# Patient Record
Sex: FEMALE | Race: OTHER | HISPANIC OR LATINO | URBAN - METROPOLITAN AREA
[De-identification: names, ages, dates, MRNs, and addresses within clinical notes are randomized per-mention and may not be internally consistent; named-entity substitution may affect disease eponyms.]

---

## 2019-01-09 ENCOUNTER — EMERGENCY (EMERGENCY)
Facility: HOSPITAL | Age: 54
LOS: 1 days | Discharge: HOME | End: 2019-01-09
Attending: EMERGENCY MEDICINE
Payer: COMMERCIAL

## 2019-01-09 VITALS
OXYGEN SATURATION: 100 % | TEMPERATURE: 98 F | SYSTOLIC BLOOD PRESSURE: 175 MMHG | HEART RATE: 103 BPM | DIASTOLIC BLOOD PRESSURE: 88 MMHG | RESPIRATION RATE: 18 BRPM

## 2019-01-09 DIAGNOSIS — Z90.12 ACQUIRED ABSENCE OF LEFT BREAST AND NIPPLE: Chronic | ICD-10-CM

## 2019-01-09 LAB
ACETONE SERPL-MCNC: NEGATIVE — SIGNIFICANT CHANGE UP
ALBUMIN SERPL ELPH-MCNC: 2.7 G/DL — LOW (ref 3.5–5)
ALP SERPL-CCNC: 88 U/L — SIGNIFICANT CHANGE UP (ref 40–120)
ALT FLD-CCNC: 39 U/L DA — SIGNIFICANT CHANGE UP (ref 10–60)
ANION GAP SERPL CALC-SCNC: 10 MMOL/L — SIGNIFICANT CHANGE UP (ref 5–17)
APTT BLD: 25.6 SEC — LOW (ref 27.5–36.3)
AST SERPL-CCNC: 23 U/L — SIGNIFICANT CHANGE UP (ref 10–40)
BASOPHILS # BLD AUTO: 0.1 K/UL — SIGNIFICANT CHANGE UP (ref 0–0.2)
BASOPHILS NFR BLD AUTO: 1.2 % — SIGNIFICANT CHANGE UP (ref 0–2)
BILIRUB SERPL-MCNC: <0.1 MG/DL — LOW (ref 0.2–1.2)
BUN SERPL-MCNC: 14 MG/DL — SIGNIFICANT CHANGE UP (ref 7–18)
CALCIUM SERPL-MCNC: 7.8 MG/DL — LOW (ref 8.4–10.5)
CHLORIDE SERPL-SCNC: 99 MMOL/L — SIGNIFICANT CHANGE UP (ref 96–108)
CK SERPL-CCNC: 92 U/L — SIGNIFICANT CHANGE UP (ref 21–215)
CO2 SERPL-SCNC: 24 MMOL/L — SIGNIFICANT CHANGE UP (ref 22–31)
CREAT SERPL-MCNC: 0.51 MG/DL — SIGNIFICANT CHANGE UP (ref 0.5–1.3)
D DIMER BLD IA.RAPID-MCNC: 260 NG/ML DDU — HIGH
EOSINOPHIL # BLD AUTO: 0.1 K/UL — SIGNIFICANT CHANGE UP (ref 0–0.5)
EOSINOPHIL NFR BLD AUTO: 0.8 % — SIGNIFICANT CHANGE UP (ref 0–6)
GLUCOSE SERPL-MCNC: 99 MG/DL — SIGNIFICANT CHANGE UP (ref 70–99)
HCT VFR BLD CALC: 27.8 % — LOW (ref 34.5–45)
HGB BLD-MCNC: 9.1 G/DL — LOW (ref 11.5–15.5)
INR BLD: 1.05 RATIO — SIGNIFICANT CHANGE UP (ref 0.88–1.16)
LYMPHOCYTES # BLD AUTO: 1.9 K/UL — SIGNIFICANT CHANGE UP (ref 1–3.3)
LYMPHOCYTES # BLD AUTO: 17.9 % — SIGNIFICANT CHANGE UP (ref 13–44)
MAGNESIUM SERPL-MCNC: 2.1 MG/DL — SIGNIFICANT CHANGE UP (ref 1.6–2.6)
MCHC RBC-ENTMCNC: 29.5 PG — SIGNIFICANT CHANGE UP (ref 27–34)
MCHC RBC-ENTMCNC: 32.8 GM/DL — SIGNIFICANT CHANGE UP (ref 32–36)
MCV RBC AUTO: 90 FL — SIGNIFICANT CHANGE UP (ref 80–100)
MONOCYTES # BLD AUTO: 0.7 K/UL — SIGNIFICANT CHANGE UP (ref 0–0.9)
MONOCYTES NFR BLD AUTO: 6.5 % — SIGNIFICANT CHANGE UP (ref 2–14)
NEUTROPHILS # BLD AUTO: 7.9 K/UL — HIGH (ref 1.8–7.4)
NEUTROPHILS NFR BLD AUTO: 73.6 % — SIGNIFICANT CHANGE UP (ref 43–77)
NT-PROBNP SERPL-SCNC: 222 PG/ML — HIGH (ref 0–125)
PLATELET # BLD AUTO: 484 K/UL — HIGH (ref 150–400)
POTASSIUM SERPL-MCNC: 3.5 MMOL/L — SIGNIFICANT CHANGE UP (ref 3.5–5.3)
POTASSIUM SERPL-SCNC: 3.5 MMOL/L — SIGNIFICANT CHANGE UP (ref 3.5–5.3)
PROT SERPL-MCNC: 6.3 G/DL — SIGNIFICANT CHANGE UP (ref 6–8.3)
PROTHROM AB SERPL-ACNC: 11.7 SEC — SIGNIFICANT CHANGE UP (ref 10–12.9)
RBC # BLD: 3.09 M/UL — LOW (ref 3.8–5.2)
RBC # FLD: 13.2 % — SIGNIFICANT CHANGE UP (ref 10.3–14.5)
SODIUM SERPL-SCNC: 133 MMOL/L — LOW (ref 135–145)
TROPONIN I SERPL-MCNC: 0.02 NG/ML — SIGNIFICANT CHANGE UP (ref 0–0.04)
TSH SERPL-MCNC: 2.86 UU/ML — SIGNIFICANT CHANGE UP (ref 0.34–4.82)
WBC # BLD: 10.7 K/UL — HIGH (ref 3.8–10.5)
WBC # FLD AUTO: 10.7 K/UL — HIGH (ref 3.8–10.5)

## 2019-01-09 PROCEDURE — 83880 ASSAY OF NATRIURETIC PEPTIDE: CPT

## 2019-01-09 PROCEDURE — 84480 ASSAY TRIIODOTHYRONINE (T3): CPT

## 2019-01-09 PROCEDURE — 71275 CT ANGIOGRAPHY CHEST: CPT | Mod: 26

## 2019-01-09 PROCEDURE — 84484 ASSAY OF TROPONIN QUANT: CPT

## 2019-01-09 PROCEDURE — 93005 ELECTROCARDIOGRAM TRACING: CPT

## 2019-01-09 PROCEDURE — 85379 FIBRIN DEGRADATION QUANT: CPT

## 2019-01-09 PROCEDURE — 82009 KETONE BODYS QUAL: CPT

## 2019-01-09 PROCEDURE — 99282 EMERGENCY DEPT VISIT SF MDM: CPT

## 2019-01-09 PROCEDURE — 36415 COLL VENOUS BLD VENIPUNCTURE: CPT

## 2019-01-09 PROCEDURE — 84443 ASSAY THYROID STIM HORMONE: CPT

## 2019-01-09 PROCEDURE — 82550 ASSAY OF CK (CPK): CPT

## 2019-01-09 PROCEDURE — 84436 ASSAY OF TOTAL THYROXINE: CPT

## 2019-01-09 PROCEDURE — 85027 COMPLETE CBC AUTOMATED: CPT

## 2019-01-09 PROCEDURE — 85730 THROMBOPLASTIN TIME PARTIAL: CPT

## 2019-01-09 PROCEDURE — 99284 EMERGENCY DEPT VISIT MOD MDM: CPT

## 2019-01-09 PROCEDURE — 71275 CT ANGIOGRAPHY CHEST: CPT

## 2019-01-09 PROCEDURE — 80053 COMPREHEN METABOLIC PANEL: CPT

## 2019-01-09 PROCEDURE — 83735 ASSAY OF MAGNESIUM: CPT

## 2019-01-09 PROCEDURE — 85610 PROTHROMBIN TIME: CPT

## 2019-01-09 NOTE — ED PROVIDER NOTE - OBJECTIVE STATEMENT
52 y/o F with a significant PMHx of breast CA and an impingement of the lumbar back and a significant PSHx of L-sided mastectomy presents to the ED with c/o b/l leg pain x 2 months, loss of apetite, weight loss, and general weakness x 2 weeks, and SOB and palpitations x yesterday. Pt states she had w/u x 2 weeks ago that revealed she may possibly have Sjogren's disease. Pt notes she had w/u x yesterday and had SOB with an elevated heart rate, so her doctor advised her to go to the ED. Pt denies fever, chest pain, or any other complaints. NKDA. 54 y/o F with a significant PMHx of breast CA and an impingement of the lumbar back and a significant PSHx of L-sided mastectomy presents to the ED with c/o b/l leg pain x 2 months, loss of appetite, weight loss, and general weakness x 2 weeks, and SOB and palpitations x yesterday. Pt states she had w/u x 2 weeks ago that revealed she may possibly have Sjogren's disease. Pt notes she had w/u x yesterday and had SOB with an elevated heart rate, so her doctor advised her to go to the ED. Pt denies fever, chest pain, or any other complaints. NKDA.

## 2019-01-09 NOTE — ED PROVIDER NOTE - CHPI ED SYMPTOMS POS
b/l lower extremity pain, loss of appetite, weight loss, general weakness, SHORTNESS OF BREATH/palpitations, b/l lower extremity pain, loss of appetite, weight loss, general weakness,

## 2019-01-09 NOTE — ED PROVIDER NOTE - CARE PLAN
Principal Discharge DX:	Palpitations  Secondary Diagnosis:	Atypical chest pain Principal Discharge DX:	Palpitations  Secondary Diagnosis:	Atypical chest pain  Secondary Diagnosis:	Mediastinal lymphadenopathy  Secondary Diagnosis:	Lung nodules  Secondary Diagnosis:	Fat necrosis of breast

## 2019-01-09 NOTE — ED PROVIDER NOTE - PHYSICAL EXAMINATION
blister noted on Rt palmar, Lt hand-dorsal aspect blister noted on Rt palmar, Lt hand-dorsal aspect  lt mastectomy blister noted on Rt palmar, Lt hand-dorsal aspect  lt breast-reconstruction

## 2019-01-09 NOTE — ED PROVIDER NOTE - PROGRESS NOTE DETAILS
pt with no PE, but poss fat necrosis to breast reconstruction, also mult lung nodules, mediastinal lymphadenopathy, advised to f/u with onco., pulmonary pt with no PE, but poss fat necrosis to breast reconstruction, also mult lung nodules, mediastinal lymphadenopathy, advised to f/u with onco., pulmonary.  Pt in no distress, no SOB, palpitations, will d/c home with family.  surgery outpt referral for fat necrosis of breast

## 2019-01-10 ENCOUNTER — APPOINTMENT (OUTPATIENT)
Dept: SURGERY | Facility: CLINIC | Age: 54
End: 2019-01-10
Payer: COMMERCIAL

## 2019-01-10 VITALS
SYSTOLIC BLOOD PRESSURE: 116 MMHG | OXYGEN SATURATION: 100 % | WEIGHT: 132 LBS | HEART RATE: 84 BPM | DIASTOLIC BLOOD PRESSURE: 65 MMHG | BODY MASS INDEX: 25.91 KG/M2 | HEIGHT: 60 IN

## 2019-01-10 VITALS
TEMPERATURE: 98 F | SYSTOLIC BLOOD PRESSURE: 129 MMHG | DIASTOLIC BLOOD PRESSURE: 50 MMHG | HEART RATE: 97 BPM | RESPIRATION RATE: 18 BRPM | OXYGEN SATURATION: 100 %

## 2019-01-10 LAB
T3 SERPL-MCNC: 118 NG/DL — SIGNIFICANT CHANGE UP (ref 80–200)
T4 AB SER-ACNC: 8.7 UG/DL — SIGNIFICANT CHANGE UP (ref 4.6–12)

## 2019-01-10 PROCEDURE — 99203 OFFICE O/P NEW LOW 30 MIN: CPT

## 2019-01-17 ENCOUNTER — APPOINTMENT (OUTPATIENT)
Dept: SURGERY | Facility: CLINIC | Age: 54
End: 2019-01-17

## 2019-01-30 ENCOUNTER — APPOINTMENT (OUTPATIENT)
Dept: PULMONOLOGY | Facility: CLINIC | Age: 54
End: 2019-01-30
Payer: COMMERCIAL

## 2019-01-30 VITALS
HEIGHT: 60 IN | WEIGHT: 132 LBS | HEART RATE: 98 BPM | TEMPERATURE: 97.9 F | BODY MASS INDEX: 25.91 KG/M2 | DIASTOLIC BLOOD PRESSURE: 83 MMHG | SYSTOLIC BLOOD PRESSURE: 155 MMHG

## 2019-01-30 DIAGNOSIS — Z85.3 PERSONAL HISTORY OF MALIGNANT NEOPLASM OF BREAST: ICD-10-CM

## 2019-01-30 PROCEDURE — 94729 DIFFUSING CAPACITY: CPT

## 2019-01-30 PROCEDURE — ZZZZZ: CPT

## 2019-01-30 PROCEDURE — 94010 BREATHING CAPACITY TEST: CPT

## 2019-01-30 PROCEDURE — 99203 OFFICE O/P NEW LOW 30 MIN: CPT | Mod: 25

## 2019-01-30 PROCEDURE — 94726 PLETHYSMOGRAPHY LUNG VOLUMES: CPT

## 2019-01-30 NOTE — REASON FOR VISIT
[Consultation] : a consultation visit [Abnormal CT Scan] : abnormal CT Scan [Family Member] : family member

## 2019-01-31 PROBLEM — Z85.3 HISTORY OF BREAST CANCER: Status: RESOLVED | Noted: 2019-01-10 | Resolved: 2019-01-31

## 2019-01-31 NOTE — PROCEDURE
[FreeTextEntry1] : 1) PFTs- Spirometry is normal. Lung volumes are normal. DLCO is normal.\par 2) CT angio 1/19- reviewed on PACs - mediastinal adenopathy- enlarged pretracheal, subcarinal node and AP node. Multiple <4mm nodules bilaterally\par \par 3) PET/CT- 1/22/19- Hypermetabolic nodes- largest in subcarinal 1.5cm- SUV- 9.8. smaller hypermetabolic nodes precarinal, aortia, adebayo, right subclavian node, deep right pectoral. Left breast stellate lesion 2cm SUV 2.5 correlates with recent biopsy. 3cm left lower breast lesion SUV 3.5

## 2019-01-31 NOTE — HISTORY OF PRESENT ILLNESS
[FreeTextEntry1] : Family providing translation for the patient. The audrey is a 53 year old female was referred to us for an abnormal CT scan during recent ED visit after her PCP noticed that she was tachycardic. The has been complaining of shortness of breath and night sweats for last 3 months. She has history of breast cancer ( in remission since 2013) for which she is taking tamoxifen. She lost about 30 pounds weight loss in last 3 months. She also has weakness of lower extremities because of that she is unable to walk much as she used to. Recently she has undergone rheumatological workup which has been negative so far and in addition to an MRI of her back and also an EMG by neurology.  She has associated night sweats and chills. She has no fever. She denies any recent travel. She believes that she had a negative PPD in the past.

## 2019-01-31 NOTE — ASSESSMENT
[FreeTextEntry1] : 1) Abnormal CT chest- Mediastinal adenopathy- Pretracheal, subcarinal, AP window, pectoral, right subclavian region. Lymphnodes are metabolically active on PET scan\par 2) Hx of tx breast ca\par 3) Nonspecific proximal leg weakness, weightloss, nightsweats and chills

## 2019-01-31 NOTE — PHYSICAL EXAM
[Normal Appearance] : normal appearance [General Appearance - In No Acute Distress] : no acute distress [Normal Conjunctiva] : the conjunctiva exhibited no abnormalities [Eyelids - No Xanthelasma] : the eyelids demonstrated no xanthelasmas [Neck Appearance] : the appearance of the neck was normal [Jugular Venous Distention Increased] : there was no jugular-venous distention [Heart Rate And Rhythm] : heart rate and rhythm were normal [Heart Sounds] : normal S1 and S2 [Murmurs] : no murmurs present [Respiration, Rhythm And Depth] : normal respiratory rhythm and effort [Auscultation Breath Sounds / Voice Sounds] : lungs were clear to auscultation bilaterally [Chest Palpation] : palpation of the chest revealed no abnormalities [Lungs Percussion] : the lungs were normal to percussion [Bowel Sounds] : normal bowel sounds [Abdomen Tenderness] : non-tender [] : no hepato-splenomegaly [Cranial Nerves] : cranial nerves 2-12 were intact [Deep Tendon Reflexes (DTR)] : deep tendon reflexes were 2+ and symmetric [Sensation] : the sensory exam was normal to light touch and pinprick [Motor Exam] : the motor exam was normal [Abnormal Walk] : normal gait [Nail Clubbing] : no clubbing of the fingernails [Cyanosis, Localized] : no localized cyanosis [Skin Color & Pigmentation] : normal skin color and pigmentation [Skin Turgor] : normal skin turgor [Oriented To Time, Place, And Person] : oriented to person, place, and time [Impaired Insight] : insight and judgment were intact [Normal Oropharynx] : abnormal oropharynx [Low Lying Soft Palate] : no low lying soft palate

## 2019-01-31 NOTE — REVIEW OF SYSTEMS
[Fever] : fever [Fatigue] : fatigue [Recent Wt Loss (___ Lbs)] : recent [unfilled] ~Ulb weight loss [Negative] : Endocrine [Dry Eyes] : no dryness of the eyes [Eye Irritation] : no ~T irritation of the eyes [Nasal Congestion] : no nasal congestion [Sore Throat] : no sore throat

## 2019-02-06 ENCOUNTER — APPOINTMENT (OUTPATIENT)
Dept: THORACIC SURGERY | Facility: CLINIC | Age: 54
End: 2019-02-06
Payer: COMMERCIAL

## 2019-02-06 VITALS
DIASTOLIC BLOOD PRESSURE: 63 MMHG | SYSTOLIC BLOOD PRESSURE: 98 MMHG | WEIGHT: 131 LBS | BODY MASS INDEX: 25.72 KG/M2 | TEMPERATURE: 98.1 F | HEIGHT: 60 IN | HEART RATE: 96 BPM

## 2019-02-06 PROCEDURE — 99204 OFFICE O/P NEW MOD 45 MIN: CPT

## 2019-02-06 NOTE — HISTORY OF PRESENT ILLNESS
[FreeTextEntry1] : 53 year old female was referred to us for an abnormal CT scan during recent ED visit after her PCP noticed that she was tachycardic. She has been complaining of shortness of breath and night sweats for last 3 months. She has history of breast cancer, s/p mastectomy 2012, she is on tamoxifen. She lost about 30 pounds in last 3 months. She also has weakness of lower extremities.  Recently she has undergone rheumatological workup which has been negative so far and in addition to an MRI of her back and also an EMG by neurology.  She has associated night sweats and chills. She has no fever. She denies any recent travel. She believes that she had a negative PPD in the past. [Diabetes Mellitus] : no Diabetes Melllitus [Dyslipidemia] : no dyslipidemia

## 2019-02-06 NOTE — PHYSICAL EXAM
[Skin Color & Pigmentation] : normal skin color and pigmentation [Skin Turgor] : normal skin turgor [Cranial Nerves] : cranial nerves 2-12 were intact [Deep Tendon Reflexes (DTR)] : deep tendon reflexes were 2+ and symmetric [Sensation] : the sensory exam was normal to light touch and pinprick [Motor Exam] : the motor exam was normal [Oriented To Time, Place, And Person] : oriented to person, place, and time [Impaired Insight] : insight and judgment were intact [Normal Appearance] : normal appearance [General Appearance - In No Acute Distress] : no acute distress [Normal Conjunctiva] : the conjunctiva exhibited no abnormalities [Eyelids - No Xanthelasma] : the eyelids demonstrated no xanthelasmas [Neck Appearance] : the appearance of the neck was normal [Jugular Venous Distention Increased] : there was no jugular-venous distention [Heart Rate And Rhythm] : heart rate and rhythm were normal [Heart Sounds] : normal S1 and S2 [Murmurs] : no murmurs present [Respiration, Rhythm And Depth] : normal respiratory rhythm and effort [Auscultation Breath Sounds / Voice Sounds] : lungs were clear to auscultation bilaterally [Chest Palpation] : palpation of the chest revealed no abnormalities [Lungs Percussion] : the lungs were normal to percussion [Bowel Sounds] : normal bowel sounds [Abdomen Tenderness] : non-tender [] : no hepato-splenomegaly [Abnormal Walk] : normal gait [Nail Clubbing] : no clubbing of the fingernails [Cyanosis, Localized] : no localized cyanosis [Normal Oropharynx] : abnormal oropharynx [Low Lying Soft Palate] : no low lying soft palate

## 2019-02-06 NOTE — ASSESSMENT
[FreeTextEntry1] : 1) Abnormal CT chest- Mediastinal adenopathy- Pretracheal, subcarinal, AP window, pectoral, right subclavian region. Lymphnodes are metabolically active on PET scan\par 2) Hx of tx breast ca\par 3) Nonspecific proximal leg weakness, weigh tloss, nightsweats and chills\par \par Plan for EBUS possible mediastinoscopy.  Patient will need medical clearance. We will call her with a date for the procedure.\par \par Best contact#son: Shoaib [phone]\par PMD:Dr. Yomaira Stahl [phone] FAX:[phone]\par Written by  Janeth Ramírez PA-C acting as a scribe for Cait Evans MD. The documentation recorded by the scribe accurately reflects the service I personally performed and the decisions made by me, CAIT EVANS MD.\par \par \par \par \par \par \par

## 2019-05-29 ENCOUNTER — LABORATORY RESULT (OUTPATIENT)
Age: 54
End: 2019-05-29

## 2019-05-30 ENCOUNTER — LABORATORY RESULT (OUTPATIENT)
Age: 54
End: 2019-05-30

## 2019-05-30 ENCOUNTER — APPOINTMENT (OUTPATIENT)
Dept: DERMATOLOGY | Facility: CLINIC | Age: 54
End: 2019-05-30
Payer: COMMERCIAL

## 2019-05-30 VITALS
DIASTOLIC BLOOD PRESSURE: 57 MMHG | OXYGEN SATURATION: 100 % | HEART RATE: 85 BPM | WEIGHT: 120 LBS | TEMPERATURE: 98 F | SYSTOLIC BLOOD PRESSURE: 94 MMHG | BODY MASS INDEX: 23.56 KG/M2 | HEIGHT: 60 IN

## 2019-05-30 DIAGNOSIS — Z82.5 FAMILY HISTORY OF ASTHMA AND OTHER CHRONIC LOWER RESPIRATORY DISEASES: ICD-10-CM

## 2019-05-30 DIAGNOSIS — Z85.3 PERSONAL HISTORY OF MALIGNANT NEOPLASM OF BREAST: ICD-10-CM

## 2019-05-30 DIAGNOSIS — D48.5 NEOPLASM OF UNCERTAIN BEHAVIOR OF SKIN: ICD-10-CM

## 2019-05-30 LAB — HIV1+2 AB SPEC QL IA.RAPID: NONREACTIVE

## 2019-05-30 PROCEDURE — 11104 PUNCH BX SKIN SINGLE LESION: CPT

## 2019-05-30 PROCEDURE — 99204 OFFICE O/P NEW MOD 45 MIN: CPT | Mod: 25

## 2019-05-30 PROCEDURE — 11105 PUNCH BX SKIN EA SEP/ADDL: CPT | Mod: 59

## 2019-06-03 LAB
CMV IGG SERPL QL: >10 U/ML
CMV IGG SERPL-IMP: POSITIVE
HSV 1+2 IGG SER IA-IMP: POSITIVE
HSV1 IGG SER QL: 55 INDEX

## 2019-06-03 RX ORDER — CARBIDOPA AND LEVODOPA 25; 100 MG/1; MG/1
25-100 TABLET ORAL
Qty: 60 | Refills: 0 | Status: DISCONTINUED | COMMUNITY
Start: 2018-11-01 | End: 2019-06-03

## 2019-06-03 RX ORDER — NAPROXEN 500 MG/1
500 TABLET ORAL
Qty: 30 | Refills: 0 | Status: DISCONTINUED | COMMUNITY
Start: 2018-12-17 | End: 2019-06-03

## 2019-06-03 RX ORDER — GABAPENTIN 300 MG/1
300 CAPSULE ORAL
Qty: 90 | Refills: 0 | Status: DISCONTINUED | COMMUNITY
Start: 2019-01-25 | End: 2019-06-03

## 2019-06-03 RX ORDER — CYCLOBENZAPRINE HYDROCHLORIDE 5 MG/1
5 TABLET, FILM COATED ORAL
Qty: 30 | Refills: 0 | Status: DISCONTINUED | COMMUNITY
Start: 2018-11-13 | End: 2019-06-03

## 2019-06-03 RX ORDER — IBUPROFEN AND FAMOTIDINE 800; 26.6 MG/1; MG/1
800-26.6 TABLET, COATED ORAL
Qty: 90 | Refills: 0 | Status: DISCONTINUED | COMMUNITY
Start: 2018-11-13 | End: 2019-06-03

## 2019-06-03 RX ORDER — MUPIROCIN 20 MG/G
2 OINTMENT TOPICAL
Qty: 22 | Refills: 0 | Status: DISCONTINUED | COMMUNITY
Start: 2019-01-24 | End: 2019-06-03

## 2019-06-03 RX ORDER — DICAPRYLYL CARBONATE/DIMETH
SPRAY, NON-AEROSOL (ML) TOPICAL
Qty: 170 | Refills: 0 | Status: DISCONTINUED | COMMUNITY
Start: 2019-01-25 | End: 2019-06-03

## 2019-06-03 RX ORDER — TRIAMCINOLONE ACETONIDE 1 MG/G
0.1 CREAM TOPICAL
Qty: 30 | Refills: 0 | Status: DISCONTINUED | COMMUNITY
Start: 2019-01-25 | End: 2019-06-03

## 2019-06-03 RX ORDER — AMOXICILLIN AND CLAVULANATE POTASSIUM 875; 125 MG/1; MG/1
875-125 TABLET, COATED ORAL
Qty: 28 | Refills: 0 | Status: DISCONTINUED | COMMUNITY
Start: 2019-01-25 | End: 2019-06-03

## 2019-06-03 RX ORDER — HALOBETASOL PROPIONATE 0.5 MG/G
0.05 CREAM TOPICAL
Qty: 15 | Refills: 0 | Status: DISCONTINUED | COMMUNITY
Start: 2019-01-18 | End: 2019-06-03

## 2019-06-03 RX ORDER — GABAPENTIN 100 MG/1
100 CAPSULE ORAL
Qty: 60 | Refills: 0 | Status: DISCONTINUED | COMMUNITY
Start: 2018-10-16 | End: 2019-06-03

## 2019-06-03 RX ORDER — SULFAMETHOXAZOLE AND TRIMETHOPRIM 800; 160 MG/1; MG/1
800-160 TABLET ORAL
Qty: 10 | Refills: 0 | Status: DISCONTINUED | COMMUNITY
Start: 2018-12-17 | End: 2019-06-03

## 2019-06-03 RX ORDER — MELOXICAM 7.5 MG/1
7.5 TABLET ORAL
Qty: 60 | Refills: 0 | Status: DISCONTINUED | COMMUNITY
Start: 2018-11-01 | End: 2019-06-03

## 2019-06-12 NOTE — PROCEDURE
Detail Level: Simple
[FreeTextEntry1] : 1) PFTs- Spirometry is normal. Lung volumes are normal. DLCO is normal.\par 2) CT angio 1/19- reviewed on PACs - mediastinal adenopathy- enlarged pretracheal, subcarinal node and AP node. Multiple <4mm nodules bilaterally\par \par 3) PET/CT- 1/22/19 Done at [facility]- Hypermetabolic nodes- largest in subcarinal 1.5cm- SUV- 9.8. smaller hypermetabolic nodes precarinal, aortia, adebayo, right subclavian node, deep right pectoral. Left breast satellate lesion 2cm SUV 2.5 correlates with recent biopsy. 3cm left lower breast lesion SUV 3.5

## 2019-06-18 ENCOUNTER — APPOINTMENT (OUTPATIENT)
Dept: DERMATOLOGY | Facility: CLINIC | Age: 54
End: 2019-06-18
Payer: COMMERCIAL

## 2019-06-18 ENCOUNTER — INPATIENT (INPATIENT)
Facility: HOSPITAL | Age: 54
LOS: 17 days | Discharge: HOME HEALTH | End: 2019-07-06
Attending: SURGERY | Admitting: SURGERY
Payer: COMMERCIAL

## 2019-06-18 VITALS — SYSTOLIC BLOOD PRESSURE: 108 MMHG | DIASTOLIC BLOOD PRESSURE: 71 MMHG | HEART RATE: 105 BPM

## 2019-06-18 VITALS
SYSTOLIC BLOOD PRESSURE: 112 MMHG | HEART RATE: 106 BPM | RESPIRATION RATE: 16 BRPM | DIASTOLIC BLOOD PRESSURE: 46 MMHG | TEMPERATURE: 99 F | OXYGEN SATURATION: 100 %

## 2019-06-18 VITALS — TEMPERATURE: 98.2 F

## 2019-06-18 DIAGNOSIS — Z90.12 ACQUIRED ABSENCE OF LEFT BREAST AND NIPPLE: Chronic | ICD-10-CM

## 2019-06-18 DIAGNOSIS — L98.499 NON-PRESSURE CHRONIC ULCER OF SKIN OF OTHER SITES WITH UNSPECIFIED SEVERITY: ICD-10-CM

## 2019-06-18 DIAGNOSIS — A41.9 SEPSIS, UNSPECIFIED ORGANISM: ICD-10-CM

## 2019-06-18 DIAGNOSIS — R53.81 OTHER MALAISE: ICD-10-CM

## 2019-06-18 DIAGNOSIS — R20.8 OTHER DISTURBANCES OF SKIN SENSATION: ICD-10-CM

## 2019-06-18 DIAGNOSIS — E87.1 HYPO-OSMOLALITY AND HYPONATREMIA: ICD-10-CM

## 2019-06-18 DIAGNOSIS — D64.9 ANEMIA, UNSPECIFIED: ICD-10-CM

## 2019-06-18 DIAGNOSIS — J18.9 PNEUMONIA, UNSPECIFIED ORGANISM: ICD-10-CM

## 2019-06-18 DIAGNOSIS — Z29.9 ENCOUNTER FOR PROPHYLACTIC MEASURES, UNSPECIFIED: ICD-10-CM

## 2019-06-18 DIAGNOSIS — R21 RASH AND OTHER NONSPECIFIC SKIN ERUPTION: ICD-10-CM

## 2019-06-18 DIAGNOSIS — C50.919 MALIGNANT NEOPLASM OF UNSPECIFIED SITE OF UNSPECIFIED FEMALE BREAST: ICD-10-CM

## 2019-06-18 DIAGNOSIS — K92.2 GASTROINTESTINAL HEMORRHAGE, UNSPECIFIED: ICD-10-CM

## 2019-06-18 LAB
ALBUMIN SERPL ELPH-MCNC: 2.4 G/DL — LOW (ref 3.3–5)
ALP SERPL-CCNC: 85 U/L — SIGNIFICANT CHANGE UP (ref 40–120)
ALT FLD-CCNC: 28 U/L — SIGNIFICANT CHANGE UP (ref 4–33)
ANION GAP SERPL CALC-SCNC: 12 MMO/L — SIGNIFICANT CHANGE UP (ref 7–14)
ANISOCYTOSIS BLD QL: SIGNIFICANT CHANGE UP
APTT BLD: 29.6 SEC — SIGNIFICANT CHANGE UP (ref 27.5–36.3)
AST SERPL-CCNC: 23 U/L — SIGNIFICANT CHANGE UP (ref 4–32)
BASE EXCESS BLDV CALC-SCNC: 1.3 MMOL/L — SIGNIFICANT CHANGE UP
BASOPHILS # BLD AUTO: 0.03 K/UL — SIGNIFICANT CHANGE UP (ref 0–0.2)
BASOPHILS NFR BLD AUTO: 0.4 % — SIGNIFICANT CHANGE UP (ref 0–2)
BASOPHILS NFR SPEC: 0 % — SIGNIFICANT CHANGE UP (ref 0–2)
BILIRUB SERPL-MCNC: < 0.2 MG/DL — LOW (ref 0.2–1.2)
BLASTS # FLD: 0 % — SIGNIFICANT CHANGE UP (ref 0–0)
BLD GP AB SCN SERPL QL: NEGATIVE — SIGNIFICANT CHANGE UP
BLOOD GAS VENOUS - CREATININE: 0.61 MG/DL — SIGNIFICANT CHANGE UP (ref 0.5–1.3)
BLOOD GAS VENOUS - FIO2: 21 — SIGNIFICANT CHANGE UP
BUN SERPL-MCNC: 14 MG/DL — SIGNIFICANT CHANGE UP (ref 7–23)
CALCIUM SERPL-MCNC: 8.3 MG/DL — LOW (ref 8.4–10.5)
CHLORIDE BLDV-SCNC: 99 MMOL/L — SIGNIFICANT CHANGE UP (ref 96–108)
CHLORIDE SERPL-SCNC: 96 MMOL/L — LOW (ref 98–107)
CO2 SERPL-SCNC: 22 MMOL/L — SIGNIFICANT CHANGE UP (ref 22–31)
CREAT SERPL-MCNC: 0.58 MG/DL — SIGNIFICANT CHANGE UP (ref 0.5–1.3)
DACRYOCYTES BLD QL SMEAR: SLIGHT — SIGNIFICANT CHANGE UP
ELLIPTOCYTES BLD QL SMEAR: SLIGHT — SIGNIFICANT CHANGE UP
EOSINOPHIL # BLD AUTO: 0.23 K/UL — SIGNIFICANT CHANGE UP (ref 0–0.5)
EOSINOPHIL NFR BLD AUTO: 2.7 % — SIGNIFICANT CHANGE UP (ref 0–6)
EOSINOPHIL NFR FLD: 3.6 % — SIGNIFICANT CHANGE UP (ref 0–6)
GAS PNL BLDV: 125 MMOL/L — LOW (ref 136–146)
GIANT PLATELETS BLD QL SMEAR: PRESENT — SIGNIFICANT CHANGE UP
GLUCOSE BLDV-MCNC: 96 MG/DL — SIGNIFICANT CHANGE UP (ref 70–99)
GLUCOSE SERPL-MCNC: 100 MG/DL — HIGH (ref 70–99)
HCG SERPL-ACNC: < 5 MIU/ML — SIGNIFICANT CHANGE UP
HCO3 BLDV-SCNC: 25 MMOL/L — SIGNIFICANT CHANGE UP (ref 20–27)
HCT VFR BLD CALC: 19.2 % — CRITICAL LOW (ref 34.5–45)
HCT VFR BLDV CALC: 19.1 % — CRITICAL LOW (ref 34.5–45)
HGB BLD-MCNC: 6.1 G/DL — CRITICAL LOW (ref 11.5–15.5)
HGB BLDV-MCNC: 6.1 G/DL — CRITICAL LOW (ref 11.5–15.5)
HYPOCHROMIA BLD QL: SLIGHT — SIGNIFICANT CHANGE UP
IMM GRANULOCYTES NFR BLD AUTO: 6.7 % — HIGH (ref 0–1.5)
INR BLD: 1.47 — HIGH (ref 0.88–1.17)
LACTATE BLDV-MCNC: 1.3 MMOL/L — SIGNIFICANT CHANGE UP (ref 0.5–2)
LYMPHOCYTES # BLD AUTO: 1.37 K/UL — SIGNIFICANT CHANGE UP (ref 1–3.3)
LYMPHOCYTES # BLD AUTO: 16.3 % — SIGNIFICANT CHANGE UP (ref 13–44)
LYMPHOCYTES NFR SPEC AUTO: 12.5 % — LOW (ref 13–44)
MACROCYTES BLD QL: SIGNIFICANT CHANGE UP
MCHC RBC-ENTMCNC: 29.3 PG — SIGNIFICANT CHANGE UP (ref 27–34)
MCHC RBC-ENTMCNC: 31.8 % — LOW (ref 32–36)
MCV RBC AUTO: 92.3 FL — SIGNIFICANT CHANGE UP (ref 80–100)
METAMYELOCYTES # FLD: 0 % — SIGNIFICANT CHANGE UP (ref 0–1)
MONOCYTES # BLD AUTO: 0.71 K/UL — SIGNIFICANT CHANGE UP (ref 0–0.9)
MONOCYTES NFR BLD AUTO: 8.5 % — SIGNIFICANT CHANGE UP (ref 2–14)
MONOCYTES NFR BLD: 6.2 % — SIGNIFICANT CHANGE UP (ref 2–9)
MYELOCYTES NFR BLD: 0 % — SIGNIFICANT CHANGE UP (ref 0–0)
NEUTROPHIL AB SER-ACNC: 71.4 % — SIGNIFICANT CHANGE UP (ref 43–77)
NEUTROPHILS # BLD AUTO: 5.48 K/UL — SIGNIFICANT CHANGE UP (ref 1.8–7.4)
NEUTROPHILS NFR BLD AUTO: 65.4 % — SIGNIFICANT CHANGE UP (ref 43–77)
NEUTS BAND # BLD: 0.9 % — SIGNIFICANT CHANGE UP (ref 0–6)
NRBC # FLD: 0.07 K/UL — SIGNIFICANT CHANGE UP (ref 0–0)
OB PNL STL: POSITIVE — SIGNIFICANT CHANGE UP
OTHER - HEMATOLOGY %: 0 — SIGNIFICANT CHANGE UP
PCO2 BLDV: 39 MMHG — LOW (ref 41–51)
PH BLDV: 7.43 PH — SIGNIFICANT CHANGE UP (ref 7.32–7.43)
PLATELET # BLD AUTO: 610 K/UL — HIGH (ref 150–400)
PLATELET COUNT - ESTIMATE: SIGNIFICANT CHANGE UP
PMV BLD: 9.2 FL — SIGNIFICANT CHANGE UP (ref 7–13)
PO2 BLDV: 28 MMHG — LOW (ref 35–40)
POIKILOCYTOSIS BLD QL AUTO: SIGNIFICANT CHANGE UP
POLYCHROMASIA BLD QL SMEAR: SIGNIFICANT CHANGE UP
POTASSIUM BLDV-SCNC: 3.9 MMOL/L — SIGNIFICANT CHANGE UP (ref 3.4–4.5)
POTASSIUM SERPL-MCNC: 4.1 MMOL/L — SIGNIFICANT CHANGE UP (ref 3.5–5.3)
POTASSIUM SERPL-SCNC: 4.1 MMOL/L — SIGNIFICANT CHANGE UP (ref 3.5–5.3)
PROMYELOCYTES # FLD: 0 % — SIGNIFICANT CHANGE UP (ref 0–0)
PROT SERPL-MCNC: 4.9 G/DL — LOW (ref 6–8.3)
PROTHROM AB SERPL-ACNC: 16.5 SEC — HIGH (ref 9.8–13.1)
RBC # BLD: 2.08 M/UL — LOW (ref 3.8–5.2)
RBC # FLD: 26.9 % — HIGH (ref 10.3–14.5)
RH IG SCN BLD-IMP: POSITIVE — SIGNIFICANT CHANGE UP
RH IG SCN BLD-IMP: POSITIVE — SIGNIFICANT CHANGE UP
SAO2 % BLDV: 40.8 % — LOW (ref 60–85)
SMUDGE CELLS # BLD: PRESENT — SIGNIFICANT CHANGE UP
SODIUM SERPL-SCNC: 130 MMOL/L — LOW (ref 135–145)
VARIANT LYMPHS # BLD: 5.4 % — SIGNIFICANT CHANGE UP
WBC # BLD: 8.38 K/UL — SIGNIFICANT CHANGE UP (ref 3.8–10.5)
WBC # FLD AUTO: 8.38 K/UL — SIGNIFICANT CHANGE UP (ref 3.8–10.5)

## 2019-06-18 PROCEDURE — 99223 1ST HOSP IP/OBS HIGH 75: CPT | Mod: GC

## 2019-06-18 PROCEDURE — 71045 X-RAY EXAM CHEST 1 VIEW: CPT | Mod: 26

## 2019-06-18 PROCEDURE — 99214 OFFICE O/P EST MOD 30 MIN: CPT

## 2019-06-18 PROCEDURE — 72193 CT PELVIS W/DYE: CPT | Mod: 26

## 2019-06-18 RX ORDER — SODIUM CHLORIDE 9 MG/ML
2000 INJECTION, SOLUTION INTRAVENOUS ONCE
Refills: 0 | Status: COMPLETED | OUTPATIENT
Start: 2019-06-18 | End: 2019-06-18

## 2019-06-18 RX ORDER — VANCOMYCIN HCL 1 G
1000 VIAL (EA) INTRAVENOUS ONCE
Refills: 0 | Status: COMPLETED | OUTPATIENT
Start: 2019-06-18 | End: 2019-06-18

## 2019-06-18 RX ORDER — PIPERACILLIN AND TAZOBACTAM 4; .5 G/20ML; G/20ML
3.38 INJECTION, POWDER, LYOPHILIZED, FOR SOLUTION INTRAVENOUS ONCE
Refills: 0 | Status: COMPLETED | OUTPATIENT
Start: 2019-06-18 | End: 2019-06-18

## 2019-06-18 RX ORDER — PIPERACILLIN AND TAZOBACTAM 4; .5 G/20ML; G/20ML
3.38 INJECTION, POWDER, LYOPHILIZED, FOR SOLUTION INTRAVENOUS EVERY 8 HOURS
Refills: 0 | Status: DISCONTINUED | OUTPATIENT
Start: 2019-06-18 | End: 2019-06-19

## 2019-06-18 RX ORDER — MUPIROCIN 20 MG/G
1 OINTMENT TOPICAL
Refills: 0 | Status: DISCONTINUED | OUTPATIENT
Start: 2019-06-18 | End: 2019-07-06

## 2019-06-18 RX ORDER — LIDOCAINE 4 G/100G
1 CREAM TOPICAL EVERY 6 HOURS
Refills: 0 | Status: DISCONTINUED | OUTPATIENT
Start: 2019-06-18 | End: 2019-07-06

## 2019-06-18 RX ORDER — PIPERACILLIN AND TAZOBACTAM 4; .5 G/20ML; G/20ML
3.38 INJECTION, POWDER, LYOPHILIZED, FOR SOLUTION INTRAVENOUS ONCE
Refills: 0 | Status: DISCONTINUED | OUTPATIENT
Start: 2019-06-18 | End: 2019-06-18

## 2019-06-18 RX ORDER — PANTOPRAZOLE SODIUM 20 MG/1
80 TABLET, DELAYED RELEASE ORAL ONCE
Refills: 0 | Status: COMPLETED | OUTPATIENT
Start: 2019-06-18 | End: 2019-06-18

## 2019-06-18 RX ORDER — ACETAMINOPHEN 500 MG
1000 TABLET ORAL ONCE
Refills: 0 | Status: COMPLETED | OUTPATIENT
Start: 2019-06-18 | End: 2019-06-18

## 2019-06-18 RX ADMIN — PANTOPRAZOLE SODIUM 80 MILLIGRAM(S): 20 TABLET, DELAYED RELEASE ORAL at 16:29

## 2019-06-18 RX ADMIN — Medication 250 MILLIGRAM(S): at 14:47

## 2019-06-18 RX ADMIN — Medication 1000 MILLIGRAM(S): at 14:10

## 2019-06-18 RX ADMIN — Medication 400 MILLIGRAM(S): at 13:45

## 2019-06-18 RX ADMIN — SODIUM CHLORIDE 2000 MILLILITER(S): 9 INJECTION, SOLUTION INTRAVENOUS at 14:15

## 2019-06-18 RX ADMIN — PIPERACILLIN AND TAZOBACTAM 200 GRAM(S): 4; .5 INJECTION, POWDER, LYOPHILIZED, FOR SOLUTION INTRAVENOUS at 14:15

## 2019-06-18 NOTE — H&P ADULT - NSHPREVIEWOFSYSTEMS_GEN_ALL_CORE
Constitutional: denies fevers, chills, night sweats, weight loss  HEENT: denies visual changes, cough  Cardiovascular: denies palpitations, chest pain, edema  Respiratory: denies SOB, wheezing  Gastrointestinal: denies N/V/, abdominal pain,  melena  : denies dysuria, urinary urgency, increased frequency  MSK: + weakness, no joint pain  Skin: + rashes, no masses  Heme: denies bleeding, bruising  Neuro: denies headache, weakness Constitutional Symptoms: +weakness, fevers, chills  Eyes: No visual changes, headache, eye pain, double vision  Ears, Nose, Mouth, Throat: No runny nose, sinus pain, ear pain, tinnitus, sore throat, dysphagia, odynophagia  Cardiovascular: No chest pain, palpitations, edema  Respiratory: No cough, wheezing, hemoptysis, shortness of breath  Gastrointestinal: +bloody diarrhea, no abdominal pain, dysphagia, anorexia, nausea/vomiting, hematemesis  Genitourinary: No dysuria, frequency, hematuria  Musculoskeletal: No joint pain, joint swelling, decreased ROM  Skin: +rash, no pruritus  Neurologic:  No seizures, headache, paraesthesia, numbness, limb weakness    Positives and pertinent negatives noted and all other systems negative.

## 2019-06-18 NOTE — ED ADULT NURSE NOTE - OBJECTIVE STATEMENT
Pt presents to ED from dermatolgist office with fever, chills, shaking. Pt AxOx3. Pt was seen at dermatologist today for wounds on buttocks that were diagnosed as MRSA today. Pt denies chest pain, lightheadedness and dizziness. pt denies shortness of breath. breathing even and unlabored. Pt denies dysuria and hematuria. Wounds noted to buttocks and down bilateral legs. Telfa placed on wounds by dermatologist stuck to patient. Pt states wounds are extremely painful. 20G IVL placed in the R AC. Will continue to monitor.

## 2019-06-18 NOTE — ED PROVIDER NOTE - PROGRESS NOTE DETAILS
patient found to be anemic/guiac +, given protonix, consented for blood, prbc ordered. pending ct pelvis for admission Walker: tba hospitalist if CT pelvis normal. Spoke with Dr. Bear who is aware patient is here.

## 2019-06-18 NOTE — ED PROVIDER NOTE - ATTENDING CONTRIBUTION TO CARE
Patient with h/o as above with fevers, called by doctor that buttock wounds present for last >1m + for mrsa.  Patient notes she has had wounds across her buttock which have been increasing in number and size, significantly painful with bowel movement, and now with occasional purulence in region  of some of the wounds. Noted fevers. Denies ha, neck pain, cp, cough, sob, abd pain, vomiting, dysuria. States her stools have been increasingly loose. last chemo last wednesday.  exam  GEN - NAD; ill appearing; A+O x3   HEAD - NC/AT   EYES- PERRL, EOMI  ENT: Airway patent, dry mm, Oral cavity and pharynx normal.     NECK: Neck supple  PULMONARY - CTA b/l, symmetric breath sounds.   CARDIAC -s1s2, tachy RR, no M,G,R  ABDOMEN - +BS, ND, NT, soft, no guarding  BACK - no CVA tenderness, Normal  spine   EXTREMITIES - FROM, no edema   SKIN - multiple excoriation to b/l gluteal region, several deeper wounds with malodorous purulent fluid oozing from base, no crepitus  NEUROLOGIC - alert, speech clear, no focal deficits  a/p-patient with fevers, likely 2/2 infected gluteal wounds given exam, febrile/tachy, bp stable, patient mentating well, plan to check labs, ua, ct pelvis to eval for infectious infiltration of wounds, abx, hydration, antipyretics, monitor, admit. Patient with h/o as above with fevers, called by doctor that buttock wounds present for last >1m + for mrsa.  Patient notes she has had wounds across her buttock which have been increasing in number and size, significantly painful with bowel movement, and now with occasional purulence in region  of some of the wounds. Noted fevers. Denies ha, neck pain, cp, cough, sob, abd pain, vomiting, dysuria. States her stools have been increasingly loose. last chemo last wednesday.  exam  GEN - NAD; ill appearing; A+O x3   HEAD - NC/AT   EYES- PERRL, EOMI  ENT: Airway patent, dry mm, Oral cavity and pharynx normal.     NECK: Neck supple  PULMONARY - CTA b/l, symmetric breath sounds.   CARDIAC -s1s2, tachy RR, no M,G,R  ABDOMEN - +BS, ND, NT, soft, no guarding  BACK - no CVA tenderness, Normal  spine   EXTREMITIES - FROM, no edema   SKIN - multiple excoriations/ulcerations to b/l gluteal region, several deeper wounds with malodorous purulent fluid oozing from base, no crepitus  NEUROLOGIC - alert, speech clear, no focal deficits  a/p-patient with fevers, likely 2/2 infected gluteal wounds given exam, febrile/tachy, bp stable, patient mentating well, plan to check labs, ua, ct pelvis to eval for infectious infiltration of wounds, abx, hydration, antipyretics, monitor, admit.

## 2019-06-18 NOTE — ED ADULT NURSE NOTE - INTERVENTIONS DEFINITIONS
Stretcher in lowest position, wheels locked, appropriate side rails in place/Monitor gait and stability/Reinforce activity limits and safety measures with patient and family/[place] to call system/Review medications for side effects contributing to fall risk/Instruct patient to call for assistance/Non-slip footwear when patient is off stretcher/Physically safe environment: no spills, clutter or unnecessary equipment/Call bell, personal items and telephone within reach/Monitor for mental status changes and reorient to person, place, and time/Room bathroom lighting operational

## 2019-06-18 NOTE — H&P ADULT - PROBLEM SELECTOR PLAN 5
-Present for aprrox 6 months. Derm notes in [facility] report lesions grew HSV and the patient was treated with valacyclovir.   -06/03/19 patient found to have MRSA in wound culture. Was started on topical mupirocin and PO doxycyline for 3 weeks.   -Given sepsis will treat with IV vancomycin. Will start IV acyclovir as it does not appear that the patient has cleared HSV infection. Will place patient on airborne given multiple dertmatomes involves. -Present for aprrox 6 months. Derm notes in [facility] report lesions grew HSV and the patient was treated with valacyclovir.   -06/03/19 patient found to have MRSA in wound culture. Was started on topical mupirocin and PO doxycyline for 3 weeks.   -Given sepsis will treat with IV vancomycin. Will start IV acyclovir as it does not appear that the patient has cleared HSV infection. Will place patient on airborne given multiple dermatomes involves.  -Derm consult AM.  Will start IV acyclovir. -Present for aprrox 6 months. Derm notes in [facility] report lesions grew HSV and the patient was treated with valacyclovir.   -06/03/19 patient found to have MRSA in wound culture. Was started on topical mupirocin and PO doxycyline for 3 weeks.   -Given sepsis will treat with IV vancomycin. Will start IV acyclovir 10mg/kg q8 hours as it does not appear that the patient has cleared HSV infection. Will place patient on airborne given multiple dermatomes involves.  - ID consult AM.

## 2019-06-18 NOTE — H&P ADULT - NSHPLABSRESULTS_GEN_ALL_CORE
CBC Full  -  ( 18 Jun 2019 14:00 )  WBC Count : 8.38 K/uL  RBC Count : 2.08 M/uL  Hemoglobin : 6.1 g/dL  Hematocrit : 19.2 %  Platelet Count - Automated : 610 K/uL  Mean Cell Volume : 92.3 fL  Mean Cell Hemoglobin : 29.3 pg  Mean Cell Hemoglobin Concentration : 31.8 %  Auto Neutrophil # : 5.48 K/uL  Auto Lymphocyte # : 1.37 K/uL  Auto Monocyte # : 0.71 K/uL  Auto Eosinophil # : 0.23 K/uL  Auto Basophil # : 0.03 K/uL  Auto Neutrophil % : 65.4 %  Auto Lymphocyte % : 16.3 %  Auto Monocyte % : 8.5 %  Auto Eosinophil % : 2.7 %  Auto Basophil % : 0.4 %    06-18    130<L>  |  96<L>  |  14  ----------------------------<  100<H>  4.1   |  22  |  0.58    Ca    8.3<L>      18 Jun 2019 14:00    LIVER FUNCTIONS - ( 18 Jun 2019 14:00 )  Alb: 2.4 g/dL / Pro: 4.9 g/dL / ALK PHOS: 85 u/L / ALT: 28 u/L / AST: 23 u/L / GGT: x           PT/INR - ( 18 Jun 2019 14:00 )   PT: 16.5 SEC;   INR: 1.47          PTT - ( 18 Jun 2019 14:00 )  PTT:29.6 SEC    14:00 - VBG - pH: 7.43  | pCO2: 39    | pO2: 28    | Lactate: 1.3      CTAP  FINDINGS:    BLADDER: Within normal limits.  REPRODUCTIVE ORGANS: Uterus and adnexa within normal limits.  LYMPH NODES: No pelvic lymphadenopathy.    VISUALIZED PORTIONS:    ABDOMINAL ORGANS: Within normal limits.  BOWEL: Within normal limits. The appendix is normal.  PERITONEUM: No ascites.  VESSELS: Within normal limits.  ABDOMINAL WALL: Status post ventral abdominoplasty. No drainable   perirectal abscess or organized collection.  BONES: Lumbosacral transitional vertebral body    IMPRESSION:     No organized perirectal collection or drainable abscess.      CXR INTERPRETATION:  patchy bialteral airspace opacities and prominent lung   markings, diff includes multifocal infectious consolidation

## 2019-06-18 NOTE — H&P ADULT - PROBLEM SELECTOR PLAN 1
-febrile, tachy, MRSA skin infection dx via wound cx at outpt derm office ([employer])  -vanc and pip tazo given ID. 2L IVF given. Bcx sent. UA/UCx pending. CXR showing possible bilateral PNA. Given immunocompromised, receiving weekly chemo at outpt facility will txt as HCAP

## 2019-06-18 NOTE — ED PROVIDER NOTE - PHYSICAL EXAMINATION
GENERAL: no acute distress, non-toxic appearing, febrile rectally  HEAD: normocephalic, atraumatic  HEENT: normal conjunctiva, oral mucosa moist, neck supple  CARDIAC: tachycardic  PULM: clear to ascultation bilaterally, no crackles, rales, rhonchi, or wheezing  GI: abdomen nondistended, soft, nontender, no guarding or rebound tenderness  NEURO: alert and oriented x 3, normal speech, PERRLA, EOMI, no focal motor or sensory deficits, nonantalgic gait  MSK: no visible deformities, no peripheral edema, calf tenderness/redness/swelling  SKIN: scattered excoriations to bilateral gluteal regions, some with purulent drainage; large communicating opening between rectum and vaginal midline perineum  PSYCH: appropriate mood and affect

## 2019-06-18 NOTE — H&P ADULT - PROBLEM SELECTOR PLAN 3
-Hb 6.2 on admission likely 2/2 LGIB w 2 weeks of hematochezia 5x per day  -1 unit given will check post transfusion CBC. If less than 7 will transfuse another unit  -Pt has not had any hematochezia since arrival. BP and HR stable.  -Will keep NPO, will consult GI via email ON. Hb 01/19 9.1

## 2019-06-18 NOTE — ED PROVIDER NOTE - CARE PLAN
Principal Discharge DX:	Sepsis  Secondary Diagnosis:	MRSA (methicillin resistant Staphylococcus aureus)

## 2019-06-18 NOTE — ED ADULT TRIAGE NOTE - CHIEF COMPLAINT QUOTE
patient with a h/o breast ca , c/o shaking chills x 2 days , she had chemo last Wednesday. Feels weak.

## 2019-06-18 NOTE — H&P ADULT - ASSESSMENT
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

## 2019-06-18 NOTE — H&P ADULT - PROBLEM SELECTOR PLAN 2
-Bilat opacities on xray. no hx of cough or dyspnea however immunocompromised  -will txt vanc and pip tazo.

## 2019-06-18 NOTE — ED PROVIDER NOTE - NS ED ROS FT
GENERAL: + fever, chills  HEENT: no cough, congestion, odynophagia, dysphagia  CARDIAC: no chest pain, palpitations, lightheadedness  PULM: no dyspnea, wheezing   GI: no abdominal pain, nausea, vomiting, diarrhea, constipation, melena, hematochezia  : no urinary dysuria, frequency, incontinence, hematuria  NEURO: no headache, motor weakness, sensory changes  MSK: no joint or muscle pain  SKIN: + rash over buttocks, posterior legs  HEME: no active bleeding, bruising

## 2019-06-18 NOTE — H&P ADULT - HISTORY OF PRESENT ILLNESS
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 She also reports that she has had two weeks of bloody bowel movements with blood in the toilet bowel and on paper. She reports a normal colonoscopy approx 2 years ago. She denies hx of hemorrhoids or diverticulosis. She reports she has been feeling tired all of the time. She denies SOB or CP. She denies melena. She had some epigastric abdominal pain earlier today that has gone away and that she attributed to having not eaten anything today.     In the ED vital signs: 101.2, 109., 102/43, 20, 100% on RA. She received vanc and pip tazo. She received 2L of LR. She received IV acetaminophen and pantoprazole.

## 2019-06-18 NOTE — ED PROVIDER NOTE - CLINICAL SUMMARY MEDICAL DECISION MAKING FREE TEXT BOX
53F presenting with Breast Ca (last chemo last wednesday), s/p L mastectomy, mets to the lung, MRSA of the buttocks, posterior legs x 1 month presenting with fever, chills. Likely septic with her rash as the source. Plan - basics, blood cultures, ua/uc, ivf, antibiotics, tba.

## 2019-06-18 NOTE — H&P ADULT - NSHPSOCIALHISTORY_GEN_ALL_CORE
Lives at home with family  Used to work as a   Originally from [facility] Lives at home with family  Used to work as a   [facility] [place] [facility]  No tobacco/drugs/ETOH

## 2019-06-18 NOTE — H&P ADULT - PROBLEM SELECTOR PLAN 7
-metastatic to mediasteinal LNs, dx in 2012 s/p chemo and masectomy, remission in 2013, recurrence 01/19, on paclitaxel weekly every Wednesday and exemestane daily. Patient follows with private onc but wants to establish care at [facility] opinion.   -will c/s onc via email ON. Will hold chemo given sepsis

## 2019-06-18 NOTE — H&P ADULT - NSHPPHYSICALEXAM_GEN_ALL_CORE
PHYSICAL EXAM:   GEN: Age appropriate, resting comfortably in bed, no acute distress, non toxic appearing, speaking in complete sentences. Appears pale  HEENT: Conjunctival pallor  PULM: Lungs CTAB  CV: RRR, S1S2, no MRG  MSK: no stiffness or joint effusions  Abdominal: Soft, nontender to palpation, non-distended, +BS  Extremities: No edema or cyanosis  NEURO: AAOx3  Psych: normal affect, normal behavior  Skin:  multiple excoriation on buttocks, back, intergluteal region, back. erythematous. no foul smell. some purulence.     T(C): 36.7 (06-18-19 @ 20:58), Max: 38.4 (06-18-19 @ 14:02)  HR: 90 (06-18-19 @ 20:58) (90 - 109)  BP: 113/62 (06-18-19 @ 20:58) (102/43 - 115/50)  RR: 18 (06-18-19 @ 20:58) (16 - 20)  SpO2: 100% (06-18-19 @ 20:58) (100% - 100%) T(C): 36.7 (06-18-19 @ 20:58), Max: 38.4 (06-18-19 @ 14:02)  HR: 90 (06-18-19 @ 20:58) (90 - 109)  BP: 113/62 (06-18-19 @ 20:58) (102/43 - 115/50)  RR: 18 (06-18-19 @ 20:58) (16 - 20)  SpO2: 100% (06-18-19 @ 20:58) (100% - 100%)    Constitutional: Age appropriate, resting comfortably in bed, no acute distress, non toxic appearing, speaking in complete sentences. Appears pale  Ears, Nose, Mouth, and Throat: normal external ears and nose, normal hearing, moist oral mucosa  Eyes: Conjunctival pallor, EOMI, PERRL  Neck: supple, no JVD  Respiratory: Clear to auscultation bilaterally. No wheezes, rales or rhonchi. Normal respiratory effort  Cardiovascular: RRR, no M/R/G, no edema, 2+ Peripheral Pulses  Gastrointestinal: soft, nontender, nondistended, +BS, no hernia  Skin: warm, multiple excoriation on buttocks, back, intergluteal region, back. erythematous. no foul smell. some purulence.   Neurologic: sensation grossly intact, CN grossly intact, non-focal exam  Musculoskeletal: no clubbing, no cyanosis, no joint swelling  Psychiatric: AOX3, appropriate mood, affect

## 2019-06-19 LAB
ANION GAP SERPL CALC-SCNC: 11 MMO/L — SIGNIFICANT CHANGE UP (ref 7–14)
ANION GAP SERPL CALC-SCNC: 9 MMO/L — SIGNIFICANT CHANGE UP (ref 7–14)
APPEARANCE UR: SIGNIFICANT CHANGE UP
BACTERIA # UR AUTO: NEGATIVE — SIGNIFICANT CHANGE UP
BASOPHILS # BLD AUTO: 0.03 K/UL — SIGNIFICANT CHANGE UP (ref 0–0.2)
BASOPHILS # BLD AUTO: 0.06 K/UL — SIGNIFICANT CHANGE UP (ref 0–0.2)
BASOPHILS NFR BLD AUTO: 0.4 % — SIGNIFICANT CHANGE UP (ref 0–2)
BASOPHILS NFR BLD AUTO: 0.6 % — SIGNIFICANT CHANGE UP (ref 0–2)
BILIRUB UR-MCNC: NEGATIVE — SIGNIFICANT CHANGE UP
BLOOD UR QL VISUAL: SIGNIFICANT CHANGE UP
BUN SERPL-MCNC: 7 MG/DL — SIGNIFICANT CHANGE UP (ref 7–23)
BUN SERPL-MCNC: 8 MG/DL — SIGNIFICANT CHANGE UP (ref 7–23)
CALCIUM SERPL-MCNC: 7.8 MG/DL — LOW (ref 8.4–10.5)
CALCIUM SERPL-MCNC: 8.2 MG/DL — LOW (ref 8.4–10.5)
CHLORIDE SERPL-SCNC: 95 MMOL/L — LOW (ref 98–107)
CHLORIDE SERPL-SCNC: 97 MMOL/L — LOW (ref 98–107)
CO2 SERPL-SCNC: 21 MMOL/L — LOW (ref 22–31)
CO2 SERPL-SCNC: 22 MMOL/L — SIGNIFICANT CHANGE UP (ref 22–31)
COLOR SPEC: YELLOW — SIGNIFICANT CHANGE UP
CREAT SERPL-MCNC: 0.51 MG/DL — SIGNIFICANT CHANGE UP (ref 0.5–1.3)
CREAT SERPL-MCNC: 0.58 MG/DL — SIGNIFICANT CHANGE UP (ref 0.5–1.3)
EOSINOPHIL # BLD AUTO: 0.24 K/UL — SIGNIFICANT CHANGE UP (ref 0–0.5)
EOSINOPHIL # BLD AUTO: 0.31 K/UL — SIGNIFICANT CHANGE UP (ref 0–0.5)
EOSINOPHIL NFR BLD AUTO: 3.2 % — SIGNIFICANT CHANGE UP (ref 0–6)
EOSINOPHIL NFR BLD AUTO: 3.4 % — SIGNIFICANT CHANGE UP (ref 0–6)
GLUCOSE SERPL-MCNC: 107 MG/DL — HIGH (ref 70–99)
GLUCOSE SERPL-MCNC: 87 MG/DL — SIGNIFICANT CHANGE UP (ref 70–99)
GLUCOSE UR-MCNC: NEGATIVE — SIGNIFICANT CHANGE UP
HCT VFR BLD CALC: 22.4 % — LOW (ref 34.5–45)
HCT VFR BLD CALC: 24.6 % — LOW (ref 34.5–45)
HGB BLD-MCNC: 7.4 G/DL — LOW (ref 11.5–15.5)
HGB BLD-MCNC: 8 G/DL — LOW (ref 11.5–15.5)
HYALINE CASTS # UR AUTO: HIGH
IMM GRANULOCYTES NFR BLD AUTO: 6.9 % — HIGH (ref 0–1.5)
IMM GRANULOCYTES NFR BLD AUTO: 7.2 % — HIGH (ref 0–1.5)
KETONES UR-MCNC: NEGATIVE — SIGNIFICANT CHANGE UP
LEUKOCYTE ESTERASE UR-ACNC: SIGNIFICANT CHANGE UP
LYMPHOCYTES # BLD AUTO: 1.04 K/UL — SIGNIFICANT CHANGE UP (ref 1–3.3)
LYMPHOCYTES # BLD AUTO: 1.26 K/UL — SIGNIFICANT CHANGE UP (ref 1–3.3)
LYMPHOCYTES # BLD AUTO: 12.9 % — LOW (ref 13–44)
LYMPHOCYTES # BLD AUTO: 14.7 % — SIGNIFICANT CHANGE UP (ref 13–44)
MAGNESIUM SERPL-MCNC: 1.6 MG/DL — SIGNIFICANT CHANGE UP (ref 1.6–2.6)
MAGNESIUM SERPL-MCNC: 1.6 MG/DL — SIGNIFICANT CHANGE UP (ref 1.6–2.6)
MCHC RBC-ENTMCNC: 29.4 PG — SIGNIFICANT CHANGE UP (ref 27–34)
MCHC RBC-ENTMCNC: 29.5 PG — SIGNIFICANT CHANGE UP (ref 27–34)
MCHC RBC-ENTMCNC: 32.5 % — SIGNIFICANT CHANGE UP (ref 32–36)
MCHC RBC-ENTMCNC: 33 % — SIGNIFICANT CHANGE UP (ref 32–36)
MCV RBC AUTO: 88.9 FL — SIGNIFICANT CHANGE UP (ref 80–100)
MCV RBC AUTO: 90.8 FL — SIGNIFICANT CHANGE UP (ref 80–100)
MONOCYTES # BLD AUTO: 0.49 K/UL — SIGNIFICANT CHANGE UP (ref 0–0.9)
MONOCYTES # BLD AUTO: 0.68 K/UL — SIGNIFICANT CHANGE UP (ref 0–0.9)
MONOCYTES NFR BLD AUTO: 6.9 % — SIGNIFICANT CHANGE UP (ref 2–14)
MONOCYTES NFR BLD AUTO: 6.9 % — SIGNIFICANT CHANGE UP (ref 2–14)
NEUTROPHILS # BLD AUTO: 4.76 K/UL — SIGNIFICANT CHANGE UP (ref 1.8–7.4)
NEUTROPHILS # BLD AUTO: 6.81 K/UL — SIGNIFICANT CHANGE UP (ref 1.8–7.4)
NEUTROPHILS NFR BLD AUTO: 67.4 % — SIGNIFICANT CHANGE UP (ref 43–77)
NEUTROPHILS NFR BLD AUTO: 69.5 % — SIGNIFICANT CHANGE UP (ref 43–77)
NITRITE UR-MCNC: NEGATIVE — SIGNIFICANT CHANGE UP
NRBC # FLD: 0.03 K/UL — SIGNIFICANT CHANGE UP (ref 0–0)
NRBC # FLD: 0.03 K/UL — SIGNIFICANT CHANGE UP (ref 0–0)
OSMOLALITY SERPL: 258 MOSMO/KG — LOW (ref 275–295)
PH UR: 8 — SIGNIFICANT CHANGE UP (ref 5–8)
PHOSPHATE SERPL-MCNC: 4.3 MG/DL — SIGNIFICANT CHANGE UP (ref 2.5–4.5)
PHOSPHATE SERPL-MCNC: 4.3 MG/DL — SIGNIFICANT CHANGE UP (ref 2.5–4.5)
PLATELET # BLD AUTO: 602 K/UL — HIGH (ref 150–400)
PLATELET # BLD AUTO: 626 K/UL — HIGH (ref 150–400)
PMV BLD: 9 FL — SIGNIFICANT CHANGE UP (ref 7–13)
PMV BLD: 9.1 FL — SIGNIFICANT CHANGE UP (ref 7–13)
POTASSIUM SERPL-MCNC: 3.7 MMOL/L — SIGNIFICANT CHANGE UP (ref 3.5–5.3)
POTASSIUM SERPL-MCNC: 4.1 MMOL/L — SIGNIFICANT CHANGE UP (ref 3.5–5.3)
POTASSIUM SERPL-SCNC: 3.7 MMOL/L — SIGNIFICANT CHANGE UP (ref 3.5–5.3)
POTASSIUM SERPL-SCNC: 4.1 MMOL/L — SIGNIFICANT CHANGE UP (ref 3.5–5.3)
PROT UR-MCNC: 20 — SIGNIFICANT CHANGE UP
RBC # BLD: 2.52 M/UL — LOW (ref 3.8–5.2)
RBC # BLD: 2.71 M/UL — LOW (ref 3.8–5.2)
RBC # FLD: 23.9 % — HIGH (ref 10.3–14.5)
RBC # FLD: 23.9 % — HIGH (ref 10.3–14.5)
RBC CASTS # UR COMP ASSIST: HIGH (ref 0–?)
SODIUM SERPL-SCNC: 127 MMOL/L — LOW (ref 135–145)
SODIUM SERPL-SCNC: 128 MMOL/L — LOW (ref 135–145)
SP GR SPEC: > 1.04 — HIGH (ref 1–1.04)
SPECIMEN SOURCE: SIGNIFICANT CHANGE UP
SPECIMEN SOURCE: SIGNIFICANT CHANGE UP
SQUAMOUS # UR AUTO: SIGNIFICANT CHANGE UP
UROBILINOGEN FLD QL: NORMAL — SIGNIFICANT CHANGE UP
WBC # BLD: 7.07 K/UL — SIGNIFICANT CHANGE UP (ref 3.8–10.5)
WBC # BLD: 9.8 K/UL — SIGNIFICANT CHANGE UP (ref 3.8–10.5)
WBC # FLD AUTO: 7.07 K/UL — SIGNIFICANT CHANGE UP (ref 3.8–10.5)
WBC # FLD AUTO: 9.8 K/UL — SIGNIFICANT CHANGE UP (ref 3.8–10.5)
WBC UR QL: >50 — HIGH (ref 0–?)

## 2019-06-19 PROCEDURE — 99222 1ST HOSP IP/OBS MODERATE 55: CPT

## 2019-06-19 PROCEDURE — 99222 1ST HOSP IP/OBS MODERATE 55: CPT | Mod: GC

## 2019-06-19 PROCEDURE — 99221 1ST HOSP IP/OBS SF/LOW 40: CPT | Mod: GC

## 2019-06-19 PROCEDURE — 99233 SBSQ HOSP IP/OBS HIGH 50: CPT | Mod: GC

## 2019-06-19 PROCEDURE — 99223 1ST HOSP IP/OBS HIGH 75: CPT

## 2019-06-19 RX ORDER — VALACYCLOVIR 500 MG/1
500 TABLET, FILM COATED ORAL EVERY 12 HOURS
Refills: 0 | Status: DISCONTINUED | OUTPATIENT
Start: 2019-06-19 | End: 2019-07-06

## 2019-06-19 RX ORDER — PIPERACILLIN AND TAZOBACTAM 4; .5 G/20ML; G/20ML
3.38 INJECTION, POWDER, LYOPHILIZED, FOR SOLUTION INTRAVENOUS EVERY 8 HOURS
Refills: 0 | Status: DISCONTINUED | OUTPATIENT
Start: 2019-06-19 | End: 2019-06-20

## 2019-06-19 RX ORDER — SODIUM CHLORIDE 9 MG/ML
1000 INJECTION INTRAMUSCULAR; INTRAVENOUS; SUBCUTANEOUS
Refills: 0 | Status: DISCONTINUED | OUTPATIENT
Start: 2019-06-19 | End: 2019-06-19

## 2019-06-19 RX ORDER — VANCOMYCIN HCL 1 G
750 VIAL (EA) INTRAVENOUS EVERY 12 HOURS
Refills: 0 | Status: DISCONTINUED | OUTPATIENT
Start: 2019-06-19 | End: 2019-06-20

## 2019-06-19 RX ORDER — SODIUM CHLORIDE 9 MG/ML
1000 INJECTION, SOLUTION INTRAVENOUS
Refills: 0 | Status: DISCONTINUED | OUTPATIENT
Start: 2019-06-19 | End: 2019-06-19

## 2019-06-19 RX ORDER — ACYCLOVIR SODIUM 500 MG
500 VIAL (EA) INTRAVENOUS EVERY 8 HOURS
Refills: 0 | Status: DISCONTINUED | OUTPATIENT
Start: 2019-06-19 | End: 2019-06-19

## 2019-06-19 RX ORDER — ACYCLOVIR SODIUM 500 MG
VIAL (EA) INTRAVENOUS
Refills: 0 | Status: DISCONTINUED | OUTPATIENT
Start: 2019-06-19 | End: 2019-06-19

## 2019-06-19 RX ORDER — PANTOPRAZOLE SODIUM 20 MG/1
40 TABLET, DELAYED RELEASE ORAL EVERY 12 HOURS
Refills: 0 | Status: DISCONTINUED | OUTPATIENT
Start: 2019-06-19 | End: 2019-06-19

## 2019-06-19 RX ORDER — VALACYCLOVIR 500 MG/1
500 TABLET, FILM COATED ORAL ONCE
Refills: 0 | Status: COMPLETED | OUTPATIENT
Start: 2019-06-19 | End: 2019-06-19

## 2019-06-19 RX ORDER — ACYCLOVIR SODIUM 500 MG
500 VIAL (EA) INTRAVENOUS ONCE
Refills: 0 | Status: COMPLETED | OUTPATIENT
Start: 2019-06-19 | End: 2019-06-19

## 2019-06-19 RX ORDER — VANCOMYCIN HCL 1 G
750 VIAL (EA) INTRAVENOUS EVERY 12 HOURS
Refills: 0 | Status: DISCONTINUED | OUTPATIENT
Start: 2019-06-19 | End: 2019-06-19

## 2019-06-19 RX ADMIN — PANTOPRAZOLE SODIUM 40 MILLIGRAM(S): 20 TABLET, DELAYED RELEASE ORAL at 07:07

## 2019-06-19 RX ADMIN — SODIUM CHLORIDE 50 MILLILITER(S): 9 INJECTION, SOLUTION INTRAVENOUS at 02:40

## 2019-06-19 RX ADMIN — PIPERACILLIN AND TAZOBACTAM 25 GRAM(S): 4; .5 INJECTION, POWDER, LYOPHILIZED, FOR SOLUTION INTRAVENOUS at 00:25

## 2019-06-19 RX ADMIN — Medication 250 MILLIGRAM(S): at 20:16

## 2019-06-19 RX ADMIN — VALACYCLOVIR 500 MILLIGRAM(S): 500 TABLET, FILM COATED ORAL at 22:09

## 2019-06-19 RX ADMIN — MUPIROCIN 1 APPLICATION(S): 20 OINTMENT TOPICAL at 20:16

## 2019-06-19 RX ADMIN — Medication 250 MILLIGRAM(S): at 04:17

## 2019-06-19 RX ADMIN — Medication 260 MILLIGRAM(S): at 05:54

## 2019-06-19 RX ADMIN — SODIUM CHLORIDE 75 MILLILITER(S): 9 INJECTION INTRAMUSCULAR; INTRAVENOUS; SUBCUTANEOUS at 12:35

## 2019-06-19 RX ADMIN — LIDOCAINE 1 APPLICATION(S): 4 CREAM TOPICAL at 11:24

## 2019-06-19 RX ADMIN — PIPERACILLIN AND TAZOBACTAM 25 GRAM(S): 4; .5 INJECTION, POWDER, LYOPHILIZED, FOR SOLUTION INTRAVENOUS at 22:09

## 2019-06-19 RX ADMIN — MUPIROCIN 1 APPLICATION(S): 20 OINTMENT TOPICAL at 05:54

## 2019-06-19 RX ADMIN — VALACYCLOVIR 500 MILLIGRAM(S): 500 TABLET, FILM COATED ORAL at 12:36

## 2019-06-19 RX ADMIN — PIPERACILLIN AND TAZOBACTAM 25 GRAM(S): 4; .5 INJECTION, POWDER, LYOPHILIZED, FOR SOLUTION INTRAVENOUS at 16:03

## 2019-06-19 NOTE — PROGRESS NOTE ADULT - PROBLEM SELECTOR PLAN 6
-Na 130 in setting of chronic pain and decreased PO intake today  -will trend with IVF -metastatic to mediasteinal LNs, dx in 2012 s/p chemo and masectomy, remission in 2013, recurrence 01/19, on paclitaxel weekly every Wednesday and exemestane daily. Patient follows with private onc but wants to establish care at [facility] /2nd opinion.   -will c/s onc via email ON. 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 Patient follows with private onc but wants to establish care at [facility] /2nd opinion.   -will c/s onc via email ON. Will hold chemo given sepsis  -outpt onc called to obtain recs and notified that family would like 2nd opinion, outpt onc will send records and see patient in hospital

## 2019-06-19 NOTE — PROGRESS NOTE ADULT - PROBLEM SELECTOR PLAN 4
-likely LGIB w 2 weeks of hematochezia 5x per day  -reports normal colonoscopy approx 2 years ago. Denies hx of diverticulosis or hemorrhoids. No prev hx of GI bleeds.   -will keep NPO. Will c/w GI. Vitals q4. BP WNL. -Present for aprrox 6 months. Derm notes in [employer] report lesions grew HSV and the patient was treated with valacyclovir.   -06/03/19 patient found to have MRSA in wound culture. Was started on topical mupirocin and PO doxycyline for 3 weeks.   -does not appear infected, c/w vanc/zosyn  - ID consult AM. -Hb 6.2 on admission likely 2/2 LGIB w 2 weeks of hematochezia 5x per day, now s/p 1U PRBC w/ stable HGB and appropriate response  -possible anemia 2/2 paclitaxel  -d/t recent transfusions will not check iron studies at this time  -retic count -Hb 6.2 on admission likely 2/2 LGIB w 2 weeks of hematochezia 5x per day, now s/p 1U PRBC w/ stable HGB and appropriate response  -likely multifactorial. Family and patient states her wounds on buttocks have been oozing blood.  Also possible paclitaxel component to anemia.  -d/t recent transfusions will not check iron studies at this time  -retic count ordered

## 2019-06-19 NOTE — CONSULT NOTE ADULT - ASSESSMENT
Impression:  1) BRBPR - with drop in baseline to 6's from 9's, now stable after a transfusion.  Patient likely just with rectal disease (can see tricking on CT).  Patient is febrile currently needing work up and antibiotic therapy.    Recommendation:   - supportive care per primary   - no plans for endoscopic intervention at this time   - sepsis work up and antibiotics   - if BRBPR continues may consider flexible sig later in course    Ros Lopez, PGY-4  Gastroenterology Fellow  Pager x [phone] or [phone]  (After 5 pm or on weekends please page GI on call)

## 2019-06-19 NOTE — PROGRESS NOTE ADULT - PROBLEM SELECTOR PLAN 2
-Bilat opacities on xray. no hx of cough or dyspnea however immunocompromised  -will txt vanc and pip tazo. -Hb 6.2 on admission likely 2/2 LGIB w 2 weeks of hematochezia 5x per day, now s/p 1U PRBC w/ stable HGB and appropriate response  -possible anemia 2/2 paclitaxel  -d/t recent transfusions will not check iron studies at this time  -retic count -Present for aprrox 6 months. Derm notes in [employer] report lesions grew HSV and the patient was treated with valacyclovir.   -06/03/19 patient found to have MRSA in wound culture. Was started on topical mupirocin and PO doxycyline for 3 weeks.   -ID consulted, unlikely to be source of sepsis but will c/w vanc/zosyn until can prove no infection  -ophtho was consulted d/t concern for disseminated HSV, no e/o occular involvement  -derm and wound care consulted

## 2019-06-19 NOTE — CONSULT NOTE ADULT - SUBJECTIVE AND OBJECTIVE BOX
Spoke with pt via  ID # [id number], history obtained from patient and her son who had medical records    HPI: 54 yo F with triple positive (ER 20%, PR10%, Her2 2+ FISH amplified) breast cancer (dx 2012) initially stage IIIB, s/p L mastectomy followed by ACTH then 1 year of herceptin and RT, tamoxifen, with recurrence in 1/2019 (mediastinal and hilar LNs, small lung nodules, ?liver mets) on weekly paclitaxel (since 3/2019) and exemestane, HSV+ skin rash with MRSA superinfection p/w fever and chills, admitted for sepsis and symptomatic anemia with component of blood loss.     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 She also reports that she has had two weeks of bloody bowel movements with blood in the toilet bowel and on paper. She reports a normal colonoscopy approx 2 years ago. She denies hx of hemorrhoids or diverticulosis. She reports she has been feeling tired all of the time. She denies SOB or CP. She denies melena. Hgb was 6.1 on admission, received 1 unit of pRBC with improvement to 8.0 and today is 7.4     Pt and family seeking a second opinion for management of breast cancer. Reports pt was found to have recurrent disease in 1/2019 and has been on weekly paclitaxel (on Wednesdays) since March and exemestane. Son reports pt did not have repeat biopsy when found to have recurrent disease. Reports no recent imaging since starting paclitaxel. Outpatient Oncologist is Dr. Geovany Rabago.     PAST MEDICAL & SURGICAL HISTORY:  Breast cancer  S/P mastectomy, left    Allergies    No Known Allergies    Intolerances    MEDICATIONS  (STANDING):  mupirocin 2% Ointment 1 Application(s) Topical two times a day  pantoprazole  Injectable 40 milliGRAM(s) IV Push every 12 hours  piperacillin/tazobactam IVPB.. 3.375 Gram(s) IV Intermittent every 8 hours  sodium chloride 0.9%. 1000 milliLiter(s) (75 mL/Hr) IV Continuous <Continuous>  valACYclovir 500 milliGRAM(s) Oral every 12 hours  vancomycin  IVPB 750 milliGRAM(s) IV Intermittent every 12 hours    MEDICATIONS  (PRN):  lidocaine 5% Ointment 1 Application(s) Topical every 6 hours PRN pain at rash    FAMILY HISTORY:  No pertinent family history in first degree relatives    SOCIAL HISTORY: No EtOH, no tobacco    REVIEW OF SYSTEMS:    CONSTITUTIONAL: No fevers, no chills, no weakness, no weight loss  EYES/ENT: No visual changes;  No dizziness/vertigo or throat pain   NECK: No pain or stiffness  RESPIRATORY: No shortness of breath, no cough or wheezing   CARDIOVASCULAR: No chest pain or palpitations, no dyspnea on exertion, no peripheral edema  GASTROINTESTINAL: No nausea/vomiting/diarrhea, no abdominal pain, no melena or BRBPR, no constipation  GENITOURINARY: No dysuria, increased frequency or hematuria  NEUROLOGICAL: No numbness or weakness  SKIN: No rashes, no bruises, no petechiae  LYMPH: No enlarged lymph nodes  All other review of systems is negative unless indicated above    Height (cm): 154.94 (06-19 @ 03:27)  Weight (kg): 49.8 (06-19 @ 03:27)  BMI (kg/m2): 20.7 (06-19 @ 03:27)  BSA (m2): 1.46 (06-19 @ 03:27)    VITALS:   T(F): 97.5 (06-19-19 @ 09:48), Max: 101.2 (06-18-19 @ 14:02)  HR: 91 (06-19-19 @ 09:48)  BP: 118/50 (06-19-19 @ 09:48)  RR: 18 (06-19-19 @ 09:48)  SpO2: 100% (06-19-19 @ 09:48)  Wt(kg): --      PHYSICAL EXAM:  GENERAL: NAD, alopecia  EYES: EOMI, conjunctiva and sclera clear  NECK: supple  RESP: CTAB  HEART: RRR, S1/S2  ABDOMEN: +BS, soft, NT, ND  EXTREMITIES: no LE edema  NEUROLOGIC: no focal deficits, AAO x 3    LABS:                         7.4    7.07  )-----------( 602      ( 19 Jun 2019 08:04 )             22.4     06-19    128<L>  |  97<L>  |  8   ----------------------------<  87  3.7   |  22  |  0.58    Ca    7.8<L>      19 Jun 2019 08:04  Phos  4.3     06-19  Mg     1.6     06-19    TPro  4.9<L>  /  Alb  2.4<L>  /  TBili  < 0.2<L>  /  DBili  x   /  AST  23  /  ALT  28  /  AlkPhos  85  06-18    Phosphorus Level, Serum: 4.3 mg/dL (06-19 @ 08:04)  Magnesium, Serum: 1.6 mg/dL (06-19 @ 08:04)    PT/INR - ( 18 Jun 2019 14:00 )   PT: 16.5 SEC;   INR: 1.47     PTT - ( 18 Jun 2019 14:00 )  PTT:29.6 SEC    IMAGING:

## 2019-06-19 NOTE — PROGRESS NOTE ADULT - PROBLEM SELECTOR PLAN 3
-Hb 6.2 on admission likely 2/2 LGIB w 2 weeks of hematochezia 5x per day  -1 unit given will check post transfusion CBC. If less than 7 will transfuse another unit  -Pt has not had any hematochezia since arrival. BP and HR stable.  -Will keep NPO, will consult GI via email ON. Hb 01/19 9.1 -possible hematochezia, though rectal exam w/o e/o blood in vault vs hemorrhoids   -GI consulted, no plans for scope at this time  -monitor CBC daily upon further questioning, patient does NOT have blood intermixed with stool, but blood on top of stool. patient with associated hard stools, rectal pain with defecation and blood on toilet paper.  -more indicative of hemorrhoidal bleeding (external given pain).  -would NOT perform colonoscopy/endoscopy at this time.  -supportive measures with stool softeners.

## 2019-06-19 NOTE — CONSULT NOTE ADULT - ASSESSMENT
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  With her immune suppression and prior extent of infection, would prophylax with valtrex 500mg bid.  Can stop IV acv.  If bc and negative can stop broad spectrum antibiotics    suggest:  - d/c IV acv  - d/c airborne  - valtrex 500mg bid  - vanc/zosyn - if negative BC at 48H, can stop  - wound care    above d/w medicine team

## 2019-06-19 NOTE — CONSULT NOTE ADULT - ASSESSMENT
INCOMPLETE    #Ulcerations on the buttocks, biopsy proven HSV infection along with +PCR for HSV       Patient to follow up at [facility] [address] [phone] when ready for discharge     Mira Crawford MD   Dermatology Resident PGY-2   [phone] INCOMPLETE    #Ulcerations on the buttocks, biopsy proven HSV infection along with +PCR for HSV, lesions are improving   -Continue therapy with valtrex   -Barrier protection to prevent further irritation and superinfection       Patient to follow up at [facility] [address] [phone] when ready for discharge     Mira Crawford MD   Dermatology Resident PGY-2   [phone] #Ulcerations on the buttocks, biopsy proven HSV infection along with +PCR for HSV, lesions are improving. No currently evidence of superinfection of the lesions.   -Continue therapy with Valtrex   -Barrier protection with Vaseline and Xeroform dressing to prevent further irritation and superinfection, currently the wounds are sticking to the bedding and resulting in continuous trauma and tearing of the skin which is slowing the healing   -Recommend wound care consultation for this patient     Patient to follow up at [facility] [address] [place] [address] [address] [phone] when ready for discharge     Mira Crawford MD   Dermatology Resident PGY-2   [phone]

## 2019-06-19 NOTE — PROGRESS NOTE ADULT - ASSESSMENT
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

## 2019-06-19 NOTE — PROGRESS NOTE ADULT - SUBJECTIVE AND OBJECTIVE BOX
Daily Progress Note - INCOMPLETE  Author: Lefty Venegas MD PGY-1  Pgr: [phone]    SUBJECTIVE / OVERNIGHT EVENTS: No acute events overnight    MEDICATIONS  (STANDING):  acyclovir IVPB 500 milliGRAM(s) IV Intermittent every 8 hours  acyclovir IVPB      dextrose 5% + sodium chloride 0.45%. 1000 milliLiter(s) (50 mL/Hr) IV Continuous <Continuous>  mupirocin 2% Ointment 1 Application(s) Topical two times a day  pantoprazole  Injectable 40 milliGRAM(s) IV Push every 12 hours  piperacillin/tazobactam IVPB.. 3.375 Gram(s) IV Intermittent every 8 hours  vancomycin  IVPB 750 milliGRAM(s) IV Intermittent every 12 hours    MEDICATIONS  (PRN):  lidocaine 5% Ointment 1 Application(s) Topical every 6 hours PRN pain at rash      T(C): 36.6 (06-19-19 @ 05:49), Max: 38.4 (06-18-19 @ 14:02)  HR: 88 (06-19-19 @ 05:49) (88 - 109)  BP: 105/51 (06-19-19 @ 05:49) (102/43 - 120/53)  RR: 16 (06-19-19 @ 05:49) (16 - 20)  SpO2: 98% (06-19-19 @ 05:49) (98% - 100%)    CAPILLARY BLOOD GLUCOSE        I&O's Summary      Physical exam:   GENERAL: No acute distress, well-developed  HEAD:  Atraumatic, Normocephalic  ENT: EOMI, PERRLA, No JVD, moist mucosa  CHEST/LUNG: Clear to auscultation bilaterally  BACK: No spinal tenderness  HEART: Regular rate and rhythm; No murmurs, rubs, or gallops  ABDOMEN: Soft, Nontender, Nondistended; Bowel sounds present  EXTREMITIES:  No clubbing, cyanosis, or edema  PSYCH: Nl behavior, nl affect  NEUROLOGY: AAOx3, non-focal, cranial nerves intact  SKIN: Normal color, No rashes or lesions    LABS:                        8.0    9.80  )-----------( 626      ( 19 Jun 2019 00:20 )             24.6     Hgb Trend: 8.0<--, 6.1<--  06-18    130<L>  |  96<L>  |  14  ----------------------------<  100<H>  4.1   |  22  |  0.58    Ca    8.3<L>      18 Jun 2019 14:00    TPro  4.9<L>  /  Alb  2.4<L>  /  TBili  < 0.2<L>  /  DBili  x   /  AST  23  /  ALT  28  /  AlkPhos  85  06-18    Creatinine Trend: 0.58<--  PT/INR - ( 18 Jun 2019 14:00 )   PT: 16.5 SEC;   INR: 1.47          PTT - ( 18 Jun 2019 14:00 )  PTT:29.6 SEC      Urinalysis Basic - ( 19 Jun 2019 05:08 )    Color: YELLOW / Appearance: Lt TURBID / SG: > 1.040 / pH: 8.0  Gluc: NEGATIVE / Ketone: NEGATIVE  / Bili: NEGATIVE / Urobili: NORMAL   Blood: TRACE / Protein: 20 / Nitrite: NEGATIVE   Leuk Esterase: LARGE / RBC: 11-25 / WBC >50   Sq Epi: MODERATE / Non Sq Epi: x / Bacteria: NEGATIVE Daily Progress Note   Author: Lefty Venegas MD PGY-1  Pgr: [phone]    SUBJECTIVE / OVERNIGHT EVENTS: Admitted overnight, this morning pt c/o leg pain, no SOB, cough, confirmed HPI w/ family at bedside.    MEDICATIONS  (STANDING):  acyclovir IVPB 500 milliGRAM(s) IV Intermittent every 8 hours  acyclovir IVPB      dextrose 5% + sodium chloride 0.45%. 1000 milliLiter(s) (50 mL/Hr) IV Continuous <Continuous>  mupirocin 2% Ointment 1 Application(s) Topical two times a day  pantoprazole  Injectable 40 milliGRAM(s) IV Push every 12 hours  piperacillin/tazobactam IVPB.. 3.375 Gram(s) IV Intermittent every 8 hours  vancomycin  IVPB 750 milliGRAM(s) IV Intermittent every 12 hours    MEDICATIONS  (PRN):  lidocaine 5% Ointment 1 Application(s) Topical every 6 hours PRN pain at rash      T(C): 36.6 (06-19-19 @ 05:49), Max: 38.4 (06-18-19 @ 14:02)  HR: 88 (06-19-19 @ 05:49) (88 - 109)  BP: 105/51 (06-19-19 @ 05:49) (102/43 - 120/53)  RR: 16 (06-19-19 @ 05:49) (16 - 20)  SpO2: 98% (06-19-19 @ 05:49) (98% - 100%)    CAPILLARY BLOOD GLUCOSE        I&O's Summary      Physical exam:   Constitutional: NAD  Eyes: Conjunctival pallor, EOMI, PERRL, no redness  Respiratory: diffuse crackles throughout  Cardiovascular: RRR, no M/R/G, no edema, 2+ Peripheral Pulses  Gastrointestinal: soft, nontender, nondistended, +BS, no hernia  Skin: warm, multiple excoriation on buttocks, back, intergluteal region, back. erythematous. no foul smell. no purulence  Neurologic: sensation grossly intact, CN grossly intact, non-focal exam  Psychiatric: AOX3, appropriate mood, affect    LABS:                        8.0    9.80  )-----------( 626      ( 19 Jun 2019 00:20 )             24.6     Hgb Trend: 8.0<--, 6.1<--  06-18    130<L>  |  96<L>  |  14  ----------------------------<  100<H>  4.1   |  22  |  0.58    Ca    8.3<L>      18 Jun 2019 14:00    TPro  4.9<L>  /  Alb  2.4<L>  /  TBili  < 0.2<L>  /  DBili  x   /  AST  23  /  ALT  28  /  AlkPhos  85  06-18    Creatinine Trend: 0.58<--  PT/INR - ( 18 Jun 2019 14:00 )   PT: 16.5 SEC;   INR: 1.47          PTT - ( 18 Jun 2019 14:00 )  PTT:29.6 SEC      Urinalysis Basic - ( 19 Jun 2019 05:08 )    Color: YELLOW / Appearance: Lt TURBID / SG: > 1.040 / pH: 8.0  Gluc: NEGATIVE / Ketone: NEGATIVE  / Bili: NEGATIVE / Urobili: NORMAL   Blood: TRACE / Protein: 20 / Nitrite: NEGATIVE   Leuk Esterase: LARGE / RBC: 11-25 / WBC >50   Sq Epi: MODERATE / Non Sq Epi: x / Bacteria: NEGATIVE Daily Progress Note   Author: Lefty Venegas MD PGY-1  Pgr: [phone]    SUBJECTIVE / OVERNIGHT EVENTS: Admitted overnight, this morning pt c/o leg pain, no SOB, cough, confirmed HPI w/ family at bedside.    MEDICATIONS  (STANDING):  acyclovir IVPB 500 milliGRAM(s) IV Intermittent every 8 hours  acyclovir IVPB      dextrose 5% + sodium chloride 0.45%. 1000 milliLiter(s) (50 mL/Hr) IV Continuous <Continuous>  mupirocin 2% Ointment 1 Application(s) Topical two times a day  pantoprazole  Injectable 40 milliGRAM(s) IV Push every 12 hours  piperacillin/tazobactam IVPB.. 3.375 Gram(s) IV Intermittent every 8 hours  vancomycin  IVPB 750 milliGRAM(s) IV Intermittent every 12 hours    MEDICATIONS  (PRN):  lidocaine 5% Ointment 1 Application(s) Topical every 6 hours PRN pain at rash      T(C): 36.6 (06-19-19 @ 05:49), Max: 38.4 (06-18-19 @ 14:02)  HR: 88 (06-19-19 @ 05:49) (88 - 109)  BP: 105/51 (06-19-19 @ 05:49) (102/43 - 120/53)  RR: 16 (06-19-19 @ 05:49) (16 - 20)  SpO2: 98% (06-19-19 @ 05:49) (98% - 100%)    CAPILLARY BLOOD GLUCOSE        I&O's Summary      Physical exam:   Constitutional: NAD, weak appearing.   Eyes: Conjunctival pallor, EOMI,, no redness  Respiratory: diffuse crackles throughout  Cardiovascular: RRR, no M/R/G, no edema, 2+ Peripheral Pulses  Gastrointestinal: soft, nontender, nondistended, +BS, no hernia  Skin: warm, multiple excoriation on buttocks, back, intergluteal region, back. erythematous. no foul smell. no purulence  Neurologic: sensation grossly intact, CN grossly intact, non-focal exam  Psychiatric: AOX3, appropriate mood, affect    LABS:                        8.0    9.80  )-----------( 626      ( 19 Jun 2019 00:20 )             24.6     Hgb Trend: 8.0<--, 6.1<--  06-18    130<L>  |  96<L>  |  14  ----------------------------<  100<H>  4.1   |  22  |  0.58    Ca    8.3<L>      18 Jun 2019 14:00    TPro  4.9<L>  /  Alb  2.4<L>  /  TBili  < 0.2<L>  /  DBili  x   /  AST  23  /  ALT  28  /  AlkPhos  85  06-18    Creatinine Trend: 0.58<--  PT/INR - ( 18 Jun 2019 14:00 )   PT: 16.5 SEC;   INR: 1.47          PTT - ( 18 Jun 2019 14:00 )  PTT:29.6 SEC      Urinalysis Basic - ( 19 Jun 2019 05:08 )    Color: YELLOW / Appearance: Lt TURBID / SG: > 1.040 / pH: 8.0  Gluc: NEGATIVE / Ketone: NEGATIVE  / Bili: NEGATIVE / Urobili: NORMAL   Blood: TRACE / Protein: 20 / Nitrite: NEGATIVE   Leuk Esterase: LARGE / RBC: 11-25 / WBC >50   Sq Epi: MODERATE / Non Sq Epi: x / Bacteria: NEGATIVE    DISCUSSED personally wtih : ID attending (Dr. Milian)  Reviewed notes from: Oncology, Dermatology

## 2019-06-19 NOTE — CONSULT NOTE ADULT - SUBJECTIVE AND OBJECTIVE BOX
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 Patient of Dr. Cowart ([facility]), presents to ED at her request due to persistent pain, minimal improvement in ulcerations as well as fevers and chills.     PAST MEDICAL & SURGICAL HISTORY:  Breast cancer  S/P mastectomy, left      REVIEW OF SYSTEMS    General: no fevers/chills, no lethargy	    Skin/Breast: see HPI  	  Ophthalmologic: no eye pain or change in vision  	  ENMT: no dysphagia or change in hearing    Respiratory and Thorax: no SOB or cough  	  Cardiovascular: no palpitations or chest pain    Gastrointestinal: no abdominal pain or blood in stool     Genitourinary: no dysuria or frequency    Musculoskeletal: no joint pains    Neurological: no weakness, numbness , or tingling    MEDICATIONS  (STANDING):  mupirocin 2% Ointment 1 Application(s) Topical two times a day  pantoprazole  Injectable 40 milliGRAM(s) IV Push every 12 hours  piperacillin/tazobactam IVPB.. 3.375 Gram(s) IV Intermittent every 8 hours  sodium chloride 0.9%. 1000 milliLiter(s) (75 mL/Hr) IV Continuous <Continuous>  valACYclovir 500 milliGRAM(s) Oral every 12 hours  vancomycin  IVPB 750 milliGRAM(s) IV Intermittent every 12 hours    MEDICATIONS  (PRN):  lidocaine 5% Ointment 1 Application(s) Topical every 6 hours PRN pain at rash      Allergies    No Known Allergies    Intolerances        SOCIAL HISTORY:    FAMILY HISTORY:  No pertinent family history in first degree relatives      Vital Signs Last 24 Hrs  T(C): 36.4 (19 Jun 2019 09:48), Max: 38.4 (18 Jun 2019 14:02)  T(F): 97.5 (19 Jun 2019 09:48), Max: 101.2 (18 Jun 2019 14:02)  HR: 91 (19 Jun 2019 09:48) (88 - 109)  BP: 118/50 (19 Jun 2019 09:48) (102/43 - 120/53)  BP(mean): --  RR: 18 (19 Jun 2019 09:48) (16 - 20)  SpO2: 100% (19 Jun 2019 09:48) (98% - 100%)    PHYSICAL EXAM:     The patient was alert and oriented X 3, well nourished, and in no  apparent distress.  OP showed no ulcerations  There was no visible lymphadenopathy.  Conjunctiva were non injected  There was no clubbing or edema of extremities.  The scalp, hair, face, eyebrows, lips, OP, neck, chest, back,   extremities X 4, nails were examined.  There was no hyperhidrosis or bromhidrosis.    Of note on skin exam:       LABS:                        7.4    7.07  )-----------( 602      ( 19 Jun 2019 08:04 )             22.4     06-19    128<L>  |  97<L>  |  8   ----------------------------<  87  3.7   |  22  |  0.58    Ca    7.8<L>      19 Jun 2019 08:04  Phos  4.3     06-19  Mg     1.6     06-19    TPro  4.9<L>  /  Alb  2.4<L>  /  TBili  < 0.2<L>  /  DBili  x   /  AST  23  /  ALT  28  /  AlkPhos  85  06-18    PT/INR - ( 18 Jun 2019 14:00 )   PT: 16.5 SEC;   INR: 1.47          PTT - ( 18 Jun 2019 14:00 )  PTT:29.6 SEC  Urinalysis Basic - ( 19 Jun 2019 05:08 )    Color: YELLOW / Appearance: Lt TURBID / SG: > 1.040 / pH: 8.0  Gluc: NEGATIVE / Ketone: NEGATIVE  / Bili: NEGATIVE / Urobili: NORMAL   Blood: TRACE / Protein: 20 / Nitrite: NEGATIVE   Leuk Esterase: LARGE / RBC: 11-25 / WBC >50   Sq Epi: MODERATE / Non Sq Epi: x / Bacteria: NEGATIVE        RADIOLOGY & ADDITIONAL STUDIES: HPI:  The patient is a 53 year old female with a PMH of metastatic breast cancer (to mediasteinal LNs, dx in 2012 s/p chemo and masectomy, remission in 2013, recurrence 01/19, on paclitaxel weekly every Wednesday and exemestane daily), 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 Patient of Dr. Cowart ([facility]), presents to ED at her request due to persistent pain, minimal improvement in ulcerations as well as fevers and chills. Currently patient notes her pain has improved and she mostly has itching. She is on Valtrex PO after being given a dose of IV Acyclovir.      PAST MEDICAL & SURGICAL HISTORY:  Breast cancer  S/P mastectomy, left      REVIEW OF SYSTEMS    General: no fevers/chills, no lethargy	    Skin/Breast: see HPI  	  Ophthalmologic: no eye pain or change in vision  	  ENMT: no dysphagia or change in hearing    Respiratory and Thorax: no SOB or cough  	  Cardiovascular: no palpitations or chest pain    Gastrointestinal: no abdominal pain or blood in stool     Genitourinary: no dysuria or frequency    Musculoskeletal: no joint pains    Neurological: no weakness, numbness , or tingling    MEDICATIONS  (STANDING):  mupirocin 2% Ointment 1 Application(s) Topical two times a day  pantoprazole  Injectable 40 milliGRAM(s) IV Push every 12 hours  piperacillin/tazobactam IVPB.. 3.375 Gram(s) IV Intermittent every 8 hours  sodium chloride 0.9%. 1000 milliLiter(s) (75 mL/Hr) IV Continuous <Continuous>  valACYclovir 500 milliGRAM(s) Oral every 12 hours  vancomycin  IVPB 750 milliGRAM(s) IV Intermittent every 12 hours    MEDICATIONS  (PRN):  lidocaine 5% Ointment 1 Application(s) Topical every 6 hours PRN pain at rash      Allergies    No Known Allergies    Intolerances        SOCIAL HISTORY:    FAMILY HISTORY:  No pertinent family history in first degree relatives      Vital Signs Last 24 Hrs  T(C): 36.4 (19 Jun 2019 09:48), Max: 38.4 (18 Jun 2019 14:02)  T(F): 97.5 (19 Jun 2019 09:48), Max: 101.2 (18 Jun 2019 14:02)  HR: 91 (19 Jun 2019 09:48) (88 - 109)  BP: 118/50 (19 Jun 2019 09:48) (102/43 - 120/53)  BP(mean): --  RR: 18 (19 Jun 2019 09:48) (16 - 20)  SpO2: 100% (19 Jun 2019 09:48) (98% - 100%)    PHYSICAL EXAM:     The patient was alert and oriented X 3, well nourished, and in no  apparent distress.  OP showed no ulcerations  There was no visible lymphadenopathy.  Conjunctiva were non injected  There was no clubbing or edema of extremities.  The scalp, hair, face, eyebrows, lips, OP, neck, chest, back,   extremities X 4, nails were examined.  There was no hyperhidrosis or bromhidrosis.    Of note on skin exam:   alopecia of the scalp   the buttocks and low back with erythematous erosions and ulcerations with scalloped border    LABS:                        7.4    7.07  )-----------( 602      ( 19 Jun 2019 08:04 )             22.4     06-19    128<L>  |  97<L>  |  8   ----------------------------<  87  3.7   |  22  |  0.58    Ca    7.8<L>      19 Jun 2019 08:04  Phos  4.3     06-19  Mg     1.6     06-19    TPro  4.9<L>  /  Alb  2.4<L>  /  TBili  < 0.2<L>  /  DBili  x   /  AST  23  /  ALT  28  /  AlkPhos  85  06-18    PT/INR - ( 18 Jun 2019 14:00 )   PT: 16.5 SEC;   INR: 1.47          PTT - ( 18 Jun 2019 14:00 )  PTT:29.6 SEC  Urinalysis Basic - ( 19 Jun 2019 05:08 )    Color: YELLOW / Appearance: Lt TURBID / SG: > 1.040 / pH: 8.0  Gluc: NEGATIVE / Ketone: NEGATIVE  / Bili: NEGATIVE / Urobili: NORMAL   Blood: TRACE / Protein: 20 / Nitrite: NEGATIVE   Leuk Esterase: LARGE / RBC: 11-25 / WBC >50   Sq Epi: MODERATE / Non Sq Epi: x / Bacteria: NEGATIVE        RADIOLOGY & ADDITIONAL STUDIES:

## 2019-06-19 NOTE — CONSULT NOTE ADULT - ASSESSMENT
52 yo F with triple positive (ER 20%, PR10%, Her2 2+ FISH amplified) breast cancer (dx 2012) initially stage IIIB, s/p L mastectomy followed by ACTH then 1 year of herceptin and RT, tamoxifen, with recurrence in 1/2019 (mediastinal and hilar LNs, small lung nodules, ?liver mets) on weekly paclitaxel (since 3/2019) and exemestane, HSV+ skin rash with MRSA superinfection p/w fever and chills, admitted for sepsis and symptomatic anemia with component of blood loss.     1. Metastatic breast cancer: 52 yo F with triple positive (ER 20%, PR10%, Her2 2+ FISH amplified) breast cancer (dx 2012) initially stage IIIB, s/p L mastectomy followed by ACTH then 1 year of herceptin and RT, tamoxifen, with recurrence in 1/2019 (mediastinal and hilar LNs, small lung nodules, ?liver mets) on weekly paclitaxel (since 3/2019) and exemestane, HSV+ skin rash with MRSA superinfection p/w fever and chills, admitted for sepsis and symptomatic anemia     1. Metastatic breast cancer: triple positive when first diagnosed  - no plan for chemotherapy as inpatient  - repeat imaging to assess disease status as outpatient  - may also need repeat biopsy as outpatient (pt and son report no repeat biopsy was done when found to have recurrence) to assess if still triple positive or if histology has changed  - Will refer to [facility] after discharge to establish care with a breast oncologist. Provided them with [facility] new patient unit number [phone] as well.    plan d/w pt and son    Elizabeth Dunn, PGY-5  Hematology-Oncology Fellow  Pager: [phone] ([facility]), [phone] ([facility])  (After 5 pm or on weekends please page the on-call fellow)

## 2019-06-19 NOTE — PROGRESS NOTE ADULT - PROBLEM SELECTOR PLAN 1
-febrile, tachy, MRSA skin infection dx via wound cx at outpt derm office ([employer])  -vanc and pip tazo given ID. 2L IVF given. Bcx sent. UA/UCx pending. CXR showing possible bilateral PNA. Given immunocompromised, receiving weekly chemo at outpt facility will txt as HCAP unknown etiology, possibly 2/2 skin though doesn't appear infected, also possibly 2/2 known malignancy  -febrile, tachy, MRSA skin infection dx via wound cx at outpt derm office ([facility])  -s/p vanc/zosyn and 1L IVF in ED  -BCx sent  -c/w vanc/zosyn until can r/o infection  -valacyclovir 500mg BID for HSV ppx unknown etiology, possibly 2/2 skin though doesn't appear infected, also possibly 2/2 known malignancy  -febrile, tachy, MRSA skin infection dx via wound cx at outpt derm office ([facility])  -s/p vanc/zosyn and 1L IVF in ED  -BCx sent  -c/w vanc/zosyn for at leaset 48 hours given immunocompromised state.   -valacyclovir 500mg BID for HSV ppx

## 2019-06-19 NOTE — PROGRESS NOTE ADULT - ATTENDING COMMENTS
Agree with above and modified as above with following addendum:  #Pulmonary infiltrates.  -patient with CXR with pulmonary infiltrates, pre-portillo read as consolidation. However patient has known nodules from CT scan 1 month ago. Patient without cough, SOB, chest pain.  My suspicion is consolidations seen on CXR is more likely the nodules seen on CT.  If hypoxia, or persistent fevers will obtain CT chest for further clarification.

## 2019-06-19 NOTE — PROGRESS NOTE ADULT - PROBLEM SELECTOR PLAN 5
-Present for aprrox 6 months. Derm notes in [facility] report lesions grew HSV and the patient was treated with valacyclovir.   -06/03/19 patient found to have MRSA in wound culture. Was started on topical mupirocin and PO doxycyline for 3 weeks.   -Given sepsis will treat with IV vancomycin. Will start IV acyclovir 10mg/kg q8 hours as it does not appear that the patient has cleared HSV infection. Will place patient on airborne given multiple dermatomes involves.  - ID consult AM. Na downtrending, pt did receive hypotonic saline. SIADH in stg of infection/pain/malignancy vs hypotonic saline administration  - send for urine studies, osms  - NS @ 75cc/hr x 12 hours  - monitor BMP, may need fluid restriction Na downtrending, pt did receive hypotonic saline. SIADH in stg of infection/pain/malignancy vs hypotonic saline administration  - send for urine studies, osms  - monitor BMP, may need fluid restriction

## 2019-06-19 NOTE — PROGRESS NOTE ADULT - PROBLEM SELECTOR PLAN 7
-metastatic to mediasteinal LNs, dx in 2012 s/p chemo and masectomy, remission in 2013, recurrence 01/19, on paclitaxel weekly every Wednesday and exemestane daily. Patient follows with private onc but wants to establish care at [facility] opinion.   -will c/s onc via email ON. Will hold chemo given sepsis -DVT prophylaxis with SCDs  -Diet CLD

## 2019-06-19 NOTE — CONSULT NOTE ADULT - SUBJECTIVE AND OBJECTIVE BOX
Layne Milian - [phone]    Patient is a 53y old  Female who presents with a chief complaint of chills (19 Jun 2019 10:26)    HPI:  53F with metastatic breast cancer (+ mediasteinal LN).  Initially diagnosed with L breast cancer in 2012 - s/p chemo and mastectomy - remission in 2013.  Then had SOB, weight loss - work up showed metastatic breast cancer in January 2019.  Started chemo with paclitaxel weekly every Wednesday and exemestane daily in February.  In January, she noted a rash of the hands - biopsy showed bullous impetigo.  Developed rash of the groin - biopsy showed excoriation.  Again developed diffuse rash of the back and posterior scalp - biopsy and culture +HSV +mrsa.  Was treated with a course of valtrex and doxycyline with marked improvement. She continued to have ulcerative lesions, however, other than pruritis - it is much better.  Saw derm 6/18 and was noted to have chills and rigors and therefore sent to ER at [facility].  Here, she had isolated fever 101.  Otherwise, notes fatigue and pruritis.  No headache, SOB, cough, n/v/d.  She also reports that she has had two weeks of bloody bowel movements with blood in the toilet bowel and on paper. She reports a normal colonoscopy approx 2 years ago. She denies hx of hemorrhoids or diverticulosis. She reports she has been feeling tired all of the time. She denies SOB or CP. She denies melena. She had some epigastric abdominal pain earlier today that has gone away and that she attributed to having not eaten anything today.     In the ED vital signs: 101.2, 109., 102/43, 20, 100% on RA. She received vanc and pip tazo. She received 2L of LR. She received IV acetaminophen and pantoprazole. (18 Jun 2019 21:06)     prior hospital charts reviewed [ x ]  primary team notes reviewed [ x ]  other consultant notes reviewed [ x]    PAST MEDICAL & SURGICAL HISTORY:  Breast cancer  S/P mastectomy, left    Allergies  No Known Allergies    ANTIMICROBIALS):  acyclovir IVPB   piperacillin/tazobactam IVPB (6/18-)  vancomycin  IVPB (6/18-)    MEDICATIONS  (STANDING):  pantoprazole  Injectable 40 every 12 hours    SOCIAL HISTORY:  non-smoker    FAMILY HISTORY:  no cancer in the family    REVIEW OF SYSTEMS  [  ] ROS unobtainable because:    [ x ] All other systems negative except as noted below:	    Constitutional:  [ ] fever [x ] chills  [x ] weight loss  [ ] weakness  Skin:  [x ] rash [ ] phlebitis	  Eyes: [ ] icterus [ ] pain  [ ] discharge	  ENMT: [ ] sore throat  [ ] thrush [ ] ulcers [ ] exudates  Respiratory: [ ] dyspnea [ ] hemoptysis [ ] cough [ ] sputum	  Cardiovascular:  [ ] chest pain [ ] palpitations [ ] edema	  Gastrointestinal:  [ ] nausea [ ] vomiting [ ] diarrhea [ ] constipation [ ] pain	  Genitourinary:  [ ] dysuria [ ] frequency [ ] hematuria [ ] discharge [ ] flank pain  [ ] incontinence  Musculoskeletal:  [ ] myalgias [ ] arthralgias [ ] arthritis  [ ] back pain  Neurological:  [ ] headache [ ] seizures  [ ] confusion/altered mental status  Psychiatric:  [ ] anxiety [ ] depression	  Hematology/Lymphatics:  [ ] lymphadenopathy  Endocrine:  [ ] adrenal [ ] thyroid  Allergic/Immunologic:	 [ ] transplant [ ] seasonal    Vital Signs Last 24 Hrs  T(F): 97.5 (06-19-19 @ 09:48), Max: 101.2 (06-18-19 @ 14:02)  Vital Signs Last 24 Hrs  HR: 91 (06-19-19 @ 09:48) (88 - 109)  BP: 118/50 (06-19-19 @ 09:48) (102/43 - 120/53)  RR: 18 (06-19-19 @ 09:48)  SpO2: 100% (06-19-19 @ 09:48) (98% - 100%)  Wt(kg): --    PHYSICAL EXAM:  General: non-toxic  HEAD/EYES: anicteric  ENT:  supple  Cardiovascular:   S1, S2  Respiratory:  clear bilaterally  GI:  soft, non-tender, normal bowel sounds  :  no CVA tenderness   Musculoskeletal:  no synovitis  Neurologic:  grossly non-focal  Skin:  ulcerative but appear resolving diffusely of the back; no cellulitis; no pus; no malodor; non-tender; alopecia  Psychiatric:  appropriate affect  Vascular:  no phlebitis                      7.4    7.07  )-----------( 602      ( 19 Jun 2019 08:04 )             22.4     128<L>  |  97<L>  |  8   ----------------------------<  87  3.7   |  22  |  0.58    Ca    7.8<L>      19 Jun 2019 08:04  Phos  4.3     06-19  Mg     1.6     06-19    TPro  4.9<L>  /  Alb  2.4<L>  /  TBili  < 0.2<L>  /  DBili  x   /  AST  23  /  ALT  28  /  AlkPhos  85  06-18    Urinalysis Basic - ( 19 Jun 2019 05:08 )  Color: YELLOW / Appearance: Lt TURBID / SG: > 1.040 / pH: 8.0  Gluc: NEGATIVE / Ketone: NEGATIVE  / Bili: NEGATIVE / Urobili: NORMAL   Blood: TRACE / Protein: 20 / Nitrite: NEGATIVE   Leuk Esterase: LARGE / RBC: 11-25 / WBC >50   Sq Epi: MODERATE / Non Sq Epi: x / Bacteria: NEGATIVE    MICROBIOLOGY:  5/30 MRSA  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 No focal consolidations.    CT Pelvis w/ IV Cont (06.18.19 @ 17:48) >  IMPRESSION: No organized perirectal collection or drainable abscess.

## 2019-06-19 NOTE — CONSULT NOTE ADULT - SUBJECTIVE AND OBJECTIVE BOX
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 She also reports that she has had two weeks of bloody bowel movements with blood in the toilet bowel and on paper. She reports a normal colonoscopy approx 2 years ago. She denies hx of hemorrhoids or diverticulosis. She reports she has been feeling tired all of the time. She denies SOB or CP. She denies melena. She had some epigastric abdominal pain earlier today that has gone away and that she attributed to having not eaten anything today. "    Ophthalmology consulted to r/o herpes zoster ophthalmicus. Patient seen and examined in the late evening. She denies visual complaints. She denies blurry vision, photophobia, eye pain, flashes/floaters, decreased vision, and diplopia. She denies recent history of visual complaints. She endorses no rashes or vesicles on her face at present time or in recent past.     PMH: as above  Meds: see sunrise  POcHx (including surgeries/lasers/trauma):  None. Wears reading glasses occasionally  Drops: None  FamHx: None  Social Hx: Luxembourger speaking, english speaking partner at bedside  Allergies: NKDA    ROS:  General (neg), Vision (per HPI), Head and Neck (neg), Pulm (neg), CV (neg), GI (neg),  (neg), Musculoskeletal (neg), Skin/Integ (neg), Neuro (neg), Endocrine (neg), Heme (neg), All/Immuno (neg)    Mood and Affect Appropriate ( x ),  Oriented to Time, Place, and Person x 3 ( x )    Ophthalmology Exam    Visual acuity (sc): 20/20 OU  Pupils: PERRL OU, no APD  Ttono: 12, 14 OU  Extraocular movements (EOMs): Full OU, no pain, no diplopia  Color Plates: 12/12 OU    Slit Lamp Exam (SLE)  External:  Flat OU. No rash or vesicles seen in right or left V1 distribution. Nontender to palpation along V1 distribution bilaterally.  Lids/Lashes/Lacrimal Ducts: Flat OU    Sclera/Conjunctiva:  W+Q OU  Cornea: Cl OU. No SPK or dendritic lesions seen  Anterior Chamber: D+Q OU  Iris:  Flat OU  Lens:  Cl OU    Fundus Exam: dilated with 1% tropicamide and 2.5% phenylephrine  Approval obtained from primary team for dilation  Patient aware that pupils can remained dilated for at least 4-6 hours  Exam performed with 20D lens    Vitreous: wnl OU  Disc, cup/disc: sharp and pink, 0.4 OU  Macula:  wnl OU  Vessels:  wnl OU  Periphery: wnl OU    Diagnostic Testing:    Assessment: 52 y/o F, complicated medical history including metastatic breast cancer, presents with chronic skin rash, HSV positive, with superimposed MRSA infection - sent in by dermatology. Ophthalmology consulted to r/o herpes zoster ophthalmicus. Patient has no visual complaints. On exam, no evidence of rash or vesicles of V1 distribution bilaterally. Vision 20/20, no APD, pressures WNL, EOMs full, cornea clear, AC quiet, DFE WNL OU.       Plan:  - management per dermatology/primary team regarding herpes zoster  - no acute ophthalmology intervention     Follow-Up:  Patient should follow up his/her ophthalmologist or in the [facility] within 1 week of discharge  [address] [place] [address].  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 She also reports that she has had two weeks of bloody bowel movements with blood in the toilet bowel and on paper. She reports a normal colonoscopy approx 2 years ago. She denies hx of hemorrhoids or diverticulosis. She reports she has been feeling tired all of the time. She denies SOB or CP. She denies melena. She had some epigastric abdominal pain earlier today that has gone away and that she attributed to having not eaten anything today. "    Ophthalmology consulted to r/o herpes zoster ophthalmicus. Patient seen and examined in the late evening. She denies visual complaints. She denies blurry vision, photophobia, eye pain, flashes/floaters, decreased vision, and diplopia. She denies recent history of visual complaints. She endorses no rashes or vesicles on her face at present time or in recent past.     PMH: as above  Meds: see sunrise  POcHx (including surgeries/lasers/trauma):  None. Wears reading glasses occasionally  Drops: None  FamHx: None  Social Hx: Rwandan speaking, english speaking partner at bedside  Allergies: NKDA    ROS:  General (neg), Vision (per HPI), Head and Neck (neg), Pulm (neg), CV (neg), GI (neg),  (neg), Musculoskeletal (neg), Skin/Integ (neg), Neuro (neg), Endocrine (neg), Heme (neg), All/Immuno (neg)    Mood and Affect Appropriate ( x ),  Oriented to Time, Place, and Person x 3 ( x )    Ophthalmology Exam    Visual acuity (sc): 20/20 OU  Pupils: PERRL OU, no APD  Ttono: 12, 14 OU  Extraocular movements (EOMs): Full OU, no pain, no diplopia  Color Plates: 12/12 OU    Slit Lamp Exam (SLE)  External:  Flat OU. No rash or vesicles seen in right or left V1 distribution. Nontender to palpation along V1 distribution bilaterally.  Lids/Lashes/Lacrimal Ducts: Flat OU    Sclera/Conjunctiva:  W+Q OU  Cornea: Cl OU. No SPK or dendritic lesions seen  Anterior Chamber: D+Q OU  Iris:  Flat OU  Lens:  ns OU    Fundus Exam: dilated with 1% tropicamide and 2.5% phenylephrine  Approval obtained from primary team for dilation  Patient aware that pupils can remained dilated for at least 4-6 hours  Exam performed with 20D lens    Vitreous: wnl OU  Disc, cup/disc: sharp and pink, 0.3 OU  Macula:  wnl OU  Vessels:  wnl OU  Periphery: wnl OU    Diagnostic Testing: none    Assessment: 54 y/o F, complicated medical history including metastatic breast cancer, presents with chronic skin rash, HSV positive, with superimposed MRSA infection - sent in by dermatology. Ophthalmology consulted to r/o herpes zoster ophthalmicus. Patient has no visual complaints. On exam, no evidence of rash or vesicles of V1 distribution bilaterally. Vision 20/20, no APD, pressures WNL, EOMs full, cornea clear, AC quiet, DFE WNL OU.       Plan:  - management per dermatology/primary team regarding herpes zoster  - no acute ophthalmology intervention     Follow-Up:  Patient should follow up his/her ophthalmologist or in the [facility] within 1 week of discharge  [address] [place] [address].  [place], [place] [id number] [phone]

## 2019-06-19 NOTE — CONSULT NOTE ADULT - SUBJECTIVE AND OBJECTIVE BOX
Chief Complaint:  Patient is a 53y old  Female who presents with a chief complaint of chills (19 Jun 2019 07:36)      HPI:  Th    Allergies:  No Known Allergies      Home Medications:    Hospital Medications:  acyclovir IVPB 500 milliGRAM(s) IV Intermittent every 8 hours  acyclovir IVPB      dextrose 5% + sodium chloride 0.45%. 1000 milliLiter(s) IV Continuous <Continuous>  lidocaine 5% Ointment 1 Application(s) Topical every 6 hours PRN  mupirocin 2% Ointment 1 Application(s) Topical two times a day  pantoprazole  Injectable 40 milliGRAM(s) IV Push every 12 hours  piperacillin/tazobactam IVPB.. 3.375 Gram(s) IV Intermittent every 8 hours  vancomycin  IVPB 750 milliGRAM(s) IV Intermittent every 12 hours      PMHX/PSHX:  Breast cancer  S/P mastectomy, left      Family history:  No pertinent family history in first degree relatives      Social History:     ROS:     General:  No wt loss, fevers, chills, night sweats, fatigue,   Eyes:  Good vision, no reported pain  ENT:  No sore throat, pain, runny nose, dysphagia  CV:  No pain, palpitations, hypo/hypertension  Resp:  No dyspnea, cough, tachypnea, wheezing  GI:  See HPI  :  No pain, bleeding, incontinence, nocturia  Muscle:  No pain, weakness  Neuro:  No weakness, tingling, memory problems  Psych:  No fatigue, insomnia, mood problems, depression  Endocrine:  No polyuria, polydipsia, cold/heat intolerance  Heme:  No petechiae, ecchymosis, easy bruisability  Skin:  No rash, edema      PHYSICAL EXAM:     GENERAL:  NAD  CHEST:  Full & symmetric excursion  HEART:  Regular rhythm, no abdominal bruit, no edema  ABDOMEN:  Soft, non-tender, non-distended, normoactive bowel sounds,  no masses , no hepatosplenomegaly  EXTREMITIES:  no cyanosis,clubbing or edema  SKIN:  No rash/erythema/ecchymoses/petechiae/wounds/abscess/warm/dry  NEURO:  Alert, oriented    Vital Signs:  Vital Signs Last 24 Hrs  T(C): 36.6 (19 Jun 2019 05:49), Max: 38.4 (18 Jun 2019 14:02)  T(F): 97.9 (19 Jun 2019 05:49), Max: 101.2 (18 Jun 2019 14:02)  HR: 88 (19 Jun 2019 05:49) (88 - 109)  BP: 105/51 (19 Jun 2019 05:49) (102/43 - 120/53)  BP(mean): --  RR: 16 (19 Jun 2019 05:49) (16 - 20)  SpO2: 98% (19 Jun 2019 05:49) (98% - 100%)  Daily Height in cm: 154.94 (19 Jun 2019 03:27)    Daily     LABS:                        7.4    7.07  )-----------( 602      ( 19 Jun 2019 08:04 )             22.4     06-19    128<L>  |  97<L>  |  8   ----------------------------<  87  3.7   |  22  |  0.58    Ca    7.8<L>      19 Jun 2019 08:04  Phos  4.3     06-19  Mg     1.6     06-19    TPro  4.9<L>  /  Alb  2.4<L>  /  TBili  < 0.2<L>  /  DBili  x   /  AST  23  /  ALT  28  /  AlkPhos  85  06-18    LIVER FUNCTIONS - ( 18 Jun 2019 14:00 )  Alb: 2.4 g/dL / Pro: 4.9 g/dL / ALK PHOS: 85 u/L / ALT: 28 u/L / AST: 23 u/L / GGT: x           PT/INR - ( 18 Jun 2019 14:00 )   PT: 16.5 SEC;   INR: 1.47          PTT - ( 18 Jun 2019 14:00 )  PTT:29.6 SEC  Urinalysis Basic - ( 19 Jun 2019 05:08 )    Color: YELLOW / Appearance: Lt TURBID / SG: > 1.040 / pH: 8.0  Gluc: NEGATIVE / Ketone: NEGATIVE  / Bili: NEGATIVE / Urobili: NORMAL   Blood: TRACE / Protein: 20 / Nitrite: NEGATIVE   Leuk Esterase: LARGE / RBC: 11-25 / WBC >50   Sq Epi: MODERATE / Non Sq Epi: x / Bacteria: NEGATIVE          Imaging: Chief Complaint:  Patient is a 53y old  Female who presents with a chief complaint of chills (19 Jun 2019 07:36)      HPI:  The patient is a 53 year old female with a PMHx of metastatic breast cancer (to mediastinal LNs, dx in 2012 s/p chemo and masectomy, remission in 2013, recurrence 01/19, on paclitaxel weekly every Wednesday and exemestane daily), 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     In addition on arrival the patient noted that for the past two weeks she has been having intermittent bloody bowel movements with blood in the toilet bowel and on paper. She states she last saw thsi on Monday, and she has had loose but non-bloody bowel movements since then.  The patient states this has never happened before.  She reports a normal colonoscopy [facility] 2 years ago which she said was normal.     In the ED the patient was febrile to 101.2, 109., 102/43, 20, 100% on RA. She received vanc and pip tazo. She received 2L of LR. She received IV acetaminophen and pantoprazole.     Allergies:  No Known Allergies      Home Medications:    Hospital Medications:  acyclovir IVPB 500 milliGRAM(s) IV Intermittent every 8 hours  acyclovir IVPB      dextrose 5% + sodium chloride 0.45%. 1000 milliLiter(s) IV Continuous <Continuous>  lidocaine 5% Ointment 1 Application(s) Topical every 6 hours PRN  mupirocin 2% Ointment 1 Application(s) Topical two times a day  pantoprazole  Injectable 40 milliGRAM(s) IV Push every 12 hours  piperacillin/tazobactam IVPB.. 3.375 Gram(s) IV Intermittent every 8 hours  vancomycin  IVPB 750 milliGRAM(s) IV Intermittent every 12 hours      PMHX/PSHX:  Breast cancer  S/P mastectomy, left      Family history:  No pertinent family history in first degree relatives      Social History:     ROS:     General:  No wt loss, fevers, chills, night sweats, fatigue,   Eyes:  Good vision, no reported pain  ENT:  No sore throat, pain, runny nose, dysphagia  CV:  No pain, palpitations, hypo/hypertension  Resp:  No dyspnea, cough, tachypnea, wheezing  GI:  See HPI  :  No pain, bleeding, incontinence, nocturia  Muscle:  No pain, weakness  Neuro:  No weakness, tingling, memory problems  Psych:  No fatigue, insomnia, mood problems, depression  Endocrine:  No polyuria, polydipsia, cold/heat intolerance  Heme:  No petechiae, ecchymosis, easy bruisability  Skin:  No rash, edema      PHYSICAL EXAM:     GENERAL:  NAD  CHEST:  Full & symmetric excursion  HEART:  Regular rhythm, no abdominal bruit, no edema  ABDOMEN:  Soft, non-tender, non-distended, normoactive bowel sounds,  no masses , no hepatosplenomegaly  EXTREMITIES:  no cyanosis,clubbing or edema  SKIN:  ulcerating skin around buttock, rectal with bright   NEURO:  Alert, oriented    Vital Signs:  Vital Signs Last 24 Hrs  T(C): 36.6 (19 Jun 2019 05:49), Max: 38.4 (18 Jun 2019 14:02)  T(F): 97.9 (19 Jun 2019 05:49), Max: 101.2 (18 Jun 2019 14:02)  HR: 88 (19 Jun 2019 05:49) (88 - 109)  BP: 105/51 (19 Jun 2019 05:49) (102/43 - 120/53)  BP(mean): --  RR: 16 (19 Jun 2019 05:49) (16 - 20)  SpO2: 98% (19 Jun 2019 05:49) (98% - 100%)  Daily Height in cm: 154.94 (19 Jun 2019 03:27)    Daily     LABS:                        7.4    7.07  )-----------( 602      ( 19 Jun 2019 08:04 )             22.4     06-19    128<L>  |  97<L>  |  8   ----------------------------<  87  3.7   |  22  |  0.58    Ca    7.8<L>      19 Jun 2019 08:04  Phos  4.3     06-19  Mg     1.6     06-19    TPro  4.9<L>  /  Alb  2.4<L>  /  TBili  < 0.2<L>  /  DBili  x   /  AST  23  /  ALT  28  /  AlkPhos  85  06-18    LIVER FUNCTIONS - ( 18 Jun 2019 14:00 )  Alb: 2.4 g/dL / Pro: 4.9 g/dL / ALK PHOS: 85 u/L / ALT: 28 u/L / AST: 23 u/L / GGT: x           PT/INR - ( 18 Jun 2019 14:00 )   PT: 16.5 SEC;   INR: 1.47          PTT - ( 18 Jun 2019 14:00 )  PTT:29.6 SEC  Urinalysis Basic - ( 19 Jun 2019 05:08 )    Color: YELLOW / Appearance: Lt TURBID / SG: > 1.040 / pH: 8.0  Gluc: NEGATIVE / Ketone: NEGATIVE  / Bili: NEGATIVE / Urobili: NORMAL   Blood: TRACE / Protein: 20 / Nitrite: NEGATIVE   Leuk Esterase: LARGE / RBC: 11-25 / WBC >50   Sq Epi: MODERATE / Non Sq Epi: x / Bacteria: NEGATIVE          Imaging:

## 2019-06-20 ENCOUNTER — TRANSCRIPTION ENCOUNTER (OUTPATIENT)
Age: 54
End: 2019-06-20

## 2019-06-20 LAB
ANION GAP SERPL CALC-SCNC: 10 MMO/L — SIGNIFICANT CHANGE UP (ref 7–14)
BACTERIA UR CULT: SIGNIFICANT CHANGE UP
BASOPHILS # BLD AUTO: 0.03 K/UL — SIGNIFICANT CHANGE UP (ref 0–0.2)
BASOPHILS NFR BLD AUTO: 0.5 % — SIGNIFICANT CHANGE UP (ref 0–2)
BUN SERPL-MCNC: 6 MG/DL — LOW (ref 7–23)
CALCIUM SERPL-MCNC: 8.3 MG/DL — LOW (ref 8.4–10.5)
CHLORIDE SERPL-SCNC: 99 MMOL/L — SIGNIFICANT CHANGE UP (ref 98–107)
CO2 SERPL-SCNC: 22 MMOL/L — SIGNIFICANT CHANGE UP (ref 22–31)
CREAT SERPL-MCNC: 0.53 MG/DL — SIGNIFICANT CHANGE UP (ref 0.5–1.3)
EOSINOPHIL # BLD AUTO: 0.31 K/UL — SIGNIFICANT CHANGE UP (ref 0–0.5)
EOSINOPHIL NFR BLD AUTO: 5.4 % — SIGNIFICANT CHANGE UP (ref 0–6)
GLUCOSE SERPL-MCNC: 88 MG/DL — SIGNIFICANT CHANGE UP (ref 70–99)
HCT VFR BLD CALC: 25.3 % — LOW (ref 34.5–45)
HGB BLD-MCNC: 8.2 G/DL — LOW (ref 11.5–15.5)
IMM GRANULOCYTES NFR BLD AUTO: 8.3 % — HIGH (ref 0–1.5)
LDH SERPL L TO P-CCNC: 321 U/L — HIGH (ref 135–225)
LYMPHOCYTES # BLD AUTO: 1.01 K/UL — SIGNIFICANT CHANGE UP (ref 1–3.3)
LYMPHOCYTES # BLD AUTO: 17.5 % — SIGNIFICANT CHANGE UP (ref 13–44)
MAGNESIUM SERPL-MCNC: 1.7 MG/DL — SIGNIFICANT CHANGE UP (ref 1.6–2.6)
MCHC RBC-ENTMCNC: 28.9 PG — SIGNIFICANT CHANGE UP (ref 27–34)
MCHC RBC-ENTMCNC: 32.4 % — SIGNIFICANT CHANGE UP (ref 32–36)
MCV RBC AUTO: 89.1 FL — SIGNIFICANT CHANGE UP (ref 80–100)
MONOCYTES # BLD AUTO: 0.54 K/UL — SIGNIFICANT CHANGE UP (ref 0–0.9)
MONOCYTES NFR BLD AUTO: 9.4 % — SIGNIFICANT CHANGE UP (ref 2–14)
NEUTROPHILS # BLD AUTO: 3.4 K/UL — SIGNIFICANT CHANGE UP (ref 1.8–7.4)
NEUTROPHILS NFR BLD AUTO: 58.9 % — SIGNIFICANT CHANGE UP (ref 43–77)
NRBC # FLD: 0.07 K/UL — SIGNIFICANT CHANGE UP (ref 0–0)
NRBC FLD-RTO: 1.2 — SIGNIFICANT CHANGE UP
PHOSPHATE SERPL-MCNC: 4.5 MG/DL — SIGNIFICANT CHANGE UP (ref 2.5–4.5)
PLATELET # BLD AUTO: 625 K/UL — HIGH (ref 150–400)
PMV BLD: 9.3 FL — SIGNIFICANT CHANGE UP (ref 7–13)
POTASSIUM SERPL-MCNC: 3.7 MMOL/L — SIGNIFICANT CHANGE UP (ref 3.5–5.3)
POTASSIUM SERPL-SCNC: 3.7 MMOL/L — SIGNIFICANT CHANGE UP (ref 3.5–5.3)
RBC # BLD: 2.84 M/UL — LOW (ref 3.8–5.2)
RBC # FLD: 23.4 % — HIGH (ref 10.3–14.5)
RETICS #: 182 K/UL — HIGH (ref 25–125)
RETICS/RBC NFR: 6.4 % — HIGH (ref 0.5–2.5)
SODIUM SERPL-SCNC: 131 MMOL/L — LOW (ref 135–145)
SPECIMEN SOURCE: SIGNIFICANT CHANGE UP
VANCOMYCIN TROUGH SERPL-MCNC: 7.1 UG/ML — LOW (ref 10–20)
WBC # BLD: 5.77 K/UL — SIGNIFICANT CHANGE UP (ref 3.8–10.5)
WBC # FLD AUTO: 5.77 K/UL — SIGNIFICANT CHANGE UP (ref 3.8–10.5)

## 2019-06-20 PROCEDURE — 99232 SBSQ HOSP IP/OBS MODERATE 35: CPT | Mod: GC

## 2019-06-20 PROCEDURE — 99233 SBSQ HOSP IP/OBS HIGH 50: CPT | Mod: GC

## 2019-06-20 PROCEDURE — 99232 SBSQ HOSP IP/OBS MODERATE 35: CPT

## 2019-06-20 RX ORDER — SODIUM CHLORIDE 9 MG/ML
500 INJECTION, SOLUTION INTRAVENOUS ONCE
Refills: 0 | Status: COMPLETED | OUTPATIENT
Start: 2019-06-20 | End: 2019-06-20

## 2019-06-20 RX ORDER — VANCOMYCIN HCL 1 G
1000 VIAL (EA) INTRAVENOUS EVERY 12 HOURS
Refills: 0 | Status: DISCONTINUED | OUTPATIENT
Start: 2019-06-20 | End: 2019-06-20

## 2019-06-20 RX ORDER — SODIUM CHLORIDE 9 MG/ML
500 INJECTION INTRAMUSCULAR; INTRAVENOUS; SUBCUTANEOUS ONCE
Refills: 0 | Status: COMPLETED | OUTPATIENT
Start: 2019-06-20 | End: 2019-06-20

## 2019-06-20 RX ADMIN — VALACYCLOVIR 500 MILLIGRAM(S): 500 TABLET, FILM COATED ORAL at 06:51

## 2019-06-20 RX ADMIN — SODIUM CHLORIDE 1000 MILLILITER(S): 9 INJECTION, SOLUTION INTRAVENOUS at 15:57

## 2019-06-20 RX ADMIN — Medication 250 MILLIGRAM(S): at 11:03

## 2019-06-20 RX ADMIN — MUPIROCIN 1 APPLICATION(S): 20 OINTMENT TOPICAL at 19:05

## 2019-06-20 RX ADMIN — PIPERACILLIN AND TAZOBACTAM 25 GRAM(S): 4; .5 INJECTION, POWDER, LYOPHILIZED, FOR SOLUTION INTRAVENOUS at 06:51

## 2019-06-20 RX ADMIN — MUPIROCIN 1 APPLICATION(S): 20 OINTMENT TOPICAL at 07:01

## 2019-06-20 RX ADMIN — VALACYCLOVIR 500 MILLIGRAM(S): 500 TABLET, FILM COATED ORAL at 19:06

## 2019-06-20 RX ADMIN — SODIUM CHLORIDE 500 MILLILITER(S): 9 INJECTION INTRAMUSCULAR; INTRAVENOUS; SUBCUTANEOUS at 22:38

## 2019-06-20 RX ADMIN — PIPERACILLIN AND TAZOBACTAM 25 GRAM(S): 4; .5 INJECTION, POWDER, LYOPHILIZED, FOR SOLUTION INTRAVENOUS at 14:24

## 2019-06-20 NOTE — DISCHARGE NOTE PROVIDER - CARE PROVIDER_API CALL
Geovany Carias (MD)  Hematology; Internal Medicine; Medical Oncology  [address] [facility], [address]  [place] [address]  Phone: [phone]  Fax: [phone]  Follow Up Time: 1 week    Courtney Cowart)  DermatologyDermatopathology  [address] [place] [address]  Phone: [phone]  Fax: [phone]  Follow Up Time: 1 week    Yomaira Bear  [address] [place], [place], [address]  Phone: [phone]  Fax: (   )    -  Follow Up Time: 1 week Geovany Carias (MD)  Hematology; Internal Medicine; Medical Oncology  [address] [facility], [address]  [place] [address]  Phone: [phone]  Fax: [phone]  Follow Up Time: 1 week    Courtney Cowart)  DermatologyDermatopathology  [address] [place] [address]  Phone: [phone]  Fax: [phone]  Follow Up Time: 1 week    Yomaira Bear  [facility] [place], [place], [address]  Phone: [phone]  Fax: (   )    -  Follow Up Time: 1 week    Bam Hayden)  Surgery  [address] [place] [address]  Phone: [phone]  Fax: [phone]  Follow Up Time: 2 weeks

## 2019-06-20 NOTE — CHART NOTE - NSCHARTNOTEFT_GEN_A_CORE
House Oncology called for second opinion per pt and family's request. Reviewed Dr. Carias's note from today. [facility] Pt Scheduling Unit [phone] number provided to pt and her son if they decide to transfer care.     Will sign off. Discussed with primary team.    Please call back with any questions.

## 2019-06-20 NOTE — ADVANCED PRACTICE NURSE CONSULT - REASON FOR CONSULT
Patient seen on skin care rounds after wound care referral received for assessment of skin impairment and recommendations of topical management. Chart reviewed: WBC 5.77, Hemoglobin/Hematocrit 8.2/25.3, Platelets 625, (+) Occult blood, INR 1.47, BMI 20.7kg/m2, John 18. Patient H/O of metastatic breast cancer (+ mediasteinal LN).  Initially diagnosed with L breast cancer in 2012 - s/p chemo and mastectomy - remission in 2013.  Then had SOB, weight loss - work up showed metastatic breast cancer in January 2019.  Started chemo with paclitaxel weekly every Wednesday and exemestane daily in February.  In January, she noted a rash of the hands - biopsy showed bullous impetigo.  Developed rash of the groin - biopsy showed excoriation.  Again developed diffuse rash of the back and posterior scalp - biopsy and culture +HSV +mrsa.  Was treated with a course of valtrex and doxycyline with marked improvement. She continued to have ulcerative lesions, however, other than pruritis - it is much better.  Saw derm 6/18 and was noted to have chills and rigors and therefore sent to ER at [facility].  Patient being seen and followed by Opthalmology for herpes zoster ophthalmicus, Gastroenterology for hematochezia, Hematology/Oncology for second opinion for metastatic breast cancer, Infectious disease for MRSA/HSV and Dermatology for rash. Patient, son and sister-in-law interviewed, patient care for by outpatient dermatology and oncology. Lesions began "in the beginning of the year. Many have healed." Previously cared for by sister-in-law using recommended topical ointments (unable to recall medications used), left open to air. Currently, concerned about wounds rubbing up against linens delaying healing and frequent "itching."

## 2019-06-20 NOTE — PROGRESS NOTE ADULT - PROBLEM SELECTOR PLAN 5
Na downtrending, pt did receive hypotonic saline. SIADH in stg of infection/pain/malignancy vs hypotonic saline administration  - send for urine studies, osms  - monitor BMP, may need fluid restriction Na improving w/ fluid restriction  - c/w 1.2L fluid restriction  - monitor BMP

## 2019-06-20 NOTE — CONSULT NOTE ADULT - ASSESSMENT
52 yo F with triple positive (ER 20%, PR10%, Her2 2+ FISH amplified) breast cancer (dx 2012) initially stage IIIB, s/p L mastectomy followed by ACTH then 1 year of herceptin and RT, tamoxifen, with recurrence in 1/2019 (mediastinal and hilar LNs, small lung nodules, ?liver mets) on weekly paclitaxel (since 3/2019) and exemestane, HSV+ skin rash with MRSA superinfection p/w fever and chills, admitted for sepsis and symptomatic anemia     1. Metastatic breast cancer: triple positive when first diagnosed but ER+ AK neg Her 2 neg on recurrence  -Heme onc opinion appreciated  - no plan for chemotherapy as inpatient  - repeat imaging to assess disease status as outpatient  plan d/w with son/patient    2l. Anemia / MDS / Elevated retic  -?hemolysis present   -recommend haptoglobin and direct romain   -Recommend [facility] testing   -will see as outpatient on patient discharge.

## 2019-06-20 NOTE — PHYSICAL THERAPY INITIAL EVALUATION ADULT - ASR EQUIP NEEDS DISCH PT EVAL
Pt. reports owning rolling walker at home. Pt. educated on the benefit of utilizing a rolling walker for ambulation upon discharge to optimize safety and decrease risk of falls. Pt. expressed understand.

## 2019-06-20 NOTE — DISCHARGE NOTE PROVIDER - NSDCCPTREATMENT_GEN_ALL_CORE_FT
PRINCIPAL PROCEDURE  Procedure: Gastrectomy, distal, with gastrojejunostomy  Findings and Treatment:       SECONDARY PROCEDURE  Procedure: Embolization, artery  Findings and Treatment: IR Gastric artery embolization

## 2019-06-20 NOTE — PHYSICAL THERAPY INITIAL EVALUATION ADULT - DISCHARGE DISPOSITION, PT EVAL
home w/ home PT home w/ home PT/anticipated discharge to home with home PT services to address current functional limitations to optimize safety within the home environment with the use of a rolling walker.

## 2019-06-20 NOTE — CONSULT NOTE ADULT - SUBJECTIVE AND OBJECTIVE BOX
HPI: 52 yo F with triple positive (ER 20%, PR10%, Her2 2+ FISH amplified) breast cancer (dx 2012) initially stage IIIB (pT3 (5.5cm) pN2 (6/13 +veLN) s/p L mastectomy followed by ACTH then 1 year of herceptin and RT, tamoxifen, with recurrence in 1/2019 (mediastinal and hilar LNs, small lung nodules, ?liver mets - FNA LN - 02/26/19 - ER > 50% NJ <1% HER2 0 )  on weekly paclitaxel (since 3/2019) and exemestane, HSV+ skin rash with MRSA superinfection p/w fever and chills, admitted for sepsis and symptomatic anemia with component of blood loss.     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 She also reports that she has had two weeks of bloody bowel movements with blood in the toilet bowel and on paper. She reports a normal colonoscopy approx 2 years ago. She denies hx of hemorrhoids or diverticulosis. She reports she has been feeling tired all of the time. She denies SOB or CP. She denies melena. Hgb was 6.1 on admission, received 1 unit of pRBC with improvement to 8.0 and today is 7.4     Pt and family sough a second opinion for management of breast cancer. Reports pt was found to have recurrent disease in 1/2019 on CT/PET. and has been on weekly paclitaxel (on Wednesdays) since March and exemestane - addition of Perjeta/Herceptin w/ Her2 neg due to previous Her+ve.  Reports no recent imaging since starting paclitaxel. Pt completed 2.5 mo treatment w/ plan to repeat imaging after 3-4 mo treatment. 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 Cytogentetics / NGS non diagnostic. Iron store adequate but serum iron studies showing deficiency. Feraheme recommended. As well as procrit     PAST MEDICAL & SURGICAL HISTORY:  Breast cancer  S/P mastectomy, left    Allergies    No Known Allergies    Intolerances    MEDICATIONS  (STANDING):  mupirocin 2% Ointment 1 Application(s) Topical two times a day  pantoprazole  Injectable 40 milliGRAM(s) IV Push every 12 hours  piperacillin/tazobactam IVPB.. 3.375 Gram(s) IV Intermittent every 8 hours  sodium chloride 0.9%. 1000 milliLiter(s) (75 mL/Hr) IV Continuous <Continuous>  valACYclovir 500 milliGRAM(s) Oral every 12 hours  vancomycin  IVPB 750 milliGRAM(s) IV Intermittent every 12 hours    MEDICATIONS  (PRN):  lidocaine 5% Ointment 1 Application(s) Topical every 6 hours PRN pain at rash    FAMILY HISTORY:  No pertinent family history in first degree relatives    SOCIAL HISTORY: No EtOH, no tobacco    REVIEW OF SYSTEMS:    CONSTITUTIONAL: No fevers, no chills, no weakness, no weight loss  EYES/ENT: No visual changes;  No dizziness/vertigo or throat pain   NECK: No pain or stiffness  RESPIRATORY: No shortness of breath, no cough or wheezing   CARDIOVASCULAR: No chest pain or palpitations, no dyspnea on exertion, no peripheral edema  GASTROINTESTINAL: No nausea/vomiting/diarrhea, no abdominal pain, no melena or BRBPR, no constipation  GENITOURINARY: No dysuria, increased frequency or hematuria  NEUROLOGICAL: No numbness or weakness  SKIN: No rashes, no bruises, no petechiae  LYMPH: No enlarged lymph nodes  All other review of systems is negative unless indicated above    Height (cm): 154.94 (06-19 @ 03:27)  Weight (kg): 49.8 (06-19 @ 03:27)  BMI (kg/m2): 20.7 (06-19 @ 03:27)  BSA (m2): 1.46 (06-19 @ 03:27)    Vital Signs Last 24 Hrs  T(C): 36.5 (20 Jun 2019 06:03), Max: 36.6 (20 Jun 2019 02:22)  T(F): 97.7 (20 Jun 2019 06:03), Max: 97.9 (20 Jun 2019 02:22)  HR: 78 (20 Jun 2019 06:03) (77 - 93)  BP: 100/61 (20 Jun 2019 06:03) (100/61 - 120/56)  BP(mean): --  RR: 18 (20 Jun 2019 06:03) (16 - 18)  SpO2: 100% (20 Jun 2019 06:03) (100% - 100%)  PHYSICAL EXAM:  GENERAL: NAD, alopecia  EYES: EOMI, conjunctiva and sclera clear  NECK: supple  RESP: CTAB  HEART: RRR, S1/S2  ABDOMEN: +BS, soft, NT, ND  EXTREMITIES: no LE edema  NEUROLOGIC: no focal deficits, AAO x 3    Basic Metabolic Panel w/Mg &amp; Inorg Phos (06.20.19 @ 06:27)    Calcium, Total Serum: 8.3 mg/dL    Phosphorus Level, Serum: 4.5 mg/dL    eGFR if : 127 mL/min    eGFR if Non : 109: The units for eGFR are ml/min/1.73m2 (normalized body  surface area). The eGFR is calculated from a serum  creatinine using the CKD-EPI equation. Other variables  required for calculation are race, age and sex. Among  patients with chronic kidney disease (CKD), the eGFR is  useful in determining the stage of disease according to  KDOQI CKD classification. All eGFR results are reported  numerically with the following interpretation.  Complete Blood Count + Automated Diff in AM (06.20.19 @ 06:27)    Nucleated RBC #: 0.07 K/uL    Nucleated RBC%: 1.2: NEW CBC INSTRUMENTATION AT THE [facility] WILL REPORT AN  AUTOMATED NRBC COUNT BASED ON FLUORESCENCE NUCLEAR STAIN AND  AUTOMATICALLY CORRECT THE WBC IN THE PRESENCE OF NRBC.    WBC Count: 5.77 K/uL    RBC Count: 2.84 M/uL    Hemoglobin: 8.2 g/dL    Hematocrit: 25.3 %    Mean Cell Volume: 89.1 fL    Mean Cell Hemoglobin: 28.9 pg    Mean Cell Hemoglobin Conc: 32.4 %    Red Cell Distrib Width: 23.4 %    Platelet Count - Automated: 625 K/uL    MPV: 9.3 fl    Auto Neutrophil #: 3.40 K/uL    Auto Lymphocyte #: 1.01 K/uL    Auto Monocyte #: 0.54 K/uL    Auto Eosinophil #: 0.31 K/uL    Auto Basophil #: 0.03 K/uL    Auto Neutrophil %: 58.9 %    Auto Lymphocyte %: 17.5 %    Auto Monocyte %: 9.4 %    Auto Eosinophil %: 5.4 %    Auto Basophil %: 0.5 %    Auto Immature Granulocyte %: 8.3: (Includes meta, myelo and promyelocytes) %      GFR  (ml/min/1.73 m2)          W/KIDNEY DAMAGE    W/O KIDNEY DMG  ==========================================================  >= 90.......................Stage 1..............Normal  60-89.......................Stage 2...........Decreased GFR  30-59.......................Stage 3..............Stage 3  15-29.......................Stage 4..............Stage 4  < 15........................Stage 5..............Stage 5    Each stage of CKD assumes that the associated GFR level  has been in effect for at least 3 months. Determination of  stages one and two (with eGFR > 59ml/min/m2) requires  estimation of kidney damage for at least 3 months as  defined by structural or functional abnormalities.    Limitations: All estimates of GFR will be less accurate  for patients at extremes of muscle mass (including but  not limited to frail elderly, critically ill, or cancer  patients), those with unusual diets, and those with  conditions associated with reduced secretion or  extrarenal elimination of creatinine. The eGFR equation  is not recommended for use in patients with unstable  creatinine levels. mL/min    Sodium, Serum: 131 mmol/L    Potassium, Serum: 3.7 mmol/L    Chloride, Serum: 99 mmol/L    Carbon Dioxide, Serum: 22 mmol/L    Anion Gap, Serum: 10 mmo/L    Blood Urea Nitrogen, Serum: 6 mg/dL    Creatinine, Serum: 0.53 mg/dL    Glucose, Serum: 88 mg/dL    Magnesium, Serum: 1.7 mg/dL    Reticulocyte Count in AM (06.20.19 @ 06:27)    Reticulocyte Percent: 6.4 %    Absolute Reticulocytes: 182 k/uL    Lactate Dehydrogenase, Serum (06.20.19 @ 06:27)    Lactate Dehydrogenase, Serum: 321 U/L      IMAGING:

## 2019-06-20 NOTE — DISCHARGE NOTE PROVIDER - CARE PROVIDERS DIRECT ADDRESSES
,DirectAddress_Unknown,[email or web address],DirectAddress_Unknown ,DirectAddress_Unknown,[email or web address],DirectAddress_Unknown,[email or web address]

## 2019-06-20 NOTE — PROGRESS NOTE ADULT - PROBLEM SELECTOR PLAN 2
-Present for aprrox 6 months. Derm notes in [employer] report lesions grew HSV and the patient was treated with valacyclovir.   -06/03/19 patient found to have MRSA in wound culture. Was started on topical mupirocin and PO doxycyline for 3 weeks.   -ID consulted, unlikely to be source of sepsis but will c/w vanc/zosyn until can prove no infection  -ophtho was consulted d/t concern for disseminated HSV, no e/o occular involvement  -derm and wound care consulted

## 2019-06-20 NOTE — DISCHARGE NOTE PROVIDER - NSDCFUADDINST_GEN_ALL_CORE_FT
WOUND CARE:  Please keep incisions clean and dry. Please do not Scrub or rub incisions. Do not use lotion or powder on incisions.   BATHING: You may shower and/or sponge bathe. You may use warm soapy water in the shower and rinse, pat dry.  ACTIVITY: No heavy lifting or straining. Otherwise, you may return to your usual level of physical activity. If you are taking narcotic pain medication DO NOT drive a car, operate machinery or make important decisions.  DIET: Return to your usual diet.  NOTIFY YOUR SURGEON IF YOU HAVE: any bleeding that does not stop, any pus draining from your wound(s), any fever (over 100.4 F) persistent nausea/vomiting, or if your pain is not controlled on your discharge pain medications, unable to urinate.  Please follow up with your primary care physician in one week regarding your hospitalization, bring copies of your discharge paperwork.  Please follow up with your surgeon, Dr. Hayden

## 2019-06-20 NOTE — PROGRESS NOTE ADULT - SUBJECTIVE AND OBJECTIVE BOX
Patient is a 53y old  Female who presents with a chief complaint of chills (19 Jun 2019 10:26)    f/u rash    Interval History/ROS:  no fever/chills.  no n/v/d.  no abdominal pain.  no dysuria.  Remainder of ROS otherwise negative.    PAST MEDICAL & SURGICAL HISTORY:  Breast cancer  S/P mastectomy, left    Allergies  No Known Allergies    ANTIMICROBIALS):  acyclovir IVPB   piperacillin/tazobactam IVPB (6/18-)  vancomycin  IVPB (6/18-)  valACYclovir 500 every 12 hours    MEDICATIONS  (STANDING):  PRN    Vital Signs Last 24 Hrs  T(F): 98 (06-20-19 @ 11:18), Max: 98 (06-20-19 @ 11:18)  HR: 103 (06-20-19 @ 11:18)  BP: 104/49 (06-20-19 @ 11:18)  RR: 18 (06-20-19 @ 11:18)  SpO2: 100% (06-20-19 @ 11:18) (100% - 100%)    PHYSICAL EXAM:  General: non-toxic  HEAD/EYES: anicteric  ENT:  supple  Cardiovascular:   S1, S2  Respiratory:  clear bilaterally  GI:  soft, non-tender, normal bowel sounds  :  no CVA tenderness   Musculoskeletal:  no synovitis  Neurologic:  grossly non-focal  Skin:  ulcerative but appear resolving diffusely of the back; no cellulitis; no pus; no malodor; non-tender; alopecia  Psychiatric:  appropriate affect  Vascular:  no phlebitis                                   8.2    5.77  )-----------( 625      ( 20 Jun 2019 06:27 )             25.3 06-20    131  |  99  |  6   ----------------------------<  88  3.7   |  22  |  0.53  Ca    8.3      20 Jun 2019 06:27Phos  4.5     06-20Mg     1.7     06-20  TPro  4.9  /  Alb  2.4  /  TBili  < 0.2  /  DBili  x   /  AST  23  /  ALT  28  /  AlkPhos  85  06-18    Urinalysis Basic - ( 19 Jun 2019 05:08 )  Color: YELLOW / Appearance: Lt TURBID / SG: > 1.040 / pH: 8.0  Gluc: NEGATIVE / Ketone: NEGATIVE  / Bili: NEGATIVE / Urobili: NORMAL   Blood: TRACE / Protein: 20 / Nitrite: NEGATIVE   Leuk Esterase: LARGE / RBC: 11-25 / WBC >50   Sq Epi: MODERATE / Non Sq Epi: x / Bacteria: NEGATIVE    MICROBIOLOGY:  5/30 MRSA  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 No focal consolidations.    CT Pelvis w/ IV Cont (06.18.19 @ 17:48) >  IMPRESSION: No organized perirectal collection or drainable abscess.

## 2019-06-20 NOTE — PROGRESS NOTE ADULT - SUBJECTIVE AND OBJECTIVE BOX
Daily Progress Note - INCOMPLETE  Author: Lefty Venegas MD PGY-1  Pgr: [phone]    SUBJECTIVE / OVERNIGHT EVENTS: No acute events overnight    MEDICATIONS  (STANDING):  mupirocin 2% Ointment 1 Application(s) Topical two times a day  piperacillin/tazobactam IVPB.. 3.375 Gram(s) IV Intermittent every 8 hours  valACYclovir 500 milliGRAM(s) Oral every 12 hours  vancomycin  IVPB 750 milliGRAM(s) IV Intermittent every 12 hours    MEDICATIONS  (PRN):  lidocaine 5% Ointment 1 Application(s) Topical every 6 hours PRN pain at rash      T(C): 36.6 (06-20-19 @ 02:22), Max: 36.6 (06-20-19 @ 02:22)  HR: 77 (06-20-19 @ 02:22) (77 - 93)  BP: 101/58 (06-20-19 @ 02:22) (101/58 - 120/56)  RR: 16 (06-20-19 @ 02:22) (16 - 18)  SpO2: 100% (06-20-19 @ 02:22) (100% - 100%)    CAPILLARY BLOOD GLUCOSE        I&O's Summary      Physical exam:   GENERAL: No acute distress, well-developed  HEAD:  Atraumatic, Normocephalic  ENT: EOMI, PERRLA, No JVD, moist mucosa  CHEST/LUNG: Clear to auscultation bilaterally  BACK: No spinal tenderness  HEART: Regular rate and rhythm; No murmurs, rubs, or gallops  ABDOMEN: Soft, Nontender, Nondistended; Bowel sounds present  EXTREMITIES:  No clubbing, cyanosis, or edema  PSYCH: Nl behavior, nl affect  NEUROLOGY: AAOx3, non-focal, cranial nerves intact  SKIN: Normal color, No rashes or lesions    LABS:                        7.4    7.07  )-----------( 602      ( 19 Jun 2019 08:04 )             22.4     Hgb Trend: 7.4<--, 8.0<--, 6.1<--  06-19    127<L>  |  95<L>  |  7   ----------------------------<  107<H>  4.1   |  21<L>  |  0.51    Ca    8.2<L>      19 Jun 2019 15:46  Phos  4.3     06-19  Mg     1.6     06-19    TPro  4.9<L>  /  Alb  2.4<L>  /  TBili  < 0.2<L>  /  DBili  x   /  AST  23  /  ALT  28  /  AlkPhos  85  06-18    Creatinine Trend: 0.51<--, 0.58<--, 0.58<--  PT/INR - ( 18 Jun 2019 14:00 )   PT: 16.5 SEC;   INR: 1.47          PTT - ( 18 Jun 2019 14:00 )  PTT:29.6 SEC      Urinalysis Basic - ( 19 Jun 2019 05:08 )    Color: YELLOW / Appearance: Lt TURBID / SG: > 1.040 / pH: 8.0  Gluc: NEGATIVE / Ketone: NEGATIVE  / Bili: NEGATIVE / Urobili: NORMAL   Blood: TRACE / Protein: 20 / Nitrite: NEGATIVE   Leuk Esterase: LARGE / RBC: 11-25 / WBC >50   Sq Epi: MODERATE / Non Sq Epi: x / Bacteria: NEGATIVE Daily Progress Note   Author: Lefty Venegas MD PGY-1  Pgr: [phone]    SUBJECTIVE / OVERNIGHT EVENTS: No acute events overnight, this morning complaining of itchiness in LE, some pain but unchanged from usual. No further BRBPR.     MEDICATIONS  (STANDING):  mupirocin 2% Ointment 1 Application(s) Topical two times a day  piperacillin/tazobactam IVPB.. 3.375 Gram(s) IV Intermittent every 8 hours  valACYclovir 500 milliGRAM(s) Oral every 12 hours  vancomycin  IVPB 750 milliGRAM(s) IV Intermittent every 12 hours    MEDICATIONS  (PRN):  lidocaine 5% Ointment 1 Application(s) Topical every 6 hours PRN pain at rash      T(C): 36.6 (06-20-19 @ 02:22), Max: 36.6 (06-20-19 @ 02:22)  HR: 77 (06-20-19 @ 02:22) (77 - 93)  BP: 101/58 (06-20-19 @ 02:22) (101/58 - 120/56)  RR: 16 (06-20-19 @ 02:22) (16 - 18)  SpO2: 100% (06-20-19 @ 02:22) (100% - 100%)    CAPILLARY BLOOD GLUCOSE        I&O's Summary      Physical exam:   Constitutional: NAD, weak appearing.   Eyes: Conjunctival pallor, EOMI,, no redness  Respiratory: crackles at bases b/l  Cardiovascular: RRR, no M/R/G, no edema, 2+ Peripheral Pulses  Gastrointestinal: soft, nontender, nondistended, +BS, no hernia  Skin: warm, multiple excoriation on buttocks, back, intergluteal region, back. erythematous. no foul smell. no purulence  Neurologic: sensation grossly intact, CN grossly intact, non-focal exam  Psychiatric: AOX3, appropriate mood, affect    LABS:                        7.4    7.07  )-----------( 602      ( 19 Jun 2019 08:04 )             22.4     Hgb Trend: 7.4<--, 8.0<--, 6.1<--  06-19    127<L>  |  95<L>  |  7   ----------------------------<  107<H>  4.1   |  21<L>  |  0.51    Ca    8.2<L>      19 Jun 2019 15:46  Phos  4.3     06-19  Mg     1.6     06-19    TPro  4.9<L>  /  Alb  2.4<L>  /  TBili  < 0.2<L>  /  DBili  x   /  AST  23  /  ALT  28  /  AlkPhos  85  06-18    Creatinine Trend: 0.51<--, 0.58<--, 0.58<--  PT/INR - ( 18 Jun 2019 14:00 )   PT: 16.5 SEC;   INR: 1.47          PTT - ( 18 Jun 2019 14:00 )  PTT:29.6 SEC      Urinalysis Basic - ( 19 Jun 2019 05:08 )    Color: YELLOW / Appearance: Lt TURBID / SG: > 1.040 / pH: 8.0  Gluc: NEGATIVE / Ketone: NEGATIVE  / Bili: NEGATIVE / Urobili: NORMAL   Blood: TRACE / Protein: 20 / Nitrite: NEGATIVE   Leuk Esterase: LARGE / RBC: 11-25 / WBC >50   Sq Epi: MODERATE / Non Sq Epi: x / Bacteria: NEGATIVE Daily Progress Note   Author: Lefty Venegas MD PGY-1  Pgr: [phone]    SUBJECTIVE / OVERNIGHT EVENTS: No acute events overnight, this morning complaining of itchiness in LE, some pain but unchanged from usual. No further BRBPR.     MEDICATIONS  (STANDING):  mupirocin 2% Ointment 1 Application(s) Topical two times a day  piperacillin/tazobactam IVPB.. 3.375 Gram(s) IV Intermittent every 8 hours  valACYclovir 500 milliGRAM(s) Oral every 12 hours  vancomycin  IVPB 750 milliGRAM(s) IV Intermittent every 12 hours    MEDICATIONS  (PRN):  lidocaine 5% Ointment 1 Application(s) Topical every 6 hours PRN pain at rash      T(C): 36.6 (06-20-19 @ 02:22), Max: 36.6 (06-20-19 @ 02:22)  HR: 77 (06-20-19 @ 02:22) (77 - 93)  BP: 101/58 (06-20-19 @ 02:22) (101/58 - 120/56)  RR: 16 (06-20-19 @ 02:22) (16 - 18)  SpO2: 100% (06-20-19 @ 02:22) (100% - 100%)    CAPILLARY BLOOD GLUCOSE        I&O's Summary      Physical exam:   Constitutional: NAD, weak appearing.   Eyes: Conjunctival pallor, EOMI,, no redness  Respiratory: crackles at bases b/l  Cardiovascular: RRR, no M/R/G, no edema, 2+ Peripheral Pulses  Gastrointestinal: soft, nontender, nondistended, +BS, no hernia  Skin: warm, multiple excoriation on buttocks, back, intergluteal region, back. erythematous. no foul smell. no purulence  Neurologic: sensation grossly intact, CN grossly intact, non-focal exam  Psychiatric: AOX3, appropriate mood, affect    LABS:                        7.4    7.07  )-----------( 602      ( 19 Jun 2019 08:04 )             22.4     Hgb Trend: 7.4<--, 8.0<--, 6.1<--  06-19    127<L>  |  95<L>  |  7   ----------------------------<  107<H>  4.1   |  21<L>  |  0.51    Ca    8.2<L>      19 Jun 2019 15:46  Phos  4.3     06-19  Mg     1.6     06-19    TPro  4.9<L>  /  Alb  2.4<L>  /  TBili  < 0.2<L>  /  DBili  x   /  AST  23  /  ALT  28  /  AlkPhos  85  06-18    Creatinine Trend: 0.51<--, 0.58<--, 0.58<--  PT/INR - ( 18 Jun 2019 14:00 )   PT: 16.5 SEC;   INR: 1.47          PTT - ( 18 Jun 2019 14:00 )  PTT:29.6 SEC      Urinalysis Basic - ( 19 Jun 2019 05:08 )    Color: YELLOW / Appearance: Lt TURBID / SG: > 1.040 / pH: 8.0  Gluc: NEGATIVE / Ketone: NEGATIVE  / Bili: NEGATIVE / Urobili: NORMAL   Blood: TRACE / Protein: 20 / Nitrite: NEGATIVE   Leuk Esterase: LARGE / RBC: 11-25 / WBC >50   Sq Epi: MODERATE / Non Sq Epi: x / Bacteria: NEGATIVE    Discussed personally with: Dr. Milian, Dr. Carias re: chemotehrapy and anemia.  Reviewed: Dermatology notes

## 2019-06-20 NOTE — PROGRESS NOTE ADULT - ATTENDING COMMENTS
Agree with above.  D/C abx tonight if BCx continue to be negative. Appreciate ID and Derm consult as well as wound consult. Likely D/C next 24-48 hours.

## 2019-06-20 NOTE — PROGRESS NOTE ADULT - ASSESSMENT
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  With her immune suppression and prior extent of infection, would prophylax with valtrex 500mg bid.  Derm consult noted and appreciated.      suggest:  - continue valtrex 500mg bid  - d/c vanc/zosyn  - wound care  - d/c planning    Please call Infectious Diseases if there is a change in status.  Thank you.  [phone].    above d/w medicine team

## 2019-06-20 NOTE — DISCHARGE NOTE PROVIDER - NSDCCPCAREPLAN_GEN_ALL_CORE_FT
PRINCIPAL DISCHARGE DIAGNOSIS  Diagnosis: Wound of skin  Assessment and Plan of Treatment: You came to the hospital because you were have      SECONDARY DISCHARGE DIAGNOSES  Diagnosis: MRSA (methicillin resistant Staphylococcus aureus)  Assessment and Plan of Treatment: PRINCIPAL DISCHARGE DIAGNOSIS  Diagnosis: Wound of skin  Assessment and Plan of Treatment: You came to the hospital because you were have chills and rigors. We were worried that your rigors and chills were due to an infection of the skin. We asked the skin doctor (dermatologist), wound care team, and infectious disease to evaluate your rash and we do not think your rash was infected. Please follow-up with your dermatologist for further care.      SECONDARY DISCHARGE DIAGNOSES  Diagnosis: Breast cancer  Assessment and Plan of Treatment: Please follow-up with Dr. Adams or the [facility] for further care. PRINCIPAL DISCHARGE DIAGNOSIS  Diagnosis: Upper GI bleed  Assessment and Plan of Treatment:       SECONDARY DISCHARGE DIAGNOSES  Diagnosis: Breast cancer  Assessment and Plan of Treatment: Please follow-up with Dr. Adams or the [facility] for further care.

## 2019-06-20 NOTE — PHYSICAL THERAPY INITIAL EVALUATION ADULT - ADDITIONAL COMMENTS
Pt. received at edge of bed with son in room. Son used to translate, pt. understands some english. Pt. lives in a private home with family. Pt. reports owning a rolling walker at home, but does not utilize. Pt. ambulates with assistance at home, without assistive device, and requires varying assistance with some ADLs, independent with others. Pt. returned edge of bed with all tubes/lines intact, call bell in reach and in NAD.

## 2019-06-20 NOTE — DISCHARGE NOTE PROVIDER - NSDCFUADDAPPT_GEN_ALL_CORE_FT
Generalized HSV affecting upper posterior trunk, lower back, bilateral buttocks (including sacral/gluteal cleft) extending to the bilateral posterior thighs- Warm NS in luke-warm water. Cleanse affected area of with luke-warm NS. Dry well. Apply Liquid barrier film to periwound skin, and to all areas of re-epithelialization. Apply topical Mupirocine 2% ointment (as recommended by Dermatology) to areas of denuded epidermis, apply [employer] moisture barrier ointment to sacral/gluteal folds, cover with silicone foam without border; apply silicone foam with border to posterior upper back. Secure with spandage underwear. Change daily.    Continue low air loss bed therapy, continue heel elevation with offloading device, continue to turn & reposition q2h, continue moisture management with barrier creams & single breathable pad, continue measures to decrease friction/shear/pressure.

## 2019-06-20 NOTE — PROGRESS NOTE ADULT - PROBLEM SELECTOR PLAN 6
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 Patient follows with private onc but wants to establish care at [facility] /2nd opinion.   -will c/s onc via email ON. 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 Patient follows with private onc but wants to establish care at [facility] /2nd opinion.   -chemo on hold for now, resume as outpatient  -outpt onc called to obtain recs and notified that family would like 2nd opinion, outpt onc will send records and see patient in hospital

## 2019-06-20 NOTE — PROGRESS NOTE ADULT - PROBLEM SELECTOR PLAN 1
unknown etiology, possibly 2/2 skin though doesn't appear infected, also possibly 2/2 known malignancy  -febrile, tachy, MRSA skin infection dx via wound cx at outpt derm office ([employer])  -s/p vanc/zosyn and 1L IVF in ED  -BCx sent  -c/w vanc/zosyn for at leaset 48 hours given immunocompromised state.   -valacyclovir 500mg BID for HSV ppx Resolved. unknown etiology, possibly 2/2 skin though doesn't appear infected, also possibly 2/2 known malignancy  -febrile, tachy, MRSA skin infection dx via wound cx at outpt derm office ([employer])  -s/p vanc/zosyn and 1L IVF in ED  -c/w vanc/zosyn for at leaset 48 hours given immunocompromised state, d/c this evening if BCx negative   -valacyclovir 500mg BID for HSV ppx

## 2019-06-20 NOTE — PROGRESS NOTE ADULT - PROBLEM SELECTOR PLAN 4
-Hb 6.2 on admission likely 2/2 LGIB w 2 weeks of hematochezia 5x per day, now s/p 1U PRBC w/ stable HGB and appropriate response  -likely multifactorial. Family and patient states her wounds on buttocks have been oozing blood.  Also possible paclitaxel component to anemia.  -d/t recent transfusions will not check iron studies at this time  -retic count ordered -Hb 6.2 on admission likely 2/2 LGIB w 2 weeks of hematochezia 5x per day, now s/p 1U PRBC w/ stable HGB and appropriate response  -likely multifactorial. Family and patient states her wounds on buttocks have been oozing blood.  Also possible paclitaxel component to anemia.  -d/t recent transfusions will not check iron studies at this time  -retic count ordered  -r/o hemolysis, check LDH and hapto, concern for PNH

## 2019-06-20 NOTE — DISCHARGE NOTE PROVIDER - PROVIDER TOKENS
PROVIDER:[TOKEN:[[id number]],FOLLOWUP:[1 week]],PROVIDER:[TOKEN:[[id number]],FOLLOWUP:[1 week]],FREE:[LAST:[Catucci],FIRST:[Yomaira],PHONE:[phone]:[(   )    -],ADDRESS:[[address] [place] [address], [id number]],FOLLOWUP:[1 week]] PROVIDER:[TOKEN:[[id number]],FOLLOWUP:[1 week]],PROVIDER:[TOKEN:[[id number]],FOLLOWUP:[1 week]],FREE:[LAST:[Catucci],FIRST:[Yomaira],PHONE:[phone]],FAX:[(   )    -],ADDRESS:[[address] [place] [address], [phone]],FOLLOWUP:[1 week]],PROVIDER:[[id number]],FOLLOWUP:[2 weeks]]

## 2019-06-20 NOTE — PROGRESS NOTE ADULT - ASSESSMENT
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

## 2019-06-20 NOTE — PROGRESS NOTE ADULT - SUBJECTIVE AND OBJECTIVE BOX
Chief Complaint:  Patient is a 53y old  Female who presents with a chief complaint of chills (20 Jun 2019 07:07)      Interval Events:   Patients hemoglobin is stable.  No further GI bleeding noted.      Allergies:  No Known Allergies      Hospital Medications:  lidocaine 5% Ointment 1 Application(s) Topical every 6 hours PRN  mupirocin 2% Ointment 1 Application(s) Topical two times a day  piperacillin/tazobactam IVPB.. 3.375 Gram(s) IV Intermittent every 8 hours  valACYclovir 500 milliGRAM(s) Oral every 12 hours  vancomycin  IVPB 750 milliGRAM(s) IV Intermittent every 12 hours      PMHX/PSHX:  Breast cancer  S/P mastectomy, left      Family history:  No pertinent family history in first degree relatives          PHYSICAL EXAM:     GENERAL:  NAD  HEENT:  NC/AT,  conjunctivae clear, sclera -anicteric  CHEST:  Full & symmetric excursion, no increased  HEART:  Regular rhythm  ABDOMEN:  Soft, non-tender, non-distended, normoactive bowel sounds,  no masses ,no hepato-splenomegaly,   EXTREMITIES:  no cyanosis,clubbing or edema  SKIN:  No rash/erythema/ecchymoses/petechiae/wounds/abscess/warm/dry  NEURO:  Alert, oriented    Vital Signs:  Vital Signs Last 24 Hrs  T(C): 36.5 (20 Jun 2019 06:03), Max: 36.6 (20 Jun 2019 02:22)  T(F): 97.7 (20 Jun 2019 06:03), Max: 97.9 (20 Jun 2019 02:22)  HR: 78 (20 Jun 2019 06:03) (77 - 93)  BP: 100/61 (20 Jun 2019 06:03) (100/61 - 120/56)  BP(mean): --  RR: 18 (20 Jun 2019 06:03) (16 - 18)  SpO2: 100% (20 Jun 2019 06:03) (100% - 100%)  Daily     Daily     LABS:                        8.2    5.77  )-----------( 625      ( 20 Jun 2019 06:27 )             25.3     06-20    131<L>  |  99  |  6<L>  ----------------------------<  88  3.7   |  22  |  0.53    Ca    8.3<L>      20 Jun 2019 06:27  Phos  4.5     06-20  Mg     1.7     06-20    TPro  4.9<L>  /  Alb  2.4<L>  /  TBili  < 0.2<L>  /  DBili  x   /  AST  23  /  ALT  28  /  AlkPhos  85  06-18    LIVER FUNCTIONS - ( 18 Jun 2019 14:00 )  Alb: 2.4 g/dL / Pro: 4.9 g/dL / ALK PHOS: 85 u/L / ALT: 28 u/L / AST: 23 u/L / GGT: x           PT/INR - ( 18 Jun 2019 14:00 )   PT: 16.5 SEC;   INR: 1.47          PTT - ( 18 Jun 2019 14:00 )  PTT:29.6 SEC  Urinalysis Basic - ( 19 Jun 2019 05:08 )    Color: YELLOW / Appearance: Lt TURBID / SG: > 1.040 / pH: 8.0  Gluc: NEGATIVE / Ketone: NEGATIVE  / Bili: NEGATIVE / Urobili: NORMAL   Blood: TRACE / Protein: 20 / Nitrite: NEGATIVE   Leuk Esterase: LARGE / RBC: 11-25 / WBC >50   Sq Epi: MODERATE / Non Sq Epi: x / Bacteria: NEGATIVE          Imaging: Chief Complaint:  Patient is a 53y old  Female who presents with a chief complaint of chills (20 Jun 2019 07:07)      Interval Events:   Patients hemoglobin is stable.  No further GI bleeding (melena/hematochezia) noted.   No abd pain.    Allergies:  No Known Allergies      Hospital Medications:  lidocaine 5% Ointment 1 Application(s) Topical every 6 hours PRN  mupirocin 2% Ointment 1 Application(s) Topical two times a day  piperacillin/tazobactam IVPB.. 3.375 Gram(s) IV Intermittent every 8 hours  valACYclovir 500 milliGRAM(s) Oral every 12 hours  vancomycin  IVPB 750 milliGRAM(s) IV Intermittent every 12 hours      PMHX/PSHX:  Breast cancer  S/P mastectomy, left      Family history:  No pertinent family history in first degree relatives          PHYSICAL EXAM:     GENERAL:  NAD  HEENT:  NC/AT,  conjunctivae clear, sclera -anicteric  CHEST:  Full & symmetric excursion, no increased  HEART:  Regular rhythm  ABDOMEN:  Soft, non-tender, non-distended, normoactive bowel sounds,  no masses ,no hepato-splenomegaly,   EXTREMITIES:  no cyanosis,clubbing or edema  SKIN:  No rash/erythema/ecchymoses/petechiae/wounds/abscess/warm/dry  NEURO:  Alert, oriented    Vital Signs:  Vital Signs Last 24 Hrs  T(C): 36.5 (20 Jun 2019 06:03), Max: 36.6 (20 Jun 2019 02:22)  T(F): 97.7 (20 Jun 2019 06:03), Max: 97.9 (20 Jun 2019 02:22)  HR: 78 (20 Jun 2019 06:03) (77 - 93)  BP: 100/61 (20 Jun 2019 06:03) (100/61 - 120/56)  BP(mean): --  RR: 18 (20 Jun 2019 06:03) (16 - 18)  SpO2: 100% (20 Jun 2019 06:03) (100% - 100%)  Daily     Daily     LABS:                        8.2    5.77  )-----------( 625      ( 20 Jun 2019 06:27 )             25.3     06-20    131<L>  |  99  |  6<L>  ----------------------------<  88  3.7   |  22  |  0.53    Ca    8.3<L>      20 Jun 2019 06:27  Phos  4.5     06-20  Mg     1.7     06-20    TPro  4.9<L>  /  Alb  2.4<L>  /  TBili  < 0.2<L>  /  DBili  x   /  AST  23  /  ALT  28  /  AlkPhos  85  06-18    LIVER FUNCTIONS - ( 18 Jun 2019 14:00 )  Alb: 2.4 g/dL / Pro: 4.9 g/dL / ALK PHOS: 85 u/L / ALT: 28 u/L / AST: 23 u/L / GGT: x           PT/INR - ( 18 Jun 2019 14:00 )   PT: 16.5 SEC;   INR: 1.47          PTT - ( 18 Jun 2019 14:00 )  PTT:29.6 SEC  Urinalysis Basic - ( 19 Jun 2019 05:08 )    Color: YELLOW / Appearance: Lt TURBID / SG: > 1.040 / pH: 8.0  Gluc: NEGATIVE / Ketone: NEGATIVE  / Bili: NEGATIVE / Urobili: NORMAL   Blood: TRACE / Protein: 20 / Nitrite: NEGATIVE   Leuk Esterase: LARGE / RBC: 11-25 / WBC >50   Sq Epi: MODERATE / Non Sq Epi: x / Bacteria: NEGATIVE          Imaging:

## 2019-06-20 NOTE — PROGRESS NOTE ADULT - PROBLEM SELECTOR PLAN 3
upon further questioning, patient does NOT have blood intermixed with stool, but blood on top of stool. patient with associated hard stools, rectal pain with defecation and blood on toilet paper.  -more indicative of hemorrhoidal bleeding (external given pain).  -would NOT perform colonoscopy/endoscopy at this time.  -supportive measures with stool softeners.

## 2019-06-20 NOTE — DISCHARGE NOTE PROVIDER - HOSPITAL COURSE
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 Upon admission pt was found to meet SIRS criteria, source was attributed to her lower extremity wounds. Blood cultures were drawn and empiric broad spectrum abx was started. ID and onc were consulted. ID recommended continuing abx for 48 hours while awaiting negative blood cultures and onc recommended holding chemo until discharge. Pt also endorsed BRBPPR x 3 weeks prior to admission, HGB was low on admission, GI consulted and thought most likely etiology for bleeding was 2/2 hemorrhoids, flex sig deferred unless pt became unstable. Derm and wound care was also consulted for her LE wounds. 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 Upon admission pt was found to meet SIRS criteria, source was attributed to her lower extremity wounds. Blood cultures were drawn and empiric broad spectrum abx was started. ID and onc were consulted. ID recommended continuing abx for 48 hours while awaiting negative blood cultures and onc recommended holding chemo until discharge. Pt also endorsed BRBPPR x 3 weeks prior to admission, HGB was low on admission, GI consulted and thought most likely etiology for bleeding was 2/2 hemorrhoids, flex sig deferred unless pt became unstable. Derm and wound care was also consulted for her LE wounds. She remained HD stable off abx and discharged in stable condition w/ instructions to f/u w/ outpt MDs. 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 Upon admission pt was found to meet SIRS criteria, source was attributed to her lower extremity wounds. Blood cultures were drawn and empiric broad spectrum abx was started. ID and onc were consulted. ID recommended continuing abx for 48 hours while awaiting negative blood cultures and onc recommended holding chemo until discharge. Pt also endorsed BRBPPR x 3 weeks prior to admission, HGB was low on admission, GI consulted and thought most likely etiology for bleeding was 2/2 hemorrhoids, flex sig deferred unless pt became unstable. Derm and wound care was also consulted for her LE wounds and recommended local wound therapy. She remained HD stable off abx and discharged in stable condition w/ instructions to f/u w/ outpt MDs. 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 She also reports that she has had two weeks of bloody bowel movements with blood in the toilet bowel and on paper. She reports a normal colonoscopy approx 2 years ago. Since admission, pt was in SICU for UGIB and underwent IR embolization and EGD. Surgical oncology consulted for second opinion in setting of recurrent UGIB to evaluate for possible gastrectomy.            Hospital Course:        After embolization and EGD, CTA abdomen and pelvis didn't show any active bleeding at which point patient underwent preoperative clearance. To this point, she has been transfused a total of 5 U PRBC, 1 U FFP, 1 U Platlets. Eventually, patient underwent distal gastrectomy w/ gastrojejunostomy on 7/1 with Dr. Bam Hayden for refractory upper GI bleed. Postoperatively she was transferred to the SICU in stable condition. She was extubated on POD 0 without issue. On POD 1, her Ernandez was removed and she met her trial of void. She underwent upper GI series on 7/5 which didn't demonstrate any leaks or obstructions and she was advanced to a regular diet. The rest of her postoperative course was relatively uneventful and by POD5 she was tolerating PO, urinating without issue, ambulating, and having bowel movements. Her pain was well controlled and at discharge she was hemodynamically stable and afebrile. 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 She also reports that she has had two weeks of bloody bowel movements with blood in the toilet bowel and on paper. She reports a normal colonoscopy approx 2 years ago. Since admission, pt was in SICU for UGIB and underwent IR embolization and EGD. Surgical oncology consulted for second opinion in setting of recurrent UGIB to evaluate for possible gastrectomy.            Hospital Course:        After embolization and EGD, CTA abdomen and pelvis didn't show any active bleeding at which point patient underwent preoperative clearance. To this point, she has been transfused a total of 5 U PRBC, 1 U FFP, 1 U Platlets. Eventually, patient underwent distal gastrectomy w/ gastrojejunostomy on 7/1 with Dr. Bam Hayden for refractory upper GI bleed. Postoperatively she was transferred to the SICU in stable condition. She was extubated on POD 0 without issue. On POD 1, her Ernandez was removed and she met her trial of void. She underwent upper GI series on 7/5 which didn't demonstrate any leaks or obstructions and she was advanced to a regular diet. The rest of her postoperative course was relatively uneventful and by POD5 she was ready for discharge. At time of discharge, the patient was hemodynamically stable, was tolerating PO diet, was voiding urine and passing stool, was ambulating, and was comfortable with adequate pain control. The patient was instructed to follow up with Dr. Hayden in 2 weeks after discharge from the hospital. The patient/family felt comfortable with discharge. The patient was discharged to home/rehab. The patient had no other issues and was recovering appropriately.         She will also go home on Valtrex given her persistent herpes zoster which was not found to be present in the eye per opthalmology on 6/21. She does have the persistent rash the right lateral thigh which has remained stable. 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 She also reports that she has had two weeks of bloody bowel movements with blood in the toilet bowel and on paper. She reports a normal colonoscopy approx 2 years ago. Since admission, pt was in SICU for UGIB and underwent IR embolization and EGD. Surgical oncology consulted for second opinion in setting of recurrent UGIB to evaluate for possible gastrectomy.            Hospital Course:        Initially was started on acyclovir for HSV on the right lateral thigh and opthalmology didn't see any evidence of ocular herpes. ID was consulted and she was started on vanc/zosyn for 48 hours which were then discontinued. Valtrex was started for HSV prophylaxis and continued throughout the hospital course. She will go home on prophylaxis and will follow up with heme/onc outpatient.        She had significant GI bleeding which eventually required EGD and IR interventions. After embolization and EGD, CTA abdomen and pelvis didn't show any active bleeding at which point patient underwent preoperative clearance. To this point, she has been transfused a total of 5 U PRBC, 1 U FFP, 1 U Platlets. Eventually, patient underwent distal gastrectomy w/ gastrojejunostomy on 7/1 with Dr. Bam Hayden for refractory upper GI bleed. Postoperatively she was transferred to the SICU in stable condition. She was extubated on POD 0 without issue. On POD 1, her Ernandez was removed and she met her trial of void. She underwent upper GI series on 7/5 which didn't demonstrate any leaks or obstructions and she was advanced to a regular diet. The rest of her postoperative course was relatively uneventful and by POD5 she was ready for discharge. At time of discharge, the patient was hemodynamically stable, was tolerating PO diet, was voiding urine and passing stool, was ambulating, and was comfortable with adequate pain control. The patient was instructed to follow up with Dr. Hayden in 2 weeks after discharge from the hospital. The patient/family felt comfortable with discharge. The patient was discharged to home/rehab. The patient had no other issues and was recovering appropriately.

## 2019-06-20 NOTE — PHYSICAL THERAPY INITIAL EVALUATION ADULT - PERTINENT HX OF CURRENT PROBLEM, REHAB EVAL
Pt. is a 53 year old female, admitted to [facility] presenting with sepsis. PMH: Breast cancer s/p mastectomy 1/19.

## 2019-06-20 NOTE — PROGRESS NOTE ADULT - ASSESSMENT
Impression:  1) BRBPR - with drop in baseline to 6's from 9's, now stable after a transfusion.  Patient likely just with rectal disease (can see tricking on CT).  Patient is febrile currently needing work up and antibiotic therapy.  Patient now with stable hemoglobin and no further bleeding.      Recommendation:   - supportive care per primary   - no plans for endoscopic intervention at this time   - sepsis work up and antibiotics   - if BRBPR continues may consider flexible sig later in course    Ros Lopez, PGY-4  Gastroenterology Fellow  Pager x [phone] or [phone]  (After 5 pm or on weekends please page GI on call)

## 2019-06-20 NOTE — ADVANCED PRACTICE NURSE CONSULT - RECOMMEDATIONS
Continue to follow up with outpatient dermatology and oncology as instructed.  Would recommend home care, visiting nurse services to assist in wound care and assessment.  Nutrition consult, patient with diffuse HSV lesions in various stages of healing.    Topical Recommendations:  Generalized HSV affecting upper posterior trunk, lower back, bilateral buttocks (including sacral/gluteal cleft) extending to the bilateral posterior thighs- Warm NS in luke-warm water. Cleanse affected area of with luke-warm NS. Dry well. Apply Liquid barrier film to periwound skin, and to all areas of re-epithelialization. Apply topical Mupirocine 2% ointment (as recommended by Dermatology) to areas of denuded epidermis, cover with silicone foam without border; apply silicone foam with border to posterior upper back. Secure with spandage underwear. Change daily.    Continue low air loss bed therapy, continue heel elevation with offloading device, continue to turn & reposition q2h, continue moisture management with barrier creams & single breathable pad, continue measures to decrease friction/shear/pressure.     Plan discussed with patient and family. Patient educated on topical wound therapy. Questions answers.     Please contact Wound Care Service Line if we can be of further assistance (ext [phone]). Continue to follow up with outpatient dermatology and oncology as instructed.  Would recommend home care, visiting nurse services to assist in wound care and assessment.  Nutrition consult, patient with diffuse HSV lesions in various stages of healing.    Topical Recommendations:  Generalized HSV affecting upper posterior trunk, lower back, bilateral buttocks (including sacral/gluteal cleft) extending to the bilateral posterior thighs- Warm NS in luke-warm water. Cleanse affected area of with luke-warm NS. Dry well. Apply Liquid barrier film to periwound skin, and to all areas of re-epithelialization. Apply topical Mupirocine 2% ointment (as recommended by Dermatology) to areas of denuded epidermis, apply [employer] moisture barrier ointment to sacral/gluteal folds, cover with silicone foam without border; apply silicone foam with border to posterior upper back. Secure with spandage underwear. Change daily.    Continue low air loss bed therapy, continue heel elevation with offloading device, continue to turn & reposition q2h, continue moisture management with barrier creams & single breathable pad, continue measures to decrease friction/shear/pressure.     Plan discussed with patient and family. Patient educated on topical wound therapy. Questions answers.     Please contact Wound Care Service Line if we can be of further assistance (ext [phone]).

## 2019-06-20 NOTE — PHYSICAL THERAPY INITIAL EVALUATION ADULT - DIAGNOSIS, PT EVAL
Decreased strength, decreased endurance, deconditioning, decreased balance, decreased postural control, all limiting functional mobility.

## 2019-06-21 DIAGNOSIS — I95.9 HYPOTENSION, UNSPECIFIED: ICD-10-CM

## 2019-06-21 LAB
ANION GAP SERPL CALC-SCNC: 10 MMO/L — SIGNIFICANT CHANGE UP (ref 7–14)
BUN SERPL-MCNC: 8 MG/DL — SIGNIFICANT CHANGE UP (ref 7–23)
CALCIUM SERPL-MCNC: 8.2 MG/DL — LOW (ref 8.4–10.5)
CHLORIDE SERPL-SCNC: 101 MMOL/L — SIGNIFICANT CHANGE UP (ref 98–107)
CO2 SERPL-SCNC: 21 MMOL/L — LOW (ref 22–31)
CREAT SERPL-MCNC: 0.51 MG/DL — SIGNIFICANT CHANGE UP (ref 0.5–1.3)
DAT C3-SP REAG RBC QL: NEGATIVE — SIGNIFICANT CHANGE UP
DAT POLY-SP REAG RBC QL: NEGATIVE — SIGNIFICANT CHANGE UP
DIRECT COOMBS IGG: NEGATIVE — SIGNIFICANT CHANGE UP
GLUCOSE SERPL-MCNC: 85 MG/DL — SIGNIFICANT CHANGE UP (ref 70–99)
HAPTOGLOB SERPL-MCNC: 308 MG/DL — HIGH (ref 34–200)
HCT VFR BLD CALC: 26.1 % — LOW (ref 34.5–45)
HGB BLD-MCNC: 8.3 G/DL — LOW (ref 11.5–15.5)
MAGNESIUM SERPL-MCNC: 1.8 MG/DL — SIGNIFICANT CHANGE UP (ref 1.6–2.6)
MCHC RBC-ENTMCNC: 28.6 PG — SIGNIFICANT CHANGE UP (ref 27–34)
MCHC RBC-ENTMCNC: 31.8 % — LOW (ref 32–36)
MCV RBC AUTO: 90 FL — SIGNIFICANT CHANGE UP (ref 80–100)
NRBC # FLD: 0.04 K/UL — SIGNIFICANT CHANGE UP (ref 0–0)
PHOSPHATE SERPL-MCNC: 3.4 MG/DL — SIGNIFICANT CHANGE UP (ref 2.5–4.5)
PLATELET # BLD AUTO: 607 K/UL — HIGH (ref 150–400)
PMV BLD: 8.8 FL — SIGNIFICANT CHANGE UP (ref 7–13)
POTASSIUM SERPL-MCNC: 3.6 MMOL/L — SIGNIFICANT CHANGE UP (ref 3.5–5.3)
POTASSIUM SERPL-SCNC: 3.6 MMOL/L — SIGNIFICANT CHANGE UP (ref 3.5–5.3)
RBC # BLD: 2.9 M/UL — LOW (ref 3.8–5.2)
RBC # FLD: 23 % — HIGH (ref 10.3–14.5)
SODIUM SERPL-SCNC: 132 MMOL/L — LOW (ref 135–145)
WBC # BLD: 5.6 K/UL — SIGNIFICANT CHANGE UP (ref 3.8–10.5)
WBC # FLD AUTO: 5.6 K/UL — SIGNIFICANT CHANGE UP (ref 3.8–10.5)

## 2019-06-21 PROCEDURE — 99233 SBSQ HOSP IP/OBS HIGH 50: CPT | Mod: GC

## 2019-06-21 RX ORDER — MUPIROCIN 20 MG/G
1 OINTMENT TOPICAL
Qty: 3 | Refills: 0
Start: 2019-06-21

## 2019-06-21 RX ORDER — SODIUM CHLORIDE 9 MG/ML
500 INJECTION INTRAMUSCULAR; INTRAVENOUS; SUBCUTANEOUS ONCE
Refills: 0 | Status: COMPLETED | OUTPATIENT
Start: 2019-06-21 | End: 2019-06-21

## 2019-06-21 RX ORDER — LIDOCAINE 4 G/100G
1 CREAM TOPICAL
Qty: 3 | Refills: 0
Start: 2019-06-21 | End: 2019-07-04

## 2019-06-21 RX ADMIN — VALACYCLOVIR 500 MILLIGRAM(S): 500 TABLET, FILM COATED ORAL at 06:38

## 2019-06-21 RX ADMIN — MUPIROCIN 1 APPLICATION(S): 20 OINTMENT TOPICAL at 06:38

## 2019-06-21 RX ADMIN — VALACYCLOVIR 500 MILLIGRAM(S): 500 TABLET, FILM COATED ORAL at 17:31

## 2019-06-21 RX ADMIN — MUPIROCIN 1 APPLICATION(S): 20 OINTMENT TOPICAL at 17:32

## 2019-06-21 RX ADMIN — SODIUM CHLORIDE 1000 MILLILITER(S): 9 INJECTION INTRAMUSCULAR; INTRAVENOUS; SUBCUTANEOUS at 14:52

## 2019-06-21 NOTE — PROGRESS NOTE ADULT - ATTENDING COMMENTS
Agree with above. Initial concerns over patients low blood pressure, when discussed with patient, she usually runs in systolic blood pressures 's.  Patient assymptomatic from blood pressure, no dizziness, palpitaitons, chest discomfort.  Scant blood after bowel movement, will discharge with stool softeners. Plan for discharge tomorrow as patient requests one more day of observation off of Abx.

## 2019-06-21 NOTE — DIETITIAN INITIAL EVALUATION ADULT. - PROBLEM SELECTOR PLAN 7
-metastatic to mediasteinal LNs, dx in 2012 s/p chemo and masectomy, remission in 2013, recurrence 01/19, on paclitaxel weekly every Wednesday and exemestane daily. Patient follows with private onc but wants to establish care at [facility] opinion.   -will c/s onc via email ON. Will hold chemo given sepsis

## 2019-06-21 NOTE — DIETITIAN INITIAL EVALUATION ADULT. - ORAL INTAKE PTA
poor/Patient with decreased appetite due to chemotherapy treatment. Patient mainly consuming soups, oatmeal, "green" shakes family makes.

## 2019-06-21 NOTE — DIETITIAN INITIAL EVALUATION ADULT. - ENERGY NEEDS
Height (cm): 154.94 (19 Jun 2019 03:27)  Weight (kg): 49.8 (19 Jun 2019 03:27)  BMI (kg/m2): 20.7 (19 Jun 2019 03:27)  IBW: 47.6kg +/-10%

## 2019-06-21 NOTE — PROGRESS NOTE ADULT - PROBLEM SELECTOR PLAN 2
-Present for aprrox 6 months. Derm notes in [employer] report lesions grew HSV and the patient was treated with valacyclovir.   -06/03/19 patient found to have MRSA in wound culture. Was started on topical mupirocin and PO doxycyline for 3 weeks.   -ID consulted, unlikely to be source of sepsis but will c/w vanc/zosyn until can prove no infection  -ophtho was consulted d/t concern for disseminated HSV, no e/o occular involvement  -derm and wound care consulted resolved, met criteria on admission likely in stg of malignancy. BCx NGTD. Abx held. -Present for aprrox 6 months. Derm notes in [employer] report lesions grew HSV and the patient was treated with valacyclovir.   -06/03/19 patient found to have MRSA in wound culture. Was started on topical mupirocin and PO doxycyline for 3 weeks.   -ID consulted, holding abx, c/w valtrex 500mg BID for hsv ppx  -derm and wound care consulted

## 2019-06-21 NOTE — DIETITIAN INITIAL EVALUATION ADULT. - SOURCE
Patient Ivorian speaking, declined  service and preferred Niece at bedside to translate. Medical record reviewed./patient/family/significant other

## 2019-06-21 NOTE — PROGRESS NOTE ADULT - PROBLEM SELECTOR PLAN 6
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 Patient follows with private onc but wants to establish care at [facility] /2nd opinion.   -chemo on hold for now, resume as outpatient  -outpt onc called to obtain recs and notified that family would like 2nd opinion, outpt onc will send records and see patient in hospital Na improving w/ fluid restriction  - c/w 1.2L fluid restriction  - monitor BMP

## 2019-06-21 NOTE — PROGRESS NOTE ADULT - SUBJECTIVE AND OBJECTIVE BOX
Daily Progress Note - INCOMPLETE  Author: Lefty Venegas MD PGY-1  Pgr: [phone]    SUBJECTIVE / OVERNIGHT EVENTS: No acute events overnight    MEDICATIONS  (STANDING):  mupirocin 2% Ointment 1 Application(s) Topical two times a day  valACYclovir 500 milliGRAM(s) Oral every 12 hours    MEDICATIONS  (PRN):  lidocaine 5% Ointment 1 Application(s) Topical every 6 hours PRN pain at rash      T(C): 36.6 (06-21-19 @ 06:46), Max: 36.7 (06-20-19 @ 11:18)  HR: 72 (06-21-19 @ 06:46) (72 - 103)  BP: 99/48 (06-21-19 @ 06:46) (89/47 - 104/49)  RR: 18 (06-21-19 @ 06:46) (18 - 18)  SpO2: 97% (06-21-19 @ 06:46) (97% - 100%)    CAPILLARY BLOOD GLUCOSE        I&O's Summary      Physical exam:   GENERAL: No acute distress, well-developed  HEAD:  Atraumatic, Normocephalic  ENT: EOMI, PERRLA, No JVD, moist mucosa  CHEST/LUNG: Clear to auscultation bilaterally  BACK: No spinal tenderness  HEART: Regular rate and rhythm; No murmurs, rubs, or gallops  ABDOMEN: Soft, Nontender, Nondistended; Bowel sounds present  EXTREMITIES:  No clubbing, cyanosis, or edema  PSYCH: Nl behavior, nl affect  NEUROLOGY: AAOx3, non-focal, cranial nerves intact  SKIN: Normal color, No rashes or lesions    LABS:                        8.2    5.77  )-----------( 625      ( 20 Jun 2019 06:27 )             25.3     Hgb Trend: 8.2<--, 7.4<--, 8.0<--, 6.1<--  06-20    131<L>  |  99  |  6<L>  ----------------------------<  88  3.7   |  22  |  0.53    Ca    8.3<L>      20 Jun 2019 06:27  Phos  4.5     06-20  Mg     1.7     06-20      Creatinine Trend: 0.53<--, 0.51<--, 0.58<--, 0.58<-- Daily Progress Note   Author: Lefty Venegas MD PGY-1  Pgr: [phone]    SUBJECTIVE / OVERNIGHT EVENTS: BP soft o/n 90s/40s. Pt given 500cc bolus. Pt feeling weak but unchanged from prior. This AM pt w/o complaints other than generalized weakness, no cough, sob, rigors, chills.     MEDICATIONS  (STANDING):  mupirocin 2% Ointment 1 Application(s) Topical two times a day  valACYclovir 500 milliGRAM(s) Oral every 12 hours    MEDICATIONS  (PRN):  lidocaine 5% Ointment 1 Application(s) Topical every 6 hours PRN pain at rash      T(C): 36.6 (06-21-19 @ 06:46), Max: 36.7 (06-20-19 @ 11:18)  HR: 72 (06-21-19 @ 06:46) (72 - 103)  BP: 99/48 (06-21-19 @ 06:46) (89/47 - 104/49)  RR: 18 (06-21-19 @ 06:46) (18 - 18)  SpO2: 97% (06-21-19 @ 06:46) (97% - 100%)    CAPILLARY BLOOD GLUCOSE        I&O's Summary      Physical exam:   Constitutional: NAD, weak appearing.   Respiratory: CTAB today  Cardiovascular: RRR, no M/R/G, no edema, 2+ Peripheral Pulses  Gastrointestinal: soft, nontender, nondistended, +BS, no hernia  Skin: warm, multiple excoriation on buttocks, back, intergluteal region, back. erythematous. no foul smell. no purulence  Neurologic: sensation grossly intact, CN grossly intact, non-focal exam  Psychiatric: AOX3, appropriate mood, affect    LABS:                        8.2    5.77  )-----------( 625      ( 20 Jun 2019 06:27 )             25.3     Hgb Trend: 8.2<--, 7.4<--, 8.0<--, 6.1<--  06-20    131<L>  |  99  |  6<L>  ----------------------------<  88  3.7   |  22  |  0.53    Ca    8.3<L>      20 Jun 2019 06:27  Phos  4.5     06-20  Mg     1.7     06-20      Creatinine Trend: 0.53<--, 0.51<--, 0.58<--, 0.58<--

## 2019-06-21 NOTE — DIETITIAN INITIAL EVALUATION ADULT. - OTHER INFO
Patient seen for nutrition consult for assessment. Per chart review patient with medical history of "metastatic breast cancer (dx in 2012 s/p chemo and mastectomy, remission in 2013, recurrence 01/19, chronic ulcerative rash, who presents for chills and rigors, met sepsis criteria of unclear origin, malignancy vs infection." Patient's diet advanced from Clears to Regular today. Patient reports feeling hungry today and has a good appetite. NKFA. No chewing/swallowing difficulties reported. Patient with poor appetite prior to admission. Patient reports always having a BM after eating (not diarrhea, possible side-effect of chemotherapy?) which causes her to not want to eat at times. No nausea/vomiting. Patient endorses significant weight loss since 1/2019; 26.7% weight loss. Encouraged PO intake. Provided diet education regarding high calorie/high protein nutrition therapy. Patient amenable to trying Ensure Enlive supplement to improve caloric/protein intake.

## 2019-06-21 NOTE — PROGRESS NOTE ADULT - PROBLEM SELECTOR PLAN 3
upon further questioning, patient does NOT have blood intermixed with stool, but blood on top of stool. patient with associated hard stools, rectal pain with defecation and blood on toilet paper.  -more indicative of hemorrhoidal bleeding (external given pain).  -would NOT perform colonoscopy/endoscopy at this time.  -supportive measures with stool softeners. resolved, met criteria on admission likely in stg of malignancy. BCx NGTD. Abx held.

## 2019-06-21 NOTE — CHART NOTE - NSCHARTNOTEFT_GEN_A_CORE
Gastroenterology Brief Note   - patient with no further GI bleeding   - stable hemoglobin   - please call GI back with any further questions   - rest of care per primary team

## 2019-06-21 NOTE — DIETITIAN INITIAL EVALUATION ADULT. - NS AS NUTRI INTERV ED CONTENT
High Biological Value Protein sources (i.e. chicken, turkey, beef, fish, eggs, etc.), consume small, frequent meals, high calorie/high protein foods

## 2019-06-21 NOTE — PROGRESS NOTE ADULT - PROBLEM SELECTOR PLAN 5
Na improving w/ fluid restriction  - c/w 1.2L fluid restriction  - monitor BMP -Hb 6.2 on admission likely 2/2 LGIB w 2 weeks of hematochezia 5x per day, now s/p 1U PRBC w/ stable HGB and appropriate response  -likely multifactorial. Family and patient states her wounds on buttocks have been oozing blood.  Also possible paclitaxel component to anemia.  -d/t recent transfusions will not check iron studies at this time  -retic count ordered  -hemolysis labs unremarkable

## 2019-06-21 NOTE — CHART NOTE - NSCHARTNOTEFT_GEN_A_CORE
NUTRITION SERVICES     Upon Nutritional Assessment by the Registered Dietitian your patient was determined to meet criteria/ has evidence of the following diagnosis/diagnoses:  [ ] Mild Protein Calorie Malnutrition   [ ] Moderate Protein Calorie Malnutrition   [x] Severe Protein Calorie Malnutrition   [ ] Unspecified Protein Calorie Malnutrition   [ ] Underweight / BMI <19  [ ] Morbid Obesity / BMI >40    Findings as based on:  •  Comprehensive nutritional assessment and consultation    Please refer to Initial Dietitian Evaluation or Nutrition Follow-up via documents section of [facility] EMR for further recommendations.

## 2019-06-21 NOTE — DIETITIAN INITIAL EVALUATION ADULT. - PROBLEM SELECTOR PLAN 5
-Present for aprrox 6 months. Derm notes in [employer] report lesions grew HSV and the patient was treated with valacyclovir.   -06/03/19 patient found to have MRSA in wound culture. Was started on topical mupirocin and PO doxycyline for 3 weeks.   -Given sepsis will treat with IV vancomycin. Will start IV acyclovir 10mg/kg q8 hours as it does not appear that the patient has cleared HSV infection. Will place patient on airborne given multiple dermatomes involves.  - ID consult AM.

## 2019-06-21 NOTE — CHART NOTE - NSCHARTNOTEFT_GEN_A_CORE
Please email [email or web address] for follow up appointment for patient with Dr. Cowart within 1 week of discharge.  Dermatology to sign off, please re-consult for any worsening symptoms or new dermatologic concerns.    Kelley Murdock MD (PGY-3)   Dermatology Resident  [facility]  Pager: [phone]  Office [phone]

## 2019-06-21 NOTE — DIETITIAN INITIAL EVALUATION ADULT. - PERTINENT LABORATORY DATA
06-21 Na 132 mmol/L<L> Glu 85 mg/dL K+ 3.6 mmol/L Cr 0.51 mg/dL BUN 8 mg/dL Phos 3.4 mg/dL Alb n/a   PAB n/a   Hgb 8.3 g/dL<L> Hct 26.1 %<L> HgA1C n/a    Glucose, Serum: 85 mg/dL

## 2019-06-21 NOTE — DIETITIAN INITIAL EVALUATION ADULT. - NUTRITION INTERVENTION
Medical Food Supplements/Nutrition Education/Feeding Assistance/Meals and Snack/Collaboration and Referral of Nutrition Care

## 2019-06-21 NOTE — DIETITIAN INITIAL EVALUATION ADULT. - PROBLEM SELECTOR PLAN 4
-likely LGIB w 2 weeks of hematochezia 5x per day  -reports normal colonoscopy approx 2 years ago. Denies hx of diverticulosis or hemorrhoids. No prev hx of GI bleeds.   -will keep NPO. Will c/w GI. Vitals q4. BP WNL.

## 2019-06-21 NOTE — PROGRESS NOTE ADULT - PROBLEM SELECTOR PLAN 1
Resolved. unknown etiology, possibly 2/2 skin though doesn't appear infected, also possibly 2/2 known malignancy  -febrile, tachy, MRSA skin infection dx via wound cx at outpt derm office ([employer])  -s/p vanc/zosyn and 1L IVF in ED  -c/w vanc/zosyn for at leaset 48 hours given immunocompromised state, d/c this evening if BCx negative   -valacyclovir 500mg BID for HSV ppx -Present for aprrox 6 months. Derm notes in [employer] report lesions grew HSV and the patient was treated with valacyclovir.   -06/03/19 patient found to have MRSA in wound culture. Was started on topical mupirocin and PO doxycyline for 3 weeks.   -ID consulted, unlikely to be source of sepsis but will c/w vanc/zosyn until can prove no infection  -ophtho was consulted d/t concern for disseminated HSV, no e/o occular involvement  -derm and wound care consulted -Present for aprrox 6 months. Derm notes in [employer] report lesions grew HSV and the patient was treated with valacyclovir.   -06/03/19 patient found to have MRSA in wound culture. Was started on topical mupirocin and PO doxycyline for 3 weeks.   -ID consulted, holding abx, c/w valtrex 500mg BID for hsv ppx  -derm and wound care consulted BP 90s/50s. Per family, this is patient's usual BP at home. Will give 500cc bolus as needed for low BP. Monitor overnight, tentative discharge tomorrow.

## 2019-06-21 NOTE — PROGRESS NOTE ADULT - ASSESSMENT
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

## 2019-06-21 NOTE — PROGRESS NOTE ADULT - PROBLEM SELECTOR PLAN 4
-Hb 6.2 on admission likely 2/2 LGIB w 2 weeks of hematochezia 5x per day, now s/p 1U PRBC w/ stable HGB and appropriate response  -likely multifactorial. Family and patient states her wounds on buttocks have been oozing blood.  Also possible paclitaxel component to anemia.  -d/t recent transfusions will not check iron studies at this time  -retic count ordered  -r/o hemolysis, check LDH and hapto, concern for PNH -Hb 6.2 on admission likely 2/2 LGIB w 2 weeks of hematochezia 5x per day, now s/p 1U PRBC w/ stable HGB and appropriate response  -likely multifactorial. Family and patient states her wounds on buttocks have been oozing blood.  Also possible paclitaxel component to anemia.  -d/t recent transfusions will not check iron studies at this time  -retic count ordered  -hemolysis labs unremarkable upon further questioning, patient does NOT have blood intermixed with stool, but blood on top of stool. patient with associated hard stools, rectal pain with defecation and blood on toilet paper.  -more indicative of hemorrhoidal bleeding (external given pain).  -would NOT perform colonoscopy/endoscopy at this time.  -supportive measures with stool softeners.

## 2019-06-21 NOTE — DIETITIAN INITIAL EVALUATION ADULT. - PHYSICAL APPEARANCE
Patient with severe triceps fat loss, severe shoulder and clavicle muscle mass depletion and moderate temporal wasting

## 2019-06-21 NOTE — PROGRESS NOTE ADULT - PROBLEM SELECTOR PLAN 7
-DVT prophylaxis with SCDs  -Diet regular -metastatic to mediastinal LNs, dx in 2012 s/p chemo and mastectomy, remission in 2013, recurrence 01/19, on paclitaxel weekly every Wednesday and exemestane daily. Patient follows with private onc but wants to establish care at [facility] /2nd opinion.   -chemo on hold for now, resume as outpatient  -outpt onc called to obtain recs and notified that family would like 2nd opinion, outpt onc will send records and see patient in hospital

## 2019-06-22 DIAGNOSIS — K92.2 GASTROINTESTINAL HEMORRHAGE, UNSPECIFIED: ICD-10-CM

## 2019-06-22 LAB
ALBUMIN SERPL ELPH-MCNC: 2 G/DL — LOW (ref 3.3–5)
ALBUMIN SERPL ELPH-MCNC: 2.3 G/DL — LOW (ref 3.3–5)
ALP SERPL-CCNC: 66 U/L — SIGNIFICANT CHANGE UP (ref 40–120)
ALP SERPL-CCNC: 89 U/L — SIGNIFICANT CHANGE UP (ref 40–120)
ALT FLD-CCNC: 32 U/L — SIGNIFICANT CHANGE UP (ref 4–33)
ALT FLD-CCNC: 34 U/L — HIGH (ref 4–33)
ANION GAP SERPL CALC-SCNC: 10 MMO/L — SIGNIFICANT CHANGE UP (ref 7–14)
ANION GAP SERPL CALC-SCNC: 13 MMO/L — SIGNIFICANT CHANGE UP (ref 7–14)
ANISOCYTOSIS BLD QL: SIGNIFICANT CHANGE UP
APTT BLD: 32.4 SEC — SIGNIFICANT CHANGE UP (ref 27.5–36.3)
AST SERPL-CCNC: 21 U/L — SIGNIFICANT CHANGE UP (ref 4–32)
AST SERPL-CCNC: 23 U/L — SIGNIFICANT CHANGE UP (ref 4–32)
BASE EXCESS BLDA CALC-SCNC: -3.9 MMOL/L — SIGNIFICANT CHANGE UP
BASE EXCESS BLDA CALC-SCNC: -5.4 MMOL/L — SIGNIFICANT CHANGE UP
BASE EXCESS BLDV CALC-SCNC: -2.7 MMOL/L — SIGNIFICANT CHANGE UP
BASOPHILS # BLD AUTO: 0.03 K/UL — SIGNIFICANT CHANGE UP (ref 0–0.2)
BASOPHILS # BLD AUTO: 0.06 K/UL — SIGNIFICANT CHANGE UP (ref 0–0.2)
BASOPHILS NFR BLD AUTO: 0.4 % — SIGNIFICANT CHANGE UP (ref 0–2)
BASOPHILS NFR BLD AUTO: 0.6 % — SIGNIFICANT CHANGE UP (ref 0–2)
BASOPHILS NFR SPEC: 0 % — SIGNIFICANT CHANGE UP (ref 0–2)
BILIRUB SERPL-MCNC: 0.3 MG/DL — SIGNIFICANT CHANGE UP (ref 0.2–1.2)
BILIRUB SERPL-MCNC: < 0.2 MG/DL — LOW (ref 0.2–1.2)
BLASTS # FLD: 0 % — SIGNIFICANT CHANGE UP (ref 0–0)
BLD GP AB SCN SERPL QL: NEGATIVE — SIGNIFICANT CHANGE UP
BLOOD GAS ARTERIAL - FIO2: 60 — SIGNIFICANT CHANGE UP
BLOOD GAS VENOUS - CREATININE: 0.6 MG/DL — SIGNIFICANT CHANGE UP (ref 0.5–1.3)
BLOOD GAS VENOUS - FIO2: 30 — SIGNIFICANT CHANGE UP
BUN SERPL-MCNC: 14 MG/DL — SIGNIFICANT CHANGE UP (ref 7–23)
BUN SERPL-MCNC: 19 MG/DL — SIGNIFICANT CHANGE UP (ref 7–23)
CA-I BLDA-SCNC: 0.96 MMOL/L — LOW (ref 1.15–1.29)
CALCIUM SERPL-MCNC: 7.6 MG/DL — LOW (ref 8.4–10.5)
CALCIUM SERPL-MCNC: 8.5 MG/DL — SIGNIFICANT CHANGE UP (ref 8.4–10.5)
CHLORIDE BLDV-SCNC: 106 MMOL/L — SIGNIFICANT CHANGE UP (ref 96–108)
CHLORIDE SERPL-SCNC: 103 MMOL/L — SIGNIFICANT CHANGE UP (ref 98–107)
CHLORIDE SERPL-SCNC: 107 MMOL/L — SIGNIFICANT CHANGE UP (ref 98–107)
CK MB BLD-MCNC: < 1 NG/ML — LOW (ref 1–4.7)
CK MB BLD-MCNC: SIGNIFICANT CHANGE UP (ref 0–2.5)
CK SERPL-CCNC: 21 U/L — LOW (ref 25–170)
CO2 SERPL-SCNC: 19 MMOL/L — LOW (ref 22–31)
CO2 SERPL-SCNC: 19 MMOL/L — LOW (ref 22–31)
CREAT SERPL-MCNC: 0.43 MG/DL — LOW (ref 0.5–1.3)
CREAT SERPL-MCNC: 0.53 MG/DL — SIGNIFICANT CHANGE UP (ref 0.5–1.3)
EOSINOPHIL # BLD AUTO: 0.27 K/UL — SIGNIFICANT CHANGE UP (ref 0–0.5)
EOSINOPHIL # BLD AUTO: 0.3 K/UL — SIGNIFICANT CHANGE UP (ref 0–0.5)
EOSINOPHIL NFR BLD AUTO: 2.9 % — SIGNIFICANT CHANGE UP (ref 0–6)
EOSINOPHIL NFR BLD AUTO: 3.6 % — SIGNIFICANT CHANGE UP (ref 0–6)
EOSINOPHIL NFR FLD: 0.9 % — SIGNIFICANT CHANGE UP (ref 0–6)
GAS PNL BLDV: 130 MMOL/L — LOW (ref 136–146)
GIANT PLATELETS BLD QL SMEAR: PRESENT — SIGNIFICANT CHANGE UP
GLUCOSE BLDA-MCNC: 101 MG/DL — HIGH (ref 70–99)
GLUCOSE BLDA-MCNC: 127 MG/DL — HIGH (ref 70–99)
GLUCOSE BLDV-MCNC: 121 MG/DL — HIGH (ref 70–99)
GLUCOSE SERPL-MCNC: 103 MG/DL — HIGH (ref 70–99)
GLUCOSE SERPL-MCNC: 133 MG/DL — HIGH (ref 70–99)
HCG SERPL-ACNC: < 5 MIU/ML — SIGNIFICANT CHANGE UP
HCO3 BLDA-SCNC: 20 MMOL/L — LOW (ref 22–26)
HCO3 BLDA-SCNC: 21 MMOL/L — LOW (ref 22–26)
HCO3 BLDV-SCNC: 22 MMOL/L — SIGNIFICANT CHANGE UP (ref 20–27)
HCT VFR BLD CALC: 19.1 % — CRITICAL LOW (ref 34.5–45)
HCT VFR BLD CALC: 23.2 % — LOW (ref 34.5–45)
HCT VFR BLD CALC: 27.3 % — LOW (ref 34.5–45)
HCT VFR BLD CALC: 31.5 % — LOW (ref 34.5–45)
HCT VFR BLDA CALC: 30.4 % — LOW (ref 34.5–46.5)
HCT VFR BLDA CALC: 32.3 % — LOW (ref 34.5–46.5)
HCT VFR BLDV CALC: 19.4 % — CRITICAL LOW (ref 34.5–45)
HGB BLD-MCNC: 10.5 G/DL — LOW (ref 11.5–15.5)
HGB BLD-MCNC: 6 G/DL — CRITICAL LOW (ref 11.5–15.5)
HGB BLD-MCNC: 7.4 G/DL — LOW (ref 11.5–15.5)
HGB BLD-MCNC: 8.6 G/DL — LOW (ref 11.5–15.5)
HGB BLDA-MCNC: 10.5 G/DL — LOW (ref 11.5–15.5)
HGB BLDA-MCNC: 9.8 G/DL — LOW (ref 11.5–15.5)
HGB BLDV-MCNC: 6.2 G/DL — CRITICAL LOW (ref 11.5–15.5)
IMM GRANULOCYTES NFR BLD AUTO: 10.8 % — HIGH (ref 0–1.5)
IMM GRANULOCYTES NFR BLD AUTO: 12.1 % — HIGH (ref 0–1.5)
INR BLD: 1.18 — HIGH (ref 0.88–1.17)
INR BLD: 1.21 — HIGH (ref 0.88–1.17)
LACTATE BLDV-MCNC: 4.1 MMOL/L — CRITICAL HIGH (ref 0.5–2)
LACTATE SERPL-SCNC: 1.5 MMOL/L — SIGNIFICANT CHANGE UP (ref 0.5–2)
LYMPHOCYTES # BLD AUTO: 2.21 K/UL — SIGNIFICANT CHANGE UP (ref 1–3.3)
LYMPHOCYTES # BLD AUTO: 29.7 % — SIGNIFICANT CHANGE UP (ref 13–44)
LYMPHOCYTES # BLD AUTO: 3.72 K/UL — HIGH (ref 1–3.3)
LYMPHOCYTES # BLD AUTO: 36.1 % — SIGNIFICANT CHANGE UP (ref 13–44)
LYMPHOCYTES NFR SPEC AUTO: 10.6 % — LOW (ref 13–44)
MACROCYTES BLD QL: SIGNIFICANT CHANGE UP
MAGNESIUM SERPL-MCNC: 1.7 MG/DL — SIGNIFICANT CHANGE UP (ref 1.6–2.6)
MAGNESIUM SERPL-MCNC: 1.7 MG/DL — SIGNIFICANT CHANGE UP (ref 1.6–2.6)
MANUAL SMEAR VERIFICATION: SIGNIFICANT CHANGE UP
MCHC RBC-ENTMCNC: 26.7 PG — LOW (ref 27–34)
MCHC RBC-ENTMCNC: 27.3 PG — SIGNIFICANT CHANGE UP (ref 27–34)
MCHC RBC-ENTMCNC: 28.4 PG — SIGNIFICANT CHANGE UP (ref 27–34)
MCHC RBC-ENTMCNC: 29.9 PG — SIGNIFICANT CHANGE UP (ref 27–34)
MCHC RBC-ENTMCNC: 31.4 % — LOW (ref 32–36)
MCHC RBC-ENTMCNC: 31.5 % — LOW (ref 32–36)
MCHC RBC-ENTMCNC: 31.9 % — LOW (ref 32–36)
MCHC RBC-ENTMCNC: 33.3 % — SIGNIFICANT CHANGE UP (ref 32–36)
MCV RBC AUTO: 84.8 FL — SIGNIFICANT CHANGE UP (ref 80–100)
MCV RBC AUTO: 85.1 FL — SIGNIFICANT CHANGE UP (ref 80–100)
MCV RBC AUTO: 85.6 FL — SIGNIFICANT CHANGE UP (ref 80–100)
MCV RBC AUTO: 95 FL — SIGNIFICANT CHANGE UP (ref 80–100)
METAMYELOCYTES # FLD: 2.7 % — HIGH (ref 0–1)
MONOCYTES # BLD AUTO: 0.42 K/UL — SIGNIFICANT CHANGE UP (ref 0–0.9)
MONOCYTES # BLD AUTO: 0.93 K/UL — HIGH (ref 0–0.9)
MONOCYTES NFR BLD AUTO: 5.6 % — SIGNIFICANT CHANGE UP (ref 2–14)
MONOCYTES NFR BLD AUTO: 9 % — SIGNIFICANT CHANGE UP (ref 2–14)
MONOCYTES NFR BLD: 5.3 % — SIGNIFICANT CHANGE UP (ref 2–9)
MYELOCYTES NFR BLD: 2.6 % — HIGH (ref 0–0)
NEUTROPHIL AB SER-ACNC: 72.6 % — SIGNIFICANT CHANGE UP (ref 43–77)
NEUTROPHILS # BLD AUTO: 3.71 K/UL — SIGNIFICANT CHANGE UP (ref 1.8–7.4)
NEUTROPHILS # BLD AUTO: 4.04 K/UL — SIGNIFICANT CHANGE UP (ref 1.8–7.4)
NEUTROPHILS NFR BLD AUTO: 39.3 % — LOW (ref 43–77)
NEUTROPHILS NFR BLD AUTO: 49.9 % — SIGNIFICANT CHANGE UP (ref 43–77)
NEUTS BAND # BLD: 4.4 % — SIGNIFICANT CHANGE UP (ref 0–6)
NRBC # BLD: 3 /100WBC — SIGNIFICANT CHANGE UP
NRBC # FLD: 0.07 K/UL — SIGNIFICANT CHANGE UP (ref 0–0)
NRBC # FLD: 0.08 K/UL — SIGNIFICANT CHANGE UP (ref 0–0)
NRBC # FLD: 0.1 K/UL — SIGNIFICANT CHANGE UP (ref 0–0)
NRBC # FLD: 0.12 K/UL — SIGNIFICANT CHANGE UP (ref 0–0)
NRBC FLD-RTO: 1 — SIGNIFICANT CHANGE UP
NRBC FLD-RTO: 1.1 — SIGNIFICANT CHANGE UP
NRBC FLD-RTO: 1.2 — SIGNIFICANT CHANGE UP
OTHER - HEMATOLOGY %: 0 — SIGNIFICANT CHANGE UP
PCO2 BLDA: 37 MMHG — SIGNIFICANT CHANGE UP (ref 32–48)
PCO2 BLDA: 38 MMHG — SIGNIFICANT CHANGE UP (ref 32–48)
PCO2 BLDV: 37 MMHG — LOW (ref 41–51)
PH BLDA: 7.33 PH — LOW (ref 7.35–7.45)
PH BLDA: 7.37 PH — SIGNIFICANT CHANGE UP (ref 7.35–7.45)
PH BLDV: 7.38 PH — SIGNIFICANT CHANGE UP (ref 7.32–7.43)
PHOSPHATE SERPL-MCNC: 2.9 MG/DL — SIGNIFICANT CHANGE UP (ref 2.5–4.5)
PHOSPHATE SERPL-MCNC: 3 MG/DL — SIGNIFICANT CHANGE UP (ref 2.5–4.5)
PLATELET # BLD AUTO: 310 K/UL — SIGNIFICANT CHANGE UP (ref 150–400)
PLATELET # BLD AUTO: 333 K/UL — SIGNIFICANT CHANGE UP (ref 150–400)
PLATELET # BLD AUTO: 385 K/UL — SIGNIFICANT CHANGE UP (ref 150–400)
PLATELET # BLD AUTO: 527 K/UL — HIGH (ref 150–400)
PLATELET COUNT - ESTIMATE: SIGNIFICANT CHANGE UP
PMV BLD: 8.8 FL — SIGNIFICANT CHANGE UP (ref 7–13)
PMV BLD: 8.8 FL — SIGNIFICANT CHANGE UP (ref 7–13)
PMV BLD: 9.1 FL — SIGNIFICANT CHANGE UP (ref 7–13)
PMV BLD: 9.1 FL — SIGNIFICANT CHANGE UP (ref 7–13)
PO2 BLDA: 229 MMHG — HIGH (ref 83–108)
PO2 BLDA: 422 MMHG — HIGH (ref 83–108)
PO2 BLDV: 43 MMHG — HIGH (ref 35–40)
POIKILOCYTOSIS BLD QL AUTO: SIGNIFICANT CHANGE UP
POLYCHROMASIA BLD QL SMEAR: SIGNIFICANT CHANGE UP
POTASSIUM BLDA-SCNC: 3 MMOL/L — LOW (ref 3.4–4.5)
POTASSIUM BLDA-SCNC: 3.2 MMOL/L — LOW (ref 3.4–4.5)
POTASSIUM BLDV-SCNC: 3.8 MMOL/L — SIGNIFICANT CHANGE UP (ref 3.4–4.5)
POTASSIUM SERPL-MCNC: 3.3 MMOL/L — LOW (ref 3.5–5.3)
POTASSIUM SERPL-MCNC: 4.3 MMOL/L — SIGNIFICANT CHANGE UP (ref 3.5–5.3)
POTASSIUM SERPL-SCNC: 3.3 MMOL/L — LOW (ref 3.5–5.3)
POTASSIUM SERPL-SCNC: 4.3 MMOL/L — SIGNIFICANT CHANGE UP (ref 3.5–5.3)
PROMYELOCYTES # FLD: 0 % — SIGNIFICANT CHANGE UP (ref 0–0)
PROT SERPL-MCNC: 4.2 G/DL — LOW (ref 6–8.3)
PROT SERPL-MCNC: 4.3 G/DL — LOW (ref 6–8.3)
PROTHROM AB SERPL-ACNC: 13.5 SEC — HIGH (ref 9.8–13.1)
PROTHROM AB SERPL-ACNC: 13.9 SEC — HIGH (ref 9.8–13.1)
RBC # BLD: 2.01 M/UL — LOW (ref 3.8–5.2)
RBC # BLD: 2.71 M/UL — LOW (ref 3.8–5.2)
RBC # BLD: 3.22 M/UL — LOW (ref 3.8–5.2)
RBC # BLD: 3.7 M/UL — LOW (ref 3.8–5.2)
RBC # FLD: 18.5 % — HIGH (ref 10.3–14.5)
RBC # FLD: 21.9 % — HIGH (ref 10.3–14.5)
RBC # FLD: 22.4 % — HIGH (ref 10.3–14.5)
RBC # FLD: 23.2 % — HIGH (ref 10.3–14.5)
RH IG SCN BLD-IMP: POSITIVE — SIGNIFICANT CHANGE UP
SAO2 % BLDA: 98.9 % — SIGNIFICANT CHANGE UP (ref 95–99)
SAO2 % BLDA: 99.8 % — HIGH (ref 95–99)
SAO2 % BLDV: 70.7 % — SIGNIFICANT CHANGE UP (ref 60–85)
SMUDGE CELLS # BLD: PRESENT — SIGNIFICANT CHANGE UP
SODIUM BLDA-SCNC: 131 MMOL/L — LOW (ref 136–146)
SODIUM BLDA-SCNC: 131 MMOL/L — LOW (ref 136–146)
SODIUM SERPL-SCNC: 135 MMOL/L — SIGNIFICANT CHANGE UP (ref 135–145)
SODIUM SERPL-SCNC: 136 MMOL/L — SIGNIFICANT CHANGE UP (ref 135–145)
TROPONIN T, HIGH SENSITIVITY: 10 NG/L — SIGNIFICANT CHANGE UP (ref ?–14)
VARIANT LYMPHS # BLD: 0.9 % — SIGNIFICANT CHANGE UP
WBC # BLD: 10.3 K/UL — SIGNIFICANT CHANGE UP (ref 3.8–10.5)
WBC # BLD: 7.44 K/UL — SIGNIFICANT CHANGE UP (ref 3.8–10.5)
WBC # BLD: 8.06 K/UL — SIGNIFICANT CHANGE UP (ref 3.8–10.5)
WBC # BLD: 9.72 K/UL — SIGNIFICANT CHANGE UP (ref 3.8–10.5)
WBC # FLD AUTO: 10.3 K/UL — SIGNIFICANT CHANGE UP (ref 3.8–10.5)
WBC # FLD AUTO: 7.44 K/UL — SIGNIFICANT CHANGE UP (ref 3.8–10.5)
WBC # FLD AUTO: 8.06 K/UL — SIGNIFICANT CHANGE UP (ref 3.8–10.5)
WBC # FLD AUTO: 9.72 K/UL — SIGNIFICANT CHANGE UP (ref 3.8–10.5)

## 2019-06-22 PROCEDURE — 76937 US GUIDE VASCULAR ACCESS: CPT | Mod: 26

## 2019-06-22 PROCEDURE — 99233 SBSQ HOSP IP/OBS HIGH 50: CPT | Mod: GC

## 2019-06-22 PROCEDURE — 71045 X-RAY EXAM CHEST 1 VIEW: CPT | Mod: 26

## 2019-06-22 PROCEDURE — 36246 INS CATH ABD/L-EXT ART 2ND: CPT

## 2019-06-22 PROCEDURE — 93010 ELECTROCARDIOGRAM REPORT: CPT

## 2019-06-22 PROCEDURE — 43255 EGD CONTROL BLEEDING ANY: CPT | Mod: GC

## 2019-06-22 PROCEDURE — 36248 INS CATH ABD/L-EXT ART ADDL: CPT

## 2019-06-22 PROCEDURE — 37244 VASC EMBOLIZE/OCCLUDE BLEED: CPT

## 2019-06-22 PROCEDURE — 99291 CRITICAL CARE FIRST HOUR: CPT

## 2019-06-22 RX ORDER — ACETAMINOPHEN 500 MG
750 TABLET ORAL ONCE
Refills: 0 | Status: DISCONTINUED | OUTPATIENT
Start: 2019-06-22 | End: 2019-06-23

## 2019-06-22 RX ORDER — CHLORHEXIDINE GLUCONATE 213 G/1000ML
1 SOLUTION TOPICAL
Refills: 0 | Status: DISCONTINUED | OUTPATIENT
Start: 2019-06-22 | End: 2019-06-24

## 2019-06-22 RX ORDER — DEXAMETHASONE 0.5 MG/5ML
8 ELIXIR ORAL ONCE
Refills: 0 | Status: DISCONTINUED | OUTPATIENT
Start: 2019-06-22 | End: 2019-06-22

## 2019-06-22 RX ORDER — PROPOFOL 10 MG/ML
75 INJECTION, EMULSION INTRAVENOUS
Qty: 500 | Refills: 0 | Status: DISCONTINUED | OUTPATIENT
Start: 2019-06-22 | End: 2019-06-22

## 2019-06-22 RX ORDER — ONDANSETRON 8 MG/1
4 TABLET, FILM COATED ORAL ONCE
Refills: 0 | Status: DISCONTINUED | OUTPATIENT
Start: 2019-06-22 | End: 2019-06-23

## 2019-06-22 RX ORDER — DEXAMETHASONE 0.5 MG/5ML
8 ELIXIR ORAL ONCE
Refills: 0 | Status: DISCONTINUED | OUTPATIENT
Start: 2019-06-22 | End: 2019-06-23

## 2019-06-22 RX ORDER — PROPOFOL 10 MG/ML
75 INJECTION, EMULSION INTRAVENOUS
Qty: 1000 | Refills: 0 | Status: DISCONTINUED | OUTPATIENT
Start: 2019-06-22 | End: 2019-06-23

## 2019-06-22 RX ORDER — FENTANYL CITRATE 50 UG/ML
25 INJECTION INTRAVENOUS
Refills: 0 | Status: DISCONTINUED | OUTPATIENT
Start: 2019-06-22 | End: 2019-06-23

## 2019-06-22 RX ORDER — ONDANSETRON 8 MG/1
4 TABLET, FILM COATED ORAL ONCE
Refills: 0 | Status: DISCONTINUED | OUTPATIENT
Start: 2019-06-22 | End: 2019-06-22

## 2019-06-22 RX ORDER — PROPOFOL 10 MG/ML
75 INJECTION, EMULSION INTRAVENOUS
Qty: 1000 | Refills: 0 | Status: DISCONTINUED | OUTPATIENT
Start: 2019-06-22 | End: 2019-06-22

## 2019-06-22 RX ORDER — POTASSIUM CHLORIDE 20 MEQ
20 PACKET (EA) ORAL ONCE
Refills: 0 | Status: DISCONTINUED | OUTPATIENT
Start: 2019-06-22 | End: 2019-06-22

## 2019-06-22 RX ORDER — ACETAMINOPHEN 500 MG
650 TABLET ORAL ONCE
Refills: 0 | Status: COMPLETED | OUTPATIENT
Start: 2019-06-22 | End: 2019-06-22

## 2019-06-22 RX ORDER — PANTOPRAZOLE SODIUM 20 MG/1
80 TABLET, DELAYED RELEASE ORAL ONCE
Refills: 0 | Status: COMPLETED | OUTPATIENT
Start: 2019-06-22 | End: 2019-06-22

## 2019-06-22 RX ORDER — POTASSIUM CHLORIDE 20 MEQ
10 PACKET (EA) ORAL
Refills: 0 | Status: DISCONTINUED | OUTPATIENT
Start: 2019-06-22 | End: 2019-06-22

## 2019-06-22 RX ORDER — PANTOPRAZOLE SODIUM 20 MG/1
40 TABLET, DELAYED RELEASE ORAL EVERY 12 HOURS
Refills: 0 | Status: DISCONTINUED | OUTPATIENT
Start: 2019-06-22 | End: 2019-06-22

## 2019-06-22 RX ORDER — PANTOPRAZOLE SODIUM 20 MG/1
8 TABLET, DELAYED RELEASE ORAL
Qty: 80 | Refills: 0 | Status: DISCONTINUED | OUTPATIENT
Start: 2019-06-22 | End: 2019-06-29

## 2019-06-22 RX ADMIN — MUPIROCIN 1 APPLICATION(S): 20 OINTMENT TOPICAL at 18:00

## 2019-06-22 RX ADMIN — PANTOPRAZOLE SODIUM 10 MG/HR: 20 TABLET, DELAYED RELEASE ORAL at 11:47

## 2019-06-22 RX ADMIN — VALACYCLOVIR 500 MILLIGRAM(S): 500 TABLET, FILM COATED ORAL at 06:52

## 2019-06-22 RX ADMIN — PROPOFOL 22.41 MICROGRAM(S)/KG/MIN: 10 INJECTION, EMULSION INTRAVENOUS at 23:28

## 2019-06-22 RX ADMIN — PANTOPRAZOLE SODIUM 40 MILLIGRAM(S): 20 TABLET, DELAYED RELEASE ORAL at 06:52

## 2019-06-22 RX ADMIN — Medication 650 MILLIGRAM(S): at 08:30

## 2019-06-22 RX ADMIN — MUPIROCIN 1 APPLICATION(S): 20 OINTMENT TOPICAL at 07:30

## 2019-06-22 RX ADMIN — Medication 650 MILLIGRAM(S): at 07:50

## 2019-06-22 RX ADMIN — PANTOPRAZOLE SODIUM 80 MILLIGRAM(S): 20 TABLET, DELAYED RELEASE ORAL at 11:47

## 2019-06-22 RX ADMIN — FENTANYL CITRATE 25 MICROGRAM(S): 50 INJECTION INTRAVENOUS at 22:50

## 2019-06-22 NOTE — PROGRESS NOTE ADULT - PROBLEM SELECTOR PLAN 1
BP 90s/50s. Per family, this is patient's usual BP at home. Will give 500cc bolus as needed for low BP. Monitor overnight, tentative discharge tomorrow. melena last night and this morning. BP 70s, improved after 2U PRBC + 2L NS. Likely upper GIB  - GI following, plan for EGD Monday  - CLD  - CBC Q8H, active T&S, transfuse if < 7 or overt bleeding  - VS Q4H, if HD unstable transfer to MICU melena last night and this morning. BP 70s, improved after 2U PRBC + 2L NS. Likely upper GIB  -start PPI gtt as pre-intervention UGIB.  Load with protonix 80mg IV  -2 large bore IV.  - GI following, plan for EGD Monday  - CLD  - CBC Q8H, active T&S, transfuse if < 7 or overt bleeding  - VS Q4H, if HD unstable transfer to MICU

## 2019-06-22 NOTE — CHART NOTE - NSCHARTNOTEFT_GEN_A_CORE
Patient planned for upper endoscopy today for evaluation of GI bleeding. Patient has breast carcinoma and is actively receiving chemotherapy. Patient requires endoscopic evaluation regardless of results of urine or serum HCG.     Plan discussed with on call GI attending.

## 2019-06-22 NOTE — PROGRESS NOTE ADULT - PROBLEM SELECTOR PLAN 6
Na improving w/ fluid restriction  - c/w 1.2L fluid restriction  - monitor BMP -DVT prophylaxis with SCDs  -Diet regular  -Dispo: EGD and GI clearance  Full Code

## 2019-06-22 NOTE — CHART NOTE - NSCHARTNOTEFT_GEN_A_CORE
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 She also reports that she has had two weeks of bloody bowel movements with blood in the toilet bowel and on paper. She reports a normal colonoscopy approx 2 years ago. She denies hx of hemorrhoids or diverticulosis. She reports she has been feeling tired all of the time. She denies SOB or CP. She denies melena. She had some epigastric abdominal pain earlier today that has gone away and that she attributed to having not eaten anything today.     In the ED vital signs: 101.2, 109., 102/43, 20, 100% on RA. She received vanc and pip tazo. She received 2L of LR. She received IV acetaminophen and pantoprazole. (18 Jun 2019 21:06)    RRT called on 6/22 for near syncope, hypotension 2/2 anemia from GIB. MICU c/s for hypotension. Initially thought to be 2/2 from sepsis vs GIB, now with repeat labs showing anemia to 6.0, concern for acute GIB. BP during rapid rapid response 80s/40s, improved w/ IVF, now 90s/40s, (baseline BP 90s-110s prior to today). Recommended transfusion of 2 U pRBC for Hgb 6.0. C/w protonix gtt per GI recs. F/u GI recs, recommending clear diet, NPO AC MN on sunday, for possible endoscopy monday   Trend CBC q8, keep active T&S, vitals q4.     Patient initally HD responded to 2 units PRBC, however continued to have black bloody bowel movements. GI preformed endoscopy with evidence of large malignant appearing ulcer with two visible vessels actively bleeding s/p endoscopic therapy. Patient then underwent via RFA angiogram with evidence of no active extravasation was seen. There were 2 left gastric arteries, both with branches extending to the GI clips and both were embolized with Avitene slurry. A small left gastric artery branch was embolized with NBCA glue.     Patient received total       PAST MEDICAL & SURGICAL HISTORY:  Breast cancer  S/P mastectomy, left      FAMILY HISTORY:  No pertinent family history in first degree relatives      SOCIAL HISTORY:  Smoking: [ ] Never Smoked [ ] Former Smoker (__ packs x ___ years) [ ] Current Smoker  (__ packs x ___ years)  Substance Use: [ ] Never Used [ ] Used ____  EtOH Use:  Marital Status: [ ] Single [ ]  [ ]  [ ]   Sexual History:   Occupation:  Recent Travel:  Country of Birth:  Advance Directives:    Allergies    No Known Allergies    Intolerances        HOME MEDICATIONS:    REVIEW OF SYSTEMS:  Constitutional: [ ] fevers [ ] chills [ ] weight loss [ ] weight gain  HEENT: [ ] dry eyes [ ] eye irritation [ ] postnasal drip [ ] nasal congestion  CV: [ ] chest pain [ ] orthopnea [ ] palpitations [ ] murmur  Resp: [ ] cough [ ] shortness of breath [ ] dyspnea [ ] wheezing [ ] sputum [ ] hemoptysis  GI: [ ] nausea [ ] vomiting [ ] diarrhea [ ] constipation [ ] abd pain [ ] dysphagia   : [ ] dysuria [ ] nocturia [ ] hematuria [ ] increased urinary frequency  Musculoskeletal: [ ] back pain [ ] myalgias [ ] arthralgias [ ] fracture  Skin: [ ] rash [ ] itch  Neurological: [ ] headache [ ] dizziness [ ] syncope [ ] weakness [ ] numbness  Psychiatric: [ ] anxiety [ ] depression  Endocrine: [ ] diabetes [ ] thyroid problem  Hematologic/Lymphatic: [ ] anemia [ ] bleeding problem  Allergic/Immunologic: [ ] itchy eyes [ ] nasal discharge [ ] hives [ ] angioedema  [ ] All other systems negative  [ ] Unable to assess ROS because ________    OBJECTIVE:  ICU Vital Signs Last 24 Hrs  T(C): 36.3 (22 Jun 2019 15:00), Max: 36.9 (21 Jun 2019 22:22)  T(F): 97.4 (22 Jun 2019 15:00), Max: 98.5 (21 Jun 2019 22:22)  HR: 80 (22 Jun 2019 21:15) (80 - 102)  BP: 113/58 (22 Jun 2019 15:00) (86/45 - 114/63)  BP(mean): 61 (22 Jun 2019 07:26) (61 - 61)  ABP: --  ABP(mean): --  RR: 17 (22 Jun 2019 15:00) (17 - 18)  SpO2: 100% (22 Jun 2019 21:15) (100% - 100%)    Mode: SIMV (Synchronized Intermittent Mandatory Ventilation), RR (machine): 10, TV (machine): 400, FiO2: 60, PEEP: 5, PS: 5, MAP: 8.6, PIP: 14    CAPILLARY BLOOD GLUCOSE      POCT Blood Glucose.: 147 mg/dL (22 Jun 2019 06:27)      PHYSICAL EXAM:  General:   HEENT:   Lymph Nodes:  Neck:   Respiratory:   Cardiovascular:   Abdomen:   Extremities:   Skin:   Neurological:  Psychiatry:    LINES:     HOSPITAL MEDICATIONS:  MEDICATIONS  (STANDING):  chlorhexidine 4% Liquid 1 Application(s) Topical <User Schedule>  mupirocin 2% Ointment 1 Application(s) Topical two times a day  pantoprazole Infusion 8 mG/Hr (10 mL/Hr) IV Continuous <Continuous>  valACYclovir 500 milliGRAM(s) Oral every 12 hours    MEDICATIONS  (PRN):  acetaminophen  IVPB .. 750 milliGRAM(s) IV Intermittent once PRN Mild Pain (1 - 3)  dexamethasone  IVPB 8 milliGRAM(s) IV Intermittent once PRN Nausea and/or Vomiting  lidocaine 5% Ointment 1 Application(s) Topical every 6 hours PRN pain at rash  ondansetron Injectable 4 milliGRAM(s) IV Push once PRN Nausea and/or Vomiting      LABS:                        7.4    10.30 )-----------( 333      ( 22 Jun 2019 17:09 )             23.2     Hgb Trend: 7.4<--, 8.6<--, 6.0<--, 8.3<--, 8.2<--  06-22    135  |  103  |  19  ----------------------------<  133<H>  4.3   |  19<L>  |  0.53    Ca    7.6<L>      22 Jun 2019 06:40  Phos  3.0     06-22  Mg     1.7     06-22    TPro  4.2<L>  /  Alb  2.0<L>  /  TBili  < 0.2<L>  /  DBili  x   /  AST  23  /  ALT  34<H>  /  AlkPhos  89  06-22    Creatinine Trend: 0.53<--, 0.51<--, 0.53<--, 0.51<--, 0.58<--, 0.58<--  PT/INR - ( 22 Jun 2019 18:20 )   PT: 13.5 SEC;   INR: 1.18          PTT - ( 22 Jun 2019 18:20 )  PTT:32.4 SEC    Arterial Blood Gas:  06-22 @ 19:30  7.33/38/422/20/98.9/-5.4  ABG lactate: --    Venous Blood Gas:  06-22 @ 06:40  7.38/37/43/22/70.7  VBG Lactate: 4.1      MICROBIOLOGY:     RADIOLOGY:  [ ] Reviewed and interpreted by me    EKG: CHIEF COMPLAINT:    HPI:  The patient is a 53 year old female with a PMH of metastatic breast cancer (to mediasteinal LNs, dx in  s/p chemo and masectomy, remission in , recurrence , on paclitaxel weekly every Wednesday and exemestane daily), 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 She also reports that she has had two weeks of bloody bowel movements with blood in the toilet bowel and on paper. She reports a normal colonoscopy approx 2 years ago. She denies hx of hemorrhoids or diverticulosis. She reports she has been feeling tired all of the time. She denies SOB or CP. She denies melena. She had some epigastric abdominal pain earlier today that has gone away and that she attributed to having not eaten anything today.     In the ED vital signs: 101.2, 109., 102/43, 20, 100% on RA. She received vanc and pip tazo. She received 2L of LR. She received IV acetaminophen and pantoprazole. (2019 21:06)    RRT called on  for near syncope, hypotension 2/2 anemia from GIB. MICU c/s for hypotension. Initially thought to be 2/2 from sepsis vs GIB, now with repeat labs showing anemia to 6.0, concern for acute GIB. BP during rapid rapid response 80s/40s, improved w/ IVF, now 90s/40s, (baseline BP 90s-110s prior to today). Recommended transfusion of 2 U pRBC for Hgb 6.0. C/w protonix gtt per GI recs. F/u GI recs, recommending clear diet, NPO AC MN on , for possible endoscopy monday   Trend CBC q8, keep active T&S, vitals q4.     Patient initally HD responded to 2 units PRBC, however continued to have black bloody bowel movements. GI preformed endoscopy with evidence of large malignant appearing ulcer with two visible vessels actively bleeding s/p endoscopic therapy. Patient then underwent via RFA angiogram with evidence of no active extravasation was seen. There were 2 left gastric arteries, both with branches extending to the GI clips and both were embolized with Avitene slurry. A small left gastric artery branch was embolized with NBCA glue.     Patient received total       PAST MEDICAL & SURGICAL HISTORY:  Breast cancer  S/P mastectomy, left      FAMILY HISTORY:  No pertinent family history in first degree relatives      SOCIAL HISTORY:  Smoking: [ ] Never Smoked [ ] Former Smoker (__ packs x ___ years) [ ] Current Smoker  (__ packs x ___ years)  Substance Use: [ ] Never Used [ ] Used ____  EtOH Use:  Marital Status: [ ] Single [ ]  [ ]  [ ]   Sexual History:   Occupation:  Recent Travel:  Country of Birth:  Advance Directives:    Allergies    No Known Allergies    Intolerances        HOME MEDICATIONS:    REVIEW OF SYSTEMS:  Constitutional: [ ] fevers [ ] chills [ ] weight loss [ ] weight gain  HEENT: [ ] dry eyes [ ] eye irritation [ ] postnasal drip [ ] nasal congestion  CV: [ ] chest pain [ ] orthopnea [ ] palpitations [ ] murmur  Resp: [ ] cough [ ] shortness of breath [ ] dyspnea [ ] wheezing [ ] sputum [ ] hemoptysis  GI: [ ] nausea [ ] vomiting [ ] diarrhea [ ] constipation [ ] abd pain [ ] dysphagia   : [ ] dysuria [ ] nocturia [ ] hematuria [ ] increased urinary frequency  Musculoskeletal: [ ] back pain [ ] myalgias [ ] arthralgias [ ] fracture  Skin: [ ] rash [ ] itch  Neurological: [ ] headache [ ] dizziness [ ] syncope [ ] weakness [ ] numbness  Psychiatric: [ ] anxiety [ ] depression  Endocrine: [ ] diabetes [ ] thyroid problem  Hematologic/Lymphatic: [ ] anemia [ ] bleeding problem  Allergic/Immunologic: [ ] itchy eyes [ ] nasal discharge [ ] hives [ ] angioedema  [ ] All other systems negative  [ ] Unable to assess ROS because ________    OBJECTIVE:  ICU Vital Signs Last 24 Hrs  T(C): 36.3 (2019 15:00), Max: 36.9 (2019 22:22)  T(F): 97.4 (2019 15:00), Max: 98.5 (2019 22:22)  HR: 80 (2019 21:15) (80 - 102)  BP: 113/58 (2019 15:00) (86/45 - 114/63)  BP(mean): 61 (2019 07:26) (61 - 61)  ABP: --  ABP(mean): --  RR: 17 (2019 15:00) (17 - 18)  SpO2: 100% (2019 21:15) (100% - 100%)    Mode: SIMV (Synchronized Intermittent Mandatory Ventilation), RR (machine): 10, TV (machine): 400, FiO2: 60, PEEP: 5, PS: 5, MAP: 8.6, PIP: 14    CAPILLARY BLOOD GLUCOSE      POCT Blood Glucose.: 147 mg/dL (2019 06:27)      PHYSICAL EXAM:  General:   HEENT:   Lymph Nodes:  Neck:   Respiratory:   Cardiovascular:   Abdomen:   Extremities:   Skin:   Neurological:  Psychiatry:    LINES:     HOSPITAL MEDICATIONS:  MEDICATIONS  (STANDING):  chlorhexidine 4% Liquid 1 Application(s) Topical <User Schedule>  mupirocin 2% Ointment 1 Application(s) Topical two times a day  pantoprazole Infusion 8 mG/Hr (10 mL/Hr) IV Continuous <Continuous>  valACYclovir 500 milliGRAM(s) Oral every 12 hours    MEDICATIONS  (PRN):  acetaminophen  IVPB .. 750 milliGRAM(s) IV Intermittent once PRN Mild Pain (1 - 3)  dexamethasone  IVPB 8 milliGRAM(s) IV Intermittent once PRN Nausea and/or Vomiting  lidocaine 5% Ointment 1 Application(s) Topical every 6 hours PRN pain at rash  ondansetron Injectable 4 milliGRAM(s) IV Push once PRN Nausea and/or Vomiting      LABS:                        7.4    10.30 )-----------( 333      ( 2019 17:09 )             23.2     Hgb Trend: 7.4<--, 8.6<--, 6.0<--, 8.3<--, 8.2<--  06-22    135  |  103  |  19  ----------------------------<  133<H>  4.3   |  19<L>  |  0.53    Ca    7.6<L>      2019 06:40  Phos  3.0     06-22  Mg     1.7         TPro  4.2<L>  /  Alb  2.0<L>  /  TBili  < 0.2<L>  /  DBili  x   /  AST  23  /  ALT  34<H>  /  AlkPhos  89      Creatinine Trend: 0.53<--, 0.51<--, 0.53<--, 0.51<--, 0.58<--, 0.58<--  PT/INR - ( 2019 18:20 )   PT: 13.5 SEC;   INR: 1.18          PTT - ( 2019 18:20 )  PTT:32.4 SEC    Arterial Blood Gas:   @ 19:30  7.33/38/422/20/98.9/-5.4  ABG lactate: --    Venous Blood Gas:   @ 06:40  7.38/37/43/22/70.7  VBG Lactate: 4.1      MICROBIOLOGY:     RADIOLOGY:  [ ] Reviewed and interpreted by me    [age] year old female with a PMH of metastatic breast cancer (to mediastinal LNs, dx in  s/p chemo and mastectomy, remission in , recurrence , on paclitaxel weekly every Wednesday and exemestane daily), 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 RRT called on  for near syncope, hypotension 2/2 anemia from GIB. MICU c/s for hypotension in the setting of UGI bleed found to have large craterized gastric ulcer with two active bleeding arteries s/p GI intervention, s/p IR intervention, total __ received    #UGI Bleed    have large craterezed gastric ulcer with two active bleeding arteries s/p GI intervention, s/p IR intervention, total __ received  CBCq6, Active T+S, 2 PIV at all times  PPI gtt   NPO   F/u biopsy results CHIEF COMPLAINT:    HPI:  The patient is a 53 year old female with a PMH of metastatic breast cancer (to mediasteinal LNs, dx in  s/p chemo and masectomy, remission in , recurrence , on paclitaxel weekly every Wednesday and exemestane daily), 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 She also reports that she has had two weeks of bloody bowel movements with blood in the toilet bowel and on paper. She reports a normal colonoscopy approx 2 years ago. She denies hx of hemorrhoids or diverticulosis. She reports she has been feeling tired all of the time. She denies SOB or CP. She denies melena. She had some epigastric abdominal pain earlier today that has gone away and that she attributed to having not eaten anything today.     In the ED vital signs: 101.2, 109., 102/43, 20, 100% on RA. She received vanc and pip tazo. She received 2L of LR. She received IV acetaminophen and pantoprazole. (2019 21:06)    RRT called on  for near syncope, hypotension 2/2 anemia from GIB. MICU c/s for hypotension. Initially thought to be 2/2 from sepsis vs GIB, now with repeat labs showing anemia to 6.0, concern for acute GIB. BP during rapid rapid response 80s/40s, improved w/ IVF, now 90s/40s, (baseline BP 90s-110s prior to today). Recommended transfusion of 2 U pRBC for Hgb 6.0. C/w protonix gtt per GI recs. F/u GI recs, recommending clear diet, NPO AC MN on , for possible endoscopy monday   Trend CBC q8, keep active T&S, vitals q4.     Patient initally HD responded to 2 units PRBC, however continued to have black bloody bowel movements. GI preformed endoscopy with evidence of large malignant appearing ulcer with two visible vessels actively bleeding s/p endoscopic therapy. Patient then underwent via RFA angiogram with evidence of no active extravasation was seen. There were 2 left gastric arteries, both with branches extending to the GI clips and both were embolized with Avitene slurry. A small left gastric artery branch was embolized with NBCA glue.     Patient received total       PAST MEDICAL & SURGICAL HISTORY:  Breast cancer  S/P mastectomy, left      FAMILY HISTORY:  No pertinent family history in first degree relatives      SOCIAL HISTORY:  Smoking: [ ] Never Smoked [ ] Former Smoker (__ packs x ___ years) [ ] Current Smoker  (__ packs x ___ years)  Substance Use: [ ] Never Used [ ] Used ____  EtOH Use:  Marital Status: [ ] Single [ ]  [ ]  [ ]   Sexual History:   Occupation:  Recent Travel:  Country of Birth:  Advance Directives:    Allergies    No Known Allergies    Intolerances        HOME MEDICATIONS:    REVIEW OF SYSTEMS:  Constitutional: [ ] fevers [ ] chills [ ] weight loss [ ] weight gain  HEENT: [ ] dry eyes [ ] eye irritation [ ] postnasal drip [ ] nasal congestion  CV: [ ] chest pain [ ] orthopnea [ ] palpitations [ ] murmur  Resp: [ ] cough [ ] shortness of breath [ ] dyspnea [ ] wheezing [ ] sputum [ ] hemoptysis  GI: [ ] nausea [ ] vomiting [ ] diarrhea [ ] constipation [ ] abd pain [ ] dysphagia   : [ ] dysuria [ ] nocturia [ ] hematuria [ ] increased urinary frequency  Musculoskeletal: [ ] back pain [ ] myalgias [ ] arthralgias [ ] fracture  Skin: [ ] rash [ ] itch  Neurological: [ ] headache [ ] dizziness [ ] syncope [ ] weakness [ ] numbness  Psychiatric: [ ] anxiety [ ] depression  Endocrine: [ ] diabetes [ ] thyroid problem  Hematologic/Lymphatic: [ ] anemia [ ] bleeding problem  Allergic/Immunologic: [ ] itchy eyes [ ] nasal discharge [ ] hives [ ] angioedema  [ ] All other systems negative  [ ] Unable to assess ROS because ________    OBJECTIVE:  ICU Vital Signs Last 24 Hrs  T(C): 36.3 (2019 15:00), Max: 36.9 (2019 22:22)  T(F): 97.4 (2019 15:00), Max: 98.5 (2019 22:22)  HR: 80 (2019 21:15) (80 - 102)  BP: 113/58 (2019 15:00) (86/45 - 114/63)  BP(mean): 61 (2019 07:26) (61 - 61)  ABP: --  ABP(mean): --  RR: 17 (2019 15:00) (17 - 18)  SpO2: 100% (2019 21:15) (100% - 100%)    Mode: SIMV (Synchronized Intermittent Mandatory Ventilation), RR (machine): 10, TV (machine): 400, FiO2: 60, PEEP: 5, PS: 5, MAP: 8.6, PIP: 14    CAPILLARY BLOOD GLUCOSE      POCT Blood Glucose.: 147 mg/dL (2019 06:27)      PHYSICAL EXAM:  General:   HEENT:   Lymph Nodes:  Neck:   Respiratory:   Cardiovascular:   Abdomen:   Extremities:   Skin:   Neurological:  Psychiatry:    LINES:     HOSPITAL MEDICATIONS:  MEDICATIONS  (STANDING):  chlorhexidine 4% Liquid 1 Application(s) Topical <User Schedule>  mupirocin 2% Ointment 1 Application(s) Topical two times a day  pantoprazole Infusion 8 mG/Hr (10 mL/Hr) IV Continuous <Continuous>  valACYclovir 500 milliGRAM(s) Oral every 12 hours    MEDICATIONS  (PRN):  acetaminophen  IVPB .. 750 milliGRAM(s) IV Intermittent once PRN Mild Pain (1 - 3)  dexamethasone  IVPB 8 milliGRAM(s) IV Intermittent once PRN Nausea and/or Vomiting  lidocaine 5% Ointment 1 Application(s) Topical every 6 hours PRN pain at rash  ondansetron Injectable 4 milliGRAM(s) IV Push once PRN Nausea and/or Vomiting      LABS:                        7.4    10.30 )-----------( 333      ( 2019 17:09 )             23.2     Hgb Trend: 7.4<--, 8.6<--, 6.0<--, 8.3<--, 8.2<--  06-22    135  |  103  |  19  ----------------------------<  133<H>  4.3   |  19<L>  |  0.53    Ca    7.6<L>      2019 06:40  Phos  3.0     06-22  Mg     1.7         TPro  4.2<L>  /  Alb  2.0<L>  /  TBili  < 0.2<L>  /  DBili  x   /  AST  23  /  ALT  34<H>  /  AlkPhos  89      Creatinine Trend: 0.53<--, 0.51<--, 0.53<--, 0.51<--, 0.58<--, 0.58<--  PT/INR - ( 2019 18:20 )   PT: 13.5 SEC;   INR: 1.18          PTT - ( 2019 18:20 )  PTT:32.4 SEC    Arterial Blood Gas:   @ 19:30  7.33/38/422/20/98.9/-5.4  ABG lactate: --    Venous Blood Gas:   @ 06:40  7.38/37/43/22/70.7  VBG Lactate: 4.1      MICROBIOLOGY:     RADIOLOGY:  [ ] Reviewed and interpreted by me    [age] year old female with a PMH of metastatic breast cancer (to mediastinal LNs, dx in  s/p chemo and mastectomy, remission in , recurrence , on paclitaxel weekly every Wednesday and exemestane daily), 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 RRT called on  for near syncope, hypotension 2/2 anemia from GIB. MICU c/s for hypotension in the setting of UGI bleed found to have large craterized gastric ulcer with two active bleeding arteries s/p GI intervention, s/p IR intervention, total __ received    #UGI Bleed    have large craterezed gastric ulcer with two active bleeding arteries s/p GI intervention, s/p IR intervention, total __ received  CBCq6, Active T+S, 2 PIV at all times  PPI gtt   NPO   F/u biopsy results      Attending Attestation  52 yo with metastatic breast CA on Ctx, h/o bullous impetigo- HSV +, MRSA+superinfection, both treated and not active, now with UGI bleed s/p 5UPRBC, FFP, Plt, EGD showed large malignant appearing gastric ulcer with two visible blood spurting vessels, s/p cautery, epi inj and clip but one still bleeding, s/p IR embolization of two L gastric arteries  Pt seen intubated just post embolization sedated; 145/78  76   hgb 10.5 at 10pm after procedure    UGI bleed 2/2 gastric ulcer s/p embolization  VSS, Hgb good  will extubate after sedation wears off  admit to MICU for close monitoring and repeat H/H in 2-3 hrs  cont pantoprazole, valacyclovir ppx  Guarded  CC time 35 min CHIEF COMPLAINT:    HPI:  The patient is a 53 year old female with a PMH of metastatic breast cancer (to mediasteinal LNs, dx in  s/p chemo and masectomy, remission in , recurrence , on paclitaxel weekly every Wednesday and exemestane daily), 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 She also reports that she has had two weeks of bloody bowel movements with blood in the toilet bowel and on paper. She reports a normal colonoscopy approx 2 years ago. She denies hx of hemorrhoids or diverticulosis. She reports she has been feeling tired all of the time. She denies SOB or CP. She denies melena. She had some epigastric abdominal pain earlier today that has gone away and that she attributed to having not eaten anything today.     In the ED vital signs: 101.2, 109., 102/43, 20, 100% on RA. She received vanc and pip tazo. She received 2L of LR. She received IV acetaminophen and pantoprazole. (2019 21:06)    RRT called on  for near syncope, hypotension 2/2 anemia from GIB. MICU c/s for hypotension. Initially thought to be 2/2 from sepsis vs GIB, now with repeat labs showing anemia to 6.0, concern for acute GIB. BP during rapid rapid response 80s/40s, improved w/ IVF, now 90s/40s, (baseline BP 90s-110s prior to today). Recommended transfusion of 2 U pRBC for Hgb 6.0. C/w protonix gtt per GI recs. F/u GI recs, recommending clear diet, NPO AC MN on , for possible endoscopy monday   Trend CBC q8, keep active T&S, vitals q4.     Patient initally HD responded to 2 units PRBC, however continued to have black bloody bowel movements. GI preformed endoscopy with evidence of large malignant appearing ulcer with two visible vessels actively bleeding s/p endoscopic therapy. Patient then underwent via RFA angiogram with evidence of no active extravasation was seen. There were 2 left gastric arteries, both with branches extending to the GI clips and both were embolized with Avitene slurry. A small left gastric artery branch was embolized with NBCA glue.     Patient received total       PAST MEDICAL & SURGICAL HISTORY:  Breast cancer  S/P mastectomy, left      FAMILY HISTORY:  No pertinent family history in first degree relatives      SOCIAL HISTORY:  Smoking: [ ] Never Smoked [ ] Former Smoker (__ packs x ___ years) [ ] Current Smoker  (__ packs x ___ years)  Substance Use: [ ] Never Used [ ] Used ____  EtOH Use:  Marital Status: [ ] Single [ ]  [ ]  [ ]   Sexual History:   Occupation:  Recent Travel:  Country of Birth:  Advance Directives:    Allergies    No Known Allergies    Intolerances        HOME MEDICATIONS:    REVIEW OF SYSTEMS:  Constitutional: [ ] fevers [ ] chills [ ] weight loss [ ] weight gain  HEENT: [ ] dry eyes [ ] eye irritation [ ] postnasal drip [ ] nasal congestion  CV: [ ] chest pain [ ] orthopnea [ ] palpitations [ ] murmur  Resp: [ ] cough [ ] shortness of breath [ ] dyspnea [ ] wheezing [ ] sputum [ ] hemoptysis  GI: [ ] nausea [ ] vomiting [ ] diarrhea [ ] constipation [ ] abd pain [ ] dysphagia   : [ ] dysuria [ ] nocturia [ ] hematuria [ ] increased urinary frequency  Musculoskeletal: [ ] back pain [ ] myalgias [ ] arthralgias [ ] fracture  Skin: [ ] rash [ ] itch  Neurological: [ ] headache [ ] dizziness [ ] syncope [ ] weakness [ ] numbness  Psychiatric: [ ] anxiety [ ] depression  Endocrine: [ ] diabetes [ ] thyroid problem  Hematologic/Lymphatic: [ ] anemia [ ] bleeding problem  Allergic/Immunologic: [ ] itchy eyes [ ] nasal discharge [ ] hives [ ] angioedema  [ ] All other systems negative  [ ] Unable to assess ROS because ________    OBJECTIVE:  ICU Vital Signs Last 24 Hrs  T(C): 36.3 (2019 15:00), Max: 36.9 (2019 22:22)  T(F): 97.4 (2019 15:00), Max: 98.5 (2019 22:22)  HR: 80 (2019 21:15) (80 - 102)  BP: 113/58 (2019 15:00) (86/45 - 114/63)  BP(mean): 61 (2019 07:26) (61 - 61)  ABP: --  ABP(mean): --  RR: 17 (2019 15:00) (17 - 18)  SpO2: 100% (2019 21:15) (100% - 100%)    Mode: SIMV (Synchronized Intermittent Mandatory Ventilation), RR (machine): 10, TV (machine): 400, FiO2: 60, PEEP: 5, PS: 5, MAP: 8.6, PIP: 14    CAPILLARY BLOOD GLUCOSE      POCT Blood Glucose.: 147 mg/dL (2019 06:27)      PHYSICAL EXAM:  General:   HEENT:   Lymph Nodes:  Neck:   Respiratory:   Cardiovascular:   Abdomen:   Extremities:   Skin:   Neurological:  Psychiatry:    LINES:     HOSPITAL MEDICATIONS:  MEDICATIONS  (STANDING):  chlorhexidine 4% Liquid 1 Application(s) Topical <User Schedule>  mupirocin 2% Ointment 1 Application(s) Topical two times a day  pantoprazole Infusion 8 mG/Hr (10 mL/Hr) IV Continuous <Continuous>  valACYclovir 500 milliGRAM(s) Oral every 12 hours    MEDICATIONS  (PRN):  acetaminophen  IVPB .. 750 milliGRAM(s) IV Intermittent once PRN Mild Pain (1 - 3)  dexamethasone  IVPB 8 milliGRAM(s) IV Intermittent once PRN Nausea and/or Vomiting  lidocaine 5% Ointment 1 Application(s) Topical every 6 hours PRN pain at rash  ondansetron Injectable 4 milliGRAM(s) IV Push once PRN Nausea and/or Vomiting      LABS:                        7.4    10.30 )-----------( 333      ( 2019 17:09 )             23.2     Hgb Trend: 7.4<--, 8.6<--, 6.0<--, 8.3<--, 8.2<--  06-22    135  |  103  |  19  ----------------------------<  133<H>  4.3   |  19<L>  |  0.53    Ca    7.6<L>      2019 06:40  Phos  3.0     06-22  Mg     1.7         TPro  4.2<L>  /  Alb  2.0<L>  /  TBili  < 0.2<L>  /  DBili  x   /  AST  23  /  ALT  34<H>  /  AlkPhos  89      Creatinine Trend: 0.53<--, 0.51<--, 0.53<--, 0.51<--, 0.58<--, 0.58<--  PT/INR - ( 2019 18:20 )   PT: 13.5 SEC;   INR: 1.18          PTT - ( 2019 18:20 )  PTT:32.4 SEC    Arterial Blood Gas:   @ 19:30  7.33/38/422/20/98.9/-5.4  ABG lactate: --    Venous Blood Gas:   @ 06:40  7.38/37/43/22/70.7  VBG Lactate: 4.1      MICROBIOLOGY:     RADIOLOGY:  [ ] Reviewed and interpreted by me    [age] year old female with a PMH of metastatic breast cancer (to mediastinal LNs, dx in  s/p chemo and mastectomy, remission in , recurrence , on paclitaxel weekly every Wednesday and exemestane daily), 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 RRT called on  for near syncope, hypotension 2/2 anemia from GIB. MICU c/s for hypotension in the setting of UGI bleed found to have large craterized gastric ulcer with two active bleeding arteries s/p GI intervention, s/p IR intervention, total __ received    #UGI Bleed   Found to have large craterized gastric ulcer with two active bleeding arteries s/p GI intervention, s/p IR intervention, total __ received  Unclear if malignant in nature, ?2/2 radiation vs. risk factors  S/p RFA access, assess vascular site, bedrest  CBCq6, Active T+S, 2 PIV at all times  PPI gtt   NPO   F/u GI recs   F/u biopsy results  Patient intubated post op, post intubation ABG appropriate   Wean sedation, once mental status improves will plan for extubation     Attending Attestation  52 yo with metastatic breast CA on Ctx, h/o bullous impetigo- HSV +, MRSA+superinfection, both treated and not active, now with UGI bleed s/p 5UPRBC, FFP, Plt, EGD showed large malignant appearing gastric ulcer with two visible blood spurting vessels, s/p cautery, epi inj and clip but one still bleeding, s/p IR embolization of two L gastric arteries  Pt seen intubated just post embolization sedated; 145/78  76   hgb 10.5 at 10pm after procedure    UGI bleed 2/2 gastric ulcer s/p embolization  VSS, Hgb good  will extubate after sedation wears off  admit to MICU for close monitoring and repeat H/H in 2-3 hrs  cont pantoprazole, valacyclovir ppx  Guarded  CC time 35 min

## 2019-06-22 NOTE — PROVIDER CONTACT NOTE (OTHER) - SITUATION
Pt noted with syncopal episode while family member fixing pt's sacral dressing, pt was assisted on the floor by family member (on her knees with body on the bed)

## 2019-06-22 NOTE — CONSULT NOTE ADULT - ASSESSMENT
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 RRT called on 6/22 for near syncope, hypotension 2/2 anemia from GIB. MICU c/s for hypotension.     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 RRT called on 6/22 for near syncope, hypotension 2/2 anemia from GIB. MICU c/s for hypotension.     #Hypotension  Initially thought to be 2/2 from sepsis vs GIB, now with repeat labs showing anemia to 6.0, concern for acute GIB  BP during rapid rapid response 80s/40s, improved w/ IVF, now 90s/40s, (baseline BP 90s-110s prior to today)  Recommend transfusion of 2 U pRBC for Hgb 6.0   C/w protonix gtt per GI recs  F/u GI recs, recommending clear diet, NPO AC MN on sunday, for possible endoscopy monday   Trend CBC q8, keep active T&S, vitals q4    **TO BE DISCUSSED WITH ICU ATTENDING**    Brennen Sharma, PGY-2  MICU resident   Spectra: [phone] 53 year old female with a PMH of metastatic breast cancer (to mediastinal LNs, dx in 2012 s/p chemo and mastectomy, remission in 2013, recurrence 01/19, on paclitaxel weekly every Wednesday and exemestane daily), 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 RRT called on 6/22 for near syncope, hypotension 2/2 anemia from GIB. MICU c/s for hypotension.     #Hypotension  Initially thought to be 2/2 from sepsis vs GIB, now with repeat labs showing anemia to 6.0, concern for acute GIB  BP during rapid rapid response 80s/40s, improved w/ IVF, now 90s/40s, (baseline BP 90s-110s prior to today)  Recommend transfusion of 2 U pRBC for Hgb 6.0   C/w protonix gtt per GI recs  F/u GI recs, recommending clear diet, NPO AC MN on sunday, for possible endoscopy monday   Trend CBC q8, keep active T&S, vitals q4    Not a MICU candidate at this time    Brennen Sharma, PGY-2  MICU resident   Spectra: [phone]

## 2019-06-22 NOTE — PROGRESS NOTE ADULT - SUBJECTIVE AND OBJECTIVE BOX
Vascular & Interventional Radiology Pre-Procedure Note    Procedure Name: visceral angiogram with possible embolization.     HPI: 53y Female with acute blood loss anemia and bleeding gastric ulcer on endoscopy with 2 bleeding vessels found. One was successfully clipped, the second vessel was unable to be adequately controlled.     Allergies:   Medications (Abx/Cardiac/Anticoagulation/Blood Products)    valACYclovir: 500 milliGRAM(s) Oral (06-22 @ 06:52)    Data:    T(C): 36.3  HR: 80  BP: 113/58  RR: 17  SpO2: 100%    Exam  General:   Chest:   Abdomen:   Extremities:     -WBC 10.30 / HgB 7.4 / Hct 23.2 / Plt 333  -Na 135 / Cl 103 / BUN 19 / Glucose 133  -K 4.3 / CO2 19 / Cr 0.53  -ALT 34 / Alk Phos 89 / T.Bili < 0.2  -INR1.18    Plan:   -53y Female presents for visceral angiogram and embolization.   -Risks/Benefits/alternatives explained with the patients son and witnessed informed consent obtained.

## 2019-06-22 NOTE — PROGRESS NOTE ADULT - ASSESSMENT
Impression:    #Anemia: Currently with no overt GI bleeding, brown stool on rectal exam, hypotensive with SBP in the 80s to 90s, and with Hgb of 6.0 down from ~ 8.0. May represent intra-abdominal bleeding. May represent occult blood loss due to erosive gastropathy or angioectasias. Patient with report of blood streaks in stool and on toilet paper, which primary concern for anorectal disease. Likely component of bone marrow suppression in setting of chemotherapy.   #Syncope: Concern for intravascular volume depletion in setting of watery diarrhea with resulting orthostatic hypotension. May also represent vasovagal syncope.  #Breast cancer on chemotherapy: Last dose of chemotherapy received last   #Chills: Infectious work-up negative  #Diarrhea: 5 to 6 episodes of watery diarrhea    Recommendations:  - Will consider endoscopic evaluation on Monday pending results of CT A/P  - Can perform urgent endoscopy if patient with overt GI bleeding affecting hemodynamics  - Agree with CT A/P w/ IV contrast to rule out intra-abdominal / RP bleeding  - F/U hematology/oncology regarding alternative causes of anemia  - Check C-diff PCR, GI-PCR, and stool cultures if patient continues to have watery diarrhea  - Clear liquid diet  - Monitor CBCs q8h  - Monitor bowel movements  - Transfuse for goal Hgb >/= 7.0  - Continue pantoprazole 40 mg IV BID  - Maintain active type and screen    Please page GI (Pager: [phone]) if there are any additional questions or concerns. Please call on-call GI fellow after 5pm and before 8am, and on weekends. Impression:    #Acute blood loss anemia with reported melena: Currently with black stools per primary team, brown stool on my rectal exam, hypotensive with SBP in the 80s to 90s which responded to IV resuscitation and blood, and with Hgb of 6.0 down from ~ 8.0. Receiving 2 units pRBCs. Differential includes PUD due to H. pylori or NSAID use, erosive gastropathy, gastric or small bowel angioectasias.   #Syncope: Concern for intravascular volume depletion in setting of watery diarrhea with resulting orthostatic hypotension. May also represent vasovagal syncope.  #Breast cancer on chemotherapy: Last dose of chemotherapy received last   #Chills: Infectious work-up negative  #Diarrhea: 5 to 6 episodes of watery diarrhea    Recommendations:  - Plan for upper endoscopy on Monday  - NPO after midnight Sunday night  - Can perform urgent endoscopy if patient clinically decompensates  - Clear liquid diet  - Monitor CBCs q8h  - Monitor bowel movements  - Transfuse for goal Hgb >/= 7.0  - Pantoprazole 80 mg IV bolus followed by pantoprazole 8 mg/hr  - Two large bore IVs  - Maintain active type and screen    Please page GI (Pager: [phone]) if there are any additional questions or concerns. Please call on-call GI fellow after 5pm and before 8am, and on weekends.

## 2019-06-22 NOTE — PROGRESS NOTE ADULT - PROBLEM SELECTOR PLAN 7
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 Patient follows with private onc but wants to establish care at [facility] /2nd opinion.   -chemo on hold for now, resume as outpatient  -outpt onc called to obtain recs and notified that family would like 2nd opinion, outpt onc will send records and see patient in hospital

## 2019-06-22 NOTE — PACU DISCHARGE NOTE - COMMENTS
CXR done looks normal and ETT in trachea just above lindsay.  Chem-7 dhowed K=3.3 and ABG K+=3.2. KCl 20 Meq/L x 2 runs ordered over 4 hours total.  HCt=31, plts-310, Coags only slightly elevated.  Patient currently intubated on ventilator. Pain under control and going to MICU for further care.

## 2019-06-22 NOTE — RAPID RESPONSE TEAM SUMMARY - NSSITUATIONBACKGROUNDRRT_GEN_ALL_CORE
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    RRT called this morning for syncope hypotension. Patient had gotten out of bed with assistance to use the bathroom, became weak and was gradually lowered to the floor by a family member before defecating on the floor. Patient did not have any head trauma and was returned to her bed. SBP remained in the 70s for most of RRT, with patient's baseline SBP normally in the 90s-110s. Patient was bolused 1L NS with minimal improvement in BP to 88/51. Patient afebrile, . Full set of labs were drawn. Given patient's clinical history and hypotension, suspicion is high for GI bleed. MICU was consulted for hypotension. Full set of labs were sent and plan of care discussed with primary team at bedside. Patient to receive a 2nd liter of NS and was also started on Protonix 40mg IV BID.

## 2019-06-22 NOTE — PROGRESS NOTE ADULT - SUBJECTIVE AND OBJECTIVE BOX
Vascular & Interventional Radiology Post-Procedure Note    Pre-Procedure Diagnosis: Upper GI bleed  Post-Procedure Diagnosis: Same as pre.  Indications for Procedure: Acute blood loss anemia    Attending: Dr. Syed  Resident: Dr. Lisa    Procedure Details/Findings: No active extravasation was seen. There were 2 left gastric arteries, both with branches extending to the GI clips and both were embolized with Avitene slurry. A small left gastric artery branch was embolized with NBCA glue.   Access (if applicable): Right common femoral    Complications: none  Estimated Blood Loss: Minimal  Specimen: none  Contrast: 45cc  Sedation: General  Patient Condition/Disposition: PACU, under MICU care    Plan:   - Strict bed rest with RLE straight x 4 hours. Groin checks with vitals  - Monitor vitals and trend CBC, transfuse as needed.   - Care per ICU team.

## 2019-06-22 NOTE — PROGRESS NOTE ADULT - PROBLEM SELECTOR PLAN 2
-Present for aprrox 6 months. Derm notes in [employer] report lesions grew HSV and the patient was treated with valacyclovir.   -06/03/19 patient found to have MRSA in wound culture. Was started on topical mupirocin and PO doxycyline for 3 weeks.   -ID consulted, holding abx, c/w valtrex 500mg BID for hsv ppx  -derm and wound care consulted BP 70s this AM d/t GIB. Lactate elevated. s/p 2L NS IVF bolus.   - GIB mgmt as above  - vitals Q4H  - MICU following, if patient becomes HD unstable/fluid unresponsive then will call MICU

## 2019-06-22 NOTE — CONSULT NOTE ADULT - SUBJECTIVE AND OBJECTIVE BOX
CHIEF COMPLAINT:    HPI:    PAST MEDICAL & SURGICAL HISTORY:  Breast cancer  S/P mastectomy, left      FAMILY HISTORY:  No pertinent family history in first degree relatives      SOCIAL HISTORY:  Smoking: __ packs x ___ years  EtOH Use:  Marital Status:  Occupation:  Recent Travel:  Country of Birth:  Advance Directives:    Allergies    No Known Allergies    Intolerances        HOME MEDICATIONS:    REVIEW OF SYSTEMS:  CONSTITUTIONAL: No weakness, fevers or chills. +fatigue, HA  EYES/ENT: No visual changes;  No vertigo or throat pain   NECK: No pain or stiffness  RESPIRATORY: No cough, wheezing, hemoptysis; No shortness of breath  CARDIOVASCULAR: No chest pain or palpitations  GASTROINTESTINAL: No abdominal or epigastric pain. No nausea, vomiting, or hematemesis; No diarrhea or constipation. No melena or hematochezia.  GENITOURINARY: No dysuria, frequency or hematuria  NEUROLOGICAL: No numbness or weakness  SKIN: No itching, burning, rashes, or lesions   All other review of systems is negative unless indicated above.    OBJECTIVE:  ICU Vital Signs Last 24 Hrs  T(C): 36.9 (21 Jun 2019 22:22), Max: 36.9 (21 Jun 2019 22:22)  T(F): 98.5 (21 Jun 2019 22:22), Max: 98.5 (21 Jun 2019 22:22)  HR: 93 (22 Jun 2019 07:26) (70 - 96)  BP: 86/45 (22 Jun 2019 09:10) (86/45 - 105/65)  BP(mean): 61 (22 Jun 2019 07:26) (61 - 61)  ABP: --  ABP(mean): --  RR: 17 (21 Jun 2019 22:22) (16 - 17)  SpO2: 100% (21 Jun 2019 22:22) (98% - 100%)        CAPILLARY BLOOD GLUCOSE      POCT Blood Glucose.: 147 mg/dL (22 Jun 2019 06:27)    :  PHYSICAL EXAM:  GENERAL: NAD, well-developed  HEAD:  Atraumatic, Normocephalic  EYES: EOMI, PERRLA, conjunctiva and sclera clear  NECK: Supple, No JVD  CHEST/LUNG: Clear to auscultation bilaterally; No wheeze  HEART: Regular rate and rhythm; No murmurs, rubs, or gallops  ABDOMEN: Soft, Nontender, Nondistended; Bowel sounds present  EXTREMITIES:  2+ Peripheral Pulses, No clubbing, cyanosis, or edema  PSYCH: AAOx3  NEUROLOGY: non-focal  SKIN: No rashes or lesions    HOSPITAL MEDICATIONS:  MEDICATIONS  (STANDING):  mupirocin 2% Ointment 1 Application(s) Topical two times a day  pantoprazole  Injectable 40 milliGRAM(s) IV Push every 12 hours  valACYclovir 500 milliGRAM(s) Oral every 12 hours    MEDICATIONS  (PRN):  lidocaine 5% Ointment 1 Application(s) Topical every 6 hours PRN pain at rash      LABS:                        6.0    7.44  )-----------( 527      ( 22 Jun 2019 06:40 )             19.1     06-22    135  |  103  |  19  ----------------------------<  133<H>  4.3   |  19<L>  |  0.53    Ca    7.6<L>      22 Jun 2019 06:40  Phos  3.0     06-22  Mg     1.7     06-22    TPro  4.2<L>  /  Alb  2.0<L>  /  TBili  < 0.2<L>  /  DBili  x   /  AST  23  /  ALT  34<H>  /  AlkPhos  89  06-22    PT/INR - ( 22 Jun 2019 06:40 )   PT: 13.9 SEC;   INR: 1.21                Venous Blood Gas:  06-22 @ 06:40  7.38/37/43/22/70.7  VBG Lactate: 4.1      MICROBIOLOGY:     RADIOLOGY:  [ ] Reviewed and interpreted by me    EKG: CHIEF COMPLAINT: Patient is a 53y old  Female who presents with a chief complaint of Chills (22 Jun 2019 10:30)      HPI:   The patient is a 53 year old female with a PMH of metastatic breast cancer (to mediasteinal LNs, dx in 2012 s/p chemo and masectomy, remission in 2013, recurrence 01/19, on paclitaxel weekly every Wednesday and exemestane daily), 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 She also reports that she has had two weeks of bloody bowel movements with blood in the toilet bowel and on paper. She reports a normal colonoscopy approx 2 years ago. She denies hx of hemorrhoids or diverticulosis. She reports she has been feeling tired all of the time. She denies SOB or CP. She denies melena. She had some epigastric abdominal pain earlier today that has gone away and that she attributed to having not eaten anything today.     In the ED vital signs: 101.2, 109., 102/43, 20, 100% on RA. She received vanc and pip tazo. She received 2L of LR. She received IV acetaminophen and pantoprazole. (18 Jun 2019 21:06)      PAST MEDICAL & SURGICAL HISTORY:  Breast cancer  S/P mastectomy, left      FAMILY HISTORY:  No pertinent family history in first degree relatives      SOCIAL HISTORY:  Smoking: __ packs x ___ years  EtOH Use:  Marital Status:  Occupation:  Recent Travel:  Country of Birth:  Advance Directives:    Allergies    No Known Allergies    Intolerances        HOME MEDICATIONS:    REVIEW OF SYSTEMS:  CONSTITUTIONAL: No weakness, fevers or chills. +fatigue, HA  EYES/ENT: No visual changes;  No vertigo or throat pain   NECK: No pain or stiffness  RESPIRATORY: No cough, wheezing, hemoptysis; No shortness of breath  CARDIOVASCULAR: No chest pain or palpitations  GASTROINTESTINAL: No abdominal or epigastric pain. No nausea, vomiting, or hematemesis; No diarrhea or constipation. No melena or hematochezia.  GENITOURINARY: No dysuria, frequency or hematuria  NEUROLOGICAL: No numbness or weakness  SKIN: No itching, burning, rashes, or lesions   All other review of systems is negative unless indicated above.    OBJECTIVE:  ICU Vital Signs Last 24 Hrs  T(C): 36.9 (21 Jun 2019 22:22), Max: 36.9 (21 Jun 2019 22:22)  T(F): 98.5 (21 Jun 2019 22:22), Max: 98.5 (21 Jun 2019 22:22)  HR: 93 (22 Jun 2019 07:26) (70 - 96)  BP: 86/45 (22 Jun 2019 09:10) (86/45 - 105/65)  BP(mean): 61 (22 Jun 2019 07:26) (61 - 61)  ABP: --  ABP(mean): --  RR: 17 (21 Jun 2019 22:22) (16 - 17)  SpO2: 100% (21 Jun 2019 22:22) (98% - 100%)        CAPILLARY BLOOD GLUCOSE      POCT Blood Glucose.: 147 mg/dL (22 Jun 2019 06:27)    :  PHYSICAL EXAM:  GENERAL: NAD, well-developed  HEAD:  Atraumatic, Normocephalic  EYES: EOMI, PERRLA, conjunctiva and sclera clear  NECK: Supple, No JVD  CHEST/LUNG: Clear to auscultation bilaterally; No wheeze  HEART: Regular rate and rhythm; No murmurs, rubs, or gallops  ABDOMEN: Soft, Nontender, Nondistended; Bowel sounds present  EXTREMITIES:  2+ Peripheral Pulses, No clubbing, cyanosis, or edema. Chronic appearing wounds on backside  PSYCH: AAOx3  NEUROLOGY: non-focal  SKIN: No rashes or lesions    HOSPITAL MEDICATIONS:  MEDICATIONS  (STANDING):  mupirocin 2% Ointment 1 Application(s) Topical two times a day  pantoprazole  Injectable 40 milliGRAM(s) IV Push every 12 hours  valACYclovir 500 milliGRAM(s) Oral every 12 hours    MEDICATIONS  (PRN):  lidocaine 5% Ointment 1 Application(s) Topical every 6 hours PRN pain at rash      LABS:                        6.0    7.44  )-----------( 527      ( 22 Jun 2019 06:40 )             19.1     06-22    135  |  103  |  19  ----------------------------<  133<H>  4.3   |  19<L>  |  0.53    Ca    7.6<L>      22 Jun 2019 06:40  Phos  3.0     06-22  Mg     1.7     06-22    TPro  4.2<L>  /  Alb  2.0<L>  /  TBili  < 0.2<L>  /  DBili  x   /  AST  23  /  ALT  34<H>  /  AlkPhos  89  06-22    PT/INR - ( 22 Jun 2019 06:40 )   PT: 13.9 SEC;   INR: 1.21                Venous Blood Gas:  06-22 @ 06:40  7.38/37/43/22/70.7  VBG Lactate: 4.1      MICROBIOLOGY:     RADIOLOGY:  [ ] Reviewed and interpreted by me    EKG:

## 2019-06-22 NOTE — PROGRESS NOTE ADULT - SUBJECTIVE AND OBJECTIVE BOX
Daily Progress Note - INCOMPLETE  Author: Lefty Venegas MD PGY-1  Pgr: [phone]    SUBJECTIVE / OVERNIGHT EVENTS: No acute events overnight    MEDICATIONS  (STANDING):  mupirocin 2% Ointment 1 Application(s) Topical two times a day  pantoprazole  Injectable 40 milliGRAM(s) IV Push every 12 hours  valACYclovir 500 milliGRAM(s) Oral every 12 hours    MEDICATIONS  (PRN):  lidocaine 5% Ointment 1 Application(s) Topical every 6 hours PRN pain at rash      T(C): 36.9 (06-21-19 @ 22:22), Max: 36.9 (06-21-19 @ 22:22)  HR: 93 (06-22-19 @ 07:26) (70 - 96)  BP: 91/46 (06-22-19 @ 07:26) (91/46 - 110/70)  RR: 17 (06-21-19 @ 22:22) (16 - 17)  SpO2: 100% (06-21-19 @ 22:22) (98% - 100%)    CAPILLARY BLOOD GLUCOSE      POCT Blood Glucose.: 147 mg/dL (22 Jun 2019 06:27)    I&O's Summary      Physical exam:   GENERAL: No acute distress, well-developed  HEAD:  Atraumatic, Normocephalic  ENT: EOMI, PERRLA, No JVD, moist mucosa  CHEST/LUNG: Clear to auscultation bilaterally  BACK: No spinal tenderness  HEART: Regular rate and rhythm; No murmurs, rubs, or gallops  ABDOMEN: Soft, Nontender, Nondistended; Bowel sounds present  EXTREMITIES:  No clubbing, cyanosis, or edema  PSYCH: Nl behavior, nl affect  NEUROLOGY: AAOx3, non-focal, cranial nerves intact  SKIN: Normal color, No rashes or lesions    LABS:                        8.3    5.60  )-----------( 607      ( 21 Jun 2019 06:55 )             26.1     Hgb Trend: 8.3<--, 8.2<--, 7.4<--, 8.0<--, 6.1<--  06-21    132<L>  |  101  |  8   ----------------------------<  85  3.6   |  21<L>  |  0.51    Ca    8.2<L>      21 Jun 2019 06:55  Phos  3.4     06-21  Mg     1.8     06-21      Creatinine Trend: 0.51<--, 0.53<--, 0.51<--, 0.58<--, 0.58<--  PT/INR - ( 22 Jun 2019 06:40 )   PT: 13.9 SEC;   INR: 1.21 Daily Progress Note   Author: Lefty Venegas MD PGY-1  Pgr: [phone]    SUBJECTIVE / OVERNIGHT EVENTS: This morning, RRT was called for patient w/ hypotension. BP 70s/40s. Patient syncopized w/ assisted fall when getting out of bed. Had BM on the floor, did not hit her head. Pt was placed back in bed and given IVF w/ minimally improvement in BP. On CBC pt HGB was 6.0. Shortly after pt began to move bowels and was melanotic. GI notified. Other than feeling tired/fatigued, denied any abd pain, flank pain, CP. Family at bedside, son notified    MEDICATIONS  (STANDING):  mupirocin 2% Ointment 1 Application(s) Topical two times a day  pantoprazole  Injectable 40 milliGRAM(s) IV Push every 12 hours  valACYclovir 500 milliGRAM(s) Oral every 12 hours    MEDICATIONS  (PRN):  lidocaine 5% Ointment 1 Application(s) Topical every 6 hours PRN pain at rash      T(C): 36.9 (06-21-19 @ 22:22), Max: 36.9 (06-21-19 @ 22:22)  HR: 93 (06-22-19 @ 07:26) (70 - 96)  BP: 91/46 (06-22-19 @ 07:26) (91/46 - 110/70)  RR: 17 (06-21-19 @ 22:22) (16 - 17)  SpO2: 100% (06-21-19 @ 22:22) (98% - 100%)    CAPILLARY BLOOD GLUCOSE      POCT Blood Glucose.: 147 mg/dL (22 Jun 2019 06:27)    I&O's Summary      Physical exam:   Constitutional: NAD, weak appearing.   Respiratory: CTAB  Cardiovascular: RRR, no M/R/G, no edema, 2+ Peripheral Pulses  Gastrointestinal: soft, nontender, nondistended, +BS, no hernia, no flank tenderness  Rectal: did not assess, but stool on ground appeared dark brown  Skin: warm, multiple excoriation on buttocks, back, intergluteal region, back. erythematous. no foul smell. no purulence  Neurologic: sensation grossly intact, CN grossly intact, non-focal exam  Psychiatric: AOX3, appropriate mood, affect    LABS:                        8.3    5.60  )-----------( 607      ( 21 Jun 2019 06:55 )             26.1     Hgb Trend: 8.3<--, 8.2<--, 7.4<--, 8.0<--, 6.1<--  06-21    132<L>  |  101  |  8   ----------------------------<  85  3.6   |  21<L>  |  0.51    Ca    8.2<L>      21 Jun 2019 06:55  Phos  3.4     06-21  Mg     1.8     06-21      Creatinine Trend: 0.51<--, 0.53<--, 0.51<--, 0.58<--, 0.58<--  PT/INR - ( 22 Jun 2019 06:40 )   PT: 13.9 SEC;   INR: 1.21

## 2019-06-22 NOTE — PROGRESS NOTE ADULT - SUBJECTIVE AND OBJECTIVE BOX
Chief Complaint: Chills    Re-consult for evaluation for drop in Hgb.    52 y/o F w/ hx of breast carcinoma on chemotherapy, previously followed by GI for evaluation of anemia in setting of small volume hematochezia.    Interval Events:   - Rapid response was called after patient had a syncopal episode while having a bowel movement  - Bowel movement was noted to brown by primary team  - The patient endorses multiple episodes of watery stools last night (5 to 6). She denies bloody stools and black tarry stools, however, her family member endorsed 1 black colored stool yesterday.  - The patient denies bloody emesis and coffee ground emesis.    Allergies:  No Known Allergies    Hospital Medications:  lidocaine 5% Ointment 1 Application(s) Topical every 6 hours PRN  mupirocin 2% Ointment 1 Application(s) Topical two times a day  pantoprazole  Injectable 40 milliGRAM(s) IV Push every 12 hours  valACYclovir 500 milliGRAM(s) Oral every 12 hours    PMHX/PSHX:  Breast cancer  S/P mastectomy, left    ROS:     General:  No wt loss, fevers, chills, night sweats, fatigue   Eyes:  Good vision, no reported pain  ENT:  No sore throat, pain, runny nose, dysphagia  CV:  No pain, palpitations, hypo/hypertension  Pulm:  No dyspnea, cough, tachypnea, wheezing  GI:  No pain, No nausea, No vomiting, + diarrhea, No constipation, No weight loss, No fever, No pruritis, No rectal bleeding, + tarry stools, No dysphagia  :  No pain, bleeding, incontinence, nocturia  Muscle:  No pain, weakness  Neuro:  No weakness, tingling, memory problems  Psych:  No fatigue, insomnia, mood problems, depression  Endocrine:  No polyuria, polydipsia, cold/heat intolerance  Heme:  No petechiae, ecchymosis, easy bruisability  Skin:  No rash, tattoos, scars, edema    PHYSICAL EXAM:   Vital Signs:  Vital Signs Last 24 Hrs  T(C): 36.9 (2019 22:22), Max: 36.9 (2019 22:22)  T(F): 98.5 (2019 22:22), Max: 98.5 (2019 22:22)  HR: 93 (2019 07:26) (70 - 96)  BP: 86/45 (2019 09:10) (86/45 - 105/65)  BP(mean): 61 (2019 07:26) (61 - 61)  RR: 17 (2019 22:22) (16 - 17)  SpO2: 100% (2019 22:22) (98% - 100%)  Daily Weight in k.7 (2019 11:53)    GENERAL:  No acute distress  HEENT:  Normocephalic/atraumatic,  no scleral icterus  ABDOMEN:  Soft, non-tender, non-distended, normoactive bowel sounds  RECTAL: No external hemorrhoids, brown stool in rectal vault  EXTREMITIES:  No cyanosis, clubbing, or edema  SKIN: Diffuse decubitus ulcers on the back  NEURO:  Alert and oriented x 3    LABS:                        6.0    7.44  )-----------( 527      ( 2019 06:40 )             19.1     Mean Cell Volume: 95.0 fL (-19 @ 06:40)        135  |  103  |  19  ----------------------------<  133<H>  4.3   |  19<L>  |  0.53    Ca    7.6<L>      2019 06:40  Phos  3.0       Mg     1.7         TPro  4.2<L>  /  Alb  2.0<L>  /  TBili  < 0.2<L>  /  DBili  x   /  AST  23  /  ALT  34<H>  /  AlkPhos  89      LIVER FUNCTIONS - ( 2019 06:40 )  Alb: 2.0 g/dL / Pro: 4.2 g/dL / ALK PHOS: 89 u/L / ALT: 34 u/L / AST: 23 u/L / GGT: x           PT/INR - ( 2019 06:40 )   PT: 13.9 SEC;   INR: 1.21                           6.0    7.44  )-----------( 527      ( 2019 06:40 )             19.1                         8.3    5.60  )-----------( 607      ( 2019 06:55 )             26.1                         8.2    5.77  )-----------( 625      ( 2019 06:27 )             25.3     Imaging:    No new imaging Chief Complaint: Chills    Re-consult for evaluation for drop in Hgb.    52 y/o F w/ hx of breast carcinoma on chemotherapy, previously followed by GI for evaluation of anemia in setting of small volume hematochezia.    Interval Events:   - Rapid response was called after patient had a syncopal episode while having a bowel movement  - Bowel movement was noted to brown by primary team  - The patient endorses multiple episodes of watery stools last night (5 to 6). She denies bloody stools and black tarry stools, however, her family member endorsed 1 black colored stool yesterday.  - The patient denies bloody emesis and coffee ground emesis.    - Notified by primary team at 11:00 AM that patient is now having black tarry stools    Allergies:  No Known Allergies    Hospital Medications:  lidocaine 5% Ointment 1 Application(s) Topical every 6 hours PRN  mupirocin 2% Ointment 1 Application(s) Topical two times a day  pantoprazole  Injectable 40 milliGRAM(s) IV Push every 12 hours  valACYclovir 500 milliGRAM(s) Oral every 12 hours    PMHX/PSHX:  Breast cancer  S/P mastectomy, left    ROS:     General:  No wt loss, fevers, chills, night sweats, fatigue   Eyes:  Good vision, no reported pain  ENT:  No sore throat, pain, runny nose, dysphagia  CV:  No pain, palpitations, hypo/hypertension  Pulm:  No dyspnea, cough, tachypnea, wheezing  GI:  No pain, No nausea, No vomiting, + diarrhea, No constipation, No weight loss, No fever, No pruritis, No rectal bleeding, + tarry stools, No dysphagia  :  No pain, bleeding, incontinence, nocturia  Muscle:  No pain, weakness  Neuro:  No weakness, tingling, memory problems  Psych:  No fatigue, insomnia, mood problems, depression  Endocrine:  No polyuria, polydipsia, cold/heat intolerance  Heme:  No petechiae, ecchymosis, easy bruisability  Skin:  No rash, tattoos, scars, edema    PHYSICAL EXAM:   Vital Signs:  Vital Signs Last 24 Hrs  T(C): 36.9 (2019 22:22), Max: 36.9 (2019 22:22)  T(F): 98.5 (2019 22:22), Max: 98.5 (2019 22:22)  HR: 93 (2019 07:26) (70 - 96)  BP: 86/45 (2019 09:10) (86/45 - 105/65)  BP(mean): 61 (2019 07:26) (61 - 61)  RR: 17 (2019 22:22) (16 - 17)  SpO2: 100% (2019 22:22) (98% - 100%)  Daily Weight in k.7 (2019 11:53)    GENERAL:  No acute distress  HEENT:  Normocephalic/atraumatic,  no scleral icterus  ABDOMEN:  Soft, non-tender, non-distended, normoactive bowel sounds  RECTAL: No external hemorrhoids, brown stool in rectal vault  EXTREMITIES:  No cyanosis, clubbing, or edema  SKIN: Diffuse decubitus ulcers on the back  NEURO:  Alert and oriented x 3    LABS:                        6.0    7.44  )-----------( 527      ( 2019 06:40 )             19.1     Mean Cell Volume: 95.0 fL (19 @ 06:40)        135  |  103  |  19  ----------------------------<  133<H>  4.3   |  19<L>  |  0.53    Ca    7.6<L>      2019 06:40  Phos  3.0       Mg     1.7         TPro  4.2<L>  /  Alb  2.0<L>  /  TBili  < 0.2<L>  /  DBili  x   /  AST  23  /  ALT  34<H>  /  AlkPhos  89      LIVER FUNCTIONS - ( 2019 06:40 )  Alb: 2.0 g/dL / Pro: 4.2 g/dL / ALK PHOS: 89 u/L / ALT: 34 u/L / AST: 23 u/L / GGT: x           PT/INR - ( 2019 06:40 )   PT: 13.9 SEC;   INR: 1.21                           6.0    7.44  )-----------( 527      ( 2019 06:40 )             19.1                         8.3    5.60  )-----------( 607      ( 2019 06:55 )             26.1                         8.2    5.77  )-----------( 625      ( 2019 06:27 )             25.3     Imaging:    No new imaging

## 2019-06-22 NOTE — PROGRESS NOTE ADULT - PROBLEM SELECTOR PLAN 5
-Hb 6.2 on admission likely 2/2 LGIB w 2 weeks of hematochezia 5x per day, now s/p 1U PRBC w/ stable HGB and appropriate response  -likely multifactorial. Family and patient states her wounds on buttocks have been oozing blood.  Also possible paclitaxel component to anemia.  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 Patient follows with private onc but wants to establish care at [facility] /2nd opinion.   -chemo on hold for now, resume as outpatient  -outpt onc called to obtain recs and notified that family would like 2nd opinion, outpt onc will send records and see patient in hospital

## 2019-06-22 NOTE — PROGRESS NOTE ADULT - PROBLEM SELECTOR PLAN 4
upon further questioning, patient does NOT have blood intermixed with stool, but blood on top of stool. patient with associated hard stools, rectal pain with defecation and blood on toilet paper.  -more indicative of hemorrhoidal bleeding (external given pain).  -would NOT perform colonoscopy/endoscopy at this time.  -supportive measures with stool softeners. Resolved. CLD, monitor BMP.

## 2019-06-22 NOTE — PROGRESS NOTE ADULT - PROBLEM SELECTOR PLAN 3
resolved, met criteria on admission likely in stg of malignancy. BCx NGTD. Abx held. -Present for aprrox 6 months. Derm notes in [employer] report lesions grew HSV and the patient was treated with valacyclovir.   -06/03/19 patient found to have MRSA in wound culture. Was started on topical mupirocin and PO doxycyline for 3 weeks.   -ID consulted, holding abx, c/w valtrex 500mg BID for hsv ppx  -derm and wound care consulted

## 2019-06-22 NOTE — PROGRESS NOTE ADULT - PROBLEM SELECTOR PLAN 8
-DVT prophylaxis with SCDs  -Diet NPO pending GI eval given GIB
-DVT prophylaxis with SCDs  -Diet regular  -Dispo: home wound care, BP stable
-DVT prophylaxis with SCDs  -Diet regular  -Dispo: home wound care, BP stable

## 2019-06-22 NOTE — PROGRESS NOTE ADULT - ATTENDING COMMENTS
Patient seen and examined with the GI fellow. I agree with the above assessment and plan. Thank you for allowing us to care for your patient.    Plan for EGD post transfusion. Timing of EGD will be determined by transfusion.

## 2019-06-22 NOTE — PROGRESS NOTE ADULT - ATTENDING COMMENTS
Agree with above.  #Acute GIB  -patient with overt hypotension, new frequent BM o/n with last one per patient being black, +black stools per RN on BM this morning.  -GI and MICU evaluated patient, appreciate input.  -will start PPI ggt and protonix 80 IV x1 given pre-intervention.  -hold DVT ppx.  -will go for endoscopy today.  -intiially prior to black stool was going to check CT abdomen for occult bleed, however with black stools have source so will forego.    REst as abvoe Agree with above.  #Acute GIB  -patient with overt hypotension, new frequent BM o/n with last one per patient being black, +black stools per RN on BM this morning.  -GI and MICU evaluated patient, appreciate input.  -will start PPI ggt and protonix 80 IV x1 given pre-intervention.  -hold DVT ppx.  -given syncopal episodes and overt HTN, likely EBL of 20-30%.  GI appreciated, will go for endoscopy today.  -intiially prior to black stool was going to check CT abdomen for occult bleed, however with black stools have source so will forego.    REst as abvoe

## 2019-06-23 LAB
ANION GAP SERPL CALC-SCNC: 10 MMO/L — SIGNIFICANT CHANGE UP (ref 7–14)
ANION GAP SERPL CALC-SCNC: 12 MMO/L — SIGNIFICANT CHANGE UP (ref 7–14)
APTT BLD: 29.9 SEC — SIGNIFICANT CHANGE UP (ref 27.5–36.3)
BACTERIA BLD CULT: SIGNIFICANT CHANGE UP
BACTERIA BLD CULT: SIGNIFICANT CHANGE UP
BASE EXCESS BLDA CALC-SCNC: -3.3 MMOL/L — SIGNIFICANT CHANGE UP
BASOPHILS # BLD AUTO: 0.07 K/UL — SIGNIFICANT CHANGE UP (ref 0–0.2)
BASOPHILS NFR BLD AUTO: 0.8 % — SIGNIFICANT CHANGE UP (ref 0–2)
BLOOD GAS ARTERIAL - FIO2: 50 — SIGNIFICANT CHANGE UP
BUN SERPL-MCNC: 11 MG/DL — SIGNIFICANT CHANGE UP (ref 7–23)
BUN SERPL-MCNC: 12 MG/DL — SIGNIFICANT CHANGE UP (ref 7–23)
CALCIUM SERPL-MCNC: 8.1 MG/DL — LOW (ref 8.4–10.5)
CALCIUM SERPL-MCNC: 8.1 MG/DL — LOW (ref 8.4–10.5)
CHLORIDE BLDA-SCNC: 111 MMOL/L — HIGH (ref 96–108)
CHLORIDE SERPL-SCNC: 102 MMOL/L — SIGNIFICANT CHANGE UP (ref 98–107)
CHLORIDE SERPL-SCNC: 106 MMOL/L — SIGNIFICANT CHANGE UP (ref 98–107)
CO2 SERPL-SCNC: 18 MMOL/L — LOW (ref 22–31)
CO2 SERPL-SCNC: 19 MMOL/L — LOW (ref 22–31)
CREAT SERPL-MCNC: 0.4 MG/DL — LOW (ref 0.5–1.3)
CREAT SERPL-MCNC: 0.43 MG/DL — LOW (ref 0.5–1.3)
EOSINOPHIL # BLD AUTO: 0.24 K/UL — SIGNIFICANT CHANGE UP (ref 0–0.5)
EOSINOPHIL NFR BLD AUTO: 2.7 % — SIGNIFICANT CHANGE UP (ref 0–6)
GLUCOSE BLDA-MCNC: 90 MG/DL — SIGNIFICANT CHANGE UP (ref 70–99)
GLUCOSE SERPL-MCNC: 83 MG/DL — SIGNIFICANT CHANGE UP (ref 70–99)
GLUCOSE SERPL-MCNC: 90 MG/DL — SIGNIFICANT CHANGE UP (ref 70–99)
HCO3 BLDA-SCNC: 22 MMOL/L — SIGNIFICANT CHANGE UP (ref 22–26)
HCT VFR BLD CALC: 32.3 % — LOW (ref 34.5–45)
HCT VFR BLD CALC: 34.5 % — SIGNIFICANT CHANGE UP (ref 34.5–45)
HCT VFR BLD CALC: 37.2 % — SIGNIFICANT CHANGE UP (ref 34.5–45)
HCT VFR BLD CALC: 37.9 % — SIGNIFICANT CHANGE UP (ref 34.5–45)
HCT VFR BLDA CALC: 34.9 % — SIGNIFICANT CHANGE UP (ref 34.5–46.5)
HGB BLD-MCNC: 11 G/DL — LOW (ref 11.5–15.5)
HGB BLD-MCNC: 12.2 G/DL — SIGNIFICANT CHANGE UP (ref 11.5–15.5)
HGB BLD-MCNC: 12.4 G/DL — SIGNIFICANT CHANGE UP (ref 11.5–15.5)
HGB BLD-MCNC: 12.8 G/DL — SIGNIFICANT CHANGE UP (ref 11.5–15.5)
HGB BLDA-MCNC: 11.3 G/DL — LOW (ref 11.5–15.5)
IMM GRANULOCYTES NFR BLD AUTO: 10.8 % — HIGH (ref 0–1.5)
INR BLD: 1.08 — SIGNIFICANT CHANGE UP (ref 0.88–1.17)
LACTATE BLDA-SCNC: 1.4 MMOL/L — SIGNIFICANT CHANGE UP (ref 0.5–2)
LYMPHOCYTES # BLD AUTO: 1.23 K/UL — SIGNIFICANT CHANGE UP (ref 1–3.3)
LYMPHOCYTES # BLD AUTO: 13.7 % — SIGNIFICANT CHANGE UP (ref 13–44)
MAGNESIUM SERPL-MCNC: 1.5 MG/DL — LOW (ref 1.6–2.6)
MAGNESIUM SERPL-MCNC: 2.4 MG/DL — SIGNIFICANT CHANGE UP (ref 1.6–2.6)
MCHC RBC-ENTMCNC: 28.1 PG — SIGNIFICANT CHANGE UP (ref 27–34)
MCHC RBC-ENTMCNC: 28.4 PG — SIGNIFICANT CHANGE UP (ref 27–34)
MCHC RBC-ENTMCNC: 29.1 PG — SIGNIFICANT CHANGE UP (ref 27–34)
MCHC RBC-ENTMCNC: 29.6 PG — SIGNIFICANT CHANGE UP (ref 27–34)
MCHC RBC-ENTMCNC: 33.3 % — SIGNIFICANT CHANGE UP (ref 32–36)
MCHC RBC-ENTMCNC: 33.8 % — SIGNIFICANT CHANGE UP (ref 32–36)
MCHC RBC-ENTMCNC: 34.1 % — SIGNIFICANT CHANGE UP (ref 32–36)
MCHC RBC-ENTMCNC: 35.3 % — SIGNIFICANT CHANGE UP (ref 32–36)
MCV RBC AUTO: 83.2 FL — SIGNIFICANT CHANGE UP (ref 80–100)
MCV RBC AUTO: 84.2 FL — SIGNIFICANT CHANGE UP (ref 80–100)
MCV RBC AUTO: 86.1 FL — SIGNIFICANT CHANGE UP (ref 80–100)
MCV RBC AUTO: 89.3 FL — SIGNIFICANT CHANGE UP (ref 80–100)
MONOCYTES # BLD AUTO: 0.7 K/UL — SIGNIFICANT CHANGE UP (ref 0–0.9)
MONOCYTES NFR BLD AUTO: 7.8 % — SIGNIFICANT CHANGE UP (ref 2–14)
NEUTROPHILS # BLD AUTO: 5.8 K/UL — SIGNIFICANT CHANGE UP (ref 1.8–7.4)
NEUTROPHILS NFR BLD AUTO: 64.2 % — SIGNIFICANT CHANGE UP (ref 43–77)
NRBC # FLD: 0.05 K/UL — SIGNIFICANT CHANGE UP (ref 0–0)
NRBC # FLD: 0.05 K/UL — SIGNIFICANT CHANGE UP (ref 0–0)
NRBC # FLD: 0.06 K/UL — SIGNIFICANT CHANGE UP (ref 0–0)
NRBC # FLD: 0.08 K/UL — SIGNIFICANT CHANGE UP (ref 0–0)
PCO2 BLDA: 30 MMHG — LOW (ref 32–48)
PH BLDA: 7.44 PH — SIGNIFICANT CHANGE UP (ref 7.35–7.45)
PHOSPHATE SERPL-MCNC: 2.6 MG/DL — SIGNIFICANT CHANGE UP (ref 2.5–4.5)
PHOSPHATE SERPL-MCNC: 2.6 MG/DL — SIGNIFICANT CHANGE UP (ref 2.5–4.5)
PLATELET # BLD AUTO: 315 K/UL — SIGNIFICANT CHANGE UP (ref 150–400)
PLATELET # BLD AUTO: 322 K/UL — SIGNIFICANT CHANGE UP (ref 150–400)
PLATELET # BLD AUTO: 324 K/UL — SIGNIFICANT CHANGE UP (ref 150–400)
PLATELET # BLD AUTO: 362 K/UL — SIGNIFICANT CHANGE UP (ref 150–400)
PMV BLD: 8.4 FL — SIGNIFICANT CHANGE UP (ref 7–13)
PMV BLD: 8.6 FL — SIGNIFICANT CHANGE UP (ref 7–13)
PMV BLD: 8.7 FL — SIGNIFICANT CHANGE UP (ref 7–13)
PMV BLD: 9.3 FL — SIGNIFICANT CHANGE UP (ref 7–13)
PO2 BLDA: 166 MMHG — HIGH (ref 83–108)
POTASSIUM BLDA-SCNC: 2.9 MMOL/L — LOW (ref 3.4–4.5)
POTASSIUM SERPL-MCNC: 3.3 MMOL/L — LOW (ref 3.5–5.3)
POTASSIUM SERPL-MCNC: 4 MMOL/L — SIGNIFICANT CHANGE UP (ref 3.5–5.3)
POTASSIUM SERPL-SCNC: 3.3 MMOL/L — LOW (ref 3.5–5.3)
POTASSIUM SERPL-SCNC: 4 MMOL/L — SIGNIFICANT CHANGE UP (ref 3.5–5.3)
PROTHROM AB SERPL-ACNC: 12.3 SEC — SIGNIFICANT CHANGE UP (ref 9.8–13.1)
RBC # BLD: 3.86 M/UL — SIGNIFICANT CHANGE UP (ref 3.8–5.2)
RBC # BLD: 3.88 M/UL — SIGNIFICANT CHANGE UP (ref 3.8–5.2)
RBC # BLD: 4.4 M/UL — SIGNIFICANT CHANGE UP (ref 3.8–5.2)
RBC # BLD: 4.42 M/UL — SIGNIFICANT CHANGE UP (ref 3.8–5.2)
RBC # FLD: 18.9 % — HIGH (ref 10.3–14.5)
RBC # FLD: 19.4 % — HIGH (ref 10.3–14.5)
RBC # FLD: 19.5 % — HIGH (ref 10.3–14.5)
RBC # FLD: 19.8 % — HIGH (ref 10.3–14.5)
SAO2 % BLDA: 98.3 % — SIGNIFICANT CHANGE UP (ref 95–99)
SODIUM BLDA-SCNC: 130 MMOL/L — LOW (ref 136–146)
SODIUM SERPL-SCNC: 133 MMOL/L — LOW (ref 135–145)
SODIUM SERPL-SCNC: 134 MMOL/L — LOW (ref 135–145)
WBC # BLD: 10.72 K/UL — HIGH (ref 3.8–10.5)
WBC # BLD: 11.43 K/UL — HIGH (ref 3.8–10.5)
WBC # BLD: 11.94 K/UL — HIGH (ref 3.8–10.5)
WBC # BLD: 9.01 K/UL — SIGNIFICANT CHANGE UP (ref 3.8–10.5)
WBC # FLD AUTO: 10.72 K/UL — HIGH (ref 3.8–10.5)
WBC # FLD AUTO: 11.43 K/UL — HIGH (ref 3.8–10.5)
WBC # FLD AUTO: 11.94 K/UL — HIGH (ref 3.8–10.5)
WBC # FLD AUTO: 9.01 K/UL — SIGNIFICANT CHANGE UP (ref 3.8–10.5)

## 2019-06-23 PROCEDURE — 71045 X-RAY EXAM CHEST 1 VIEW: CPT | Mod: 26

## 2019-06-23 PROCEDURE — 99291 CRITICAL CARE FIRST HOUR: CPT

## 2019-06-23 PROCEDURE — 99232 SBSQ HOSP IP/OBS MODERATE 35: CPT | Mod: GC

## 2019-06-23 RX ORDER — MAGNESIUM SULFATE 500 MG/ML
2 VIAL (ML) INJECTION ONCE
Refills: 0 | Status: COMPLETED | OUTPATIENT
Start: 2019-06-23 | End: 2019-06-23

## 2019-06-23 RX ORDER — DEXMEDETOMIDINE HYDROCHLORIDE IN 0.9% SODIUM CHLORIDE 4 UG/ML
0.1 INJECTION INTRAVENOUS
Qty: 200 | Refills: 0 | Status: DISCONTINUED | OUTPATIENT
Start: 2019-06-23 | End: 2019-06-24

## 2019-06-23 RX ORDER — METOCLOPRAMIDE HCL 10 MG
10 TABLET ORAL ONCE
Refills: 0 | Status: COMPLETED | OUTPATIENT
Start: 2019-06-23 | End: 2019-06-23

## 2019-06-23 RX ORDER — DEXMEDETOMIDINE HYDROCHLORIDE IN 0.9% SODIUM CHLORIDE 4 UG/ML
0.01 INJECTION INTRAVENOUS
Qty: 200 | Refills: 0 | Status: DISCONTINUED | OUTPATIENT
Start: 2019-06-23 | End: 2019-06-23

## 2019-06-23 RX ORDER — POTASSIUM CHLORIDE 20 MEQ
10 PACKET (EA) ORAL
Refills: 0 | Status: DISCONTINUED | OUTPATIENT
Start: 2019-06-23 | End: 2019-06-23

## 2019-06-23 RX ORDER — POTASSIUM CHLORIDE 20 MEQ
10 PACKET (EA) ORAL
Refills: 0 | Status: COMPLETED | OUTPATIENT
Start: 2019-06-23 | End: 2019-06-23

## 2019-06-23 RX ADMIN — Medication 100 MILLIEQUIVALENT(S): at 03:02

## 2019-06-23 RX ADMIN — MUPIROCIN 1 APPLICATION(S): 20 OINTMENT TOPICAL at 18:08

## 2019-06-23 RX ADMIN — PROPOFOL 22.41 MICROGRAM(S)/KG/MIN: 10 INJECTION, EMULSION INTRAVENOUS at 07:29

## 2019-06-23 RX ADMIN — Medication 100 MILLIEQUIVALENT(S): at 01:16

## 2019-06-23 RX ADMIN — Medication 10 MILLIGRAM(S): at 20:22

## 2019-06-23 RX ADMIN — PROPOFOL 22.41 MICROGRAM(S)/KG/MIN: 10 INJECTION, EMULSION INTRAVENOUS at 19:22

## 2019-06-23 RX ADMIN — Medication 100 MILLIEQUIVALENT(S): at 02:04

## 2019-06-23 RX ADMIN — CHLORHEXIDINE GLUCONATE 1 APPLICATION(S): 213 SOLUTION TOPICAL at 11:00

## 2019-06-23 RX ADMIN — PROPOFOL 22.41 MICROGRAM(S)/KG/MIN: 10 INJECTION, EMULSION INTRAVENOUS at 01:17

## 2019-06-23 RX ADMIN — PANTOPRAZOLE SODIUM 10 MG/HR: 20 TABLET, DELAYED RELEASE ORAL at 19:22

## 2019-06-23 RX ADMIN — Medication 50 GRAM(S): at 02:06

## 2019-06-23 RX ADMIN — PANTOPRAZOLE SODIUM 10 MG/HR: 20 TABLET, DELAYED RELEASE ORAL at 07:30

## 2019-06-23 RX ADMIN — DEXMEDETOMIDINE HYDROCHLORIDE IN 0.9% SODIUM CHLORIDE 1.25 MICROGRAM(S)/KG/HR: 4 INJECTION INTRAVENOUS at 22:26

## 2019-06-23 RX ADMIN — PANTOPRAZOLE SODIUM 10 MG/HR: 20 TABLET, DELAYED RELEASE ORAL at 01:16

## 2019-06-23 RX ADMIN — MUPIROCIN 1 APPLICATION(S): 20 OINTMENT TOPICAL at 05:20

## 2019-06-23 NOTE — PROGRESS NOTE ADULT - ASSESSMENT
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 Admitted to the MICU for hematemesis and melena s/p embolization.     #Neuro: intubated and sedated  -propofol, wean as tolerated    #Pulm: intubated for airway protection   -remain intubated for active GI bleed    #CV: Pt initially hypotensive from UGI bleed, now BP and HR stable, no hx of cardiac issues   -Monitor BP and HR    #GI: Upper GI bleed s/p embolization with 1 episode of hematemesis and melena   -Protonix gtt  -Keep NPO  -Monitor bowel movements  -GI and IR following and recommending possible surgery if pt is still actively bleeding given pt has been embolized and angiogram is negative   -Surgery consult if pt continues to decompensate       #Renal: No issues  -Monitor Cr and I/O's     #Heme:  Anemia from upper GI bleed requiring blood transfusion, Hgb now stable   -CBC q4hr to trend H/H  -transfuse if Hgb <7.0    #ID: Hx of ulcerative rash, HSV+ and MRSA+ s/p treatment, now on valacyclovir for ppx, pt is afebrile   -Continue with Valacyclovir 500mg BID  -If febrile, consider starting on abx and obtaining cultures     #Ppx:  -Protonix gtt 53 year old female with a PMH of metastatic breast cancer (to mediastinal LNs, dx in 2012 s/p chemo and mastectomy, remission in 2013, recurrence 01/19, on paclitaxel weekly every Wednesday and exemestane daily), 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 Admitted to the MICU for hematemesis and melena s/p embolization.     #Neuro: intubated and sedated  -propofol, wean as tolerated  -Baseline AOX3    #Pulm: intubated for airway protection   -remain intubated for active GI bleed  -post intubation ABG appropriate   -post intubation CXRY ETT above lindsay    #CV: Pt initially hypotensive from UGI bleed, now BP and HR stable, no hx of cardiac issues   -Monitor BP and HR    #GI: Upper GI bleed s/p embolization with 1 episode of hematemesis and melena   -Protonix gtt  -Keep NPO  -Monitor bowel movements  -GI and IR following and recommending possible surgery if pt is still actively bleeding given pt has been embolized and angiogram is negative   -Surgery consult if pt continues to decompensate     -CBCq4, Active T+S  -F/u biopsy    #Renal: No issues  -Monitor Cr and I/O's     #Heme:  Anemia from upper GI bleed requiring blood transfusion, Hgb now stable   -CBC q4hr to trend H/H  -transfuse if Hgb <7.0  -total blood products: 5 units pRBC, 1 unit FFP, 1 unit platelets    #ID: Hx of ulcerative rash, HSV+ and MRSA+ s/p treatment, now on valacyclovir for ppx, pt is afebrile   -Continue with Valacyclovir 500mg BID  -If febrile, consider starting on abx and obtaining cultures     #Ppx:  -Protonix gtt  -SCDs

## 2019-06-23 NOTE — PROGRESS NOTE ADULT - SUBJECTIVE AND OBJECTIVE BOX
Chief Complaint: Chills    Interval Events:     - The patient underwent upper endoscopy yesterday (Please see full report in Results section of [place])  - Underwent EGD yesterday with large ulcer in the incisura, concerning for metastatic malignancy, with two visible vessels treated with injection of epinephrine, use of bipolar cautery, and hemostatic clips, however, hemostasis was not fully achieved with one of the vessels. Hemospray was then applied with successful hemostasis. (Please see full report in Results section of [facility]). Patient went to IR, for definitive therapy, with embolization of the left gastric artery with ativene slurry and NBCA glue (see IR note)  - The patient was noted to have bloody emesis and continued dark tarry stools overnight by MICU team  - NG tube was placed with dark red output in NG tube and canister  - Surgery consulted and with no plan for surgical intervention given HD stability and up-trending Hgb     Allergies:  No Known Allergies    Hospital Medications:  chlorhexidine 4% Liquid 1 Application(s) Topical <User Schedule>  lidocaine 5% Ointment 1 Application(s) Topical every 6 hours PRN  mupirocin 2% Ointment 1 Application(s) Topical two times a day  pantoprazole Infusion 8 mG/Hr IV Continuous <Continuous>  propofol Infusion 75 MICROgram(s)/kG/Min IV Continuous <Continuous>  valACYclovir 500 milliGRAM(s) Oral every 12 hours    PMHX/PSHX:  Breast cancer  S/P mastectomy, left      Family history:  No pertinent family history in first degree relatives    ROS: Intubated, sedated    PHYSICAL EXAM:   Vital Signs:  Vital Signs Last 24 Hrs  T(C): 36.8 (2019 08:00), Max: 37.3 (2019 00:20)  T(F): 98.2 (2019 08:00), Max: 99.1 (2019 00:20)  HR: 77 (2019 10:31) (66 - 102)  BP: 130/90 (2019 10:00) (102/53 - 142/80)  BP(mean): 102 (2019 10:00) (70 - 119)  RR: 14 (2019 10:00) (11 - 21)  SpO2: 100% (2019 10:31) (100% - 100%)  Daily Weight in k.3 (2019 04:00)    GENERAL: Intubated, sedated  HEENT: ETT in place, no scleral icterus, NGT in place with dark red blood in tube and canister  CHEST: Ventilator associated breath sounds  HEART:  Regular rate and rhythm, no murmurs/rubs/gallops  ABDOMEN:  Soft, non-tender, non-distended, normoactive bowel sounds  EXTREMITIES:  No cyanosis, clubbing, or edema  SKIN:  No rash/erythema  NEURO: Intubated, sedated      LABS:                        12.8   11.94 )-----------( 362      ( 2019 09:00 )             37.9     Mean Cell Volume: 86.1 fL (- @ 09:00)        133<L>  |  102  |  11  ----------------------------<  83  4.0   |  19<L>  |  0.43<L>    Ca    8.1<L>      2019 05:00  Phos  2.6       Mg     2.4         TPro  4.3<L>  /  Alb  2.3<L>  /  TBili  0.3  /  DBili  x   /  AST  21  /  ALT  32  /  AlkPhos  66      LIVER FUNCTIONS - ( 2019 22:04 )  Alb: 2.3 g/dL / Pro: 4.3 g/dL / ALK PHOS: 66 u/L / ALT: 32 u/L / AST: 21 u/L / GGT: x           PT/INR - ( 2019 00:15 )   PT: 12.3 SEC;   INR: 1.08          PTT - ( 2019 00:15 )  PTT:29.9 SEC                            12.8   11.94 )-----------( 362      ( 2019 09:00 )             37.9                         12.4   11.43 )-----------( 322      ( 2019 05:00 )             37.2                         11.0   9.01  )-----------( 315      ( 2019 00:15 )             32.3                         10.5   9.72  )-----------( 310      ( 2019 22:04 )             31.5                         7.4    10.30 )-----------( 333      ( 2019 17:09 )             23.2     Imaging:    < from: Xray Chest 1 View- PORTABLE-Urgent (19 @ 01:25) >    EXAM:  XR CHEST PORTABLE URGENT 1V        PROCEDURE DATE:  2019         INTERPRETATION:    DATE OF STUDY: 2019at 12:52AM    PRIOR: 2019 at 10:03PM    CLINICAL INDICATION: Assess ET tube    TECHNIQUE: portable chest.    FINDINGS: ETtube tip appropriately positioned above the lindsay.  The heart is not enlarged.  Again seen are diffuse reticulonodular opacities throughout both lung   -may represent interstitial edema -but innumerable tiny metastases not   excluded.  No focal consolidations. No pleural effusion or pneumothorax  No acute bony finding.    IMPRESSION:   Persistent reticular nodular pattern in the lungs as above.  ET tube in appropriate position.                  CLARISSA YI M.D., ATTENDING RADIOLOGIST  This document has been electronically signed. 2019  9:03AM                  < end of copied text >    Procedures:    < from: Upper Endoscopy (19 @ 13:38) >    [facility]  _______________________________________________________________________________  Patient Name: Kathy Shelley            Procedure Date: 2019 1:38 PM  MRN: [id number]                     Account Number: [id number]  YOB: 1965              Admit Type: Inpatient  Room: OR                              Gender: Female  Attending MD: CHASE BEYER MD      _______________________________________________________________________________     Procedure:           Upper GI endoscopy  Indications:         Melena, 53 year old female with melena and a Hb drop.                        EGD scheduled for therapy of a GI bleed.  Providers:           CHASE BEYER MD, JORGE LEES MD (Fellow)  Medicines:           Monitored Anesthesia Care, General Anesthesia  Complications:       No immediate complications.  Procedure:           Pre-Anesthesia Assessment:                       - Universal Protocol:                       - Pre-procedure Verification: Prior to the procedure,                        the patient's identity was verified by full name, date                        of birth and medical record number. The patient's                        identity was verified on all pertinent medical records,                        including History and Physical, nursing assessment and                        pre-anesthesia assessment. Also prior to the procedure,                        a History and Physical was performed, and patient                medications, allergies and sensitivities were reviewed.                        The patient's tolerance of previous anesthesia was                        reviewed. The risks and benefits of the procedure and                        the sedation options and risks were discussed with the                        patient. All questions were answered and informed                        consent was obtained.                       - Marking: The endoscopic procedure was visually marked                  on a patient wrist band delineating the patient name,                        proposed procedure and endoscopist's initials.                       - Time-Out: Prior to the start of the procedure, the                        patient's identification, proposed procedure, accurate                        signed consent, correctly labeled images and records,                        and need for prophylactic antibiotics were verified by                        the physician, the nurse, the anesthesiologist and the                        anesthetist in the pre-procedure area in the procedure                        room.                       - Prior to the procedure, a History and Physical was                        performed, and patientmedications, allergies and                        sensitivities were reviewed. The patient's tolerance of                        previous anesthesia was reviewed.                       After obtaining informed consent, the endoscope was            passed under direct vision. Throughout the procedure,                        the patient's blood pressure, pulse, and oxygen                        saturations were monitored continuously. The Endoscope                        was introduced through the mouth, and advanced to the                        second part of duodenum. The upper GI endoscopy was                        accomplished with ease.                                                                                   Findings:       EGD:       -The esophagus was normal.       -The stomach contained a cratered large malignant appearing ulcer with        two visible vessels. Both of them was spurting blood.       -Epinephrine 1:10,000 (2 cc) was injected into each site.    -Bipolar cautery was applied using a 10 Fr probe with a single channel        therapeutic scope. The vessels were obliterate but oozing was still seen        at one site. A clip was applied successfully to one site controlling the        bleeding. The other site would not retain a clip due to the fibrotic        nature of the vessel.       -Hemispora was applied to both sites with successful control of the        bleeding.       -The duodenal bulb and D2 were normal.                                                        Impression:          Large malignant appearing ulcer with two visible vessels                        actively bleeding s/p endoscopic therapy. The hemospray                        is a temporary agent and thus more definitive therapy is                        needed. I discussed with IR for embolization for a more                        definitive therapy.  Recommendation:      - Return patient to PACU for ongoing care.                       -Follow up IR recommendations.                                                                                   Attending Participation:       I was present and participated during the entire procedure, including        non-key portions.                                                                         ___________________  CHASE BEYER MD  2019 6:00:50 PM  This report has been signed electronically.  Number of Addenda: 0    Note Initiated On: 2019 1:38 PM    < end of copied text >

## 2019-06-23 NOTE — PROGRESS NOTE ADULT - ATTENDING COMMENTS
s/p embolization of 2 L gastric arteries for bleeding gastric ulcer and failed GI/EGD tx.  Approx 30 cc fresh blood hematemesis and melena upon arrival to MICU from PACU.  Pt sedated on vent.  Vital signs remain stable; Hgb unchanged.  D/W IR and GI.  Will repeat Hgb in 4 hours (or sooner if becomes hemodynamically unstable.  Hold off on transfusion.  GI suggests this could be residual blood seen during their EGD and not fresh bleed.  She has received 5 U PRBC, 1 U FFP and 1U plts thus far; nothing since embolization.  remains in guarded condition s/p embolization of 2 L gastric arteries for bleeding gastric ulcer and failed GI/EGD tx.  Approx 30 cc fresh blood hematemesis and melena upon arrival to MICU from PACU.  Pt sedated on vent.  Vital signs remain stable; Hgb unchanged.  D/W IR and GI.  Will repeat Hgb in 4 hours (or sooner if becomes hemodynamically unstable.  Hold off on transfusion.  GI suggests this could be residual blood seen during their EGD and not fresh bleed.  She has received 5 U PRBC, 1 U FFP and 1U plts thus far; nothing since embolization.  Keep intubated until verify that she is not bleeding   Unable to place NGT because of danger to gastric ulcer  remains in guarded condition s/p embolization of 2 L gastric arteries for bleeding gastric ulcer and failed GI/EGD tx.  Approx 30 cc fresh blood hematemesis and melena upon arrival to MICU from PACU.  Pt sedated on vent.  Vital signs remain stable; Hgb unchanged.  D/W IR and GI.  Will repeat Hgb in 4 hours (or sooner if becomes hemodynamically unstable.  Hold off on transfusion.  GI suggests this could be residual blood seen during their EGD and not fresh bleed.  She has received 5 U PRBC, 1 U FFP and 1U plts thus far; nothing since embolization.  Keep intubated until verify that she is not bleeding   Unable to place NGT because of danger to gastric ulcer  remains in guarded condition  cc time 35 min

## 2019-06-23 NOTE — CHART NOTE - NSCHARTNOTEFT_GEN_A_CORE
Informed by MICU team that patient had hematemesis approximately 15 minutes ago and continues to have melena. She is HD stable and with Hgb of 10.5 after receiving 4 units pRBCs in total with increase in Hgb from 6.0 to 10.5.     Underwent EGD with large ulcer in the incisura, concerning for metastatic malignancy, with two visible vessels treated with injection of epinephrine, use of bipolar cautery, and hemostatic clips, however, hemostasis was not fully achieved with one of the vessels. Hemospray was then applied with successful hemostasis. (Please see full report in Results section of [facility]). Patient went to IR, for definitive therapy, with embolization of the left gastric artery with ativene slurry and NBCA glue (see IR note)    Discussed with on call attending. There is no role for endoscopy at this time.    Primary team discussing possible repeat intervention with IR.    Recommended Surgical Evaluation given failure of endoscopic hemostasis and given patient has received left gastric artery embolization.  - Continue to monitor serial CBCs  - Transfuse for goal Hgb >/= 7.0  - Continue pantoprazole infusion     Please page GI (Pager: [phone]) if there are any additional questions or concerns. Please call on-call GI fellow after 5pm and before 8am, and on weekends.

## 2019-06-23 NOTE — CONSULT NOTE ADULT - SUBJECTIVE AND OBJECTIVE BOX
General Surgery Consult  Consulting surgical team: B team surgery  Consulting attending: Dr. 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 Patient had been seeing her dermatologist for chronic rash and was being treated for superimposed MRSA infection. Upon arrival to [facility], patient also endorsing bloody BM x 2 weeks. Last colonoscopy 2 years ago, which was reportedly normal. Also endorsing fatigue. Patient admitted for further work up.    Patient had been hemodynamically stable on the floor until 6/22 when pt had RRT called for near syncope event. STAT labs at that time significant for anemia to H/H 6.0/19.1. Patient intubated and underwent endoscopy by GI which revealed malignant appearing ulcer with two visible vessels which were actively spurting blood. One vessel was successfully endoscopically clipped however the other could not be controlled. After endoscopy patient was brought to IR, where pt underwent embolization of total 3 L gastric branches. In total, patient has received 5uPRBC.     This AM patient hemodynamically stable however with 1 episode of hematemesis. General surgery consulted due to concern for possible continued bleeding.    PAST MEDICAL HISTORY:  Breast cancer      PAST SURGICAL HISTORY:  S/P mastectomy, left      MEDICATIONS:  chlorhexidine 4% Liquid 1 Application(s) Topical <User Schedule>  lidocaine 5% Ointment 1 Application(s) Topical every 6 hours PRN  mupirocin 2% Ointment 1 Application(s) Topical two times a day  pantoprazole Infusion 8 mG/Hr IV Continuous <Continuous>  propofol Infusion 75 MICROgram(s)/kG/Min IV Continuous <Continuous>  valACYclovir 500 milliGRAM(s) Oral every 12 hours      ALLERGIES:  No Known Allergies      VITALS & I/Os:  Vital Signs Last 24 Hrs  T(C): 36.8 (23 Jun 2019 08:00), Max: 37.3 (23 Jun 2019 00:20)  T(F): 98.2 (23 Jun 2019 08:00), Max: 99.1 (23 Jun 2019 00:20)  HR: 74 (23 Jun 2019 08:00) (66 - 102)  BP: 118/77 (23 Jun 2019 08:00) (86/45 - 142/80)  BP(mean): 90 (23 Jun 2019 08:00) (70 - 119)  RR: 15 (23 Jun 2019 08:00) (11 - 21)  SpO2: 100% (23 Jun 2019 08:00) (100% - 100%)  Mode: AC/ CMV (Assist Control/ Continuous Mandatory Ventilation)  RR (machine): 12  TV (machine): 400  FiO2: 30  PEEP: 5  MAP: 10  PIP: 21    I&O's Summary    22 Jun 2019 07:01  -  23 Jun 2019 07:00  --------------------------------------------------------  IN: 549 mL / OUT: 655 mL / NET: -106 mL    23 Jun 2019 07:01  -  23 Jun 2019 08:09  --------------------------------------------------------  IN: 24.9 mL / OUT: 130 mL / NET: -105.1 mL        PHYSICAL EXAM:  General: No acute distress  Respiratory: Nonlabored  Cardiovascular: RRR  Abdominal: Soft, nondistended, nontender. No rebound or guarding. No organomegaly, no palpable mass.  Extremities: Warm    LABS:                        12.4   11.43 )-----------( 322      ( 23 Jun 2019 05:00 )             37.2     06-23    133<L>  |  102  |  11  ----------------------------<  83  4.0   |  19<L>  |  0.43<L>    Ca    8.1<L>      23 Jun 2019 05:00  Phos  2.6     06-23  Mg     2.4     06-23    TPro  4.3<L>  /  Alb  2.3<L>  /  TBili  0.3  /  DBili  x   /  AST  21  /  ALT  32  /  AlkPhos  66  06-22    Lactate: Lactate, Blood: 1.5 mmol/L (06-22 @ 14:59)     PT/INR - ( 23 Jun 2019 00:15 )   PT: 12.3 SEC;   INR: 1.08          PTT - ( 23 Jun 2019 00:15 )  PTT:29.9 SEC  ABG - ( 23 Jun 2019 00:15 )  pH, Arterial: 7.44  pH, Blood: x     /  pCO2: 30    /  pO2: 166   / HCO3: 22    / Base Excess: -3.3  /  SaO2: 98.3              CARDIAC MARKERS ( 22 Jun 2019 06:40 )  x     / x     / 21 u/L / < 1.00 ng/mL / x                IMAGING:    Upper Endoscopy (06.22.19 @ 13:38)  Findings:       EGD:       -The esophagus was normal.       -The stomach contained a cratered large malignant appearing ulcer with        two visible vessels. Both of them was spurting blood.       -Epinephrine 1:10,000 (2 cc) was injected into each site.    -Bipolar cautery was applied using a 10 Fr probe with a single channel        therapeutic scope. The vessels were obliterate but oozing was still seen        at one site. A clip was applied successfully to one site controlling the        bleeding. The other site would not retain a clip due to the fibrotic        nature of the vessel.       -Hemispora was applied to both sites with successful control of the        bleeding.       -The duodenal bulb and D2 were normal. General Surgery Consult  Consulting surgical team: B team surgery  Consulting attending: Dr. 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 Patient had been seeing her dermatologist for chronic rash and was being treated for superimposed MRSA infection. Upon arrival to [facility], patient also endorsing bloody BM x 2 weeks. Last colonoscopy 2 years ago, which was reportedly normal. Also endorsing fatigue. Patient admitted for further work up.    Patient had been hemodynamically stable on the floor until 6/22 when pt had RRT called for near syncope event. STAT labs at that time significant for anemia to H/H 6.0/19.1. Patient intubated and underwent endoscopy by GI which revealed malignant appearing ulcer with two visible vessels which were actively spurting blood. One vessel was successfully endoscopically clipped however the other could not be controlled. After endoscopy patient was brought to IR, where pt underwent embolization of total 3 L gastric branches. In total, patient has received 5uPRBC.     This AM patient hemodynamically stable however with 1 episode of hematemesis. General surgery consulted due to concern for possible continued bleeding.    PAST MEDICAL HISTORY:  Breast cancer      PAST SURGICAL HISTORY:  S/P mastectomy, left      MEDICATIONS:  chlorhexidine 4% Liquid 1 Application(s) Topical <User Schedule>  lidocaine 5% Ointment 1 Application(s) Topical every 6 hours PRN  mupirocin 2% Ointment 1 Application(s) Topical two times a day  pantoprazole Infusion 8 mG/Hr IV Continuous <Continuous>  propofol Infusion 75 MICROgram(s)/kG/Min IV Continuous <Continuous>  valACYclovir 500 milliGRAM(s) Oral every 12 hours      ALLERGIES:  No Known Allergies      VITALS & I/Os:  Vital Signs Last 24 Hrs  T(C): 36.8 (23 Jun 2019 08:00), Max: 37.3 (23 Jun 2019 00:20)  T(F): 98.2 (23 Jun 2019 08:00), Max: 99.1 (23 Jun 2019 00:20)  HR: 74 (23 Jun 2019 08:00) (66 - 102)  BP: 118/77 (23 Jun 2019 08:00) (86/45 - 142/80)  BP(mean): 90 (23 Jun 2019 08:00) (70 - 119)  RR: 15 (23 Jun 2019 08:00) (11 - 21)  SpO2: 100% (23 Jun 2019 08:00) (100% - 100%)  Mode: AC/ CMV (Assist Control/ Continuous Mandatory Ventilation)  RR (machine): 12  TV (machine): 400  FiO2: 30  PEEP: 5  MAP: 10  PIP: 21    I&O's Summary    22 Jun 2019 07:01  -  23 Jun 2019 07:00  --------------------------------------------------------  IN: 549 mL / OUT: 655 mL / NET: -106 mL    23 Jun 2019 07:01  -  23 Jun 2019 08:09  --------------------------------------------------------  IN: 24.9 mL / OUT: 130 mL / NET: -105.1 mL        PHYSICAL EXAM:  General: Laying in bed, in no acute distress  Respiratory: Intubated on mechanical vent  Cardiovascular: RRR, normotensive  Abdominal: Soft, nondistended. Well healed surgical scars.   Extremities: Warm    LABS:                        12.4   11.43 )-----------( 322      ( 23 Jun 2019 05:00 )             37.2     06-23    133<L>  |  102  |  11  ----------------------------<  83  4.0   |  19<L>  |  0.43<L>    Ca    8.1<L>      23 Jun 2019 05:00  Phos  2.6     06-23  Mg     2.4     06-23    TPro  4.3<L>  /  Alb  2.3<L>  /  TBili  0.3  /  DBili  x   /  AST  21  /  ALT  32  /  AlkPhos  66  06-22    Lactate: Lactate, Blood: 1.5 mmol/L (06-22 @ 14:59)     PT/INR - ( 23 Jun 2019 00:15 )   PT: 12.3 SEC;   INR: 1.08          PTT - ( 23 Jun 2019 00:15 )  PTT:29.9 SEC  ABG - ( 23 Jun 2019 00:15 )  pH, Arterial: 7.44  pH, Blood: x     /  pCO2: 30    /  pO2: 166   / HCO3: 22    / Base Excess: -3.3  /  SaO2: 98.3              CARDIAC MARKERS ( 22 Jun 2019 06:40 )  x     / x     / 21 u/L / < 1.00 ng/mL / x                IMAGING:    Upper Endoscopy (06.22.19 @ 13:38)  Findings:       EGD:       -The esophagus was normal.       -The stomach contained a cratered large malignant appearing ulcer with        two visible vessels. Both of them was spurting blood.       -Epinephrine 1:10,000 (2 cc) was injected into each site.    -Bipolar cautery was applied using a 10 Fr probe with a single channel        therapeutic scope. The vessels were obliterate but oozing was still seen        at one site. A clip was applied successfully to one site controlling the        bleeding. The other site would not retain a clip due to the fibrotic        nature of the vessel.       -Hemispora was applied to both sites with successful control of the        bleeding.       -The duodenal bulb and D2 were normal.

## 2019-06-23 NOTE — PROGRESS NOTE ADULT - ASSESSMENT
Impression:    #Acute blood loss anemia with melena with large ulcer with actively spurting vessel: Currently with dark red output and melena, HD stable, and with Hgb of 12.8 after receiving 4 to 5 units pRBCs in the last 24 hours. Underwent EGD on 6/22/19 with visualization of large cratered ulcer at the incisura with visible vessel x 2 with active spurting treated with epinephrine, bipolar cautery, and hemostatic clips, however, hemostasis was not fully achieved with active oozing at site of one of the visible vessels. Hemospray was utilized with hemostasis. Patient underwent IR embolization on 6/22/19 of the left gastric artery.   #Syncope: Concern for intravascular volume depletion in setting of watery diarrhea with resulting orthostatic hypotension. May also represent vasovagal syncope.  #Breast cancer on chemotherapy: Last dose of chemotherapy received last   #Chills: Infectious work-up negative    Recommendations:  - F/U IR and surgical recommendations  - No plan for repeat endoscopy at this time  - Ventilator management per MICU  - Monitor CBCs q8h  - Monitor bowel movements  - Transfuse for goal Hgb >/= 7.0  - Continue pantoprazole 8 mg/hr for at least 72 hours  - Two large bore IVs  - Maintain active type and screen    Please page GI (Pager: [phone]) if there are any additional questions or concerns. Please call on-call GI fellow after 5pm and before 8am, and on weekends.

## 2019-06-23 NOTE — CONSULT NOTE ADULT - ASSESSMENT
Full note to follow 53F PMH Metastatic breast cancer, chronic ulcerative rash, initially presented with chills/rigors, hospital course c/b anemia 2/2 GI bleed found on endoscopy to have bleeding gastric ulcer s/p endoscopic clipping and IR embolization of L gastric artery branches    - Full note to follow 53F PMH Metastatic breast cancer, chronic ulcerative rash, initially presented with chills/rigors, hospital course c/b anemia 2/2 GI bleed found on endoscopy to have bleeding gastric ulcer s/p endoscopic clipping and IR embolization of L gastric artery branches    - Hematemesis likely represents old blood in the stomach and not continued active bleeding given patient's hemodynamic stability  - Trend CBCs  - If H/H begins to downtrend, would recommend re-scope by GI to assess for continued bleeding  - No indication for urgent surgical intervention at this time  - Discussed with attending Dr. Shaun Christian PGY2  B team surgery  [id number]

## 2019-06-23 NOTE — PROGRESS NOTE ADULT - SUBJECTIVE AND OBJECTIVE BOX
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 She also reports that she has had two weeks of bloody bowel movements with blood in the toilet bowel and on paper. She reports a normal colonoscopy approx 2 years ago. She denies hx of hemorrhoids or diverticulosis. She reports she has been feeling tired all of the time. She denies SOB or CP. She denies melena. She had some epigastric abdominal pain earlier today that has gone away and that she attributed to having not eaten anything today.     In the ED vital signs: 101.2, 109, 102/43, 20, 100% on RA. She received vanc and pip tazo. She received 2L of LR. She received IV acetaminophen and pantoprazole. (18 Jun 2019 21:06)    RRT called on 6/22 for near syncope, hypotension 2/2 anemia from GIB. MICU c/s for hypotension. Initially thought to be 2/2 from sepsis vs GIB, now with repeat labs showing anemia to 6.0, concern for acute GIB. BP during rapid rapid response 80s/40s, improved w/ IVF, now 90s/40s, (baseline BP 90s-110s prior to today). Recommended transfusion of 2 U pRBC for Hgb 6.0. C/w protonix gtt per GI recs. Patient initially HD responded to 2 units PRBC, however continued to have black bloody bowel movements. GI preformed endoscopy with evidence of large malignant appearing ulcer with two visible vessels actively bleeding s/p endoscopic therapy. Patient then underwent via RFA angiogram with evidence of no active extravasation was seen. There were 2 left gastric arteries, both with branches extending to the GI clips and both were embolized with Avitene slurry. A small left gastric artery branch was embolized with NBCA glue.    Upon arrival in the MICU s/p embolization, pt had one episode of hematemesis with large clots and melena. Vitals remained stable throughout. Admitted to the MICU for GI bleed and intubation.       PAST MEDICAL & SURGICAL HISTORY:  Breast cancer  S/P mastectomy, left      FAMILY HISTORY:  No pertinent family history in first degree relatives      SOCIAL HISTORY:   Lives at home with family  Used to work as a   Originally from [facility]  No tobacco/drugs/ETOH    HOME MEDICATIONS:      Allergies    No Known Allergies    Intolerances      REVIEW OF SYSTEMS:  Constitutional: No fevers, chills, weight loss, weight gain  HEENT: No vision problems, eye pain, nasal congestion, rhinorrhea, sore throat, dysphagia  CV: No chest pain, orthopnea, palpitations  Resp: No cough, dyspnea, wheezing, hemoptysis  GI: No nausea, vomiting, diarrhea, constipation, abdominal pain  : [ ] dysuria [ ] nocturia [ ] hematuria [ ] increased urinary frequency  Musculoskeletal: [ ] back pain [ ] myalgias [ ] arthralgias [ ] fracture  Skin: [ ] rash [ ] itch  Neurological: [ ] headache [ ] dizziness [ ] syncope [ ] weakness [ ] numbness  Psychiatric: [ ] anxiety [ ] depression  Endocrine: [ ] diabetes [ ] thyroid problem  Hematologic/Lymphatic: [ ] anemia [ ] bleeding problem  Allergic/Immunologic: [ ] itchy eyes [ ] nasal discharge [ ] hives [ ] angioedema  [ ] All other systems negative  [x] Unable to assess ROS due to sedation     OBJECTIVE:  ICU Vital Signs Last 24 Hrs  T(C): 37.3 (23 Jun 2019 00:20), Max: 37.3 (23 Jun 2019 00:20)  T(F): 99.1 (23 Jun 2019 00:20), Max: 99.1 (23 Jun 2019 00:20)  HR: 66 (23 Jun 2019 01:00) (66 - 102)  BP: 134/61 (23 Jun 2019 01:00) (86/45 - 142/80)  BP(mean): 84 (23 Jun 2019 01:00) (61 - 119)  ABP: 162/85 (22 Jun 2019 23:32) (127/70 - 162/85)  ABP(mean): 118 (22 Jun 2019 23:32) (95 - 118)  RR: 11 (23 Jun 2019 01:00) (11 - 21)  SpO2: 100% (23 Jun 2019 01:00) (100% - 100%)    Mode: SIMV (Synchronized Intermittent Mandatory Ventilation), RR (machine): 10, TV (machine): 400, FiO2: 60, PEEP: 5, PS: 5, MAP: 10, PIP: 17    06-22 @ 07:01  -  06-23 @ 02:02  --------------------------------------------------------  IN: 224 mL / OUT: 225 mL / NET: -1 mL      CAPILLARY BLOOD GLUCOSE      POCT Blood Glucose.: 147 mg/dL (22 Jun 2019 06:27)      PHYSICAL EXAM:    CONSTITUTIONAL: cachetic, middle-aged female, resting comfortably in no acute distress  HEAD: Normocephalic; atraumatic  EYES: Normal inspection, EOMI, PERRLA  ENMT: External appears normal; normal oropharynx  NECK: Supple; non-tender; no cervical lymphadenopathy  CARD: RRR; no audible murmurs, rubs, or gallops  RESP: No respiratory distress, lungs ctab/l  ABD: Soft, non-distended; non-tender; no rebound or guarding  EXT: No LE pitting edema or calf tenderness; distal pulses intact with good capillary refill  SKIN: erythematous erosions and ulcerations scattered over back and buttocks  NEURO: sedated and intubated       HOSPITAL MEDICATIONS:  MEDICATIONS  (STANDING):  chlorhexidine 4% Liquid 1 Application(s) Topical <User Schedule>  magnesium sulfate  IVPB 2 Gram(s) IV Intermittent once  mupirocin 2% Ointment 1 Application(s) Topical two times a day  pantoprazole Infusion 8 mG/Hr (10 mL/Hr) IV Continuous <Continuous>  potassium chloride  10 mEq/100 mL IVPB 10 milliEquivalent(s) IV Intermittent every 1 hour  propofol Infusion 75 MICROgram(s)/kG/Min (22.41 mL/Hr) IV Continuous <Continuous>  valACYclovir 500 milliGRAM(s) Oral every 12 hours    MEDICATIONS  (PRN):  lidocaine 5% Ointment 1 Application(s) Topical every 6 hours PRN pain at rash      LABS:                        11.0   9.01  )-----------( 315      ( 23 Jun 2019 00:15 )             32.3     Hgb Trend: 11.0<--, 10.5<--, 7.4<--, 8.6<--, 6.0<--  06-23    134<L>  |  106  |  12  ----------------------------<  90  3.3<L>   |  18<L>  |  0.40<L>    Ca    8.1<L>      23 Jun 2019 00:15  Phos  2.6     06-23  Mg     1.5     06-23    TPro  4.3<L>  /  Alb  2.3<L>  /  TBili  0.3  /  DBili  x   /  AST  21  /  ALT  32  /  AlkPhos  66  06-22    Creatinine Trend: 0.40<--, 0.43<--, 0.53<--, 0.51<--, 0.53<--, 0.51<--  PT/INR - ( 23 Jun 2019 00:15 )   PT: 12.3 SEC;   INR: 1.08          PTT - ( 23 Jun 2019 00:15 )  PTT:29.9 SEC    Arterial Blood Gas:  06-23 @ 00:15  7.44/30/166/22/98.3/-3.3  ABG lactate: 1.4  Arterial Blood Gas:  06-22 @ 21:27  7.37/37/229/21/99.8/-3.9  ABG lactate: --  Arterial Blood Gas:  06-22 @ 19:30  7.33/38/422/20/98.9/-5.4  ABG lactate: --    Venous Blood Gas:  06-22 @ 06:40  7.38/37/43/22/70.7  VBG Lactate: 4.1      MICROBIOLOGY:     Culture - Urine:   NO GROWTH AT 24 HOURS (06.19.19 @ 07:51)    Culture - Blood:   NO ORGANISMS ISOLATED  NO ORGANISMS ISOLATED AT 96 HOURS (06.18.19 @ 16:26)      RADIOLOGY & ADDITIONAL TESTS:     < from: CT Pelvis w/ IV Cont (06.18.19 @ 17:48) >    IMPRESSION:     No organized perirectal collection or drainable abscess.      < end of copied text >    < from: Xray Chest 1 View- PORTABLE-Urgent (06.18.19 @ 17:04) >    IMPRESSION: Diffuse linear opacities may representmild interstitial   edema. No focal consolidations.    < end of copied text >    < from: Upper Endoscopy (06.22.19 @ 13:38) >  Findings:       EGD:       -The esophagus was normal.       -The stomach contained a cratered large malignant appearing ulcer with        two visible vessels. Both of them was spurting blood.       -Epinephrine 1:10,000 (2 cc) was injected into each site.    -Bipolar cautery was applied using a 10 Fr probe with a single channel        therapeutic scope. The vessels were obliterate but oozing was still seen        at one site. A clip was applied successfully to one site controlling the        bleeding. The other site would not retain a clip due to the fibrotic        nature of the vessel.       -Hemispora was applied to both sites with successful control of the        bleeding.       -The duodenal bulb and D2 were normal.                                                        Impression:          Large malignant appearing ulcer with two visible vessels                        actively bleeding s/p endoscopic therapy. The hemospray                        is a temporary agent and thus more definitive therapy is                        needed. I discussed with IR for embolization for a more                        definitive therapy.    < end of copied text >

## 2019-06-24 LAB
ANION GAP SERPL CALC-SCNC: 11 MMO/L — SIGNIFICANT CHANGE UP (ref 7–14)
BUN SERPL-MCNC: 13 MG/DL — SIGNIFICANT CHANGE UP (ref 7–23)
CALCIUM SERPL-MCNC: 8 MG/DL — LOW (ref 8.4–10.5)
CHLORIDE SERPL-SCNC: 106 MMOL/L — SIGNIFICANT CHANGE UP (ref 98–107)
CO2 SERPL-SCNC: 20 MMOL/L — LOW (ref 22–31)
CREAT SERPL-MCNC: 0.51 MG/DL — SIGNIFICANT CHANGE UP (ref 0.5–1.3)
GLUCOSE SERPL-MCNC: 98 MG/DL — SIGNIFICANT CHANGE UP (ref 70–99)
HCT VFR BLD CALC: 33.7 % — LOW (ref 34.5–45)
HGB BLD-MCNC: 11.3 G/DL — LOW (ref 11.5–15.5)
MAGNESIUM SERPL-MCNC: 2.2 MG/DL — SIGNIFICANT CHANGE UP (ref 1.6–2.6)
MCHC RBC-ENTMCNC: 28 PG — SIGNIFICANT CHANGE UP (ref 27–34)
MCHC RBC-ENTMCNC: 33.5 % — SIGNIFICANT CHANGE UP (ref 32–36)
MCV RBC AUTO: 83.4 FL — SIGNIFICANT CHANGE UP (ref 80–100)
NRBC # FLD: 0.03 K/UL — SIGNIFICANT CHANGE UP (ref 0–0)
PHOSPHATE SERPL-MCNC: 4.6 MG/DL — HIGH (ref 2.5–4.5)
PLATELET # BLD AUTO: 310 K/UL — SIGNIFICANT CHANGE UP (ref 150–400)
PMV BLD: 8.6 FL — SIGNIFICANT CHANGE UP (ref 7–13)
POTASSIUM SERPL-MCNC: 4 MMOL/L — SIGNIFICANT CHANGE UP (ref 3.5–5.3)
POTASSIUM SERPL-SCNC: 4 MMOL/L — SIGNIFICANT CHANGE UP (ref 3.5–5.3)
RBC # BLD: 4.04 M/UL — SIGNIFICANT CHANGE UP (ref 3.8–5.2)
RBC # FLD: 19.8 % — HIGH (ref 10.3–14.5)
SODIUM SERPL-SCNC: 137 MMOL/L — SIGNIFICANT CHANGE UP (ref 135–145)
WBC # BLD: 10.5 K/UL — SIGNIFICANT CHANGE UP (ref 3.8–10.5)
WBC # FLD AUTO: 10.5 K/UL — SIGNIFICANT CHANGE UP (ref 3.8–10.5)

## 2019-06-24 PROCEDURE — 99233 SBSQ HOSP IP/OBS HIGH 50: CPT | Mod: GC

## 2019-06-24 PROCEDURE — 99232 SBSQ HOSP IP/OBS MODERATE 35: CPT | Mod: GC

## 2019-06-24 RX ADMIN — PANTOPRAZOLE SODIUM 10 MG/HR: 20 TABLET, DELAYED RELEASE ORAL at 13:36

## 2019-06-24 RX ADMIN — MUPIROCIN 1 APPLICATION(S): 20 OINTMENT TOPICAL at 06:07

## 2019-06-24 RX ADMIN — MUPIROCIN 1 APPLICATION(S): 20 OINTMENT TOPICAL at 17:55

## 2019-06-24 RX ADMIN — VALACYCLOVIR 500 MILLIGRAM(S): 500 TABLET, FILM COATED ORAL at 17:56

## 2019-06-24 RX ADMIN — PANTOPRAZOLE SODIUM 10 MG/HR: 20 TABLET, DELAYED RELEASE ORAL at 22:22

## 2019-06-24 RX ADMIN — CHLORHEXIDINE GLUCONATE 1 APPLICATION(S): 213 SOLUTION TOPICAL at 13:35

## 2019-06-24 RX ADMIN — VALACYCLOVIR 500 MILLIGRAM(S): 500 TABLET, FILM COATED ORAL at 06:07

## 2019-06-24 NOTE — PROGRESS NOTE ADULT - ASSESSMENT
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 Admitted to the MICU for hematemesis and melena s/p embolization.     #Neuro: Just extubated now  -propofol, weaned  -Baseline AOX3    #Pulm: intubated for airway protection   -Slow GI bleed currently.  -Now extubated.  -Will continue to monitor    #CV: Pt initially hypotensive from UGI bleed, now BP and HR stable, no hx of cardiac issues   -Monitor BP and HR    #GI: Upper GI bleed s/p embolization with 1 episode of hematemesis and melena   -Protonix gtt  -Keep NPO  -Monitor bowel movements  -GI and IR following and recommending possible surgery if pt is still actively bleeding given pt has been embolized and angiogram is negative   -Surgery consult if pt continues to decompensate     -CBCq4, Active T+S  -F/u biopsy    #Renal: No issues  -Monitor Cr and I/O's    #Heme:  Anemia from upper GI bleed requiring blood transfusion, Hgb now stable   -CBC q4hr to trend H/H  -transfuse if Hgb <7.0  -total blood products: 5 units pRBC, 1 unit FFP, 1 unit platelets    #ID: Hx of ulcerative rash, HSV+ and MRSA+ s/p treatment, now on valacyclovir for ppx, pt is afebrile   -Continue with Valacyclovir 500mg BID  -If febrile, consider starting on abx and obtaining cultures     #Ppx:  -Protonix gtt  -SCDs

## 2019-06-24 NOTE — PROGRESS NOTE ADULT - SUBJECTIVE AND OBJECTIVE BOX
Patient is a 53y old  Female who presents with a chief complaint of Chills (23 Jun 2019 10:32)      SUBJECTIVE / OVERNIGHT EVENTS: No acute overnight events. Pt is now extubated. Still having dark emesis, but rate is slow.      MEDICATIONS  (STANDING):  chlorhexidine 4% Liquid 1 Application(s) Topical <User Schedule>  dexmedetomidine Infusion 0.1 MICROgram(s)/kG/Hr (1.245 mL/Hr) IV Continuous <Continuous>  mupirocin 2% Ointment 1 Application(s) Topical two times a day  pantoprazole Infusion 8 mG/Hr (10 mL/Hr) IV Continuous <Continuous>  valACYclovir 500 milliGRAM(s) Oral every 12 hours    MEDICATIONS  (PRN):  lidocaine 5% Ointment 1 Application(s) Topical every 6 hours PRN pain at rash        CAPILLARY BLOOD GLUCOSE        I&O's Summary    23 Jun 2019 07:01  -  24 Jun 2019 07:00  --------------------------------------------------------  IN: 510.8 mL / OUT: 1620 mL / NET: -1109.2 mL        PHYSICAL EXAM  GENERAL: NAD, well-developed  HEAD:  Atraumatic, Normocephalic  EYES: EOMI, PERRLA, conjunctiva and sclera clear  NECK: Supple, No JVD  CHEST/LUNG: Clear to auscultation bilaterally; No wheeze  HEART: Regular rate and rhythm; No murmurs, rubs, or gallops  ABDOMEN: Soft, Nontender, Nondistended; Bowel sounds present  EXTREMITIES:  2+ Peripheral Pulses, No clubbing, cyanosis, or edema  PSYCH: AAOx3  SKIN: No rashes or lesions    LABS:                        11.3   10.50 )-----------( 310      ( 24 Jun 2019 03:30 )             33.7     06-24    137  |  106  |  13  ----------------------------<  98  4.0   |  20<L>  |  0.51    Ca    8.0<L>      24 Jun 2019 03:30  Phos  4.6     06-24  Mg     2.2     06-24    TPro  4.3<L>  /  Alb  2.3<L>  /  TBili  0.3  /  DBili  x   /  AST  21  /  ALT  32  /  AlkPhos  66  06-22    PT/INR - ( 23 Jun 2019 00:15 )   PT: 12.3 SEC;   INR: 1.08          PTT - ( 23 Jun 2019 00:15 )  PTT:29.9 SEC          RADIOLOGY & ADDITIONAL TESTS:    Imaging Personally Reviewed:  Consultant(s) Notes Reviewed:    Care Discussed with Consultants/Other Providers:

## 2019-06-24 NOTE — PROGRESS NOTE ADULT - SUBJECTIVE AND OBJECTIVE BOX
POST ANESTHESIA EVALUATION    53y Female POSTOP DAY 1 S/P     MENTAL STATUS: Patient participation [ x ] Awake     [  ] Arousable     [  ] Sedated    AIRWAY PATENCY: [  x] Satisfactory  [  ] Other:     Vital Signs Last 24 Hrs  T(C): 36.8 (24 Jun 2019 04:00), Max: 36.9 (23 Jun 2019 16:00)  T(F): 98.2 (24 Jun 2019 04:00), Max: 98.4 (23 Jun 2019 16:00)  HR: 73 (24 Jun 2019 07:01) (60 - 78)  BP: 114/78 (24 Jun 2019 06:00) (114/78 - 143/88)  BP(mean): 90 (24 Jun 2019 06:00) (89 - 104)  RR: 14 (24 Jun 2019 06:00) (14 - 21)  SpO2: 100% (24 Jun 2019 07:01) (100% - 100%)  I&O's Summary    23 Jun 2019 07:01  -  24 Jun 2019 07:00  --------------------------------------------------------  IN: 510.8 mL / OUT: 1620 mL / NET: -1109.2 mL          NAUSEA/ VOMITTING:  [x  ] NONE  [  ] CONTROLLED [  ] OTHER     PAIN: [ x ] CONTROLLED WITH CURRENT REGIMEN  [  ] OTHER    [ x ] NO APPARENT ANESTHESIA COMPLICATIONS      Comments:

## 2019-06-24 NOTE — PROGRESS NOTE ADULT - ASSESSMENT
19-Sep-2017 Impression:    #Acute blood loss anemia with melena with large ulcer with actively spurting vessel: Currently with dark red output and melena, HD stable, and with Hgb of 11 to 12 after receiving 4 to 5 units pRBCs in the last 24 hours. Underwent EGD on 6/22/19 with visualization of large cratered ulcer at the incisura with visible vessel x 2 with active spurting treated with epinephrine, bipolar cautery, and hemostatic clips, however, hemostasis was not fully achieved with active oozing at site of one of the visible vessels. Hemospray was utilized with hemostasis. Patient underwent IR embolization on 6/22/19 of the left gastric artery.   #Breast cancer on chemotherapy  #Chills: Infectious work-up negative    Recommendations:  - F/U IR and surgical recommendations  - No plan for repeat endoscopy at this time  - Monitor CBCs q8h  - Monitor bowel movements  - Transfuse for goal Hgb >/= 7.0  - Continue pantoprazole 8 mg/hr for at least 72 hours after EGD on 6/22/19  - Two large bore IVs  - Maintain active type and screen    Please page GI (Pager: [phone]) if there are any additional questions or concerns. Please call on-call GI fellow after 5pm and before 8am, and on weekends. 30-Sep-2017

## 2019-06-24 NOTE — CHART NOTE - NSCHARTNOTEFT_GEN_A_CORE
MICU Transfer Note    Transfer from: MICU  Transfer to:  ( x ) Medicine    (  ) Telemetry    (  ) RCU    (  ) Palliative    (  ) Stroke Unit    (  ) _______________  Accepting physician: Dr. 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 She also reports that she has had two weeks of bloody bowel movements with blood in the toilet bowel and on paper. She reports a normal colonoscopy approx 2 years ago. She denies hx of hemorrhoids or diverticulosis. She reports she has been feeling tired all of the time. She denies SOB or CP. She denies melena. She had some epigastric abdominal pain earlier today that has gone away and that she attributed to having not eaten anything today.     In the ED vital signs: 101.2, 109., 102/43, 20, 100% on RA. She received vanc and pip tazo. She received 2L of LR. She received IV acetaminophen and pantoprazole. (18 Jun 2019 21:06)      MICU course  RRT called on 6/22 for near syncope, hypotension 2/2 anemia from GIB. MICU c/s for hypotension. Initially thought to be 2/2 from sepsis vs GIB, now with repeat labs showing anemia to 6.0, concern for acute GIB. BP during rapid rapid response 80s/40s, improved w/ IVF, now 90s/40s, (baseline BP 90s-110s prior to today). Patient initially hemodyanmically responded to 2 units PRBC, however continued to have black bloody bowel movements. GI preformed endoscopy with evidence of large malignant appearing ulcer with two visible vessels actively bleeding s/p endoscopic therapy. Patient then underwent via RFA angiogram with evidence of no active extravasation was seen. There were 2 left gastric arteries, both with branches extending to the GI clips and both were embolized with Avitene slurry. A small left gastric artery branch was embolized with NBCA glue. Patient received total 5 units pRBC, 1 unit FFP, 1 unit platelets, last transfusion on 6/22. She was extubated this morning. She currently is saturating well on RA and tolerating clears. GI and surgery not planning for further procedures at this time.           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 Admitted to the MICU for hematemesis and melena s/p embolization.     #Neuro:   - s/p extubation, AOX3    #Pulm:   - s/p extubation, initially intubated for airway protection   - currently saturating well on 4L NC  - wean O2 as tolerated    #CV: Pt initially hypotensive from UGI bleed, now BP and HR stable, no pressor support requiried  -Monitor BP and HR    #GI: Upper GI bleed s/p embolization with hematemesis and melena   -Protonix gtt for 72   - GI, IR, and surgery recs- no further plans for intervention at this time  -CBC q12  -F/u biopsy    #Renal: No issues  -Monitor Cr and I/O's    #Heme:  Anemia from upper GI bleed requiring blood transfusion, Hgb now stable   - CBC q4hr to trend H/H  - transfuse if Hgb <7.0  - total blood products: 5 units pRBC, 1 unit FFP, 1 unit platelets    #ID: Hx of ulcerative rash, HSV+ and MRSA+ s/p treatment, now on valacyclovir for ppx, pt is afebrile   - Continue with Valacyclovir 500mg BID  - If febrile, consider starting on abx and obtaining cultures     #Ppx:  - Protonix gtt  - SCDs      For Follow-Up:  [ ] protonix gtt until 6/25 (72 hours post EGD)  [ ] trend hB Q12  [ ] GI and surgery recs  [ ] advance diet as tolerated  [ ] resume home meds        Vital Signs Last 24 Hrs  T(C): 36.2 (24 Jun 2019 16:00), Max: 36.8 (24 Jun 2019 04:00)  T(F): 97.1 (24 Jun 2019 16:00), Max: 98.2 (24 Jun 2019 04:00)  HR: 59 (24 Jun 2019 16:00) (57 - 73)  BP: 143/89 (24 Jun 2019 16:00) (114/78 - 150/82)  BP(mean): 105 (24 Jun 2019 16:00) (89 - 105)  RR: 15 (24 Jun 2019 16:00) (14 - 21)  SpO2: 100% (24 Jun 2019 16:00) (100% - 100%)  I&O's Summary    23 Jun 2019 07:01  -  24 Jun 2019 07:00  --------------------------------------------------------  IN: 510.8 mL / OUT: 1620 mL / NET: -1109.2 mL          MEDICATIONS  (STANDING):  mupirocin 2% Ointment 1 Application(s) Topical two times a day  pantoprazole Infusion 8 mG/Hr (10 mL/Hr) IV Continuous <Continuous>  valACYclovir 500 milliGRAM(s) Oral every 12 hours    MEDICATIONS  (PRN):  lidocaine 5% Ointment 1 Application(s) Topical every 6 hours PRN pain at rash        LABS                                            11.3                  Neurophils% (auto):   x      (06-24 @ 03:30):    10.50)-----------(310          Lymphocytes% (auto):  x                                             33.7                   Eosinphils% (auto):   x        Manual%: Neutrophils x    ; Lymphocytes x    ; Eosinophils x    ; Bands%: x    ; Blasts x                                    137    |  106    |  13                  Calcium: 8.0   / iCa: x      (06-24 @ 03:30)    ----------------------------<  98        Magnesium: 2.2                              4.0     |  20     |  0.51             Phosphorous: 4.6

## 2019-06-24 NOTE — PROGRESS NOTE ADULT - SUBJECTIVE AND OBJECTIVE BOX
GENERAL SURGERY DAILY PROGRESS NOTE:     Subjective:  Pt seen and examined. No acute events overnight.     Objective:    MEDICATIONS  (STANDING):  chlorhexidine 4% Liquid 1 Application(s) Topical <User Schedule>  mupirocin 2% Ointment 1 Application(s) Topical two times a day  pantoprazole Infusion 8 mG/Hr (10 mL/Hr) IV Continuous <Continuous>  valACYclovir 500 milliGRAM(s) Oral every 12 hours    MEDICATIONS  (PRN):  lidocaine 5% Ointment 1 Application(s) Topical every 6 hours PRN pain at rash      Vital Signs Last 24 Hrs  T(C): 35.9 (24 Jun 2019 12:00), Max: 36.9 (23 Jun 2019 16:00)  T(F): 96.7 (24 Jun 2019 12:00), Max: 98.4 (23 Jun 2019 16:00)  HR: 62 (24 Jun 2019 14:00) (57 - 73)  BP: 150/82 (24 Jun 2019 14:00) (114/78 - 150/82)  BP(mean): 102 (24 Jun 2019 14:00) (89 - 104)  RR: 14 (24 Jun 2019 14:00) (14 - 21)  SpO2: 100% (24 Jun 2019 14:00) (100% - 100%)    I&O's Detail    23 Jun 2019 07:01  -  24 Jun 2019 07:00  --------------------------------------------------------  IN:    dexmedetomidine Infusion: 92 mL    pantoprazole Infusion: 240 mL    propofol Infusion: 178.8 mL  Total IN: 510.8 mL    OUT:    Indwelling Catheter - Urethral: 1220 mL    Nasoenteral Tube: 400 mL  Total OUT: 1620 mL    Total NET: -1109.2 mL      PHYSICAL EXAM:  General: Laying in bed, in no acute distress  Respiratory: Intubated on mechanical vent  Cardiovascular: RRR, normotensive  Abdominal: Soft, nondistended. Well healed surgical scars.   Extremities: Warm      Daily     Daily     LABS:                        11.3   10.50 )-----------( 310      ( 24 Jun 2019 03:30 )             33.7     06-24    137  |  106  |  13  ----------------------------<  98  4.0   |  20<L>  |  0.51    Ca    8.0<L>      24 Jun 2019 03:30  Phos  4.6     06-24  Mg     2.2     06-24    TPro  4.3<L>  /  Alb  2.3<L>  /  TBili  0.3  /  DBili  x   /  AST  21  /  ALT  32  /  AlkPhos  66  06-22    PT/INR - ( 23 Jun 2019 00:15 )   PT: 12.3 SEC;   INR: 1.08          PTT - ( 23 Jun 2019 00:15 )  PTT:29.9 SEC      RADIOLOGY & ADDITIONAL STUDIES:

## 2019-06-24 NOTE — CHART NOTE - NSCHARTNOTEFT_GEN_A_CORE
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 RRT called on 6/22 for near syncope, hypotension 2/2 anemia from GIB. Patient initially hemodyanmically responded to 2 units PRBC, however continued to have black bloody bowel movements. Patient was transferred to MICU. GI preformed endoscopy with evidence of large malignant appearing ulcer with two visible vessels actively bleeding s/p endoscopic therapy. Patient then underwent via RFA angiogram with evidence of no active extravasation was seen. There were 2 left gastric arteries, both with branches extending to the GI clips and both were embolized with Avitene slurry. A small left gastric artery branch was embolized with NBCA glue. Patient received total 5 units pRBC, 1 unit FFP, 1 unit platelets, last transfusion on 6/22. She was extubated on 6/24 and is transferring to medicine floor for further management.           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 Admitted to the MICU for hematemesis and melena s/p embolization.       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 RRT called on 6/22 for near syncope, hypotension 2/2 anemia from GIB. Patient initially hemodyanmically responded to 2 units PRBC, however continued to have black bloody bowel movements. Patient was transferred to MICU. GI preformed endoscopy with evidence of large malignant appearing ulcer with two visible vessels actively bleeding s/p endoscopic therapy. Patient then underwent via RFA angiogram with evidence of no active extravasation was seen. There were 2 left gastric arteries, both with branches extending to the GI clips and both were embolized with Avitene slurry. A small left gastric artery branch was embolized with NBCA glue. Patient received total 5 units pRBC, 1 unit FFP, 1 unit platelets, last transfusion on 6/22. She was extubated on 6/24 and is transferring to medicine floor for further management. On exam, pt lays in bed comfortably with no distress. Vital sign stable.           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 Admitted to the MICU for hematemesis and melena s/p embolization.       For Follow-Up:  [ ] c/w protonix gtt until 6/25 (72 hours post EGD)  [ ] trend hg Q12  [ ] f/u GI and surgery recs  [ ] advance diet as tolerated  [ ] resume home meds      Walt LARIOS

## 2019-06-24 NOTE — PROGRESS NOTE ADULT - ATTENDING COMMENTS
1. Acute hypoxemic respiratory resolved. Pt extubated this am. Intubated for airway protection for GI bleed. Tolerating extubation.  2. Gi Bleed from  bleeding ulcers. Clipped then vessels embolized  by IR. No further bleeding. No PRBC last 24 hrs. Continue to monitor H/H.  3. Metastatic breast ca s/p chemotherapy. Gastric ulcers suspicious for malignancy. BX pending.

## 2019-06-24 NOTE — PROGRESS NOTE ADULT - SUBJECTIVE AND OBJECTIVE BOX
Chief Complaint: Chills    Interval Events:   - The patient was intubated at the time of my evaluation, however, per chart review was extubated later this morning  - No reports of bloody emesis, coffee ground emesis, bloody stools, and black tarry stools.    Allergies:  No Known Allergies    Hospital Medications:  chlorhexidine 4% Liquid 1 Application(s) Topical <User Schedule>  dexmedetomidine Infusion 0.1 MICROgram(s)/kG/Hr IV Continuous <Continuous>  lidocaine 5% Ointment 1 Application(s) Topical every 6 hours PRN  mupirocin 2% Ointment 1 Application(s) Topical two times a day  pantoprazole Infusion 8 mG/Hr IV Continuous <Continuous>  valACYclovir 500 milliGRAM(s) Oral every 12 hours    PMHX/PSHX:  Breast cancer  S/P mastectomy, left    Family history:  No pertinent family history in first degree relatives    ROS: Unable to obtain    PHYSICAL EXAM:   Vital Signs:  Vital Signs Last 24 Hrs  T(C): 36.8 (24 Jun 2019 04:00), Max: 36.9 (23 Jun 2019 16:00)  T(F): 98.2 (24 Jun 2019 04:00), Max: 98.4 (23 Jun 2019 16:00)  HR: 57 (24 Jun 2019 10:00) (57 - 73)  BP: 122/77 (24 Jun 2019 10:00) (114/78 - 143/88)  BP(mean): 90 (24 Jun 2019 10:00) (89 - 104)  RR: 19 (24 Jun 2019 10:00) (14 - 21)  SpO2: 100% (24 Jun 2019 10:00) (100% - 100%)    GENERAL: Intubated, sedated  HEENT: ETT in place, no scleral icterus  CHEST: Ventilator associated breath sounds  HEART:  Regular rate and rhythm, no murmurs/rubs/gallops  ABDOMEN:  Soft, non-tender, non-distended, normoactive bowel sounds  EXTREMITIES:  No cyanosis, clubbing, or edema  SKIN:  No rash/erythema  NEURO: Intubated, sedated      LABS:                        11.3   10.50 )-----------( 310      ( 24 Jun 2019 03:30 )             33.7     Mean Cell Volume: 83.4 fL (06-24-19 @ 03:30)    06-24    137  |  106  |  13  ----------------------------<  98  4.0   |  20<L>  |  0.51    Ca    8.0<L>      24 Jun 2019 03:30  Phos  4.6     06-24  Mg     2.2     06-24    TPro  4.3<L>  /  Alb  2.3<L>  /  TBili  0.3  /  DBili  x   /  AST  21  /  ALT  32  /  AlkPhos  66  06-22    LIVER FUNCTIONS - ( 22 Jun 2019 22:04 )  Alb: 2.3 g/dL / Pro: 4.3 g/dL / ALK PHOS: 66 u/L / ALT: 32 u/L / AST: 21 u/L / GGT: x           PT/INR - ( 23 Jun 2019 00:15 )   PT: 12.3 SEC;   INR: 1.08       PTT - ( 23 Jun 2019 00:15 )  PTT:29.9 SEC               11.3   10.50 )-----------( 310      ( 24 Jun 2019 03:30 )             33.7                         12.2   10.72 )-----------( 324      ( 23 Jun 2019 14:45 )             34.5                         12.8   11.94 )-----------( 362      ( 23 Jun 2019 09:00 )             37.9                         12.4   11.43 )-----------( 322      ( 23 Jun 2019 05:00 )             37.2                         11.0   9.01  )-----------( 315      ( 23 Jun 2019 00:15 )             32.3     Imaging:    No new imaging

## 2019-06-24 NOTE — PROGRESS NOTE ADULT - ASSESSMENT
53F PMH Metastatic breast cancer, chronic ulcerative rash, initially presented with chills/rigors, hospital course c/b anemia 2/2 GI bleed found on endoscopy to have bleeding gastric ulcer s/p endoscopic clipping and IR embolization of L gastric artery branches    - Hematemesis likely represents old blood in the stomach and not continued active bleeding given patient's hemodynamic stability  - Trend CBCs  - If H/H begins to downtrend, would recommend re-scope by GI to assess for continued bleeding  - No indication for urgent surgical intervention at this time    B team surgery  [id number]

## 2019-06-25 LAB
ALBUMIN SERPL ELPH-MCNC: 2.2 G/DL — LOW (ref 3.3–5)
ALP SERPL-CCNC: 87 U/L — SIGNIFICANT CHANGE UP (ref 40–120)
ALT FLD-CCNC: 21 U/L — SIGNIFICANT CHANGE UP (ref 4–33)
ANION GAP SERPL CALC-SCNC: 11 MMO/L — SIGNIFICANT CHANGE UP (ref 7–14)
AST SERPL-CCNC: 13 U/L — SIGNIFICANT CHANGE UP (ref 4–32)
BASOPHILS # BLD AUTO: 0.04 K/UL — SIGNIFICANT CHANGE UP (ref 0–0.2)
BASOPHILS NFR BLD AUTO: 0.5 % — SIGNIFICANT CHANGE UP (ref 0–2)
BILIRUB SERPL-MCNC: 0.2 MG/DL — SIGNIFICANT CHANGE UP (ref 0.2–1.2)
BLD GP AB SCN SERPL QL: NEGATIVE — SIGNIFICANT CHANGE UP
BUN SERPL-MCNC: 10 MG/DL — SIGNIFICANT CHANGE UP (ref 7–23)
CALCIUM SERPL-MCNC: 7.9 MG/DL — LOW (ref 8.4–10.5)
CHLORIDE SERPL-SCNC: 103 MMOL/L — SIGNIFICANT CHANGE UP (ref 98–107)
CO2 SERPL-SCNC: 22 MMOL/L — SIGNIFICANT CHANGE UP (ref 22–31)
CREAT SERPL-MCNC: 0.51 MG/DL — SIGNIFICANT CHANGE UP (ref 0.5–1.3)
EOSINOPHIL # BLD AUTO: 0.06 K/UL — SIGNIFICANT CHANGE UP (ref 0–0.5)
EOSINOPHIL NFR BLD AUTO: 0.7 % — SIGNIFICANT CHANGE UP (ref 0–6)
GLUCOSE SERPL-MCNC: 79 MG/DL — SIGNIFICANT CHANGE UP (ref 70–99)
HCT VFR BLD CALC: 26.2 % — LOW (ref 34.5–45)
HCT VFR BLD CALC: 30.7 % — LOW (ref 34.5–45)
HCT VFR BLD CALC: 33.6 % — LOW (ref 34.5–45)
HGB BLD-MCNC: 10 G/DL — LOW (ref 11.5–15.5)
HGB BLD-MCNC: 11 G/DL — LOW (ref 11.5–15.5)
HGB BLD-MCNC: 8.4 G/DL — LOW (ref 11.5–15.5)
IMM GRANULOCYTES NFR BLD AUTO: 2.6 % — HIGH (ref 0–1.5)
LYMPHOCYTES # BLD AUTO: 1.29 K/UL — SIGNIFICANT CHANGE UP (ref 1–3.3)
LYMPHOCYTES # BLD AUTO: 16 % — SIGNIFICANT CHANGE UP (ref 13–44)
MAGNESIUM SERPL-MCNC: 2 MG/DL — SIGNIFICANT CHANGE UP (ref 1.6–2.6)
MCHC RBC-ENTMCNC: 28.2 PG — SIGNIFICANT CHANGE UP (ref 27–34)
MCHC RBC-ENTMCNC: 28.3 PG — SIGNIFICANT CHANGE UP (ref 27–34)
MCHC RBC-ENTMCNC: 28.5 PG — SIGNIFICANT CHANGE UP (ref 27–34)
MCHC RBC-ENTMCNC: 32.1 % — SIGNIFICANT CHANGE UP (ref 32–36)
MCHC RBC-ENTMCNC: 32.6 % — SIGNIFICANT CHANGE UP (ref 32–36)
MCHC RBC-ENTMCNC: 32.7 % — SIGNIFICANT CHANGE UP (ref 32–36)
MCV RBC AUTO: 86.4 FL — SIGNIFICANT CHANGE UP (ref 80–100)
MCV RBC AUTO: 86.7 FL — SIGNIFICANT CHANGE UP (ref 80–100)
MCV RBC AUTO: 88.8 FL — SIGNIFICANT CHANGE UP (ref 80–100)
MONOCYTES # BLD AUTO: 0.69 K/UL — SIGNIFICANT CHANGE UP (ref 0–0.9)
MONOCYTES NFR BLD AUTO: 8.5 % — SIGNIFICANT CHANGE UP (ref 2–14)
NEUTROPHILS # BLD AUTO: 5.79 K/UL — SIGNIFICANT CHANGE UP (ref 1.8–7.4)
NEUTROPHILS NFR BLD AUTO: 71.7 % — SIGNIFICANT CHANGE UP (ref 43–77)
NRBC # FLD: 0 K/UL — SIGNIFICANT CHANGE UP (ref 0–0)
NRBC # FLD: 0 K/UL — SIGNIFICANT CHANGE UP (ref 0–0)
NRBC # FLD: 0.03 K/UL — SIGNIFICANT CHANGE UP (ref 0–0)
PHOSPHATE SERPL-MCNC: 2.9 MG/DL — SIGNIFICANT CHANGE UP (ref 2.5–4.5)
PLATELET # BLD AUTO: 259 K/UL — SIGNIFICANT CHANGE UP (ref 150–400)
PLATELET # BLD AUTO: 279 K/UL — SIGNIFICANT CHANGE UP (ref 150–400)
PLATELET # BLD AUTO: 314 K/UL — SIGNIFICANT CHANGE UP (ref 150–400)
PMV BLD: 8.9 FL — SIGNIFICANT CHANGE UP (ref 7–13)
PMV BLD: 8.9 FL — SIGNIFICANT CHANGE UP (ref 7–13)
PMV BLD: 9.1 FL — SIGNIFICANT CHANGE UP (ref 7–13)
POTASSIUM SERPL-MCNC: 3.4 MMOL/L — LOW (ref 3.5–5.3)
POTASSIUM SERPL-SCNC: 3.4 MMOL/L — LOW (ref 3.5–5.3)
PROT SERPL-MCNC: 4.6 G/DL — LOW (ref 6–8.3)
RBC # BLD: 2.95 M/UL — LOW (ref 3.8–5.2)
RBC # BLD: 3.54 M/UL — LOW (ref 3.8–5.2)
RBC # BLD: 3.89 M/UL — SIGNIFICANT CHANGE UP (ref 3.8–5.2)
RBC # FLD: 19 % — HIGH (ref 10.3–14.5)
RBC # FLD: 19.3 % — HIGH (ref 10.3–14.5)
RBC # FLD: 19.6 % — HIGH (ref 10.3–14.5)
RH IG SCN BLD-IMP: POSITIVE — SIGNIFICANT CHANGE UP
SODIUM SERPL-SCNC: 136 MMOL/L — SIGNIFICANT CHANGE UP (ref 135–145)
WBC # BLD: 11 K/UL — HIGH (ref 3.8–10.5)
WBC # BLD: 7.89 K/UL — SIGNIFICANT CHANGE UP (ref 3.8–10.5)
WBC # BLD: 8.08 K/UL — SIGNIFICANT CHANGE UP (ref 3.8–10.5)
WBC # FLD AUTO: 11 K/UL — HIGH (ref 3.8–10.5)
WBC # FLD AUTO: 7.89 K/UL — SIGNIFICANT CHANGE UP (ref 3.8–10.5)
WBC # FLD AUTO: 8.08 K/UL — SIGNIFICANT CHANGE UP (ref 3.8–10.5)

## 2019-06-25 PROCEDURE — 99232 SBSQ HOSP IP/OBS MODERATE 35: CPT | Mod: GC

## 2019-06-25 PROCEDURE — 99233 SBSQ HOSP IP/OBS HIGH 50: CPT | Mod: GC

## 2019-06-25 RX ORDER — POTASSIUM CHLORIDE 20 MEQ
40 PACKET (EA) ORAL ONCE
Refills: 0 | Status: COMPLETED | OUTPATIENT
Start: 2019-06-25 | End: 2019-06-25

## 2019-06-25 RX ADMIN — VALACYCLOVIR 500 MILLIGRAM(S): 500 TABLET, FILM COATED ORAL at 05:52

## 2019-06-25 RX ADMIN — VALACYCLOVIR 500 MILLIGRAM(S): 500 TABLET, FILM COATED ORAL at 17:11

## 2019-06-25 RX ADMIN — MUPIROCIN 1 APPLICATION(S): 20 OINTMENT TOPICAL at 05:52

## 2019-06-25 RX ADMIN — MUPIROCIN 1 APPLICATION(S): 20 OINTMENT TOPICAL at 17:12

## 2019-06-25 RX ADMIN — Medication 40 MILLIEQUIVALENT(S): at 17:11

## 2019-06-25 NOTE — PROGRESS NOTE ADULT - ASSESSMENT
54 yo F with triple positive (ER 20%, PR10%, Her2 2+ FISH amplified) breast cancer (dx 2012) initially stage IIIB, s/p L mastectomy followed by ACTH then 1 year of herceptin and RT, tamoxifen, with recurrence in 1/2019 (mediastinal and hilar LNs, small lung nodules, ?liver mets) on weekly paclitaxel (since 3/2019) and exemestane, HSV+ skin rash with MRSA superinfection p/w fever and chills, admitted for sepsis and symptomatic anemia     1. Metastatic breast cancer: triple positive when first diagnosed but ER+ LA neg Her 2 neg on recurrence  -Heme onc opinion appreciated  - no plan for chemotherapy as inpatient  - repeat imaging to assess disease status as outpatient  plan d/w with son/patient    2l. Anemia / MDS / Elevated retic / GI Bleed  -Events noted   -s/p EGD - active bleeding ulcer w/ hypotension RRT  -s/p local treatment and L gastric artery embolization  -f/u per medical/GI/Surgical teams.  -will follow

## 2019-06-25 NOTE — PROGRESS NOTE ADULT - PROBLEM SELECTOR PLAN 4
History of metastatic breast cancer. Follow up with oncology note for details regarding current regiment

## 2019-06-25 NOTE — PROGRESS NOTE ADULT - SUBJECTIVE AND OBJECTIVE BOX
Patient is a 53y old  Female who presents with a chief complaint of chills (25 Jun 2019 09:22)       Pt is seen and examined  pt is awake and lying in bed  pt seems comfortable and denies any complaints at this time          ROS:  Negative except for:    MEDICATIONS  (STANDING):  mupirocin 2% Ointment 1 Application(s) Topical two times a day  pantoprazole Infusion 8 mG/Hr (10 mL/Hr) IV Continuous <Continuous>  potassium chloride    Tablet ER 40 milliEquivalent(s) Oral once  valACYclovir 500 milliGRAM(s) Oral every 12 hours    MEDICATIONS  (PRN):  lidocaine 5% Ointment 1 Application(s) Topical every 6 hours PRN pain at rash      Allergies    No Known Allergies    Intolerances        Vital Signs Last 24 Hrs  T(C): 36.9 (25 Jun 2019 05:32), Max: 37 (24 Jun 2019 21:45)  T(F): 98.5 (25 Jun 2019 05:32), Max: 98.6 (24 Jun 2019 21:45)  HR: 62 (25 Jun 2019 05:32) (58 - 65)  BP: 116/61 (25 Jun 2019 05:32) (116/61 - 150/82)  BP(mean): 102 (24 Jun 2019 18:00) (93 - 105)  RR: 16 (25 Jun 2019 05:32) (14 - 16)  SpO2: 100% (25 Jun 2019 05:32) (100% - 100%)    PHYSICAL EXAM  General: adult in NAD  HEENT: clear oropharynx, anicteric sclera, pink conjunctiva  Neck: supple  CV: normal S1/S2 with no murmur rubs or gallops  Lungs: positive air movement b/l ant lungs,clear to auscultation, no wheezes, no rales  Abdomen: soft non-tender non-distended, no hepatosplenomegaly  Ext: no clubbing cyanosis or edema  Skin: no rashes and no petechiae  Neuro: alert and oriented X 4, no focal deficits  LABS:                          11.0   8.08  )-----------( 314      ( 25 Jun 2019 06:40 )             33.6         Mean Cell Volume : 86.4 fL  Mean Cell Hemoglobin : 28.3 pg  Mean Cell Hemoglobin Concentration : 32.7 %  Auto Neutrophil # : 5.79 K/uL  Auto Lymphocyte # : 1.29 K/uL  Auto Monocyte # : 0.69 K/uL  Auto Eosinophil # : 0.06 K/uL  Auto Basophil # : 0.04 K/uL  Auto Neutrophil % : 71.7 %  Auto Lymphocyte % : 16.0 %  Auto Monocyte % : 8.5 %  Auto Eosinophil % : 0.7 %  Auto Basophil % : 0.5 %    Serial CBC's  06-25 @ 06:40  Hct-33.6 / Hgb-11.0 / Plat-314 / RBC-3.89 / WBC-8.08          Serial CBC's  06-24 @ 03:30  Hct-33.7 / Hgb-11.3 / Plat-310 / RBC-4.04 / WBC-10.50          Serial CBC's  06-23 @ 14:45  Hct-34.5 / Hgb-12.2 / Plat-324 / RBC-3.86 / WBC-10.72          Serial CBC's  06-23 @ 09:00  Hct-37.9 / Hgb-12.8 / Plat-362 / RBC-4.40 / WBC-11.94          Serial CBC's  06-23 @ 05:00  Hct-37.2 / Hgb-12.4 / Plat-322 / RBC-4.42 / WBC-11.43            06-25    136  |  103  |  10  ----------------------------<  79  3.4<L>   |  22  |  0.51    Ca    7.9<L>      25 Jun 2019 06:40  Phos  2.9     06-25  Mg     2.0     06-25    TPro  4.6<L>  /  Alb  2.2<L>  /  TBili  0.2  /  DBili  x   /  AST  13  /  ALT  21  /  AlkPhos  87  06-25          WBC Count: 8.08 K/uL (06-25-19 @ 06:40)  Hemoglobin: 11.0 g/dL (06-25-19 @ 06:40)  Hematocrit: 33.6 % (06-25-19 @ 06:40)  Platelet Count - Automated: 314 K/uL (06-25-19 @ 06:40)  WBC Count: 10.50 K/uL (06-24-19 @ 03:30)  Hemoglobin: 11.3 g/dL (06-24-19 @ 03:30)  Hematocrit: 33.7 % (06-24-19 @ 03:30)  Platelet Count - Automated: 310 K/uL (06-24-19 @ 03:30)  WBC Count: 10.72 K/uL (06-23-19 @ 14:45)  Hemoglobin: 12.2 g/dL (06-23-19 @ 14:45)  Hematocrit: 34.5 % (06-23-19 @ 14:45)  Platelet Count - Automated: 324 K/uL (06-23-19 @ 14:45)  WBC Count: 11.94 K/uL (06-23-19 @ 09:00)  Hemoglobin: 12.8 g/dL (06-23-19 @ 09:00)  Hematocrit: 37.9 % (06-23-19 @ 09:00)  Platelet Count - Automated: 362 K/uL (06-23-19 @ 09:00)  WBC Count: 11.43 K/uL (06-23-19 @ 05:00)  Hemoglobin: 12.4 g/dL (06-23-19 @ 05:00)  Hematocrit: 37.2 % (06-23-19 @ 05:00)  Platelet Count - Automated: 322 K/uL (06-23-19 @ 05:00)  WBC Count: 9.01 K/uL (06-23-19 @ 00:15)  Hemoglobin: 11.0 g/dL (06-23-19 @ 00:15)  Hematocrit: 32.3 % (06-23-19 @ 00:15)  Platelet Count - Automated: 315 K/uL (06-23-19 @ 00:15)  WBC Count: 9.72 K/uL (06-22-19 @ 22:04)  Hemoglobin: 10.5 g/dL (06-22-19 @ 22:04)  Hematocrit: 31.5 % (06-22-19 @ 22:04)  Platelet Count - Automated: 310 K/uL (06-22-19 @ 22:04)  WBC Count: 10.30 K/uL (06-22-19 @ 17:09)  Hemoglobin: 7.4 g/dL (06-22-19 @ 17:09)  Hematocrit: 23.2 % (06-22-19 @ 17:09)  Platelet Count - Automated: 333 K/uL (06-22-19 @ 17:09)  WBC Count: 8.06 K/uL (06-22-19 @ 14:59)  Hemoglobin: 8.6 g/dL (06-22-19 @ 14:59)  Hematocrit: 27.3 % (06-22-19 @ 14:59)  Platelet Count - Automated: 385 K/uL (06-22-19 @ 14:59)  WBC Count: 7.44 K/uL (06-22-19 @ 06:40)  Hemoglobin: 6.0 g/dL (06-22-19 @ 06:40)  Hematocrit: 19.1 % (06-22-19 @ 06:40)  Platelet Count - Automated: 527 K/uL (06-22-19 @ 06:40)  WBC Count: 5.60 K/uL (06-21-19 @ 06:55)  Hemoglobin: 8.3 g/dL (06-21-19 @ 06:55)  Hematocrit: 26.1 % (06-21-19 @ 06:55)  Platelet Count - Automated: 607 K/uL (06-21-19 @ 06:55)  Reticulocyte Percent: 6.4 % (06-20-19 @ 06:27)  WBC Count: 5.77 K/uL (06-20-19 @ 06:27)  Hemoglobin: 8.2 g/dL (06-20-19 @ 06:27)  Hematocrit: 25.3 % (06-20-19 @ 06:27)  Platelet Count - Automated: 625 K/uL (06-20-19 @ 06:27)  WBC Count: 7.07 K/uL (06-19-19 @ 08:04)  Hemoglobin: 7.4 g/dL (06-19-19 @ 08:04)  Hematocrit: 22.4 % (06-19-19 @ 08:04)  Platelet Count - Automated: 602 K/uL (06-19-19 @ 08:04)  WBC Count: 9.80 K/uL (06-19-19 @ 00:20)  Hemoglobin: 8.0 g/dL (06-19-19 @ 00:20)  Hematocrit: 24.6 % (06-19-19 @ 00:20)  Platelet Count - Automated: 626 K/uL (06-19-19 @ 00:20)  Hematocrit: 19.2 % (06-18-19 @ 14:00)  Platelet Count - Automated: 610 K/uL (06-18-19 @ 14:00)  WBC Count: 8.38 K/uL (06-18-19 @ 14:00)  Hemoglobin: 6.1 g/dL (06-18-19 @ 14:00)                BLOOD SMEAR INTERPRETATION:       RADIOLOGY & ADDITIONAL STUDIES:

## 2019-06-25 NOTE — PROGRESS NOTE ADULT - SUBJECTIVE AND OBJECTIVE BOX
***************************************************************  Alexandre Kaur, PGY1  Internal Medicine   pager: [phone]  ***************************************************************    ETHAN GIFFORD  53y  MRN: [id number]    Patient is a 53y old  Female with h/o metastatic breast cancer s/p chemo and mastectomy with recurrence on 01/19, chronic HSV (+) ulcerative rash s/p valcyclovir who presents with a chief complaint of chills (25 Jun 2019 12:14)      Subjective: no events ON. Denies fever, CP, SOB, abn pain, N/V, dysuria. Tolerating diet.      MEDICATIONS  (STANDING):  mupirocin 2% Ointment 1 Application(s) Topical two times a day  pantoprazole Infusion 8 mG/Hr (10 mL/Hr) IV Continuous <Continuous>  potassium chloride    Tablet ER 40 milliEquivalent(s) Oral once  valACYclovir 500 milliGRAM(s) Oral every 12 hours    MEDICATIONS  (PRN):  lidocaine 5% Ointment 1 Application(s) Topical every 6 hours PRN pain at rash      Objective:    Vitals: Vital Signs Last 24 Hrs  T(C): 36.4 (06-25-19 @ 13:12), Max: 37 (06-24-19 @ 21:45)  T(F): 97.6 (06-25-19 @ 13:12), Max: 98.6 (06-24-19 @ 21:45)  HR: 72 (06-25-19 @ 13:12) (58 - 72)  BP: 120/52 (06-25-19 @ 13:12) (116/61 - 143/89)  BP(mean): 102 (06-24-19 @ 18:00) (93 - 105)  RR: 18 (06-25-19 @ 13:12) (14 - 18)  SpO2: 100% (06-25-19 @ 13:12) (100% - 100%)            I&O's Summary    24 Jun 2019 07:01  -  25 Jun 2019 07:00  --------------------------------------------------------  IN: 120 mL / OUT: 235 mL / NET: -115 mL        PHYSICAL EXAM:  GENERAL: Cachetic appearing female in NAD  HEAD:  Atraumatic, Normocephalic  EYES: EOMI, conjunctiva and sclera clear  CHEST/LUNG: Clear to percussion bilaterally; No rales, rhonchi, wheezing, or rubs  HEART: Regular rate and rhythm; No murmurs, rubs, or gallops  ABDOMEN: Soft, Nontender, Nondistended;   SKIN: Ulcerative rash 2cm x 2cm on the middle of the upper back - no discharge or erythema  NERVOUS SYSTEM:  Alert & Oriented X3, no focal deficit    LABS:  06-25    136  |  103  |  10  ----------------------------<  79  3.4<L>   |  22  |  0.51  06-24    137  |  106  |  13  ----------------------------<  98  4.0   |  20<L>  |  0.51  06-23    133<L>  |  102  |  11  ----------------------------<  83  4.0   |  19<L>  |  0.43<L>    Ca    7.9<L>      25 Jun 2019 06:40  Ca    8.0<L>      24 Jun 2019 03:30  Ca    8.1<L>      23 Jun 2019 05:00  Phos  2.9     06-25  Mg     2.0     06-25    TPro  4.6<L>  /  Alb  2.2<L>  /  TBili  0.2  /  DBili  x   /  AST  13  /  ALT  21  /  AlkPhos  87  06-25  TPro  4.3<L>  /  Alb  2.3<L>  /  TBili  0.3  /  DBili  x   /  AST  21  /  ALT  32  /  AlkPhos  66  06-22                                              11.0   8.08  )-----------( 314      ( 25 Jun 2019 06:40 )             33.6                         11.3   10.50 )-----------( 310      ( 24 Jun 2019 03:30 )             33.7                         12.2   10.72 )-----------( 324      ( 23 Jun 2019 14:45 )             34.5     CAPILLARY BLOOD GLUCOSE          RADIOLOGY & ADDITIONAL TESTS:    Imaging Personally Reviewed:  [x ] YES  [ ] NO    Consultants involved in case:   Consultant(s) Notes Reviewed:  [ x] YES  [ ] NO:   Care Discussed with Consultants/Other Providers [x ] YES  [ ] NO ***************************************************************  Alexandre Kaur, PGY1  Internal Medicine   pager: [phone]  ***************************************************************    ETHAN GIFFORD  53y  MRN: [id number]    Patient is a 53y old  Female with h/o metastatic breast cancer s/p chemo and mastectomy with recurrence on 01/19, chronic HSV (+) ulcerative rash s/p valcyclovir who presents with a chief complaint of chills (25 Jun 2019 12:14)      Subjective: no events ON. Denies fever, CP, SOB, abn pain, N/V, dysuria. Tolerating diet.      MEDICATIONS  (STANDING):  mupirocin 2% Ointment 1 Application(s) Topical two times a day  pantoprazole Infusion 8 mG/Hr (10 mL/Hr) IV Continuous <Continuous>  potassium chloride    Tablet ER 40 milliEquivalent(s) Oral once  valACYclovir 500 milliGRAM(s) Oral every 12 hours    MEDICATIONS  (PRN):  lidocaine 5% Ointment 1 Application(s) Topical every 6 hours PRN pain at rash      Objective:    Vitals: Vital Signs Last 24 Hrs  T(C): 36.4 (06-25-19 @ 13:12), Max: 37 (06-24-19 @ 21:45)  T(F): 97.6 (06-25-19 @ 13:12), Max: 98.6 (06-24-19 @ 21:45)  HR: 72 (06-25-19 @ 13:12) (58 - 72)  BP: 120/52 (06-25-19 @ 13:12) (116/61 - 143/89)  BP(mean): 102 (06-24-19 @ 18:00) (93 - 105)  RR: 18 (06-25-19 @ 13:12) (14 - 18)  SpO2: 100% (06-25-19 @ 13:12) (100% - 100%)            I&O's Summary    24 Jun 2019 07:01  -  25 Jun 2019 07:00  --------------------------------------------------------  IN: 120 mL / OUT: 235 mL / NET: -115 mL        PHYSICAL EXAM:  GENERAL: Cachetic appearing female in NAD  HEAD:  Atraumatic, Normocephalic  EYES: EOMI, conjunctiva and sclera clear  CHEST/LUNG: Clear to percussion bilaterally; No rales, rhonchi, wheezing, or rubs  HEART: Regular rate and rhythm; No murmurs, rubs, or gallops  ABDOMEN: Soft, Nontender, Nondistended;   SKIN: Ulcerative rash 2cm x 2cm on the middle of the upper back - no discharge or erythema  NERVOUS SYSTEM:  Alert & Oriented X3, no focal deficit    LABS:  06-25    136  |  103  |  10  ----------------------------<  79  3.4<L>   |  22  |  0.51  06-24    137  |  106  |  13  ----------------------------<  98  4.0   |  20<L>  |  0.51  06-23    133<L>  |  102  |  11  ----------------------------<  83  4.0   |  19<L>  |  0.43<L>    Ca    7.9<L>      25 Jun 2019 06:40  Ca    8.0<L>      24 Jun 2019 03:30  Ca    8.1<L>      23 Jun 2019 05:00  Phos  2.9     06-25  Mg     2.0     06-25    TPro  4.6<L>  /  Alb  2.2<L>  /  TBili  0.2  /  DBili  x   /  AST  13  /  ALT  21  /  AlkPhos  87  06-25  TPro  4.3<L>  /  Alb  2.3<L>  /  TBili  0.3  /  DBili  x   /  AST  21  /  ALT  32  /  AlkPhos  66  06-22                                              11.0   8.08  )-----------( 314      ( 25 Jun 2019 06:40 )             33.6                         11.3   10.50 )-----------( 310      ( 24 Jun 2019 03:30 )             33.7                         12.2   10.72 )-----------( 324      ( 23 Jun 2019 14:45 )             34.5     CAPILLARY BLOOD GLUCOSE          RADIOLOGY & ADDITIONAL TESTS:    Imaging Personally Reviewed:  [x ] YES  [ ] NO    Consultants involved in case:   Consultant(s) Notes Reviewed:  [ x] YES  [ ] NO:   Care Discussed with Consultants/Other Providers [x ] YES  [ ] NO: GI (Dr. Lopez) re: advancing diet

## 2019-06-25 NOTE — PROGRESS NOTE ADULT - SUBJECTIVE AND OBJECTIVE BOX
Chief Complaint:  Patient is a 53y old  Female who presents with a chief complaint of chills (24 Jun 2019 15:18)      Interval Events:   Patient transferred to floor. Hemoglobin 11 from12.    Allergies:  No Known Allergies      Hospital Medications:  lidocaine 5% Ointment 1 Application(s) Topical every 6 hours PRN  mupirocin 2% Ointment 1 Application(s) Topical two times a day  pantoprazole Infusion 8 mG/Hr IV Continuous <Continuous>  valACYclovir 500 milliGRAM(s) Oral every 12 hours      PMHX/PSHX:  Breast cancer  S/P mastectomy, left      Family history:  No pertinent family history in first degree relatives          PHYSICAL EXAM:     GENERAL:  NAD  HEENT:  NC/AT,  conjunctivae clear, sclera -anicteric  CHEST:  Full & symmetric excursion, no increased  HEART:  Regular rhythm  ABDOMEN:  Soft, non-tender, non-distended, normoactive bowel sounds,  no masses ,no hepato-splenomegaly,   EXTREMITIES:  no cyanosis,clubbing or edema  SKIN:  No rash/erythema/ecchymoses/petechiae/wounds/abscess/warm/dry  NEURO:  Alert, oriented    Vital Signs:  Vital Signs Last 24 Hrs  T(C): 36.9 (25 Jun 2019 05:32), Max: 37 (24 Jun 2019 21:45)  T(F): 98.5 (25 Jun 2019 05:32), Max: 98.6 (24 Jun 2019 21:45)  HR: 62 (25 Jun 2019 05:32) (57 - 65)  BP: 116/61 (25 Jun 2019 05:32) (116/61 - 150/82)  BP(mean): 102 (24 Jun 2019 18:00) (90 - 105)  RR: 16 (25 Jun 2019 05:32) (14 - 19)  SpO2: 100% (25 Jun 2019 05:32) (100% - 100%)  Daily     Daily     LABS:                        11.0   8.08  )-----------( 314      ( 25 Jun 2019 06:40 )             33.6     06-25    136  |  103  |  10  ----------------------------<  79  3.4<L>   |  22  |  0.51    Ca    7.9<L>      25 Jun 2019 06:40  Phos  2.9     06-25  Mg     2.0     06-25    TPro  4.6<L>  /  Alb  2.2<L>  /  TBili  0.2  /  DBili  x   /  AST  13  /  ALT  21  /  AlkPhos  87  06-25    LIVER FUNCTIONS - ( 25 Jun 2019 06:40 )  Alb: 2.2 g/dL / Pro: 4.6 g/dL / ALK PHOS: 87 u/L / ALT: 21 u/L / AST: 13 u/L / GGT: x                   Imaging:

## 2019-06-25 NOTE — PROGRESS NOTE ADULT - ASSESSMENT
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 Admitted to the MICU for hematemesis and melena s/p embolization.

## 2019-06-25 NOTE — PROGRESS NOTE ADULT - ATTENDING COMMENTS
Patient seen and examined at bedside. Case d/w HS1. Patient reports feeling well today. States she is hungry and wants a more substantial diet. Later, patient with reported black BM and subsequent drop in H/H from 11 to 10. In brief, this is a 52 yo F with recurrent breast CA (on weekly chemotherapy with weekly paclitaxel and aromatase inhibiter (exemestane) with lung and possible liver mets, chronic HSV ulcerative rash (on chronic immunosuppressive therapy with valacyclovir and s/p treatment for MRSA with doxycycline who presents with chills and subsequently found to have acute blood loss anemia 2/2 UGIB 2/2 malignant gastric ulcers s/p IR embolization of the right and left gastric artery with course c/b then by hematemesis and MICU stay requiring intubation for airway protection and now extubated. H/H stable initially but currently slowly downtrending.    #Acute blood loss anemia 2/2 UGIB 2/2 malignant gastric ulcer:  -s/p IR embolization  -Concerns for melena this afternoon with downtrending CBC  -Repeat CBC q8hrs, transfuse H/H<9  -f/u GI and surgery recs    #Metastatic Breast CA  -Onc recs appreciated, will need outpatient chemo    Remaining care as above.

## 2019-06-25 NOTE — PROGRESS NOTE ADULT - PROBLEM SELECTOR PLAN 2
2/2 GI bleed requiring transfusion  -Type and screen  -Transfuse if Hgb < 7.0  -Total blood products received: 5 units pRBC, 1 unit FFP, 1 unit platelets

## 2019-06-25 NOTE — PROGRESS NOTE ADULT - ASSESSMENT
Impression:    #Acute blood loss anemia with melena with large ulcer with actively spurting vessel: Currently with dark red output and melena, HD stable, and with Hgb of 11 to 12 after receiving 4 to 5 units pRBCs in the last 24 hours. Underwent EGD on 6/22/19 with visualization of large cratered ulcer at the incisura with visible vessel x 2 with active spurting treated with epinephrine, bipolar cautery, and hemostatic clips, however, hemostasis was not fully achieved with active oozing at site of one of the visible vessels. Hemospray was utilized with hemostasis. Patient underwent IR embolization on 6/22/19 of the left gastric artery.   #Breast cancer on chemotherapy  #Chills: Infectious work-up negative    Recommendations:  - F/U IR and surgical recommendations  - No plan for repeat endoscopy at this time  - Monitor CBCs q8h  - Monitor bowel movements  - Transfuse for goal Hgb >/= 7.0  - Continue pantoprazole 8 mg/hr for at least 72 hours after EGD on 6/22/19  - Two large bore IVs  - Maintain active type and screen

## 2019-06-25 NOTE — PROGRESS NOTE ADULT - PROBLEM SELECTOR PLAN 3
Hx of HSV (+), MRSA (+) rash on the back  -Valacyclovir for ppx 500mg BID  -If patient becomes febrile then consider starting antibiotics and obtaining cultures

## 2019-06-25 NOTE — PROGRESS NOTE ADULT - PROBLEM SELECTOR PLAN 1
s/p embolization  -monitor for melena or hematemesis  -Protonix until 06/25   -Trend CBC q8hrs  -Advanced diet to full liquid per GI recommendation

## 2019-06-26 LAB
ANION GAP SERPL CALC-SCNC: 8 MMO/L — SIGNIFICANT CHANGE UP (ref 7–14)
APTT BLD: 30.6 SEC — SIGNIFICANT CHANGE UP (ref 27.5–36.3)
BUN SERPL-MCNC: 25 MG/DL — HIGH (ref 7–23)
CA-I BLD-SCNC: 0.97 MMOL/L — LOW (ref 1.03–1.23)
CALCIUM SERPL-MCNC: 7.5 MG/DL — LOW (ref 8.4–10.5)
CHLORIDE SERPL-SCNC: 106 MMOL/L — SIGNIFICANT CHANGE UP (ref 98–107)
CO2 SERPL-SCNC: 24 MMOL/L — SIGNIFICANT CHANGE UP (ref 22–31)
CREAT SERPL-MCNC: 0.52 MG/DL — SIGNIFICANT CHANGE UP (ref 0.5–1.3)
GLUCOSE SERPL-MCNC: 100 MG/DL — HIGH (ref 70–99)
HCG SERPL-ACNC: < 5 MIU/ML — SIGNIFICANT CHANGE UP
HCT VFR BLD CALC: 22.9 % — LOW (ref 34.5–45)
HCT VFR BLD CALC: 28.4 % — LOW (ref 34.5–45)
HCT VFR BLD CALC: 30 % — LOW (ref 34.5–45)
HCT VFR BLD CALC: 31.7 % — LOW (ref 34.5–45)
HCT VFR BLD CALC: 32.1 % — LOW (ref 34.5–45)
HGB BLD-MCNC: 10.1 G/DL — LOW (ref 11.5–15.5)
HGB BLD-MCNC: 10.6 G/DL — LOW (ref 11.5–15.5)
HGB BLD-MCNC: 10.7 G/DL — LOW (ref 11.5–15.5)
HGB BLD-MCNC: 7.5 G/DL — LOW (ref 11.5–15.5)
HGB BLD-MCNC: 9.4 G/DL — LOW (ref 11.5–15.5)
INR BLD: 1.13 — SIGNIFICANT CHANGE UP (ref 0.88–1.17)
MAGNESIUM SERPL-MCNC: 1.8 MG/DL — SIGNIFICANT CHANGE UP (ref 1.6–2.6)
MCHC RBC-ENTMCNC: 28.7 PG — SIGNIFICANT CHANGE UP (ref 27–34)
MCHC RBC-ENTMCNC: 28.7 PG — SIGNIFICANT CHANGE UP (ref 27–34)
MCHC RBC-ENTMCNC: 28.8 PG — SIGNIFICANT CHANGE UP (ref 27–34)
MCHC RBC-ENTMCNC: 29.3 PG — SIGNIFICANT CHANGE UP (ref 27–34)
MCHC RBC-ENTMCNC: 29.4 PG — SIGNIFICANT CHANGE UP (ref 27–34)
MCHC RBC-ENTMCNC: 32.8 % — SIGNIFICANT CHANGE UP (ref 32–36)
MCHC RBC-ENTMCNC: 33.1 % — SIGNIFICANT CHANGE UP (ref 32–36)
MCHC RBC-ENTMCNC: 33.3 % — SIGNIFICANT CHANGE UP (ref 32–36)
MCHC RBC-ENTMCNC: 33.4 % — SIGNIFICANT CHANGE UP (ref 32–36)
MCHC RBC-ENTMCNC: 33.7 % — SIGNIFICANT CHANGE UP (ref 32–36)
MCV RBC AUTO: 85.9 FL — SIGNIFICANT CHANGE UP (ref 80–100)
MCV RBC AUTO: 87.1 FL — SIGNIFICANT CHANGE UP (ref 80–100)
MCV RBC AUTO: 87.2 FL — SIGNIFICANT CHANGE UP (ref 80–100)
MCV RBC AUTO: 87.7 FL — SIGNIFICANT CHANGE UP (ref 80–100)
MCV RBC AUTO: 87.9 FL — SIGNIFICANT CHANGE UP (ref 80–100)
NRBC # FLD: 0 K/UL — SIGNIFICANT CHANGE UP (ref 0–0)
PHOSPHATE SERPL-MCNC: 2.4 MG/DL — LOW (ref 2.5–4.5)
PLATELET # BLD AUTO: 206 K/UL — SIGNIFICANT CHANGE UP (ref 150–400)
PLATELET # BLD AUTO: 217 K/UL — SIGNIFICANT CHANGE UP (ref 150–400)
PLATELET # BLD AUTO: 253 K/UL — SIGNIFICANT CHANGE UP (ref 150–400)
PLATELET # BLD AUTO: 267 K/UL — SIGNIFICANT CHANGE UP (ref 150–400)
PLATELET # BLD AUTO: 286 K/UL — SIGNIFICANT CHANGE UP (ref 150–400)
PMV BLD: 8.8 FL — SIGNIFICANT CHANGE UP (ref 7–13)
PMV BLD: 8.9 FL — SIGNIFICANT CHANGE UP (ref 7–13)
PMV BLD: 9.2 FL — SIGNIFICANT CHANGE UP (ref 7–13)
PMV BLD: 9.5 FL — SIGNIFICANT CHANGE UP (ref 7–13)
PMV BLD: 9.5 FL — SIGNIFICANT CHANGE UP (ref 7–13)
POTASSIUM SERPL-MCNC: 4.4 MMOL/L — SIGNIFICANT CHANGE UP (ref 3.5–5.3)
POTASSIUM SERPL-SCNC: 4.4 MMOL/L — SIGNIFICANT CHANGE UP (ref 3.5–5.3)
PROTHROM AB SERPL-ACNC: 12.9 SEC — SIGNIFICANT CHANGE UP (ref 9.8–13.1)
RBC # BLD: 2.61 M/UL — LOW (ref 3.8–5.2)
RBC # BLD: 3.26 M/UL — LOW (ref 3.8–5.2)
RBC # BLD: 3.44 M/UL — LOW (ref 3.8–5.2)
RBC # BLD: 3.65 M/UL — LOW (ref 3.8–5.2)
RBC # BLD: 3.69 M/UL — LOW (ref 3.8–5.2)
RBC # FLD: 16.5 % — HIGH (ref 10.3–14.5)
RBC # FLD: 17.7 % — HIGH (ref 10.3–14.5)
RBC # FLD: 17.8 % — HIGH (ref 10.3–14.5)
RBC # FLD: 17.9 % — HIGH (ref 10.3–14.5)
RBC # FLD: 18.1 % — HIGH (ref 10.3–14.5)
SODIUM SERPL-SCNC: 138 MMOL/L — SIGNIFICANT CHANGE UP (ref 135–145)
WBC # BLD: 12.12 K/UL — HIGH (ref 3.8–10.5)
WBC # BLD: 5.56 K/UL — SIGNIFICANT CHANGE UP (ref 3.8–10.5)
WBC # BLD: 6.03 K/UL — SIGNIFICANT CHANGE UP (ref 3.8–10.5)
WBC # BLD: 8.62 K/UL — SIGNIFICANT CHANGE UP (ref 3.8–10.5)
WBC # BLD: 8.84 K/UL — SIGNIFICANT CHANGE UP (ref 3.8–10.5)
WBC # FLD AUTO: 12.12 K/UL — HIGH (ref 3.8–10.5)
WBC # FLD AUTO: 5.56 K/UL — SIGNIFICANT CHANGE UP (ref 3.8–10.5)
WBC # FLD AUTO: 6.03 K/UL — SIGNIFICANT CHANGE UP (ref 3.8–10.5)
WBC # FLD AUTO: 8.62 K/UL — SIGNIFICANT CHANGE UP (ref 3.8–10.5)
WBC # FLD AUTO: 8.84 K/UL — SIGNIFICANT CHANGE UP (ref 3.8–10.5)

## 2019-06-26 PROCEDURE — 99233 SBSQ HOSP IP/OBS HIGH 50: CPT | Mod: GC

## 2019-06-26 PROCEDURE — 99232 SBSQ HOSP IP/OBS MODERATE 35: CPT | Mod: GC

## 2019-06-26 PROCEDURE — 99223 1ST HOSP IP/OBS HIGH 75: CPT

## 2019-06-26 PROCEDURE — 74174 CTA ABD&PLVS W/CONTRAST: CPT | Mod: 26

## 2019-06-26 PROCEDURE — 99291 CRITICAL CARE FIRST HOUR: CPT

## 2019-06-26 RX ORDER — SODIUM CHLORIDE 9 MG/ML
1000 INJECTION, SOLUTION INTRAVENOUS
Refills: 0 | Status: DISCONTINUED | OUTPATIENT
Start: 2019-06-26 | End: 2019-06-28

## 2019-06-26 RX ORDER — SODIUM CHLORIDE 9 MG/ML
1000 INJECTION INTRAMUSCULAR; INTRAVENOUS; SUBCUTANEOUS
Refills: 0 | Status: DISCONTINUED | OUTPATIENT
Start: 2019-06-26 | End: 2019-06-26

## 2019-06-26 RX ORDER — SODIUM CHLORIDE 9 MG/ML
1000 INJECTION INTRAMUSCULAR; INTRAVENOUS; SUBCUTANEOUS ONCE
Refills: 0 | Status: COMPLETED | OUTPATIENT
Start: 2019-06-26 | End: 2019-06-26

## 2019-06-26 RX ADMIN — SODIUM CHLORIDE 100 MILLILITER(S): 9 INJECTION, SOLUTION INTRAVENOUS at 23:16

## 2019-06-26 RX ADMIN — SODIUM CHLORIDE 1000 MILLILITER(S): 9 INJECTION INTRAMUSCULAR; INTRAVENOUS; SUBCUTANEOUS at 02:45

## 2019-06-26 RX ADMIN — VALACYCLOVIR 500 MILLIGRAM(S): 500 TABLET, FILM COATED ORAL at 17:05

## 2019-06-26 RX ADMIN — SODIUM CHLORIDE 100 MILLILITER(S): 9 INJECTION INTRAMUSCULAR; INTRAVENOUS; SUBCUTANEOUS at 10:26

## 2019-06-26 RX ADMIN — MUPIROCIN 1 APPLICATION(S): 20 OINTMENT TOPICAL at 17:04

## 2019-06-26 RX ADMIN — VALACYCLOVIR 500 MILLIGRAM(S): 500 TABLET, FILM COATED ORAL at 05:09

## 2019-06-26 RX ADMIN — Medication 62.5 MILLIMOLE(S): at 21:04

## 2019-06-26 RX ADMIN — PANTOPRAZOLE SODIUM 10 MG/HR: 20 TABLET, DELAYED RELEASE ORAL at 23:16

## 2019-06-26 RX ADMIN — MUPIROCIN 1 APPLICATION(S): 20 OINTMENT TOPICAL at 05:09

## 2019-06-26 NOTE — CONSULT NOTE ADULT - ASSESSMENT
52yo F with bleeding gastric ulcer concerning for malignancy s/p IR embolization and EGD with continued bleeding at this time (1U PRBCs given overnight)

## 2019-06-26 NOTE — PROGRESS NOTE ADULT - SUBJECTIVE AND OBJECTIVE BOX
Interventional Radiology    PAST MEDICAL & SURGICAL HISTORY:  Breast cancer  S/P mastectomy, left       CBC Full  -  ( 26 Jun 2019 12:31 )  WBC Count : 8.62 K/uL  RBC Count : 3.26 M/uL  Hemoglobin : 9.4 g/dL  Hematocrit : 28.4 %  Platelet Count - Automated : 267 K/uL  Mean Cell Volume : 87.1 fL  Mean Cell Hemoglobin : 28.8 pg  Mean Cell Hemoglobin Concentration : 33.1 %  Auto Neutrophil # : x  Auto Lymphocyte # : x  Auto Monocyte # : x  Auto Eosinophil # : x  Auto Basophil # : x  Auto Neutrophil % : x  Auto Lymphocyte % : x  Auto Monocyte % : x  Auto Eosinophil % : x  Auto Basophil % : x    06-26    138  |  106  |  25<H>  ----------------------------<  100<H>  4.4   |  24  |  0.52    Ca    7.5<L>      26 Jun 2019 03:22  Phos  2.4     06-26  Mg     1.8     06-26    TPro  4.6<L>  /  Alb  2.2<L>  /  TBili  0.2  /  DBili  x   /  AST  13  /  ALT  21  /  AlkPhos  87  06-25        A/P This is a 53y Female with A GI bleed who was embolized previously on 6/22/19.      Plan:  After the recent CTA which showed no evidence of GI bleed earlier today the risks of repeat angiography and embolization outweigh the benefits.  Should the patients condition change significantly please feel free to reconsult interventional radiology.  Discussed the plan with GI fellow.    D/w Attending Interventional Radiologist Dr. Vanegas

## 2019-06-26 NOTE — PROGRESS NOTE ADULT - ASSESSMENT
Impression:  #Acute blood loss anemia with melena with large ulcer with actively spurting vessel: Currently with dark red output and melena, HD stable, and with Hgb of 11 to 12 after receiving 4 to 5 units pRBCs in the last 24 hours. Underwent EGD on 6/22/19 with visualization of large cratered ulcer at the incisura with visible vessel x 2 with active spurting treated with epinephrine, bipolar cautery, and hemostatic clips, however, hemostasis was not fully achieved with active oozing at site of one of the visible vessels. Hemospray was utilized with hemostasis. Patient underwent IR embolization on 6/22/19 of the left gastric artery.   #Breast cancer on chemotherapy  #Chills: Infectious work-up negative    Recommendations:  - F/U IR and surgical recommendations as patients endoscopic bleeding was not amendable to endoscopic intervention  - keep patient NPO  - Monitor CBCs q8h  - Monitor bowel movements  - Transfuse for goal Hgb >/= 7.0  - Continue pantoprazole 8 mg/hr for at least 72 hours after EGD on 6/22/19  - Two large bore IVs  - Maintain active type and screen    Ros Lopez, PGY-4  Gastroenterology Fellow  Pager x [phone] or [phone]  (After 5 pm or on weekends please page GI on call) Impression:  #Acute blood loss anemia with melena with large ulcer with actively spurting vessel: Currently with dark red output and melena, HD stable, and with Hgb of 11 to 12 after receiving 4 to 5 units pRBCs in the last 24 hours. Underwent EGD on 6/22/19 with visualization of large cratered ulcer at the incisura with visible vessel x 2 with active spurting treated with epinephrine, bipolar cautery, and hemostatic clips, however, hemostasis was not fully achieved with active oozing at site of one of the visible vessels. Hemospray was utilized with hemostasis. Patient underwent IR embolization on 6/22/19 of the left gastric artery.   #Breast cancer on chemotherapy  #Chills: Infectious work-up negative    Recommendations:  - F/U IR and surgical recommendations   - keep patient NPO  - tentative plans to repeat EGD tomorrow if patient continues to have melena or if H/H continues to drop  - Monitor CBCs q8h  - Monitor bowel movements  - Transfuse for goal Hgb >/= 7.0  - Continue pantoprazole 8 mg/hr for at least 72 hours after EGD on 6/22/19  - Two large bore IVs  - Maintain active type and screen    Ros Lopez, PGY-4  Gastroenterology Fellow  Pager x [phone] or [phone]  (After 5 pm or on weekends please page GI on call) Impression:  #Acute blood loss anemia with melena with large ulcer with actively spurting vessel: Currently with dark red output and melena, HD stable, and with Hgb of 11 to 12 after receiving 4 to 5 units pRBCs in the last 24 hours. Underwent EGD on 6/22/19 with visualization of large cratered ulcer at the incisura with visible vessel x 2 with active spurting treated with epinephrine, bipolar cautery, and hemostatic clips, however, hemostasis was not fully achieved with active oozing at site of one of the visible vessels. Hemospray was utilized with hemostasis. Patient underwent IR embolization on 6/22/19 of the left gastric artery.   #Breast cancer on chemotherapy  #Chills: Infectious work-up negative    Recommendations:  - F/U IR and surgical recommendations   - keep patient NPO  - patients bleeding likely not amendable to endoscopic intervention based on previous EGD (extensive case discussion amongst three GI attendings)  - Monitor CBCs q8h  - Monitor bowel movements  - Transfuse for goal Hgb >/= 7.0  - Continue pantoprazole 8 mg/hr for at least 72 hours after EGD on 6/22/19  - Two large bore IVs  - Maintain active type and screen    Ros Lopez, PGY-4  Gastroenterology Fellow  Pager x [phone] or [phone]  (After 5 pm or on weekends please page GI on call)

## 2019-06-26 NOTE — PROGRESS NOTE ADULT - SUBJECTIVE AND OBJECTIVE BOX
Chief Complaint:  Patient is a 53y old  Female who presents with a chief complaint of chills and bloody BM (25 Jun 2019 14:22)      Interval Events:     Allergies:  No Known Allergies      Hospital Medications:  lidocaine 5% Ointment 1 Application(s) Topical every 6 hours PRN  mupirocin 2% Ointment 1 Application(s) Topical two times a day  pantoprazole Infusion 8 mG/Hr IV Continuous <Continuous>  valACYclovir 500 milliGRAM(s) Oral every 12 hours      PMHX/PSHX:  Breast cancer  S/P mastectomy, left      Family history:  No pertinent family history in first degree relatives          PHYSICAL EXAM:     GENERAL:  NAD  HEENT:  NC/AT,  conjunctivae clear, sclera -anicteric  CHEST:  Full & symmetric excursion, no increased  HEART:  Regular rhythm  ABDOMEN:  Soft, non-tender, non-distended, normoactive bowel sounds,  no masses ,no hepato-splenomegaly,   EXTREMITIES:  no cyanosis,clubbing or edema  SKIN:  No rash/erythema/ecchymoses/petechiae/wounds/abscess/warm/dry  NEURO:  Alert, oriented    Vital Signs:  Vital Signs Last 24 Hrs  T(C): 37.2 (26 Jun 2019 05:09), Max: 37.6 (25 Jun 2019 22:20)  T(F): 98.9 (26 Jun 2019 05:09), Max: 99.7 (25 Jun 2019 22:20)  HR: 74 (26 Jun 2019 05:09) (72 - 88)  BP: 108/46 (26 Jun 2019 05:09) (87/50 - 120/52)  BP(mean): --  RR: 18 (26 Jun 2019 05:09) (17 - 18)  SpO2: 100% (26 Jun 2019 05:09) (100% - 100%)  Daily     Daily     LABS:                        10.7   12.12 )-----------( 286      ( 26 Jun 2019 03:22 )             32.1     06-26    138  |  106  |  25<H>  ----------------------------<  100<H>  4.4   |  24  |  0.52    Ca    7.5<L>      26 Jun 2019 03:22  Phos  2.4     06-26  Mg     1.8     06-26    TPro  4.6<L>  /  Alb  2.2<L>  /  TBili  0.2  /  DBili  x   /  AST  13  /  ALT  21  /  AlkPhos  87  06-25    LIVER FUNCTIONS - ( 25 Jun 2019 06:40 )  Alb: 2.2 g/dL / Pro: 4.6 g/dL / ALK PHOS: 87 u/L / ALT: 21 u/L / AST: 13 u/L / GGT: x                   Imaging: Chief Complaint:  Patient is a 53y old  Female who presents with a chief complaint of chills and bloody BM (25 Jun 2019 14:22)      Interval Events:   Patient with melena overnight requiring transfusion.  Patient with CT-A after which was negative for GI bleed.    Allergies:  No Known Allergies      Hospital Medications:  lidocaine 5% Ointment 1 Application(s) Topical every 6 hours PRN  mupirocin 2% Ointment 1 Application(s) Topical two times a day  pantoprazole Infusion 8 mG/Hr IV Continuous <Continuous>  valACYclovir 500 milliGRAM(s) Oral every 12 hours      PMHX/PSHX:  Breast cancer  S/P mastectomy, left      Family history:  No pertinent family history in first degree relatives          PHYSICAL EXAM:     GENERAL:  NAD  HEENT:  NC/AT,  conjunctivae clear, sclera -anicteric  CHEST:  Full & symmetric excursion, no increased  HEART:  Regular rhythm  ABDOMEN:  Soft, non-tender, non-distended, normoactive bowel sounds,  no masses ,no hepato-splenomegaly,   EXTREMITIES:  no cyanosis,clubbing or edema  SKIN:  No rash/erythema/ecchymoses/petechiae/wounds/abscess/warm/dry  NEURO:  Alert, oriented    Vital Signs:  Vital Signs Last 24 Hrs  T(C): 37.2 (26 Jun 2019 05:09), Max: 37.6 (25 Jun 2019 22:20)  T(F): 98.9 (26 Jun 2019 05:09), Max: 99.7 (25 Jun 2019 22:20)  HR: 74 (26 Jun 2019 05:09) (72 - 88)  BP: 108/46 (26 Jun 2019 05:09) (87/50 - 120/52)  BP(mean): --  RR: 18 (26 Jun 2019 05:09) (17 - 18)  SpO2: 100% (26 Jun 2019 05:09) (100% - 100%)  Daily     Daily     LABS:                        10.7   12.12 )-----------( 286      ( 26 Jun 2019 03:22 )             32.1     06-26    138  |  106  |  25<H>  ----------------------------<  100<H>  4.4   |  24  |  0.52    Ca    7.5<L>      26 Jun 2019 03:22  Phos  2.4     06-26  Mg     1.8     06-26    TPro  4.6<L>  /  Alb  2.2<L>  /  TBili  0.2  /  DBili  x   /  AST  13  /  ALT  21  /  AlkPhos  87  06-25    LIVER FUNCTIONS - ( 25 Jun 2019 06:40 )  Alb: 2.2 g/dL / Pro: 4.6 g/dL / ALK PHOS: 87 u/L / ALT: 21 u/L / AST: 13 u/L / GGT: x                   Imaging:

## 2019-06-26 NOTE — PROGRESS NOTE ADULT - ATTENDING COMMENTS
Having active GI hemorrhage. Spoke with GI, surgery, and IR. Awaiting surgical evaluation for possible partial gastrectomy given ongoing hemorrhage. 2 Iarge bore IV's placed for resuscitative measures, c/w protonix gtt, IVF, H/H now 7.5. Give 1U PRBC. Trend vitals and CBC q4hrs. Possible OR tonight.

## 2019-06-26 NOTE — CONSULT NOTE ADULT - ATTENDING COMMENTS
I have reviewed the residents note including the history, exam, assessment, and plan.  I agree with the residents assessment and plan.    Nunu Miguel MD
Continue transfusion.  Monitor H/H.  Plan for partial gastrectomy given ongoing bleeding requiring transfusion despite therapeutic EGD and IR embolization.
As above.    Impression:    #1.  GI bleed with hematochezia.  Hemodynamically stable.  Digital rectal exam:  trace blood.  #2.  Anemia Hb 6.1, transfused 1 unit to Hb 8 then 7.4  #3.  CT scan demonstrated thickened distal rectum  #4.  Elevated INR 1.5  #5.  Metastatic breast CA with surgery then chemotherapy  #6.  Chronic rash (?HS), on empiric treatment with valganciclovir   #7.  Pyuria.    Recommendations:    #1.  Empiric antibiotics.  #2.  Follow CBC, workup for infection risk on an elderly patient on   #3.  Obtain records from outside hospital regarding labs, endoscopic reports.  #4.  Will consider sigmoidoscopy when clinically improved.
Critical Care Dx    K92.2 Acute upper GI bleed  K25.0 Acute gastric ulcer with hemorrhage   D62 Acute blood loss anemia   -transfuse to crit > 21, hemodynamic stability  -trend with serial crits, PPI gtt, appreciate GI/IR hemostasis   -keep NGT  -Surg onc for possible gastrectomy   E87.6 Hypokalemia   -replete K and Magnesium  B00.9 HSV (herpes simplex virus) infection   -valacyclovir as ordered, will need to hold once post-op    The patient is a critical care patient with life threatening hemodynamic instability in SICU.  I have personally interviewed and examined the patient, reviewed data and laboratory tests/x-rays and all pertinent electronic images.  The SICU team has a constant risk benefit analyzes discussion with the primary team, all consultants, House Staff and PA's on all decisions.  These diagnoses are unrelated to the surgical procedure noted.  Time involved in performance of separately billable procedures was not counted toward my critical care time. There is no overlap.    I have personally provided 60 minutes of critical care time concurrently with the resident/fellow and excludes time spent on separate procedures.    I have reviewed the resident's documentation and I agree with the resident's assessment and plan of care.  I was physically present for the key portions of the evaluation and management (E/M) service provided.  I agree with the above history, physical, and plan which I have reviewed and edited where appropriate.     Stew Rangel MD  Acute and Critical Care Surgery
I discussed the case and saw the patient with the fellow and agree with the above.    Jordana Cedillo MD
Likely upper GIB, in setting of metastatic breast cancer.  Initial hypotension, now resolved after 2 units of PRBC, not tachycardic with stable respiratory status.  No need for ICU level of care, if status changes we'll reevaluated.

## 2019-06-26 NOTE — PROVIDER CONTACT NOTE (OTHER) - BACKGROUND
MICU transfer admitted with sepsis. PMH of breast CA, hypotension, GI bleed.
Patient admitted for sepsis with history of met. cancer.
Patient with DX of Sepsis secondary to MRSA skin, hx of breast CA.
Patient with DX of Sepsis secondary to MRSA skin, hx of breast CA.
Patient with Dx of Sepsis, hx of breast CA.
Pt admitted with sepsis and GIB
Pt was admitted for infection
Patient admitted for sepsis with history of met. cancer.

## 2019-06-26 NOTE — PROVIDER CONTACT NOTE (OTHER) - RECOMMENDATIONS
Notify MD, continue monitoring patient throughout transfusion
Continue 2nd unit PRBC as ordered, stat GI and MICU consult, continue bedrest as ordered, vs q 4hours and continue to monitor.
Monitor
Notify MD, continue monitoring patient
RRT
continue to monitor pt.
will order  cc bolus over 30 minutes x1 dose.
will order NS 0.9% over 500cc

## 2019-06-26 NOTE — PROVIDER CONTACT NOTE (OTHER) - ACTION/TREATMENT ORDERED:
MD notified, continue transfusion
Aware of above order.
Aware of above orders.
MD made aware. No interventions ordered at this time.
MD notified, continue transfusion
No interventions presently.
RRT was called, pt treated accordingly/ as per MD orders. endorsed to incoming nurse
provider notified

## 2019-06-26 NOTE — PROGRESS NOTE ADULT - ATTENDING COMMENTS
Clinical course as noted-several episodes of melena. Suspect recurrent GI bleed from large gastric ulcer. Of note, CTA negative for active bleeding. Patient not candidate for further IR guided embolo-therapy. In addition, followup EGD likely of limited benefit in view of previous extensive EGD/endoscopic hemostatic therapy with inability to achieve durable hemostasis. Therefore, advise surgical consult regarding possible operative intervention. For now, maintain n.p.o. in conjunction with PPI drip and monitoring of serial hemoglobin/hematocrit. Consider MICU evaluation.

## 2019-06-26 NOTE — CHART NOTE - NSCHARTNOTEFT_GEN_A_CORE
Gastroenterology Brief Note   - patient with continued melena and drop in H/H   - spoke with IR fellow earlier today, as CT-angio negative nothing for them to intervene on currently and patient has already been empirically embolized   - spoke with multiple GI attendings, given patients recent endoscopy findings not likely to have successful resolution of bleeding with EGD   - Surgical team contact by myself and case discussed/explained as to why recommended surgical evaluation   - attending on medicine team also updated and involved in coordination of care

## 2019-06-26 NOTE — PROGRESS NOTE ADULT - PROBLEM SELECTOR PLAN 1
s/p embolization  -monitor for melena or hematemesis  -Protonix until 06/25   -Trend CBC q8hrs  -Advanced diet to full liquid per GI recommendation Gastric artery bleed.   -embolized by IR  -CTA today 06/26 shows no bleeding  -IR recommendation is no intervention at this time. Gastric artery bleed.   -embolized by IR  -CTA today 06/26 shows no bleeding  -IR recommendation is no intervention at this time.  -vitals Q4  -Hgb checks Q4

## 2019-06-26 NOTE — CONSULT NOTE ADULT - SUBJECTIVE AND OBJECTIVE BOX
HISTORY OF PRESENT ILLNESS:  53 year old female with a PMH of metastatic breast cancer (to mediastinal LNs, dx in 2012 s/p chemo and mastectomy, remission in 2013, recurrence 01/19, on paclitaxel weekly every Wednesday and exemestane daily), chronic ulcerative rash (HSV+ via skin biopsy and PCR s/p PO valacyclovir, +MRSA on cx s/p doxy, derm notes in [employer]), who initially presented to [facility] d/t rigors and chills. Patient had gone to her dermatologist's office due to worsening of her chronic rash (states that rash was casued by HSV and had a superimposed MRSA infection), and dermatologist sent her in because of rigors and chills in office. In ED, patient reported that she also had two weeks of bloody bowel movements with blood in the toilet bowel and on paper. She reports a normal colonoscopy approx 2 years ago. Since admission, pt was in MICU for UGIB and underwent IR embolization and EGD. Surgical oncology consulted for second opinion in setting of recurrent UGIB to evaluate for possible gastrectomy/partial gastrectomy. Surgical oncology consulted SICU for closer monitoring in setting of active GI bleed with dropping hemoglobin/hematocrit.     PAST MEDICAL HISTORY: Breast cancer    PAST SURGICAL HISTORY: S/P mastectomy, left    FAMILY HISTORY: No pertinent family history in first degree relatives    SOCIAL HISTORY: Patient lives at home with her family. She used to work as a . She is originally from [facility]. No history of tobacco/drugs/ETOH.    CODE STATUS: FULL CODE    HOME MEDICATIONS:  -Paclitaxel 100 mg/17 mL intravenous solution: 118 milligram(s) intravenous once a week  -Exemestane 25 mg oral tablet: 1 tab(s) orally once a day    ALLERGIES: No Known Allergies    VITAL SIGNS:  ICU Vital Signs Last 24 Hrs  T(C): 36.8 (26 Jun 2019 16:59), Max: 37.6 (25 Jun 2019 22:20)  T(F): 98.2 (26 Jun 2019 16:59), Max: 99.7 (25 Jun 2019 22:20)  HR: 84 (26 Jun 2019 16:59) (74 - 88)  BP: 124/64 (26 Jun 2019 16:59) (87/50 - 128/76)  BP(mean): --  ABP: --  ABP(mean): --  RR: 18 (26 Jun 2019 16:59) (17 - 18)  SpO2: 100% (26 Jun 2019 16:59) (99% - 100%)      NEURO  Exam: AAO x 4. Moving all extremities with full strength. Mentating appropriately. Non-focal neurologic exam.     RESPIRATORY  Exam: Lungs clear to auscultation bilaterally, with normal respiratory effort and no intercostal retractions; normal work of breathing.    CARDIOVASCULAR  Exam: Regular Rhythm and Rate; Normal S1, S2; No murmurs, rubs, or gallops. 2+ peripheral pulses. No JVD.  Cardiac Rhythm: NSR.    GI/NUTRITION  Exam: Soft, non-tender, non-distended; no masses or hepatosplenomegaly. Normoactive bowel sounds.  Diet: NPO  pantoprazole Infusion 8 mG/Hr IV Continuous <Continuous>    GENITOURINARY/RENAL  sodium chloride 0.9%. 1000 milliLiter(s) IV Continuous <Continuous>  sodium phosphate IVPB 15 milliMole(s) IV Intermittent once      06-26    138  |  106  |  25<H>  ----------------------------<  100<H>  4.4   |  24  |  0.52    Ca    7.5<L>      26 Jun 2019 03:22  Phos  2.4     06-26  Mg     1.8     06-26    TPro  4.6<L>  /  Alb  2.2<L>  /  TBili  0.2  /  DBili  x   /  AST  13  /  ALT  21  /  AlkPhos  87  06-25    HEMATOLOGIC  [X] VTE Prophylaxis:                          7.5    5.56  )-----------( 217      ( 26 Jun 2019 15:44 )             22.9       INFECTIOUS DISEASES  valACYclovir 500 milliGRAM(s) Oral every 12 hours      PATIENT CARE ACCESS DEVICES:  [X] Peripheral IV  [ ] Central Venous Line	[ ] R	[ ] L	[ ] IJ	[ ] Fem	[ ] SC	Placed:   [ ] Arterial Line		[ ] R	[ ] L	[ ] Fem	[ ] Rad	[ ] Ax	Placed:   [ ] PICC:					[ ] Mediport  [ ] Urinary Catheter, Date Placed:   [x] Necessity of urinary, arterial, and venous catheters discussed    OTHER MEDICATIONS: lidocaine 5% Ointment 1 Application(s) Topical every 6 hours PRN  mupirocin 2% Ointment 1 Application(s) Topical two times a day      IMAGING STUDIES: CTA Abdomen/Pelvis (06/26/19): No evidence of active GI bleed

## 2019-06-26 NOTE — CONSULT NOTE ADULT - SUBJECTIVE AND OBJECTIVE BOX
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 She also reports that she has had two weeks of bloody bowel movements with blood in the toilet bowel and on paper. She reports a normal colonoscopy approx 2 years ago. Since admission, pt was in SICU for UGIB and underwent IR embolization and EGD. Surgical oncology consulted for second opinion in setting of recurrent UGIB to evaluate for possible gastrectomy.        PAST MEDICAL & SURGICAL HISTORY:  Breast cancer  S/P mastectomy, left    [  ] No significant past history as reviewed with the patient and family    FAMILY HISTORY:  No pertinent family history in first degree relatives    [  ] Family history not pertinent as reviewed with the patient and family    SOCIAL HISTORY:    MEDICATIONS  (STANDING):  mupirocin 2% Ointment 1 Application(s) Topical two times a day  pantoprazole Infusion 8 mG/Hr (10 mL/Hr) IV Continuous <Continuous>  sodium chloride 0.9%. 1000 milliLiter(s) (100 mL/Hr) IV Continuous <Continuous>  sodium phosphate IVPB 15 milliMole(s) IV Intermittent once  valACYclovir 500 milliGRAM(s) Oral every 12 hours    MEDICATIONS  (PRN):  lidocaine 5% Ointment 1 Application(s) Topical every 6 hours PRN pain at rash    Allergies    No Known Allergies    Intolerances        Vital Signs Last 24 Hrs  T(C): 36.7 (26 Jun 2019 14:00), Max: 37.6 (25 Jun 2019 22:20)  T(F): 98.1 (26 Jun 2019 14:00), Max: 99.7 (25 Jun 2019 22:20)  HR: 82 (26 Jun 2019 14:00) (74 - 88)  BP: 128/76 (26 Jun 2019 14:00) (87/50 - 128/76)  BP(mean): --  RR: 17 (26 Jun 2019 14:00) (17 - 18)  SpO2: 99% (26 Jun 2019 14:00) (99% - 100%)  Daily     Daily     Exam:  General:  HEENT:  Resp:  Chest:   Abd:  Ext:  Neuro:                          9.4    8.62  )-----------( 267      ( 26 Jun 2019 12:31 )             28.4     06-26    138  |  106  |  25<H>  ----------------------------<  100<H>  4.4   |  24  |  0.52    Ca    7.5<L>      26 Jun 2019 03:22  Phos  2.4     06-26  Mg     1.8     06-26    TPro  4.6<L>  /  Alb  2.2<L>  /  TBili  0.2  /  DBili  x   /  AST  13  /  ALT  21  /  AlkPhos  87  06-25          IMAGING STUDIES:  < from: CT Angio Abdomen and Pelvis w/ IV Cont (06.26.19 @ 11:58) >  FINDINGS:    LOWER CHEST: Bibasilar dependent atelectasis. Postoperative changes in   the left breast. Stable bilateral pleural effusions with adjacent passive   atelectasis.    LIVER: Left hepatic lobe cyst. Subcentimeter liver lesions too small to   characterize, likely additional cysts. Linear and punctate radiodensities   in the right hepatic lobe may reflect calcifications or sequela of prior   embolization. Similar radiodensities are present adjacent to the head and   uncinate process of pancreas.  BILE DUCTS: Normal caliber.  GALLBLADDER: Cholecystectomy.  SPLEEN: Within normal limits.  PANCREAS: Within normal limits.  ADRENALS: Within normal limits.  KIDNEYS/URETERS: Within normal limits.    BLADDER: Diffuse thickening of the bladder wall.  REPRODUCTIVE ORGANS: Uterus and bilateral adnexa are unremarkable.    BOWEL: No bowel obstruction. Appendix is normal. No evidence of active GI   bleed. A metallic clip is noted along the posterior aspect of the gastric   fundus.  PERITONEUM: No ascites.  VESSELS:  Within normal limits.  RETROPERITONEUM: No lymphadenopathy.    ABDOMINAL WALL: Postoperative changes in the anterior abdominal wall.  BONES: Within normal limits.    IMPRESSION:     No evidence of active GI bleed, as clinically questioned.    < end of copied text >    < from: Upper Endoscopy (06.22.19 @ 13:38) >  Findings:       EGD:       -The esophagus was normal.       -The stomach contained a cratered large malignant appearing ulcer with        two visible vessels. Both of them was spurting blood.       -Epinephrine 1:10,000 (2 cc) was injected into each site.    -Bipolar cautery was applied using a 10 Fr probe with a single channel        therapeutic scope. The vessels were obliterate but oozing was still seen        at one site. A clip was applied successfully to one site controlling the        bleeding. The other site would not retain a clip due to the fibrotic        nature of the vessel.       -Hemispora was applied to both sites with successful control of the        bleeding.       -The duodenal bulb and D2 were normal.                                                        Impression:          Large malignant appearing ulcer with two visible vessels                        actively bleeding s/p endoscopic therapy. The hemospray                        is a temporary agent and thus more definitive therapy is                        needed. I discussed with IR for embolization for a more                        definitive therapy.  Recommendation:      - Return patient to PACU for ongoing care.                       -Follow up IR recommendations.                                                                                     < end of copied text >    < from: IR Procedure (06.22.19 @ 20:29) >  IMPRESSION:  Catheter embolization of right and left gastric artery for upper GI bleed    PLAN  Monitor for signs of bleeding.    < end of copied text >

## 2019-06-26 NOTE — PROVIDER CONTACT NOTE (OTHER) - ASSESSMENT
Patient awake and verbally responsive. No reports of dizziness or lightheadedness.
/53. pt stable. no acute distress noted.
Patient asymptomatic.
Patient asymptomatic. no s/s noted
Patient awake and verbally responsive. No reports of dizziness or lightheadedness.
Pt BP is 105/51 mmhg. Asymptomatic
Pt appears pale, unresponsive for a minute, low BP
vs T 97.9, /63, HR 88, RR 17, O2sat 100% RA, patient is currently receiving 2nd unit of PRBC as ordered.

## 2019-06-26 NOTE — CONSULT NOTE ADULT - ASSESSMENT
Assessment: 54 yo F, presenting to SICU, with bleeding gastric ulcer concerning for malignancy s/p IR embolization and EGD with continued bleeding at this time, and dropping Hemoglobin/Hematocrit, without active bleed on CTA abdomen/pelvis.     Plan: Assessment: 54 yo F, presenting to SICU, with bleeding gastric ulcer concerning for malignancy s/p IR embolization and EGD with continued bleeding at this time, and dropping Hemoglobin/Hematocrit, without active bleed on CTA abdomen/pelvis.     Plan:     #Neuro:   -Baseline AAOX4  -Mentating appropriately feels tired. Continue to monitor.     #Pulm:   -Breathing comfortably, normal work and rate of breathing.  -Will continue to monitor. Maintain SpO2 >92%.     #CV:   -Pt initially hypotensive from UGI bleed, now BP and HR stable, no hx of cardiac issues   -Monitor BP and HR    #GI: Upper GI bleed s/p embolization  -Protonix gtt  -Keep NPO  -Monitor bowel movements  -GI, IR, general surgery following.   -Plan for gastrectomy/partial gastrectomy, as still actively bleeding with negative CT angiogram abdomen/pelvis  -CBC q4h, Active T+S  -F/u biopsy    #Renal:   -No issues  -Monitor Cr and I/O's    #Heme:    -Anemia from upper GI bleed requiring blood transfusion, follow up CBC post-transfusion.  -CBC q4hr to trend H/H  -transfuse if Hgb <7.0  -total blood products: 5 units pRBC, 1 unit FFP, 1 unit platelets    #ID:   -Hx of ulcerative rash, HSV+ and MRSA+ s/p treatment, now on valacyclovir for ppx, pt is afebrile   -Continue with Valacyclovir 500mg BID  -If febrile, consider starting on abx and obtaining cultures     #Ppx:  -Protonix gtt  -SCDs    Dispo: SICU

## 2019-06-26 NOTE — PROGRESS NOTE ADULT - PROBLEM SELECTOR PLAN 2
2/2 GI bleed requiring transfusion  -Type and screen  -Transfuse if Hgb < 7.0  -Total blood products received: 5 units pRBC, 1 unit FFP, 1 unit platelets 2/2 GI bleed requiring transfusion  -Type and screen  -Transfuse if Hgb < 9.0  -Total blood products received: 6 units pRBC, 1 unit FFP, 1 unit platelets

## 2019-06-26 NOTE — PROGRESS NOTE ADULT - SUBJECTIVE AND OBJECTIVE BOX
***************************************************************  Alexandre Kaur, PGY1  Internal Medicine   pager: [phone]  ***************************************************************    ETHAN GIFFORD  53y  MRN: [id number]    Patient is a 53y old  Female who presents with a chief complaint of chills (26 Jun 2019 08:48)      Subjective: Patients Hgb dropped overnight Denies fever, CP, SOB, abn pain, N/V, dysuria.       MEDICATIONS  (STANDING):  mupirocin 2% Ointment 1 Application(s) Topical two times a day  pantoprazole Infusion 8 mG/Hr (10 mL/Hr) IV Continuous <Continuous>  sodium chloride 0.9%. 1000 milliLiter(s) (100 mL/Hr) IV Continuous <Continuous>  sodium phosphate IVPB 30 milliMole(s) IV Intermittent once  valACYclovir 500 milliGRAM(s) Oral every 12 hours    MEDICATIONS  (PRN):  lidocaine 5% Ointment 1 Application(s) Topical every 6 hours PRN pain at rash      Objective:    Vitals: Vital Signs Last 24 Hrs  T(C): 37 (06-26-19 @ 10:00), Max: 37.6 (06-25-19 @ 22:20)  T(F): 98.6 (06-26-19 @ 10:00), Max: 99.7 (06-25-19 @ 22:20)  HR: 86 (06-26-19 @ 10:00) (72 - 88)  BP: 122/57 (06-26-19 @ 10:00) (87/50 - 122/57)  BP(mean): --  RR: 18 (06-26-19 @ 10:00) (17 - 18)  SpO2: 100% (06-26-19 @ 10:00) (100% - 100%)            I&O's Summary      PHYSICAL EXAM:  GENERAL: NAD  HEAD:  Atraumatic, Normocephalic  EYES: EOMI, conjunctiva and sclera clear  CHEST/LUNG: Clear to percussion bilaterally; No rales, rhonchi, wheezing, or rubs  HEART: Regular rate and rhythm; No murmurs, rubs, or gallops  ABDOMEN: Soft, Nontender, Nondistended;   SKIN: No rashes or lesions  NERVOUS SYSTEM:  Alert & Oriented X3, no focal deficit    LABS:  06-26    138  |  106  |  25<H>  ----------------------------<  100<H>  4.4   |  24  |  0.52  06-25    136  |  103  |  10  ----------------------------<  79  3.4<L>   |  22  |  0.51  06-24    137  |  106  |  13  ----------------------------<  98  4.0   |  20<L>  |  0.51    Ca    7.5<L>      26 Jun 2019 03:22  Ca    7.9<L>      25 Jun 2019 06:40  Ca    8.0<L>      24 Jun 2019 03:30  Phos  2.4     06-26  Mg     1.8     06-26    TPro  4.6<L>  /  Alb  2.2<L>  /  TBili  0.2  /  DBili  x   /  AST  13  /  ALT  21  /  AlkPhos  87  06-25                                              10.1   8.84  )-----------( 206      ( 26 Jun 2019 09:15 )             30.0                         10.7   12.12 )-----------( 286      ( 26 Jun 2019 03:22 )             32.1                         8.4    11.00 )-----------( 259      ( 25 Jun 2019 20:36 )             26.2     CAPILLARY BLOOD GLUCOSE          RADIOLOGY & ADDITIONAL TESTS:    Imaging Personally Reviewed:  [x ] YES  [ ] NO    Consultants involved in case:   Consultant(s) Notes Reviewed:  [ x] YES  [ ] NO:   Care Discussed with Consultants/Other Providers [x ] YES  [ ] NO ***************************************************************  Alexandre Kaur, PGY1  Internal Medicine   pager: [phone]  ***************************************************************    ETHAN GIFFORD  53y  MRN: [id number]    Patient is a 53y old  Female who presents with a chief complaint of chills (26 Jun 2019 08:48)      Subjective: Patients Hgb dropped overnight to 8.4, given the rate of decline the patient received 1 unit of pRBC. Patient became hypotensive post-infusion and received fluids. This morning the patient reports 5 episodes of tarry stools and feeling fatigue. She denies any fever, CP, SOB, abn pain, N/V, dysuria.       MEDICATIONS  (STANDING):  mupirocin 2% Ointment 1 Application(s) Topical two times a day  pantoprazole Infusion 8 mG/Hr (10 mL/Hr) IV Continuous <Continuous>  sodium chloride 0.9%. 1000 milliLiter(s) (100 mL/Hr) IV Continuous <Continuous>  sodium phosphate IVPB 30 milliMole(s) IV Intermittent once  valACYclovir 500 milliGRAM(s) Oral every 12 hours    MEDICATIONS  (PRN):  lidocaine 5% Ointment 1 Application(s) Topical every 6 hours PRN pain at rash      Objective:    Vitals: Vital Signs Last 24 Hrs  T(C): 37 (06-26-19 @ 10:00), Max: 37.6 (06-25-19 @ 22:20)  T(F): 98.6 (06-26-19 @ 10:00), Max: 99.7 (06-25-19 @ 22:20)  HR: 86 (06-26-19 @ 10:00) (72 - 88)  BP: 122/57 (06-26-19 @ 10:00) (87/50 - 122/57)  BP(mean): --  RR: 18 (06-26-19 @ 10:00) (17 - 18)  SpO2: 100% (06-26-19 @ 10:00) (100% - 100%)            I&O's Summary      PHYSICAL EXAM:  GENERAL: NAD  HEAD:  Atraumatic, Normocephalic  EYES: EOMI, conjunctiva and sclera clear  CHEST/LUNG: Clear to percussion bilaterally; No rales, rhonchi, wheezing, or rubs  HEART: Regular rate and rhythm; No murmurs, rubs, or gallops  ABDOMEN: Soft, Nontender, Nondistended;   SKIN: multiple back ulcerations  NERVOUS SYSTEM:  Alert & Oriented X3, no focal deficit    LABS:  06-26    138  |  106  |  25<H>  ----------------------------<  100<H>  4.4   |  24  |  0.52  06-25    136  |  103  |  10  ----------------------------<  79  3.4<L>   |  22  |  0.51  06-24    137  |  106  |  13  ----------------------------<  98  4.0   |  20<L>  |  0.51    Ca    7.5<L>      26 Jun 2019 03:22  Ca    7.9<L>      25 Jun 2019 06:40  Ca    8.0<L>      24 Jun 2019 03:30  Phos  2.4     06-26  Mg     1.8     06-26    TPro  4.6<L>  /  Alb  2.2<L>  /  TBili  0.2  /  DBili  x   /  AST  13  /  ALT  21  /  AlkPhos  87  06-25                                              10.1   8.84  )-----------( 206      ( 26 Jun 2019 09:15 )             30.0                         10.7   12.12 )-----------( 286      ( 26 Jun 2019 03:22 )             32.1                         8.4    11.00 )-----------( 259      ( 25 Jun 2019 20:36 )             26.2     CAPILLARY BLOOD GLUCOSE          RADIOLOGY & ADDITIONAL TESTS:    Imaging Personally Reviewed:  [x ] YES  [ ] NO    Consultants involved in case:   Consultant(s) Notes Reviewed:  [ x] YES  [ ] NO:   Care Discussed with Consultants/Other Providers [x ] YES  [ ] NO

## 2019-06-27 LAB
ANION GAP SERPL CALC-SCNC: 13 MMO/L — SIGNIFICANT CHANGE UP (ref 7–14)
BLD GP AB SCN SERPL QL: NEGATIVE — SIGNIFICANT CHANGE UP
BUN SERPL-MCNC: 17 MG/DL — SIGNIFICANT CHANGE UP (ref 7–23)
CALCIUM SERPL-MCNC: 7.4 MG/DL — LOW (ref 8.4–10.5)
CHLORIDE SERPL-SCNC: 111 MMOL/L — HIGH (ref 98–107)
CO2 SERPL-SCNC: 16 MMOL/L — LOW (ref 22–31)
CREAT SERPL-MCNC: 0.43 MG/DL — LOW (ref 0.5–1.3)
GLUCOSE SERPL-MCNC: 77 MG/DL — SIGNIFICANT CHANGE UP (ref 70–99)
HCG UR-SCNC: NEGATIVE — SIGNIFICANT CHANGE UP
HCT VFR BLD CALC: 30.9 % — LOW (ref 34.5–45)
HCT VFR BLD CALC: 31.2 % — LOW (ref 34.5–45)
HCT VFR BLD CALC: 32.3 % — LOW (ref 34.5–45)
HGB BLD-MCNC: 10.5 G/DL — LOW (ref 11.5–15.5)
HGB BLD-MCNC: 10.6 G/DL — LOW (ref 11.5–15.5)
HGB BLD-MCNC: 10.8 G/DL — LOW (ref 11.5–15.5)
MAGNESIUM SERPL-MCNC: 1.7 MG/DL — SIGNIFICANT CHANGE UP (ref 1.6–2.6)
MCHC RBC-ENTMCNC: 28.7 PG — SIGNIFICANT CHANGE UP (ref 27–34)
MCHC RBC-ENTMCNC: 28.8 PG — SIGNIFICANT CHANGE UP (ref 27–34)
MCHC RBC-ENTMCNC: 29 PG — SIGNIFICANT CHANGE UP (ref 27–34)
MCHC RBC-ENTMCNC: 33.4 % — SIGNIFICANT CHANGE UP (ref 32–36)
MCHC RBC-ENTMCNC: 34 % — SIGNIFICANT CHANGE UP (ref 32–36)
MCHC RBC-ENTMCNC: 34 % — SIGNIFICANT CHANGE UP (ref 32–36)
MCV RBC AUTO: 84.9 FL — SIGNIFICANT CHANGE UP (ref 80–100)
MCV RBC AUTO: 85.2 FL — SIGNIFICANT CHANGE UP (ref 80–100)
MCV RBC AUTO: 85.9 FL — SIGNIFICANT CHANGE UP (ref 80–100)
NRBC # FLD: 0 K/UL — SIGNIFICANT CHANGE UP (ref 0–0)
PHOSPHATE SERPL-MCNC: 2.3 MG/DL — LOW (ref 2.5–4.5)
PLATELET # BLD AUTO: 248 K/UL — SIGNIFICANT CHANGE UP (ref 150–400)
PLATELET # BLD AUTO: 268 K/UL — SIGNIFICANT CHANGE UP (ref 150–400)
PLATELET # BLD AUTO: 297 K/UL — SIGNIFICANT CHANGE UP (ref 150–400)
PMV BLD: 8.8 FL — SIGNIFICANT CHANGE UP (ref 7–13)
PMV BLD: 8.9 FL — SIGNIFICANT CHANGE UP (ref 7–13)
PMV BLD: 8.9 FL — SIGNIFICANT CHANGE UP (ref 7–13)
POTASSIUM SERPL-MCNC: 3.3 MMOL/L — LOW (ref 3.5–5.3)
POTASSIUM SERPL-SCNC: 3.3 MMOL/L — LOW (ref 3.5–5.3)
RBC # BLD: 3.64 M/UL — LOW (ref 3.8–5.2)
RBC # BLD: 3.66 M/UL — LOW (ref 3.8–5.2)
RBC # BLD: 3.76 M/UL — LOW (ref 3.8–5.2)
RBC # FLD: 16.9 % — HIGH (ref 10.3–14.5)
RBC # FLD: 16.9 % — HIGH (ref 10.3–14.5)
RBC # FLD: 17 % — HIGH (ref 10.3–14.5)
RH IG SCN BLD-IMP: POSITIVE — SIGNIFICANT CHANGE UP
SODIUM SERPL-SCNC: 140 MMOL/L — SIGNIFICANT CHANGE UP (ref 135–145)
SP GR UR: 1.01 — SIGNIFICANT CHANGE UP (ref 1–1.03)
WBC # BLD: 6.31 K/UL — SIGNIFICANT CHANGE UP (ref 3.8–10.5)
WBC # BLD: 6.87 K/UL — SIGNIFICANT CHANGE UP (ref 3.8–10.5)
WBC # BLD: 7.09 K/UL — SIGNIFICANT CHANGE UP (ref 3.8–10.5)
WBC # FLD AUTO: 6.31 K/UL — SIGNIFICANT CHANGE UP (ref 3.8–10.5)
WBC # FLD AUTO: 6.87 K/UL — SIGNIFICANT CHANGE UP (ref 3.8–10.5)
WBC # FLD AUTO: 7.09 K/UL — SIGNIFICANT CHANGE UP (ref 3.8–10.5)

## 2019-06-27 PROCEDURE — 99232 SBSQ HOSP IP/OBS MODERATE 35: CPT

## 2019-06-27 PROCEDURE — 99291 CRITICAL CARE FIRST HOUR: CPT

## 2019-06-27 RX ORDER — POTASSIUM CHLORIDE 20 MEQ
10 PACKET (EA) ORAL
Refills: 0 | Status: COMPLETED | OUTPATIENT
Start: 2019-06-27 | End: 2019-06-27

## 2019-06-27 RX ORDER — MAGNESIUM SULFATE 500 MG/ML
2 VIAL (ML) INJECTION ONCE
Refills: 0 | Status: COMPLETED | OUTPATIENT
Start: 2019-06-27 | End: 2019-06-27

## 2019-06-27 RX ORDER — CALCIUM GLUCONATE 100 MG/ML
2 VIAL (ML) INTRAVENOUS ONCE
Refills: 0 | Status: COMPLETED | OUTPATIENT
Start: 2019-06-27 | End: 2019-06-27

## 2019-06-27 RX ORDER — POTASSIUM PHOSPHATE, MONOBASIC POTASSIUM PHOSPHATE, DIBASIC 236; 224 MG/ML; MG/ML
15 INJECTION, SOLUTION INTRAVENOUS ONCE
Refills: 0 | Status: COMPLETED | OUTPATIENT
Start: 2019-06-27 | End: 2019-06-27

## 2019-06-27 RX ADMIN — PANTOPRAZOLE SODIUM 10 MG/HR: 20 TABLET, DELAYED RELEASE ORAL at 10:16

## 2019-06-27 RX ADMIN — POTASSIUM PHOSPHATE, MONOBASIC POTASSIUM PHOSPHATE, DIBASIC 62.5 MILLIMOLE(S): 236; 224 INJECTION, SOLUTION INTRAVENOUS at 10:16

## 2019-06-27 RX ADMIN — SODIUM CHLORIDE 100 MILLILITER(S): 9 INJECTION, SOLUTION INTRAVENOUS at 10:16

## 2019-06-27 RX ADMIN — VALACYCLOVIR 500 MILLIGRAM(S): 500 TABLET, FILM COATED ORAL at 17:58

## 2019-06-27 RX ADMIN — Medication 100 MILLIEQUIVALENT(S): at 05:31

## 2019-06-27 RX ADMIN — Medication 100 MILLIEQUIVALENT(S): at 06:35

## 2019-06-27 RX ADMIN — Medication 100 MILLIEQUIVALENT(S): at 08:55

## 2019-06-27 RX ADMIN — Medication 50 GRAM(S): at 04:32

## 2019-06-27 RX ADMIN — VALACYCLOVIR 500 MILLIGRAM(S): 500 TABLET, FILM COATED ORAL at 05:32

## 2019-06-27 RX ADMIN — Medication 200 GRAM(S): at 05:31

## 2019-06-27 RX ADMIN — LIDOCAINE 1 APPLICATION(S): 4 CREAM TOPICAL at 13:58

## 2019-06-27 RX ADMIN — LIDOCAINE 1 APPLICATION(S): 4 CREAM TOPICAL at 05:31

## 2019-06-27 RX ADMIN — MUPIROCIN 1 APPLICATION(S): 20 OINTMENT TOPICAL at 05:32

## 2019-06-27 NOTE — PROGRESS NOTE ADULT - ASSESSMENT
54 yo F, presenting to SICU, with bleeding gastric ulcer concerning for malignancy s/p IR embolization and EGD with continued bleeding at this time, and dropping Hemoglobin/Hematocrit, without active bleed on CTA abdomen/pelvis.     Plan:     - OR today for diagnostic laparoscopy, possible exploratory laparotomy, possible gastrectomy, possible bowel resection.    - Consent not found in chart, will confirm with team. Patient will require [place] consent.   - monitor mental status.  - maintain SpO2 >92%.   - monitor BP and HR  -Protonix gtt  -Keep NPO  -Monitor bowel movements  -GI, IR, general surgery following.   -Plan for gastrectomy/partial gastrectomy, as still actively bleeding with negative CT angiogram abdomen/pelvis  -CBC q4h, Active T+S  -F/u biopsy  -Monitor Cr and I/O's  -Anemia from upper GI bleed requiring blood transfusion, follow up CBC post-transfusion.  -CBC q4hr to trend H/H  -transfuse if Hgb <7.0  -total blood products: 5 units pRBC, 1 unit FFP, 1 unit platelets   -Hx of ulcerative rash, HSV+ and MRSA+ s/p treatment, now on valacyclovir for ppx, pt is afebrile   -Continue with Valacyclovir 500mg BID  -If febrile, consider starting on abx and obtaining cultures   Dispo: SICU    D-team Surgery  [id number]

## 2019-06-27 NOTE — PROGRESS NOTE ADULT - SUBJECTIVE AND OBJECTIVE BOX
Chief Complaint:  Patient is a 53y old  Female who presents with a chief complaint of chills (27 Jun 2019 04:37)      Interval Events:   Patient transferred to SICU overnight for monitoring, plan for OR today.    Allergies:  No Known Allergies      Hospital Medications:  lactated ringers. 1000 milliLiter(s) IV Continuous <Continuous>  lidocaine 5% Ointment 1 Application(s) Topical every 6 hours PRN  mupirocin 2% Ointment 1 Application(s) Topical two times a day  pantoprazole Infusion 8 mG/Hr IV Continuous <Continuous>  potassium chloride  10 mEq/100 mL IVPB 10 milliEquivalent(s) IV Intermittent every 1 hour  potassium phosphate IVPB 15 milliMole(s) IV Intermittent once  valACYclovir 500 milliGRAM(s) Oral every 12 hours      PMHX/PSHX:  Breast cancer  S/P mastectomy, left      Family history:  No pertinent family history in first degree relatives          PHYSICAL EXAM:     GENERAL:  NAD  HEENT:  NC/AT,  conjunctivae clear, sclera -anicteric  CHEST:  Full & symmetric excursion, no increased  HEART:  Regular rhythm  ABDOMEN:  Soft, non-tender, non-distended, normoactive bowel sounds,  no masses ,no hepato-splenomegaly,   EXTREMITIES:  no cyanosis,clubbing or edema  SKIN:  No rash/erythema/ecchymoses/petechiae/wounds/abscess/warm/dry  NEURO:  Alert, oriented    Vital Signs:  Vital Signs Last 24 Hrs  T(C): 36.3 (27 Jun 2019 04:00), Max: 37 (26 Jun 2019 10:00)  T(F): 97.4 (27 Jun 2019 04:00), Max: 98.6 (26 Jun 2019 10:00)  HR: 75 (27 Jun 2019 06:00) (75 - 86)  BP: 119/54 (27 Jun 2019 06:00) (119/54 - 150/68)  BP(mean): 71 (27 Jun 2019 06:00) (69 - 98)  RR: 16 (27 Jun 2019 06:00) (12 - 19)  SpO2: 100% (27 Jun 2019 06:00) (99% - 100%)  Daily     Daily     LABS:                        10.6   7.09  )-----------( 268      ( 27 Jun 2019 04:40 )             31.2     06-26    140  |  111<H>  |  17  ----------------------------<  77  3.3<L>   |  16<L>  |  0.43<L>    Ca    7.4<L>      26 Jun 2019 23:10  Phos  2.3     06-26  Mg     1.7     06-26        PT/INR - ( 26 Jun 2019 23:10 )   PT: 12.9 SEC;   INR: 1.13          PTT - ( 26 Jun 2019 23:10 )  PTT:30.6 SEC        Imaging:

## 2019-06-27 NOTE — PROGRESS NOTE ADULT - ATTENDING COMMENTS
Critical Care Dx    K92.2 Acute upper GI bleed  K25.0 Acute gastric ulcer with hemorrhage   D62 Acute blood loss anemia   -transfuse to crit > 21, hemodynamic stability  -trend with serial crits, PPI gtt, appreciate GI/IR hemostasis   -keep NGT  -Surg onc for possible gastrectomy vs medical oncology recs   E87.6 Hypokalemia   -replete K and Magnesium  B00.9 HSV (herpes simplex virus) infection   -valacyclovir as ordered, will need to hold once post-op    The patient is a critical care patient with life threatening hemodynamic instability in SICU.  I have personally interviewed and examined the patient, reviewed data and laboratory tests/x-rays and all pertinent electronic images.  The SICU team has a constant risk benefit analyzes discussion with the primary team, all consultants, House Staff and PA's on all decisions.  These diagnoses are unrelated to the surgical procedure noted.  Time involved in performance of separately billable procedures was not counted toward my critical care time. There is no overlap.    I have personally provided 60 minutes of critical care time concurrently with the resident/fellow and excludes time spent on separate procedures.    I have reviewed the resident's documentation and I agree with the resident's assessment and plan of care.  I was physically present for the key portions of the evaluation and management (E/M) service provided.  I agree with the above history, physical, and plan which I have reviewed and edited where appropriate.     Stew Rangel MD  Acute and Critical Care Surgery . Critical Care Dx    Hemodynamically stable, awaiting surgical plan. Trend CBC, q1 vitals.    K92.2 Acute upper GI bleed  K25.0 Acute gastric ulcer with hemorrhage   D62 Acute blood loss anemia   -transfuse to crit > 21, hemodynamic stability  -trend with serial crits, PPI gtt, appreciate GI/IR hemostasis   -keep NGT  -Surg onc for possible gastrectomy vs medical oncology recs   E87.6 Hypokalemia   -replete K and Magnesium  B00.9 HSV (herpes simplex virus) infection   -valacyclovir as ordered, will need to hold once post-op    The patient is a critical care patient with life threatening hemodynamic instability in SICU.  I have personally interviewed and examined the patient, reviewed data and laboratory tests/x-rays and all pertinent electronic images.  The SICU team has a constant risk benefit analyzes discussion with the primary team, all consultants, House Staff and PA's on all decisions.  These diagnoses are unrelated to the surgical procedure noted.  Time involved in performance of separately billable procedures was not counted toward my critical care time. There is no overlap.    I have personally provided 60 minutes of critical care time concurrently with the resident/fellow and excludes time spent on separate procedures.    I have reviewed the resident's documentation and I agree with the resident's assessment and plan of care.  I was physically present for the key portions of the evaluation and management (E/M) service provided.  I agree with the above history, physical, and plan which I have reviewed and edited where appropriate.     Stew Rangel MD  Acute and Critical Care Surgery .

## 2019-06-27 NOTE — PROGRESS NOTE ADULT - SUBJECTIVE AND OBJECTIVE BOX
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 Patient had gone to her dermatologist's office due to worsening of her chronic rash (states that rash was casued by HSV and had a superimposed MRSA infection), and dermatologist sent her in because of rigors and chills in office. In ED, patient reported that she also had two weeks of bloody bowel movements with blood in the toilet bowel and on paper. She reports a normal colonoscopy approx 2 years ago. Since admission, pt was in MICU for UGIB and underwent IR embolization and EGD. Surgical oncology consulted for second opinion in setting of recurrent UGIB to evaluate for possible gastrectomy/partial gastrectomy. Surgical oncology consulted SICU for closer monitoring in setting of active GI bleed with dropping hemoglobin/hematocrit.     24 HOUR EVENTS:  -SICU was consulted given dropping H/H in setting of known GI bleed.  -Patient was receiving 1 unit of blood upon evaluation by SICU. Post-transfusion CBC showed increase from 7.5 to 10.6.  -Per primary team, plan for gastrectomy/partial gastrectomy today.    SUBJECTIVE/ROS:  [X] A ten-point review of systems was otherwise negative except as noted.    NEURO  Exam: AAO x 4. Moving all extremities with full strength. Mentating appropriately. Non-focal neurologic exam.   [x] Adequacy of sedation and pain control has been assessed and adjusted      RESPIRATORY  RR: 15 (06-26-19 @ 23:00) (12 - 18)  SpO2: 100% (06-26-19 @ 23:00) (99% - 100%)  Wt(kg): --  Exam: Lungs clear to auscultation bilaterally, with normal respiratory effort and no intercostal retractions; normal work of breathing.      CARDIOVASCULAR  HR: 79 (06-26-19 @ 23:00) (74 - 86)  BP: 136/67 (06-26-19 @ 23:00) (108/46 - 148/81)  BP(mean): 93 (06-26-19 @ 23:00) (93 - 93)  ABP: --  ABP(mean): --  Wt(kg): --  CVP(cm H2O): --    Exam: Regular Rhythm and Rate; Normal S1, S2; No murmurs, rubs, or gallops. 2+ peripheral pulses. No JVD.  Cardiac Rhythm: NSR.      GI/NUTRITION  Exam: Soft, non-tender, non-distended; no masses or hepatosplenomegaly. Normoactive bowel sounds.  Diet: NPO  Meds: pantoprazole Infusion 8 mG/Hr IV Continuous <Continuous>      GENITOURINARY  I&O's Detail    06-26 @ 07:01 - 06-27 @ 04:38  --------------------------------------------------------  IN:  Total IN: 0 mL    OUT:    Voided: 200 mL  Total OUT: 200 mL    Total NET: -200 mL      06-26    140  |  111<H>  |  17  ----------------------------<  77  3.3<L>   |  16<L>  |  0.43<L>    Ca    7.4<L>      26 Jun 2019 23:10  Phos  2.3     06-26  Mg     1.7     06-26    TPro  4.6<L>  /  Alb  2.2<L>  /  TBili  0.2  /  DBili  x   /  AST  13  /  ALT  21  /  AlkPhos  87  06-25    Meds: calcium gluconate IVPB 2 Gram(s) IV Intermittent once  lactated ringers. 1000 milliLiter(s) IV Continuous <Continuous>  potassium chloride  10 mEq/100 mL IVPB 10 milliEquivalent(s) IV Intermittent every 1 hour  potassium phosphate IVPB 15 milliMole(s) IV Intermittent once        HEMATOLOGIC  Meds:   [x] VTE Prophylaxis                        10.6   6.03  )-----------( 253      ( 26 Jun 2019 23:10 )             31.7     PT/INR - ( 26 Jun 2019 23:10 )   PT: 12.9 SEC;   INR: 1.13          PTT - ( 26 Jun 2019 23:10 )  PTT:30.6 SEC  Transfusion     [ ] PRBC   [ ] Platelets   [ ] FFP   [ ] Cryoprecipitate      INFECTIOUS DISEASES  WBC Count: 6.03 K/uL (06-26 @ 23:10)  WBC Count: 5.56 K/uL (06-26 @ 15:44)  WBC Count: 8.62 K/uL (06-26 @ 12:31)  WBC Count: 8.84 K/uL (06-26 @ 09:15)    RECENT CULTURES:    Meds: valACYclovir 500 milliGRAM(s) Oral every 12 hours      PATIENT CARE ACCESS DEVICES:  [X] Peripheral IV  [ ] Central Venous Line	[ ] R	[ ] L	[ ] IJ	[ ] Fem	[ ] SC	Placed:   [ ] Arterial Line		[ ] R	[ ] L	[ ] Fem	[ ] Rad	[ ] Ax	Placed:   [ ] PICC:					[ ] Mediport  [ ] Urinary Catheter, Date Placed:   [x] Necessity of urinary, arterial, and venous catheters discussed    OTHER MEDICATIONS:  lidocaine 5% Ointment 1 Application(s) Topical every 6 hours PRN  mupirocin 2% Ointment 1 Application(s) Topical two times a day      CODE STATUS: FULL CODE

## 2019-06-27 NOTE — PROGRESS NOTE ADULT - SUBJECTIVE AND OBJECTIVE BOX
D-TEAM SURGERY NOTE      SUBJECTIVE: Seen and examined on AM rounds. Pain well controlled. No fevers, chills, nausea, or vomiting.    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 Patient had gone to her dermatologist's office due to worsening of her chronic rash (states that rash was casued by HSV and had a superimposed MRSA infection), and dermatologist sent her in because of rigors and chills in office. In ED, patient reported that she also had two weeks of bloody bowel movements with blood in the toilet bowel and on paper. She reports a normal colonoscopy approx 2 years ago. Since admission, pt was in MICU for UGIB and underwent IR embolization and EGD. Surgical oncology consulted for second opinion in setting of recurrent UGIB to evaluate for possible gastrectomy/partial gastrectomy. Surgical oncology consulted SICU for closer monitoring in setting of active GI bleed with dropping hemoglobin/hematocrit.     PAST MEDICAL HISTORY: Breast cancer    PAST SURGICAL HISTORY: S/P mastectomy, left    FAMILY HISTORY: No pertinent family history in first degree relatives    SOCIAL HISTORY: Patient lives at home with her family. She used to work as a . She is originally from [facility]. No history of tobacco/drugs/ETOH.    CODE STATUS: FULL CODE    HOME MEDICATIONS:  -Paclitaxel 100 mg/17 mL intravenous solution: 118 milligram(s) intravenous once a week  -Exemestane 25 mg oral tablet: 1 tab(s) orally once a day    ALLERGIES: No Known Allergies    VITAL SIGNS:  ICU Vital Signs Last 24 Hrs  T(C): 36.8 (26 Jun 2019 16:59), Max: 37.6 (25 Jun 2019 22:20)  T(F): 98.2 (26 Jun 2019 16:59), Max: 99.7 (25 Jun 2019 22:20)  HR: 84 (26 Jun 2019 16:59) (74 - 88)  BP: 124/64 (26 Jun 2019 16:59) (87/50 - 128/76)  BP(mean): --  ABP: --  ABP(mean): --  RR: 18 (26 Jun 2019 16:59) (17 - 18)  SpO2: 100% (26 Jun 2019 16:59) (99% - 100%)    Physical Exam:  Neuro: AAO x 4. Moving all extremities with full strength. Mentating appropriately. Non-focal neurologic exam.   Respiratory: Lungs clear to auscultation bilaterally, with normal respiratory effort and no intercostal retractions; normal work of breathing.  CV: Regular Rhythm and Rate; Normal S1, S2; No murmurs, rubs, or gallops. 2+ peripheral pulses. No JVD. Cardiac Rhythm: NSR.  Abdomen: Soft, non-tender, non-distended; no masses or hepatosplenomegaly. Normoactive bowel sounds.    GENITOURINARY/RENAL  sodium chloride 0.9%. 1000 milliLiter(s) IV Continuous <Continuous>  sodium phosphate IVPB 15 milliMole(s) IV Intermittent once      06-26    138  |  106  |  25<H>  ----------------------------<  100<H>  4.4   |  24  |  0.52    Ca    7.5<L>      26 Jun 2019 03:22  Phos  2.4     06-26  Mg     1.8     06-26    TPro  4.6<L>  /  Alb  2.2<L>  /  TBili  0.2  /  DBili  x   /  AST  13  /  ALT  21  /  AlkPhos  87  06-25    HEMATOLOGIC  [X] VTE Prophylaxis:                          7.5    5.56  )-----------( 217      ( 26 Jun 2019 15:44 )             22.9       INFECTIOUS DISEASES  valACYclovir 500 milliGRAM(s) Oral every 12 hours      PATIENT CARE ACCESS DEVICES:  [X] Peripheral IV  [ ] Central Venous Line	[ ] R	[ ] L	[ ] IJ	[ ] Fem	[ ] SC	Placed:   [ ] Arterial Line		[ ] R	[ ] L	[ ] Fem	[ ] Rad	[ ] Ax	Placed:   [ ] PICC:					[ ] Mediport  [ ] Urinary Catheter, Date Placed:   [x] Necessity of urinary, arterial, and venous catheters discussed    OTHER MEDICATIONS: lidocaine 5% Ointment 1 Application(s) Topical every 6 hours PRN  mupirocin 2% Ointment 1 Application(s) Topical two times a day      IMAGING STUDIES: CTA Abdomen/Pelvis (06/26/19): No evidence of active GI bleed

## 2019-06-27 NOTE — PROGRESS NOTE ADULT - ATTENDING COMMENTS
Has remained stable overnight.  H/H stable.  Discussed indications for surgical intervention with patient and family in detail.  Will discuss with medical oncology.

## 2019-06-27 NOTE — PROGRESS NOTE ADULT - ASSESSMENT
Assessment: 54 yo F, presenting to SICU, with bleeding gastric ulcer concerning for malignancy s/p IR embolization and EGD with continued bleeding at this time, and dropping Hemoglobin/Hematocrit, without active bleed on CTA abdomen/pelvis.     Plan:     #Neuro:   -Baseline AAOX4  -Mentating appropriately feels tired. Continue to monitor.     #Pulm:   -Breathing comfortably, normal work and rate of breathing.  -Will continue to monitor. Maintain SpO2 >92%.     #CV:   -Pt initially hypotensive from UGI bleed, now BP and HR stable, no hx of cardiac issues   -Monitor BP and HR    #GI: Upper GI bleed s/p embolization  -Protonix gtt  -Keep NPO  -Monitor bowel movements  -GI, IR, general surgery following.   -Plan for gastrectomy/partial gastrectomy, as still actively bleeding with negative CT angiogram abdomen/pelvis  -CBC q4h, Active T+S  -F/u biopsy    #Renal:   -No issues  -Monitor Cr and I/O's    #Heme:    -Anemia from upper GI bleed requiring blood transfusion  -CBC q4hr to trend H/H  -transfuse if Hgb <7.0  -total blood products: 5 units pRBC, 1 unit FFP, 1 unit platelets    #ID:   -Hx of ulcerative rash, HSV+ and MRSA+ s/p treatment, now on valacyclovir for ppx, pt is afebrile   -Continue with Valacyclovir 500mg BID  -If febrile, consider starting on abx and obtaining cultures     #Ppx:  -Protonix gtt  -SCDs    Dispo: SICU

## 2019-06-27 NOTE — PROGRESS NOTE ADULT - ASSESSMENT
Impression:  #Acute blood loss anemia with melena with large ulcer with actively spurting vessel: Currently with dark red output and melena, HD stable, and with Hgb of 11 to 12 after receiving 4 to 5 units pRBCs in the last 24 hours. Underwent EGD on 6/22/19 with visualization of large cratered ulcer at the incisura with visible vessel x 2 with active spurting treated with epinephrine, bipolar cautery, and hemostatic clips, however, hemostasis was not fully achieved with active oozing at site of one of the visible vessels. Hemospray was utilized with hemostasis. Patient underwent IR embolization on 6/22/19 of the left gastric artery.   #Breast cancer on chemotherapy  #Chills: Infectious work-up negative    Recommendations:  - patient planned for OR today  - Monitor CBCs q8h  - Monitor bowel movements  - Transfuse for goal Hgb >/= 7.0  - Continue pantoprazole 8 mg/hr for now  - Two large bore IVs  - Maintain active type and screen  - supportive care per SICU team    Ros Lopez, PGY-4  Gastroenterology Fellow  Pager x [phone] or [phone]  (After 5 pm or on weekends please page GI on call)

## 2019-06-28 ENCOUNTER — APPOINTMENT (OUTPATIENT)
Dept: SURGICAL ONCOLOGY | Facility: HOSPITAL | Age: 54
End: 2019-06-28

## 2019-06-28 LAB
ALBUMIN SERPL ELPH-MCNC: 2.2 G/DL — LOW (ref 3.3–5)
ALP SERPL-CCNC: 79 U/L — SIGNIFICANT CHANGE UP (ref 40–120)
ALT FLD-CCNC: 29 U/L — SIGNIFICANT CHANGE UP (ref 4–33)
ANION GAP SERPL CALC-SCNC: 14 MMO/L — SIGNIFICANT CHANGE UP (ref 7–14)
APTT BLD: 31.6 SEC — SIGNIFICANT CHANGE UP (ref 27.5–36.3)
AST SERPL-CCNC: 25 U/L — SIGNIFICANT CHANGE UP (ref 4–32)
BILIRUB SERPL-MCNC: 0.3 MG/DL — SIGNIFICANT CHANGE UP (ref 0.2–1.2)
BUN SERPL-MCNC: 5 MG/DL — LOW (ref 7–23)
CALCIUM SERPL-MCNC: 7.9 MG/DL — LOW (ref 8.4–10.5)
CHLORIDE SERPL-SCNC: 102 MMOL/L — SIGNIFICANT CHANGE UP (ref 98–107)
CO2 SERPL-SCNC: 20 MMOL/L — LOW (ref 22–31)
CREAT SERPL-MCNC: 0.45 MG/DL — LOW (ref 0.5–1.3)
GLUCOSE BLDC GLUCOMTR-MCNC: 128 MG/DL — HIGH (ref 70–99)
GLUCOSE BLDC GLUCOMTR-MCNC: 151 MG/DL — HIGH (ref 70–99)
GLUCOSE SERPL-MCNC: 64 MG/DL — LOW (ref 70–99)
HCT VFR BLD CALC: 31.4 % — LOW (ref 34.5–45)
HGB BLD-MCNC: 10.5 G/DL — LOW (ref 11.5–15.5)
INR BLD: 1.22 — HIGH (ref 0.88–1.17)
MAGNESIUM SERPL-MCNC: 1.8 MG/DL — SIGNIFICANT CHANGE UP (ref 1.6–2.6)
MCHC RBC-ENTMCNC: 28.7 PG — SIGNIFICANT CHANGE UP (ref 27–34)
MCHC RBC-ENTMCNC: 33.4 % — SIGNIFICANT CHANGE UP (ref 32–36)
MCV RBC AUTO: 85.8 FL — SIGNIFICANT CHANGE UP (ref 80–100)
NRBC # FLD: 0 K/UL — SIGNIFICANT CHANGE UP (ref 0–0)
PHOSPHATE SERPL-MCNC: 3.2 MG/DL — SIGNIFICANT CHANGE UP (ref 2.5–4.5)
PLATELET # BLD AUTO: 287 K/UL — SIGNIFICANT CHANGE UP (ref 150–400)
PMV BLD: 9.1 FL — SIGNIFICANT CHANGE UP (ref 7–13)
POTASSIUM SERPL-MCNC: 3.9 MMOL/L — SIGNIFICANT CHANGE UP (ref 3.5–5.3)
POTASSIUM SERPL-SCNC: 3.9 MMOL/L — SIGNIFICANT CHANGE UP (ref 3.5–5.3)
PROT SERPL-MCNC: 4.7 G/DL — LOW (ref 6–8.3)
PROTHROM AB SERPL-ACNC: 14 SEC — HIGH (ref 9.8–13.1)
RBC # BLD: 3.66 M/UL — LOW (ref 3.8–5.2)
RBC # FLD: 17 % — HIGH (ref 10.3–14.5)
SODIUM SERPL-SCNC: 136 MMOL/L — SIGNIFICANT CHANGE UP (ref 135–145)
WBC # BLD: 7.46 K/UL — SIGNIFICANT CHANGE UP (ref 3.8–10.5)
WBC # FLD AUTO: 7.46 K/UL — SIGNIFICANT CHANGE UP (ref 3.8–10.5)

## 2019-06-28 PROCEDURE — 99232 SBSQ HOSP IP/OBS MODERATE 35: CPT

## 2019-06-28 PROCEDURE — 99233 SBSQ HOSP IP/OBS HIGH 50: CPT | Mod: GC

## 2019-06-28 RX ORDER — HEPARIN SODIUM 5000 [USP'U]/ML
5000 INJECTION INTRAVENOUS; SUBCUTANEOUS EVERY 8 HOURS
Refills: 0 | Status: DISCONTINUED | OUTPATIENT
Start: 2019-06-28 | End: 2019-07-06

## 2019-06-28 RX ORDER — MAGNESIUM SULFATE 500 MG/ML
2 VIAL (ML) INJECTION ONCE
Refills: 0 | Status: COMPLETED | OUTPATIENT
Start: 2019-06-28 | End: 2019-06-28

## 2019-06-28 RX ORDER — DEXTROSE 50 % IN WATER 50 %
12.5 SYRINGE (ML) INTRAVENOUS ONCE
Refills: 0 | Status: COMPLETED | OUTPATIENT
Start: 2019-06-28 | End: 2019-06-28

## 2019-06-28 RX ORDER — DEXTROSE MONOHYDRATE, SODIUM CHLORIDE, AND POTASSIUM CHLORIDE 50; .745; 4.5 G/1000ML; G/1000ML; G/1000ML
1000 INJECTION, SOLUTION INTRAVENOUS
Refills: 0 | Status: DISCONTINUED | OUTPATIENT
Start: 2019-06-28 | End: 2019-06-29

## 2019-06-28 RX ADMIN — PANTOPRAZOLE SODIUM 10 MG/HR: 20 TABLET, DELAYED RELEASE ORAL at 19:21

## 2019-06-28 RX ADMIN — HEPARIN SODIUM 5000 UNIT(S): 5000 INJECTION INTRAVENOUS; SUBCUTANEOUS at 13:26

## 2019-06-28 RX ADMIN — DEXTROSE MONOHYDRATE, SODIUM CHLORIDE, AND POTASSIUM CHLORIDE 75 MILLILITER(S): 50; .745; 4.5 INJECTION, SOLUTION INTRAVENOUS at 07:10

## 2019-06-28 RX ADMIN — HEPARIN SODIUM 5000 UNIT(S): 5000 INJECTION INTRAVENOUS; SUBCUTANEOUS at 06:28

## 2019-06-28 RX ADMIN — VALACYCLOVIR 500 MILLIGRAM(S): 500 TABLET, FILM COATED ORAL at 17:25

## 2019-06-28 RX ADMIN — DEXTROSE MONOHYDRATE, SODIUM CHLORIDE, AND POTASSIUM CHLORIDE 50 MILLILITER(S): 50; .745; 4.5 INJECTION, SOLUTION INTRAVENOUS at 23:30

## 2019-06-28 RX ADMIN — Medication 12.5 MILLILITER(S): at 03:33

## 2019-06-28 RX ADMIN — HEPARIN SODIUM 5000 UNIT(S): 5000 INJECTION INTRAVENOUS; SUBCUTANEOUS at 21:09

## 2019-06-28 RX ADMIN — PANTOPRAZOLE SODIUM 10 MG/HR: 20 TABLET, DELAYED RELEASE ORAL at 07:09

## 2019-06-28 RX ADMIN — DEXTROSE MONOHYDRATE, SODIUM CHLORIDE, AND POTASSIUM CHLORIDE 75 MILLILITER(S): 50; .745; 4.5 INJECTION, SOLUTION INTRAVENOUS at 19:21

## 2019-06-28 RX ADMIN — MUPIROCIN 1 APPLICATION(S): 20 OINTMENT TOPICAL at 06:28

## 2019-06-28 RX ADMIN — Medication 50 GRAM(S): at 03:55

## 2019-06-28 RX ADMIN — VALACYCLOVIR 500 MILLIGRAM(S): 500 TABLET, FILM COATED ORAL at 06:28

## 2019-06-28 RX ADMIN — MUPIROCIN 1 APPLICATION(S): 20 OINTMENT TOPICAL at 17:25

## 2019-06-28 NOTE — PROGRESS NOTE ADULT - ASSESSMENT
Impression:  #Acute blood loss anemia with melena with large ulcer with actively spurting vessel: Currently with dark red output and melena, HD stable, and with Hgb of 11 to 12 after receiving 4 to 5 units pRBCs in the last 24 hours. Underwent EGD on 6/22/19 with visualization of large cratered ulcer at the incisura with visible vessel x 2 with active spurting treated with epinephrine, bipolar cautery, and hemostatic clips, however, hemostasis was not fully achieved with active oozing at site of one of the visible vessels. Hemospray was utilized with hemostasis. Patient underwent IR embolization on 6/22/19 of the left gastric artery.   #Breast cancer on chemotherapy  #Chills: Infectious work-up negative    Recommendations:  - patient planned for OR today  - Monitor CBCs q8h  - Monitor bowel movements  - Transfuse for goal Hgb >/= 7.0  - Continue pantoprazole 8 mg/hr for now  - Two large bore IVs  - Maintain active type and screen  - supportive care per SICU team    Ros Lopez, PGY-4  Gastroenterology Fellow  Pager x [phone] or [phone]  (After 5 pm or on weekends please page GI on call)

## 2019-06-28 NOTE — PROGRESS NOTE ADULT - SUBJECTIVE AND OBJECTIVE BOX
D-TEAM SURGERY PROGRESS NOTE    SUBJECTIVE: Seen and examined on AM rounds. Pain well controlled. No fevers, chills, nausea, or vomiting. Voiding appropriately. VSS.    24 HOURS EVENTS AS PER SICU:  - Hemodynamically stable with stable H&H  - No acute events overnight  - NPO in anticipation of OR today    OBJECTIVE:  Physical Exam:  Neuro: AAO x 4. Moving all extremities with full strength. Mentating appropriately. Non-focal neurologic exam.   Respiratory: Lungs clear to auscultation bilaterally, with normal respiratory effort and no intercostal retractions; normal work of breathing.  CV: Regular Rhythm and Rate; Normal S1, S2; No murmurs, rubs, or gallops. 2+ peripheral pulses. No JVD. Cardiac Rhythm: NSR.  Abdomen: Soft, non-tender, non-distended; no masses or hepatosplenomegaly. Normoactive bowel sounds.    Vital Signs Last 24 Hrs  T(C): 36.4 (28 Jun 2019 00:00), Max: 36.4 (27 Jun 2019 20:00)  T(F): 97.6 (28 Jun 2019 00:00), Max: 97.6 (28 Jun 2019 00:00)  HR: 75 (28 Jun 2019 02:00) (68 - 85)  BP: 127/59 (28 Jun 2019 02:00) (113/62 - 150/89)  BP(mean): 74 (28 Jun 2019 02:00) (57 - 100)  RR: 15 (28 Jun 2019 02:00) (12 - 21)  SpO2: 100% (28 Jun 2019 02:00) (100% - 100%)    06-26-19 @ 07:01  -  06-27-19 @ 07:00  --------------------------------------------------------  IN: 1080 mL / OUT: 800 mL / NET: 280 mL    06-27-19 @ 07:01  -  06-28-19 @ 02:53  --------------------------------------------------------  IN: 2440 mL / OUT: 1800 mL / NET: 640 mL      MEDICATIONS  (STANDING):  heparin  Injectable 5000 Unit(s) SubCutaneous every 8 hours  lactated ringers. 1000 milliLiter(s) (100 mL/Hr) IV Continuous <Continuous>  mupirocin 2% Ointment 1 Application(s) Topical two times a day  pantoprazole Infusion 8 mG/Hr (10 mL/Hr) IV Continuous <Continuous>  valACYclovir 500 milliGRAM(s) Oral every 12 hours    MEDICATIONS  (PRN):  lidocaine 5% Ointment 1 Application(s) Topical every 6 hours PRN pain at rash      LABS:                        10.5   7.46  )-----------( 287      ( 28 Jun 2019 01:25 )             31.4     06-28    136  |  102  |  5<L>  ----------------------------<  64<L>  3.9   |  20<L>  |  0.45<L>    Ca    7.9<L>      28 Jun 2019 01:35  Phos  3.2     06-28  Mg     1.8     06-28    TPro  4.7<L>  /  Alb  2.2<L>  /  TBili  0.3  /  DBili  x   /  AST  25  /  ALT  29  /  AlkPhos  79  06-28

## 2019-06-28 NOTE — PROGRESS NOTE ADULT - ATTENDING COMMENTS
Has remained stable for 36 hours.  Plan for partial gastrectomy of ulcer region for control of bleeding.  Will obtain echo and cardiology evaluation at request of anesthesia.

## 2019-06-28 NOTE — PROGRESS NOTE ADULT - ASSESSMENT
ASSESSMENT  53 y.o. woman with bleeding gastric ulcer s/p EGD s/p cauterization and IR embolization w/ R+L gastric artery embolization. Hemodynamically stable with stable H&H >24 hours.       PLAN  #Neuro:   - Baseline AAOX4  - Mentating appropriately feels tired. Continue to monitor.     #Pulm:   - Breathing comfortably, normal work and rate of breathing.  - Will continue to monitor. Maintain SpO2 >92%.     #CV:   - No active issue   - Monitor BP and HR    #GI: Upper GI bleed s/p embolization  - Protonix gtt  - Keep NPO  - Monitor bowel movements  - GI, IR, general surgery following.   - Plan for gastrectomy/partial gastrectomy today  - CBC q4h, Active T+S  - F/u pathology     #Renal:   - No issues  - Monitor Cr and I/O's    #Heme:    - Anemia from upper GI bleed requiring blood transfusion  - CBC q4hr to trend H/H  - Start heparin for DVT PPX given stable H&H >24 hrs  - Total blood products: 5 units pRBC, 1 unit FFP, 1 unit platelets    #ID:   - Hx of ulcerative rash, HSV+ and MRSA+ s/p treatment, now on valacyclovir for ppx, pt is afebrile   - Continue with Valacyclovir 500mg BID     Dispo: MIKE Michel, PGY2

## 2019-06-28 NOTE — PROGRESS NOTE ADULT - ATTENDING COMMENTS
Critical Care Dx    Hemodynamically stable, awaiting surgery planned for today     K92.2 Acute upper GI bleed  K25.0 Acute gastric ulcer with hemorrhage   D62 Acute blood loss anemia   -transfuse to crit > 21, hemodynamic stability  -trend with serial crits, PPI gtt, appreciate GI/IR hemostasis   E87.6 Hypokalemia   -replete K and Magnesium as needed  B00.9 HSV (herpes simplex virus) infection   -valacyclovir as ordered, will need to hold once post-op    I have personally interviewed and examined the patient, reviewed data and laboratory tests/x-rays and all pertinent electronic images.  The SICU team has a constant risk benefit analyzes discussion with the primary team, all consultants, House Staff and PA's on all decisions.  These diagnoses are unrelated to the surgical procedure noted.  Time involved in performance of separately billable procedures was not counted toward my critical care time. There is no overlap.    I have personally provided 40 minutes of critical care time concurrently with the resident/fellow and excludes time spent on separate procedures.    I have reviewed the resident's documentation and I agree with the resident's assessment and plan of care.  I was physically present for the key portions of the evaluation and management (E/M) service provided.  I agree with the above history, physical, and plan which I have reviewed and edited where appropriate.     Stew Rangel MD  Acute and Critical Care Surgery .

## 2019-06-28 NOTE — PROGRESS NOTE ADULT - ASSESSMENT
54 yo F, presenting to SICU, with bleeding gastric ulcer concerning for malignancy s/p IR embolization and EGD with continued bleeding at this time, and dropping Hemoglobin/Hematocrit, without active bleed on CTA abdomen/pelvis.     Plan:     - OR today for diagnostic laparoscopy, possible exploratory laparotomy, possible gastrectomy, possible bowel resection.    - Consent placed in chart   - monitor mental status.  - maintain SpO2 >92%.   - monitor BP and HR  -Protonix gtt  -Keep NPO  -Monitor bowel movements  -GI, IR, general surgery following.   -Plan for gastrectomy/partial gastrectomy, as still actively bleeding with negative CT angiogram abdomen/pelvis  -CBC q4h, Active T+S  -F/u biopsy  -Monitor Cr and I/O's  -Anemia from upper GI bleed requiring blood transfusion, follow up CBC post-transfusion.  -CBC q4hr to trend H/H  -transfuse if Hgb <7.0  -total blood products: 5 units pRBC, 1 unit FFP, 1 unit platelets   -Hx of ulcerative rash, HSV+ and MRSA+ s/p treatment, now on valacyclovir for ppx, pt is afebrile   -Continue with Valacyclovir 500mg BID  -If febrile, consider starting on abx and obtaining cultures   Dispo: SICU    Reyes Alegria MD  PGY-1  D-Team Surgery [id number]

## 2019-06-28 NOTE — PROGRESS NOTE ADULT - SUBJECTIVE AND OBJECTIVE BOX
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 Patient had gone to her dermatologist's office due to worsening of her chronic rash (states that rash was caused by HSV and had a superimposed MRSA infection), and dermatologist sent her in because of rigors and chills in office. In ED, patient reported that she also had two weeks of bloody bowel movements with blood in the toilet bowel and on paper. She reports a normal colonoscopy approx 2 years ago. Since admission, pt was in MICU for UGIB and underwent IR embolization and EGD. Surgical oncology consulted for second opinion in setting of recurrent UGIB to evaluate for possible gastrectomy/partial gastrectomy. Surgical oncology consulted SICU for closer monitoring in setting of active GI bleed with dropping hemoglobin/hematocrit.     24 HOUR EVENTS  - Hemodynamically stable with stable H&H  - No acute events overnight  - NPO in anticipation of OR today    SUBJECTIVE/ROS  [x] A ten-point review of systems was otherwise negative except as noted.  [ ] Due to altered mental status/intubation, subjective information were not able to be obtained from the patient. History was obtained, to the extent possible, from review of the chart and collateral sources of information.      NEURO  RASS:     GCS:  15   CAM ICU:  Exam: awake, alert, oriented  [x] Adequacy of sedation and pain control has been assessed and adjusted      RESPIRATORY  RR: 15 (06-28-19 @ 00:00) (12 - 21)  SpO2: 100% (06-28-19 @ 00:00) (100% - 100%)  Exam: nonlabored      CARDIOVASCULAR  HR: 69 (06-28-19 @ 00:00) (68 - 85)  BP: 113/62 (06-28-19 @ 00:00) (113/62 - 150/89)  BP(mean): 75 (06-28-19 @ 00:00) (57 - 100)    Exam: regular rate, normotensive  Cardiac Rhythm: regular on monitor   Perfusion     [x]Adequate   [ ]Inadequate  Mentation   [x]Normal       [ ]Reduced  Extremities  [x]Warm         [ ]Cool  Volume Status [ ]Hypervolemic [x]Euvolemic [ ]Hypovolemic  Meds:       GI/NUTRITION  Exam: soft, nontender, nondistended  Diet: NPO   Meds: pantoprazole Infusion 8 mG/Hr IV Continuous <Continuous>      GENITOURINARY  I&O's Detail    06-26 @ 07:01  -  06-27 @ 07:00  --------------------------------------------------------  IN:    IV PiggyBack: 500 mL    lactated ringers.: 500 mL    pantoprazole Infusion: 80 mL  Total IN: 1080 mL    OUT:    Voided: 800 mL  Total OUT: 800 mL    Total NET: 280 mL    06-27 @ 07:01 - 06-28 @ 02:07  --------------------------------------------------------  IN:    IV PiggyBack: 350 mL    lactated ringers.: 1700 mL    pantoprazole Infusion: 170 mL  Total IN: 2220 mL    OUT:    Voided: 1500 mL  Total OUT: 1500 mL    Total NET: 720 mL    06-26    140  |  111<H>  |  17  ----------------------------<  77  3.3<L>   |  16<L>  |  0.43<L>    Ca    7.4<L>      26 Jun 2019 23:10  Phos  2.3     06-26  Mg     1.7     06-26    Meds: lactated ringers. 1000 milliLiter(s) IV Continuous <Continuous>      HEMATOLOGIC  Meds:   [x] VTE Prophylaxis - heparin 5000 u Q8H                        10.8   6.87  )-----------( 297      ( 27 Jun 2019 22:00 )             32.3     PT/INR - ( 26 Jun 2019 23:10 )   PT: 12.9 SEC;   INR: 1.13          PTT - ( 26 Jun 2019 23:10 )  PTT:30.6 SEC  Transfusion     [ ] PRBC   [ ] Platelets   [ ] FFP   [ ] Cryoprecipitate      INFECTIOUS DISEASES  WBC Count: 6.87 K/uL (06-27 @ 22:00)  WBC Count: 6.31 K/uL (06-27 @ 12:35)  WBC Count: 7.09 K/uL (06-27 @ 04:40)    Meds: valACYclovir 500 milliGRAM(s) Oral every 12 hours      ENDOCRINE      ACCESS DEVICES  [x] Peripheral IV  [ ] Central Venous Line	[ ] R	[ ] L	[ ] IJ	[ ] Fem	[ ] SC	Placed:   [ ] Arterial Line		[ ] R	[ ] L	[ ] Fem	[ ] Rad	[ ] Ax	Placed:   [ ] PICC:					[ ] Mediport  [ ] Urinary Catheter, Date Placed:   [x] Necessity of urinary, arterial, and venous catheters discussed      OTHER MEDICATIONS  lidocaine 5% Ointment 1 Application(s) Topical every 6 hours PRN  mupirocin 2% Ointment 1 Application(s) Topical two times a day      CODE STATUS  Full       IMAGING  CT Angio Abdomen and Pelvis w/ IV Cont (06.26.19 @ 11:58)   LOWER CHEST: Bibasilar dependent atelectasis. Postoperative changes in   the left breast. Stable bilateral pleural effusions with adjacent passive   atelectasis.    LIVER: Left hepatic lobe cyst. Subcentimeter liver lesions too small to   characterize, likely additional cysts. Linear and punctate radiodensities   in the right hepatic lobe may reflect calcifications or sequela of prior   embolization. Similar radiodensities are present adjacent to the head and   uncinate process of pancreas.  BILE DUCTS: Normal caliber.  GALLBLADDER: Cholecystectomy.  SPLEEN: Within normal limits.  PANCREAS: Within normal limits.  ADRENALS: Within normal limits.  KIDNEYS/URETERS: Within normal limits.    BLADDER: Diffuse thickening of the bladder wall.  REPRODUCTIVE ORGANS: Uterus and bilateral adnexa are unremarkable.    BOWEL: No bowel obstruction. Appendix is normal. No evidence of active GI   bleed. A metallic clip is noted along the posterior aspect of the gastric   fundus.  PERITONEUM: No ascites.  VESSELS:  Within normal limits.  RETROPERITONEUM: No lymphadenopathy.    ABDOMINAL WALL: Postoperative changes in the anterior abdominal wall.  BONES: Within normal limits.    IMPRESSION:   No evidence of active GI bleed, as clinically questioned.    Upper Endoscopy (06.22.19 @ 13:38)   - The esophagus was normal.  - The stomach contained a cratered large malignant appearing ulcer with two visible vessels. Both of them was spurting blood.  - Epinephrine 1:10,000 (2 cc) was injected into each site.  - Bipolar cautery was applied using a 10 Fr probe with a single channel therapeutic scope. The vessels were obliterate but oozing was still seen at one site. A clip was applied successfully to one site controlling the bleeding. The other site would not retain a clip due to the fibrotic nature of the vessel.  - Hemispora was applied to both sites with successful control of the bleeding.  - The duodenal bulb and D2 were normal.                                                        Impression:            Large malignant appearing ulcer with two visible vessels actively bleeding s/p endoscopic therapy. The hemospray is a temporary agent and thus more definitive therapy is needed. I discussed with IR for embolization for a more definitive therapy.    IR Procedure (06.22.19 @ 20:29)   There are separate origins of the common hepatic artery, left gastric   artery, and splenic artery from the aorta. The left gastric artery was   patent without evidence of active extravasation, with branches filling in the region of the previously placed endoscopy clips. Based on the   endoscopy findings. Embolization was performed to stasis with Avitene slurry. A small branch vessel from the left gastric artery was filling a larger vessel thought to be a vein, the artery was embolized to stasis with n-BCA glue. The common hepatic artery and its branches are patent without evidence of stenosis or aneurysm. The right gastric artery origin was found demonstrating multiple branch vessels in the region of the previously placed endoscopy clips. Based on the endoscopy findings embolization was performed of the left gastric artery to stasis. The gastroduodenal artery is patent without evidence of active extravasation, truncation, or pseudoaneurysm.  Vessels embolized to stasis: The left gastric artery with its origin from   the aorta, a small branch vessel from the left gastric artery. The right   gastric artery with its origin from the common hepatic artery.    IMPRESSION:  Catheter embolization of right and left gastric artery for upper GI bleed.

## 2019-06-28 NOTE — PROGRESS NOTE ADULT - SUBJECTIVE AND OBJECTIVE BOX
Patient is a 53y old  Female who presents with a chief complaint of chills (28 Jun 2019 09:31)       Pt is seen and examined  pt is awake and lying in bed  pt seems comfortable and denies any complaints at this time  Transferred to SICU in anticipation of partial gastrectomy due to bleeding uncontrolled by arterial embolization           ROS:  Negative except for:    MEDICATIONS  (STANDING):  dextrose 5% + sodium chloride 0.45% with potassium chloride 20 mEq/L 1000 milliLiter(s) (75 mL/Hr) IV Continuous <Continuous>  heparin  Injectable 5000 Unit(s) SubCutaneous every 8 hours  mupirocin 2% Ointment 1 Application(s) Topical two times a day  pantoprazole Infusion 8 mG/Hr (10 mL/Hr) IV Continuous <Continuous>  valACYclovir 500 milliGRAM(s) Oral every 12 hours    MEDICATIONS  (PRN):  lidocaine 5% Ointment 1 Application(s) Topical every 6 hours PRN pain at rash      Allergies    No Known Allergies    Intolerances        Vital Signs Last 24 Hrs  T(C): 36.6 (28 Jun 2019 16:00), Max: 36.6 (28 Jun 2019 16:00)  T(F): 97.9 (28 Jun 2019 16:00), Max: 97.9 (28 Jun 2019 16:00)  HR: 71 (28 Jun 2019 16:00) (68 - 84)  BP: 130/74 (28 Jun 2019 16:00) (105/60 - 138/70)  BP(mean): 86 (28 Jun 2019 16:00) (64 - 90)  RR: 16 (28 Jun 2019 16:00) (12 - 18)  SpO2: 100% (28 Jun 2019 16:00) (99% - 100%)    PHYSICAL EXAM  General: adult in NAD  HEENT: clear oropharynx, anicteric sclera, pink conjunctiva  Neck: supple  CV: normal S1/S2 with no murmur rubs or gallops  Lungs: positive air movement b/l ant lungs,clear to auscultation, no wheezes, no rales  Abdomen: soft non-tender non-distended, no hepatosplenomegaly  Ext: no clubbing cyanosis or edema  Skin: no rashes and no petechiae  Neuro: alert and oriented X 4, no focal deficits  LABS:                          10.5   7.46  )-----------( 287      ( 28 Jun 2019 01:25 )             31.4         Mean Cell Volume : 85.8 fL  Mean Cell Hemoglobin : 28.7 pg  Mean Cell Hemoglobin Concentration : 33.4 %  Auto Neutrophil # : x  Auto Lymphocyte # : x  Auto Monocyte # : x  Auto Eosinophil # : x  Auto Basophil # : x  Auto Neutrophil % : x  Auto Lymphocyte % : x  Auto Monocyte % : x  Auto Eosinophil % : x  Auto Basophil % : x    Serial CBC's  06-28 @ 01:25  Hct-31.4 / Hgb-10.5 / Plat-287 / RBC-3.66 / WBC-7.46          Serial CBC's  06-27 @ 22:00  Hct-32.3 / Hgb-10.8 / Plat-297 / RBC-3.76 / WBC-6.87          Serial CBC's  06-27 @ 12:35  Hct-30.9 / Hgb-10.5 / Plat-248 / RBC-3.64 / WBC-6.31          Serial CBC's  06-27 @ 04:40  Hct-31.2 / Hgb-10.6 / Plat-268 / RBC-3.66 / WBC-7.09          Serial CBC's  06-26 @ 23:10  Hct-31.7 / Hgb-10.6 / Plat-253 / RBC-3.69 / WBC-6.03            06-28    136  |  102  |  5<L>  ----------------------------<  64<L>  3.9   |  20<L>  |  0.45<L>    Ca    7.9<L>      28 Jun 2019 01:35  Phos  3.2     06-28  Mg     1.8     06-28    TPro  4.7<L>  /  Alb  2.2<L>  /  TBili  0.3  /  DBili  x   /  AST  25  /  ALT  29  /  AlkPhos  79  06-28      PT/INR - ( 28 Jun 2019 01:35 )   PT: 14.0 SEC;   INR: 1.22          PTT - ( 28 Jun 2019 01:35 )  PTT:31.6 SEC    WBC Count: 7.46 K/uL (06-28-19 @ 01:25)  Hemoglobin: 10.5 g/dL (06-28-19 @ 01:25)  Hematocrit: 31.4 % (06-28-19 @ 01:25)  Platelet Count - Automated: 287 K/uL (06-28-19 @ 01:25)  WBC Count: 6.87 K/uL (06-27-19 @ 22:00)  Hemoglobin: 10.8 g/dL (06-27-19 @ 22:00)  Hematocrit: 32.3 % (06-27-19 @ 22:00)  Platelet Count - Automated: 297 K/uL (06-27-19 @ 22:00)  WBC Count: 6.31 K/uL (06-27-19 @ 12:35)  Hemoglobin: 10.5 g/dL (06-27-19 @ 12:35)  Hematocrit: 30.9 % (06-27-19 @ 12:35)  Platelet Count - Automated: 248 K/uL (06-27-19 @ 12:35)  WBC Count: 7.09 K/uL (06-27-19 @ 04:40)  Hemoglobin: 10.6 g/dL (06-27-19 @ 04:40)  Hematocrit: 31.2 % (06-27-19 @ 04:40)  Platelet Count - Automated: 268 K/uL (06-27-19 @ 04:40)  WBC Count: 6.03 K/uL (06-26-19 @ 23:10)  Hemoglobin: 10.6 g/dL (06-26-19 @ 23:10)  Hematocrit: 31.7 % (06-26-19 @ 23:10)  Platelet Count - Automated: 253 K/uL (06-26-19 @ 23:10)  WBC Count: 5.56 K/uL (06-26-19 @ 15:44)  Hemoglobin: 7.5 g/dL (06-26-19 @ 15:44)  Hematocrit: 22.9 % (06-26-19 @ 15:44)  Platelet Count - Automated: 217 K/uL (06-26-19 @ 15:44)  WBC Count: 8.62 K/uL (06-26-19 @ 12:31)  Hemoglobin: 9.4 g/dL (06-26-19 @ 12:31)  Hematocrit: 28.4 % (06-26-19 @ 12:31)  Platelet Count - Automated: 267 K/uL (06-26-19 @ 12:31)  WBC Count: 8.84 K/uL (06-26-19 @ 09:15)  Hemoglobin: 10.1 g/dL (06-26-19 @ 09:15)  Hematocrit: 30.0 % (06-26-19 @ 09:15)  Platelet Count - Automated: 206 K/uL (06-26-19 @ 09:15)  WBC Count: 12.12 K/uL (06-26-19 @ 03:22)  Hemoglobin: 10.7 g/dL (06-26-19 @ 03:22)  Hematocrit: 32.1 % (06-26-19 @ 03:22)  Platelet Count - Automated: 286 K/uL (06-26-19 @ 03:22)  WBC Count: 11.00 K/uL (06-25-19 @ 20:36)  Hemoglobin: 8.4 g/dL (06-25-19 @ 20:36)  Hematocrit: 26.2 % (06-25-19 @ 20:36)  Platelet Count - Automated: 259 K/uL (06-25-19 @ 20:36)  WBC Count: 7.89 K/uL (06-25-19 @ 14:52)  Hemoglobin: 10.0 g/dL (06-25-19 @ 14:52)  Hematocrit: 30.7 % (06-25-19 @ 14:52)  Platelet Count - Automated: 279 K/uL (06-25-19 @ 14:52)  WBC Count: 8.08 K/uL (06-25-19 @ 06:40)  Hemoglobin: 11.0 g/dL (06-25-19 @ 06:40)  Hematocrit: 33.6 % (06-25-19 @ 06:40)  Platelet Count - Automated: 314 K/uL (06-25-19 @ 06:40)  WBC Count: 10.50 K/uL (06-24-19 @ 03:30)  Hemoglobin: 11.3 g/dL (06-24-19 @ 03:30)  Hematocrit: 33.7 % (06-24-19 @ 03:30)  Platelet Count - Automated: 310 K/uL (06-24-19 @ 03:30)  WBC Count: 10.72 K/uL (06-23-19 @ 14:45)  Hemoglobin: 12.2 g/dL (06-23-19 @ 14:45)  Hematocrit: 34.5 % (06-23-19 @ 14:45)  Platelet Count - Automated: 324 K/uL (06-23-19 @ 14:45)  WBC Count: 11.94 K/uL (06-23-19 @ 09:00)  Hemoglobin: 12.8 g/dL (06-23-19 @ 09:00)  Hematocrit: 37.9 % (06-23-19 @ 09:00)  Platelet Count - Automated: 362 K/uL (06-23-19 @ 09:00)  WBC Count: 11.43 K/uL (06-23-19 @ 05:00)  Hemoglobin: 12.4 g/dL (06-23-19 @ 05:00)  Hematocrit: 37.2 % (06-23-19 @ 05:00)  Platelet Count - Automated: 322 K/uL (06-23-19 @ 05:00)  WBC Count: 9.01 K/uL (06-23-19 @ 00:15)  Hemoglobin: 11.0 g/dL (06-23-19 @ 00:15)  Hematocrit: 32.3 % (06-23-19 @ 00:15)  Platelet Count - Automated: 315 K/uL (06-23-19 @ 00:15)  WBC Count: 9.72 K/uL (06-22-19 @ 22:04)  Hemoglobin: 10.5 g/dL (06-22-19 @ 22:04)  Hematocrit: 31.5 % (06-22-19 @ 22:04)  Platelet Count - Automated: 310 K/uL (06-22-19 @ 22:04)  WBC Count: 10.30 K/uL (06-22-19 @ 17:09)  Hemoglobin: 7.4 g/dL (06-22-19 @ 17:09)  Hematocrit: 23.2 % (06-22-19 @ 17:09)  Platelet Count - Automated: 333 K/uL (06-22-19 @ 17:09)  WBC Count: 8.06 K/uL (06-22-19 @ 14:59)  Hemoglobin: 8.6 g/dL (06-22-19 @ 14:59)  Hematocrit: 27.3 % (06-22-19 @ 14:59)  Platelet Count - Automated: 385 K/uL (06-22-19 @ 14:59)  WBC Count: 7.44 K/uL (06-22-19 @ 06:40)  Hemoglobin: 6.0 g/dL (06-22-19 @ 06:40)  Hematocrit: 19.1 % (06-22-19 @ 06:40)  Platelet Count - Automated: 527 K/uL (06-22-19 @ 06:40)  WBC Count: 5.60 K/uL (06-21-19 @ 06:55)  Hemoglobin: 8.3 g/dL (06-21-19 @ 06:55)  Hematocrit: 26.1 % (06-21-19 @ 06:55)  Platelet Count - Automated: 607 K/uL (06-21-19 @ 06:55)  Reticulocyte Percent: 6.4 % (06-20-19 @ 06:27)  WBC Count: 5.77 K/uL (06-20-19 @ 06:27)  Hemoglobin: 8.2 g/dL (06-20-19 @ 06:27)  Hematocrit: 25.3 % (06-20-19 @ 06:27)  Platelet Count - Automated: 625 K/uL (06-20-19 @ 06:27)  WBC Count: 7.07 K/uL (06-19-19 @ 08:04)  Hemoglobin: 7.4 g/dL (06-19-19 @ 08:04)  Hematocrit: 22.4 % (06-19-19 @ 08:04)  Platelet Count - Automated: 602 K/uL (06-19-19 @ 08:04)  WBC Count: 9.80 K/uL (06-19-19 @ 00:20)  Hemoglobin: 8.0 g/dL (06-19-19 @ 00:20)  Hematocrit: 24.6 % (06-19-19 @ 00:20)  Platelet Count - Automated: 626 K/uL (06-19-19 @ 00:20)                BLOOD SMEAR INTERPRETATION:       RADIOLOGY & ADDITIONAL STUDIES:

## 2019-06-28 NOTE — PROGRESS NOTE ADULT - SUBJECTIVE AND OBJECTIVE BOX
Chief Complaint:  Patient is a 53y old  Female who presents with a chief complaint of chills (28 Jun 2019 02:51)      Interval Events:   Patient still pending OR.  Patient reports no further bleeding.    Allergies:  No Known Allergies      Hospital Medications:  dextrose 5% + sodium chloride 0.45% with potassium chloride 20 mEq/L 1000 milliLiter(s) IV Continuous <Continuous>  heparin  Injectable 5000 Unit(s) SubCutaneous every 8 hours  lidocaine 5% Ointment 1 Application(s) Topical every 6 hours PRN  mupirocin 2% Ointment 1 Application(s) Topical two times a day  pantoprazole Infusion 8 mG/Hr IV Continuous <Continuous>  valACYclovir 500 milliGRAM(s) Oral every 12 hours      PMHX/PSHX:  Breast cancer  S/P mastectomy, left      Family history:  No pertinent family history in first degree relatives          PHYSICAL EXAM:     GENERAL:  NAD  HEENT:  NC/AT,  conjunctivae clear, sclera -anicteric  CHEST:  Full & symmetric excursion, no increased  HEART:  Regular rhythm  ABDOMEN:  Soft, non-tender, non-distended, normoactive bowel sounds,  no masses ,no hepato-splenomegaly,   EXTREMITIES:  no cyanosis,clubbing or edema  SKIN:  No rash/erythema/ecchymoses/petechiae/wounds/abscess/warm/dry  NEURO:  Alert, oriented    Vital Signs:  Vital Signs Last 24 Hrs  T(C): 36.1 (28 Jun 2019 08:00), Max: 36.4 (27 Jun 2019 20:00)  T(F): 97 (28 Jun 2019 08:00), Max: 97.6 (28 Jun 2019 00:00)  HR: 79 (28 Jun 2019 07:00) (68 - 84)  BP: 126/64 (28 Jun 2019 07:00) (105/60 - 145/69)  BP(mean): 76 (28 Jun 2019 07:00) (64 - 90)  RR: 13 (28 Jun 2019 07:00) (12 - 21)  SpO2: 100% (28 Jun 2019 07:00) (99% - 100%)  Daily     Daily     LABS:                        10.5   7.46  )-----------( 287      ( 28 Jun 2019 01:25 )             31.4     06-28    136  |  102  |  5<L>  ----------------------------<  64<L>  3.9   |  20<L>  |  0.45<L>    Ca    7.9<L>      28 Jun 2019 01:35  Phos  3.2     06-28  Mg     1.8     06-28    TPro  4.7<L>  /  Alb  2.2<L>  /  TBili  0.3  /  DBili  x   /  AST  25  /  ALT  29  /  AlkPhos  79  06-28    LIVER FUNCTIONS - ( 28 Jun 2019 01:35 )  Alb: 2.2 g/dL / Pro: 4.7 g/dL / ALK PHOS: 79 u/L / ALT: 29 u/L / AST: 25 u/L / GGT: x           PT/INR - ( 28 Jun 2019 01:35 )   PT: 14.0 SEC;   INR: 1.22          PTT - ( 28 Jun 2019 01:35 )  PTT:31.6 SEC        Imaging:

## 2019-06-28 NOTE — PROGRESS NOTE ADULT - ASSESSMENT
52 yo F with triple positive (ER 20%, PR10%, Her2 2+ FISH amplified) breast cancer (dx 2012) initially stage IIIB, s/p L mastectomy followed by ACTH then 1 year of herceptin and RT, tamoxifen, with recurrence in 1/2019 (mediastinal and hilar LNs, small lung nodules, ?liver mets) on weekly paclitaxel (since 3/2019) and exemestane, HSV+ skin rash with MRSA superinfection p/w fever and chills, admitted for sepsis and symptomatic anemia     1. Metastatic breast cancer: triple positive when first diagnosed but ER+ ME neg Her 2 neg on recurre  - no plan for chemotherapy as inpatient  - repeat imaging to assess disease status as outpatient  plan d/w with son/patient    2l. Anemia / MDS / Elevated retic  -s/p EGD showing bleeding L gastric artery s/p EGD treatment then embolization w/ incomplete improvement  -d/w Dr. Hayden, agree with partial gastric resection of observed ulcer ? metastases vs stress  -will follow  -will see as outpatient on patient discharge.

## 2019-06-28 NOTE — CHART NOTE - NSCHARTNOTEFT_GEN_A_CORE
Source: Patient , nursing & EMR    Diet : NPO except medications    Patient alert, oriented, maintained NPO , it is 5 days NPO , noted wt. loss from 6/23/19, but wt. above admit wt., pt. had unintentional wt. loss per initial nutrition assessment and pt. continues w/ SEVERE fat loss & muscle waisting . Met w/ SICU team discussed the extended NPO status , surgery plan & type which indicates prolonged NPO , need for alternate means of nutrition support .        Current Weight: - 51,2 kg on 6/28/19; BMI - 21.3;  57.3 kg on 6/23/19; 49.8 kg on 6/19/19; UBW - 68 KG ; IBW - 47.6 kg     Pertinent Medications: MEDICATIONS  (STANDING):  dextrose 5% + sodium chloride 0.45% with potassium chloride 20 mEq/L 1000 milliLiter(s) (75 mL/Hr) IV Continuous <Continuous>  heparin  Injectable 5000 Unit(s) SubCutaneous every 8 hours  mupirocin 2% Ointment 1 Application(s) Topical two times a day  pantoprazole Infusion 8 mG/Hr (10 mL/Hr) IV Continuous <Continuous>  valACYclovir 500 milliGRAM(s) Oral every 12 hours    MEDICATIONS  (PRN):  lidocaine 5% Ointment 1 Application(s) Topical every 6 hours PRN pain at rash    Pertinent Labs:  06-28 Na136 mmol/L Glu 64 mg/dL<L> K+ 3.9 mmol/L Cr  0.45 mg/dL<L> BUN 5 mg/dL<L> 06-28 Phos 3.2 mg/dL 06-28 Alb 2.2 g/dL<L>      Skin: intact    Estimated Needs:  1026 - 1283 kcal/d ( 20 - 25 kcal/kg wt. & protein need 48- 71 gm/d( 1.0 - 1.5 gm/kg IBW) -  recalculated:       Previous Nutrition Diagnosis:  Unintended Weight Loss , severe Malnutrition      Nutrition Diagnosis is -ongoing      New Nutrition Diagnosis:  Inadequate Protein Energy Intake > 5 DAYS ( < 50%), ; SEVERE Malnutrition     Related to: PHYSIOLOGICAL CAUSES   As evidenced by:  evidenced by wt. loss, < 50% nutrition intake, fat loss & muscle waisting visualized .  Interventions: alternate means of nutrition support if PO nutrition not able.    Recommend - parenteral nutrition support ( discussed w/ SICU team & NST)     Monitoring and Evaluation:  Tolerance to diet prescription WHEN INITIATED  , weights & follow up per protocol

## 2019-06-29 LAB
ALBUMIN SERPL ELPH-MCNC: 2.4 G/DL — LOW (ref 3.3–5)
ALP SERPL-CCNC: 85 U/L — SIGNIFICANT CHANGE UP (ref 40–120)
ALT FLD-CCNC: 25 U/L — SIGNIFICANT CHANGE UP (ref 4–33)
ANION GAP SERPL CALC-SCNC: 11 MMO/L — SIGNIFICANT CHANGE UP (ref 7–14)
AST SERPL-CCNC: 20 U/L — SIGNIFICANT CHANGE UP (ref 4–32)
BILIRUB SERPL-MCNC: 0.2 MG/DL — SIGNIFICANT CHANGE UP (ref 0.2–1.2)
BUN SERPL-MCNC: 3 MG/DL — LOW (ref 7–23)
CALCIUM SERPL-MCNC: 7.9 MG/DL — LOW (ref 8.4–10.5)
CHLORIDE SERPL-SCNC: 102 MMOL/L — SIGNIFICANT CHANGE UP (ref 98–107)
CO2 SERPL-SCNC: 23 MMOL/L — SIGNIFICANT CHANGE UP (ref 22–31)
CREAT SERPL-MCNC: 0.48 MG/DL — LOW (ref 0.5–1.3)
GLUCOSE SERPL-MCNC: 96 MG/DL — SIGNIFICANT CHANGE UP (ref 70–99)
HCT VFR BLD CALC: 33.5 % — LOW (ref 34.5–45)
HGB BLD-MCNC: 11.1 G/DL — LOW (ref 11.5–15.5)
MAGNESIUM SERPL-MCNC: 2 MG/DL — SIGNIFICANT CHANGE UP (ref 1.6–2.6)
MCHC RBC-ENTMCNC: 28.7 PG — SIGNIFICANT CHANGE UP (ref 27–34)
MCHC RBC-ENTMCNC: 33.1 % — SIGNIFICANT CHANGE UP (ref 32–36)
MCV RBC AUTO: 86.6 FL — SIGNIFICANT CHANGE UP (ref 80–100)
NRBC # FLD: 0 K/UL — SIGNIFICANT CHANGE UP (ref 0–0)
PHOSPHATE SERPL-MCNC: 3.3 MG/DL — SIGNIFICANT CHANGE UP (ref 2.5–4.5)
PLATELET # BLD AUTO: 303 K/UL — SIGNIFICANT CHANGE UP (ref 150–400)
PMV BLD: 9.3 FL — SIGNIFICANT CHANGE UP (ref 7–13)
POTASSIUM SERPL-MCNC: 4 MMOL/L — SIGNIFICANT CHANGE UP (ref 3.5–5.3)
POTASSIUM SERPL-SCNC: 4 MMOL/L — SIGNIFICANT CHANGE UP (ref 3.5–5.3)
PROT SERPL-MCNC: 4.8 G/DL — LOW (ref 6–8.3)
RBC # BLD: 3.87 M/UL — SIGNIFICANT CHANGE UP (ref 3.8–5.2)
RBC # FLD: 16.6 % — HIGH (ref 10.3–14.5)
SODIUM SERPL-SCNC: 136 MMOL/L — SIGNIFICANT CHANGE UP (ref 135–145)
WBC # BLD: 5.53 K/UL — SIGNIFICANT CHANGE UP (ref 3.8–10.5)
WBC # FLD AUTO: 5.53 K/UL — SIGNIFICANT CHANGE UP (ref 3.8–10.5)

## 2019-06-29 PROCEDURE — 99232 SBSQ HOSP IP/OBS MODERATE 35: CPT | Mod: GC

## 2019-06-29 PROCEDURE — 93306 TTE W/DOPPLER COMPLETE: CPT | Mod: 26

## 2019-06-29 RX ORDER — PANTOPRAZOLE SODIUM 20 MG/1
40 TABLET, DELAYED RELEASE ORAL
Refills: 0 | Status: DISCONTINUED | OUTPATIENT
Start: 2019-06-29 | End: 2019-07-06

## 2019-06-29 RX ADMIN — VALACYCLOVIR 500 MILLIGRAM(S): 500 TABLET, FILM COATED ORAL at 05:10

## 2019-06-29 RX ADMIN — DEXTROSE MONOHYDRATE, SODIUM CHLORIDE, AND POTASSIUM CHLORIDE 50 MILLILITER(S): 50; .745; 4.5 INJECTION, SOLUTION INTRAVENOUS at 07:01

## 2019-06-29 RX ADMIN — VALACYCLOVIR 500 MILLIGRAM(S): 500 TABLET, FILM COATED ORAL at 17:28

## 2019-06-29 RX ADMIN — MUPIROCIN 1 APPLICATION(S): 20 OINTMENT TOPICAL at 05:10

## 2019-06-29 RX ADMIN — PANTOPRAZOLE SODIUM 40 MILLIGRAM(S): 20 TABLET, DELAYED RELEASE ORAL at 17:28

## 2019-06-29 RX ADMIN — HEPARIN SODIUM 5000 UNIT(S): 5000 INJECTION INTRAVENOUS; SUBCUTANEOUS at 22:00

## 2019-06-29 RX ADMIN — MUPIROCIN 1 APPLICATION(S): 20 OINTMENT TOPICAL at 17:29

## 2019-06-29 RX ADMIN — HEPARIN SODIUM 5000 UNIT(S): 5000 INJECTION INTRAVENOUS; SUBCUTANEOUS at 13:17

## 2019-06-29 RX ADMIN — PANTOPRAZOLE SODIUM 10 MG/HR: 20 TABLET, DELAYED RELEASE ORAL at 07:01

## 2019-06-29 RX ADMIN — HEPARIN SODIUM 5000 UNIT(S): 5000 INJECTION INTRAVENOUS; SUBCUTANEOUS at 05:10

## 2019-06-29 NOTE — PROGRESS NOTE ADULT - ASSESSMENT
ASSESSMENT  53 y.o. woman with bleeding gastric ulcer s/p EGD s/p cauterization and IR embolization w/ R+L gastric artery embolization. Hemodynamically stable with stable H&H >24 hours.     PLAN  #Neuro:   - Baseline AAOX4  - Mentating appropriately feels tired. Continue to monitor.     #Pulm:   - Breathing comfortably, normal work and rate of breathing.  - Will continue to monitor. Maintain SpO2 >92%.     #CV:   - TTE ordered  - Monitor BP and HR    #GI: Upper GI bleed s/p embolization  - Protonix gtt  - Keep NPO  - Monitor bowel movements  - GI, IR, general surgery following.   - Plan for gastrectomy/partial gastrectomy s/p ECHO  - CBC q4h, Active T+S  - F/u pathology     #Renal:   - No issues  - Monitor Cr and I/O's    #Heme:    - CBC q4hr to trend H/H  - Start heparin for DVT PPX given stable H&H >24 hrs  - Total blood products: 5 units pRBC, 1 unit FFP, 1 unit platelets    #ID:   - Hx of ulcerative rash, HSV+ and MRSA+ s/p treatment, now on valacyclovir for ppx, pt is afebrile   - Continue with Valacyclovir 500mg BID ASSESSMENT  53 y.o. woman with bleeding gastric ulcer s/p EGD s/p cauterization and IR embolization w/ R+L gastric artery embolization. Hemodynamically stable with stable H&H >24 hours.     PLAN  #Neuro:   - Baseline AAOX4  - Mentating appropriately feels tired. Continue to monitor.     #Pulm:   - Breathing comfortably, normal work and rate of breathing.  - Will continue to monitor. Maintain SpO2 >92%.     #CV:   - TTE ordered  - Monitor BP and HR    #GI: Upper GI bleed s/p embolization  - Protonix gtt  - Keep NPO  - Monitor bowel movements  - GI, IR, general surgery following.   - Plan for gastrectomy/partial gastrectomy s/p ECHO  - CBC q4h, Active T+S  - F/u pathology     #Renal:   - No issues  - Monitor Cr and I/O's    #Heme:    - CBC qAM to trend H/H  - Start heparin for DVT PPX given stable H&H >24 hrs  - Total blood products: 5 units pRBC, 1 unit FFP, 1 unit platelets    #ID:   - Hx of ulcerative rash, HSV+ and MRSA+ s/p treatment, now on valacyclovir for ppx, pt is afebrile   - Continue with Valacyclovir 500mg BID

## 2019-06-29 NOTE — PROGRESS NOTE ADULT - ASSESSMENT
54 yo F, presenting to SICU, with bleeding gastric ulcer concerning for malignancy s/p IR embolization and EGD with continued bleeding at this time, and dropping Hemoglobin/Hematocrit, without active bleed on CTA abdomen/pelvis.     Plan:     - OR Monday(?) for diagnostic laparoscopy, possible exploratory laparotomy, possible gastrectomy, possible bowel resection.    - Echocardiogram  - Consent placed in chart   - monitor mental status.  - maintain SpO2 >92%.   - monitor BP and HR  -Protonix gtt  -Keep NPO  -Monitor bowel movements  -GI, IR, general surgery following.   -Plan for gastrectomy/partial gastrectomy, as still actively bleeding with negative CT angiogram abdomen/pelvis  -CBC q4h, Active T+S  -F/u biopsy  -Monitor Cr and I/O's  -Anemia from upper GI bleed requiring blood transfusion, follow up CBC post-transfusion.  -CBC q4hr to trend H/H  -transfuse if Hgb <7.0  -total blood products: 5 units pRBC, 1 unit FFP, 1 unit platelets   -Hx of ulcerative rash, HSV+ and MRSA+ s/p treatment, now on valacyclovir for ppx, pt is afebrile   -Continue with Valacyclovir 500mg BID  -If febrile, consider starting on abx and obtaining cultures   Dispo: SICU

## 2019-06-29 NOTE — PROGRESS NOTE ADULT - SUBJECTIVE AND OBJECTIVE BOX
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 Patient had gone to her dermatologist's office due to worsening of her chronic rash (states that rash was caused by HSV and had a superimposed MRSA infection), and dermatologist sent her in because of rigors and chills in office. In ED, patient reported that she also had two weeks of bloody bowel movements with blood in the toilet bowel and on paper. She reports a normal colonoscopy approx 2 years ago. Since admission, pt was in MICU for UGIB and underwent IR embolization and EGD. Surgical oncology consulted for second opinion in setting of recurrent UGIB to evaluate for possible gastrectomy/partial gastrectomy. Surgical oncology consulted SICU for closer monitoring in setting of active GI bleed with dropping hemoglobin/hematocrit.     24 HOUR EVENTS  - Hemodynamically stable with stable H&H  - No acute events overnight  - Started on clear liquid diet as OR time delayed for ECHO  - TTE ordered for evaluation of cardiac function pre-operatively     SUBJECTIVE/ROS  [x] A ten-point review of systems was otherwise negative except as noted.  [ ] Due to altered mental status/intubation, subjective information were not able to be obtained from the patient. History was obtained, to the extent possible, from review of the chart and collateral sources of information.      NEURO  RASS:     GCS:  15   CAM ICU:  Exam: awake, alert, oriented  [x] Adequacy of sedation and pain control has been assessed and adjusted      RESPIRATORY  RR: 15 (06-28-19 @ 00:00) (12 - 21)  SpO2: 100% (06-28-19 @ 00:00) (100% - 100%)  Exam: nonlabored, CTAB      CARDIOVASCULAR  HR: 69 (06-28-19 @ 00:00) (68 - 85)  BP: 113/62 (06-28-19 @ 00:00) (113/62 - 150/89)  BP(mean): 75 (06-28-19 @ 00:00) (57 - 100)    Exam: regular rate, normotensive  Cardiac Rhythm: regular on monitor   Perfusion     [x]Adequate   [ ]Inadequate  Mentation   [x]Normal       [ ]Reduced  Extremities  [x]Warm         [ ]Cool  Volume Status [ ]Hypervolemic [x]Euvolemic [ ]Hypovolemic  Meds:       GI/NUTRITION  Exam: soft, nontender, nondistended  Diet: NPO   Meds: pantoprazole Infusion 8 mG/Hr IV Continuous <Continuous>      GENITOURINARY  I&O's Detail    06-26 @ 07:01  -  06-27 @ 07:00  --------------------------------------------------------  IN:    IV PiggyBack: 500 mL    lactated ringers.: 500 mL    pantoprazole Infusion: 80 mL  Total IN: 1080 mL    OUT:    Voided: 800 mL  Total OUT: 800 mL    Total NET: 280 mL    06-27 @ 07:01  -  06-28 @ 02:07  --------------------------------------------------------  IN:    IV PiggyBack: 350 mL    lactated ringers.: 1700 mL    pantoprazole Infusion: 170 mL  Total IN: 2220 mL    OUT:    Voided: 1500 mL  Total OUT: 1500 mL    Total NET: 720 mL    06-26    140  |  111<H>  |  17  ----------------------------<  77  3.3<L>   |  16<L>  |  0.43<L>    Ca    7.4<L>      26 Jun 2019 23:10  Phos  2.3     06-26  Mg     1.7     06-26    Meds: lactated ringers. 1000 milliLiter(s) IV Continuous <Continuous>    HEMATOLOGIC  Meds:   [x] VTE Prophylaxis - heparin 5000 u Q8H                        10.8   6.87  )-----------( 297      ( 27 Jun 2019 22:00 )             32.3     PT/INR - ( 26 Jun 2019 23:10 )   PT: 12.9 SEC;   INR: 1.13        PTT - ( 26 Jun 2019 23:10 )  PTT:30.6 SEC    INFECTIOUS DISEASES  WBC Count: 6.87 K/uL (06-27 @ 22:00)  WBC Count: 6.31 K/uL (06-27 @ 12:35)  WBC Count: 7.09 K/uL (06-27 @ 04:40)    Meds: valACYclovir 500 milliGRAM(s) Oral every 12 hours    ENDOCRINE    ACCESS DEVICES  [x] Peripheral IV  [ ] Central Venous Line	[ ] R	[ ] L	[ ] IJ	[ ] Fem	[ ] SC	Placed:   [ ] Arterial Line		[ ] R	[ ] L	[ ] Fem	[ ] Rad	[ ] Ax	Placed:   [ ] PICC:					[ ] Mediport  [ ] Urinary Catheter, Date Placed:   [x] Necessity of urinary, arterial, and venous catheters discussed    OTHER MEDICATIONS  lidocaine 5% Ointment 1 Application(s) Topical every 6 hours PRN  mupirocin 2% Ointment 1 Application(s) Topical two times a day    CODE STATUS  Full     IMAGING  CT Angio Abdomen and Pelvis w/ IV Cont (06.26.19 @ 11:58)   LOWER CHEST: Bibasilar dependent atelectasis. Postoperative changes in   the left breast. Stable bilateral pleural effusions with adjacent passive   atelectasis.    LIVER: Left hepatic lobe cyst. Subcentimeter liver lesions too small to   characterize, likely additional cysts. Linear and punctate radiodensities   in the right hepatic lobe may reflect calcifications or sequela of prior   embolization. Similar radiodensities are present adjacent to the head and   uncinate process of pancreas.  BILE DUCTS: Normal caliber.  GALLBLADDER: Cholecystectomy.  SPLEEN: Within normal limits.  PANCREAS: Within normal limits.  ADRENALS: Within normal limits.  KIDNEYS/URETERS: Within normal limits.    BLADDER: Diffuse thickening of the bladder wall.  REPRODUCTIVE ORGANS: Uterus and bilateral adnexa are unremarkable.    BOWEL: No bowel obstruction. Appendix is normal. No evidence of active GI   bleed. A metallic clip is noted along the posterior aspect of the gastric   fundus.  PERITONEUM: No ascites.  VESSELS:  Within normal limits.  RETROPERITONEUM: No lymphadenopathy.    ABDOMINAL WALL: Postoperative changes in the anterior abdominal wall.  BONES: Within normal limits.    IMPRESSION:   No evidence of active GI bleed, as clinically questioned.    Upper Endoscopy (06.22.19 @ 13:38)   - The esophagus was normal.  - The stomach contained a cratered large malignant appearing ulcer with two visible vessels. Both of them was spurting blood.  - Epinephrine 1:10,000 (2 cc) was injected into each site.  - Bipolar cautery was applied using a 10 Fr probe with a single channel therapeutic scope. The vessels were obliterate but oozing was still seen at one site. A clip was applied successfully to one site controlling the bleeding. The other site would not retain a clip due to the fibrotic nature of the vessel.  - Hemispora was applied to both sites with successful control of the bleeding.  - The duodenal bulb and D2 were normal.                                                        Impression:            Large malignant appearing ulcer with two visible vessels actively bleeding s/p endoscopic therapy. The hemospray is a temporary agent and thus more definitive therapy is needed. I discussed with IR for embolization for a more definitive therapy.    IR Procedure (06.22.19 @ 20:29)   There are separate origins of the common hepatic artery, left gastric   artery, and splenic artery from the aorta. The left gastric artery was   patent without evidence of active extravasation, with branches filling in the region of the previously placed endoscopy clips. Based on the   endoscopy findings. Embolization was performed to stasis with Avitene slurry. A small branch vessel from the left gastric artery was filling a larger vessel thought to be a vein, the artery was embolized to stasis with n-BCA glue. The common hepatic artery and its branches are patent without evidence of stenosis or aneurysm. The right gastric artery origin was found demonstrating multiple branch vessels in the region of the previously placed endoscopy clips. Based on the endoscopy findings embolization was performed of the left gastric artery to stasis. The gastroduodenal artery is patent without evidence of active extravasation, truncation, or pseudoaneurysm.  Vessels embolized to stasis: The left gastric artery with its origin from   the aorta, a small branch vessel from the left gastric artery. The right   gastric artery with its origin from the common hepatic artery.    IMPRESSION:  Catheter embolization of right and left gastric artery for upper GI bleed.

## 2019-06-29 NOTE — PROGRESS NOTE ADULT - ATTENDING COMMENTS
I have personally interviewed and examined this patient, reviewed pertinent labs and imaging, and discussed the case with colleagues, residents, physician assistants, and nurses on SICU rounds.     35    minutes in total were spent in providing direct critical care for the diagnoses, assessment and plan outlined below.  These diagnoses are unrelated to the surgical procedure.  Additionally, time spent in the performance of separately billable procedures was not counted toward the critical care time.  There is no overlap.    The active critical care issues are:  1. acute blood loss anemia, now stabilized    Change PPI infusion to bid dosing.  Advance diet if D team agrees.  Transfer to regular fernandez.

## 2019-06-29 NOTE — PROGRESS NOTE ADULT - SUBJECTIVE AND OBJECTIVE BOX
Surgery Progress Note    SUBJECTIVE: Pt seen and examined at bedside. Patient comfortable and in no-apparent distress. Yesterday, OR was cancelled due to the patient never having an echo done and being on chemotherapy. h&h stable in the last 2 days. No nausea, vomiting, diarrhea, or bloody BMs,  Vital Signs Last 24 Hrs  T(C): 36.9 (29 Jun 2019 00:00), Max: 36.9 (29 Jun 2019 00:00)  T(F): 98.5 (29 Jun 2019 00:00), Max: 98.5 (29 Jun 2019 00:00)  HR: 79 (29 Jun 2019 02:00) (71 - 83)  BP: 131/73 (29 Jun 2019 02:00) (105/60 - 138/64)  BP(mean): 85 (29 Jun 2019 02:00) (64 - 90)  RR: 16 (29 Jun 2019 02:00) (11 - 20)  SpO2: 100% (29 Jun 2019 02:00) (99% - 100%)    Physical Exam:  General Appearance: Appears well, NAD  Respiratory: No labored breathing  CV: Pulse regularly present  Abdomen: Soft, nontender,    LABS:                        10.5   7.46  )-----------( 287      ( 28 Jun 2019 01:25 )             31.4     06-28    136  |  102  |  5<L>  ----------------------------<  64<L>  3.9   |  20<L>  |  0.45<L>    Ca    7.9<L>      28 Jun 2019 01:35  Phos  3.2     06-28  Mg     1.8     06-28    TPro  4.7<L>  /  Alb  2.2<L>  /  TBili  0.3  /  DBili  x   /  AST  25  /  ALT  29  /  AlkPhos  79  06-28    PT/INR - ( 28 Jun 2019 01:35 )   PT: 14.0 SEC;   INR: 1.22          PTT - ( 28 Jun 2019 01:35 )  PTT:31.6 SEC      INs and OUTs:    06-27-19 @ 07:01  -  06-28-19 @ 07:00  --------------------------------------------------------  IN: 2632.5 mL / OUT: 2100 mL / NET: 532.5 mL    06-28-19 @ 07:01  -  06-29-19 @ 03:07  --------------------------------------------------------  IN: 1540 mL / OUT: 1475 mL / NET: 65 mL Surgery Progress Note    SUBJECTIVE: Pt seen and examined at bedside. Patient comfortable and in no-apparent distress. Yesterday, OR was cancelled due to the patient never having an echo done and being on chemotherapy. h&h stable in the last 2 days. No nausea, vomiting, diarrhea, or bloody BMs.    Vital Signs Last 24 Hrs  T(C): 36.9 (29 Jun 2019 00:00), Max: 36.9 (29 Jun 2019 00:00)  T(F): 98.5 (29 Jun 2019 00:00), Max: 98.5 (29 Jun 2019 00:00)  HR: 79 (29 Jun 2019 02:00) (71 - 83)  BP: 131/73 (29 Jun 2019 02:00) (105/60 - 138/64)  BP(mean): 85 (29 Jun 2019 02:00) (64 - 90)  RR: 16 (29 Jun 2019 02:00) (11 - 20)  SpO2: 100% (29 Jun 2019 02:00) (99% - 100%)    Physical Exam:  General Appearance: Appears well, NAD  Respiratory: No labored breathing  CV: Pulse regularly present  Abdomen: Soft, nontender, nondistended    LABS:                        10.5   7.46  )-----------( 287      ( 28 Jun 2019 01:25 )             31.4     06-28    136  |  102  |  5<L>  ----------------------------<  64<L>  3.9   |  20<L>  |  0.45<L>    Ca    7.9<L>      28 Jun 2019 01:35  Phos  3.2     06-28  Mg     1.8     06-28    TPro  4.7<L>  /  Alb  2.2<L>  /  TBili  0.3  /  DBili  x   /  AST  25  /  ALT  29  /  AlkPhos  79  06-28    PT/INR - ( 28 Jun 2019 01:35 )   PT: 14.0 SEC;   INR: 1.22          PTT - ( 28 Jun 2019 01:35 )  PTT:31.6 SEC      INs and OUTs:    06-27-19 @ 07:01  -  06-28-19 @ 07:00  --------------------------------------------------------  IN: 2632.5 mL / OUT: 2100 mL / NET: 532.5 mL    06-28-19 @ 07:01  -  06-29-19 @ 03:07  --------------------------------------------------------  IN: 1540 mL / OUT: 1475 mL / NET: 65 mL Surgery Progress Note    SUBJECTIVE: Pt seen and examined at bedside. Patient in no-apparent distress. Yesterday, OR was cancelled due to the patient never having an echo done and being on chemotherapy. h&h stable in the last 2 days. No nausea, vomiting, diarrhea, or bloody BMs.    Vital Signs Last 24 Hrs  T(C): 36.9 (29 Jun 2019 00:00), Max: 36.9 (29 Jun 2019 00:00)  T(F): 98.5 (29 Jun 2019 00:00), Max: 98.5 (29 Jun 2019 00:00)  HR: 79 (29 Jun 2019 02:00) (71 - 83)  BP: 131/73 (29 Jun 2019 02:00) (105/60 - 138/64)  BP(mean): 85 (29 Jun 2019 02:00) (64 - 90)  RR: 16 (29 Jun 2019 02:00) (11 - 20)  SpO2: 100% (29 Jun 2019 02:00) (99% - 100%)    Physical Exam:  General Appearance: Appears well, NAD  Respiratory: No labored breathing  CV: Pulse regularly present  Abdomen: Soft, nontender, nondistended    LABS:                        10.5   7.46  )-----------( 287      ( 28 Jun 2019 01:25 )             31.4     06-28    136  |  102  |  5<L>  ----------------------------<  64<L>  3.9   |  20<L>  |  0.45<L>    Ca    7.9<L>      28 Jun 2019 01:35  Phos  3.2     06-28  Mg     1.8     06-28    TPro  4.7<L>  /  Alb  2.2<L>  /  TBili  0.3  /  DBili  x   /  AST  25  /  ALT  29  /  AlkPhos  79  06-28    PT/INR - ( 28 Jun 2019 01:35 )   PT: 14.0 SEC;   INR: 1.22          PTT - ( 28 Jun 2019 01:35 )  PTT:31.6 SEC      INs and OUTs:    06-27-19 @ 07:01  -  06-28-19 @ 07:00  --------------------------------------------------------  IN: 2632.5 mL / OUT: 2100 mL / NET: 532.5 mL    06-28-19 @ 07:01  -  06-29-19 @ 03:07  --------------------------------------------------------  IN: 1540 mL / OUT: 1475 mL / NET: 65 mL

## 2019-06-30 ENCOUNTER — TRANSCRIPTION ENCOUNTER (OUTPATIENT)
Age: 54
End: 2019-06-30

## 2019-06-30 LAB
ALBUMIN SERPL ELPH-MCNC: 2.6 G/DL — LOW (ref 3.3–5)
ALP SERPL-CCNC: 94 U/L — SIGNIFICANT CHANGE UP (ref 40–120)
ALT FLD-CCNC: 23 U/L — SIGNIFICANT CHANGE UP (ref 4–33)
ANION GAP SERPL CALC-SCNC: 12 MMO/L — SIGNIFICANT CHANGE UP (ref 7–14)
APTT BLD: 30.6 SEC — SIGNIFICANT CHANGE UP (ref 27.5–36.3)
AST SERPL-CCNC: 18 U/L — SIGNIFICANT CHANGE UP (ref 4–32)
BILIRUB SERPL-MCNC: 0.2 MG/DL — SIGNIFICANT CHANGE UP (ref 0.2–1.2)
BLD GP AB SCN SERPL QL: NEGATIVE — SIGNIFICANT CHANGE UP
BUN SERPL-MCNC: 5 MG/DL — LOW (ref 7–23)
CA-I BLD-SCNC: 1.1 MMOL/L — SIGNIFICANT CHANGE UP (ref 1.03–1.23)
CALCIUM SERPL-MCNC: 8.5 MG/DL — SIGNIFICANT CHANGE UP (ref 8.4–10.5)
CHLORIDE SERPL-SCNC: 101 MMOL/L — SIGNIFICANT CHANGE UP (ref 98–107)
CO2 SERPL-SCNC: 24 MMOL/L — SIGNIFICANT CHANGE UP (ref 22–31)
CREAT SERPL-MCNC: 0.54 MG/DL — SIGNIFICANT CHANGE UP (ref 0.5–1.3)
GLUCOSE SERPL-MCNC: 85 MG/DL — SIGNIFICANT CHANGE UP (ref 70–99)
HCT VFR BLD CALC: 35.3 % — SIGNIFICANT CHANGE UP (ref 34.5–45)
HGB BLD-MCNC: 11.9 G/DL — SIGNIFICANT CHANGE UP (ref 11.5–15.5)
INR BLD: 1.12 — SIGNIFICANT CHANGE UP (ref 0.88–1.17)
MAGNESIUM SERPL-MCNC: 1.8 MG/DL — SIGNIFICANT CHANGE UP (ref 1.6–2.6)
MCHC RBC-ENTMCNC: 29 PG — SIGNIFICANT CHANGE UP (ref 27–34)
MCHC RBC-ENTMCNC: 33.7 % — SIGNIFICANT CHANGE UP (ref 32–36)
MCV RBC AUTO: 85.9 FL — SIGNIFICANT CHANGE UP (ref 80–100)
NRBC # FLD: 0 K/UL — SIGNIFICANT CHANGE UP (ref 0–0)
PHOSPHATE SERPL-MCNC: 4.2 MG/DL — SIGNIFICANT CHANGE UP (ref 2.5–4.5)
PLATELET # BLD AUTO: 370 K/UL — SIGNIFICANT CHANGE UP (ref 150–400)
PMV BLD: 9 FL — SIGNIFICANT CHANGE UP (ref 7–13)
POTASSIUM SERPL-MCNC: 4.1 MMOL/L — SIGNIFICANT CHANGE UP (ref 3.5–5.3)
POTASSIUM SERPL-SCNC: 4.1 MMOL/L — SIGNIFICANT CHANGE UP (ref 3.5–5.3)
PROT SERPL-MCNC: 5.5 G/DL — LOW (ref 6–8.3)
PROTHROM AB SERPL-ACNC: 12.8 SEC — SIGNIFICANT CHANGE UP (ref 9.8–13.1)
RBC # BLD: 4.11 M/UL — SIGNIFICANT CHANGE UP (ref 3.8–5.2)
RBC # FLD: 16.7 % — HIGH (ref 10.3–14.5)
RH IG SCN BLD-IMP: POSITIVE — SIGNIFICANT CHANGE UP
SODIUM SERPL-SCNC: 137 MMOL/L — SIGNIFICANT CHANGE UP (ref 135–145)
WBC # BLD: 5.88 K/UL — SIGNIFICANT CHANGE UP (ref 3.8–10.5)
WBC # FLD AUTO: 5.88 K/UL — SIGNIFICANT CHANGE UP (ref 3.8–10.5)

## 2019-06-30 PROCEDURE — 99232 SBSQ HOSP IP/OBS MODERATE 35: CPT | Mod: GC

## 2019-06-30 RX ORDER — DEXTROSE MONOHYDRATE, SODIUM CHLORIDE, AND POTASSIUM CHLORIDE 50; .745; 4.5 G/1000ML; G/1000ML; G/1000ML
1000 INJECTION, SOLUTION INTRAVENOUS
Refills: 0 | Status: DISCONTINUED | OUTPATIENT
Start: 2019-06-30 | End: 2019-07-01

## 2019-06-30 RX ORDER — MAGNESIUM SULFATE 500 MG/ML
2 VIAL (ML) INJECTION ONCE
Refills: 0 | Status: COMPLETED | OUTPATIENT
Start: 2019-06-30 | End: 2019-06-30

## 2019-06-30 RX ADMIN — PANTOPRAZOLE SODIUM 40 MILLIGRAM(S): 20 TABLET, DELAYED RELEASE ORAL at 06:00

## 2019-06-30 RX ADMIN — VALACYCLOVIR 500 MILLIGRAM(S): 500 TABLET, FILM COATED ORAL at 18:11

## 2019-06-30 RX ADMIN — MUPIROCIN 1 APPLICATION(S): 20 OINTMENT TOPICAL at 18:11

## 2019-06-30 RX ADMIN — PANTOPRAZOLE SODIUM 40 MILLIGRAM(S): 20 TABLET, DELAYED RELEASE ORAL at 18:11

## 2019-06-30 RX ADMIN — HEPARIN SODIUM 5000 UNIT(S): 5000 INJECTION INTRAVENOUS; SUBCUTANEOUS at 06:00

## 2019-06-30 RX ADMIN — HEPARIN SODIUM 5000 UNIT(S): 5000 INJECTION INTRAVENOUS; SUBCUTANEOUS at 22:16

## 2019-06-30 RX ADMIN — VALACYCLOVIR 500 MILLIGRAM(S): 500 TABLET, FILM COATED ORAL at 05:58

## 2019-06-30 RX ADMIN — HEPARIN SODIUM 5000 UNIT(S): 5000 INJECTION INTRAVENOUS; SUBCUTANEOUS at 13:29

## 2019-06-30 RX ADMIN — MUPIROCIN 1 APPLICATION(S): 20 OINTMENT TOPICAL at 06:00

## 2019-06-30 RX ADMIN — Medication 50 GRAM(S): at 08:09

## 2019-06-30 RX ADMIN — LIDOCAINE 1 APPLICATION(S): 4 CREAM TOPICAL at 09:44

## 2019-06-30 NOTE — PROGRESS NOTE ADULT - SUBJECTIVE AND OBJECTIVE BOX
Surgery Progress Note     Subjective/24hour Events:   Patient seen and examined.   No acute events overnight.   TTE performed.     Vital Signs:  Vital Signs Last 24 Hrs  T(C): 35.9 (30 Jun 2019 00:00), Max: 36.6 (29 Jun 2019 08:00)  T(F): 96.7 (30 Jun 2019 00:00), Max: 97.9 (29 Jun 2019 08:00)  HR: 82 (30 Jun 2019 00:00) (67 - 82)  BP: 110/62 (30 Jun 2019 00:00) (110/62 - 145/81)  BP(mean): 73 (30 Jun 2019 00:00) (70 - 85)  RR: 20 (30 Jun 2019 00:00) (13 - 20)  SpO2: 100% (30 Jun 2019 00:00) (100% - 100%)    CAPILLARY BLOOD GLUCOSE          I&O's Detail    28 Jun 2019 07:01  -  29 Jun 2019 07:00  --------------------------------------------------------  IN:    dextrose 5% + sodium chloride 0.45% with potassium chloride 20 mEq/L: 1600 mL    pantoprazole Infusion: 240 mL  Total IN: 1840 mL    OUT:    Voided: 1675 mL  Total OUT: 1675 mL    Total NET: 165 mL      29 Jun 2019 07:01  -  30 Jun 2019 05:40  --------------------------------------------------------  IN:    dextrose 5% + sodium chloride 0.45% with potassium chloride 20 mEq/L: 200 mL    pantoprazole Infusion: 40 mL  Total IN: 240 mL    OUT:    Voided: 1350 mL  Total OUT: 1350 mL    Total NET: -1110 mL          MEDICATIONS  (STANDING):  heparin  Injectable 5000 Unit(s) SubCutaneous every 8 hours  mupirocin 2% Ointment 1 Application(s) Topical two times a day  pantoprazole  Injectable 40 milliGRAM(s) IV Push two times a day  valACYclovir 500 milliGRAM(s) Oral every 12 hours    MEDICATIONS  (PRN):  lidocaine 5% Ointment 1 Application(s) Topical every 6 hours PRN pain at rash      Physical Exam:  Exam: soft, nontender, nondistended  Diet: Full liquid diet.      Labs:    06-30    137  |  101  |  5<L>  ----------------------------<  85  4.1   |  24  |  0.54    Ca    8.5      30 Jun 2019 02:30  Phos  4.2     06-30  Mg     1.8     06-30    TPro  5.5<L>  /  Alb  2.6<L>  /  TBili  0.2  /  DBili  x   /  AST  18  /  ALT  23  /  AlkPhos  94  06-30    LIVER FUNCTIONS - ( 30 Jun 2019 02:30 )  Alb: 2.6 g/dL / Pro: 5.5 g/dL / ALK PHOS: 94 u/L / ALT: 23 u/L / AST: 18 u/L / GGT: x                                 11.9   5.88  )-----------( 370      ( 30 Jun 2019 02:30 )             35.3     PT/INR - ( 30 Jun 2019 02:30 )   PT: 12.8 SEC;   INR: 1.12          PTT - ( 30 Jun 2019 02:30 )  PTT:30.6 SEC    Imaging:  < from: Transthoracic Echocardiogram (06.29.19 @ 11:52) >  OBSERVATIONS:  Mitral Valve: Normal mitral valve.  Aortic Root: Normal aortic root.  Aortic Valve: Normal trileaflet aortic valve.  Left Atrium: Normal left atrium.  LA volume index = 25  cc/m2.  Left Ventricle: Endocardium not well visualized; grossly  normal left ventricular systolic function. Increased  relative wall thickness with normal left ventricular mass  index, consistent with concentric left ventricular  remodeling.  Right Heart: Normal right atrium. Normal right ventricular  size and function. Normal tricuspid valve. Normal pulmonic  valve.  Pericardium/PleuraNormal pericardium with no pericardial  effusion.  Hemodynamic: Estimated right ventricular systolic pressure  equals 34 mm Hg, assuming right atrial pressure equals 10  mm Hg, consistent with normal pulmonary pressures.  ------------------------------------------------------------------------  CONCLUSIONS:    < end of copied text >

## 2019-06-30 NOTE — PROGRESS NOTE ADULT - ASSESSMENT
54 yo F, presenting to SICU, with bleeding gastric ulcer concerning for malignancy s/p IR embolization and EGD with continued bleeding at this time, and dropping Hemoglobin/Hematocrit, without active bleed on CTA abdomen/pelvis.     Plan:   - Plan for OR Monday for diagnostic laparoscopy, possible exploratory laparotomy, possible gastrectomy, possible bowel resection.    - Echocardiogram performed needs finalization.   - Will contact Cardiology for OR clearance/optimization.   - Consent placed in chart   - Full liquids as tolerated.   - Rest of excellent care per SICU appreciated.

## 2019-06-30 NOTE — PROGRESS NOTE ADULT - ASSESSMENT
ASSESSMENT  53 y.o. woman with bleeding gastric ulcer s/p EGD s/p cauterization and IR embolization w/ R+L gastric artery embolization. Hemodynamically stable with stable H&H.     PLAN  #Neuro:   - Baseline AAOX4  - Mentating appropriately; feels tired. Continue to monitor.     #Pulm:   - Breathing comfortably, normal work and rate of breathing.  - Will continue to monitor. Maintain SpO2 >92%.     #CV:   - TTE showing grossly normal LV and RV function.   - Monitor BP and HR  - Maintain MAP > 65.    #GI: Upper GI bleed s/p embolization  - Protonix BID  - Diet: Full Liquid Diet. Advance as tolerated.   - Monitor bowel movements  - GI, IR, general surgery following.   - Plan for gastrectomy/partial gastrectomy  - CBC qd, Active T+S  - F/u pathology     #Renal:   - No issues  - Monitor Cr and I/O's    #Heme:    - CBC qAM to trend H/H  - Continue heparin for DVT PPX given stable H&H  - Total blood products: 5 units pRBC, 1 unit FFP, 1 unit platelets    #ID:   - Hx of ulcerative rash, HSV+ and MRSA+ s/p treatment, now on valacyclovir for ppx, pt is afebrile   - Continue with Valacyclovir 500mg BID     Dispo: Floor

## 2019-06-30 NOTE — PROGRESS NOTE ADULT - ATTENDING COMMENTS
I saw and examined the patient and agree with the above note.    K25.0 Acute gastric ulcer with hemorrhage   D62 Acute blood loss anemia   B00.9 HSV (herpes simplex virus) infection     I spent 35min reviewing history, data, images and in discussion/coordination of care. Greater than 50% of my time was spent in face-to-face discussion with the patient regarding potential surgical intervention and pre-op care.    Stew Rangel MD (Cell: [phone])  Acute and Critical Care Surgery    The Acute Care Surgery (B Team) Attending Group Practice:  Dr. Deana Dunn, Dr. Kalin Browning, Dr. Nargis Kimball, Dr. Stew Rangel    Urgent issues - spectra [id number] or [id number]  Nonurgent issues - [phone]  Patient appointments or after hours - [phone]

## 2019-06-30 NOTE — CONSULT NOTE ADULT - CONSULT REQUESTED DATE/TIME
19-Jun-2019 09:08
19-Jun-2019 00:05
19-Jun-2019 10:27
19-Jun-2019 11:45
19-Jun-2019 13:32
20-Jun-2019 10:23
22-Jun-2019 09:34
23-Jun-2019
26-Jun-2019 16:29
26-Jun-2019 18:12
30-Jun-2019 09:41

## 2019-06-30 NOTE — CONSULT NOTE ADULT - SUBJECTIVE AND OBJECTIVE BOX
CHIEF COMPLAINT:Patient is a 53y old  Female who presents with a chief complaint of chills (30 Jun 2019 05:40)      HISTORY OF PRESENT ILLNESS:    53 female with history as below breast ca, anemia, bleeding gastric argery s/p EGD / embolization planned diagnostic laparoscopy, possible exploratory laparotomy, possible gastrectomy, possible bowel resection.    denies any chest pain, sob, palpitation, dizziness or syncope.      PAST MEDICAL & SURGICAL HISTORY:  Breast cancer  S/P mastectomy, left          MEDICATIONS:  heparin  Injectable 5000 Unit(s) SubCutaneous every 8 hours    valACYclovir 500 milliGRAM(s) Oral every 12 hours        pantoprazole  Injectable 40 milliGRAM(s) IV Push two times a day      lidocaine 5% Ointment 1 Application(s) Topical every 6 hours PRN  mupirocin 2% Ointment 1 Application(s) Topical two times a day      FAMILY HISTORY:  No pertinent family history in first degree relatives      Non-contributory    SOCIAL HISTORY:    No tobacco, drugs or etoh    Allergies    No Known Allergies    Intolerances    	    REVIEW OF SYSTEMS:  as above  The rest of the 14 points ROS reviewed and except above they are unremarkable.        PHYSICAL EXAM:  T(C): 36.1 (06-30-19 @ 08:00), Max: 36.2 (06-29-19 @ 20:00)  HR: 74 (06-30-19 @ 08:00) (67 - 82)  BP: 109/65 (06-30-19 @ 08:00) (109/65 - 145/81)  RR: 16 (06-30-19 @ 08:00) (13 - 20)  SpO2: 100% (06-30-19 @ 08:00) (100% - 100%)  Wt(kg): --  I&O's Summary    29 Jun 2019 07:01  -  30 Jun 2019 07:00  --------------------------------------------------------  IN: 240 mL / OUT: 1550 mL / NET: -1310 mL    30 Jun 2019 07:01  -  30 Jun 2019 09:42  --------------------------------------------------------  IN: 0 mL / OUT: 100 mL / NET: -100 mL        JVP: Normal  Neck: supple  Lung: clear   CV: S1 S2 , Murmur:  Abd: soft  Ext: No edema  neuro: Awake / alert  Psych: flat affect  Skin: rash on ext     LABS/DATA:    TELEMETRY: 	    ECG:  	   	  CARDIAC MARKERS:                                      11.9   5.88  )-----------( 370      ( 30 Jun 2019 02:30 )             35.3     06-30    137  |  101  |  5<L>  ----------------------------<  85  4.1   |  24  |  0.54    Ca    8.5      30 Jun 2019 02:30  Phos  4.2     06-30  Mg     1.8     06-30    TPro  5.5<L>  /  Alb  2.6<L>  /  TBili  0.2  /  DBili  x   /  AST  18  /  ALT  23  /  AlkPhos  94  06-30    proBNP:   Lipid Profile:   HgA1c:   TSH:     < from: Transthoracic Echocardiogram (06.29.19 @ 11:52) >    Patient name: ETHAN GIFFORD  YOB: 1965   Age: 53 (F)   MR#: [id number]  Study Date: 6/29/2019  [facility] Hetal Estrada RDCS  Study quality: Technically good  Referring Physician: Bam Hayden MD  Blood Pressure:135/83 mmHg  Height: 155 cm  Weight: 47 kg  BSA: 1.4 m2  ------------------------------------------------------------------------  PROCEDURE: Transthoracic echocardiogram with 2-D, M-Mode  and complete spectral and color flow Doppler.  INDICATION: Cardiogenic shock (R57.0)  ------------------------------------------------------------------------  DIMENSIONS:  Dimensions:     Normal Values:  LA:     3.2 cm    2.0 - 4.0 cm  Ao:     2.5 cm    2.0 - 3.8 cm  SEPTUM: 0.9 cm    0.6 - 1.2 cm  PWT:    0.8cm    0.6 - 1.1 cm  LVIDd:  3.6 cm    3.0 - 5.6 cm  LVIDs:    ---     1.8 - 4.0 cm  Derived Variables:  LVMI: 60 g/m2  RWT: 0.44  ------------------------------------------------------------------------  OBSERVATIONS:  Mitral Valve: Normal mitral valve.  Aortic Root: Normal aortic root.  Aortic Valve: Normal trileaflet aortic valve.  Left Atrium: Normal left atrium.  LA volume index = 25  cc/m2.  Left Ventricle: Endocardium not well visualized; grossly  normal left ventricular systolic function. Increased  relative wall thickness with normal left ventricular mass  index, consistent with concentric left ventricular  remodeling.  Right Heart: Normal right atrium. Normal right ventricular  size and function. Normal tricuspid valve. Normal pulmonic  valve.  Pericardium/PleuraNormal pericardium with no pericardial  effusion.  Hemodynamic: Estimated right ventricular systolic pressure  equals 34 mm Hg, assuming right atrial pressure equals 10  mm Hg, consistent with normal pulmonary pressures.    < end of copied text >  :

## 2019-06-30 NOTE — CONSULT NOTE ADULT - CONSULT REQUESTED BY NAME
Dr. Bam Hayden
Dr. Dayton Ken
Dr. Ken
IM
Janene
MAR
MICU
emmanuelle
medicine team
Dr michelle
medicine

## 2019-06-30 NOTE — CONSULT NOTE ADULT - CONSULT REASON
Pre-Operative Cardiac Risk Stratification and Optimization
hematochezia
Breast Cancer
GI bleed
Severe GI Bleeding
UGIB
herpes zoster ophthalmicus
metastatic breast cancer, second opinion, transfer of care
mrsa, hsv
rash
hypotension, GIB

## 2019-06-30 NOTE — PROGRESS NOTE ADULT - SUBJECTIVE AND OBJECTIVE BOX
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 Patient had gone to her dermatologist's office due to worsening of her chronic rash (states that rash was caused by HSV and had a superimposed MRSA infection), and dermatologist sent her in because of rigors and chills in office. In ED, patient reported that she also had two weeks of bloody bowel movements with blood in the toilet bowel and on paper. She reports a normal colonoscopy approx 2 years ago. Since admission, pt was in MICU for UGIB and underwent IR embolization and EGD. Surgical oncology consulted for second opinion in setting of recurrent UGIB to evaluate for possible gastrectomy/partial gastrectomy. Surgical oncology consulted SICU for closer monitoring in setting of active GI bleed with dropping hemoglobin/hematocrit.     24 HOUR EVENTS  - Hemodynamically stable with stable H&H  - No acute events overnight.  - Patient remains on full liquid diet.  IV fluids were discontinued.   - Protonix was changed from gtt to BID dosing.   - Patient had TTE performed. TTE showed grossly normal left ventricular systolic function. Patient also had normal Right Ventricular size and function.       SUBJECTIVE/ROS:  [X] A ten-point review of systems was otherwise negative except as noted.    NEURO  GCS:  15  Exam: awake, alert, oriented  [x] Adequacy of sedation and pain control has been assessed and adjusted    RESPIRATORY  RR: 18 (06-29-19 @ 20:00) (13 - 20)  SpO2: 100% (06-29-19 @ 20:00) (100% - 100%)  Wt(kg): --  Exam: Even and unlabored, clear to auscultation bilaterally    CARDIOVASCULAR  HR: 74 (06-29-19 @ 20:00) (67 - 82)  BP: 121/61 (06-29-19 @ 20:00) (110/79 - 145/81)  BP(mean): 76 (06-29-19 @ 20:00) (70 - 92)  ABP: --  ABP(mean): --  Wt(kg): --  CVP(cm H2O): --      Exam: regular rate and rhythm  Cardiac Rhythm: sinus  Perfusion     [x]Adequate   [ ]Inadequate  Mentation   [x]Normal       [ ]Reduced  Extremities  [x]Warm         [ ]Cool  Volume Status [ ]Hypervolemic [x]Euvolemic [ ]Hypovolemic       GI/NUTRITION  Exam: soft, nontender, nondistended  Diet: Full liquid diet.  Meds: pantoprazole  Injectable 40 milliGRAM(s) IV Push two times a day      GENITOURINARY  I&O's Detail    06-28 @ 07:01  -  06-29 @ 07:00  --------------------------------------------------------  IN:    dextrose 5% + sodium chloride 0.45% with potassium chloride 20 mEq/L: 1600 mL    pantoprazole Infusion: 240 mL  Total IN: 1840 mL    OUT:    Voided: 1675 mL  Total OUT: 1675 mL    Total NET: 165 mL      06-29 @ 07:01  -  06-30 @ 00:01  --------------------------------------------------------  IN:    dextrose 5% + sodium chloride 0.45% with potassium chloride 20 mEq/L: 200 mL    pantoprazole Infusion: 40 mL  Total IN: 240 mL    OUT:    Voided: 1150 mL  Total OUT: 1150 mL    Total NET: -910 mL      06-29    136  |  102  |  3<L>  ----------------------------<  96  4.0   |  23  |  0.48<L>    Ca    7.9<L>      29 Jun 2019 03:02  Phos  3.3     06-29  Mg     2.0     06-29    TPro  4.8<L>  /  Alb  2.4<L>  /  TBili  0.2  /  DBili  x   /  AST  20  /  ALT  25  /  AlkPhos  85  06-29      HEMATOLOGIC  Meds: heparin  Injectable 5000 Unit(s) SubCutaneous every 8 hours    [x] VTE Prophylaxis                        11.1   5.53  )-----------( 303      ( 29 Jun 2019 03:02 )             33.5     PT/INR - ( 28 Jun 2019 01:35 )   PT: 14.0 SEC;   INR: 1.22          PTT - ( 28 Jun 2019 01:35 )  PTT:31.6 SEC  Transfusion     [ ] PRBC   [ ] Platelets   [ ] FFP   [ ] Cryoprecipitate      INFECTIOUS DISEASES  WBC Count: 5.53 K/uL (06-29 @ 03:02)    RECENT CULTURES:    Meds: valACYclovir 500 milliGRAM(s) Oral every 12 hours      ACCESS DEVICES:  [X] Peripheral IV  [ ] Central Venous Line	[ ] R	[ ] L	[ ] IJ	[ ] Fem	[ ] SC	Placed:   [ ] Arterial Line		[ ] R	[ ] L	[ ] Fem	[ ] Rad	[ ] Ax	Placed:   [ ] PICC:					[ ] Mediport  [ ] Urinary Catheter, Date Placed:   [x] Necessity of urinary, arterial, and venous catheters discussed    OTHER MEDICATIONS:  lidocaine 5% Ointment 1 Application(s) Topical every 6 hours PRN  mupirocin 2% Ointment 1 Application(s) Topical two times a day      CODE STATUS: FULL CODE

## 2019-06-30 NOTE — CONSULT NOTE ADULT - PROVIDER SPECIALTY LIST ADULT
Dermatology
Gastroenterology
Heme/Onc
Heme/Onc
Infectious Disease
MICU
Ophthalmology
SICU
Surgery
Surgery
Cardiology

## 2019-07-01 ENCOUNTER — RESULT REVIEW (OUTPATIENT)
Age: 54
End: 2019-07-01

## 2019-07-01 LAB
ANION GAP SERPL CALC-SCNC: 12 MMO/L — SIGNIFICANT CHANGE UP (ref 7–14)
APTT BLD: 44.7 SEC — HIGH (ref 27.5–36.3)
BUN SERPL-MCNC: 7 MG/DL — SIGNIFICANT CHANGE UP (ref 7–23)
CA-I BLD-SCNC: 1.05 MMOL/L — SIGNIFICANT CHANGE UP (ref 1.03–1.23)
CALCIUM SERPL-MCNC: 8.5 MG/DL — SIGNIFICANT CHANGE UP (ref 8.4–10.5)
CHLORIDE SERPL-SCNC: 100 MMOL/L — SIGNIFICANT CHANGE UP (ref 98–107)
CO2 SERPL-SCNC: 24 MMOL/L — SIGNIFICANT CHANGE UP (ref 22–31)
CREAT SERPL-MCNC: 0.62 MG/DL — SIGNIFICANT CHANGE UP (ref 0.5–1.3)
GLUCOSE SERPL-MCNC: 102 MG/DL — HIGH (ref 70–99)
HCG SERPL-ACNC: < 5 MIU/ML — SIGNIFICANT CHANGE UP
HCT VFR BLD CALC: 38.9 % — SIGNIFICANT CHANGE UP (ref 34.5–45)
HGB BLD-MCNC: 12.7 G/DL — SIGNIFICANT CHANGE UP (ref 11.5–15.5)
INR BLD: 1.1 — SIGNIFICANT CHANGE UP (ref 0.88–1.17)
MAGNESIUM SERPL-MCNC: 2.6 MG/DL — SIGNIFICANT CHANGE UP (ref 1.6–2.6)
MCHC RBC-ENTMCNC: 28.6 PG — SIGNIFICANT CHANGE UP (ref 27–34)
MCHC RBC-ENTMCNC: 32.6 % — SIGNIFICANT CHANGE UP (ref 32–36)
MCV RBC AUTO: 87.6 FL — SIGNIFICANT CHANGE UP (ref 80–100)
NRBC # FLD: 0 K/UL — SIGNIFICANT CHANGE UP (ref 0–0)
PHOSPHATE SERPL-MCNC: 4.5 MG/DL — SIGNIFICANT CHANGE UP (ref 2.5–4.5)
PLATELET # BLD AUTO: 427 K/UL — HIGH (ref 150–400)
PMV BLD: 9.2 FL — SIGNIFICANT CHANGE UP (ref 7–13)
POTASSIUM SERPL-MCNC: 4.3 MMOL/L — SIGNIFICANT CHANGE UP (ref 3.5–5.3)
POTASSIUM SERPL-SCNC: 4.3 MMOL/L — SIGNIFICANT CHANGE UP (ref 3.5–5.3)
PROTHROM AB SERPL-ACNC: 12.6 SEC — SIGNIFICANT CHANGE UP (ref 9.8–13.1)
RBC # BLD: 4.44 M/UL — SIGNIFICANT CHANGE UP (ref 3.8–5.2)
RBC # FLD: 16.9 % — HIGH (ref 10.3–14.5)
SODIUM SERPL-SCNC: 136 MMOL/L — SIGNIFICANT CHANGE UP (ref 135–145)
WBC # BLD: 6.47 K/UL — SIGNIFICANT CHANGE UP (ref 3.8–10.5)
WBC # FLD AUTO: 6.47 K/UL — SIGNIFICANT CHANGE UP (ref 3.8–10.5)

## 2019-07-01 PROCEDURE — 99233 SBSQ HOSP IP/OBS HIGH 50: CPT

## 2019-07-01 PROCEDURE — 43632 REMOVAL OF STOMACH PARTIAL: CPT

## 2019-07-01 PROCEDURE — 38747 REMOVE ABDOMINAL LYMPH NODES: CPT | Mod: 59

## 2019-07-01 PROCEDURE — 44050 REDUCE BOWEL OBSTRUCTION: CPT

## 2019-07-01 PROCEDURE — 49905 OMENTAL FLAP INTRA-ABDOM: CPT

## 2019-07-01 PROCEDURE — 43236 UPPR GI SCOPE W/SUBMUC INJ: CPT

## 2019-07-01 PROCEDURE — 88307 TISSUE EXAM BY PATHOLOGIST: CPT | Mod: 26

## 2019-07-01 RX ORDER — SODIUM CHLORIDE 9 MG/ML
1000 INJECTION, SOLUTION INTRAVENOUS
Refills: 0 | Status: DISCONTINUED | OUTPATIENT
Start: 2019-07-01 | End: 2019-07-05

## 2019-07-01 RX ADMIN — DEXTROSE MONOHYDRATE, SODIUM CHLORIDE, AND POTASSIUM CHLORIDE 75 MILLILITER(S): 50; .745; 4.5 INJECTION, SOLUTION INTRAVENOUS at 22:32

## 2019-07-01 RX ADMIN — VALACYCLOVIR 500 MILLIGRAM(S): 500 TABLET, FILM COATED ORAL at 17:00

## 2019-07-01 RX ADMIN — MUPIROCIN 1 APPLICATION(S): 20 OINTMENT TOPICAL at 06:00

## 2019-07-01 RX ADMIN — VALACYCLOVIR 500 MILLIGRAM(S): 500 TABLET, FILM COATED ORAL at 05:59

## 2019-07-01 RX ADMIN — SODIUM CHLORIDE 75 MILLILITER(S): 9 INJECTION, SOLUTION INTRAVENOUS at 23:10

## 2019-07-01 RX ADMIN — HEPARIN SODIUM 5000 UNIT(S): 5000 INJECTION INTRAVENOUS; SUBCUTANEOUS at 13:12

## 2019-07-01 RX ADMIN — PANTOPRAZOLE SODIUM 40 MILLIGRAM(S): 20 TABLET, DELAYED RELEASE ORAL at 17:00

## 2019-07-01 RX ADMIN — PANTOPRAZOLE SODIUM 40 MILLIGRAM(S): 20 TABLET, DELAYED RELEASE ORAL at 05:59

## 2019-07-01 RX ADMIN — HEPARIN SODIUM 5000 UNIT(S): 5000 INJECTION INTRAVENOUS; SUBCUTANEOUS at 05:59

## 2019-07-01 RX ADMIN — HEPARIN SODIUM 5000 UNIT(S): 5000 INJECTION INTRAVENOUS; SUBCUTANEOUS at 22:31

## 2019-07-01 RX ADMIN — MUPIROCIN 1 APPLICATION(S): 20 OINTMENT TOPICAL at 17:00

## 2019-07-01 RX ADMIN — DEXTROSE MONOHYDRATE, SODIUM CHLORIDE, AND POTASSIUM CHLORIDE 75 MILLILITER(S): 50; .745; 4.5 INJECTION, SOLUTION INTRAVENOUS at 00:00

## 2019-07-01 NOTE — CHART NOTE - NSCHARTNOTEFT_GEN_A_CORE
Gastroenterology Brief Note   - patient remains in SICU with plans for OR for definitive therapy for upper GI bleed   - HD stable   - please call GI back with any further questions

## 2019-07-01 NOTE — PROGRESS NOTE ADULT - SUBJECTIVE AND OBJECTIVE BOX
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 Patient had gone to her dermatologist's office due to worsening of her chronic rash (states that rash was caused by HSV and had a superimposed MRSA infection), and dermatologist sent her in because of rigors and chills in office. In ED, patient reported that she also had two weeks of bloody bowel movements with blood in the toilet bowel and on paper. She reports a normal colonoscopy approx 2 years ago. Since admission, pt was in MICU for UGIB and underwent IR embolization and EGD. Surgical oncology consulted for second opinion in setting of recurrent UGIB to evaluate for possible gastrectomy/partial gastrectomy. Surgical oncology consulted SICU for closer monitoring in setting of active GI bleed with dropping hemoglobin/hematocrit.       24 HOUR EVENTS  - No acute events   - NPO in anticipation of OR today      SUBJECTIVE/ROS  [x] A ten-point review of systems was otherwise negative except as noted.  [ ] Due to altered mental status/intubation, subjective information were not able to be obtained from the patient. History was obtained, to the extent possible, from review of the chart and collateral sources of information.      NEURO  RASS:     GCS:  15   CAM ICU:  Exam: awake, alert, oriented  Meds:   [x] Adequacy of sedation and pain control has been assessed and adjusted      RESPIRATORY  RR: 14 (07-01-19 @ 00:00) (12 - 20)  SpO2: 100% (07-01-19 @ 00:00) (100% - 100%)  Exam: unlabored, clear to auscultation bilaterally      CARDIOVASCULAR  HR: 77 (07-01-19 @ 00:00) (74 - 93)  BP: 112/60 (07-01-19 @ 00:00) (109/65 - 124/57)  BP(mean): 73 (07-01-19 @ 00:00) (57 - 77)    Exam: regular rate and rhythm  Cardiac Rhythm: regular on monitor   Perfusion     [x]Adequate   [ ]Inadequate  Mentation   [x]Normal       [ ]Reduced  Extremities  [x]Warm         [ ]Cool  Volume Status [ ]Hypervolemic [x]Euvolemic [ ]Hypovolemic      GI/NUTRITION  Exam: soft, nontender, nondistended  Diet: NPO   Meds: pantoprazole  Injectable 40 milliGRAM(s) IV Push two times a day      GENITOURINARY  I&O's Detail    06-29 @ 07:01  -  06-30 @ 07:00  --------------------------------------------------------  IN:    dextrose 5% + sodium chloride 0.45% with potassium chloride 20 mEq/L: 200 mL    pantoprazole Infusion: 40 mL  Total IN: 240 mL    OUT:    Voided: 1550 mL  Total OUT: 1550 mL    Total NET: -1310 mL    06-30 @ 07:01  -  07-01 @ 01:17  --------------------------------------------------------  IN:    dextrose 5% + sodium chloride 0.45% with potassium chloride 20 mEq/L: 75 mL  Total IN: 75 mL    OUT:    Voided: 500 mL  Total OUT: 500 mL    Total NET: -425 mL    06-30    137  |  101  |  5<L>  ----------------------------<  85  4.1   |  24  |  0.54    Ca    8.5      30 Jun 2019 02:30  Phos  4.2     06-30  Mg     1.8     06-30    TPro  5.5<L>  /  Alb  2.6<L>  /  TBili  0.2  /  DBili  x   /  AST  18  /  ALT  23  /  AlkPhos  94  06-30    Meds: dextrose 5% + sodium chloride 0.45% with potassium chloride 20 mEq/L 1000 milliLiter(s) IV Continuous <Continuous>      HEMATOLOGIC  Meds: heparin  Injectable 5000 Unit(s) SubCutaneous every 8 hours  [x] VTE Prophylaxis                        11.9   5.88  )-----------( 370      ( 30 Jun 2019 02:30 )             35.3     PT/INR - ( 30 Jun 2019 02:30 )   PT: 12.8 SEC;   INR: 1.12       PTT - ( 30 Jun 2019 02:30 )  PTT:30.6 SEC  Transfusion     [ ] PRBC   [ ] Platelets   [ ] FFP   [ ] Cryoprecipitate      INFECTIOUS DISEASES  WBC Count: 5.88 K/uL (06-30 @ 02:30)    Meds:   valACYclovir 500 milliGRAM(s) Oral every 12 hours      ENDOCRINE      ACCESS DEVICES  [x] Peripheral IV  [ ] Central Venous Line	[ ] R	[ ] L	[ ] IJ	[ ] Fem	[ ] SC	Placed:   [ ] Arterial Line		[ ] R	[ ] L	[ ] Fem	[ ] Rad	[ ] Ax	Placed:   [ ] PICC:					[ ] Mediport  [ ] Urinary Catheter, Date Placed:   [x] Necessity of urinary, arterial, and venous catheters discussed      OTHER MEDICATIONS  lidocaine 5% Ointment 1 Application(s) Topical every 6 hours PRN  mupirocin 2% Ointment 1 Application(s) Topical two times a day      CODE STATUS  Full       IMAGING  Xray Chest 1 View- PORTABLE-Urgent (06.23.19 @ 01:25)   Persistent reticular nodular pattern in the lungs as above.  ET tube in appropriate position.

## 2019-07-01 NOTE — PROGRESS NOTE ADULT - ASSESSMENT
54 yo F, presenting to SICU, with bleeding gastric ulcer concerning for malignancy s/p IR embolization and EGD with continued bleeding at this time, and dropping Hemoglobin/Hematocrit, without active bleed on CTA abdomen/pelvis.     Plan:   - Plan for OR Monday for diagnostic laparoscopy, possible exploratory laparotomy, possible gastrectomy, possible bowel resection.    - Echocardiogram performed - normal cardiac status.   - Cardiology cleared for OR    - Consent placed in chart   - NPO for surgery  - Rest of excellent care per SICU appreciated.

## 2019-07-01 NOTE — BRIEF OPERATIVE NOTE - NSICDXBRIEFPROCEDURE_GEN_ALL_CORE_FT
PROCEDURES:  Billroth II operation 01-Jul-2019 20:59:04  Crow Carreon  Distal gastrectomy 01-Jul-2019 20:58:57  Crow Carreon

## 2019-07-01 NOTE — PROGRESS NOTE ADULT - SUBJECTIVE AND OBJECTIVE BOX
Subjective: Patient seen and examined. No new events except as noted.     SUBJECTIVE/ROS:  No chest pain, dyspnea, palpitation, or dizziness.       MEDICATIONS:  MEDICATIONS  (STANDING):  dextrose 5% + sodium chloride 0.45% with potassium chloride 20 mEq/L 1000 milliLiter(s) (75 mL/Hr) IV Continuous <Continuous>  heparin  Injectable 5000 Unit(s) SubCutaneous every 8 hours  mupirocin 2% Ointment 1 Application(s) Topical two times a day  pantoprazole  Injectable 40 milliGRAM(s) IV Push two times a day  valACYclovir 500 milliGRAM(s) Oral every 12 hours      PHYSICAL EXAM:  T(C): 36.1 (07-01-19 @ 08:00), Max: 36.6 (07-01-19 @ 00:00)  HR: 75 (07-01-19 @ 08:00) (73 - 93)  BP: 106/49 (07-01-19 @ 08:00) (106/49 - 119/52)  RR: 14 (07-01-19 @ 08:00) (12 - 20)  SpO2: 100% (07-01-19 @ 08:00) (100% - 100%)  Wt(kg): --  I&O's Summary    30 Jun 2019 07:01  -  01 Jul 2019 07:00  --------------------------------------------------------  IN: 600 mL / OUT: 850 mL / NET: -250 mL    01 Jul 2019 07:01  -  01 Jul 2019 10:40  --------------------------------------------------------  IN: 75 mL / OUT: 100 mL / NET: -25 mL            JVP: Normal  Neck: supple  Lung: clear   CV: S1 S2 , Murmur:  Abd: soft  Ext: No edema  neuro: Awake / alert  Psych: flat affect  Skin: normal``    LABS/DATA:    CARDIAC MARKERS:                                12.7   6.47  )-----------( 427      ( 01 Jul 2019 03:00 )             38.9     07-01    136  |  100  |  7   ----------------------------<  102<H>  4.3   |  24  |  0.62    Ca    8.5      01 Jul 2019 03:00  Phos  4.5     07-01  Mg     2.6     07-01    TPro  5.5<L>  /  Alb  2.6<L>  /  TBili  0.2  /  DBili  x   /  AST  18  /  ALT  23  /  AlkPhos  94  06-30    proBNP:   Lipid Profile:   HgA1c:   TSH:     TELE:  EKG:

## 2019-07-01 NOTE — BRIEF OPERATIVE NOTE - OPERATION/FINDINGS
EGD (gastric ulcer seen in incisura and marked), distal gastrectomy and billroth II reconstruction EGD (gastric ulcer seen in incisura and marked), distal gastrectomy and billroth II reconstruction (antecolic, retrogastric, antiperistaltic)

## 2019-07-01 NOTE — PROGRESS NOTE ADULT - ASSESSMENT
ASSESSMENT   53 y.o. woman with bleeding gastric ulcer s/p EGD w/ cauterization (6/22) and IR angiogram w/ R+L gastric artery embolization (6/22). Hemodynamically stable with stable H&H.     PLAN  Neuro:   - Baseline AAOX4  - Mentating appropriately; feels tired. Continue to monitor.     Pulm:   - Breathing comfortably, normal work and rate of breathing.  - Will continue to monitor. Maintain SpO2 >92%.     CV:   - TTE showing grossly normal LV and RV function.   - Monitor BP and HR  - Maintain MAP > 65.  - Cardiology note from 6/30 with documentation of optimization for OR    GI:   - Protonix BID  - Diet: NPO for OR (was on full liquids on 6/30, which were tolerated without issue)  - Plan for partial gastrectomy   - F/u pathology     Renal:   - D5 1/2 NS @75 mL/hr while NPO     Heme:    - CBC qAM to trend H/H  - Continue heparin for DVT PPX given stable H&H  - Total blood products: 5 units pRBC, 1 unit FFP, 1 unit platelets (no recent transfusions)    ID:   - Hx of ulcerative rash, HSV+ and MRSA+ s/p treatment, now on valacyclovir for ppx, pt is afebrile   - Valacyclovir 500mg BID     Dispo: Floor

## 2019-07-01 NOTE — PROGRESS NOTE ADULT - ATTENDING COMMENTS
53 y.o. woman with bleeding gastric ulcer s/p EGD w/ cauterization (6/22) and IR angiogram w/ R+L gastric artery embolization (6/22). Hemodynamically stable with stable H&H.     PLAN  Neuro:   - Baseline AAOX4  - Mentating appropriately; feels tired. Continue to monitor.     Pulm:   - Breathing comfortably, normal work and rate of breathing.  - Will continue to monitor. Maintain SpO2 >92%.     CV:   - TTE showing grossly normal LV and RV function.   - Monitor BP and HR  - Maintain MAP > 65.  - Cardiology note from 6/30 with documentation of optimization for OR    GI:   - Protonix BID  - Diet: NPO for OR (was on full liquids on 6/30, which were tolerated without issue)  - Plan for partial gastrectomy   - F/u pathology     Renal:   - D5 1/2 NS @75 mL/hr while NPO     Heme:    - CBC qAM to trend H/H  - Continue heparin for DVT PPX given stable H&H  - Total blood products: 5 units pRBC, 1 unit FFP, 1 unit platelets (no recent transfusions)    ID:   - Hx of ulcerative rash, HSV+ and MRSA+ s/p treatment, now on valacyclovir for ppx, pt is afebrile   - Valacyclovir 500mg BID     Dispo:OR.  The patient is a critical care patient with life threatening hemodynamic and metabolic instability in SICU.  I have personally interviewed when possible and examined the patient, reviewed data and laboratory tests/x-rays and all pertinent electronic images.  I was physically present for the key portions of the evaluation and management (E/M) service provided.   The SICU team has a constant risk benefit analyzes discussion with the primary team, all consultants, House Staff and PA's on all decisions.  These diagnoses are unrelated to the surgical procedure noted above.  I meet with family if needed to get further history, discuss the case and make care decisions for this patient who might not be able to participate.  Time involved in performance of separately billable procedures was not counted toward my critical care time. There is no overlap.  I spent 55-75 minutes ( 0800Hrs-0915Hrs in AM/ 1600Hrs-1715Hrs in PM, or other time indicated) of critical care time for the diagnoses, assessment, plan and interventions.

## 2019-07-01 NOTE — PROGRESS NOTE ADULT - SUBJECTIVE AND OBJECTIVE BOX
Surgery Progress Note     Subjective/24hour Events:   Patient seen and examined. No acute events overnight. h&h stable and patient without melena for 4 days. TTE yesterday showed grossly normal cardiac function. Made NPO yesterday in anticipation of OR today. No chest pain, SOB, abdominal pain, nausea, vomiting.      Vital Signs:  Vital Signs Last 24 Hrs  T(C): 36.6 (01 Jul 2019 00:00), Max: 36.6 (01 Jul 2019 00:00)  T(F): 97.8 (01 Jul 2019 00:00), Max: 97.8 (01 Jul 2019 00:00)  HR: 77 (01 Jul 2019 00:00) (74 - 93)  BP: 112/60 (01 Jul 2019 00:00) (109/65 - 124/57)  BP(mean): 73 (01 Jul 2019 00:00) (57 - 77)  RR: 14 (01 Jul 2019 00:00) (12 - 20)  SpO2: 100% (01 Jul 2019 00:00) (100% - 100%)    CAPILLARY BLOOD GLUCOSE          I&O's Detail    29 Jun 2019 07:01  -  30 Jun 2019 07:00  --------------------------------------------------------  IN:    dextrose 5% + sodium chloride 0.45% with potassium chloride 20 mEq/L: 200 mL    pantoprazole Infusion: 40 mL  Total IN: 240 mL    OUT:    Voided: 1550 mL  Total OUT: 1550 mL    Total NET: -1310 mL      30 Jun 2019 07:01  -  01 Jul 2019 01:53  --------------------------------------------------------  IN:    dextrose 5% + sodium chloride 0.45% with potassium chloride 20 mEq/L: 75 mL  Total IN: 75 mL    OUT:    Voided: 500 mL  Total OUT: 500 mL    Total NET: -425 mL          MEDICATIONS  (STANDING):  dextrose 5% + sodium chloride 0.45% with potassium chloride 20 mEq/L 1000 milliLiter(s) (75 mL/Hr) IV Continuous <Continuous>  heparin  Injectable 5000 Unit(s) SubCutaneous every 8 hours  mupirocin 2% Ointment 1 Application(s) Topical two times a day  pantoprazole  Injectable 40 milliGRAM(s) IV Push two times a day  valACYclovir 500 milliGRAM(s) Oral every 12 hours    MEDICATIONS  (PRN):  lidocaine 5% Ointment 1 Application(s) Topical every 6 hours PRN pain at rash      Physical Exam:  Gen: NAD.  Lungs: Non labored breathing.   Ab: Soft, nontender, nondistended.   Ext: Moves all 4 spontaneously.     Labs:    06-30    137  |  101  |  5<L>  ----------------------------<  85  4.1   |  24  |  0.54    Ca    8.5      30 Jun 2019 02:30  Phos  4.2     06-30  Mg     1.8     06-30    TPro  5.5<L>  /  Alb  2.6<L>  /  TBili  0.2  /  DBili  x   /  AST  18  /  ALT  23  /  AlkPhos  94  06-30    LIVER FUNCTIONS - ( 30 Jun 2019 02:30 )  Alb: 2.6 g/dL / Pro: 5.5 g/dL / ALK PHOS: 94 u/L / ALT: 23 u/L / AST: 18 u/L / GGT: x                                 11.9   5.88  )-----------( 370      ( 30 Jun 2019 02:30 )             35.3     PT/INR - ( 30 Jun 2019 02:30 )   PT: 12.8 SEC;   INR: 1.12          PTT - ( 30 Jun 2019 02:30 )  PTT:30.6 SEC    Imaging:

## 2019-07-02 LAB
ANION GAP SERPL CALC-SCNC: 18 MMO/L — HIGH (ref 7–14)
BASOPHILS # BLD AUTO: 0.02 K/UL — SIGNIFICANT CHANGE UP (ref 0–0.2)
BASOPHILS NFR BLD AUTO: 0.1 % — SIGNIFICANT CHANGE UP (ref 0–2)
BUN SERPL-MCNC: 5 MG/DL — LOW (ref 7–23)
CA-I BLD-SCNC: 1.05 MMOL/L — SIGNIFICANT CHANGE UP (ref 1.03–1.23)
CALCIUM SERPL-MCNC: 7.9 MG/DL — LOW (ref 8.4–10.5)
CHLORIDE SERPL-SCNC: 98 MMOL/L — SIGNIFICANT CHANGE UP (ref 98–107)
CO2 SERPL-SCNC: 18 MMOL/L — LOW (ref 22–31)
CREAT SERPL-MCNC: 0.38 MG/DL — LOW (ref 0.5–1.3)
EOSINOPHIL # BLD AUTO: 0.05 K/UL — SIGNIFICANT CHANGE UP (ref 0–0.5)
EOSINOPHIL NFR BLD AUTO: 0.3 % — SIGNIFICANT CHANGE UP (ref 0–6)
GLUCOSE SERPL-MCNC: 92 MG/DL — SIGNIFICANT CHANGE UP (ref 70–99)
HCT VFR BLD CALC: 32.9 % — LOW (ref 34.5–45)
HGB BLD-MCNC: 10.7 G/DL — LOW (ref 11.5–15.5)
IMM GRANULOCYTES NFR BLD AUTO: 0.9 % — SIGNIFICANT CHANGE UP (ref 0–1.5)
INR BLD: 1.1 — SIGNIFICANT CHANGE UP (ref 0.88–1.17)
LYMPHOCYTES # BLD AUTO: 0.78 K/UL — LOW (ref 1–3.3)
LYMPHOCYTES # BLD AUTO: 5.3 % — LOW (ref 13–44)
MAGNESIUM SERPL-MCNC: 1.4 MG/DL — LOW (ref 1.6–2.6)
MCHC RBC-ENTMCNC: 28.9 PG — SIGNIFICANT CHANGE UP (ref 27–34)
MCHC RBC-ENTMCNC: 32.5 % — SIGNIFICANT CHANGE UP (ref 32–36)
MCV RBC AUTO: 88.9 FL — SIGNIFICANT CHANGE UP (ref 80–100)
MONOCYTES # BLD AUTO: 0.63 K/UL — SIGNIFICANT CHANGE UP (ref 0–0.9)
MONOCYTES NFR BLD AUTO: 4.3 % — SIGNIFICANT CHANGE UP (ref 2–14)
NEUTROPHILS # BLD AUTO: 13.17 K/UL — HIGH (ref 1.8–7.4)
NEUTROPHILS NFR BLD AUTO: 89.1 % — HIGH (ref 43–77)
NRBC # FLD: 0 K/UL — SIGNIFICANT CHANGE UP (ref 0–0)
PHOSPHATE SERPL-MCNC: 3.2 MG/DL — SIGNIFICANT CHANGE UP (ref 2.5–4.5)
PLATELET # BLD AUTO: 357 K/UL — SIGNIFICANT CHANGE UP (ref 150–400)
PMV BLD: 9.2 FL — SIGNIFICANT CHANGE UP (ref 7–13)
POTASSIUM SERPL-MCNC: 4.4 MMOL/L — SIGNIFICANT CHANGE UP (ref 3.5–5.3)
POTASSIUM SERPL-SCNC: 4.4 MMOL/L — SIGNIFICANT CHANGE UP (ref 3.5–5.3)
PROTHROM AB SERPL-ACNC: 12.2 SEC — SIGNIFICANT CHANGE UP (ref 9.8–13.1)
RBC # BLD: 3.7 M/UL — LOW (ref 3.8–5.2)
RBC # FLD: 16.9 % — HIGH (ref 10.3–14.5)
SODIUM SERPL-SCNC: 134 MMOL/L — LOW (ref 135–145)
WBC # BLD: 14.78 K/UL — HIGH (ref 3.8–10.5)
WBC # FLD AUTO: 14.78 K/UL — HIGH (ref 3.8–10.5)

## 2019-07-02 PROCEDURE — 71045 X-RAY EXAM CHEST 1 VIEW: CPT | Mod: 26

## 2019-07-02 PROCEDURE — 99233 SBSQ HOSP IP/OBS HIGH 50: CPT

## 2019-07-02 RX ORDER — ACETAMINOPHEN 500 MG
650 TABLET ORAL ONCE
Refills: 0 | Status: COMPLETED | OUTPATIENT
Start: 2019-07-02 | End: 2019-07-02

## 2019-07-02 RX ORDER — HYDROMORPHONE HYDROCHLORIDE 2 MG/ML
0.5 INJECTION INTRAMUSCULAR; INTRAVENOUS; SUBCUTANEOUS EVERY 4 HOURS
Refills: 0 | Status: DISCONTINUED | OUTPATIENT
Start: 2019-07-02 | End: 2019-07-06

## 2019-07-02 RX ORDER — HYDROMORPHONE HYDROCHLORIDE 2 MG/ML
0.5 INJECTION INTRAMUSCULAR; INTRAVENOUS; SUBCUTANEOUS ONCE
Refills: 0 | Status: DISCONTINUED | OUTPATIENT
Start: 2019-07-02 | End: 2019-07-02

## 2019-07-02 RX ORDER — ACETAMINOPHEN 500 MG
750 TABLET ORAL ONCE
Refills: 0 | Status: COMPLETED | OUTPATIENT
Start: 2019-07-02 | End: 2019-07-02

## 2019-07-02 RX ORDER — MAGNESIUM SULFATE 500 MG/ML
2 VIAL (ML) INJECTION ONCE
Refills: 0 | Status: COMPLETED | OUTPATIENT
Start: 2019-07-02 | End: 2019-07-02

## 2019-07-02 RX ADMIN — SODIUM CHLORIDE 75 MILLILITER(S): 9 INJECTION, SOLUTION INTRAVENOUS at 19:21

## 2019-07-02 RX ADMIN — MUPIROCIN 1 APPLICATION(S): 20 OINTMENT TOPICAL at 17:37

## 2019-07-02 RX ADMIN — HYDROMORPHONE HYDROCHLORIDE 0.5 MILLIGRAM(S): 2 INJECTION INTRAMUSCULAR; INTRAVENOUS; SUBCUTANEOUS at 18:47

## 2019-07-02 RX ADMIN — Medication 750 MILLIGRAM(S): at 01:04

## 2019-07-02 RX ADMIN — HEPARIN SODIUM 5000 UNIT(S): 5000 INJECTION INTRAVENOUS; SUBCUTANEOUS at 05:43

## 2019-07-02 RX ADMIN — SODIUM CHLORIDE 75 MILLILITER(S): 9 INJECTION, SOLUTION INTRAVENOUS at 10:02

## 2019-07-02 RX ADMIN — HYDROMORPHONE HYDROCHLORIDE 0.5 MILLIGRAM(S): 2 INJECTION INTRAMUSCULAR; INTRAVENOUS; SUBCUTANEOUS at 19:23

## 2019-07-02 RX ADMIN — Medication 300 MILLIGRAM(S): at 00:34

## 2019-07-02 RX ADMIN — HYDROMORPHONE HYDROCHLORIDE 0.5 MILLIGRAM(S): 2 INJECTION INTRAMUSCULAR; INTRAVENOUS; SUBCUTANEOUS at 12:18

## 2019-07-02 RX ADMIN — HEPARIN SODIUM 5000 UNIT(S): 5000 INJECTION INTRAVENOUS; SUBCUTANEOUS at 22:22

## 2019-07-02 RX ADMIN — PANTOPRAZOLE SODIUM 40 MILLIGRAM(S): 20 TABLET, DELAYED RELEASE ORAL at 17:37

## 2019-07-02 RX ADMIN — VALACYCLOVIR 500 MILLIGRAM(S): 500 TABLET, FILM COATED ORAL at 05:43

## 2019-07-02 RX ADMIN — PANTOPRAZOLE SODIUM 40 MILLIGRAM(S): 20 TABLET, DELAYED RELEASE ORAL at 05:43

## 2019-07-02 RX ADMIN — Medication 260 MILLIGRAM(S): at 10:02

## 2019-07-02 RX ADMIN — HYDROMORPHONE HYDROCHLORIDE 0.5 MILLIGRAM(S): 2 INJECTION INTRAMUSCULAR; INTRAVENOUS; SUBCUTANEOUS at 13:42

## 2019-07-02 RX ADMIN — Medication 650 MILLIGRAM(S): at 12:16

## 2019-07-02 RX ADMIN — MUPIROCIN 1 APPLICATION(S): 20 OINTMENT TOPICAL at 05:43

## 2019-07-02 RX ADMIN — VALACYCLOVIR 500 MILLIGRAM(S): 500 TABLET, FILM COATED ORAL at 17:37

## 2019-07-02 RX ADMIN — HEPARIN SODIUM 5000 UNIT(S): 5000 INJECTION INTRAVENOUS; SUBCUTANEOUS at 15:48

## 2019-07-02 RX ADMIN — Medication 50 GRAM(S): at 03:56

## 2019-07-02 NOTE — CHART NOTE - NSCHARTNOTEFT_GEN_A_CORE
SURGICAL POST-OP CHECK NOTE:    Procedure: Distal gastrectomy and billroth II reconstruction    Subjective:    Patient sleeping comfortably and in no acute distress.    Vital Signs Last 24 Hrs  T(C): 36.4 (02 Jul 2019 00:00), Max: 36.6 (01 Jul 2019 04:00)  T(F): 97.6 (02 Jul 2019 00:00), Max: 97.8 (01 Jul 2019 04:00)  HR: 78 (02 Jul 2019 02:00) (71 - 93)  BP: 131/65 (02 Jul 2019 02:00) (106/49 - 139/71)  BP(mean): 79 (02 Jul 2019 02:00) (62 - 85)  RR: 16 (02 Jul 2019 02:00) (10 - 17)  SpO2: 100% (02 Jul 2019 02:00) (100% - 100%)  I&O's Summary    30 Jun 2019 07:01  -  01 Jul 2019 07:00  --------------------------------------------------------  IN: 600 mL / OUT: 850 mL / NET: -250 mL    01 Jul 2019 07:01  -  02 Jul 2019 02:57  --------------------------------------------------------  IN: 1125 mL / OUT: 1345 mL / NET: -220 mL                            10.7   14.78 )-----------( 357      ( 02 Jul 2019 00:12 )             32.9     07-02    134<L>  |  98  |  5<L>  ----------------------------<  92  4.4   |  18<L>  |  0.38<L>    Ca    7.9<L>      02 Jul 2019 00:10  Phos  3.2     07-02  Mg     1.4     07-02     PT/INR - ( 02 Jul 2019 00:12 )   PT: 12.2 SEC;   INR: 1.10          PTT - ( 01 Jul 2019 03:00 )  PTT:44.7 SEC    PHYSICAL EXAM:  Gen: NAD  CV: RRR per tele  Pulm: non-labored breathing  GI: abd non distended      a/p    52 y/o female who presents with bleeding gastric ulcer, unresponsive to IR embolization of the gastroduodenal artery, is now s/p distal gastrectomy and billroth II reconstruction. She is sleeping comfortably and appears to be in no acute distress. Her labs show a hemoglobin drop from 12.7 -> 10.7 and low magnesium. Magnesium was repleted and will continue to monitor CBC for signs of postoperative bleeding. Otherwise her vital signs are stable and she is afebrile.

## 2019-07-02 NOTE — PROGRESS NOTE ADULT - SUBJECTIVE AND OBJECTIVE BOX
SICU Daily Progress Note  =====================================================  Interval/Overnight Events: OR on 7/1 for EGD with gastric ulcer identified and tagged, Billroth 2 operation, and distal gastrectomy. , received 1500cc LR and 1L albumin intraop. NGT placed and put to low suction. Extubated postoperatively.    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 Patient had gone to her dermatologist's office due to worsening of her chronic rash (states that rash was caused by HSV and had a superimposed MRSA infection), and dermatologist sent her in because of rigors and chills in office. In ED, patient reported that she also had two weeks of bloody bowel movements with blood in the toilet bowel and on paper. She reports a normal colonoscopy approx 2 years ago. Since admission, pt was in MICU for UGIB and underwent IR embolization and EGD. Surgical oncology consulted for second opinion in setting of recurrent UGIB to evaluate for possible gastrectomy/partial gastrectomy. Surgical oncology consulted SICU for closer monitoring in setting of active GI bleed with dropping hemoglobin/hematocrit. OR on 7/1 for EGD with gastric ulcer identified and tagged, Billroth 2 operation, and distal gastrectomy.    PMH:   ***    Allergies: No Known Allergies      MEDICATIONS:   --------------------------------------------------------------------------------------  Neurologic Medications  acetaminophen  IVPB .. 750 milliGRAM(s) IV Intermittent once    Respiratory Medications    Cardiovascular Medications    Gastrointestinal Medications  lactated ringers. 1000 milliLiter(s) IV Continuous <Continuous>  pantoprazole  Injectable 40 milliGRAM(s) IV Push two times a day    Genitourinary Medications    Hematologic/Oncologic Medications  heparin  Injectable 5000 Unit(s) SubCutaneous every 8 hours    Antimicrobial/Immunologic Medications  valACYclovir 500 milliGRAM(s) Oral every 12 hours    Endocrine/Metabolic Medications    Topical/Other Medications  lidocaine 5% Ointment 1 Application(s) Topical every 6 hours PRN pain at rash  mupirocin 2% Ointment 1 Application(s) Topical two times a day    --------------------------------------------------------------------------------------    VITAL SIGNS, INS/OUTS (last 24 hours):  --------------------------------------------------------------------------------------  ((Insert SICU Vitals/Is+Os here))***  --------------------------------------------------------------------------------------    EXAM  NEUROLOGY  RASS:   	GCS:    Exam: Normal, NAD, alert, oriented x3, no focal deficits. ***    HEENT  Exam: Normocephalic, atraumatic, EOMI.  ***    RESPIRATORY  Exam: Lungs clear to auscultation, Normal expansion/effort. ***  Mechanical Ventilation:     CARDIOVASCULAR  Exam: S1, S2.  Regular rate and rhythm.   ***    GI/NUTRITION  Exam: Abdomen soft, Non-tender, Non-distended.  ***  Wound:  ***  Current Diet:  NPO***    VASCULAR  Exam: Extremities warm, pink, well-perfused. ***    MUSCULOSKELETAL  Exam: All extremities moving spontaneously without limitations. ***    SKIN  Exam: Good skin turgor, no skin breakdown. ***    METABOLIC/FLUIDS/ELECTROLYTES  lactated ringers. 1000 milliLiter(s) IV Continuous <Continuous>      HEMATOLOGIC  [x] VTE Prophylaxis: heparin  Injectable 5000 Unit(s) SubCutaneous every 8 hours    Transfusions:	[] PRBC	[] Platelets		[] FFP	[] Cryoprecipitate    INFECTIOUS DISEASE  Antimicrobials/Immunologic Medications:  valACYclovir 500 milliGRAM(s) Oral every 12 hours    Day #      of     ***    Tubes/Lines/Drains  ***  [x] Peripheral IV  [] Central Venous Line     	[] R	[] L	[] IJ	[] Fem	[] SC	Date Placed:   [] Arterial Line		[] R	[] L	[] Fem	[] Rad	[] Ax	Date Placed:   [] PICC		[] Midline		[] Mediport  [] Urinary Catheter		Date Placed:   [x] Necessity of urinary, arterial, and venous catheters discussed    LABS  --------------------------------------------------------------------------------------  ((Insert SICU Labs here))***  --------------------------------------------------------------------------------------    OTHER LABORATORY:     IMAGING STUDIES:   CXR:     ASSESSMENT:  53y Female    ((insert from previous))***    PLAN:   ***  Neurologic:   Respiratory:   Cardiovascular:   Gastrointestinal/Nutrition:   Renal/Genitourinary:   Hematologic:   Infectious Disease:   Tubes/Lines/Drains:   Endocrine:   Disposition:     --------------------------------------------------------------------------------------    Critical Care Diagnoses: SICU Daily Progress Note  =====================================================  Interval/Overnight Events: OR on 7/1 for EGD with gastric ulcer identified and tagged, Billroth 2 operation, and distal gastrectomy. , received 1500cc LR and 1L albumin intraop. NGT placed and put to low suction. Extubated postoperatively.    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 Patient had gone to her dermatologist's office due to worsening of her chronic rash (states that rash was caused by HSV and had a superimposed MRSA infection), and dermatologist sent her in because of rigors and chills in office. In ED, patient reported that she also had two weeks of bloody bowel movements with blood in the toilet bowel and on paper. She reports a normal colonoscopy approx 2 years ago. Since admission, pt was in MICU for UGIB and underwent IR embolization and EGD. Surgical oncology consulted for second opinion in setting of recurrent UGIB to evaluate for possible gastrectomy/partial gastrectomy. Surgical oncology consulted SICU for closer monitoring in setting of active GI bleed with dropping hemoglobin/hematocrit. OR on 7/1 for EGD with gastric ulcer identified and tagged, Billroth 2 operation, and distal gastrectomy.    PMH:   ***    Allergies: No Known Allergies      MEDICATIONS:   --------------------------------------------------------------------------------------  Neurologic Medications  acetaminophen  IVPB .. 750 milliGRAM(s) IV Intermittent once    Respiratory Medications    Cardiovascular Medications    Gastrointestinal Medications  lactated ringers. 1000 milliLiter(s) IV Continuous <Continuous>  pantoprazole  Injectable 40 milliGRAM(s) IV Push two times a day    Genitourinary Medications    Hematologic/Oncologic Medications  heparin  Injectable 5000 Unit(s) SubCutaneous every 8 hours    Antimicrobial/Immunologic Medications  valACYclovir 500 milliGRAM(s) Oral every 12 hours    Endocrine/Metabolic Medications    Topical/Other Medications  lidocaine 5% Ointment 1 Application(s) Topical every 6 hours PRN pain at rash  mupirocin 2% Ointment 1 Application(s) Topical two times a day    --------------------------------------------------------------------------------------    VITAL SIGNS, INS/OUTS (last 24 hours):  --------------------------------------------------------------------------------------  ((Insert SICU Vitals/Is+Os here))***  --------------------------------------------------------------------------------------    EXAM  NEUROLOGY  GCS:  14  Exam: Normal, NAD, alert, oriented x3, no focal deficits. ***    HEENT  Exam: Normocephalic, atraumatic, EOMI.  ***    RESPIRATORY  Exam: Lungs clear to auscultation, Normal expansion/effort. ***    CARDIOVASCULAR  Exam: S1, S2.  Regular rate and rhythm.   ***    GI/NUTRITION  Exam: Abdomen soft, Non-tender, Non-distended.  ***  Wound:  midline incision w/ aquacel dressing in place  Current Diet:  NPO    VASCULAR  Exam: Extremities warm, pink, well-perfused. ***    MUSCULOSKELETAL  Exam: All extremities moving spontaneously without limitations. ***    SKIN  Exam: Good skin turgor, no skin breakdown. ***    METABOLIC/FLUIDS/ELECTROLYTES  lactated ringers. 1000 milliLiter(s) IV Continuous <Continuous>      HEMATOLOGIC  [x] VTE Prophylaxis: heparin  Injectable 5000 Unit(s) SubCutaneous every 8 hours    Transfusions:	[] PRBC	[] Platelets		[] FFP	[] Cryoprecipitate    INFECTIOUS DISEASE  Antimicrobials/Immunologic Medications:  valACYclovir 500 milliGRAM(s) Oral every 12 hours    Day #      of     ***    Tubes/Lines/Drains  ***  [x] Peripheral IV (right forearm x2)  [] Central Venous Line     	[] R	[] L	[] IJ	[] Fem	[] SC	Date Placed:   [] Arterial Line		[] R	[] L	[] Fem	[] Rad	[] Ax	Date Placed:   [] PICC		[] Midline		[] Mediport  [x] Urinary Catheter		   [x] Necessity of urinary, arterial, and venous catheters discussed    LABS  --------------------------------------------------------------------------------------  ((Insert SICU Labs here))***  --------------------------------------------------------------------------------------    OTHER LABORATORY:     IMAGING STUDIES:   CXR:     ASSESSMENT:  53y Female    53 y.o. woman with bleeding gastric ulcer s/p EGD w/ cauterization (6/22) and IR angiogram w/ R+L gastric artery embolization (6/22). Hemodynamically stable with stable H&H. Now s/p EGD with gastric ulcer identified and tagged, Billroth 2 operation, and distal gastrectomy on 7/1.    PLAN:   ***  Neurologic:   Respiratory:   Cardiovascular:   Gastrointestinal/Nutrition:   Renal/Genitourinary:   Hematologic:   Infectious Disease:   Tubes/Lines/Drains:   Endocrine:   Disposition:     --------------------------------------------------------------------------------------    Critical Care Diagnoses: SICU Daily Progress Note  =====================================================  Interval/Overnight Events: OR on 7/1 for EGD with gastric ulcer identified and tagged, Billroth 2 operation, and distal gastrectomy. , received 1500cc LR and 1L albumin intraop. NGT placed and put to low suction. Extubated postoperatively.    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 Patient had gone to her dermatologist's office due to worsening of her chronic rash (states that rash was caused by HSV and had a superimposed MRSA infection), and dermatologist sent her in because of rigors and chills in office. In ED, patient reported that she also had two weeks of bloody bowel movements with blood in the toilet bowel and on paper. She reports a normal colonoscopy approx 2 years ago. Since admission, pt was in MICU for UGIB and underwent IR embolization and EGD. Surgical oncology consulted for second opinion in setting of recurrent UGIB to evaluate for possible gastrectomy/partial gastrectomy. Surgical oncology consulted SICU for closer monitoring in setting of active GI bleed with dropping hemoglobin/hematocrit. OR on 7/1 for EGD with gastric ulcer identified and tagged, Billroth 2 operation, and distal gastrectomy.      Allergies: No Known Allergies      MEDICATIONS:   --------------------------------------------------------------------------------------  Neurologic Medications  acetaminophen  IVPB .. 750 milliGRAM(s) IV Intermittent once    Respiratory Medications    Cardiovascular Medications    Gastrointestinal Medications  lactated ringers. 1000 milliLiter(s) IV Continuous <Continuous>  pantoprazole  Injectable 40 milliGRAM(s) IV Push two times a day    Genitourinary Medications    Hematologic/Oncologic Medications  heparin  Injectable 5000 Unit(s) SubCutaneous every 8 hours    Antimicrobial/Immunologic Medications  valACYclovir 500 milliGRAM(s) Oral every 12 hours    Endocrine/Metabolic Medications    Topical/Other Medications  lidocaine 5% Ointment 1 Application(s) Topical every 6 hours PRN pain at rash  mupirocin 2% Ointment 1 Application(s) Topical two times a day    --------------------------------------------------------------------------------------    VITAL SIGNS, INS/OUTS (last 24 hours):  --------------------------------------------------------------------------------------  ((Insert SICU Vitals/Is+Os here))***  --------------------------------------------------------------------------------------    EXAM  NEUROLOGY  GCS:  14  Exam: Normal, NAD, alert, following commands    HEENT  Exam: Normocephalic, atraumatic, EOMI, NGT in place    RESPIRATORY  Exam: Lungs clear to auscultation, Normal expansion/effort. ***    CARDIOVASCULAR  Exam: S1, S2.  Regular rate and rhythm.   ***    GI/NUTRITION  Exam: Abdomen soft, Non-tender, Non-distended  Wound:  midline incision w/ aquacel dressing in place  Current Diet:  NPO    VASCULAR  Exam: Extremities warm, pink, well-perfused. ***    MUSCULOSKELETAL  Exam: All extremities moving spontaneously without limitations. ***    SKIN  Exam: Good skin turgor, no skin breakdown. ***    METABOLIC/FLUIDS/ELECTROLYTES  lactated ringers. 1000 milliLiter(s) IV Continuous <Continuous>      HEMATOLOGIC  [x] VTE Prophylaxis: heparin  Injectable 5000 Unit(s) SubCutaneous every 8 hours    Transfusions:	[] PRBC	[] Platelets		[] FFP	[] Cryoprecipitate    INFECTIOUS DISEASE  Antimicrobials/Immunologic Medications:  valACYclovir 500 milliGRAM(s) Oral every 12 hours      - started 6/19    Tubes/Lines/Drains  ***  [x] Peripheral IV (right forearm x2)  [] Central Venous Line     	[] R	[] L	[] IJ	[] Fem	[] SC	Date Placed:   [] Arterial Line		[] R	[] L	[] Fem	[] Rad	[] Ax	Date Placed:   [] PICC		[] Midline		[] Mediport  [x] Urinary Catheter		   [x] Necessity of urinary, arterial, and venous catheters discussed    LABS  --------------------------------------------------------------------------------------  ((Insert SICU Labs here))***  --------------------------------------------------------------------------------------    OTHER LABORATORY:     IMAGING STUDIES:   CXR:     ASSESSMENT:  53y Female    53 y.o. woman with bleeding gastric ulcer s/p EGD w/ cauterization (6/22) and IR angiogram w/ R+L gastric artery embolization (6/22). Hemodynamically stable with stable H&H. Now s/p EGD with gastric ulcer identified and tagged, Billroth 2 operation, and distal gastrectomy on 7/1.    PLAN:   ***  Neurologic:   Respiratory:   Cardiovascular:   Gastrointestinal/Nutrition:   Renal/Genitourinary:   Hematologic:   Infectious Disease:   Tubes/Lines/Drains:   Endocrine:   Disposition:     --------------------------------------------------------------------------------------    Critical Care Diagnoses: SICU Daily Progress Note  =====================================================  Interval/Overnight Events: OR on 7/1 for EGD with gastric ulcer identified and tagged, Billroth 2 operation, and distal gastrectomy. , received 1500cc LR and 1L albumin intraop. NGT placed and put to low suction. Extubated postoperatively.    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 Patient had gone to her dermatologist's office due to worsening of her chronic rash (states that rash was caused by HSV and had a superimposed MRSA infection), and dermatologist sent her in because of rigors and chills in office. In ED, patient reported that she also had two weeks of bloody bowel movements with blood in the toilet bowel and on paper. She reports a normal colonoscopy approx 2 years ago. Since admission, pt was in MICU for UGIB and underwent IR embolization and EGD. Surgical oncology consulted for second opinion in setting of recurrent UGIB to evaluate for possible gastrectomy/partial gastrectomy. Surgical oncology consulted SICU for closer monitoring in setting of active GI bleed with dropping hemoglobin/hematocrit. OR on 7/1 for EGD with gastric ulcer identified and tagged, Billroth 2 operation, and distal gastrectomy.      Allergies: No Known Allergies      MEDICATIONS:   --------------------------------------------------------------------------------------  Neurologic Medications  acetaminophen  IVPB .. 750 milliGRAM(s) IV Intermittent once    Respiratory Medications    Cardiovascular Medications    Gastrointestinal Medications  lactated ringers. 1000 milliLiter(s) IV Continuous <Continuous>  pantoprazole  Injectable 40 milliGRAM(s) IV Push two times a day    Genitourinary Medications    Hematologic/Oncologic Medications  heparin  Injectable 5000 Unit(s) SubCutaneous every 8 hours    Antimicrobial/Immunologic Medications  valACYclovir 500 milliGRAM(s) Oral every 12 hours    Endocrine/Metabolic Medications    Topical/Other Medications  lidocaine 5% Ointment 1 Application(s) Topical every 6 hours PRN pain at rash  mupirocin 2% Ointment 1 Application(s) Topical two times a day    --------------------------------------------------------------------------------------    VITAL SIGNS, INS/OUTS (last 24 hours):  --------------------------------------------------------------------------------------  T(C): 36.4 (07-02-19 @ 00:00), Max: 36.6 (07-01-19 @ 04:00)  HR: 75 (07-02-19 @ 01:00) (71 - 93)  BP: 133/65 (07-02-19 @ 01:00) (106/49 - 139/71)  BP(mean): 81 (07-02-19 @ 01:00) (62 - 85)  ABP: --  ABP(mean): --  RR: 12 (07-02-19 @ 01:00) (10 - 17)  SpO2: 100% (07-02-19 @ 01:00) (100% - 100%)  Wt(kg): --  CVP(mm Hg): --  CI: --  CAPILLARY BLOOD GLUCOSE       N/A      06-30 @ 07:01  -  07-01 @ 07:00  --------------------------------------------------------  IN:    dextrose 5% + sodium chloride 0.45% with potassium chloride 20 mEq/L: 600 mL  Total IN: 600 mL    OUT:    Voided: 850 mL  Total OUT: 850 mL    Total NET: -250 mL      07-01 @ 07:01 - 07-02 @ 02:32  --------------------------------------------------------  IN:    dextrose 5% + sodium chloride 0.45% with potassium chloride 20 mEq/L: 825 mL    lactated ringers.: 150 mL  Total IN: 975 mL    OUT:    Indwelling Catheter - Urethral: 585 mL    Nasoenteral Tube: 50 mL    Voided: 500 mL  Total OUT: 1135 mL    Total NET: -160 mL        --------------------------------------------------------------------------------------    EXAM  NEUROLOGY  GCS:  14  Exam: Normal, NAD, alert, following commands    HEENT  Exam: Normocephalic, atraumatic, EOMI, NGT in place    RESPIRATORY  Exam: Lungs clear to auscultation, Normal expansion/effort. ***    CARDIOVASCULAR  Exam: S1, S2.  Regular rate and rhythm.   ***    GI/NUTRITION  Exam: Abdomen soft, appropriately tender, non-distended  Wound: midline incision w/ aquacel dressing in place  Current Diet:  NPO    VASCULAR  Exam: Extremities warm, pink, well-perfused    MUSCULOSKELETAL  Exam: All extremities moving spontaneously without limitations. ***    SKIN  Exam: Good skin turgor, no skin breakdown. ***    METABOLIC/FLUIDS/ELECTROLYTES  lactated ringers. 1000 milliLiter(s) IV Continuous <Continuous>      HEMATOLOGIC  [x] VTE Prophylaxis: heparin  Injectable 5000 Unit(s) SubCutaneous every 8 hours    Transfusions:	[] PRBC	[] Platelets		[] FFP	[] Cryoprecipitate    INFECTIOUS DISEASE  Antimicrobials/Immunologic Medications:  valACYclovir 500 milliGRAM(s) Oral every 12 hours      - started 6/19    Tubes/Lines/Drains  ***  [x] Peripheral IV (right forearm x2)  [] Central Venous Line     	[] R	[] L	[] IJ	[] Fem	[] SC	Date Placed:   [] Arterial Line		[] R	[] L	[] Fem	[] Rad	[] Ax	Date Placed:   [] PICC		[] Midline		[] Mediport  [x] Urinary Catheter		   [x] Necessity of urinary, arterial, and venous catheters discussed    LABS  --------------------------------------------------------------------------------------                                          10.7                  Neurophils% (auto):   89.1   (07-02 @ 00:12):    14.78)-----------(357          Lymphocytes% (auto):  5.3                                           32.9                   Eosinphils% (auto):   0.3      Manual%: Neutrophils x    ; Lymphocytes x    ; Eosinophils x    ; Bands%: x    ; Blasts x          07-02    134<L>  |  98  |  5<L>  ----------------------------<  92  4.4   |  18<L>  |  0.38<L>    Ca    7.9<L>      02 Jul 2019 00:10  Phos  3.2     07-02  Mg     1.4     07-02    TPro  5.5<L>  /  Alb  2.6<L>  /  TBili  0.2  /  DBili  x   /  AST  18  /  ALT  23  /  AlkPhos  94  06-30    ( 07-02 @ 00:12 )   PT: 12.2 SEC;   INR: 1.10   aPTT: x            RECENT CULTURES:      --------------------------------------------------------------------------------------    OTHER LABORATORY:     IMAGING STUDIES:   CXR:     ASSESSMENT:  53y Female with bleeding gastric ulcer s/p EGD w/ cauterization (6/22) and IR angiogram w/ R+L gastric artery embolization (6/22). Hemodynamically stable with stable H&H. Now s/p EGD with gastric ulcer identified and tagged, Billroth 2 operation, and distal gastrectomy on 7/1.    PLAN:   Neuro:   - Baseline AAOX4  - Mentating appropriately. Continue to monitor.     Pulm:   - Breathing comfortably, normal work and rate of breathing.  - Will continue to monitor. Maintain SpO2 >92%.     CV:   - TTE showing grossly normal LV and RV function.   - Monitor BP and HR  - Maintain MAP > 65.    GI:   - Protonix BID  - Diet: NPO w/ NGT in place  - midline incision w/ aquacel dressing  - F/u pathology     Renal:   - D5 1/2 NS @75 mL/hr while NPO     Heme:    - CBC qAM to trend H/H  - Continue heparin for DVT PPX given stable H&H  - Total blood products: 5 units pRBC, 1 unit FFP, 1 unit platelets (no recent transfusions)    ID:   - Hx of ulcerative rash, HSV+ and MRSA+ s/p treatment, now on valacyclovir for ppx, pt is afebrile   - Valacyclovir 500mg BID     Dispo: Floor  --------------------------------------------------------------------------------------    Critical Care Diagnoses:  Metastatic breast cancer SICU Daily Progress Note  =====================================================  Interval/Overnight Events: OR on 7/1 for EGD with gastric ulcer identified and tagged, Billroth 2 operation, and distal gastrectomy. , received 1500cc LR and 1L albumin intraop. NGT placed and put to low suction. Extubated postoperatively.    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 Patient had gone to her dermatologist's office due to worsening of her chronic rash (states that rash was caused by HSV and had a superimposed MRSA infection), and dermatologist sent her in because of rigors and chills in office. In ED, patient reported that she also had two weeks of bloody bowel movements with blood in the toilet bowel and on paper. She reports a normal colonoscopy approx 2 years ago. Since admission, pt was in MICU for UGIB and underwent IR embolization and EGD. Surgical oncology consulted for second opinion in setting of recurrent UGIB to evaluate for possible gastrectomy/partial gastrectomy. Surgical oncology consulted SICU for closer monitoring in setting of active GI bleed with dropping hemoglobin/hematocrit. OR on 7/1 for EGD with gastric ulcer identified and tagged, Billroth 2 operation, and distal gastrectomy.      Allergies: No Known Allergies      MEDICATIONS:   --------------------------------------------------------------------------------------  Neurologic Medications  acetaminophen  IVPB .. 750 milliGRAM(s) IV Intermittent once    Respiratory Medications    Cardiovascular Medications    Gastrointestinal Medications  lactated ringers. 1000 milliLiter(s) IV Continuous <Continuous>  pantoprazole  Injectable 40 milliGRAM(s) IV Push two times a day    Genitourinary Medications    Hematologic/Oncologic Medications  heparin  Injectable 5000 Unit(s) SubCutaneous every 8 hours    Antimicrobial/Immunologic Medications  valACYclovir 500 milliGRAM(s) Oral every 12 hours    Endocrine/Metabolic Medications    Topical/Other Medications  lidocaine 5% Ointment 1 Application(s) Topical every 6 hours PRN pain at rash  mupirocin 2% Ointment 1 Application(s) Topical two times a day    --------------------------------------------------------------------------------------    VITAL SIGNS, INS/OUTS (last 24 hours):  --------------------------------------------------------------------------------------  T(C): 36.4 (07-02-19 @ 00:00), Max: 36.6 (07-01-19 @ 04:00)  HR: 75 (07-02-19 @ 01:00) (71 - 93)  BP: 133/65 (07-02-19 @ 01:00) (106/49 - 139/71)  BP(mean): 81 (07-02-19 @ 01:00) (62 - 85)  ABP: --  ABP(mean): --  RR: 12 (07-02-19 @ 01:00) (10 - 17)  SpO2: 100% (07-02-19 @ 01:00) (100% - 100%)  Wt(kg): --  CVP(mm Hg): --  CI: --  CAPILLARY BLOOD GLUCOSE       N/A      06-30 @ 07:01 - 07-01 @ 07:00  --------------------------------------------------------  IN:    dextrose 5% + sodium chloride 0.45% with potassium chloride 20 mEq/L: 600 mL  Total IN: 600 mL    OUT:    Voided: 850 mL  Total OUT: 850 mL    Total NET: -250 mL      07-01 @ 07:01 - 07-02 @ 02:32  --------------------------------------------------------  IN:    dextrose 5% + sodium chloride 0.45% with potassium chloride 20 mEq/L: 825 mL    lactated ringers.: 150 mL  Total IN: 975 mL    OUT:    Indwelling Catheter - Urethral: 585 mL    Nasoenteral Tube: 50 mL    Voided: 500 mL  Total OUT: 1135 mL    Total NET: -160 mL        --------------------------------------------------------------------------------------    EXAM  NEUROLOGY  GCS:  14  Exam: Normal, NAD, alert, following commands    HEENT  Exam: Normocephalic, atraumatic, EOMI, NGT in place    RESPIRATORY  Exam: Lungs clear to auscultation, Normal expansion/effort.     CARDIOVASCULAR  Exam: S1, S2.  Regular rate and rhythm.      GI/NUTRITION  Exam: Abdomen soft, appropriately tender, non-distended  Wound: midline incision w/ aquacel dressing in place  Current Diet:  NPO    VASCULAR  Exam: Extremities warm, pink, well-perfused    MUSCULOSKELETAL  Exam: All extremities moving spontaneously without limitations.   SKIN  Exam: Good skin turgor, no skin breakdown.     METABOLIC/FLUIDS/ELECTROLYTES  lactated ringers. 1000 milliLiter(s) IV Continuous <Continuous>      HEMATOLOGIC  [x] VTE Prophylaxis: heparin  Injectable 5000 Unit(s) SubCutaneous every 8 hours    Transfusions:	[] PRBC	[] Platelets		[] FFP	[] Cryoprecipitate    INFECTIOUS DISEASE  Antimicrobials/Immunologic Medications:  valACYclovir 500 milliGRAM(s) Oral every 12 hours      - started 6/19    Tubes/Lines/Drains  ***  [x] Peripheral IV (right forearm x2)  [] Central Venous Line     	[] R	[] L	[] IJ	[] Fem	[] SC	Date Placed:   [] Arterial Line		[] R	[] L	[] Fem	[] Rad	[] Ax	Date Placed:   [] PICC		[] Midline		[] Mediport  [x] Urinary Catheter		   [x] Necessity of urinary, arterial, and venous catheters discussed    LABS  --------------------------------------------------------------------------------------                                          10.7                  Neurophils% (auto):   89.1   (07-02 @ 00:12):    14.78)-----------(357          Lymphocytes% (auto):  5.3                                           32.9                   Eosinphils% (auto):   0.3      Manual%: Neutrophils x    ; Lymphocytes x    ; Eosinophils x    ; Bands%: x    ; Blasts x          07-02    134<L>  |  98  |  5<L>  ----------------------------<  92  4.4   |  18<L>  |  0.38<L>    Ca    7.9<L>      02 Jul 2019 00:10  Phos  3.2     07-02  Mg     1.4     07-02    TPro  5.5<L>  /  Alb  2.6<L>  /  TBili  0.2  /  DBili  x   /  AST  18  /  ALT  23  /  AlkPhos  94  06-30    ( 07-02 @ 00:12 )   PT: 12.2 SEC;   INR: 1.10   aPTT: x            RECENT CULTURES:      --------------------------------------------------------------------------------------    OTHER LABORATORY:     IMAGING STUDIES:   CXR:     ASSESSMENT:  53y Female with bleeding gastric ulcer s/p EGD w/ cauterization (6/22) and IR angiogram w/ R+L gastric artery embolization (6/22). Hemodynamically stable with stable H&H. Now s/p EGD with gastric ulcer identified and tagged, Billroth 2 operation, and distal gastrectomy on 7/1.    PLAN:   Neuro:   - Baseline AOX4  - Mentating appropriately. Continue to monitor.     Pulm:   - Breathing comfortably, normal work and rate of breathing.  - Will continue to monitor. Maintain SpO2 >92%.     CV:   - TTE showing grossly normal LV and RV function.   - Monitor BP and HR  - Maintain MAP > 65.    GI:   - Protonix BID  - Diet: NPO w/ NGT in place to LCWS  - midline incision w/ aquacel dressing  - F/u pathology     Renal:   - D5 1/2 NS @75 mL/hr while NPO   - Discontinue matta    Heme:    - CBC qAM to trend H/H  - Continue heparin for DVT PPX given stable H&H  - Total blood products: 5 units pRBC, 1 unit FFP, 1 unit platelets (no recent transfusions)    ID:   - Hx of ulcerative rash, HSV+ and MRSA+ s/p treatment, now on valacyclovir for ppx, pt is afebrile   - Valacyclovir 500mg BID     Dispo: Floor  --------------------------------------------------------------------------------------    Critical Care Diagnoses:  Metastatic breast cancer

## 2019-07-02 NOTE — PROGRESS NOTE ADULT - SUBJECTIVE AND OBJECTIVE BOX
Subjective: Patient seen and examined. No new events except as noted.     SUBJECTIVE/ROS:  s/p surgery  No chest pain, dyspnea, palpitation, or dizziness.       MEDICATIONS:  MEDICATIONS  (STANDING):  heparin  Injectable 5000 Unit(s) SubCutaneous every 8 hours  lactated ringers. 1000 milliLiter(s) (75 mL/Hr) IV Continuous <Continuous>  mupirocin 2% Ointment 1 Application(s) Topical two times a day  pantoprazole  Injectable 40 milliGRAM(s) IV Push two times a day  valACYclovir 500 milliGRAM(s) Oral every 12 hours      PHYSICAL EXAM:  T(C): 36.3 (07-02-19 @ 16:00), Max: 36.7 (07-02-19 @ 04:00)  HR: 68 (07-02-19 @ 16:00) (68 - 93)  BP: 140/64 (07-02-19 @ 16:00) (111/63 - 140/64)  RR: 16 (07-02-19 @ 16:00) (10 - 18)  SpO2: 100% (07-02-19 @ 16:00) (100% - 100%)  Wt(kg): --  I&O's Summary    01 Jul 2019 07:01  -  02 Jul 2019 07:00  --------------------------------------------------------  IN: 1425 mL / OUT: 1895 mL / NET: -470 mL    02 Jul 2019 07:01  -  02 Jul 2019 16:24  --------------------------------------------------------  IN: 475 mL / OUT: 1275 mL / NET: -800 mL            JVP: Normal  Neck: supple  Lung: clear   CV: S1 S2 , Murmur:  Abd: soft  Ext: No edema  neuro: Awake / alert  Psych: flat affect  Skin: normal``    LABS/DATA:    CARDIAC MARKERS:                                10.7   14.78 )-----------( 357      ( 02 Jul 2019 00:12 )             32.9     07-02    134<L>  |  98  |  5<L>  ----------------------------<  92  4.4   |  18<L>  |  0.38<L>    Ca    7.9<L>      02 Jul 2019 00:10  Phos  3.2     07-02  Mg     1.4     07-02      proBNP:   Lipid Profile:   HgA1c:   TSH:     TELE:  EKG:

## 2019-07-02 NOTE — PROGRESS NOTE ADULT - SUBJECTIVE AND OBJECTIVE BOX
Surgery Progress Note     Subjective/24hour Events:   Patient seen and examined. No acute events overnight.      Vital Signs:    T(C): 36.2 (07-02-19 @ 12:00), Max: 36.7 (07-02-19 @ 04:00)  HR: 73 (07-02-19 @ 12:00) (71 - 93)  BP: 124/52 (07-02-19 @ 12:00) (111/63 - 139/71)  BP(mean): 67 (07-02-19 @ 12:00) (67 - 85)  RR: 15 (07-02-19 @ 12:00) (10 - 18)  SpO2: 100% (07-02-19 @ 12:00) (100% - 100%)      07-01 @ 07:01  -  07-02 @ 07:00  --------------------------------------------------------  IN:    dextrose 5% + sodium chloride 0.45% with potassium chloride 20 mEq/L: 825 mL    lactated ringers.: 600 mL  Total IN: 1425 mL    OUT:    Indwelling Catheter - Urethral: 1095 mL    Nasoenteral Tube: 300 mL    Voided: 500 mL  Total OUT: 1895 mL    Total NET: -470 mL      07-02 @ 07:01  -  07-02 @ 12:31  --------------------------------------------------------  IN:    IV PiggyBack: 100 mL    lactated ringers.: 375 mL  Total IN: 475 mL    OUT:    Indwelling Catheter - Urethral: 1275 mL  Total OUT: 1275 mL    Total NET: -800 mL    MEDICATIONS  (STANDING):  heparin  Injectable 5000 Unit(s) SubCutaneous every 8 hours  lactated ringers. 1000 milliLiter(s) (75 mL/Hr) IV Continuous <Continuous>  mupirocin 2% Ointment 1 Application(s) Topical two times a day  pantoprazole  Injectable 40 milliGRAM(s) IV Push two times a day  valACYclovir 500 milliGRAM(s) Oral every 12 hours    MEDICATIONS  (PRN):  lidocaine 5% Ointment 1 Application(s) Topical every 6 hours PRN pain at rash      Physical Exam:  Gen: NAD.  Lungs: Non labored breathing.   Ab: Soft, nontender, nondistended. midline incision with dressing    Labs:  CBC (07-02 @ 00:12)                              10.7<L>                         14.78<H>  )----------------(  357        89.1<H>% Neutrophils, 5.3<L>% Lymphocytes, ANC: 13.17<H>                              32.9<L>  CBC (07-01 @ 03:00)                              12.7                           6.47    )----------------(  427<H>     --    % Neutrophils, --    % Lymphocytes, ANC: --                                  38.9      BMP (07-02 @ 00:10)             134<L>  |  98      |  5<L>  		Ca++ 1.05    Ca 7.9<L>             ---------------------------------( 92    		Mg 1.4<L>             4.4     |  18<L>   |  0.38<L>			Ph 3.2     BMP (07-01 @ 03:00)             136     |  100     |  7     		Ca++ 1.05    Ca 8.5                ---------------------------------( 102<H>		Mg 2.6                4.3     |  24      |  0.62  			Ph 4.5         Coags (07-02 @ 00:12)  aPTT -- / INR 1.10 / PT 12.2  Coags (07-01 @ 03:00)  aPTT 44.7<H> / INR 1.10 / PT 12.6      Assessment and Plan:   · Assessment		  53y Female with bleeding gastric ulcer s/p EGD w/ cauterization (6/22) and IR angiogram w/ R+L gastric artery embolization (6/22). Hemodynamically stable with stable H&H. Now s/p EGD with gastric ulcer identified and tagged, Billroth 2 operation, and distal gastrectomy on 7/1.    Plan:   - pain control  - remove matta catheter  - NPO/IVF  - PT/OOB  Plan for UGI study Thursday  - stable for transfer to floor when SICU deems appropriate

## 2019-07-02 NOTE — PROGRESS NOTE ADULT - ASSESSMENT
GIB   on protonix   fu with GI     Herpes   on valacyclovir    s/p distal gastrectomy and billroth II reconstruction  CV stable  fu with surgery

## 2019-07-02 NOTE — PROGRESS NOTE ADULT - ASSESSMENT
54 yo F with triple positive (ER 20%, PR10%, Her2 2+ FISH amplified) breast cancer (dx 2012) initially stage IIIB, s/p L mastectomy followed by ACTH then 1 year of herceptin and RT, tamoxifen, with recurrence in 1/2019 (mediastinal and hilar LNs, small lung nodules, ?liver mets) on weekly paclitaxel (since 3/2019) and exemestane, HSV+ skin rash with MRSA superinfection p/w fever and chills, admitted for sepsis and symptomatic anemia     1. Metastatic breast cancer: triple positive when first diagnosed but ER+ [place] neg Her 2 neg on recurre  - no plan for chemotherapy as inpatient  - repeat imaging to assess disease status as outpatient  plan d/w with son/patient    2l. Anemia / MDS / Elevated retic  -s/p EGD showing bleeding L gastric artery s/p EGD treatment then embolization w/ incomplete improvement  -d/w Dr. Hayden, agree with partial gastric resection of observed ulcer ? metastases vs stress  -Now s/p distal gastrectomy (0701/19) w/ BII  -stable post surgery - await pathology  -will see as outpatient on patient discharge. 54 yo F with triple positive (ER 20%, PR10%, Her2 2+ FISH amplified) breast cancer (dx 2012) initially stage IIIB, s/p L mastectomy followed by ACTH then 1 year of herceptin and RT, tamoxifen, with recurrence in 1/2019 (mediastinal and hilar LNs, small lung nodules, ?liver mets) on weekly paclitaxel (since 3/2019) and exemestane, HSV+ skin rash with MRSA superinfection p/w fever and chills, admitted for sepsis and symptomatic anemia     1. Metastatic breast cancer: triple positive when first diagnosed but ER+ [place] neg Her 2 neg on recurre  - no plan for chemotherapy as inpatient  - repeat imaging to assess disease status as outpatient  plan d/w with son/patient    2l. Anemia / MDS / Elevated retic  -s/p EGD showing bleeding L gastric artery s/p EGD treatment then embolization w/ incomplete improvement  -d/w Dr. Hayden, agree with partial gastric resection of observed ulcer ? metastases vs stress  -Now s/p distal gastrectomy (0701/19) w/ BII reconstruction  -stable post surgery - await pathology  -will see as outpatient on patient discharge.

## 2019-07-02 NOTE — PROGRESS NOTE ADULT - SUBJECTIVE AND OBJECTIVE BOX
Patient is a 53y old  Female who presents with a chief complaint of chills (02 Jul 2019 00:17)       Pt is seen and examined  pt is awake and lying in bed  pt seems comfortable and denies any complaints at this time          ROS:  Negative except for:    MEDICATIONS  (STANDING):  heparin  Injectable 5000 Unit(s) SubCutaneous every 8 hours  lactated ringers. 1000 milliLiter(s) (75 mL/Hr) IV Continuous <Continuous>  mupirocin 2% Ointment 1 Application(s) Topical two times a day  pantoprazole  Injectable 40 milliGRAM(s) IV Push two times a day  valACYclovir 500 milliGRAM(s) Oral every 12 hours    MEDICATIONS  (PRN):  lidocaine 5% Ointment 1 Application(s) Topical every 6 hours PRN pain at rash      Allergies    No Known Allergies    Intolerances        Vital Signs Last 24 Hrs  T(C): 36.7 (02 Jul 2019 04:00), Max: 36.7 (02 Jul 2019 04:00)  T(F): 98 (02 Jul 2019 04:00), Max: 98 (02 Jul 2019 04:00)  HR: 74 (02 Jul 2019 08:00) (71 - 93)  BP: 125/55 (02 Jul 2019 08:00) (111/63 - 139/71)  BP(mean): 71 (02 Jul 2019 08:00) (71 - 85)  RR: 15 (02 Jul 2019 08:00) (10 - 17)  SpO2: 100% (02 Jul 2019 08:00) (100% - 100%)    PHYSICAL EXAM  General: adult in NAD  HEENT: clear oropharynx, anicteric sclera, pink conjunctiva  Neck: supple  CV: normal S1/S2 with no murmur rubs or gallops  Lungs: positive air movement b/l ant lungs,clear to auscultation, no wheezes, no rales  Abdomen: soft non-tender non-distended, no hepatosplenomegaly  Ext: no clubbing cyanosis or edema  Skin: no rashes and no petechiae  Neuro: alert and oriented X 4, no focal deficits  LABS:                          10.7   14.78 )-----------( 357      ( 02 Jul 2019 00:12 )             32.9         Mean Cell Volume : 88.9 fL  Mean Cell Hemoglobin : 28.9 pg  Mean Cell Hemoglobin Concentration : 32.5 %  Auto Neutrophil # : 13.17 K/uL  Auto Lymphocyte # : 0.78 K/uL  Auto Monocyte # : 0.63 K/uL  Auto Eosinophil # : 0.05 K/uL  Auto Basophil # : 0.02 K/uL  Auto Neutrophil % : 89.1 %  Auto Lymphocyte % : 5.3 %  Auto Monocyte % : 4.3 %  Auto Eosinophil % : 0.3 %  Auto Basophil % : 0.1 %    Serial CBC's  07-02 @ 00:12  Hct-32.9 / Hgb-10.7 / Plat-357 / RBC-3.70 / WBC-14.78          Serial CBC's  07-01 @ 03:00  Hct-38.9 / Hgb-12.7 / Plat-427 / RBC-4.44 / WBC-6.47          Serial CBC's  06-30 @ 02:30  Hct-35.3 / Hgb-11.9 / Plat-370 / RBC-4.11 / WBC-5.88          Serial CBC's  06-29 @ 03:02  Hct-33.5 / Hgb-11.1 / Plat-303 / RBC-3.87 / WBC-5.53            07-02    134<L>  |  98  |  5<L>  ----------------------------<  92  4.4   |  18<L>  |  0.38<L>    Ca    7.9<L>      02 Jul 2019 00:10  Phos  3.2     07-02  Mg     1.4     07-02        PT/INR - ( 02 Jul 2019 00:12 )   PT: 12.2 SEC;   INR: 1.10          PTT - ( 01 Jul 2019 03:00 )  PTT:44.7 SEC    Hemoglobin: 10.7 g/dL (07-02-19 @ 00:12)  Hematocrit: 32.9 % (07-02-19 @ 00:12)  Platelet Count - Automated: 357 K/uL (07-02-19 @ 00:12)  WBC Count: 14.78 K/uL (07-02-19 @ 00:12)  WBC Count: 6.47 K/uL (07-01-19 @ 03:00)  Hemoglobin: 12.7 g/dL (07-01-19 @ 03:00)  Hematocrit: 38.9 % (07-01-19 @ 03:00)  Platelet Count - Automated: 427 K/uL (07-01-19 @ 03:00)  WBC Count: 5.88 K/uL (06-30-19 @ 02:30)  Hemoglobin: 11.9 g/dL (06-30-19 @ 02:30)  Hematocrit: 35.3 % (06-30-19 @ 02:30)  Platelet Count - Automated: 370 K/uL (06-30-19 @ 02:30)  WBC Count: 5.53 K/uL (06-29-19 @ 03:02)  Hemoglobin: 11.1 g/dL (06-29-19 @ 03:02)  Hematocrit: 33.5 % (06-29-19 @ 03:02)  Platelet Count - Automated: 303 K/uL (06-29-19 @ 03:02)  WBC Count: 7.46 K/uL (06-28-19 @ 01:25)  Hemoglobin: 10.5 g/dL (06-28-19 @ 01:25)  Hematocrit: 31.4 % (06-28-19 @ 01:25)  Platelet Count - Automated: 287 K/uL (06-28-19 @ 01:25)  WBC Count: 6.87 K/uL (06-27-19 @ 22:00)  Hemoglobin: 10.8 g/dL (06-27-19 @ 22:00)  Hematocrit: 32.3 % (06-27-19 @ 22:00)  Platelet Count - Automated: 297 K/uL (06-27-19 @ 22:00)  WBC Count: 6.31 K/uL (06-27-19 @ 12:35)  Hemoglobin: 10.5 g/dL (06-27-19 @ 12:35)  Hematocrit: 30.9 % (06-27-19 @ 12:35)  Platelet Count - Automated: 248 K/uL (06-27-19 @ 12:35)  WBC Count: 7.09 K/uL (06-27-19 @ 04:40)  Hemoglobin: 10.6 g/dL (06-27-19 @ 04:40)  Hematocrit: 31.2 % (06-27-19 @ 04:40)  Platelet Count - Automated: 268 K/uL (06-27-19 @ 04:40)  WBC Count: 6.03 K/uL (06-26-19 @ 23:10)  Hemoglobin: 10.6 g/dL (06-26-19 @ 23:10)  Hematocrit: 31.7 % (06-26-19 @ 23:10)  Platelet Count - Automated: 253 K/uL (06-26-19 @ 23:10)  WBC Count: 5.56 K/uL (06-26-19 @ 15:44)  Hemoglobin: 7.5 g/dL (06-26-19 @ 15:44)  Hematocrit: 22.9 % (06-26-19 @ 15:44)  Platelet Count - Automated: 217 K/uL (06-26-19 @ 15:44)  WBC Count: 8.62 K/uL (06-26-19 @ 12:31)  Hemoglobin: 9.4 g/dL (06-26-19 @ 12:31)  Hematocrit: 28.4 % (06-26-19 @ 12:31)  Platelet Count - Automated: 267 K/uL (06-26-19 @ 12:31)  WBC Count: 8.84 K/uL (06-26-19 @ 09:15)  Hemoglobin: 10.1 g/dL (06-26-19 @ 09:15)  Hematocrit: 30.0 % (06-26-19 @ 09:15)  Platelet Count - Automated: 206 K/uL (06-26-19 @ 09:15)  WBC Count: 12.12 K/uL (06-26-19 @ 03:22)  Hemoglobin: 10.7 g/dL (06-26-19 @ 03:22)  Hematocrit: 32.1 % (06-26-19 @ 03:22)  Platelet Count - Automated: 286 K/uL (06-26-19 @ 03:22)  WBC Count: 11.00 K/uL (06-25-19 @ 20:36)  Hemoglobin: 8.4 g/dL (06-25-19 @ 20:36)  Hematocrit: 26.2 % (06-25-19 @ 20:36)  Platelet Count - Automated: 259 K/uL (06-25-19 @ 20:36)  WBC Count: 7.89 K/uL (06-25-19 @ 14:52)  Hemoglobin: 10.0 g/dL (06-25-19 @ 14:52)  Hematocrit: 30.7 % (06-25-19 @ 14:52)  Platelet Count - Automated: 279 K/uL (06-25-19 @ 14:52)  WBC Count: 8.08 K/uL (06-25-19 @ 06:40)  Hemoglobin: 11.0 g/dL (06-25-19 @ 06:40)  Hematocrit: 33.6 % (06-25-19 @ 06:40)  Platelet Count - Automated: 314 K/uL (06-25-19 @ 06:40)  WBC Count: 10.50 K/uL (06-24-19 @ 03:30)  Hemoglobin: 11.3 g/dL (06-24-19 @ 03:30)  Hematocrit: 33.7 % (06-24-19 @ 03:30)  Platelet Count - Automated: 310 K/uL (06-24-19 @ 03:30)  WBC Count: 10.72 K/uL (06-23-19 @ 14:45)  Hemoglobin: 12.2 g/dL (06-23-19 @ 14:45)  Hematocrit: 34.5 % (06-23-19 @ 14:45)  Platelet Count - Automated: 324 K/uL (06-23-19 @ 14:45)  WBC Count: 11.94 K/uL (06-23-19 @ 09:00)  Hemoglobin: 12.8 g/dL (06-23-19 @ 09:00)  Hematocrit: 37.9 % (06-23-19 @ 09:00)  Platelet Count - Automated: 362 K/uL (06-23-19 @ 09:00)  WBC Count: 11.43 K/uL (06-23-19 @ 05:00)  Hemoglobin: 12.4 g/dL (06-23-19 @ 05:00)  Hematocrit: 37.2 % (06-23-19 @ 05:00)  Platelet Count - Automated: 322 K/uL (06-23-19 @ 05:00)  WBC Count: 9.01 K/uL (06-23-19 @ 00:15)  Hemoglobin: 11.0 g/dL (06-23-19 @ 00:15)  Hematocrit: 32.3 % (06-23-19 @ 00:15)  Platelet Count - Automated: 315 K/uL (06-23-19 @ 00:15)  WBC Count: 9.72 K/uL (06-22-19 @ 22:04)  Hemoglobin: 10.5 g/dL (06-22-19 @ 22:04)  Hematocrit: 31.5 % (06-22-19 @ 22:04)  Platelet Count - Automated: 310 K/uL (06-22-19 @ 22:04)  WBC Count: 10.30 K/uL (06-22-19 @ 17:09)  Hemoglobin: 7.4 g/dL (06-22-19 @ 17:09)  Hematocrit: 23.2 % (06-22-19 @ 17:09)  Platelet Count - Automated: 333 K/uL (06-22-19 @ 17:09)  WBC Count: 8.06 K/uL (06-22-19 @ 14:59)  Hemoglobin: 8.6 g/dL (06-22-19 @ 14:59)  Hematocrit: 27.3 % (06-22-19 @ 14:59)  Platelet Count - Automated: 385 K/uL (06-22-19 @ 14:59)                BLOOD SMEAR INTERPRETATION:       RADIOLOGY & ADDITIONAL STUDIES:

## 2019-07-02 NOTE — PROGRESS NOTE ADULT - SUBJECTIVE AND OBJECTIVE BOX
POST ANESTHESIA EVALUATION    53y Female POSTOP DAY 1 S/P     MENTAL STATUS: Patient participation [ x ] Awake     [  ] Arousable     [  ] Sedated    AIRWAY PATENCY: [x  ] Satisfactory  [  ] Other:     Vital Signs Last 24 Hrs  T(C): 36.7 (02 Jul 2019 04:00), Max: 36.7 (02 Jul 2019 04:00)  T(F): 98 (02 Jul 2019 04:00), Max: 98 (02 Jul 2019 04:00)  HR: 74 (02 Jul 2019 08:00) (71 - 93)  BP: 125/55 (02 Jul 2019 08:00) (111/63 - 139/71)  BP(mean): 71 (02 Jul 2019 08:00) (71 - 85)  RR: 15 (02 Jul 2019 08:00) (10 - 17)  SpO2: 100% (02 Jul 2019 08:00) (100% - 100%)  I&O's Summary    01 Jul 2019 07:01  -  02 Jul 2019 07:00  --------------------------------------------------------  IN: 1425 mL / OUT: 1895 mL / NET: -470 mL          NAUSEA/ VOMITTING:  [ x ] NONE  [  ] CONTROLLED [  ] OTHER     PAIN: [ x ] CONTROLLED WITH CURRENT REGIMEN  [  ] OTHER    [  x] NO APPARENT ANESTHESIA COMPLICATIONS      Comments:

## 2019-07-03 LAB
ANION GAP SERPL CALC-SCNC: 14 MMO/L — SIGNIFICANT CHANGE UP (ref 7–14)
BUN SERPL-MCNC: 10 MG/DL — SIGNIFICANT CHANGE UP (ref 7–23)
CALCIUM SERPL-MCNC: 8.7 MG/DL — SIGNIFICANT CHANGE UP (ref 8.4–10.5)
CHLORIDE SERPL-SCNC: 97 MMOL/L — LOW (ref 98–107)
CO2 SERPL-SCNC: 25 MMOL/L — SIGNIFICANT CHANGE UP (ref 22–31)
CREAT SERPL-MCNC: 0.57 MG/DL — SIGNIFICANT CHANGE UP (ref 0.5–1.3)
GLUCOSE SERPL-MCNC: 77 MG/DL — SIGNIFICANT CHANGE UP (ref 70–99)
HCT VFR BLD CALC: 32.1 % — LOW (ref 34.5–45)
HGB BLD-MCNC: 10.7 G/DL — LOW (ref 11.5–15.5)
MAGNESIUM SERPL-MCNC: 2 MG/DL — SIGNIFICANT CHANGE UP (ref 1.6–2.6)
MCHC RBC-ENTMCNC: 29.6 PG — SIGNIFICANT CHANGE UP (ref 27–34)
MCHC RBC-ENTMCNC: 33.3 % — SIGNIFICANT CHANGE UP (ref 32–36)
MCV RBC AUTO: 88.9 FL — SIGNIFICANT CHANGE UP (ref 80–100)
NRBC # FLD: 0 K/UL — SIGNIFICANT CHANGE UP (ref 0–0)
PHOSPHATE SERPL-MCNC: 3.8 MG/DL — SIGNIFICANT CHANGE UP (ref 2.5–4.5)
PLATELET # BLD AUTO: 433 K/UL — HIGH (ref 150–400)
PMV BLD: 9 FL — SIGNIFICANT CHANGE UP (ref 7–13)
POTASSIUM SERPL-MCNC: 4.2 MMOL/L — SIGNIFICANT CHANGE UP (ref 3.5–5.3)
POTASSIUM SERPL-SCNC: 4.2 MMOL/L — SIGNIFICANT CHANGE UP (ref 3.5–5.3)
RBC # BLD: 3.61 M/UL — LOW (ref 3.8–5.2)
RBC # FLD: 17.1 % — HIGH (ref 10.3–14.5)
SODIUM SERPL-SCNC: 136 MMOL/L — SIGNIFICANT CHANGE UP (ref 135–145)
WBC # BLD: 12.85 K/UL — HIGH (ref 3.8–10.5)
WBC # FLD AUTO: 12.85 K/UL — HIGH (ref 3.8–10.5)

## 2019-07-03 PROCEDURE — 99233 SBSQ HOSP IP/OBS HIGH 50: CPT

## 2019-07-03 RX ORDER — HYDROMORPHONE HYDROCHLORIDE 2 MG/ML
0.5 INJECTION INTRAMUSCULAR; INTRAVENOUS; SUBCUTANEOUS ONCE
Refills: 0 | Status: DISCONTINUED | OUTPATIENT
Start: 2019-07-03 | End: 2019-07-03

## 2019-07-03 RX ORDER — ACETAMINOPHEN 500 MG
1000 TABLET ORAL ONCE
Refills: 0 | Status: COMPLETED | OUTPATIENT
Start: 2019-07-03 | End: 2019-07-03

## 2019-07-03 RX ORDER — DIPHENHYDRAMINE HCL 50 MG
25 CAPSULE ORAL ONCE
Refills: 0 | Status: COMPLETED | OUTPATIENT
Start: 2019-07-03 | End: 2019-07-03

## 2019-07-03 RX ADMIN — HYDROMORPHONE HYDROCHLORIDE 0.5 MILLIGRAM(S): 2 INJECTION INTRAMUSCULAR; INTRAVENOUS; SUBCUTANEOUS at 05:49

## 2019-07-03 RX ADMIN — VALACYCLOVIR 500 MILLIGRAM(S): 500 TABLET, FILM COATED ORAL at 05:02

## 2019-07-03 RX ADMIN — Medication 25 MILLIGRAM(S): at 10:43

## 2019-07-03 RX ADMIN — MUPIROCIN 1 APPLICATION(S): 20 OINTMENT TOPICAL at 19:03

## 2019-07-03 RX ADMIN — LIDOCAINE 1 APPLICATION(S): 4 CREAM TOPICAL at 19:03

## 2019-07-03 RX ADMIN — HEPARIN SODIUM 5000 UNIT(S): 5000 INJECTION INTRAVENOUS; SUBCUTANEOUS at 05:03

## 2019-07-03 RX ADMIN — MUPIROCIN 1 APPLICATION(S): 20 OINTMENT TOPICAL at 05:02

## 2019-07-03 RX ADMIN — HYDROMORPHONE HYDROCHLORIDE 0.5 MILLIGRAM(S): 2 INJECTION INTRAMUSCULAR; INTRAVENOUS; SUBCUTANEOUS at 06:33

## 2019-07-03 RX ADMIN — HEPARIN SODIUM 5000 UNIT(S): 5000 INJECTION INTRAVENOUS; SUBCUTANEOUS at 14:17

## 2019-07-03 RX ADMIN — HEPARIN SODIUM 5000 UNIT(S): 5000 INJECTION INTRAVENOUS; SUBCUTANEOUS at 21:35

## 2019-07-03 RX ADMIN — PANTOPRAZOLE SODIUM 40 MILLIGRAM(S): 20 TABLET, DELAYED RELEASE ORAL at 19:03

## 2019-07-03 RX ADMIN — PANTOPRAZOLE SODIUM 40 MILLIGRAM(S): 20 TABLET, DELAYED RELEASE ORAL at 05:03

## 2019-07-03 NOTE — PROGRESS NOTE ADULT - SUBJECTIVE AND OBJECTIVE BOX
SICU Daily Progress Note  =====================================================  Interval/Overnight Events: Patient seen and examined at bedside, in no acute distress. Patient has remained hemodynamically stable postoperatively, POD 2 s/p EGD with gastric ulcer identified and tagged, distal gastrectomy, and Billroth II reconstruction. Pt stable overnight, was OOBTC throughout the day. States pain is well controlled. Pt remains NPO on IVF. Ernandez removed, passed trial of void. Heparin maintained for DVT ppx. Pt denies any sob/cp/n/v/fevers or chills.       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 Patient had gone to her dermatologist's office due to worsening of her chronic rash (states that rash was caused by HSV and had a superimposed MRSA infection), and dermatologist sent her in because of rigors and chills in office. In ED, patient reported that she also had two weeks of bloody bowel movements with blood in the toilet bowel and on paper. She reports a normal colonoscopy approx 2 years ago. Since admission, pt was in MICU for UGIB and underwent IR embolization and EGD. Surgical oncology consulted for second opinion in setting of recurrent UGIB to evaluate for possible gastrectomy/partial gastrectomy. Surgical oncology consulted SICU for closer monitoring in setting of active GI bleed with dropping hemoglobin/hematocrit. OR on 7/1 for EGD with gastric ulcer identified and tagged, Billroth 2 operation, and distal gastrectomy.    Allergies: No Known Allergies      MEDICATIONS:   --------------------------------------------------------------------------------------  Neurologic Medications  HYDROmorphone  Injectable 0.5 milliGRAM(s) IV Push every 4 hours PRN Moderate Pain (4 - 6)    Respiratory Medications    Cardiovascular Medications    Gastrointestinal Medications  lactated ringers. 1000 milliLiter(s) IV Continuous <Continuous>  pantoprazole  Injectable 40 milliGRAM(s) IV Push two times a day    Genitourinary Medications    Hematologic/Oncologic Medications  heparin  Injectable 5000 Unit(s) SubCutaneous every 8 hours    Antimicrobial/Immunologic Medications  valACYclovir 500 milliGRAM(s) Oral every 12 hours    Endocrine/Metabolic Medications    Topical/Other Medications  lidocaine 5% Ointment 1 Application(s) Topical every 6 hours PRN pain at rash  mupirocin 2% Ointment 1 Application(s) Topical two times a day    --------------------------------------------------------------------------------------    VITAL SIGNS, INS/OUTS (last 24 hours):  --------------------------------------------------------------------------------------  ((Insert SICU Vitals/Is+Os here))***  --------------------------------------------------------------------------------------    EXAM  NEUROLOGY  RASS:   	GCS:    Exam: Normal, NAD, alert, oriented x3, no focal deficits.     HEENT  Exam: Normocephalic, atraumatic, EOMI.      RESPIRATORY  Exam: Lungs clear to auscultation, Normal expansion/effort.     CARDIOVASCULAR  Exam: S1, S2.  Regular rate and rhythm.      GI/NUTRITION  Exam: Abdomen soft, Non-tender, Non-distended.   Wound: Clean, dry, intact  Current Diet:  NPO on IVF    VASCULAR  Exam: Extremities warm, pink, well-perfused.     MUSCULOSKELETAL  Exam: All extremities moving spontaneously without limitations.     SKIN  Exam: Good skin turgor, no skin breakdown.     METABOLIC/FLUIDS/ELECTROLYTES  lactated ringers. 1000 milliLiter(s) IV Continuous <Continuous>      HEMATOLOGIC  [x] VTE Prophylaxis: heparin  Injectable 5000 Unit(s) SubCutaneous every 8 hours    Transfusions:	[] PRBC	[] Platelets		[] FFP	[] Cryoprecipitate    INFECTIOUS DISEASE  Antimicrobials/Immunologic Medications:  valACYclovir 500 milliGRAM(s) Oral every 12 hours    Tubes/Lines/Drains  NG tube  [x] Peripheral IV  [] Central Venous Line     	[] R	[] L	[] IJ	[] Fem	[] SC	Date Placed:   [] Arterial Line		[] R	[] L	[] Fem	[] Rad	[] Ax	Date Placed:   [] PICC		[] Midline		[] Mediport  [] Urinary Catheter		Date Placed:   [x] Necessity of urinary, arterial, and venous catheters discussed    LABS  --------------------------------------------------------------------------------------  CBC (07-02 @ 00:12)                              10.7<L>                         14.78<H>  )----------------(  357        89.1<H>% Neutrophils, 5.3<L>% Lymphocytes, ANC: 13.17<H>                              32.9<L>                BMP (07-02 @ 00:10)             134<L>  |  98      |  5<L>  		Ca++ 1.05    Ca 7.9<L>             ---------------------------------( 92    		Mg 1.4<L>             4.4     |  18<L>   |  0.38<L>			Ph 3.2         Coags (07-02 @ 00:12)  aPTT -- / INR 1.10 / PT 12.2          --------------------------------------------------------------------------------------      IMAGING STUDIES:   CXR: -    ASSESSMENT:  53y Female with bleeding gastric ulcer s/p EGD w/ cauterization (6/22) and IR angiogram w/ R+L gastric artery embolization (6/22). Hemodynamically stable with stable H&H. Now s/p EGD with gastric ulcer identified and tagged, distal gastrectomy and Billroth 2 operation on 7/1, stable         PLAN:      Neurologic:   - GCS 15, Alert  - Continue to monitor    Pulm:   - Breathing comfortably, normal work and rate of breathing.  - Will continue to monitor. Maintain SpO2 >92%.     CV:   - TTE showing grossly normal LV and RV function.   - Monitor BP and HR  - Maintain MAP > 65.    GI:   - Protonix BID  - Diet: NPO w/ NGT in place to LCWS  - midline incision w/ aquacel dressing  - F/u pathology     Renal:   - LR @75 mL/hr while NPO   - Ernandez removed, passed trial of void    Heme:    - CBC qAM to trend H/H  - Continue heparin for DVT PPX given stable H&H  - Total blood products: 5 units pRBC, 1 unit FFP, 1 unit platelets (no recent transfusions)    ID:   - Hx of ulcerative rash, HSV+ and MRSA+ s/p treatment, now on valacyclovir for ppx, pt is afebrile   - Valacyclovir 500mg BID     Dispo: Floor  --------------------------------------------------------------------------------------    Critical Care Diagnoses: SICU Daily Progress Note  =====================================================  Interval/Overnight Events: Patient seen and examined at bedside, in no acute distress. Patient has remained hemodynamically stable postoperatively, POD 2 s/p EGD with gastric ulcer identified and tagged, distal gastrectomy, and Billroth II reconstruction. Pt stable overnight, was OOBTC throughout the day. States pain is well controlled. Pt remains NPO on IVF. Ernandez removed, passed trial of void. Heparin maintained for DVT ppx. Pt denies any sob/cp/n/v/fevers or chills.       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 Patient had gone to her dermatologist's office due to worsening of her chronic rash (states that rash was caused by HSV and had a superimposed MRSA infection), and dermatologist sent her in because of rigors and chills in office. In ED, patient reported that she also had two weeks of bloody bowel movements with blood in the toilet bowel and on paper. She reports a normal colonoscopy approx 2 years ago. Since admission, pt was in MICU for UGIB and underwent IR embolization and EGD. Surgical oncology consulted for second opinion in setting of recurrent UGIB to evaluate for possible gastrectomy/partial gastrectomy. Surgical oncology consulted SICU for closer monitoring in setting of active GI bleed with dropping hemoglobin/hematocrit. OR on 7/1 for EGD with gastric ulcer identified and tagged, Billroth 2 operation, and distal gastrectomy.    Allergies: No Known Allergies      MEDICATIONS:   --------------------------------------------------------------------------------------  Neurologic Medications  HYDROmorphone  Injectable 0.5 milliGRAM(s) IV Push every 4 hours PRN Moderate Pain (4 - 6)    Respiratory Medications    Cardiovascular Medications    Gastrointestinal Medications  lactated ringers. 1000 milliLiter(s) IV Continuous <Continuous>  pantoprazole  Injectable 40 milliGRAM(s) IV Push two times a day    Genitourinary Medications    Hematologic/Oncologic Medications  heparin  Injectable 5000 Unit(s) SubCutaneous every 8 hours    Antimicrobial/Immunologic Medications  valACYclovir 500 milliGRAM(s) Oral every 12 hours    Endocrine/Metabolic Medications    Topical/Other Medications  lidocaine 5% Ointment 1 Application(s) Topical every 6 hours PRN pain at rash  mupirocin 2% Ointment 1 Application(s) Topical two times a day    --------------------------------------------------------------------------------------    VITAL SIGNS, INS/OUTS (last 24 hours):  --------------------------------------------------------------------------------------  ((Insert SICU Vitals/Is+Os here))***  --------------------------------------------------------------------------------------    EXAM  NEUROLOGY  RASS:   	GCS:    Exam: Normal, NAD, alert, oriented x3, no focal deficits.     HEENT  Exam: Normocephalic, atraumatic, EOMI.      RESPIRATORY  Exam: Lungs clear to auscultation, Normal expansion/effort.     CARDIOVASCULAR  Exam: S1, S2.  Regular rate and rhythm.      GI/NUTRITION  Exam: Abdomen soft, Non-tender, Non-distended.   Wound: Clean, dry, intact  Current Diet:  NPO on IVF    VASCULAR  Exam: Extremities warm, pink, well-perfused.     MUSCULOSKELETAL  Exam: All extremities moving spontaneously without limitations.     SKIN  Exam: Good skin turgor, no skin breakdown.     METABOLIC/FLUIDS/ELECTROLYTES  lactated ringers. 1000 milliLiter(s) IV Continuous <Continuous>      HEMATOLOGIC  [x] VTE Prophylaxis: heparin  Injectable 5000 Unit(s) SubCutaneous every 8 hours    Transfusions:	[] PRBC	[] Platelets		[] FFP	[] Cryoprecipitate    INFECTIOUS DISEASE  Antimicrobials/Immunologic Medications:  valACYclovir 500 milliGRAM(s) Oral every 12 hours    Tubes/Lines/Drains  NG tube  [x] Peripheral IV  [] Central Venous Line     	[] R	[] L	[] IJ	[] Fem	[] SC	Date Placed:   [] Arterial Line		[] R	[] L	[] Fem	[] Rad	[] Ax	Date Placed:   [] PICC		[] Midline		[] Mediport  [] Urinary Catheter		Date Placed:   [x] Necessity of urinary, arterial, and venous catheters discussed    LABS  --------------------------------------------------------------------------------------  CBC (07-02 @ 00:12)                              10.7<L>                         14.78<H>  )----------------(  357        89.1<H>% Neutrophils, 5.3<L>% Lymphocytes, ANC: 13.17<H>                              32.9<L>                BMP (07-02 @ 00:10)             134<L>  |  98      |  5<L>  		Ca++ 1.05    Ca 7.9<L>             ---------------------------------( 92    		Mg 1.4<L>             4.4     |  18<L>   |  0.38<L>			Ph 3.2         [place] (07-02 @ 00:12)  aPTT -- / INR 1.10 / PT 12.2          --------------------------------------------------------------------------------------      IMAGING STUDIES:   CXR: -

## 2019-07-03 NOTE — PROGRESS NOTE ADULT - ASSESSMENT
ASSESSMENT:  53y Female with bleeding gastric ulcer s/p EGD w/ cauterization (6/22) and IR angiogram w/ R+L gastric artery embolization (6/22). Hemodynamically stable with stable H&H. Now s/p EGD with gastric ulcer identified and tagged, distal gastrectomy and Billroth 2 operation on 7/1, stable         PLAN:      Neurologic:   - GCS 15, Alert  - Continue to monitor    Pulm:   - Breathing comfortably, normal work and rate of breathing.  - Will continue to monitor. Maintain SpO2 >92%.     CV:   - TTE showing grossly normal LV and RV function.   - Monitor BP and HR  - Maintain MAP > 65.    GI:   - Protonix BID  - Diet: NPO w/ NGT in place to LCWS  - midline incision w/ aquacel dressing  - F/u pathology     Renal:   - LR @75 mL/hr while NPO   - Ernandez removed, passed trial of void    Heme:    - CBC qAM to trend H/H  - Continue heparin for DVT PPX given stable H&H  - Total blood products: 5 units pRBC, 1 unit FFP, 1 unit platelets (no recent transfusions)    ID:   - Hx of ulcerative rash, HSV+ and MRSA+ s/p treatment, now on valacyclovir for ppx, pt is afebrile   - Valacyclovir 500mg BID     Dispo: Floor  --------------------------------------------------------------------------------------    Critical Care Diagnoses: ASSESSMENT:  53y Female with bleeding gastric ulcer s/p EGD w/ cauterization (6/22) and IR angiogram w/ R+L gastric artery embolization (6/22). Hemodynamically stable with stable H&H. Now s/p EGD with gastric ulcer identified and tagged, distal gastrectomy and Billroth 2 operation on 7/1, stable and ready for floor.    PLAN:      Neurologic:   - GCS 15, Alert  - Continue to monitor    Pulm:   - Breathing comfortably, normal work and rate of breathing.  - Will continue to monitor. Maintain SpO2 >92%.     CV:   - TTE showing grossly normal LV and RV function.   - Monitor BP and HR  - Maintain MAP > 65.    GI:   - Protonix BID  - Diet: NPO w/ NGT in place to LCWS  - midline incision w/ aquacel dressing  - F/u pathology     Renal:   - LR @75 mL/hr while NPO   - Ernandez removed, passed trial of void    Heme:    - CBC qAM to trend H/H  - Continue heparin for DVT PPX given stable H&H  - Total blood products: 5 units pRBC, 1 unit FFP, 1 unit platelets (no recent transfusions)    ID:   - Hx of ulcerative rash, HSV+ and MRSA+ s/p treatment, now on valacyclovir for ppx, pt is afebrile   - Valacyclovir 500mg BID     Dispo: Floor

## 2019-07-03 NOTE — PROGRESS NOTE ADULT - ASSESSMENT
Assessment and Plan:   · Assessment		  53y Female with bleeding gastric ulcer s/p EGD w/ cauterization (6/22) and IR angiogram w/ R+L gastric artery embolization (6/22). Hemodynamically stable with stable H&H. Now s/p EGD with gastric ulcer identified and tagged, Billroth 2 operation, and distal gastrectomy on 7/1. She is recovering well and is hemodynamically stable and afebrile.     Plan:   - pain control  - matta removed and met TOV  - NPO/IVF  - PT/OOB  - Plan for UGI study Friday  - Transfer to the floor today

## 2019-07-03 NOTE — PROGRESS NOTE ADULT - SUBJECTIVE AND OBJECTIVE BOX
Subjective: Patient seen and examined. No new events except as noted.     SUBJECTIVE/ROS:        MEDICATIONS:  MEDICATIONS  (STANDING):  acetaminophen  IVPB .. 1000 milliGRAM(s) IV Intermittent once  heparin  Injectable 5000 Unit(s) SubCutaneous every 8 hours  HYDROmorphone  Injectable 0.5 milliGRAM(s) IV Push once  lactated ringers. 1000 milliLiter(s) (75 mL/Hr) IV Continuous <Continuous>  mupirocin 2% Ointment 1 Application(s) Topical two times a day  pantoprazole  Injectable 40 milliGRAM(s) IV Push two times a day  valACYclovir 500 milliGRAM(s) Oral every 12 hours      PHYSICAL EXAM:  T(C): 36.6 (07-03-19 @ 08:00), Max: 36.8 (07-03-19 @ 04:00)  HR: 82 (07-03-19 @ 12:00) (68 - 97)  BP: 158/65 (07-03-19 @ 12:00) (140/64 - 158/65)  RR: 19 (07-03-19 @ 12:00) (11 - 19)  SpO2: 100% (07-03-19 @ 12:00) (97% - 100%)  Wt(kg): --  I&O's Summary    02 Jul 2019 07:01  -  03 Jul 2019 07:00  --------------------------------------------------------  IN: 1825 mL / OUT: 2725 mL / NET: -900 mL    03 Jul 2019 07:01  -  03 Jul 2019 13:07  --------------------------------------------------------  IN: 150 mL / OUT: 300 mL / NET: -150 mL            JVP: Normal  Neck: supple  Lung: clear   CV: S1 S2 , Murmur:  Abd: soft  Ext: No edema  neuro: Awake / alert  Psych: flat affect  Skin: normal``    LABS/DATA:    CARDIAC MARKERS:                                10.7   12.85 )-----------( 433      ( 03 Jul 2019 02:50 )             32.1     07-03    136  |  97<L>  |  10  ----------------------------<  77  4.2   |  25  |  0.57    Ca    8.7      03 Jul 2019 02:50  Phos  3.8     07-03  Mg     2.0     07-03      proBNP:   Lipid Profile:   HgA1c:   TSH:     TELE:  EKG:

## 2019-07-03 NOTE — PROGRESS NOTE ADULT - SUBJECTIVE AND OBJECTIVE BOX
Team D Surgery Progress Note     SUBJECTIVE / 24H EVENTS  Patient seen and examined on morning rounds. No acute events overnight. Ernandez removed and is now urinating w/o issue. Pain is well controlled. No flatus or BM yet but also no N/V    OBJECTIVE:    VITAL SIGNS:  T(C): 36.6 (19 @ 08:00), Max: 36.8 (19 @ 04:00)  HR: 82 (19 @ 12:00) (68 - 97)  BP: 158/65 (19 @ 12:00) (140/64 - 158/65)  RR: 19 (19 @ 12:00) (11 - 19)  SpO2: 100% (19 @ 12:00) (97% - 100%)  Daily     Daily Weight in k.8 (2019 04:00)      PHYSICAL EXAM:  Gen: NAD  LS: Respirations unlabored  Card: RRR  GI: Soft. appropriately tender, non distended, incisions c/d/i      19 @ 07:01  -  19 @ 07:00  --------------------------------------------------------  IN:    IV PiggyBack: 100 mL    lactated ringers.: 1725 mL  Total IN: 1825 mL    OUT:    Indwelling Catheter - Urethral: 1275 mL    Nasoenteral Tube: 600 mL    Voided: 850 mL  Total OUT: 2725 mL    Total NET: -900 mL      19 @ 07:01  -  19 @ 13:18  --------------------------------------------------------  IN:    lactated ringers.: 150 mL  Total IN: 150 mL    OUT:    Voided: 300 mL  Total OUT: 300 mL    Total NET: -150 mL        LAB VALUES:      136  |  97<L>  |  10  ----------------------------<  77  4.2   |  25  |  0.57    Ca    8.7      2019 02:50  Phos  3.8     07-03  Mg     2.0     07-03                                 10.7   12.85 )-----------( 433      ( 2019 02:50 )             32.1       PT/INR - ( 2019 00:12 )   PT: 12.2 SEC;   INR: 1.10          MICROBIOLOGY:    No new microbiology data for review.     RADIOLOGY:    No new radiographic images for review.    MEDICATIONS  (STANDING):  acetaminophen  IVPB .. 1000 milliGRAM(s) IV Intermittent once  heparin  Injectable 5000 Unit(s) SubCutaneous every 8 hours  HYDROmorphone  Injectable 0.5 milliGRAM(s) IV Push once  lactated ringers. 1000 milliLiter(s) (75 mL/Hr) IV Continuous <Continuous>  mupirocin 2% Ointment 1 Application(s) Topical two times a day  pantoprazole  Injectable 40 milliGRAM(s) IV Push two times a day  valACYclovir 500 milliGRAM(s) Oral every 12 hours    MEDICATIONS  (PRN):  HYDROmorphone  Injectable 0.5 milliGRAM(s) IV Push every 4 hours PRN Moderate Pain (4 - 6)  lidocaine 5% Ointment 1 Application(s) Topical every 6 hours PRN pain at rash

## 2019-07-03 NOTE — PROGRESS NOTE ADULT - ATTENDING COMMENTS
53y Female with bleeding gastric ulcer s/p EGD w/ cauterization (6/22) and IR angiogram w/ R+L gastric artery embolization (6/22). Hemodynamically stable with stable H&H. Now s/p EGD with gastric ulcer identified and tagged, distal gastrectomy and Billroth 2 operation on 7/1, stable and ready for floor.    PLAN:      Neurologic:   - GCS 15, Alert  - Continue to monitor    Pulm:   - Breathing comfortably, normal work and rate of breathing.  - Will continue to monitor. Maintain SpO2 >92%.     CV:   - TTE showing grossly normal LV and RV function.   - Monitor BP and HR  - Maintain MAP > 65.    GI:   - Protonix BID  - Diet: NPO w/ NGT in place to LCWS  - midline incision w/ aquacel dressing  - F/u pathology     Renal:   - LR @75 mL/hr while NPO   - Ernandez removed, passed trial of void    Heme:    - CBC qAM to trend H/H  - Continue heparin for DVT PPX given stable H&H  - Total blood products: 5 units pRBC, 1 unit FFP, 1 unit platelets (no recent transfusions)    ID:   - Hx of ulcerative rash, HSV+ and MRSA+ s/p treatment, now on valacyclovir for ppx, pt is afebrile   - Valacyclovir 500mg BID     Dispo: Floor    CCDx; respiratory abnormality.  The patient is not a critical care patient with no life threatening hemodynamic and metabolic instability in SICU.  I have personally interviewed when possible and examined the patient, reviewed data and laboratory tests/x-rays and all pertinent electronic images.  I was physically present for the key portions of the evaluation and management (E/M) service provided.   The SICU team has a constant risk benefit analyzes discussion with the primary team, all consultants, House Staff and PA's on all decisions.  These diagnoses are unrelated to the surgical procedure noted above.  I meet with family if needed to get further history, discuss the case and make care decisions for this patient who might not be able to participate.  Time involved in performance of separately billable procedures was not counted toward my critical care time. There is no overlap.  I spent 55-75 minutes ( 0800Hrs-0915Hrs in AM/ 1600Hrs-1715Hrs in PM, or other time indicated) of critical care time for the diagnoses, assessment, plan and interventions.

## 2019-07-04 LAB
ANION GAP SERPL CALC-SCNC: 15 MMO/L — HIGH (ref 7–14)
BUN SERPL-MCNC: 9 MG/DL — SIGNIFICANT CHANGE UP (ref 7–23)
CALCIUM SERPL-MCNC: 8.5 MG/DL — SIGNIFICANT CHANGE UP (ref 8.4–10.5)
CHLORIDE SERPL-SCNC: 98 MMOL/L — SIGNIFICANT CHANGE UP (ref 98–107)
CO2 SERPL-SCNC: 24 MMOL/L — SIGNIFICANT CHANGE UP (ref 22–31)
CREAT SERPL-MCNC: 0.47 MG/DL — LOW (ref 0.5–1.3)
GLUCOSE SERPL-MCNC: 56 MG/DL — LOW (ref 70–99)
HCT VFR BLD CALC: 32.8 % — LOW (ref 34.5–45)
HGB BLD-MCNC: 10.9 G/DL — LOW (ref 11.5–15.5)
MAGNESIUM SERPL-MCNC: 1.6 MG/DL — SIGNIFICANT CHANGE UP (ref 1.6–2.6)
MCHC RBC-ENTMCNC: 29.5 PG — SIGNIFICANT CHANGE UP (ref 27–34)
MCHC RBC-ENTMCNC: 33.2 % — SIGNIFICANT CHANGE UP (ref 32–36)
MCV RBC AUTO: 88.6 FL — SIGNIFICANT CHANGE UP (ref 80–100)
NRBC # FLD: 0 K/UL — SIGNIFICANT CHANGE UP (ref 0–0)
PHOSPHATE SERPL-MCNC: 3.6 MG/DL — SIGNIFICANT CHANGE UP (ref 2.5–4.5)
PLATELET # BLD AUTO: 447 K/UL — HIGH (ref 150–400)
PMV BLD: 9.4 FL — SIGNIFICANT CHANGE UP (ref 7–13)
POTASSIUM SERPL-MCNC: 3.8 MMOL/L — SIGNIFICANT CHANGE UP (ref 3.5–5.3)
POTASSIUM SERPL-SCNC: 3.8 MMOL/L — SIGNIFICANT CHANGE UP (ref 3.5–5.3)
RBC # BLD: 3.7 M/UL — LOW (ref 3.8–5.2)
RBC # FLD: 16.7 % — HIGH (ref 10.3–14.5)
SODIUM SERPL-SCNC: 137 MMOL/L — SIGNIFICANT CHANGE UP (ref 135–145)
WBC # BLD: 12.13 K/UL — HIGH (ref 3.8–10.5)
WBC # FLD AUTO: 12.13 K/UL — HIGH (ref 3.8–10.5)

## 2019-07-04 PROCEDURE — 99232 SBSQ HOSP IP/OBS MODERATE 35: CPT

## 2019-07-04 RX ADMIN — LIDOCAINE 1 APPLICATION(S): 4 CREAM TOPICAL at 03:20

## 2019-07-04 RX ADMIN — HEPARIN SODIUM 5000 UNIT(S): 5000 INJECTION INTRAVENOUS; SUBCUTANEOUS at 05:47

## 2019-07-04 RX ADMIN — VALACYCLOVIR 500 MILLIGRAM(S): 500 TABLET, FILM COATED ORAL at 17:02

## 2019-07-04 RX ADMIN — HEPARIN SODIUM 5000 UNIT(S): 5000 INJECTION INTRAVENOUS; SUBCUTANEOUS at 21:26

## 2019-07-04 RX ADMIN — HEPARIN SODIUM 5000 UNIT(S): 5000 INJECTION INTRAVENOUS; SUBCUTANEOUS at 13:04

## 2019-07-04 RX ADMIN — MUPIROCIN 1 APPLICATION(S): 20 OINTMENT TOPICAL at 17:02

## 2019-07-04 RX ADMIN — MUPIROCIN 1 APPLICATION(S): 20 OINTMENT TOPICAL at 05:47

## 2019-07-04 RX ADMIN — PANTOPRAZOLE SODIUM 40 MILLIGRAM(S): 20 TABLET, DELAYED RELEASE ORAL at 17:02

## 2019-07-04 RX ADMIN — PANTOPRAZOLE SODIUM 40 MILLIGRAM(S): 20 TABLET, DELAYED RELEASE ORAL at 05:47

## 2019-07-04 NOTE — PROGRESS NOTE ADULT - SUBJECTIVE AND OBJECTIVE BOX
Team D Surgery Progress Note     SUBJECTIVE / 24H EVENTS  Patient seen and examined on morning rounds with Dr. Hollis. No acute events overnight. NGT removed today. Pain is well controlled. No flatus or BM yet but also no N/V    OBJECTIVE:    VITAL SIGNS:  Vital Signs Last 24 Hrs  T(C): 36.7 (04 Jul 2019 12:28), Max: 36.9 (03 Jul 2019 23:35)  T(F): 98 (04 Jul 2019 12:28), Max: 98.4 (03 Jul 2019 23:35)  HR: 86 (04 Jul 2019 12:28) (75 - 99)  BP: 158/69 (04 Jul 2019 12:28) (128/58 - 163/69)  BP(mean): 90 (04 Jul 2019 08:52) (90 - 90)  RR: 17 (04 Jul 2019 12:28) (15 - 18)  SpO2: 100% (04 Jul 2019 12:28) (98% - 100%)      PHYSICAL EXAM:  Gen: NAD  LS: Respirations unlabored  Card: RRR  GI: Soft. appropriately tender, non distended, incisions c/d/i      I&O's Detail    03 Jul 2019 07:01  -  04 Jul 2019 07:00  --------------------------------------------------------  IN:    lactated ringers.: 750 mL  Total IN: 750 mL    OUT:    Nasoenteral Tube: 300 mL    Voided: 1100 mL  Total OUT: 1400 mL    Total NET: -650 mL      04 Jul 2019 07:01  -  04 Jul 2019 12:43  --------------------------------------------------------  IN:    lactated ringers.: 300 mL  Total IN: 300 mL    OUT:    Voided: 200 mL  Total OUT: 200 mL    Total NET: 100 mL            LAB VALUES:  07-04    137  |  98  |  9   ----------------------------<  56<L>  3.8   |  24  |  0.47<L>    Ca    8.5      04 Jul 2019 06:55  Phos  3.6     07-04  Mg     1.6     07-04                                                10.9   12.13 )-----------( 447      ( 04 Jul 2019 06:55 )             32.8              MICROBIOLOGY:    No new microbiology data for review.     RADIOLOGY:    No new radiographic images for review.    MEDICATIONS  (STANDING):  acetaminophen  IVPB .. 1000 milliGRAM(s) IV Intermittent once  heparin  Injectable 5000 Unit(s) SubCutaneous every 8 hours  HYDROmorphone  Injectable 0.5 milliGRAM(s) IV Push once  lactated ringers. 1000 milliLiter(s) (75 mL/Hr) IV Continuous <Continuous>  mupirocin 2% Ointment 1 Application(s) Topical two times a day  pantoprazole  Injectable 40 milliGRAM(s) IV Push two times a day  valACYclovir 500 milliGRAM(s) Oral every 12 hours    MEDICATIONS  (PRN):  HYDROmorphone  Injectable 0.5 milliGRAM(s) IV Push every 4 hours PRN Moderate Pain (4 - 6)  lidocaine 5% Ointment 1 Application(s) Topical every 6 hours PRN pain at rash

## 2019-07-04 NOTE — PROGRESS NOTE ADULT - ASSESSMENT
Assessment and Plan:   · Assessment		  53y Female with bleeding gastric ulcer s/p EGD w/ cauterization (6/22) and IR angiogram w/ R+L gastric artery embolization (6/22). Hemodynamically stable with stable H&H. Now s/p EGD with gastric ulcer identified and tagged, Billroth 2 operation, and distal gastrectomy on 7/1. She is recovering well and is hemodynamically stable and afebrile.     Plan:   - pain control  - NGT removed to clears  - PT/OOB  - Plan for UGI study Friday

## 2019-07-04 NOTE — PROGRESS NOTE ADULT - ASSESSMENT
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  Now s/p partial gastrectomy and Billroth II on 7/1/19.  The rash was seen by me end of June and looks about the same.  She has healing lesions from prior HSV.  No concern for shingles at this time.  No role for isolation.    suggest:  - continue valtrex 500mg bid  - d/c airborne isolation  - local wound care    Please call Infectious Diseases if there is a change in status.  Thank you.  [phone].    above d/w [id number]

## 2019-07-04 NOTE — PROGRESS NOTE ADULT - SUBJECTIVE AND OBJECTIVE BOX
Patient is a 53y old  Female who presents with a chief complaint of chills (19 Jun 2019 10:26)    f/u rash    Interval History/ROS:  last seen by ID 6/20 for rash.  Since then, she underwent distal gastrectomy and Billroth II reconstruction secondary to bleeding ulcer that looked suspicious.  She is Post-op D#3 (7/1/19)  Some abdominal pain.  no n/v/d.  no fever.  Remainder of ROS otherwise negative.    PAST MEDICAL & SURGICAL HISTORY:  Breast cancer  S/P mastectomy, left    Allergies  No Known Allergies    ANTIMICROBIALS):  acyclovir IVPB   piperacillin/tazobactam IVPB (6/18-)  vancomycin  IVPB (6/18-)  valACYclovir 500 every 12 hours    MEDICATIONS:  piperacillin/tazobactam IVPB. (6/18-20)  valACYclovir 500 every 12 hours    MEDICATIONS  (STANDING):  heparin  Injectable 5000 every 8 hours  pantoprazole  Injectable 40 two times a day    Vital Signs Last 24 Hrs  T(F): 98 (07-04-19 @ 12:28), Max: 98.4 (07-03-19 @ 23:35)  HR: 86 (07-04-19 @ 12:28)  BP: 158/69 (07-04-19 @ 12:28)  RR: 17 (07-04-19 @ 12:28)  SpO2: 100% (07-04-19 @ 12:28) (98% - 100%)  Wt(kg): --    PHYSICAL EXAM:  General: non-toxic  HEAD/EYES: anicteric  ENT:  supple  Cardiovascular:   S1, S2  Respiratory:  clear bilaterally  GI:  soft, non-tender; surgical site c/d/i  :  no matta  Musculoskeletal:  no synovitis  Neurologic:  grossly non-focal  Skin:  ulcerative but appear resolving diffusely of the back; no cellulitis; no pus; no malodor; non-tender; alopecia  Psychiatric:  appropriate affect  Vascular:  no phlebitis                                      10.9   12.13 )-----------( 447      ( 04 Jul 2019 06:55 )             32.8 07-04    137  |  98  |  9   ----------------------------<  56  3.8   |  24  |  0.47  Ca    8.5      04 Jul 2019 06:55Phos  3.6     07-04Mg     1.6     07-04    Urinalysis Basic - ( 19 Jun 2019 05:08 )  Color: YELLOW / Appearance: Lt TURBID / SG: > 1.040 / pH: 8.0  Gluc: NEGATIVE / Ketone: NEGATIVE  / Bili: NEGATIVE / Urobili: NORMAL   Blood: TRACE / Protein: 20 / Nitrite: NEGATIVE   Leuk Esterase: LARGE / RBC: 11-25 / WBC >50   Sq Epi: MODERATE / Non Sq Epi: x / Bacteria: NEGATIVE    MICROBIOLOGY:  5/30 MRSA  5/30 HSV-1+ on PCR    RADIOLOGY:  Xray Chest 1 View- PORTABLE-Routine (07.02.19 @ 00:41) >  IMPRESSION:  Clear lungs with enteric tube.    Xray Chest 1 View- PORTABLE-Urgent (06.18.19 @ 17:04) >  IMPRESSION: Diffuse linear opacities may representmild interstitial edema. No focal consolidations.    CT Pelvis w/ IV Cont (06.18.19 @ 17:48) >  IMPRESSION: No organized perirectal collection or drainable abscess.

## 2019-07-04 NOTE — PROGRESS NOTE ADULT - SUBJECTIVE AND OBJECTIVE BOX
Subjective: Patient seen and examined. No new events except as noted.     SUBJECTIVE/ROS:  feels ok       MEDICATIONS:  MEDICATIONS  (STANDING):  heparin  Injectable 5000 Unit(s) SubCutaneous every 8 hours  lactated ringers. 1000 milliLiter(s) (75 mL/Hr) IV Continuous <Continuous>  mupirocin 2% Ointment 1 Application(s) Topical two times a day  pantoprazole  Injectable 40 milliGRAM(s) IV Push two times a day  valACYclovir 500 milliGRAM(s) Oral every 12 hours      PHYSICAL EXAM:  T(C): 36.5 (07-04-19 @ 08:52), Max: 36.9 (07-03-19 @ 23:35)  HR: 75 (07-04-19 @ 08:52) (75 - 99)  BP: 163/69 (07-04-19 @ 08:52) (128/58 - 163/69)  RR: 16 (07-04-19 @ 08:52) (15 - 19)  SpO2: 100% (07-04-19 @ 08:52) (98% - 100%)  Wt(kg): --  I&O's Summary    03 Jul 2019 07:01  -  04 Jul 2019 07:00  --------------------------------------------------------  IN: 750 mL / OUT: 1400 mL / NET: -650 mL            JVP: Normal  Neck: supple  Lung: clear   CV: S1 S2 , Murmur:  Abd: soft  Ext: No edema  neuro: Awake / alert  Psych: flat affect  Skin: normal``    LABS/DATA:    CARDIAC MARKERS:                                10.9   12.13 )-----------( 447      ( 04 Jul 2019 06:55 )             32.8     07-04    137  |  98  |  9   ----------------------------<  56<L>  3.8   |  24  |  0.47<L>    Ca    8.5      04 Jul 2019 06:55  Phos  3.6     07-04  Mg     1.6     07-04      proBNP:   Lipid Profile:   HgA1c:   TSH:     TELE:  EKG:

## 2019-07-05 LAB
ANION GAP SERPL CALC-SCNC: 19 MMO/L — HIGH (ref 7–14)
BUN SERPL-MCNC: 6 MG/DL — LOW (ref 7–23)
CALCIUM SERPL-MCNC: 8.4 MG/DL — SIGNIFICANT CHANGE UP (ref 8.4–10.5)
CHLORIDE SERPL-SCNC: 93 MMOL/L — LOW (ref 98–107)
CO2 SERPL-SCNC: 21 MMOL/L — LOW (ref 22–31)
CREAT SERPL-MCNC: 0.41 MG/DL — LOW (ref 0.5–1.3)
GLUCOSE SERPL-MCNC: 65 MG/DL — LOW (ref 70–99)
HCT VFR BLD CALC: 32.9 % — LOW (ref 34.5–45)
HGB BLD-MCNC: 10.9 G/DL — LOW (ref 11.5–15.5)
MAGNESIUM SERPL-MCNC: 1.6 MG/DL — SIGNIFICANT CHANGE UP (ref 1.6–2.6)
MCHC RBC-ENTMCNC: 29.2 PG — SIGNIFICANT CHANGE UP (ref 27–34)
MCHC RBC-ENTMCNC: 33.1 % — SIGNIFICANT CHANGE UP (ref 32–36)
MCV RBC AUTO: 88.2 FL — SIGNIFICANT CHANGE UP (ref 80–100)
NRBC # FLD: 0 K/UL — SIGNIFICANT CHANGE UP (ref 0–0)
PHOSPHATE SERPL-MCNC: 3.1 MG/DL — SIGNIFICANT CHANGE UP (ref 2.5–4.5)
PLATELET # BLD AUTO: 455 K/UL — HIGH (ref 150–400)
PMV BLD: 9.4 FL — SIGNIFICANT CHANGE UP (ref 7–13)
POTASSIUM SERPL-MCNC: 3.7 MMOL/L — SIGNIFICANT CHANGE UP (ref 3.5–5.3)
POTASSIUM SERPL-SCNC: 3.7 MMOL/L — SIGNIFICANT CHANGE UP (ref 3.5–5.3)
RBC # BLD: 3.73 M/UL — LOW (ref 3.8–5.2)
RBC # FLD: 16.4 % — HIGH (ref 10.3–14.5)
SODIUM SERPL-SCNC: 133 MMOL/L — LOW (ref 135–145)
WBC # BLD: 10.27 K/UL — SIGNIFICANT CHANGE UP (ref 3.8–10.5)
WBC # FLD AUTO: 10.27 K/UL — SIGNIFICANT CHANGE UP (ref 3.8–10.5)

## 2019-07-05 PROCEDURE — 74018 RADEX ABDOMEN 1 VIEW: CPT | Mod: 26,59

## 2019-07-05 PROCEDURE — 74241: CPT | Mod: 26

## 2019-07-05 RX ORDER — SODIUM CHLORIDE 9 MG/ML
1000 INJECTION, SOLUTION INTRAVENOUS
Refills: 0 | Status: DISCONTINUED | OUTPATIENT
Start: 2019-07-05 | End: 2019-07-05

## 2019-07-05 RX ADMIN — PANTOPRAZOLE SODIUM 40 MILLIGRAM(S): 20 TABLET, DELAYED RELEASE ORAL at 17:23

## 2019-07-05 RX ADMIN — VALACYCLOVIR 500 MILLIGRAM(S): 500 TABLET, FILM COATED ORAL at 05:16

## 2019-07-05 RX ADMIN — HEPARIN SODIUM 5000 UNIT(S): 5000 INJECTION INTRAVENOUS; SUBCUTANEOUS at 05:16

## 2019-07-05 RX ADMIN — MUPIROCIN 1 APPLICATION(S): 20 OINTMENT TOPICAL at 05:17

## 2019-07-05 RX ADMIN — PANTOPRAZOLE SODIUM 40 MILLIGRAM(S): 20 TABLET, DELAYED RELEASE ORAL at 05:16

## 2019-07-05 RX ADMIN — HEPARIN SODIUM 5000 UNIT(S): 5000 INJECTION INTRAVENOUS; SUBCUTANEOUS at 21:05

## 2019-07-05 RX ADMIN — HEPARIN SODIUM 5000 UNIT(S): 5000 INJECTION INTRAVENOUS; SUBCUTANEOUS at 12:36

## 2019-07-05 RX ADMIN — MUPIROCIN 1 APPLICATION(S): 20 OINTMENT TOPICAL at 17:23

## 2019-07-05 RX ADMIN — VALACYCLOVIR 500 MILLIGRAM(S): 500 TABLET, FILM COATED ORAL at 17:23

## 2019-07-05 NOTE — PROGRESS NOTE ADULT - SUBJECTIVE AND OBJECTIVE BOX
Team D Surgery Progress Note     SUBJECTIVE / 24H EVENTS  Patient seen and examined on morning rounds. No acute events overnight. Pain is well controlled. No flatus or BM yet but also no N/V.     OBJECTIVE:    VITAL SIGNS:  Vital Signs Last 24 Hrs  T(C): 36.7 (04 Jul 2019 12:28), Max: 36.9 (03 Jul 2019 23:35)  T(F): 98 (04 Jul 2019 12:28), Max: 98.4 (03 Jul 2019 23:35)  HR: 86 (04 Jul 2019 12:28) (75 - 99)  BP: 158/69 (04 Jul 2019 12:28) (128/58 - 163/69)  BP(mean): 90 (04 Jul 2019 08:52) (90 - 90)  RR: 17 (04 Jul 2019 12:28) (15 - 18)  SpO2: 100% (04 Jul 2019 12:28) (98% - 100%)      PHYSICAL EXAM:  Gen: NAD  LS: Respirations unlabored  Card: RRR  GI: Soft. appropriately tender, non distended, incisions c/d/i      I&O's Detail    03 Jul 2019 07:01  -  04 Jul 2019 07:00  --------------------------------------------------------  IN:    lactated ringers.: 750 mL  Total IN: 750 mL    OUT:    Nasoenteral Tube: 300 mL    Voided: 1100 mL  Total OUT: 1400 mL    Total NET: -650 mL      04 Jul 2019 07:01  -  04 Jul 2019 12:43  --------------------------------------------------------  IN:    lactated ringers.: 300 mL  Total IN: 300 mL    OUT:    Voided: 200 mL  Total OUT: 200 mL    Total NET: 100 mL            LAB VALUES:  07-04    137  |  98  |  9   ----------------------------<  56<L>  3.8   |  24  |  0.47<L>    Ca    8.5      04 Jul 2019 06:55  Phos  3.6     07-04  Mg     1.6     07-04                                                10.9   12.13 )-----------( 447      ( 04 Jul 2019 06:55 )             32.8              MICROBIOLOGY:    No new microbiology data for review.     RADIOLOGY:    No new radiographic images for review.    MEDICATIONS  (STANDING):  acetaminophen  IVPB .. 1000 milliGRAM(s) IV Intermittent once  heparin  Injectable 5000 Unit(s) SubCutaneous every 8 hours  HYDROmorphone  Injectable 0.5 milliGRAM(s) IV Push once  lactated ringers. 1000 milliLiter(s) (75 mL/Hr) IV Continuous <Continuous>  mupirocin 2% Ointment 1 Application(s) Topical two times a day  pantoprazole  Injectable 40 milliGRAM(s) IV Push two times a day  valACYclovir 500 milliGRAM(s) Oral every 12 hours    MEDICATIONS  (PRN):  HYDROmorphone  Injectable 0.5 milliGRAM(s) IV Push every 4 hours PRN Moderate Pain (4 - 6)  lidocaine 5% Ointment 1 Application(s) Topical every 6 hours PRN pain at rash Team D Surgery Progress Note     SUBJECTIVE / 24H EVENTS  Patient seen and examined on morning rounds. No acute events overnight. Pain is well controlled. No flatus or BM yet but also no N/V.     OBJECTIVE:    VITAL SIGNS:  Vital Signs (24 Hrs):  T(C): 36.8 (07-05-19 @ 08:28), Max: 37.1 (07-05-19 @ 05:14)  HR: 79 (07-05-19 @ 08:28) (75 - 86)  BP: 144/75 (07-05-19 @ 08:28) (129/68 - 163/69)  RR: 18 (07-05-19 @ 08:28) (16 - 18)  SpO2: 100% (07-05-19 @ 08:28) (99% - 100%)      I&O's Detail    04 Jul 2019 07:01  -  05 Jul 2019 07:00  --------------------------------------------------------  IN:    lactated ringers.: 1275 mL  Total IN: 1275 mL    OUT:    Voided: 675 mL  Total OUT: 675 mL    Total NET: 600 mL            PHYSICAL EXAM:  Gen: NAD  LS: Respirations unlabored  Card: RRR  GI: Soft. appropriately tender, non distended, incisions c/d/i        07-05    133<L>  |  93<L>  |  6<L>  ----------------------------<  65<L>  3.7   |  21<L>  |  0.41<L>    Ca    8.4      05 Jul 2019 05:35  Phos  3.1     07-05  Mg     1.6     07-05                                                          10.9   10.27 )-----------( 455      ( 05 Jul 2019 05:35 )             32.9              MICROBIOLOGY:    No new microbiology data for review.     RADIOLOGY:    No new radiographic images for review.    MEDICATIONS  (STANDING):  acetaminophen  IVPB .. 1000 milliGRAM(s) IV Intermittent once  heparin  Injectable 5000 Unit(s) SubCutaneous every 8 hours  HYDROmorphone  Injectable 0.5 milliGRAM(s) IV Push once  lactated ringers. 1000 milliLiter(s) (75 mL/Hr) IV Continuous <Continuous>  mupirocin 2% Ointment 1 Application(s) Topical two times a day  pantoprazole  Injectable 40 milliGRAM(s) IV Push two times a day  valACYclovir 500 milliGRAM(s) Oral every 12 hours    MEDICATIONS  (PRN):  HYDROmorphone  Injectable 0.5 milliGRAM(s) IV Push every 4 hours PRN Moderate Pain (4 - 6)  lidocaine 5% Ointment 1 Application(s) Topical every 6 hours PRN pain at rash

## 2019-07-05 NOTE — PROGRESS NOTE ADULT - SUBJECTIVE AND OBJECTIVE BOX
Patient is a 53y old  Female who presents with a chief complaint of chills (05 Jul 2019 08:38)       Pt is seen and examined  pt is awake and lying in bed  pt seems comfortable and denies any complaints at this time          ROS:  Negative except for:    MEDICATIONS  (STANDING):  dextrose 5% + sodium chloride 0.9% 1000 milliLiter(s) (75 mL/Hr) IV Continuous <Continuous>  heparin  Injectable 5000 Unit(s) SubCutaneous every 8 hours  mupirocin 2% Ointment 1 Application(s) Topical two times a day  pantoprazole  Injectable 40 milliGRAM(s) IV Push two times a day  valACYclovir 500 milliGRAM(s) Oral every 12 hours    MEDICATIONS  (PRN):  HYDROmorphone  Injectable 0.5 milliGRAM(s) IV Push every 4 hours PRN Moderate Pain (4 - 6)  lidocaine 5% Ointment 1 Application(s) Topical every 6 hours PRN pain at rash      Allergies    No Known Allergies    Intolerances        Vital Signs Last 24 Hrs  T(C): 36.8 (05 Jul 2019 08:28), Max: 37.1 (05 Jul 2019 05:14)  T(F): 98.2 (05 Jul 2019 08:28), Max: 98.7 (05 Jul 2019 05:14)  HR: 79 (05 Jul 2019 08:28) (76 - 86)  BP: 144/75 (05 Jul 2019 08:28) (129/68 - 158/69)  BP(mean): --  RR: 18 (05 Jul 2019 08:28) (16 - 18)  SpO2: 100% (05 Jul 2019 08:28) (99% - 100%)    PHYSICAL EXAM  General: adult in NAD  HEENT: clear oropharynx, anicteric sclera, pink conjunctiva  Neck: supple  CV: normal S1/S2 with no murmur rubs or gallops  Lungs: positive air movement b/l ant lungs,clear to auscultation, no wheezes, no rales  Abdomen: soft non-tender non-distended, no hepatosplenomegaly  Ext: no clubbing cyanosis or edema  Skin: no rashes and no petechiae  Neuro: alert and oriented X 4, no focal deficits  LABS:                          10.9   10.27 )-----------( 455      ( 05 Jul 2019 05:35 )             32.9         Mean Cell Volume : 88.2 fL  Mean Cell Hemoglobin : 29.2 pg  Mean Cell Hemoglobin Concentration : 33.1 %  Auto Neutrophil # : x  Auto Lymphocyte # : x  Auto Monocyte # : x  Auto Eosinophil # : x  Auto Basophil # : x  Auto Neutrophil % : x  Auto Lymphocyte % : x  Auto Monocyte % : x  Auto Eosinophil % : x  Auto Basophil % : x    Serial CBC's  07-05 @ 05:35  Hct-32.9 / Hgb-10.9 / Plat-455 / RBC-3.73 / WBC-10.27          Serial CBC's  07-04 @ 06:55  Hct-32.8 / Hgb-10.9 / Plat-447 / RBC-3.70 / WBC-12.13          Serial CBC's  07-03 @ 02:50  Hct-32.1 / Hgb-10.7 / Plat-433 / RBC-3.61 / WBC-12.85          Serial CBC's  07-02 @ 00:12  Hct-32.9 / Hgb-10.7 / Plat-357 / RBC-3.70 / WBC-14.78            07-05    133<L>  |  93<L>  |  6<L>  ----------------------------<  65<L>  3.7   |  21<L>  |  0.41<L>    Ca    8.4      05 Jul 2019 05:35  Phos  3.1     07-05  Mg     1.6     07-05            WBC Count: 10.27 K/uL (07-05-19 @ 05:35)  Hemoglobin: 10.9 g/dL (07-05-19 @ 05:35)  Hematocrit: 32.9 % (07-05-19 @ 05:35)  Platelet Count - Automated: 455 K/uL (07-05-19 @ 05:35)  WBC Count: 12.13 K/uL (07-04-19 @ 06:55)  Hemoglobin: 10.9 g/dL (07-04-19 @ 06:55)  Hematocrit: 32.8 % (07-04-19 @ 06:55)  Platelet Count - Automated: 447 K/uL (07-04-19 @ 06:55)  WBC Count: 12.85 K/uL (07-03-19 @ 02:50)  Hemoglobin: 10.7 g/dL (07-03-19 @ 02:50)  Hematocrit: 32.1 % (07-03-19 @ 02:50)  Platelet Count - Automated: 433 K/uL (07-03-19 @ 02:50)  Hemoglobin: 10.7 g/dL (07-02-19 @ 00:12)  Hematocrit: 32.9 % (07-02-19 @ 00:12)  Platelet Count - Automated: 357 K/uL (07-02-19 @ 00:12)  WBC Count: 14.78 K/uL (07-02-19 @ 00:12)  WBC Count: 6.47 K/uL (07-01-19 @ 03:00)  Hemoglobin: 12.7 g/dL (07-01-19 @ 03:00)  Hematocrit: 38.9 % (07-01-19 @ 03:00)  Platelet Count - Automated: 427 K/uL (07-01-19 @ 03:00)  WBC Count: 5.88 K/uL (06-30-19 @ 02:30)  Hemoglobin: 11.9 g/dL (06-30-19 @ 02:30)  Hematocrit: 35.3 % (06-30-19 @ 02:30)  Platelet Count - Automated: 370 K/uL (06-30-19 @ 02:30)  WBC Count: 5.53 K/uL (06-29-19 @ 03:02)  Hemoglobin: 11.1 g/dL (06-29-19 @ 03:02)  Hematocrit: 33.5 % (06-29-19 @ 03:02)  Platelet Count - Automated: 303 K/uL (06-29-19 @ 03:02)  WBC Count: 7.46 K/uL (06-28-19 @ 01:25)  Hemoglobin: 10.5 g/dL (06-28-19 @ 01:25)  Hematocrit: 31.4 % (06-28-19 @ 01:25)  Platelet Count - Automated: 287 K/uL (06-28-19 @ 01:25)  WBC Count: 6.87 K/uL (06-27-19 @ 22:00)  Hemoglobin: 10.8 g/dL (06-27-19 @ 22:00)  Hematocrit: 32.3 % (06-27-19 @ 22:00)  Platelet Count - Automated: 297 K/uL (06-27-19 @ 22:00)  WBC Count: 6.31 K/uL (06-27-19 @ 12:35)  Hemoglobin: 10.5 g/dL (06-27-19 @ 12:35)  Hematocrit: 30.9 % (06-27-19 @ 12:35)  Platelet Count - Automated: 248 K/uL (06-27-19 @ 12:35)  WBC Count: 7.09 K/uL (06-27-19 @ 04:40)  Hemoglobin: 10.6 g/dL (06-27-19 @ 04:40)  Hematocrit: 31.2 % (06-27-19 @ 04:40)  Platelet Count - Automated: 268 K/uL (06-27-19 @ 04:40)  WBC Count: 6.03 K/uL (06-26-19 @ 23:10)  Hemoglobin: 10.6 g/dL (06-26-19 @ 23:10)  Hematocrit: 31.7 % (06-26-19 @ 23:10)  Platelet Count - Automated: 253 K/uL (06-26-19 @ 23:10)  WBC Count: 5.56 K/uL (06-26-19 @ 15:44)  Hemoglobin: 7.5 g/dL (06-26-19 @ 15:44)  Hematocrit: 22.9 % (06-26-19 @ 15:44)  Platelet Count - Automated: 217 K/uL (06-26-19 @ 15:44)  WBC Count: 8.62 K/uL (06-26-19 @ 12:31)  Hemoglobin: 9.4 g/dL (06-26-19 @ 12:31)  Hematocrit: 28.4 % (06-26-19 @ 12:31)  Platelet Count - Automated: 267 K/uL (06-26-19 @ 12:31)  WBC Count: 8.84 K/uL (06-26-19 @ 09:15)  Hemoglobin: 10.1 g/dL (06-26-19 @ 09:15)  Hematocrit: 30.0 % (06-26-19 @ 09:15)  Platelet Count - Automated: 206 K/uL (06-26-19 @ 09:15)  WBC Count: 12.12 K/uL (06-26-19 @ 03:22)  Hemoglobin: 10.7 g/dL (06-26-19 @ 03:22)  Hematocrit: 32.1 % (06-26-19 @ 03:22)  Platelet Count - Automated: 286 K/uL (06-26-19 @ 03:22)  WBC Count: 11.00 K/uL (06-25-19 @ 20:36)  Hemoglobin: 8.4 g/dL (06-25-19 @ 20:36)  Hematocrit: 26.2 % (06-25-19 @ 20:36)  Platelet Count - Automated: 259 K/uL (06-25-19 @ 20:36)  WBC Count: 7.89 K/uL (06-25-19 @ 14:52)  Hemoglobin: 10.0 g/dL (06-25-19 @ 14:52)  Hematocrit: 30.7 % (06-25-19 @ 14:52)  Platelet Count - Automated: 279 K/uL (06-25-19 @ 14:52)                BLOOD SMEAR INTERPRETATION:       RADIOLOGY & ADDITIONAL STUDIES:

## 2019-07-05 NOTE — PROGRESS NOTE ADULT - SUBJECTIVE AND OBJECTIVE BOX
Subjective: Patient seen and examined. No new events except as noted.     SUBJECTIVE/ROS:  feels ok       MEDICATIONS:  MEDICATIONS  (STANDING):  dextrose 5% + sodium chloride 0.9% 1000 milliLiter(s) (75 mL/Hr) IV Continuous <Continuous>  heparin  Injectable 5000 Unit(s) SubCutaneous every 8 hours  mupirocin 2% Ointment 1 Application(s) Topical two times a day  pantoprazole  Injectable 40 milliGRAM(s) IV Push two times a day  valACYclovir 500 milliGRAM(s) Oral every 12 hours      PHYSICAL EXAM:  T(C): 36.5 (07-05-19 @ 11:19), Max: 37.1 (07-05-19 @ 05:14)  HR: 78 (07-05-19 @ 11:19) (76 - 79)  BP: 134/68 (07-05-19 @ 11:19) (129/68 - 154/77)  RR: 18 (07-05-19 @ 11:19) (16 - 18)  SpO2: 98% (07-05-19 @ 11:19) (98% - 100%)  Wt(kg): --  I&O's Summary    04 Jul 2019 07:01  -  05 Jul 2019 07:00  --------------------------------------------------------  IN: 1275 mL / OUT: 675 mL / NET: 600 mL            JVP: Normal  Neck: supple  Lung: clear   CV: S1 S2 , Murmur:  Abd: soft  Ext: No edema  neuro: Awake / alert  Psych: flat affect  Skin: normal``    LABS/DATA:    CARDIAC MARKERS:                                10.9   10.27 )-----------( 455      ( 05 Jul 2019 05:35 )             32.9     07-05    133<L>  |  93<L>  |  6<L>  ----------------------------<  65<L>  3.7   |  21<L>  |  0.41<L>    Ca    8.4      05 Jul 2019 05:35  Phos  3.1     07-05  Mg     1.6     07-05      proBNP:   Lipid Profile:   HgA1c:   TSH:     TELE:  EKG:

## 2019-07-05 NOTE — PROGRESS NOTE ADULT - ASSESSMENT
GIB   on protonix   fu with GI     Herpes   on valacyclovir    s/p distal gastrectomy and billroth II reconstruction  CV stable  fu with surgery     HTN  stable

## 2019-07-06 ENCOUNTER — TRANSCRIPTION ENCOUNTER (OUTPATIENT)
Age: 54
End: 2019-07-06

## 2019-07-06 VITALS
RESPIRATION RATE: 18 BRPM | OXYGEN SATURATION: 100 % | SYSTOLIC BLOOD PRESSURE: 126 MMHG | HEART RATE: 86 BPM | DIASTOLIC BLOOD PRESSURE: 63 MMHG | TEMPERATURE: 98 F

## 2019-07-06 LAB
ANION GAP SERPL CALC-SCNC: 16 MMO/L — HIGH (ref 7–14)
BUN SERPL-MCNC: 4 MG/DL — LOW (ref 7–23)
CALCIUM SERPL-MCNC: 8.4 MG/DL — SIGNIFICANT CHANGE UP (ref 8.4–10.5)
CHLORIDE SERPL-SCNC: 98 MMOL/L — SIGNIFICANT CHANGE UP (ref 98–107)
CO2 SERPL-SCNC: 23 MMOL/L — SIGNIFICANT CHANGE UP (ref 22–31)
CREAT SERPL-MCNC: 0.44 MG/DL — LOW (ref 0.5–1.3)
GLUCOSE SERPL-MCNC: 99 MG/DL — SIGNIFICANT CHANGE UP (ref 70–99)
HCT VFR BLD CALC: 32.9 % — LOW (ref 34.5–45)
HGB BLD-MCNC: 10.9 G/DL — LOW (ref 11.5–15.5)
MAGNESIUM SERPL-MCNC: 1.5 MG/DL — LOW (ref 1.6–2.6)
MCHC RBC-ENTMCNC: 30 PG — SIGNIFICANT CHANGE UP (ref 27–34)
MCHC RBC-ENTMCNC: 33.1 % — SIGNIFICANT CHANGE UP (ref 32–36)
MCV RBC AUTO: 90.6 FL — SIGNIFICANT CHANGE UP (ref 80–100)
NRBC # FLD: 0 K/UL — SIGNIFICANT CHANGE UP (ref 0–0)
PHOSPHATE SERPL-MCNC: 2.6 MG/DL — SIGNIFICANT CHANGE UP (ref 2.5–4.5)
PLATELET # BLD AUTO: 468 K/UL — HIGH (ref 150–400)
PMV BLD: 9.3 FL — SIGNIFICANT CHANGE UP (ref 7–13)
POTASSIUM SERPL-MCNC: 3.5 MMOL/L — SIGNIFICANT CHANGE UP (ref 3.5–5.3)
POTASSIUM SERPL-SCNC: 3.5 MMOL/L — SIGNIFICANT CHANGE UP (ref 3.5–5.3)
RBC # BLD: 3.63 M/UL — LOW (ref 3.8–5.2)
RBC # FLD: 17 % — HIGH (ref 10.3–14.5)
SODIUM SERPL-SCNC: 137 MMOL/L — SIGNIFICANT CHANGE UP (ref 135–145)
WBC # BLD: 10.1 K/UL — SIGNIFICANT CHANGE UP (ref 3.8–10.5)
WBC # FLD AUTO: 10.1 K/UL — SIGNIFICANT CHANGE UP (ref 3.8–10.5)

## 2019-07-06 RX ORDER — SODIUM,POTASSIUM PHOSPHATES 278-250MG
1 POWDER IN PACKET (EA) ORAL
Refills: 0 | Status: COMPLETED | OUTPATIENT
Start: 2019-07-06 | End: 2019-07-06

## 2019-07-06 RX ORDER — EXEMESTANE 25 MG/1
1 TABLET, SUGAR COATED ORAL
Qty: 0 | Refills: 0 | DISCHARGE

## 2019-07-06 RX ORDER — POTASSIUM CHLORIDE 20 MEQ
10 PACKET (EA) ORAL
Refills: 0 | Status: DISCONTINUED | OUTPATIENT
Start: 2019-07-06 | End: 2019-07-06

## 2019-07-06 RX ORDER — MAGNESIUM SULFATE 500 MG/ML
2 VIAL (ML) INJECTION ONCE
Refills: 0 | Status: COMPLETED | OUTPATIENT
Start: 2019-07-06 | End: 2019-07-06

## 2019-07-06 RX ORDER — PANTOPRAZOLE SODIUM 20 MG/1
1 TABLET, DELAYED RELEASE ORAL
Qty: 60 | Refills: 1
Start: 2019-07-06 | End: 2019-09-03

## 2019-07-06 RX ORDER — OXYCODONE HYDROCHLORIDE 5 MG/1
1 TABLET ORAL
Qty: 18 | Refills: 0
Start: 2019-07-06 | End: 2019-07-08

## 2019-07-06 RX ORDER — VALACYCLOVIR 500 MG/1
1 TABLET, FILM COATED ORAL
Qty: 28 | Refills: 0
Start: 2019-07-06 | End: 2019-07-19

## 2019-07-06 RX ORDER — PACLITAXEL 6 MG/ML
118 INJECTION, SOLUTION, CONCENTRATE INTRAVENOUS
Qty: 0 | Refills: 0 | DISCHARGE

## 2019-07-06 RX ORDER — POTASSIUM PHOSPHATE, MONOBASIC POTASSIUM PHOSPHATE, DIBASIC 236; 224 MG/ML; MG/ML
15 INJECTION, SOLUTION INTRAVENOUS ONCE
Refills: 0 | Status: DISCONTINUED | OUTPATIENT
Start: 2019-07-06 | End: 2019-07-06

## 2019-07-06 RX ADMIN — Medication 1 TABLET(S): at 20:59

## 2019-07-06 RX ADMIN — MUPIROCIN 1 APPLICATION(S): 20 OINTMENT TOPICAL at 05:02

## 2019-07-06 RX ADMIN — VALACYCLOVIR 500 MILLIGRAM(S): 500 TABLET, FILM COATED ORAL at 05:01

## 2019-07-06 RX ADMIN — LIDOCAINE 1 APPLICATION(S): 4 CREAM TOPICAL at 12:50

## 2019-07-06 RX ADMIN — VALACYCLOVIR 500 MILLIGRAM(S): 500 TABLET, FILM COATED ORAL at 18:18

## 2019-07-06 RX ADMIN — PANTOPRAZOLE SODIUM 40 MILLIGRAM(S): 20 TABLET, DELAYED RELEASE ORAL at 18:18

## 2019-07-06 RX ADMIN — PANTOPRAZOLE SODIUM 40 MILLIGRAM(S): 20 TABLET, DELAYED RELEASE ORAL at 05:01

## 2019-07-06 RX ADMIN — HEPARIN SODIUM 5000 UNIT(S): 5000 INJECTION INTRAVENOUS; SUBCUTANEOUS at 13:37

## 2019-07-06 RX ADMIN — Medication 50 GRAM(S): at 16:18

## 2019-07-06 RX ADMIN — Medication 1 TABLET(S): at 16:18

## 2019-07-06 RX ADMIN — HEPARIN SODIUM 5000 UNIT(S): 5000 INJECTION INTRAVENOUS; SUBCUTANEOUS at 05:02

## 2019-07-06 RX ADMIN — MUPIROCIN 1 APPLICATION(S): 20 OINTMENT TOPICAL at 18:19

## 2019-07-06 RX ADMIN — LIDOCAINE 1 APPLICATION(S): 4 CREAM TOPICAL at 05:02

## 2019-07-06 NOTE — PROGRESS NOTE ADULT - ASSESSMENT
GIB   on protonix   fu with GI     s/p distal gastrectomy and billroth II reconstruction  CV stable  fu with surgery     HTN  stable

## 2019-07-06 NOTE — PROGRESS NOTE ADULT - SUBJECTIVE AND OBJECTIVE BOX
Subjective: Patient seen and examined. No new events except as noted.     SUBJECTIVE/ROS:        MEDICATIONS:  MEDICATIONS  (STANDING):  heparin  Injectable 5000 Unit(s) SubCutaneous every 8 hours  mupirocin 2% Ointment 1 Application(s) Topical two times a day  pantoprazole  Injectable 40 milliGRAM(s) IV Push two times a day  valACYclovir 500 milliGRAM(s) Oral every 12 hours      PHYSICAL EXAM:  T(C): 36.9 (07-06-19 @ 08:59), Max: 37 (07-06-19 @ 04:51)  HR: 74 (07-06-19 @ 08:59) (69 - 82)  BP: 120/72 (07-06-19 @ 08:59) (120/72 - 148/85)  RR: 18 (07-06-19 @ 08:59) (17 - 18)  SpO2: 99% (07-06-19 @ 08:59) (98% - 100%)  Wt(kg): --  I&O's Summary    05 Jul 2019 07:01  -  06 Jul 2019 07:00  --------------------------------------------------------  IN: 1230 mL / OUT: 0 mL / NET: 1230 mL            JVP: Normal  Neck: supple  Lung: clear   CV: S1 S2 , Murmur:  Abd: soft  Ext: No edema  neuro: Awake / alert  Psych: flat affect  Skin: normal``    LABS/DATA:    CARDIAC MARKERS:                                10.9   10.10 )-----------( 468      ( 06 Jul 2019 06:00 )             32.9     07-06    137  |  98  |  4<L>  ----------------------------<  99  3.5   |  23  |  0.44<L>    Ca    8.4      06 Jul 2019 06:00  Phos  2.6     07-06  Mg     1.5     07-06      proBNP:   Lipid Profile:   HgA1c:   TSH:     TELE:  EKG:

## 2019-07-06 NOTE — DISCHARGE NOTE NURSING/CASE MANAGEMENT/SOCIAL WORK - NSDCDPATPORTLINK_GEN_ALL_CORE
You can access the [facility] Patient Portal, offered by [facility], by registering with the following website: [email or web address]

## 2019-07-06 NOTE — PROGRESS NOTE ADULT - PROVIDER SPECIALTY LIST ADULT
Anesthesia
Anesthesia
Cardiology
Gastroenterology
Heme/Onc
Infectious Disease
Infectious Disease
Internal Medicine
Intervent Radiology
MICU
MICU
SICU
Surgery
Dental
Gastroenterology
Surgery
Surgery

## 2019-07-11 ENCOUNTER — APPOINTMENT (OUTPATIENT)
Dept: DERMATOLOGY | Facility: CLINIC | Age: 54
End: 2019-07-11
Payer: SELF-PAY

## 2019-07-11 VITALS
TEMPERATURE: 98.3 F | SYSTOLIC BLOOD PRESSURE: 110 MMHG | HEART RATE: 80 BPM | DIASTOLIC BLOOD PRESSURE: 74 MMHG | HEIGHT: 60 IN | OXYGEN SATURATION: 100 %

## 2019-07-11 DIAGNOSIS — A49.02 METHICILLIN RESISTANT STAPHYLOCOCCUS AUREUS INFECTION, UNSPECIFIED SITE: ICD-10-CM

## 2019-07-11 DIAGNOSIS — L81.0 POSTINFLAMMATORY HYPERPIGMENTATION: ICD-10-CM

## 2019-07-11 DIAGNOSIS — Z78.9 OTHER SPECIFIED HEALTH STATUS: ICD-10-CM

## 2019-07-11 DIAGNOSIS — Z56.0 UNEMPLOYMENT, UNSPECIFIED: ICD-10-CM

## 2019-07-11 LAB — SURGICAL PATHOLOGY STUDY: SIGNIFICANT CHANGE UP

## 2019-07-11 PROCEDURE — 99213 OFFICE O/P EST LOW 20 MIN: CPT | Mod: GC

## 2019-07-11 SDOH — ECONOMIC STABILITY - INCOME SECURITY: UNEMPLOYMENT, UNSPECIFIED: Z56.0

## 2019-07-16 ENCOUNTER — APPOINTMENT (OUTPATIENT)
Dept: SURGICAL ONCOLOGY | Facility: CLINIC | Age: 54
End: 2019-07-16
Payer: COMMERCIAL

## 2019-07-16 VITALS
HEART RATE: 86 BPM | DIASTOLIC BLOOD PRESSURE: 70 MMHG | WEIGHT: 100 LBS | BODY MASS INDEX: 19.63 KG/M2 | OXYGEN SATURATION: 100 % | HEIGHT: 60 IN | SYSTOLIC BLOOD PRESSURE: 126 MMHG

## 2019-07-16 PROCEDURE — 99024 POSTOP FOLLOW-UP VISIT: CPT

## 2019-07-16 NOTE — ASSESSMENT
[FreeTextEntry1] : Doing well.\par \par Plan: Follow up in 3 months.  Patient to follow up with medical oncology to resume breast cancer treatment.  May proceed with treatment next week from surgical healing perspective.

## 2019-07-16 NOTE — HISTORY OF PRESENT ILLNESS
[de-identified] : Patient is a 54 y/o female undergoing treatment for metastatic breast cancer who presented to [facility] with bleeding gastric ulcer that failed endoscopic and IR therapy.  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

## 2019-07-17 ENCOUNTER — MEDICATION RENEWAL (OUTPATIENT)
Age: 54
End: 2019-07-17

## 2019-07-17 ENCOUNTER — OUTPATIENT (OUTPATIENT)
Dept: OUTPATIENT SERVICES | Facility: HOSPITAL | Age: 54
LOS: 1 days | Discharge: HOME | End: 2019-07-17
Payer: COMMERCIAL

## 2019-07-17 DIAGNOSIS — Z90.12 ACQUIRED ABSENCE OF LEFT BREAST AND NIPPLE: Chronic | ICD-10-CM

## 2019-07-17 DIAGNOSIS — C50.919 MALIGNANT NEOPLASM OF UNSPECIFIED SITE OF UNSPECIFIED FEMALE BREAST: ICD-10-CM

## 2019-07-19 ENCOUNTER — TRANSCRIPTION ENCOUNTER (OUTPATIENT)
Age: 54
End: 2019-07-19

## 2019-07-19 ENCOUNTER — APPOINTMENT (OUTPATIENT)
Dept: INTERNAL MEDICINE | Facility: CLINIC | Age: 54
End: 2019-07-19
Payer: SELF-PAY

## 2019-07-19 VITALS
TEMPERATURE: 98.9 F | RESPIRATION RATE: 16 BRPM | HEIGHT: 59 IN | DIASTOLIC BLOOD PRESSURE: 69 MMHG | WEIGHT: 101 LBS | OXYGEN SATURATION: 100 % | SYSTOLIC BLOOD PRESSURE: 110 MMHG | BODY MASS INDEX: 20.36 KG/M2 | HEART RATE: 92 BPM

## 2019-07-19 DIAGNOSIS — Z12.39 ENCOUNTER FOR OTHER SCREENING FOR MALIGNANT NEOPLASM OF BREAST: ICD-10-CM

## 2019-07-19 DIAGNOSIS — K25.4 CHRONIC OR UNSPECIFIED GASTRIC ULCER WITH HEMORRHAGE: ICD-10-CM

## 2019-07-19 PROBLEM — Z12.4 CERVICAL CANCER SCREENING: Status: ACTIVE | Noted: 2019-07-19

## 2019-07-19 PROCEDURE — 99203 OFFICE O/P NEW LOW 30 MIN: CPT

## 2019-07-19 NOTE — PHYSICAL EXAM
[No Acute Distress] : no acute distress [Ill-Appearing] : ill-appearing [Cachectic] : cachexia was observed [Normal Sclera/Conjunctiva] : normal sclera/conjunctiva [PERRL] : pupils equal round and reactive to light [EOMI] : extraocular movements intact [Normal Outer Ear/Nose] : the outer ears and nose were normal in appearance [Normal Oropharynx] : the oropharynx was normal [No JVD] : no jugular venous distention [No Lymphadenopathy] : no lymphadenopathy [Supple] : supple [Thyroid Normal, No Nodules] : the thyroid was normal and there were no nodules present [No Respiratory Distress] : no respiratory distress  [No Accessory Muscle Use] : no accessory muscle use [Clear to Auscultation] : lungs were clear to auscultation bilaterally [Normal Rate] : normal rate  [Regular Rhythm] : with a regular rhythm [Normal S1, S2] : normal S1 and S2 [No Murmur] : no murmur heard [No Carotid Bruits] : no carotid bruits [No Abdominal Bruit] : a ~M bruit was not heard ~T in the abdomen [No Varicosities] : no varicosities [Pedal Pulses Present] : the pedal pulses are present [No Edema] : there was no peripheral edema [No Palpable Aorta] : no palpable aorta [No Extremity Clubbing/Cyanosis] : no extremity clubbing/cyanosis [Soft] : abdomen soft [Non Tender] : non-tender [Non-distended] : non-distended [No Masses] : no abdominal mass palpated [No HSM] : no HSM [Normal Bowel Sounds] : normal bowel sounds [Normal Posterior Cervical Nodes] : no posterior cervical lymphadenopathy [Normal Anterior Cervical Nodes] : no anterior cervical lymphadenopathy [No CVA Tenderness] : no CVA  tenderness [No Spinal Tenderness] : no spinal tenderness [No Joint Swelling] : no joint swelling [Grossly Normal Strength/Tone] : grossly normal strength/tone [Coordination Grossly Intact] : coordination grossly intact [No Focal Deficits] : no focal deficits [Normal Gait] : normal gait [Deep Tendon Reflexes (DTR)] : deep tendon reflexes were 2+ and symmetric [Normal Affect] : the affect was normal [Normal Insight/Judgement] : insight and judgment were intact [de-identified] : epigastric surgical scar [de-identified] : multiple dry macular lesion on legs , hyperpigmented

## 2019-07-19 NOTE — HISTORY OF PRESENT ILLNESS
[FreeTextEntry1] : breast carcinoma\par Interview and discussion conducted in Sammarinese by Sammarinese speaking Physician.\par  [de-identified] : 53 years old female with h/o left breast carcinoma, s/p mastectomy in 2014, here for first time to establish new PCP; patient came accompanied by sister in law Yesy Shelley and daughter in law Letty King , offers no acute complains bit chronic rash on skin lower back with pruritus , as per family she was receiving chemotherapy, and was hospitalized last month for chronic severe herpes simplex infection; during hospitalization she had bleeding gastric ulcer and gastric resection; now to resume chemotherapy

## 2019-07-19 NOTE — HEALTH RISK ASSESSMENT
[Poor] : ~his/her~ mood as  poor [No] : No [No falls in past year] : Patient reported no falls in the past year [0] : 2) Feeling down, depressed, or hopeless: Not at all (0) [None] : None [Alone] : lives alone [Employed] : employed [High School] : high school [] :  [# Of Children ___] : has [unfilled] children [Feels Safe at Home] : Feels safe at home [Fully functional (bathing, dressing, toileting, transferring, walking, feeding)] : Fully functional (bathing, dressing, toileting, transferring, walking, feeding) [Fully functional (using the telephone, shopping, preparing meals, housekeeping, doing laundry, using] : Fully functional and needs no help or supervision to perform IADLs (using the telephone, shopping, preparing meals, housekeeping, doing laundry, using transportation, managing medications and managing finances) [Smoke Detector] : smoke detector [Carbon Monoxide Detector] : carbon monoxide detector [Safety elements used in home] : safety elements used in home [Seat Belt] :  uses seat belt [Reviewed updated] : Reviewed updated [] : No [Change in mental status noted] : No change in mental status noted [Language] : denies difficulty with language [Behavior] : denies difficulty with behavior [Learning/Retaining New Information] : denies difficulty learning/retaining new information [Handling Complex Tasks] : denies difficulty handling complex tasks [Reasoning] : denies difficulty with reasoning [Spatial Ability and Orientation] : denies difficulty with spatial ability and orientation [Sexually Active] : not sexually active [High Risk Behavior] : no high risk behavior [Reports changes in hearing] : Reports no changes in hearing [Reports changes in vision] : Reports no changes in vision [Reports normal functional visual acuity (ie: able to read med bottle)] : Reports poor functional visual acuity.  [Reports changes in dental health] : Reports no changes in dental health [Guns at Home] : no guns at home [Sunscreen] : does not use sunscreen [Travel to Developing Areas] : does not  travel to developing areas [TB Exposure] : is not being exposed to tuberculosis [Caregiver Concerns] : does not have caregiver concerns [AdvancecareDate] : 07/19/2019

## 2019-07-24 ENCOUNTER — RESULT REVIEW (OUTPATIENT)
Age: 54
End: 2019-07-24

## 2019-07-24 ENCOUNTER — APPOINTMENT (OUTPATIENT)
Dept: HEMATOLOGY ONCOLOGY | Facility: CLINIC | Age: 54
End: 2019-07-24
Payer: COMMERCIAL

## 2019-07-24 ENCOUNTER — OUTPATIENT (OUTPATIENT)
Dept: OUTPATIENT SERVICES | Facility: HOSPITAL | Age: 54
LOS: 1 days | End: 2019-07-24
Payer: COMMERCIAL

## 2019-07-24 VITALS
WEIGHT: 100.09 LBS | TEMPERATURE: 98 F | OXYGEN SATURATION: 100 % | SYSTOLIC BLOOD PRESSURE: 109 MMHG | RESPIRATION RATE: 16 BRPM | DIASTOLIC BLOOD PRESSURE: 68 MMHG | HEART RATE: 95 BPM | HEIGHT: 58.66 IN | BODY MASS INDEX: 20.45 KG/M2

## 2019-07-24 DIAGNOSIS — C50.919 MALIGNANT NEOPLASM OF UNSPECIFIED SITE OF UNSPECIFIED FEMALE BREAST: ICD-10-CM

## 2019-07-24 DIAGNOSIS — Z12.4 ENCOUNTER FOR SCREENING FOR MALIGNANT NEOPLASM OF CERVIX: ICD-10-CM

## 2019-07-24 DIAGNOSIS — Z90.12 ACQUIRED ABSENCE OF LEFT BREAST AND NIPPLE: Chronic | ICD-10-CM

## 2019-07-24 LAB
APTT BLD: 30.5 SEC
BASOPHILS # BLD AUTO: 0 K/UL — SIGNIFICANT CHANGE UP (ref 0–0.2)
BASOPHILS NFR BLD AUTO: 0.1 % — SIGNIFICANT CHANGE UP (ref 0–2)
BLD GP AB SCN SERPL QL: NEGATIVE — SIGNIFICANT CHANGE UP
EOSINOPHIL # BLD AUTO: 0.4 K/UL — SIGNIFICANT CHANGE UP (ref 0–0.5)
EOSINOPHIL NFR BLD AUTO: 5.5 % — SIGNIFICANT CHANGE UP (ref 0–6)
HCT VFR BLD CALC: 36 % — SIGNIFICANT CHANGE UP (ref 34.5–45)
HGB BLD-MCNC: 11.8 G/DL — SIGNIFICANT CHANGE UP (ref 11.5–15.5)
INR PPP: 0.98 RATIO
LYMPHOCYTES # BLD AUTO: 1.8 K/UL — SIGNIFICANT CHANGE UP (ref 1–3.3)
LYMPHOCYTES # BLD AUTO: 23.4 % — SIGNIFICANT CHANGE UP (ref 13–44)
MCHC RBC-ENTMCNC: 31.2 PG — SIGNIFICANT CHANGE UP (ref 27–34)
MCHC RBC-ENTMCNC: 32.9 G/DL — SIGNIFICANT CHANGE UP (ref 32–36)
MCV RBC AUTO: 95.1 FL — SIGNIFICANT CHANGE UP (ref 80–100)
MONOCYTES # BLD AUTO: 0.5 K/UL — SIGNIFICANT CHANGE UP (ref 0–0.9)
MONOCYTES NFR BLD AUTO: 7.1 % — SIGNIFICANT CHANGE UP (ref 2–14)
NEUTROPHILS # BLD AUTO: 4.9 K/UL — SIGNIFICANT CHANGE UP (ref 1.8–7.4)
NEUTROPHILS NFR BLD AUTO: 63.9 % — SIGNIFICANT CHANGE UP (ref 43–77)
PLATELET # BLD AUTO: 481 K/UL — HIGH (ref 150–400)
PT BLD: 11.2 SEC
RBC # BLD: 3.78 M/UL — LOW (ref 3.8–5.2)
RBC # FLD: 17.5 % — HIGH (ref 10.3–14.5)
RH IG SCN BLD-IMP: POSITIVE — SIGNIFICANT CHANGE UP
RH IG SCN BLD-IMP: POSITIVE — SIGNIFICANT CHANGE UP
WBC # BLD: 7.6 K/UL — SIGNIFICANT CHANGE UP (ref 3.8–10.5)
WBC # FLD AUTO: 7.6 K/UL — SIGNIFICANT CHANGE UP (ref 3.8–10.5)

## 2019-07-24 PROCEDURE — 86900 BLOOD TYPING SEROLOGIC ABO: CPT

## 2019-07-24 PROCEDURE — 99215 OFFICE O/P EST HI 40 MIN: CPT

## 2019-07-24 PROCEDURE — 86850 RBC ANTIBODY SCREEN: CPT

## 2019-07-24 PROCEDURE — 86901 BLOOD TYPING SEROLOGIC RH(D): CPT

## 2019-07-25 ENCOUNTER — RESULT REVIEW (OUTPATIENT)
Age: 54
End: 2019-07-25

## 2019-07-25 LAB
ALBUMIN SERPL ELPH-MCNC: 3.9 G/DL
ALP BLD-CCNC: 126 U/L
ALT SERPL-CCNC: 11 U/L
ANION GAP SERPL CALC-SCNC: 16 MMOL/L
AST SERPL-CCNC: 11 U/L
BILIRUB SERPL-MCNC: <0.2 MG/DL
BUN SERPL-MCNC: 14 MG/DL
CALCIUM SERPL-MCNC: 9.4 MG/DL
CANCER AG27-29 SERPL-ACNC: 10.7 U/ML
CEA SERPL-MCNC: 2.7 NG/ML
CHLORIDE SERPL-SCNC: 105 MMOL/L
CO2 SERPL-SCNC: 23 MMOL/L
CREAT SERPL-MCNC: 0.52 MG/DL
GLUCOSE SERPL-MCNC: 89 MG/DL
HBV CORE IGG+IGM SER QL: NONREACTIVE
HBV SURFACE AB SER QL: REACTIVE
HBV SURFACE AG SER QL: NONREACTIVE
HCV AB SER QL: NONREACTIVE
HCV S/CO RATIO: 0.14 S/CO
POTASSIUM SERPL-SCNC: 4.9 MMOL/L
PROT SERPL-MCNC: 6.5 G/DL
SODIUM SERPL-SCNC: 144 MMOL/L

## 2019-07-25 NOTE — HISTORY OF PRESENT ILLNESS
[AJCC Stage: ____] : AJCC Stage: [unfilled] [de-identified] : Please see dictated initial consult note scanned into AE[facility]

## 2019-07-30 ENCOUNTER — APPOINTMENT (OUTPATIENT)
Dept: DERMATOLOGY | Facility: CLINIC | Age: 54
End: 2019-07-30
Payer: SELF-PAY

## 2019-07-30 VITALS — DIASTOLIC BLOOD PRESSURE: 70 MMHG | SYSTOLIC BLOOD PRESSURE: 102 MMHG | TEMPERATURE: 98.6 F

## 2019-07-30 PROCEDURE — 99214 OFFICE O/P EST MOD 30 MIN: CPT

## 2019-08-02 ENCOUNTER — MED ADMIN CHARGE (OUTPATIENT)
Age: 54
End: 2019-08-02

## 2019-08-02 ENCOUNTER — APPOINTMENT (OUTPATIENT)
Dept: INTERNAL MEDICINE | Facility: CLINIC | Age: 54
End: 2019-08-02
Payer: SELF-PAY

## 2019-08-02 VITALS
OXYGEN SATURATION: 100 % | HEART RATE: 96 BPM | SYSTOLIC BLOOD PRESSURE: 95 MMHG | RESPIRATION RATE: 16 BRPM | DIASTOLIC BLOOD PRESSURE: 66 MMHG | HEIGHT: 58.66 IN | WEIGHT: 101 LBS | BODY MASS INDEX: 20.63 KG/M2 | TEMPERATURE: 97.2 F

## 2019-08-02 DIAGNOSIS — R53.82 CHRONIC FATIGUE, UNSPECIFIED: ICD-10-CM

## 2019-08-02 DIAGNOSIS — R59.0 LOCALIZED ENLARGED LYMPH NODES: ICD-10-CM

## 2019-08-02 PROCEDURE — 99214 OFFICE O/P EST MOD 30 MIN: CPT | Mod: 25

## 2019-08-02 PROCEDURE — 96372 THER/PROPH/DIAG INJ SC/IM: CPT

## 2019-08-02 RX ORDER — CYANOCOBALAMIN 1000 UG/ML
1000 INJECTION INTRAMUSCULAR; SUBCUTANEOUS
Qty: 0 | Refills: 0 | Status: COMPLETED | OUTPATIENT
Start: 2019-08-02

## 2019-08-02 RX ADMIN — CYANOCOBALAMIN 0 MCG/ML: 1000 INJECTION, SOLUTION INTRAMUSCULAR; SUBCUTANEOUS at 00:00

## 2019-08-02 NOTE — PLAN
[FreeTextEntry1] : low cholesterol, low triglycerides diet,dietary counseling given; dietary avoidance discussed; diet and exercise reviewed with patient\par patient scheduled for PET-CT on Monday ; start vitamin B12 injections weekly; MVI for chronic fatigue; nutritional supplement ENSURE advised, prescribed, follow up next week for B12 INJECTION

## 2019-08-02 NOTE — HISTORY OF PRESENT ILLNESS
[FreeTextEntry1] : lab results\par Interview and discussion conducted in Niuean by Niuean speaking Physician.\par  [de-identified] : 53 years old female with metastatic breast carcinoma here for follow up to review labs results, no acute complains, chronic fatigue

## 2019-08-02 NOTE — PHYSICAL EXAM
[Ill-Appearing] : ill-appearing [Cachectic] : cachexia was observed [Normal] : normal gait, coordination grossly intact, no focal deficits and deep tendon reflexes were 2+ and symmetric

## 2019-08-05 ENCOUNTER — TRANSCRIPTION ENCOUNTER (OUTPATIENT)
Age: 54
End: 2019-08-05

## 2019-08-06 ENCOUNTER — RESULT REVIEW (OUTPATIENT)
Age: 54
End: 2019-08-06

## 2019-08-06 PROCEDURE — 88321 CONSLTJ&REPRT SLD PREP ELSWR: CPT

## 2019-08-07 LAB — NON-GYNECOLOGICAL CYTOLOGY STUDY: SIGNIFICANT CHANGE UP

## 2019-08-09 ENCOUNTER — APPOINTMENT (OUTPATIENT)
Dept: HEMATOLOGY ONCOLOGY | Facility: CLINIC | Age: 54
End: 2019-08-09
Payer: COMMERCIAL

## 2019-08-09 ENCOUNTER — APPOINTMENT (OUTPATIENT)
Dept: INTERNAL MEDICINE | Facility: CLINIC | Age: 54
End: 2019-08-09
Payer: SELF-PAY

## 2019-08-09 VITALS
WEIGHT: 100.31 LBS | DIASTOLIC BLOOD PRESSURE: 77 MMHG | TEMPERATURE: 97.6 F | OXYGEN SATURATION: 100 % | SYSTOLIC BLOOD PRESSURE: 117 MMHG | HEART RATE: 87 BPM | BODY MASS INDEX: 20.5 KG/M2 | RESPIRATION RATE: 16 BRPM

## 2019-08-09 PROCEDURE — 99214 OFFICE O/P EST MOD 30 MIN: CPT

## 2019-08-09 PROCEDURE — 96372 THER/PROPH/DIAG INJ SC/IM: CPT

## 2019-08-09 RX ORDER — CYANOCOBALAMIN 1000 UG/ML
1000 INJECTION INTRAMUSCULAR; SUBCUTANEOUS
Qty: 0 | Refills: 0 | Status: COMPLETED | OUTPATIENT
Start: 2019-08-02

## 2019-08-10 RX ORDER — TAMOXIFEN CITRATE 20 MG/1
20 TABLET, FILM COATED ORAL
Refills: 0 | Status: DISCONTINUED | COMMUNITY
End: 2019-08-10

## 2019-08-12 ENCOUNTER — TRANSCRIPTION ENCOUNTER (OUTPATIENT)
Age: 54
End: 2019-08-12

## 2019-08-16 ENCOUNTER — APPOINTMENT (OUTPATIENT)
Dept: INTERNAL MEDICINE | Facility: CLINIC | Age: 54
End: 2019-08-16
Payer: SELF-PAY

## 2019-08-16 PROCEDURE — 96372 THER/PROPH/DIAG INJ SC/IM: CPT

## 2019-08-16 RX ORDER — CYANOCOBALAMIN 1000 UG/ML
1000 INJECTION INTRAMUSCULAR; SUBCUTANEOUS
Qty: 0 | Refills: 0 | Status: COMPLETED | OUTPATIENT
Start: 2019-08-16

## 2019-08-16 RX ADMIN — CYANOCOBALAMIN 0 MCG/ML: 1000 INJECTION, SOLUTION INTRAMUSCULAR; SUBCUTANEOUS at 00:00

## 2019-08-23 ENCOUNTER — APPOINTMENT (OUTPATIENT)
Dept: INTERNAL MEDICINE | Facility: CLINIC | Age: 54
End: 2019-08-23
Payer: SELF-PAY

## 2019-08-23 PROCEDURE — 96372 THER/PROPH/DIAG INJ SC/IM: CPT

## 2019-08-23 RX ORDER — CYANOCOBALAMIN 1000 UG/ML
1000 INJECTION INTRAMUSCULAR; SUBCUTANEOUS
Qty: 0 | Refills: 0 | Status: COMPLETED | OUTPATIENT
Start: 2019-08-23

## 2019-08-23 RX ADMIN — CYANOCOBALAMIN 0 MCG/ML: 1000 INJECTION, SOLUTION INTRAMUSCULAR; SUBCUTANEOUS at 00:00

## 2019-08-30 ENCOUNTER — OTHER (OUTPATIENT)
Age: 54
End: 2019-08-30

## 2019-09-05 ENCOUNTER — RX RENEWAL (OUTPATIENT)
Age: 54
End: 2019-09-05

## 2019-09-05 ENCOUNTER — APPOINTMENT (OUTPATIENT)
Dept: INTERNAL MEDICINE | Facility: CLINIC | Age: 54
End: 2019-09-05
Payer: SELF-PAY

## 2019-09-05 VITALS
SYSTOLIC BLOOD PRESSURE: 135 MMHG | BODY MASS INDEX: 20.84 KG/M2 | DIASTOLIC BLOOD PRESSURE: 75 MMHG | TEMPERATURE: 97.5 F | HEIGHT: 58.66 IN | RESPIRATION RATE: 16 BRPM | HEART RATE: 102 BPM | WEIGHT: 102 LBS | OXYGEN SATURATION: 100 %

## 2019-09-05 DIAGNOSIS — L29.9 PRURITUS, UNSPECIFIED: ICD-10-CM

## 2019-09-05 DIAGNOSIS — B00.9 HERPESVIRAL INFECTION, UNSPECIFIED: ICD-10-CM

## 2019-09-05 DIAGNOSIS — E53.8 DEFICIENCY OF OTHER SPECIFIED B GROUP VITAMINS: ICD-10-CM

## 2019-09-05 PROCEDURE — 99214 OFFICE O/P EST MOD 30 MIN: CPT

## 2019-09-05 NOTE — PHYSICAL EXAM
[No Acute Distress] : no acute distress [Well Nourished] : well nourished [Well Developed] : well developed [Well-Appearing] : well-appearing [Normal Sclera/Conjunctiva] : normal sclera/conjunctiva [PERRL] : pupils equal round and reactive to light [Normal Outer Ear/Nose] : the outer ears and nose were normal in appearance [EOMI] : extraocular movements intact [Normal Oropharynx] : the oropharynx was normal [No JVD] : no jugular venous distention [No Lymphadenopathy] : no lymphadenopathy [Supple] : supple [Thyroid Normal, No Nodules] : the thyroid was normal and there were no nodules present [No Respiratory Distress] : no respiratory distress  [No Accessory Muscle Use] : no accessory muscle use [Clear to Auscultation] : lungs were clear to auscultation bilaterally [Normal Rate] : normal rate  [Regular Rhythm] : with a regular rhythm [No Murmur] : no murmur heard [Normal S1, S2] : normal S1 and S2 [No Carotid Bruits] : no carotid bruits [No Abdominal Bruit] : a ~M bruit was not heard ~T in the abdomen [No Varicosities] : no varicosities [Pedal Pulses Present] : the pedal pulses are present [No Edema] : there was no peripheral edema [No Palpable Aorta] : no palpable aorta [No Extremity Clubbing/Cyanosis] : no extremity clubbing/cyanosis [Soft] : abdomen soft [Non Tender] : non-tender [Non-distended] : non-distended [No Masses] : no abdominal mass palpated [No HSM] : no HSM [Normal Bowel Sounds] : normal bowel sounds [Normal Posterior Cervical Nodes] : no posterior cervical lymphadenopathy [Normal Anterior Cervical Nodes] : no anterior cervical lymphadenopathy [No CVA Tenderness] : no CVA  tenderness [No Spinal Tenderness] : no spinal tenderness [No Joint Swelling] : no joint swelling [Grossly Normal Strength/Tone] : grossly normal strength/tone [No Rash] : no rash [Coordination Grossly Intact] : coordination grossly intact [No Focal Deficits] : no focal deficits [Deep Tendon Reflexes (DTR)] : deep tendon reflexes were 2+ and symmetric [Normal Gait] : normal gait [Normal Affect] : the affect was normal [Normal Insight/Judgement] : insight and judgment were intact

## 2019-09-05 NOTE — HISTORY OF PRESENT ILLNESS
[FreeTextEntry1] : b12 lab results/ Aug31\par Interview and discussion conducted in Malay by Malay speaking Physician.\par  [de-identified] : patient here today to review and discuss labs results, no acute complains\par

## 2019-09-05 NOTE — PLAN
[FreeTextEntry1] : patient clinically stable, to follow with oncology ; will follow up in three  months

## 2019-09-16 ENCOUNTER — OUTPATIENT (OUTPATIENT)
Dept: OUTPATIENT SERVICES | Facility: HOSPITAL | Age: 54
LOS: 1 days | Discharge: HOME | End: 2019-09-16

## 2019-09-16 DIAGNOSIS — C50.919 MALIGNANT NEOPLASM OF UNSPECIFIED SITE OF UNSPECIFIED FEMALE BREAST: ICD-10-CM

## 2019-09-16 DIAGNOSIS — Z90.12 ACQUIRED ABSENCE OF LEFT BREAST AND NIPPLE: Chronic | ICD-10-CM

## 2019-09-17 ENCOUNTER — RESULT REVIEW (OUTPATIENT)
Age: 54
End: 2019-09-17

## 2019-09-17 ENCOUNTER — APPOINTMENT (OUTPATIENT)
Dept: HEMATOLOGY ONCOLOGY | Facility: CLINIC | Age: 54
End: 2019-09-17
Payer: COMMERCIAL

## 2019-09-17 VITALS
BODY MASS INDEX: 20.99 KG/M2 | WEIGHT: 102.73 LBS | DIASTOLIC BLOOD PRESSURE: 77 MMHG | TEMPERATURE: 98.1 F | SYSTOLIC BLOOD PRESSURE: 125 MMHG | RESPIRATION RATE: 14 BRPM | OXYGEN SATURATION: 99 % | HEART RATE: 82 BPM

## 2019-09-17 DIAGNOSIS — R45.89 OTHER SYMPTOMS AND SIGNS INVOLVING EMOTIONAL STATE: ICD-10-CM

## 2019-09-17 LAB
ALBUMIN SERPL ELPH-MCNC: 4.2 G/DL
ALP BLD-CCNC: 97 U/L
ALT SERPL-CCNC: 14 U/L
ANION GAP SERPL CALC-SCNC: 13 MMOL/L
AST SERPL-CCNC: 16 U/L
BASOPHILS # BLD AUTO: 0 K/UL — SIGNIFICANT CHANGE UP (ref 0–0.2)
BASOPHILS NFR BLD AUTO: 0.3 % — SIGNIFICANT CHANGE UP (ref 0–2)
BILIRUB SERPL-MCNC: 0.2 MG/DL
BUN SERPL-MCNC: 11 MG/DL
CALCIUM SERPL-MCNC: 9.5 MG/DL
CEA SERPL-MCNC: 3.1 NG/ML
CHLORIDE SERPL-SCNC: 102 MMOL/L
CO2 SERPL-SCNC: 25 MMOL/L
CREAT SERPL-MCNC: 0.36 MG/DL
EOSINOPHIL # BLD AUTO: 0.1 K/UL — SIGNIFICANT CHANGE UP (ref 0–0.5)
EOSINOPHIL NFR BLD AUTO: 2.4 % — SIGNIFICANT CHANGE UP (ref 0–6)
GLUCOSE SERPL-MCNC: 92 MG/DL
HCT VFR BLD CALC: 40.9 % — SIGNIFICANT CHANGE UP (ref 34.5–45)
HGB BLD-MCNC: 13.5 G/DL — SIGNIFICANT CHANGE UP (ref 11.5–15.5)
LYMPHOCYTES # BLD AUTO: 2.2 K/UL — SIGNIFICANT CHANGE UP (ref 1–3.3)
LYMPHOCYTES # BLD AUTO: 38.3 % — SIGNIFICANT CHANGE UP (ref 13–44)
MCHC RBC-ENTMCNC: 32.3 PG — SIGNIFICANT CHANGE UP (ref 27–34)
MCHC RBC-ENTMCNC: 33 G/DL — SIGNIFICANT CHANGE UP (ref 32–36)
MCV RBC AUTO: 97.8 FL — SIGNIFICANT CHANGE UP (ref 80–100)
MONOCYTES # BLD AUTO: 0.6 K/UL — SIGNIFICANT CHANGE UP (ref 0–0.9)
MONOCYTES NFR BLD AUTO: 10.5 % — SIGNIFICANT CHANGE UP (ref 2–14)
NEUTROPHILS # BLD AUTO: 2.8 K/UL — SIGNIFICANT CHANGE UP (ref 1.8–7.4)
NEUTROPHILS NFR BLD AUTO: 48.5 % — SIGNIFICANT CHANGE UP (ref 43–77)
PLATELET # BLD AUTO: 311 K/UL — SIGNIFICANT CHANGE UP (ref 150–400)
POTASSIUM SERPL-SCNC: 4.8 MMOL/L
PROT SERPL-MCNC: 6.9 G/DL
RBC # BLD: 4.18 M/UL — SIGNIFICANT CHANGE UP (ref 3.8–5.2)
RBC # FLD: 14.3 % — SIGNIFICANT CHANGE UP (ref 10.3–14.5)
SODIUM SERPL-SCNC: 140 MMOL/L
TSH SERPL-ACNC: 3.48 UIU/ML
WBC # BLD: 5.8 K/UL — SIGNIFICANT CHANGE UP (ref 3.8–10.5)
WBC # FLD AUTO: 5.8 K/UL — SIGNIFICANT CHANGE UP (ref 3.8–10.5)

## 2019-09-17 PROCEDURE — 99214 OFFICE O/P EST MOD 30 MIN: CPT

## 2019-09-18 PROBLEM — R45.89 DEPRESSED AFFECT: Status: ACTIVE | Noted: 2019-08-10

## 2019-09-18 LAB — CANCER AG27-29 SERPL-ACNC: 7.7 U/ML

## 2019-09-30 NOTE — PHYSICAL EXAM
[Ambulatory and capable of all self care but unable to carry out any work activities] : Status 2- Ambulatory and capable of all self care but unable to carry out any work activities. Up and about more than 50% of waking hours [Thin] : thin [Normal] : affect appropriate [de-identified] : R breast s/p mastectomy with a well healed scar. R breast without any palp masses, nipple retraction [de-identified] : well healed ulcers on trunk/buttocks and LE

## 2019-09-30 NOTE — REVIEW OF SYSTEMS
[Negative] : Respiratory [FreeTextEntry7] : as above [FreeTextEntry2] : As above [de-identified] : as above [de-identified] : as above

## 2019-09-30 NOTE — REASON FOR VISIT
[Follow-Up Visit] : a follow-up [Family Member] : family member [Other: _____] : [unfilled] [FreeTextEntry2] : Metastatic breast cancer

## 2019-09-30 NOTE — HISTORY OF PRESENT ILLNESS
[de-identified] : Transltiation provide by Kashmi ([id number])& .\par  \par She is here for a f/up visit. on Exemestane and tolerating well. She feels that she is slowly improving, Making small gains every day. eating better, and juicing. \par Tends to get fatigued in the afternoon. At the time of the last visit she had said she was very sad and depressed. She is less so now, however, tends to \par feel sad in the afternoon when she is fatigued. The skin rash has significantly improved. C/o tingling in 4th and 5th digits of both hands x  2 weeks. No other neurological symptoms. Able to button, and not dropping things.\par Remains with a stable weight and a gradually improving performance status.\par \par    \par \par CT [facility] 8/5/19 [facility]\par CT Chest with scattered tiny lung nodules mostly in RUL unchanged from prev exam suggesting granulomas. Prev noted righ subclavian, hilar, lymphadenopathy has sig improved. Small precarinal nodes measuring less than 5 mm\par \par CT abd / pelvis\par  Multiple liver cysts, occ tiny nonspecific liver lesions noted on prior CT in th eleft love have resolved. No bone lesions [de-identified] : The patients' history of present illness began in 2012 at which time she was found to have a right breast cancer for which she underwent a right modified radical mastectomy/axillary lymph node dissection with the finding of an ER positive 20%, FL positive 10%, HER-2/lisa 2+, FISH amplified breast cancer per outside physician report (I do not have a copy that pathology report for my review).  She went on to receive adjuvant dose-dense AC chemotherapy for 4 cycles followed by Taxol plus Herceptin as well as adjuvant radiation therapy.  She was subsequently begun adjuvant tamoxifen therapy.  Her last menstrual period was in 2014.\par \par The patient then began to experience weight loss beginning in November 2018 and over 3-month period of time lost 30 pounds per her report.  At approximately the same time she developed increasing shortness of breath, and ultimately presented to [facility] Emergency Department.  She subsequently had a CT scan of the chest, which showed multiple mediastinal hilar right subclavian lymph nodes corresponding to abnormal FDG activity on PET scan consistent with metastatic carcinoma; there were tiny nodularities in both lungs- too small to accurately characterize- not present on prior examination and measuring up to 2 mm in both lungs.  The possibility of metastasis was a consideration; she had no bony metastases; the liver demonstrated tiny poorly defined lesions less than 5 mm and difficult to characterize and visible on prior examinations with the possibility of metastasis also considered.  A PET-CT scan showed multiple hypermetabolic lymph nodes in the mediastinum, right subclavian region and both adebayo felt to represent tumor; there were stellate lesions in the left breast region, felt to represent benign scars; the lungs demonstrated no significant abnormality with multiple tiny lung nodules seen on CT as previously noted; liver demonstrated no significant abnormality with multiple small poorly defined nodules seen on CT per my previous comments.  The patient underwent a left breast core biopsy of the area of concern with a finding of fat necrosis and foreign body giant cell reaction as well as a separate fine-needle aspiration consistent with a ruptured cyst/fat necrosis.  The patient consequently saw her oncologist, Dr. Carias, who sent the patient for ultrasound-guided core biopsies of the mediastinal lymph nodes with a finding of carcinoma, consistent with breast primary with estrogen receptor returning positive, greater than 50%, progesterone receptor negative, and HER-2/lisa 0 per outside report. The patient was subsequently begun on docetaxel, pertuzumab, trastuzumab, and exemestane beginning at the early 4/19, and subsequently switched to weekly paclitaxel secondary to side effects and continuing  on trastuzumab / pertuzumab every 3 weeks as well as exemestane. The patient was last treated per her report on approximately June 12, 2019, despite outside records that show ongoing weekly treatments early July 2019.  \par \par The patient also reports that beginning in approximately 12/18, she started to develop slowly progressive diffuse rash with associated pruritus, having seen multiple dermatologists.  Most recently, she saw Dr. Courtney Cowart, a [facility] physician practicing at [place], [place], who diagnosed disseminated herpes simplex for which she was begun on valacyclovir, as well as evidence of MRSA for which was treated with doxycycline beginning in May 2019.  In June 2019, the patient presented to [facility] Emergency Department complaining of progressive "weakness and shaking." She was found to be anemic and had melena.  She underwent endoscopy and VIR procedure to stop gastric bleeding, which was unsuccessful.  Consequently, she had a distal gastrectomy.  She was discharged from [facility] on 07/08/2019.\par \par She is seen today in consultation regarding further treatment recommendations.  She notes ongoing weakness, fatigue, and generalized malaise, although all of these have begun to improve gradually.  Her last chemotherapy as noted above was approximately at 06/12/2019.  She reports that her primary oncologist, Dr. Carias, told to discontinue exemestane upon admission to [facility] with the consideration restarting it post discharge.  \par \par The patient was initial consultation 7/24/19 for a further opinion regarding treatment recommendations.\par

## 2019-09-30 NOTE — HISTORY OF PRESENT ILLNESS
[de-identified] : The patients' history of present illness began in 2012 at which time she was found to have a right breast cancer for which she underwent a right modified radical mastectomy/axillary lymph node dissection with the finding of an ER positive 20%, NM positive 10%, HER-2/lisa 2+, FISH amplified breast cancer per outside physician report (I do not have a copy that pathology report for my review).  She went on to receive adjuvant dose-dense AC chemotherapy for 4 cycles followed by Taxol plus Herceptin as well as adjuvant radiation therapy.  She was subsequently begun adjuvant tamoxifen therapy.  Her last menstrual period was in 2014.\par \par The patient then began to experience weight loss beginning in November 2018 and over 3-month period of time lost 30 pounds per her report.  At approximately the same time she developed increasing shortness of breath, and ultimately presented to [facility] Emergency Department.  She subsequently had a CT scan of the chest, which showed multiple mediastinal hilar right subclavian lymph nodes corresponding to abnormal FDG activity on PET scan consistent with metastatic carcinoma; there were tiny nodularities in both lungs- too small to accurately characterize- not present on prior examination and measuring up to 2 mm in both lungs.  The possibility of metastasis was a consideration; she had no bony metastases; the liver demonstrated tiny poorly defined lesions less than 5 mm and difficult to characterize and visible on prior examinations with the possibility of metastasis also considered.  A PET-CT scan showed multiple hypermetabolic lymph nodes in the mediastinum, right subclavian region and both adebayo felt to represent tumor; there were stellate lesions in the left breast region, felt to represent benign scars; the lungs demonstrated no significant abnormality with multiple tiny lung nodules seen on CT as previously noted; liver demonstrated no significant abnormality with multiple small poorly defined nodules seen on CT per my previous comments.  The patient underwent a left breast core biopsy of the area of concern with a finding of fat necrosis and foreign body giant cell reaction as well as a separate fine-needle aspiration consistent with a ruptured cyst/fat necrosis.  The patient consequently saw her oncologist, Dr. Carias, who sent the patient for ultrasound-guided core biopsies of the mediastinal lymph nodes with a finding of carcinoma, consistent with breast primary with estrogen receptor returning positive, greater than 50%, progesterone receptor negative, and HER-2/lisa 0 per outside report. The patient was subsequently begun on docetaxel, pertuzumab, trastuzumab, and exemestane beginning at the early 4/19, and subsequently switched to weekly paclitaxel secondary to side effects and continuing  on trastuzumab / pertuzumab every 3 weeks as well as exemestane. The patient was last treated per her report on approximately June 12, 2019, despite outside records that show ongoing weekly treatments early July 2019.  \par \par The patient also reports that beginning in approximately 12/18, she started to develop slowly progressive diffuse rash with associated pruritus, having seen multiple dermatologists.  Most recently, she saw Dr. Courtney Cowart, a [facility] physician practicing at [place], [place], who diagnosed disseminated herpes simplex for which she was begun on valacyclovir, as well as evidence of MRSA for which was treated with doxycycline beginning in May 2019.  In June 2019, the patient presented to [facility] Emergency Department complaining of progressive "weakness and shaking." She was found to be anemic and had melena.  She underwent endoscopy and VIR procedure to stop gastric bleeding, which was unsuccessful.  Consequently, she had a distal gastrectomy.  She was discharged from [facility] on 07/08/2019.\par \par She is seen today in consultation regarding further treatment recommendations.  She notes ongoing weakness, fatigue, and generalized malaise, although all of these have begun to improve gradually.  Her last chemotherapy as noted above was approximately at 06/12/2019.  She reports that her primary oncologist, Dr. Carias, told to discontinue exemestane upon admission to [facility] with the consideration restarting it post discharge.  \par \par The patient was initial consultation 7/24/19 for a further opinion regarding treatment recommendations.\par  [de-identified] : In the interim :\par \par We obtained outside slides from [employer] that were reviewed here and showed her recurrent cancer was her 2 lisa negative of note.\par \par The pt c/o ongoing and only gradually improving rash with continued treatment. she has ongoing difficulty eating anything more than small meals since her partial gastrectomy, although both she and her family members agree there is some improvement. She c/o ongoing fatigue and malaise. She notes that she does not want to leave her room, wants to be by herself.     \par \par CT CAP 8/5/19 [facility]\par CT Chest with scattered tiny lung nodules mostly in RUL unchanged from prev exam suggesting granulomas. Prev noted righ subclavian, hilar, lymphadenopathy has sig improved. Small precarinal nodes measuring less than 5 mm\par \par CT abd / pelvis\par  Multiple liver cysts, occ tiny nonspecific liver lesions noted on prior CT in th eleft love have resolved. No bone lesions [6 - Distress Level] : Distress Level: 6 [Date: ____________] : Patient's last distress assessment performed on [unfilled].

## 2019-10-01 ENCOUNTER — APPOINTMENT (OUTPATIENT)
Dept: SURGICAL ONCOLOGY | Facility: CLINIC | Age: 54
End: 2019-10-01
Payer: COMMERCIAL

## 2019-10-01 VITALS
SYSTOLIC BLOOD PRESSURE: 133 MMHG | HEIGHT: 58 IN | OXYGEN SATURATION: 99 % | BODY MASS INDEX: 21.62 KG/M2 | RESPIRATION RATE: 14 BRPM | WEIGHT: 103 LBS | DIASTOLIC BLOOD PRESSURE: 78 MMHG | HEART RATE: 88 BPM

## 2019-10-01 DIAGNOSIS — K31.82 DIEULAFOY LESION (HEMORRHAGIC) OF STOMACH AND DUODENUM: ICD-10-CM

## 2019-10-01 PROCEDURE — 99212 OFFICE O/P EST SF 10 MIN: CPT

## 2019-10-02 PROBLEM — K31.82 DIEULAFOY LESION OF STOMACH: Status: ACTIVE | Noted: 2019-07-16

## 2019-10-02 NOTE — HISTORY OF PRESENT ILLNESS
[de-identified] : Patient is a 52 y/o female undergoing treatment for metastatic breast cancer who presented to [facility] with bleeding gastric ulcer that failed endoscopic and IR therapy.  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  She is continuing on treatment for metastatic breast cancer.  She reports occasional gas discomfort, but denies nausea/emesis.  Her weight is stable.  She denies melena or blood in stool.

## 2019-10-02 NOTE — ASSESSMENT
[FreeTextEntry1] : Doing well after distal gastrectomy.\par \par Plan: Diet as tolerated.  Follow up in 6 months.

## 2019-10-02 NOTE — REASON FOR VISIT
[Follow-Up Visit] : a follow-up visit for [Family Member] : family member [FreeTextEntry2] : bleeding gastric lesion

## 2019-10-16 ENCOUNTER — APPOINTMENT (OUTPATIENT)
Dept: HEMATOLOGY ONCOLOGY | Facility: CLINIC | Age: 54
End: 2019-10-16
Payer: COMMERCIAL

## 2019-10-16 ENCOUNTER — RESULT REVIEW (OUTPATIENT)
Age: 54
End: 2019-10-16

## 2019-10-16 VITALS
DIASTOLIC BLOOD PRESSURE: 69 MMHG | OXYGEN SATURATION: 100 % | BODY MASS INDEX: 21.89 KG/M2 | RESPIRATION RATE: 16 BRPM | TEMPERATURE: 97.6 F | SYSTOLIC BLOOD PRESSURE: 103 MMHG | WEIGHT: 104.72 LBS | HEART RATE: 81 BPM

## 2019-10-16 DIAGNOSIS — R10.13 EPIGASTRIC PAIN: ICD-10-CM

## 2019-10-16 LAB
BASOPHILS # BLD AUTO: 0 K/UL — SIGNIFICANT CHANGE UP (ref 0–0.2)
BASOPHILS NFR BLD AUTO: 0.2 % — SIGNIFICANT CHANGE UP (ref 0–2)
EOSINOPHIL # BLD AUTO: 0.2 K/UL — SIGNIFICANT CHANGE UP (ref 0–0.5)
EOSINOPHIL NFR BLD AUTO: 2.6 % — SIGNIFICANT CHANGE UP (ref 0–6)
HCT VFR BLD CALC: 39.7 % — SIGNIFICANT CHANGE UP (ref 34.5–45)
HGB BLD-MCNC: 13.6 G/DL — SIGNIFICANT CHANGE UP (ref 11.5–15.5)
LYMPHOCYTES # BLD AUTO: 2.2 K/UL — SIGNIFICANT CHANGE UP (ref 1–3.3)
LYMPHOCYTES # BLD AUTO: 33.5 % — SIGNIFICANT CHANGE UP (ref 13–44)
MCHC RBC-ENTMCNC: 32.8 PG — SIGNIFICANT CHANGE UP (ref 27–34)
MCHC RBC-ENTMCNC: 34.2 G/DL — SIGNIFICANT CHANGE UP (ref 32–36)
MCV RBC AUTO: 95.9 FL — SIGNIFICANT CHANGE UP (ref 80–100)
MONOCYTES # BLD AUTO: 0.6 K/UL — SIGNIFICANT CHANGE UP (ref 0–0.9)
MONOCYTES NFR BLD AUTO: 9.8 % — SIGNIFICANT CHANGE UP (ref 2–14)
NEUTROPHILS # BLD AUTO: 3.6 K/UL — SIGNIFICANT CHANGE UP (ref 1.8–7.4)
NEUTROPHILS NFR BLD AUTO: 53.9 % — SIGNIFICANT CHANGE UP (ref 43–77)
PLATELET # BLD AUTO: 270 K/UL — SIGNIFICANT CHANGE UP (ref 150–400)
RBC # BLD: 4.14 M/UL — SIGNIFICANT CHANGE UP (ref 3.8–5.2)
RBC # FLD: 12 % — SIGNIFICANT CHANGE UP (ref 10.3–14.5)
WBC # BLD: 6.6 K/UL — SIGNIFICANT CHANGE UP (ref 3.8–10.5)
WBC # FLD AUTO: 6.6 K/UL — SIGNIFICANT CHANGE UP (ref 3.8–10.5)

## 2019-10-16 PROCEDURE — 99215 OFFICE O/P EST HI 40 MIN: CPT

## 2019-10-17 PROBLEM — R10.13 EPIGASTRIC PAIN: Status: ACTIVE | Noted: 2019-10-17

## 2019-10-17 LAB
ALBUMIN SERPL ELPH-MCNC: 4.3 G/DL
ALP BLD-CCNC: 100 U/L
ALT SERPL-CCNC: 10 U/L
ANION GAP SERPL CALC-SCNC: 12 MMOL/L
AST SERPL-CCNC: 17 U/L
BILIRUB SERPL-MCNC: 0.2 MG/DL
BUN SERPL-MCNC: 12 MG/DL
CALCIUM SERPL-MCNC: 9.8 MG/DL
CANCER AG27-29 SERPL-ACNC: 8.4 U/ML
CEA SERPL-MCNC: 2.6 NG/ML
CHLORIDE SERPL-SCNC: 102 MMOL/L
CO2 SERPL-SCNC: 26 MMOL/L
CREAT SERPL-MCNC: 0.36 MG/DL
GLUCOSE SERPL-MCNC: 94 MG/DL
POTASSIUM SERPL-SCNC: 5.5 MMOL/L
PROT SERPL-MCNC: 6.8 G/DL
SODIUM SERPL-SCNC: 140 MMOL/L

## 2019-10-17 NOTE — HISTORY OF PRESENT ILLNESS
[de-identified] : The patients' history of present illness began in 2012 at which time she was found to have a right breast cancer for which she underwent a right modified radical mastectomy/axillary lymph node dissection with the finding of an ER positive 20%, [place] positive 10%, HER-2/lisa 2+, FISH amplified breast cancer per outside physician report (I do not have a copy that pathology report for my review).  She went on to receive adjuvant dose-dense AC chemotherapy for 4 cycles followed by Taxol plus Herceptin as well as adjuvant radiation therapy.  She was subsequently begun adjuvant tamoxifen therapy.  Her last menstrual period was in 2014.\par \par The patient then began to experience weight loss beginning in November 2018 and over 3-month period of time lost 30 pounds per her report.  At approximately the same time she developed increasing shortness of breath, and ultimately presented to [facility] Emergency Department.  She subsequently had a CT scan of the chest, which showed multiple mediastinal hilar right subclavian lymph nodes corresponding to abnormal FDG activity on PET scan consistent with metastatic carcinoma; there were tiny nodularities in both lungs- too small to accurately characterize- not present on prior examination and measuring up to 2 mm in both lungs.  The possibility of metastasis was a consideration; she had no bony metastases; the liver demonstrated tiny poorly defined lesions less than 5 mm and difficult to characterize and visible on prior examinations with the possibility of metastasis also considered.  A PET-CT scan showed multiple hypermetabolic lymph nodes in the mediastinum, right subclavian region and both adebayo felt to represent tumor; there were stellate lesions in the left breast region, felt to represent benign scars; the lungs demonstrated no significant abnormality with multiple tiny lung nodules seen on CT as previously noted; liver demonstrated no significant abnormality with multiple small poorly defined nodules seen on CT per my previous comments.  The patient underwent a left breast core biopsy of the area of concern with a finding of fat necrosis and foreign body giant cell reaction as well as a separate fine-needle aspiration consistent with a ruptured cyst/fat necrosis.  The patient consequently saw her oncologist, Dr. Carias, who sent the patient for ultrasound-guided core biopsies of the mediastinal lymph nodes with a finding of carcinoma, consistent with breast primary with estrogen receptor returning positive, greater than 50%, progesterone receptor negative, and HER-2/lisa 0 per outside report. The patient was subsequently begun on docetaxel, pertuzumab, trastuzumab, and exemestane beginning at the early 4/19, and subsequently switched to weekly paclitaxel secondary to side effects and continuing  on trastuzumab / pertuzumab every 3 weeks as well as exemestane. The patient was last treated per her report on approximately June 12, 2019, despite outside records that show ongoing weekly treatments early July 2019.  \par \par The patient also reports that beginning in approximately 12/18, she started to develop slowly progressive diffuse rash with associated pruritus, having seen multiple dermatologists.  Most recently, she saw Dr. Courtney Cowart, a [facility] physician practicing at [place], [place], who diagnosed disseminated herpes simplex for which she was begun on valacyclovir, as well as evidence of MRSA for which was treated with doxycycline beginning in May 2019.  In June 2019, the patient presented to [facility] Emergency Department complaining of progressive "weakness and shaking." She was found to be anemic and had melena.  She underwent endoscopy and VIR procedure to stop gastric bleeding, which was unsuccessful.  Consequently, she had a distal gastrectomy.  She was discharged from [facility] on 07/08/2019.\par \par The patient was initial consultation 7/24/19 for a further opinion regarding treatment recommendations. She noted ongoing weakness, fatigue, and generalized malaise, although all of these had begun to improve gradually.  Her last chemotherapy as noted above was approximately at 06/12/2019.  She reports that her primary oncologist, Dr. Carias, told to discontinue exemestane upon admission to [facility] with the consideration restarting it post discharge.  \par \par I obtained her outside slides for review at [facility] and it was confirmed that she had met breast cancer ER+ [place] neg and Her 2 lisa neg.\par \par Consequently I recommended restarting exemestane.  \par \par \par  [de-identified] : Here today for f/u. \par \par On Exemestane and tolerating well. Mood has continued to improve and she is smiling spontaneously today.  She is eating better and gaining some weight. Sh eis going outside and even going shopping and caring for herself. \par \par She does c/o new onset epigastric discomfort which is relieved by eating. She also has occasional loose BMs (usually in the morning) \par    \par CT [facility] 8/5/19 [facility]\par CT Chest with scattered tiny lung nodules mostly in RUL unchanged from prev exam suggesting granulomas. Prev noted righ subclavian, hilar, lymphadenopathy has sig improved. Small precarinal nodes measuring less than 5 mm\par \par CT abd / pelvis\par  Multiple liver cysts, occ tiny nonspecific liver lesions noted on prior CT in th eleft love have resolved. No bone lesions

## 2019-10-17 NOTE — REVIEW OF SYSTEMS
[Negative] : Endocrine [FreeTextEntry2] : As above [FreeTextEntry7] : as above [de-identified] : healing skin ulcers in the lower back/buttocks/posterior prox LE b/l [de-identified] : as above [de-identified] : as above

## 2019-10-17 NOTE — PHYSICAL EXAM
[Ambulatory and capable of all self care but unable to carry out any work activities] : Status 2- Ambulatory and capable of all self care but unable to carry out any work activities. Up and about more than 50% of waking hours [Thin] : thin [Normal] : affect appropriate [de-identified] : R breast s/p mastectomy with a well healed scar. R breast without any palp masses, nipple retraction [de-identified] : well healed ulcers on trunk/buttocks and LE

## 2019-10-18 ENCOUNTER — MEDICATION RENEWAL (OUTPATIENT)
Age: 54
End: 2019-10-18

## 2019-10-31 ENCOUNTER — OUTPATIENT (OUTPATIENT)
Dept: OUTPATIENT SERVICES | Facility: HOSPITAL | Age: 54
LOS: 1 days | Discharge: HOME | End: 2019-10-31

## 2019-10-31 ENCOUNTER — RX RENEWAL (OUTPATIENT)
Age: 54
End: 2019-10-31

## 2019-10-31 DIAGNOSIS — L85.3 XEROSIS CUTIS: ICD-10-CM

## 2019-10-31 DIAGNOSIS — Z90.12 ACQUIRED ABSENCE OF LEFT BREAST AND NIPPLE: Chronic | ICD-10-CM

## 2019-10-31 DIAGNOSIS — C50.919 MALIGNANT NEOPLASM OF UNSPECIFIED SITE OF UNSPECIFIED FEMALE BREAST: ICD-10-CM

## 2019-11-08 ENCOUNTER — MOBILE ON CALL (OUTPATIENT)
Age: 54
End: 2019-11-08

## 2019-11-13 ENCOUNTER — RESULT REVIEW (OUTPATIENT)
Age: 54
End: 2019-11-13

## 2019-11-13 ENCOUNTER — APPOINTMENT (OUTPATIENT)
Dept: HEMATOLOGY ONCOLOGY | Facility: CLINIC | Age: 54
End: 2019-11-13
Payer: COMMERCIAL

## 2019-11-13 VITALS
BODY MASS INDEX: 23.5 KG/M2 | WEIGHT: 112.43 LBS | SYSTOLIC BLOOD PRESSURE: 110 MMHG | TEMPERATURE: 98 F | HEART RATE: 74 BPM | DIASTOLIC BLOOD PRESSURE: 72 MMHG | RESPIRATION RATE: 16 BRPM | OXYGEN SATURATION: 100 %

## 2019-11-13 LAB
BASOPHILS # BLD AUTO: 0 K/UL — SIGNIFICANT CHANGE UP (ref 0–0.2)
BASOPHILS NFR BLD AUTO: 0.2 % — SIGNIFICANT CHANGE UP (ref 0–2)
EOSINOPHIL # BLD AUTO: 0.2 K/UL — SIGNIFICANT CHANGE UP (ref 0–0.5)
EOSINOPHIL NFR BLD AUTO: 3.2 % — SIGNIFICANT CHANGE UP (ref 0–6)
HCT VFR BLD CALC: 37 % — SIGNIFICANT CHANGE UP (ref 34.5–45)
HGB BLD-MCNC: 12.8 G/DL — SIGNIFICANT CHANGE UP (ref 11.5–15.5)
LYMPHOCYTES # BLD AUTO: 2.3 K/UL — SIGNIFICANT CHANGE UP (ref 1–3.3)
LYMPHOCYTES # BLD AUTO: 42.7 % — SIGNIFICANT CHANGE UP (ref 13–44)
MCHC RBC-ENTMCNC: 32.6 PG — SIGNIFICANT CHANGE UP (ref 27–34)
MCHC RBC-ENTMCNC: 34.6 G/DL — SIGNIFICANT CHANGE UP (ref 32–36)
MCV RBC AUTO: 94.4 FL — SIGNIFICANT CHANGE UP (ref 80–100)
MONOCYTES # BLD AUTO: 0.5 K/UL — SIGNIFICANT CHANGE UP (ref 0–0.9)
MONOCYTES NFR BLD AUTO: 8.5 % — SIGNIFICANT CHANGE UP (ref 2–14)
NEUTROPHILS # BLD AUTO: 2.5 K/UL — SIGNIFICANT CHANGE UP (ref 1.8–7.4)
NEUTROPHILS NFR BLD AUTO: 45.3 % — SIGNIFICANT CHANGE UP (ref 43–77)
PLATELET # BLD AUTO: 267 K/UL — SIGNIFICANT CHANGE UP (ref 150–400)
RBC # BLD: 3.92 M/UL — SIGNIFICANT CHANGE UP (ref 3.8–5.2)
RBC # FLD: 11.9 % — SIGNIFICANT CHANGE UP (ref 10.3–14.5)
WBC # BLD: 5.5 K/UL — SIGNIFICANT CHANGE UP (ref 3.8–10.5)
WBC # FLD AUTO: 5.5 K/UL — SIGNIFICANT CHANGE UP (ref 3.8–10.5)

## 2019-11-13 PROCEDURE — 99214 OFFICE O/P EST MOD 30 MIN: CPT

## 2019-11-14 NOTE — HISTORY OF PRESENT ILLNESS
[de-identified] : Here today for f/u. \par \par On Exemestane and tolerating well. Mood has continued to improve even further and she is smiling spontaneously today.  She continues to eat better and gaining some weight. She is going outside regularly, shopping and cooking for herself.  Reports cont to feel better and better. \par \par  \par CT CAP 8/5/19 [facility]\par CT Chest with scattered tiny lung nodules mostly in RUL unchanged from prev exam suggesting granulomas. Prev noted righ subclavian, hilar, lymphadenopathy has sig improved. Small precarinal nodes measuring less than 5 mm\par \par CT abd / pelvis\par  Multiple liver cysts, occ tiny nonspecific liver lesions noted on prior CT in th eleft love have resolved. No bone lesions [de-identified] : The patients' history of present illness began in 2012 at which time she was found to have a right breast cancer for which she underwent a right modified radical mastectomy/axillary lymph node dissection with the finding of an ER positive 20%, MT positive 10%, HER-2/lisa 2+, FISH amplified breast cancer per outside physician report (I do not have a copy that pathology report for my review).  She went on to receive adjuvant dose-dense AC chemotherapy for 4 cycles followed by Taxol plus Herceptin as well as adjuvant radiation therapy.  She was subsequently begun adjuvant tamoxifen therapy.  Her last menstrual period was in 2014.\par \par The patient then began to experience weight loss beginning in November 2018 and over 3-month period of time lost 30 pounds per her report.  At approximately the same time she developed increasing shortness of breath, and ultimately presented to [facility] Emergency Department.  She subsequently had a CT scan of the chest, which showed multiple mediastinal hilar right subclavian lymph nodes corresponding to abnormal FDG activity on PET scan consistent with metastatic carcinoma; there were tiny nodularities in both lungs- too small to accurately characterize- not present on prior examination and measuring up to 2 mm in both lungs.  The possibility of metastasis was a consideration; she had no bony metastases; the liver demonstrated tiny poorly defined lesions less than 5 mm and difficult to characterize and visible on prior examinations with the possibility of metastasis also considered.  A PET-CT scan showed multiple hypermetabolic lymph nodes in the mediastinum, right subclavian region and both adebayo felt to represent tumor; there were stellate lesions in the left breast region, felt to represent benign scars; the lungs demonstrated no significant abnormality with multiple tiny lung nodules seen on CT as previously noted; liver demonstrated no significant abnormality with multiple small poorly defined nodules seen on CT per my previous comments.  The patient underwent a left breast core biopsy of the area of concern with a finding of fat necrosis and foreign body giant cell reaction as well as a separate fine-needle aspiration consistent with a ruptured cyst/fat necrosis.  The patient consequently saw her oncologist, Dr. Carias, who sent the patient for ultrasound-guided core biopsies of the mediastinal lymph nodes with a finding of carcinoma, consistent with breast primary with estrogen receptor returning positive, greater than 50%, progesterone receptor negative, and HER-2/lisa 0 per outside report. The patient was subsequently begun on docetaxel, pertuzumab, trastuzumab, and exemestane beginning at the early 4/19, and subsequently switched to weekly paclitaxel secondary to side effects and continuing  on trastuzumab / pertuzumab every 3 weeks as well as exemestane. The patient was last treated per her report on approximately June 12, 2019, despite outside records that show ongoing weekly treatments early July 2019.  \par \par The patient also reports that beginning in approximately 12/18, she started to develop slowly progressive diffuse rash with associated pruritus, having seen multiple dermatologists.  Most recently, she saw Dr. Courtney Cowart, a [facility] physician practicing at [place], [place], who diagnosed disseminated herpes simplex for which she was begun on valacyclovir, as well as evidence of MRSA for which was treated with doxycycline beginning in May 2019.  In June 2019, the patient presented to [facility] Emergency Department complaining of progressive "weakness and shaking." She was found to be anemic and had melena.  She underwent endoscopy and VIR procedure to stop gastric bleeding, which was unsuccessful.  Consequently, she had a distal gastrectomy.  She was discharged from [facility] on 07/08/2019.\par \par The patient was initial consultation 7/24/19 for a further opinion regarding treatment recommendations. She noted ongoing weakness, fatigue, and generalized malaise, although all of these had begun to improve gradually.  Her last chemotherapy as noted above was approximately at 06/12/2019.  She reports that her primary oncologist, Dr. Carias, told to discontinue exemestane upon admission to [facility] with the consideration restarting it post discharge.  \par \par I obtained her outside slides for review at [facility] and it was confirmed that she had met breast cancer ER+ MT neg and Her 2 lisa neg.\par \par Consequently I recommended restarting exemestane.  \par \par \par

## 2019-11-14 NOTE — REVIEW OF SYSTEMS
[Negative] : Endocrine [FreeTextEntry2] : As above [de-identified] : as above [de-identified] : healed skin ulcers in the lower back/buttocks/posterior prox LE b/l [de-identified] : as above

## 2019-11-14 NOTE — PHYSICAL EXAM
[Ambulatory and capable of all self care but unable to carry out any work activities] : Status 2- Ambulatory and capable of all self care but unable to carry out any work activities. Up and about more than 50% of waking hours [Thin] : thin [Normal] : grossly intact [de-identified] : R breast s/p mastectomy with a well healed scar. R breast without any palp masses, nipple retraction [de-identified] : well healed ulcers on trunk/buttocks and LE

## 2019-11-15 LAB
ALBUMIN SERPL ELPH-MCNC: 4.1 G/DL
ALP BLD-CCNC: 104 U/L
ALT SERPL-CCNC: 18 U/L
ANION GAP SERPL CALC-SCNC: 18 MMOL/L
AST SERPL-CCNC: 19 U/L
BILIRUB SERPL-MCNC: 0.3 MG/DL
BUN SERPL-MCNC: 16 MG/DL
CALCIUM SERPL-MCNC: 9.6 MG/DL
CANCER AG27-29 SERPL-ACNC: 8 U/ML
CEA SERPL-MCNC: 2.1 NG/ML
CHLORIDE SERPL-SCNC: 104 MMOL/L
CO2 SERPL-SCNC: 21 MMOL/L
CREAT SERPL-MCNC: 0.42 MG/DL
GLUCOSE SERPL-MCNC: 123 MG/DL
POTASSIUM SERPL-SCNC: 4.7 MMOL/L
PROT SERPL-MCNC: 6.7 G/DL
SODIUM SERPL-SCNC: 143 MMOL/L

## 2019-11-27 ENCOUNTER — APPOINTMENT (OUTPATIENT)
Dept: HEMATOLOGY ONCOLOGY | Facility: CLINIC | Age: 54
End: 2019-11-27

## 2019-12-09 ENCOUNTER — APPOINTMENT (OUTPATIENT)
Dept: INTERNAL MEDICINE | Facility: CLINIC | Age: 54
End: 2019-12-09

## 2019-12-11 ENCOUNTER — OUTPATIENT (OUTPATIENT)
Dept: OUTPATIENT SERVICES | Facility: HOSPITAL | Age: 54
LOS: 1 days | Discharge: HOME | End: 2019-12-11

## 2019-12-11 DIAGNOSIS — C50.919 MALIGNANT NEOPLASM OF UNSPECIFIED SITE OF UNSPECIFIED FEMALE BREAST: ICD-10-CM

## 2019-12-11 DIAGNOSIS — Z90.12 ACQUIRED ABSENCE OF LEFT BREAST AND NIPPLE: Chronic | ICD-10-CM

## 2019-12-17 NOTE — HISTORY OF PRESENT ILLNESS
[de-identified] : The patients' history of present illness began in 2012 at which time she was found to have a right breast cancer for which she underwent a right modified radical mastectomy/axillary lymph node dissection with the finding of an ER positive 20%, [place] positive 10%, HER-2/lisa 2+, FISH amplified breast cancer per outside physician report (I do not have a copy that pathology report for my review).  She went on to receive adjuvant dose-dense AC chemotherapy for 4 cycles followed by Taxol plus Herceptin as well as adjuvant radiation therapy.  She was subsequently begun adjuvant tamoxifen therapy.  Her last menstrual period was in 2014.\par \par The patient then began to experience weight loss beginning in November 2018 and over 3-month period of time lost 30 pounds per her report.  At approximately the same time she developed increasing shortness of breath, and ultimately presented to [facility] Emergency Department.  She subsequently had a CT scan of the chest, which showed multiple mediastinal hilar right subclavian lymph nodes corresponding to abnormal FDG activity on PET scan consistent with metastatic carcinoma; there were tiny nodularities in both lungs- too small to accurately characterize- not present on prior examination and measuring up to 2 mm in both lungs.  The possibility of metastasis was a consideration; she had no bony metastases; the liver demonstrated tiny poorly defined lesions less than 5 mm and difficult to characterize and visible on prior examinations with the possibility of metastasis also considered.  A PET-CT scan showed multiple hypermetabolic lymph nodes in the mediastinum, right subclavian region and both adebayo felt to represent tumor; there were stellate lesions in the left breast region, felt to represent benign scars; the lungs demonstrated no significant abnormality with multiple tiny lung nodules seen on CT as previously noted; liver demonstrated no significant abnormality with multiple small poorly defined nodules seen on CT per my previous comments.  The patient underwent a left breast core biopsy of the area of concern with a finding of fat necrosis and foreign body giant cell reaction as well as a separate fine-needle aspiration consistent with a ruptured cyst/fat necrosis.  The patient consequently saw her oncologist, Dr. Carias, who sent the patient for ultrasound-guided core biopsies of the mediastinal lymph nodes with a finding of carcinoma, consistent with breast primary with estrogen receptor returning positive, greater than 50%, progesterone receptor negative, and HER-2/lisa 0 per outside report. The patient was subsequently begun on docetaxel, pertuzumab, trastuzumab, and exemestane beginning at the early 4/19, and subsequently switched to weekly paclitaxel secondary to side effects and continuing  on trastuzumab / pertuzumab every 3 weeks as well as exemestane. The patient was last treated per her report on approximately June 12, 2019, despite outside records that show ongoing weekly treatments early July 2019.  \par \par The patient also reports that beginning in approximately 12/18, she started to develop slowly progressive diffuse rash with associated pruritus, having seen multiple dermatologists.  Most recently, she saw Dr. Courtney Cowart, a [facility] physician practicing at [place], [place], who diagnosed disseminated herpes simplex for which she was begun on valacyclovir, as well as evidence of MRSA for which was treated with doxycycline beginning in May 2019.  In June 2019, the patient presented to [facility] Emergency Department complaining of progressive "weakness and shaking." She was found to be anemic and had melena.  She underwent endoscopy and VIR procedure to stop gastric bleeding, which was unsuccessful.  Consequently, she had a distal gastrectomy.  She was discharged from [facility] on 07/08/2019.\par \par The patient was initial consultation 7/24/19 for a further opinion regarding treatment recommendations. She noted ongoing weakness, fatigue, and generalized malaise, although all of these had begun to improve gradually.  Her last chemotherapy as noted above was approximately at 06/12/2019.  She reports that her primary oncologist, Dr. Carias, told to discontinue exemestane upon admission to [facility] with the consideration restarting it post discharge.  \par \par I obtained her outside slides for review at [facility] and it was confirmed that she had met breast cancer ER+ [place] neg and Her 2 lisa neg.\par \par Consequently I recommended restarting exemestane.  \par \par \par  [de-identified] : Here today for f/u. \par \par On Exemestane and tolerating well. Mood has continued to improve even further and she is smiling spontaneously today.  She continues to eat better and gaining some weight. She is going outside regularly, shopping and cooking for herself.  Reports cont to feel better and better. \par \par  \par CT CAP 8/5/19 [facility]\par CT Chest with scattered tiny lung nodules mostly in RUL unchanged from prev exam suggesting granulomas. Prev noted righ subclavian, hilar, lymphadenopathy has sig improved. Small precarinal nodes measuring less than 5 mm\par \par CT abd / pelvis\par  Multiple liver cysts, occ tiny nonspecific liver lesions noted on prior CT in th eleft love have resolved. No bone lesions

## 2019-12-17 NOTE — REVIEW OF SYSTEMS
[Negative] : Endocrine [FreeTextEntry2] : As above [de-identified] : healed skin ulcers in the lower back/buttocks/posterior prox LE b/l [de-identified] : as above [de-identified] : as above

## 2019-12-17 NOTE — PHYSICAL EXAM
[Ambulatory and capable of all self care but unable to carry out any work activities] : Status 2- Ambulatory and capable of all self care but unable to carry out any work activities. Up and about more than 50% of waking hours [Thin] : thin [Normal] : affect appropriate [de-identified] : R breast s/p mastectomy with a well healed scar. R breast without any palp masses, nipple retraction [de-identified] : well healed ulcers on trunk/buttocks and LE

## 2019-12-18 ENCOUNTER — APPOINTMENT (OUTPATIENT)
Dept: HEMATOLOGY ONCOLOGY | Facility: CLINIC | Age: 54
End: 2019-12-18

## 2020-01-16 ENCOUNTER — OUTPATIENT (OUTPATIENT)
Dept: OUTPATIENT SERVICES | Facility: HOSPITAL | Age: 55
LOS: 1 days | Discharge: HOME | End: 2020-01-16

## 2020-01-16 DIAGNOSIS — Z90.12 ACQUIRED ABSENCE OF LEFT BREAST AND NIPPLE: Chronic | ICD-10-CM

## 2020-01-16 DIAGNOSIS — C50.919 MALIGNANT NEOPLASM OF UNSPECIFIED SITE OF UNSPECIFIED FEMALE BREAST: ICD-10-CM

## 2020-01-22 ENCOUNTER — RESULT REVIEW (OUTPATIENT)
Age: 55
End: 2020-01-22

## 2020-01-22 ENCOUNTER — APPOINTMENT (OUTPATIENT)
Dept: HEMATOLOGY ONCOLOGY | Facility: CLINIC | Age: 55
End: 2020-01-22
Payer: COMMERCIAL

## 2020-01-22 VITALS
BODY MASS INDEX: 27.18 KG/M2 | SYSTOLIC BLOOD PRESSURE: 105 MMHG | TEMPERATURE: 98.1 F | HEART RATE: 65 BPM | RESPIRATION RATE: 16 BRPM | DIASTOLIC BLOOD PRESSURE: 70 MMHG | WEIGHT: 130.07 LBS | OXYGEN SATURATION: 100 %

## 2020-01-22 LAB
BASOPHILS # BLD AUTO: 0 K/UL — SIGNIFICANT CHANGE UP (ref 0–0.2)
BASOPHILS NFR BLD AUTO: 0.6 % — SIGNIFICANT CHANGE UP (ref 0–2)
CANCER AG27-29 SERPL-ACNC: 8.2 U/ML
EOSINOPHIL # BLD AUTO: 0.2 K/UL — SIGNIFICANT CHANGE UP (ref 0–0.5)
EOSINOPHIL NFR BLD AUTO: 3.4 % — SIGNIFICANT CHANGE UP (ref 0–6)
HCT VFR BLD CALC: 38.7 % — SIGNIFICANT CHANGE UP (ref 34.5–45)
HGB BLD-MCNC: 13.2 G/DL — SIGNIFICANT CHANGE UP (ref 11.5–15.5)
LYMPHOCYTES # BLD AUTO: 2.9 K/UL — SIGNIFICANT CHANGE UP (ref 1–3.3)
LYMPHOCYTES # BLD AUTO: 39.2 % — SIGNIFICANT CHANGE UP (ref 13–44)
MCHC RBC-ENTMCNC: 31.8 PG — SIGNIFICANT CHANGE UP (ref 27–34)
MCHC RBC-ENTMCNC: 34.1 G/DL — SIGNIFICANT CHANGE UP (ref 32–36)
MCV RBC AUTO: 93.3 FL — SIGNIFICANT CHANGE UP (ref 80–100)
MONOCYTES # BLD AUTO: 0.5 K/UL — SIGNIFICANT CHANGE UP (ref 0–0.9)
MONOCYTES NFR BLD AUTO: 7.2 % — SIGNIFICANT CHANGE UP (ref 2–14)
NEUTROPHILS # BLD AUTO: 3.6 K/UL — SIGNIFICANT CHANGE UP (ref 1.8–7.4)
NEUTROPHILS NFR BLD AUTO: 49.6 % — SIGNIFICANT CHANGE UP (ref 43–77)
PLATELET # BLD AUTO: 244 K/UL — SIGNIFICANT CHANGE UP (ref 150–400)
RBC # BLD: 4.15 M/UL — SIGNIFICANT CHANGE UP (ref 3.8–5.2)
RBC # FLD: 12 % — SIGNIFICANT CHANGE UP (ref 10.3–14.5)
WBC # BLD: 7.3 K/UL — SIGNIFICANT CHANGE UP (ref 3.8–10.5)
WBC # FLD AUTO: 7.3 K/UL — SIGNIFICANT CHANGE UP (ref 3.8–10.5)

## 2020-01-22 PROCEDURE — 99214 OFFICE O/P EST MOD 30 MIN: CPT

## 2020-01-22 RX ORDER — MUPIROCIN 20 MG/G
2 OINTMENT TOPICAL
Qty: 3 | Refills: 1 | Status: DISCONTINUED | COMMUNITY
Start: 2019-06-03 | End: 2020-01-22

## 2020-01-22 RX ORDER — FLUOCINONIDE 0.5 MG/G
0.05 CREAM TOPICAL
Refills: 0 | Status: DISCONTINUED | COMMUNITY
End: 2020-01-22

## 2020-01-22 RX ORDER — MUPIROCIN 20 MG/G
2 OINTMENT TOPICAL
Qty: 4 | Refills: 1 | Status: DISCONTINUED | COMMUNITY
Start: 2019-06-18 | End: 2020-01-22

## 2020-01-22 RX ORDER — AMMONIUM LACTATE 12 %
12 CREAM (GRAM) TOPICAL TWICE DAILY
Qty: 1 | Refills: 10 | Status: DISCONTINUED | COMMUNITY
Start: 2019-10-31 | End: 2020-01-22

## 2020-01-22 RX ORDER — VALACYCLOVIR 1 G/1
1 TABLET, FILM COATED ORAL 3 TIMES DAILY
Qty: 42 | Refills: 0 | Status: DISCONTINUED | COMMUNITY
Start: 2019-05-30 | End: 2020-01-22

## 2020-01-22 RX ORDER — ERGOCALCIFEROL 1.25 MG/1
1.25 MG CAPSULE, LIQUID FILLED ORAL
Qty: 12 | Refills: 1 | Status: DISCONTINUED | COMMUNITY
Start: 2019-08-02 | End: 2020-01-22

## 2020-01-22 RX ORDER — VALACYCLOVIR 500 MG/1
500 TABLET, FILM COATED ORAL TWICE DAILY
Qty: 1 | Refills: 0 | Status: DISCONTINUED | COMMUNITY
Start: 2019-07-11 | End: 2020-01-22

## 2020-01-22 RX ORDER — FERRIC OXIDE RED 80; 80 MG/ML; MG/ML
8-8 LOTION TOPICAL 3 TIMES DAILY
Qty: 1 | Refills: 1 | Status: DISCONTINUED | COMMUNITY
Start: 2019-09-05 | End: 2020-01-22

## 2020-01-22 RX ORDER — LIDOCAINE 5 G/100G
5 OINTMENT TOPICAL
Qty: 1 | Refills: 2 | Status: DISCONTINUED | COMMUNITY
Start: 2019-06-18 | End: 2020-01-22

## 2020-01-22 RX ORDER — TRIAMCINOLONE ACETONIDE 1 MG/G
0.1 CREAM TOPICAL
Qty: 1 | Refills: 0 | Status: DISCONTINUED | COMMUNITY
Start: 2019-08-01 | End: 2020-01-22

## 2020-01-22 RX ORDER — MULTIVIT/FOLIC ACID/HERBAL 275 400-200/30
LIQUID (ML) ORAL DAILY
Qty: 1 | Refills: 0 | Status: DISCONTINUED | COMMUNITY
Start: 2019-08-02 | End: 2020-01-22

## 2020-01-22 RX ORDER — HYDROCORTISONE 25 MG/G
2.5 CREAM TOPICAL TWICE DAILY
Qty: 1 | Refills: 1 | Status: DISCONTINUED | COMMUNITY
Start: 2019-07-30 | End: 2020-01-22

## 2020-01-22 RX ORDER — DOXYCYCLINE HYCLATE 100 MG/1
100 TABLET ORAL
Qty: 28 | Refills: 0 | Status: DISCONTINUED | COMMUNITY
Start: 2019-06-03 | End: 2020-01-22

## 2020-01-22 RX ORDER — SUCRALFATE 1 G/10ML
1 SUSPENSION ORAL 4 TIMES DAILY
Qty: 560 | Refills: 10 | Status: DISCONTINUED | COMMUNITY
Start: 2019-10-18 | End: 2020-01-22

## 2020-01-22 RX ORDER — BISMUTH TRIBROMOPH/PETROLATUM 5"X9"
BANDAGE TOPICAL
Qty: 1 | Refills: 2 | Status: DISCONTINUED | COMMUNITY
Start: 2019-06-03 | End: 2020-01-22

## 2020-01-22 RX ORDER — OMEPRAZOLE 40 MG/1
40 CAPSULE, DELAYED RELEASE ORAL
Qty: 30 | Refills: 3 | Status: DISCONTINUED | COMMUNITY
Start: 2019-10-16 | End: 2020-01-22

## 2020-01-23 NOTE — PHYSICAL EXAM
[Restricted in physically strenuous activity but ambulatory and able to carry out work of a light or sedentary nature] : Status 1- Restricted in physically strenuous activity but ambulatory and able to carry out work of a light or sedentary nature, e.g., light house work, office work [Thin] : thin [Normal] : affect appropriate [de-identified] : R breast s/p mastectomy with a well healed scar. R breast without any palp masses, nipple retraction [de-identified] : scars on buttock area from prior wounds

## 2020-01-23 NOTE — REVIEW OF SYSTEMS
[Negative] : Endocrine [FreeTextEntry2] : As above [de-identified] : as above [de-identified] : as above [de-identified] : as above

## 2020-01-23 NOTE — HISTORY OF PRESENT ILLNESS
[de-identified] : The patients' history of present illness began in 2012 at which time she was found to have a right breast cancer for which she underwent a right modified radical mastectomy/axillary lymph node dissection with the finding of an ER positive 20%, NM positive 10%, HER-2/lisa 2+, FISH amplified breast cancer per outside physician report (I do not have a copy that pathology report for my review).  She went on to receive adjuvant dose-dense AC chemotherapy for 4 cycles followed by Taxol plus Herceptin as well as adjuvant radiation therapy.  She was subsequently begun adjuvant tamoxifen therapy.  Her last menstrual period was in 2014.\par \par The patient then began to experience weight loss beginning in November 2018 and over 3-month period of time lost 30 pounds per her report.  At approximately the same time she developed increasing shortness of breath, and ultimately presented to [facility] Emergency Department.  She subsequently had a CT scan of the chest, which showed multiple mediastinal hilar right subclavian lymph nodes corresponding to abnormal FDG activity on PET scan consistent with metastatic carcinoma; there were tiny nodularities in both lungs- too small to accurately characterize- not present on prior examination and measuring up to 2 mm in both lungs.  The possibility of metastasis was a consideration; she had no bony metastases; the liver demonstrated tiny poorly defined lesions less than 5 mm and difficult to characterize and visible on prior examinations with the possibility of metastasis also considered.  A PET-CT scan showed multiple hypermetabolic lymph nodes in the mediastinum, right subclavian region and both adebayo felt to represent tumor; there were stellate lesions in the left breast region, felt to represent benign scars; the lungs demonstrated no significant abnormality with multiple tiny lung nodules seen on CT as previously noted; liver demonstrated no significant abnormality with multiple small poorly defined nodules seen on CT per my previous comments.  The patient underwent a left breast core biopsy of the area of concern with a finding of fat necrosis and foreign body giant cell reaction as well as a separate fine-needle aspiration consistent with a ruptured cyst/fat necrosis.  The patient consequently saw her oncologist, Dr. Carias, who sent the patient for ultrasound-guided core biopsies of the mediastinal lymph nodes with a finding of carcinoma, consistent with breast primary with estrogen receptor returning positive, greater than 50%, progesterone receptor negative, and HER-2/lisa 0 per outside report. The patient was subsequently begun on docetaxel, pertuzumab, trastuzumab, and exemestane beginning at the early 4/19, and subsequently switched to weekly paclitaxel secondary to side effects and continuing  on trastuzumab / pertuzumab every 3 weeks as well as exemestane. The patient was last treated per her report on approximately June 12, 2019, despite outside records that show ongoing weekly treatments early July 2019.  \par \par The patient also reports that beginning in approximately 12/18, she started to develop slowly progressive diffuse rash with associated pruritus, having seen multiple dermatologists.  Most recently, she saw Dr. Courtney Cowart, a [facility] physician practicing at [place], [place], who diagnosed disseminated herpes simplex for which she was begun on valacyclovir, as well as evidence of MRSA for which was treated with doxycycline beginning in May 2019.  In June 2019, the patient presented to [facility] Emergency Department complaining of progressive "weakness and shaking." She was found to be anemic and had melena.  She underwent endoscopy and VIR procedure to stop gastric bleeding, which was unsuccessful.  Consequently, she had a distal gastrectomy.  She was discharged from [facility] on 07/08/2019.\par \par The patient was initial consultation 7/24/19 for a further opinion regarding treatment recommendations. She noted ongoing weakness, fatigue, and generalized malaise, although all of these had begun to improve gradually.  Her last chemotherapy as noted above was approximately at 06/12/2019.  She reports that her primary oncologist, Dr. Carias, told to discontinue exemestane upon admission to [facility] with the consideration restarting it post discharge.  \par \par I obtained her outside slides for review at [facility] and it was confirmed that she had met breast cancer ER+ NM neg and Her 2 lisa neg.\par \par Consequently I recommended restarting exemestane.  \par \par \par  [de-identified] : Here today for f/u. \par \par On Exemestane and tolerating well. Mood has continued to improve even further and she is smiling spontaneously today.  She continues to eat better and cont gaining weight. She is going outside regularly, shopping and cooking for herself.  Reports cont to feel better and better.  Her rash / infection on her buttocks has fully resolved / healed. \par \par  \par CT CAP 8/5/19 [facility]\par CT Chest with scattered tiny lung nodules mostly in RUL unchanged from prev exam suggesting granulomas. Prev noted righ subclavian, hilar, lymphadenopathy has sig improved. Small precarinal nodes measuring less than 5 mm\par \par CT abd / pelvis\par  Multiple liver cysts, occ tiny nonspecific liver lesions noted on prior CT in th eleft love have resolved. No bone lesions

## 2020-01-25 LAB
ALBUMIN SERPL ELPH-MCNC: 4.5 G/DL
ALP BLD-CCNC: 157 U/L
ALT SERPL-CCNC: 20 U/L
ANION GAP SERPL CALC-SCNC: 16 MMOL/L
AST SERPL-CCNC: 22 U/L
BILIRUB SERPL-MCNC: 0.2 MG/DL
BUN SERPL-MCNC: 21 MG/DL
CALCIUM SERPL-MCNC: 9.4 MG/DL
CEA SERPL-MCNC: 1.7 NG/ML
CHLORIDE SERPL-SCNC: 105 MMOL/L
CO2 SERPL-SCNC: 20 MMOL/L
CREAT SERPL-MCNC: 0.56 MG/DL
GLUCOSE SERPL-MCNC: 95 MG/DL
POTASSIUM SERPL-SCNC: 4.6 MMOL/L
PROT SERPL-MCNC: 7 G/DL
SODIUM SERPL-SCNC: 142 MMOL/L

## 2020-02-24 DIAGNOSIS — M79.642 PAIN IN LEFT HAND: ICD-10-CM

## 2020-03-10 ENCOUNTER — OUTPATIENT (OUTPATIENT)
Dept: OUTPATIENT SERVICES | Facility: HOSPITAL | Age: 55
LOS: 1 days | Discharge: HOME | End: 2020-03-10

## 2020-03-10 ENCOUNTER — RESULT REVIEW (OUTPATIENT)
Age: 55
End: 2020-03-10

## 2020-03-10 ENCOUNTER — APPOINTMENT (OUTPATIENT)
Dept: HEMATOLOGY ONCOLOGY | Facility: CLINIC | Age: 55
End: 2020-03-10
Payer: COMMERCIAL

## 2020-03-10 VITALS
SYSTOLIC BLOOD PRESSURE: 117 MMHG | HEART RATE: 66 BPM | RESPIRATION RATE: 16 BRPM | TEMPERATURE: 98.4 F | OXYGEN SATURATION: 100 % | BODY MASS INDEX: 28.8 KG/M2 | WEIGHT: 137.79 LBS | DIASTOLIC BLOOD PRESSURE: 73 MMHG

## 2020-03-10 DIAGNOSIS — C50.919 MALIGNANT NEOPLASM OF UNSPECIFIED SITE OF UNSPECIFIED FEMALE BREAST: ICD-10-CM

## 2020-03-10 DIAGNOSIS — Z90.12 ACQUIRED ABSENCE OF LEFT BREAST AND NIPPLE: Chronic | ICD-10-CM

## 2020-03-10 DIAGNOSIS — N93.9 ABNORMAL UTERINE AND VAGINAL BLEEDING, UNSPECIFIED: ICD-10-CM

## 2020-03-10 LAB
BASOPHILS # BLD AUTO: 0.02 K/UL — SIGNIFICANT CHANGE UP (ref 0–0.2)
BASOPHILS NFR BLD AUTO: 0.3 % — SIGNIFICANT CHANGE UP (ref 0–2)
EOSINOPHIL # BLD AUTO: 0.09 K/UL — SIGNIFICANT CHANGE UP (ref 0–0.5)
EOSINOPHIL NFR BLD AUTO: 1.1 % — SIGNIFICANT CHANGE UP (ref 0–6)
HCT VFR BLD CALC: 37.9 % — SIGNIFICANT CHANGE UP (ref 34.5–45)
HGB BLD-MCNC: 12.4 G/DL — SIGNIFICANT CHANGE UP (ref 11.5–15.5)
IMM GRANULOCYTES NFR BLD AUTO: 0.1 % — SIGNIFICANT CHANGE UP (ref 0–1.5)
LYMPHOCYTES # BLD AUTO: 2.67 K/UL — SIGNIFICANT CHANGE UP (ref 1–3.3)
LYMPHOCYTES # BLD AUTO: 33.8 % — SIGNIFICANT CHANGE UP (ref 13–44)
MCHC RBC-ENTMCNC: 30.1 PG — SIGNIFICANT CHANGE UP (ref 27–34)
MCHC RBC-ENTMCNC: 32.7 GM/DL — SIGNIFICANT CHANGE UP (ref 32–36)
MCV RBC AUTO: 92 FL — SIGNIFICANT CHANGE UP (ref 80–100)
MONOCYTES # BLD AUTO: 0.56 K/UL — SIGNIFICANT CHANGE UP (ref 0–0.9)
MONOCYTES NFR BLD AUTO: 7.1 % — SIGNIFICANT CHANGE UP (ref 2–14)
NEUTROPHILS # BLD AUTO: 4.56 K/UL — SIGNIFICANT CHANGE UP (ref 1.8–7.4)
NEUTROPHILS NFR BLD AUTO: 57.6 % — SIGNIFICANT CHANGE UP (ref 43–77)
NRBC # BLD: 0 /100 WBCS — SIGNIFICANT CHANGE UP (ref 0–0)
PLATELET # BLD AUTO: 291 K/UL — SIGNIFICANT CHANGE UP (ref 150–400)
RBC # BLD: 4.12 M/UL — SIGNIFICANT CHANGE UP (ref 3.8–5.2)
RBC # FLD: 13.3 % — SIGNIFICANT CHANGE UP (ref 10.3–14.5)
WBC # BLD: 7.91 K/UL — SIGNIFICANT CHANGE UP (ref 3.8–10.5)
WBC # FLD AUTO: 7.91 K/UL — SIGNIFICANT CHANGE UP (ref 3.8–10.5)

## 2020-03-10 PROCEDURE — 99214 OFFICE O/P EST MOD 30 MIN: CPT

## 2020-03-10 NOTE — HISTORY OF PRESENT ILLNESS
[de-identified] : The patients' history of present illness began in 2012 at which time she was found to have a right breast cancer for which she underwent a right modified radical mastectomy/axillary lymph node dissection with the finding of an ER positive 20%, GA positive 10%, HER-2/lisa 2+, FISH amplified breast cancer per outside physician report (I do not have a copy that pathology report for my review).  She went on to receive adjuvant dose-dense AC chemotherapy for 4 cycles followed by Taxol plus Herceptin as well as adjuvant radiation therapy.  She was subsequently begun adjuvant tamoxifen therapy.  Her last menstrual period was in 2014.\par \par The patient then began to experience weight loss beginning in November 2018 and over 3-month period of time lost 30 pounds per her report.  At approximately the same time she developed increasing shortness of breath, and ultimately presented to [facility] Emergency Department.  She subsequently had a CT scan of the chest, which showed multiple mediastinal hilar right subclavian lymph nodes corresponding to abnormal FDG activity on PET scan consistent with metastatic carcinoma; there were tiny nodularities in both lungs- too small to accurately characterize- not present on prior examination and measuring up to 2 mm in both lungs.  The possibility of metastasis was a consideration; she had no bony metastases; the liver demonstrated tiny poorly defined lesions less than 5 mm and difficult to characterize and visible on prior examinations with the possibility of metastasis also considered.  A PET-CT scan showed multiple hypermetabolic lymph nodes in the mediastinum, right subclavian region and both adebayo felt to represent tumor; there were stellate lesions in the left breast region, felt to represent benign scars; the lungs demonstrated no significant abnormality with multiple tiny lung nodules seen on CT as previously noted; liver demonstrated no significant abnormality with multiple small poorly defined nodules seen on CT per my previous comments.  The patient underwent a left breast core biopsy of the area of concern with a finding of fat necrosis and foreign body giant cell reaction as well as a separate fine-needle aspiration consistent with a ruptured cyst/fat necrosis.  The patient consequently saw her oncologist, Dr. Carias, who sent the patient for ultrasound-guided core biopsies of the mediastinal lymph nodes with a finding of carcinoma, consistent with breast primary with estrogen receptor returning positive, greater than 50%, progesterone receptor negative, and HER-2/lisa 0 per outside report. The patient was subsequently begun on docetaxel, pertuzumab, trastuzumab, and exemestane beginning at the early 4/19, and subsequently switched to weekly paclitaxel secondary to side effects and continuing  on trastuzumab / pertuzumab every 3 weeks as well as exemestane. The patient was last treated per her report on approximately June 12, 2019, despite outside records that show ongoing weekly treatments early July 2019.  \par \par The patient also reports that beginning in approximately 12/18, she started to develop slowly progressive diffuse rash with associated pruritus, having seen multiple dermatologists.  Most recently, she saw Dr. Courtney Cowart, a [facility] physician practicing at [place], [place], who diagnosed disseminated herpes simplex for which she was begun on valacyclovir, as well as evidence of MRSA for which was treated with doxycycline beginning in May 2019.  In June 2019, the patient presented to [facility] Emergency Department complaining of progressive "weakness and shaking." She was found to be anemic and had melena.  She underwent endoscopy and VIR procedure to stop gastric bleeding, which was unsuccessful.  Consequently, she had a distal gastrectomy.  She was discharged from [facility] on 07/08/2019.\par \par The patient was initial consultation 7/24/19 for a further opinion regarding treatment recommendations. She noted ongoing weakness, fatigue, and generalized malaise, although all of these had begun to improve gradually.  Her last chemotherapy as noted above was approximately at 06/12/2019.  She reports that her primary oncologist, Dr. Carias, told to discontinue exemestane upon admission to [facility] with the consideration restarting it post discharge.  \par \par I obtained her outside slides for review at [facility] and it was confirmed that she had met breast cancer ER+ GA neg and Her 2 lisa neg.\par \par Consequently I recommended restarting exemestane.  \par \par \par  [de-identified] : Here today for an emergent visit as "my period has come back".\par \par On Exemestane and tolerating well. She continues to eat better and cont gaining weight. She is increasingly more active / more energetic. \par \par She woke up this morning with blood in her panties. She reports this is c/w her menstrual bleeding. She has menstrual "cramps" in her lower abd / pelvis.  \par \par  \par 2/13/20 CT CAP no mets.

## 2020-03-10 NOTE — PHYSICAL EXAM
[Restricted in physically strenuous activity but ambulatory and able to carry out work of a light or sedentary nature] : Status 1- Restricted in physically strenuous activity but ambulatory and able to carry out work of a light or sedentary nature, e.g., light house work, office work [Thin] : thin [Normal] : affect appropriate [de-identified] : R breast s/p mastectomy with a well healed scar. R breast without any palp masses, nipple retraction [de-identified] : scars on buttock area from prior wounds

## 2020-03-10 NOTE — REVIEW OF SYSTEMS
[Negative] : Endocrine [FreeTextEntry2] : As above [FreeTextEntry8] : as above [de-identified] : as above [de-identified] : as above

## 2020-03-10 NOTE — REASON FOR VISIT
[Follow-Up Visit] : a follow-up [Urgent Visit] : an urgent  [Family Member] : family member [Other: _____] : [unfilled] [FreeTextEntry2] : Metastatic breast cancer

## 2020-03-12 LAB
ALBUMIN SERPL ELPH-MCNC: 4.6 G/DL
ALP BLD-CCNC: 179 U/L
ALT SERPL-CCNC: 17 U/L
ANION GAP SERPL CALC-SCNC: 13 MMOL/L
AST SERPL-CCNC: 19 U/L
BILIRUB SERPL-MCNC: 0.2 MG/DL
BUN SERPL-MCNC: 14 MG/DL
CALCIUM SERPL-MCNC: 8.9 MG/DL
CANCER AG27-29 SERPL-ACNC: 9.4 U/ML
CEA SERPL-MCNC: 1.4 NG/ML
CHLORIDE SERPL-SCNC: 106 MMOL/L
CO2 SERPL-SCNC: 24 MMOL/L
CREAT SERPL-MCNC: 0.46 MG/DL
ESTRADIOL SERPL-MCNC: 12 PG/ML
FSH SERPL-MCNC: 21.5 IU/L
GLUCOSE SERPL-MCNC: 86 MG/DL
LH SERPL-ACNC: 15.6 IU/L
POTASSIUM SERPL-SCNC: 4.8 MMOL/L
PROT SERPL-MCNC: 7 G/DL
SODIUM SERPL-SCNC: 143 MMOL/L

## 2020-03-31 ENCOUNTER — APPOINTMENT (OUTPATIENT)
Dept: SURGICAL ONCOLOGY | Facility: CLINIC | Age: 55
End: 2020-03-31

## 2020-04-20 ENCOUNTER — APPOINTMENT (OUTPATIENT)
Dept: ULTRASOUND IMAGING | Facility: IMAGING CENTER | Age: 55
End: 2020-04-20

## 2020-04-20 ENCOUNTER — OUTPATIENT (OUTPATIENT)
Dept: OUTPATIENT SERVICES | Facility: HOSPITAL | Age: 55
LOS: 1 days | End: 2020-04-20

## 2020-04-20 DIAGNOSIS — Z90.12 ACQUIRED ABSENCE OF LEFT BREAST AND NIPPLE: Chronic | ICD-10-CM

## 2020-04-20 DIAGNOSIS — C50.919 MALIGNANT NEOPLASM OF UNSPECIFIED SITE OF UNSPECIFIED FEMALE BREAST: ICD-10-CM

## 2020-04-28 ENCOUNTER — APPOINTMENT (OUTPATIENT)
Dept: SURGICAL ONCOLOGY | Facility: CLINIC | Age: 55
End: 2020-04-28
Payer: COMMERCIAL

## 2020-04-28 VITALS
DIASTOLIC BLOOD PRESSURE: 77 MMHG | RESPIRATION RATE: 16 BRPM | HEIGHT: 58 IN | BODY MASS INDEX: 30.64 KG/M2 | SYSTOLIC BLOOD PRESSURE: 137 MMHG | OXYGEN SATURATION: 100 % | WEIGHT: 146 LBS | HEART RATE: 66 BPM

## 2020-04-28 PROCEDURE — 99214 OFFICE O/P EST MOD 30 MIN: CPT

## 2020-04-28 NOTE — REASON FOR VISIT
[Follow-Up Visit] : a follow-up visit for [FreeTextEntry2] : incisional hernia, metastatic breast cancer [Family Member] : family member

## 2020-04-28 NOTE — PHYSICAL EXAM
[FreeTextEntry1] : Abdominal incision well healed.  Reducible bulge mid-incision is consistent with incisional hernia.  No evidence of incarceration/strangulation. [Normal] : supple, no neck mass and thyroid not enlarged [Normal Supraclavicular Lymph Nodes] : normal supraclavicular lymph nodes [Normal Neck Lymph Nodes] : normal neck lymph nodes  [Normal Groin Lymph Nodes] : normal groin lymph nodes [Normal Axillary Lymph Nodes] : normal axillary lymph nodes [Normal] : oriented to person, place and time, with appropriate affect

## 2020-04-28 NOTE — HISTORY OF PRESENT ILLNESS
[de-identified] : Patient is a 54 y/o female undergoing treatment for metastatic breast cancer who presented to [facility] with bleeding gastric ulcer that failed endoscopic and IR therapy.  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  She is continuing on treatment for metastatic breast cancer.  She reports occasional gas discomfort, but denies nausea/emesis.  Her weight is stable.  She denies melena or blood in stool.\par \par 04/28/2020: Patient presents to office with complaints of one month history of abdominal bulging.  She reports mild pain associated, but denies nausea/emesis.  She has had weight gain since her last visit and continues with Exemestane.  Her bowel movements are normal.

## 2020-04-28 NOTE — ASSESSMENT
[FreeTextEntry1] : 55 y/o female with minimally symptomatic ventral incisional hernia.\par \par Plan: Will obtain CT abdomen/pelvis for evaluation.  No urgent need for surgical repair.  I explained the natural history of VIH and educated the patient regarding signs and symptoms of incarceration.strangulation/obstruction.  Surgical repair with mesh and approaches to incisional hernia repair discussed.  She will return to office in 6 weeks for re-evaluation.

## 2020-05-06 ENCOUNTER — OUTPATIENT (OUTPATIENT)
Dept: OUTPATIENT SERVICES | Facility: HOSPITAL | Age: 55
LOS: 1 days | Discharge: HOME | End: 2020-05-06

## 2020-05-06 DIAGNOSIS — C50.919 MALIGNANT NEOPLASM OF UNSPECIFIED SITE OF UNSPECIFIED FEMALE BREAST: ICD-10-CM

## 2020-05-06 DIAGNOSIS — Z90.12 ACQUIRED ABSENCE OF LEFT BREAST AND NIPPLE: Chronic | ICD-10-CM

## 2020-05-07 ENCOUNTER — APPOINTMENT (OUTPATIENT)
Dept: CT IMAGING | Facility: IMAGING CENTER | Age: 55
End: 2020-05-07

## 2020-05-07 ENCOUNTER — OUTPATIENT (OUTPATIENT)
Dept: OUTPATIENT SERVICES | Facility: HOSPITAL | Age: 55
LOS: 1 days | End: 2020-05-07
Payer: COMMERCIAL

## 2020-05-07 DIAGNOSIS — Z90.12 ACQUIRED ABSENCE OF LEFT BREAST AND NIPPLE: Chronic | ICD-10-CM

## 2020-05-07 DIAGNOSIS — K43.2 INCISIONAL HERNIA WITHOUT OBSTRUCTION OR GANGRENE: ICD-10-CM

## 2020-05-07 DIAGNOSIS — R10.13 EPIGASTRIC PAIN: ICD-10-CM

## 2020-05-07 PROCEDURE — 74177 CT ABD & PELVIS W/CONTRAST: CPT

## 2020-05-07 PROCEDURE — 74177 CT ABD & PELVIS W/CONTRAST: CPT | Mod: 26

## 2020-05-12 ENCOUNTER — APPOINTMENT (OUTPATIENT)
Dept: HEMATOLOGY ONCOLOGY | Facility: CLINIC | Age: 55
End: 2020-05-12

## 2020-05-12 ENCOUNTER — APPOINTMENT (OUTPATIENT)
Dept: HEMATOLOGY ONCOLOGY | Facility: CLINIC | Age: 55
End: 2020-05-12
Payer: COMMERCIAL

## 2020-05-12 PROCEDURE — 99213 OFFICE O/P EST LOW 20 MIN: CPT | Mod: 95

## 2020-05-12 NOTE — HISTORY OF PRESENT ILLNESS
[Home] : at home, [unfilled] , at the time of the visit. [Medical Office: ([facility])___] : at the medical office located in  [Other:____] : [unfilled] [Patient] : the patient [Self] : self [de-identified] : The patients' history of present illness began in 2012 at which time she was found to have a right breast cancer for which she underwent a right modified radical mastectomy/axillary lymph node dissection with the finding of an ER positive 20%, NJ positive 10%, HER-2/lisa 2+, FISH amplified breast cancer per outside physician report (I do not have a copy that pathology report for my review).  She went on to receive adjuvant dose-dense AC chemotherapy for 4 cycles followed by Taxol plus Herceptin as well as adjuvant radiation therapy.  She was subsequently begun adjuvant tamoxifen therapy.  Her last menstrual period was in 2014.\par \par The patient then began to experience weight loss beginning in November 2018 and over 3-month period of time lost 30 pounds per her report.  At approximately the same time she developed increasing shortness of breath, and ultimately presented to [facility] Emergency Department.  She subsequently had a CT scan of the chest, which showed multiple mediastinal hilar right subclavian lymph nodes corresponding to abnormal FDG activity on PET scan consistent with metastatic carcinoma; there were tiny nodularities in both lungs- too small to accurately characterize- not present on prior examination and measuring up to 2 mm in both lungs.  The possibility of metastasis was a consideration; she had no bony metastases; the liver demonstrated tiny poorly defined lesions less than 5 mm and difficult to characterize and visible on prior examinations with the possibility of metastasis also considered.  A PET-CT scan showed multiple hypermetabolic lymph nodes in the mediastinum, right subclavian region and both adebayo felt to represent tumor; there were stellate lesions in the left breast region, felt to represent benign scars; the lungs demonstrated no significant abnormality with multiple tiny lung nodules seen on CT as previously noted; liver demonstrated no significant abnormality with multiple small poorly defined nodules seen on CT per my previous comments.  The patient underwent a left breast core biopsy of the area of concern with a finding of fat necrosis and foreign body giant cell reaction as well as a separate fine-needle aspiration consistent with a ruptured cyst/fat necrosis.  The patient consequently saw her oncologist, Dr. Carias, who sent the patient for ultrasound-guided core biopsies of the mediastinal lymph nodes with a finding of carcinoma, consistent with breast primary with estrogen receptor returning positive, greater than 50%, progesterone receptor negative, and HER-2/lisa 0 per outside report. The patient was subsequently begun on docetaxel, pertuzumab, trastuzumab, and exemestane beginning at the early 4/19, and subsequently switched to weekly paclitaxel secondary to side effects and continuing  on trastuzumab / pertuzumab every 3 weeks as well as exemestane. The patient was last treated per her report on approximately June 12, 2019, despite outside records that show ongoing weekly treatments early July 2019.  \par \par The patient also reports that beginning in approximately 12/18, she started to develop slowly progressive diffuse rash with associated pruritus, having seen multiple dermatologists.  Most recently, she saw Dr. Courtney Cowart, a [facility] physician practicing at [place], [place], who diagnosed disseminated herpes simplex for which she was begun on valacyclovir, as well as evidence of MRSA for which was treated with doxycycline beginning in May 2019.  In June 2019, the patient presented to [facility] Emergency Department complaining of progressive "weakness and shaking." She was found to be anemic and had melena.  She underwent endoscopy and VIR procedure to stop gastric bleeding, which was unsuccessful.  Consequently, she had a distal gastrectomy.  She was discharged from [facility] on 07/08/2019.\par \par The patient was initial consultation 7/24/19 for a further opinion regarding treatment recommendations. She noted ongoing weakness, fatigue, and generalized malaise, although all of these had begun to improve gradually.  Her last chemotherapy as noted above was approximately at 06/12/2019.  She reports that her primary oncologist, Dr. Carias, told to discontinue exemestane upon admission to [facility] with the consideration restarting it post discharge.  \par \par I obtained her outside slides for review at [facility] and it was confirmed that she had met breast cancer ER+ NJ neg and Her 2 lisa neg.\par \par Consequently I recommended restarting exemestane.  \par \par \par  [de-identified] : Pt seen today for a telehealth f/u visit secondary to the Covid crisis.\par \par On Exemestane and tolerating well. She notes a good appetite stable weight and excellent performance status. She is sad today because earlier today heard about friend passing from Covid.\par \par No further vag spotting / bleeding. prior blood test c/w menopause.    \par \par Had recent onset of mild R groin pain (approx 2 weeks ago) with standing / sitting - no difference with wt bearing. She also had recent abd /pelvic CT by Dr michelle secondary to eval of ventral hernia at prior surgical sit e- no abnl noted in bones. \par \par \par 2/13/20 CT CAP no mets.

## 2020-05-12 NOTE — PHYSICAL EXAM
[Restricted in physically strenuous activity but ambulatory and able to carry out work of a light or sedentary nature] : Status 1- Restricted in physically strenuous activity but ambulatory and able to carry out work of a light or sedentary nature, e.g., light house work, office work [Normal] : well developed, well nourished, in no acute distress [de-identified] : mid abd bulge c/w ventral hernia [de-identified] : flat today

## 2020-06-03 ENCOUNTER — LABORATORY RESULT (OUTPATIENT)
Age: 55
End: 2020-06-03

## 2020-06-16 ENCOUNTER — APPOINTMENT (OUTPATIENT)
Dept: SURGICAL ONCOLOGY | Facility: CLINIC | Age: 55
End: 2020-06-16
Payer: COMMERCIAL

## 2020-06-16 VITALS
DIASTOLIC BLOOD PRESSURE: 74 MMHG | HEART RATE: 68 BPM | SYSTOLIC BLOOD PRESSURE: 113 MMHG | OXYGEN SATURATION: 97 % | RESPIRATION RATE: 16 BRPM

## 2020-06-16 VITALS — TEMPERATURE: 98.2 F

## 2020-06-16 PROCEDURE — 99213 OFFICE O/P EST LOW 20 MIN: CPT

## 2020-06-16 NOTE — PHYSICAL EXAM
[FreeTextEntry1] : Abdominal incision well healed.  Reducible bulge mid-incision is consistent with incisional hernia, slightly more prominent.  No evidence of incarceration/strangulation. [Normal] : supple, no neck mass and thyroid not enlarged [Normal Supraclavicular Lymph Nodes] : normal supraclavicular lymph nodes [Normal Neck Lymph Nodes] : normal neck lymph nodes  [Normal Groin Lymph Nodes] : normal groin lymph nodes [Normal Axillary Lymph Nodes] : normal axillary lymph nodes [Normal] : grossly intact

## 2020-06-16 NOTE — HISTORY OF PRESENT ILLNESS
[de-identified] : Patient is a 54 y/o female undergoing treatment for metastatic breast cancer who presented to [facility] with bleeding gastric ulcer that failed endoscopic and IR therapy.  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  She is continuing on treatment for metastatic breast cancer.  She reports occasional gas discomfort, but denies nausea/emesis.  Her weight is stable.  She denies melena or blood in stool.\par \par 04/28/2020: Patient presents to office with complaints of one month history of abdominal bulging.  She reports mild pain associated, but denies nausea/emesis.  She has had weight gain since her last visit and continues with Exemestane.  Her bowel movements are normal.\par \par 06/16/2020: Since her last visit, patient reports abdominal bulging has increased in size.  She denies worsening pain, nausea/emesis.  CT abdomen/pelvis demonstrates a 5 cm supraumbilical incisional hernia with bowel.

## 2020-06-16 NOTE — ASSESSMENT
[FreeTextEntry1] : Ventral incisional hernia.\par \par Plan: Recommend surgical repair with mesh.  Patient is a candidate for minimally invasive robotic approach.  She will discuss with her family and contact office should she wish to proceed.  Signs and symptoms of incarceration/strangulation explained to the patient and over the telephone with her son, Korey.

## 2020-07-13 ENCOUNTER — OUTPATIENT (OUTPATIENT)
Dept: OUTPATIENT SERVICES | Facility: HOSPITAL | Age: 55
LOS: 1 days | End: 2020-07-13
Payer: COMMERCIAL

## 2020-07-13 ENCOUNTER — OUTPATIENT (OUTPATIENT)
Dept: OUTPATIENT SERVICES | Facility: HOSPITAL | Age: 55
LOS: 1 days | Discharge: HOME | End: 2020-07-13

## 2020-07-13 VITALS
OXYGEN SATURATION: 96 % | HEIGHT: 60 IN | DIASTOLIC BLOOD PRESSURE: 76 MMHG | HEART RATE: 74 BPM | TEMPERATURE: 98 F | WEIGHT: 154.1 LBS | SYSTOLIC BLOOD PRESSURE: 117 MMHG | RESPIRATION RATE: 16 BRPM

## 2020-07-13 DIAGNOSIS — Z90.12 ACQUIRED ABSENCE OF LEFT BREAST AND NIPPLE: Chronic | ICD-10-CM

## 2020-07-13 DIAGNOSIS — Z90.49 ACQUIRED ABSENCE OF OTHER SPECIFIED PARTS OF DIGESTIVE TRACT: Chronic | ICD-10-CM

## 2020-07-13 DIAGNOSIS — C50.919 MALIGNANT NEOPLASM OF UNSPECIFIED SITE OF UNSPECIFIED FEMALE BREAST: ICD-10-CM

## 2020-07-13 DIAGNOSIS — Z01.818 ENCOUNTER FOR OTHER PREPROCEDURAL EXAMINATION: ICD-10-CM

## 2020-07-13 DIAGNOSIS — K43.2 INCISIONAL HERNIA WITHOUT OBSTRUCTION OR GANGRENE: ICD-10-CM

## 2020-07-13 DIAGNOSIS — Z90.3 ACQUIRED ABSENCE OF STOMACH [PART OF]: Chronic | ICD-10-CM

## 2020-07-13 DIAGNOSIS — Z98.51 TUBAL LIGATION STATUS: Chronic | ICD-10-CM

## 2020-07-13 PROCEDURE — 87641 MR-STAPH DNA AMP PROBE: CPT

## 2020-07-13 PROCEDURE — G0463: CPT

## 2020-07-13 PROCEDURE — 87640 STAPH A DNA AMP PROBE: CPT

## 2020-07-13 NOTE — H&P PST ADULT - NSICDXPASTMEDICALHX_GEN_ALL_CORE_FT
PAST MEDICAL HISTORY:  Breast cancer 2012 h/o chemo/radiation 2013    Dieulafoy lesion of stomach GI bleed , s/p distal gastrectomy    Incisional hernia     Metastatic breast cancer to lungs  1/2019 was treated with chemo , completed 7/2019

## 2020-07-13 NOTE — H&P PST ADULT - NSICDXPROBLEM_GEN_ALL_CORE_FT
PROBLEM DIAGNOSES  Problem: Incisional hernia  Assessment and Plan: Robotic single  incision hernia repair   PST instructions provided in [place], Surgical scrub given, patient verbalized understanding.   Blood work in [facility] 6/3/20  MRSA collected and sent. PROBLEM DIAGNOSES  Problem: Incisional hernia  Assessment and Plan: Robotic single  incision hernia repair   PST instructions provided in [place], Surgical scrub given, patient verbalized understanding.   Blood work in [facility] 6/3/20  MRSA collected and sent.  Covid PCR 7/17 @ [address]  no BP/IV left arm       Problem: Breast cancer  Assessment and Plan: stable, continue exemestane as prescribed

## 2020-07-13 NOTE — H&P PST ADULT - HISTORY OF PRESENT ILLNESS
The patient is a 54 year old female, Lao speaking,  with a PMH of metastatic breast cancer (to mediasteinal LNs, dx in 2012 s/p chemo and mastectomy, remission in 2013, recurrence 01/19, was undergoing chemo , developed GI bleed, s/p distal gastrectomy 7/2019, at present , patient reports abdominal bulging, worsen by activity, presents to PST for scheduled robotic single incision hernia repair on 7/20/20. Denies fever, chills, no acute complaints. Denies sick contacts.   Covid PCR scheduled 7/17 @ [address] The patient is a 54 year old female, [place] speaking,  with a PMH of metastatic breast cancer (to mediasteinal LNs, dx in 2012 s/p chemo and mastectomy, remission in 2013, recurrence 01/19, was undergoing chemo , developed GI bleed, s/p distal gastrectomy 7/2019, at present , patient reports abdominal bulging, worsen by activity, presents to PST for scheduled robotic single incision hernia repair on 7/20/20. Denies fever, chills, no acute complaints. Denies sick contacts.   Covid PCR scheduled 7/17 @ [address]     *** NO BP /IV left arm ***    Patient denies need for , Tanzanian speaking PCA assisted with history and provided Tanzanian preop instructions.

## 2020-07-13 NOTE — H&P PST ADULT - NSANTHOSAYNRD_GEN_A_CORE
No. DIANELYS screening performed.  STOP BANG Legend: 0-2 = LOW Risk; 3-4 = INTERMEDIATE Risk; 5-8 = HIGH Risk

## 2020-07-13 NOTE — H&P PST ADULT - NSICDXPASTSURGICALHX_GEN_ALL_CORE_FT
PAST SURGICAL HISTORY:  History of cholecystectomy 2000    History of gastrectomy partial/distal  7/2019    History of tubal ligation 1999    S/P mastectomy, left 2012

## 2020-07-14 LAB
MRSA PCR RESULT.: SIGNIFICANT CHANGE UP
S AUREUS DNA NOSE QL NAA+PROBE: SIGNIFICANT CHANGE UP

## 2020-07-15 ENCOUNTER — APPOINTMENT (OUTPATIENT)
Dept: HEMATOLOGY ONCOLOGY | Facility: CLINIC | Age: 55
End: 2020-07-15
Payer: COMMERCIAL

## 2020-07-15 ENCOUNTER — RESULT REVIEW (OUTPATIENT)
Age: 55
End: 2020-07-15

## 2020-07-15 VITALS
HEART RATE: 63 BPM | OXYGEN SATURATION: 98 % | DIASTOLIC BLOOD PRESSURE: 71 MMHG | TEMPERATURE: 98 F | WEIGHT: 153.22 LBS | RESPIRATION RATE: 17 BRPM | BODY MASS INDEX: 32.02 KG/M2 | SYSTOLIC BLOOD PRESSURE: 111 MMHG

## 2020-07-15 LAB
BASOPHILS # BLD AUTO: 0.02 K/UL — SIGNIFICANT CHANGE UP (ref 0–0.2)
BASOPHILS NFR BLD AUTO: 0.3 % — SIGNIFICANT CHANGE UP (ref 0–2)
EOSINOPHIL # BLD AUTO: 0.12 K/UL — SIGNIFICANT CHANGE UP (ref 0–0.5)
EOSINOPHIL NFR BLD AUTO: 1.7 % — SIGNIFICANT CHANGE UP (ref 0–6)
HCT VFR BLD CALC: 37.1 % — SIGNIFICANT CHANGE UP (ref 34.5–45)
HGB BLD-MCNC: 12.2 G/DL — SIGNIFICANT CHANGE UP (ref 11.5–15.5)
IMM GRANULOCYTES NFR BLD AUTO: 0.3 % — SIGNIFICANT CHANGE UP (ref 0–1.5)
LYMPHOCYTES # BLD AUTO: 3.1 K/UL — SIGNIFICANT CHANGE UP (ref 1–3.3)
LYMPHOCYTES # BLD AUTO: 43.9 % — SIGNIFICANT CHANGE UP (ref 13–44)
MCHC RBC-ENTMCNC: 29.4 PG — SIGNIFICANT CHANGE UP (ref 27–34)
MCHC RBC-ENTMCNC: 32.9 GM/DL — SIGNIFICANT CHANGE UP (ref 32–36)
MCV RBC AUTO: 89.4 FL — SIGNIFICANT CHANGE UP (ref 80–100)
MONOCYTES # BLD AUTO: 0.61 K/UL — SIGNIFICANT CHANGE UP (ref 0–0.9)
MONOCYTES NFR BLD AUTO: 8.6 % — SIGNIFICANT CHANGE UP (ref 2–14)
NEUTROPHILS # BLD AUTO: 3.19 K/UL — SIGNIFICANT CHANGE UP (ref 1.8–7.4)
NEUTROPHILS NFR BLD AUTO: 45.2 % — SIGNIFICANT CHANGE UP (ref 43–77)
NRBC # BLD: 0 /100 WBCS — SIGNIFICANT CHANGE UP (ref 0–0)
PLATELET # BLD AUTO: 271 K/UL — SIGNIFICANT CHANGE UP (ref 150–400)
RBC # BLD: 4.15 M/UL — SIGNIFICANT CHANGE UP (ref 3.8–5.2)
RBC # FLD: 14.3 % — SIGNIFICANT CHANGE UP (ref 10.3–14.5)
WBC # BLD: 7.06 K/UL — SIGNIFICANT CHANGE UP (ref 3.8–10.5)
WBC # FLD AUTO: 7.06 K/UL — SIGNIFICANT CHANGE UP (ref 3.8–10.5)

## 2020-07-15 PROCEDURE — 99214 OFFICE O/P EST MOD 30 MIN: CPT

## 2020-07-16 NOTE — PHYSICAL EXAM
[Fully active, able to carry on all pre-disease performance without restriction] : Status 0 - Fully active, able to carry on all pre-disease performance without restriction [Normal] : affect appropriate [de-identified] : R breast s/p mastectomy with a well healed scar. R breast without any palp masses, nipple retraction. \par R s/p MRM with well healed scar, no masses. Lw/o nipple retarction skin dimpling o rpalp masses. B/L ax neg [de-identified] : mid abd bulge c/w ventral hernia, otherwise unremarkable

## 2020-07-16 NOTE — HISTORY OF PRESENT ILLNESS
[Home] : at home, [unfilled] , at the time of the visit. [Other:____] : [unfilled] [Medical Office: ([facility])___] : at the medical office located in  [Patient] : the patient [Self] : self [de-identified] : The patients' history of present illness began in 2012 at which time she was found to have a right breast cancer for which she underwent a right modified radical mastectomy/axillary lymph node dissection with the finding of an ER positive 20%, KY positive 10%, HER-2/lisa 2+, FISH amplified breast cancer per outside physician report (I do not have a copy that pathology report for my review).  She went on to receive adjuvant dose-dense AC chemotherapy for 4 cycles followed by Taxol plus Herceptin as well as adjuvant radiation therapy.  She was subsequently begun adjuvant tamoxifen therapy.  Her last menstrual period was in 2014.\par \par The patient then began to experience weight loss beginning in November 2018 and over 3-month period of time lost 30 pounds per her report.  At approximately the same time she developed increasing shortness of breath, and ultimately presented to [facility] Emergency Department.  She subsequently had a CT scan of the chest, which showed multiple mediastinal hilar right subclavian lymph nodes corresponding to abnormal FDG activity on PET scan consistent with metastatic carcinoma; there were tiny nodularities in both lungs- too small to accurately characterize- not present on prior examination and measuring up to 2 mm in both lungs.  The possibility of metastasis was a consideration; she had no bony metastases; the liver demonstrated tiny poorly defined lesions less than 5 mm and difficult to characterize and visible on prior examinations with the possibility of metastasis also considered.  A PET-CT scan showed multiple hypermetabolic lymph nodes in the mediastinum, right subclavian region and both adebayo felt to represent tumor; there were stellate lesions in the left breast region, felt to represent benign scars; the lungs demonstrated no significant abnormality with multiple tiny lung nodules seen on CT as previously noted; liver demonstrated no significant abnormality with multiple small poorly defined nodules seen on CT per my previous comments.  The patient underwent a left breast core biopsy of the area of concern with a finding of fat necrosis and foreign body giant cell reaction as well as a separate fine-needle aspiration consistent with a ruptured cyst/fat necrosis.  The patient consequently saw her oncologist, Dr. Carias, who sent the patient for ultrasound-guided core biopsies of the mediastinal lymph nodes with a finding of carcinoma, consistent with breast primary with estrogen receptor returning positive, greater than 50%, progesterone receptor negative, and HER-2/lisa 0 per outside report. The patient was subsequently begun on docetaxel, pertuzumab, trastuzumab, and exemestane beginning at the early 4/19, and subsequently switched to weekly paclitaxel secondary to side effects and continuing  on trastuzumab / pertuzumab every 3 weeks as well as exemestane. The patient was last treated per her report on approximately June 12, 2019, despite outside records that show ongoing weekly treatments early July 2019.  \par \par The patient also reports that beginning in approximately 12/18, she started to develop slowly progressive diffuse rash with associated pruritus, having seen multiple dermatologists.  Most recently, she saw Dr. Courtney Cowart, a [facility] physician practicing at [place], [place], who diagnosed disseminated herpes simplex for which she was begun on valacyclovir, as well as evidence of MRSA for which was treated with doxycycline beginning in May 2019.  In June 2019, the patient presented to [facility] Emergency Department complaining of progressive "weakness and shaking." She was found to be anemic and had melena.  She underwent endoscopy and VIR procedure to stop gastric bleeding, which was unsuccessful.  Consequently, she had a distal gastrectomy.  She was discharged from [facility] on 07/08/2019.\par \par The patient was initial consultation 7/24/19 for a further opinion regarding treatment recommendations. She noted ongoing weakness, fatigue, and generalized malaise, although all of these had begun to improve gradually.  Her last chemotherapy as noted above was approximately at 06/12/2019.  She reports that her primary oncologist, Dr. Carias, told to discontinue exemestane upon admission to [facility] with the consideration restarting it post discharge.  \par \par I obtained her outside slides for review at [facility] and it was confirmed that she had met breast cancer ER+ KY neg and Her 2 lisa neg.\par \par Consequently I recommended restarting exemestane.  \par \par \par  [de-identified] : Pt seen today for a routine f/u visit.\par \par On Exemestane and tolerating well. She notes a good appetite stable weight and excellent performance status. \par \par No vag spotting / bleeding.    \par \par Will be having a ventral abd hernia repair soon with Dr Hayden.  \par \par CT CAP 2/20 : no mets

## 2020-07-17 ENCOUNTER — APPOINTMENT (OUTPATIENT)
Dept: INTERNAL MEDICINE | Facility: CLINIC | Age: 55
End: 2020-07-17
Payer: COMMERCIAL

## 2020-07-17 ENCOUNTER — NON-APPOINTMENT (OUTPATIENT)
Age: 55
End: 2020-07-17

## 2020-07-17 ENCOUNTER — APPOINTMENT (OUTPATIENT)
Dept: SURGICAL ONCOLOGY | Facility: CLINIC | Age: 55
End: 2020-07-17

## 2020-07-17 VITALS
SYSTOLIC BLOOD PRESSURE: 121 MMHG | BODY MASS INDEX: 32.12 KG/M2 | HEART RATE: 64 BPM | TEMPERATURE: 98 F | OXYGEN SATURATION: 100 % | HEIGHT: 58 IN | RESPIRATION RATE: 17 BRPM | WEIGHT: 153 LBS | DIASTOLIC BLOOD PRESSURE: 73 MMHG

## 2020-07-17 DIAGNOSIS — Z01.818 ENCOUNTER FOR OTHER PREPROCEDURAL EXAMINATION: ICD-10-CM

## 2020-07-17 DIAGNOSIS — K43.9 VENTRAL HERNIA W/OUT OBSTRUCTION OR GANGRENE: ICD-10-CM

## 2020-07-17 LAB
ALBUMIN SERPL ELPH-MCNC: 4.6 G/DL
ALP BLD-CCNC: 162 U/L
ALT SERPL-CCNC: 19 U/L
ANION GAP SERPL CALC-SCNC: 18 MMOL/L
AST SERPL-CCNC: 22 U/L
BILIRUB SERPL-MCNC: 0.2 MG/DL
BUN SERPL-MCNC: 20 MG/DL
CALCIUM SERPL-MCNC: 9.2 MG/DL
CANCER AG27-29 SERPL-ACNC: 10.4 U/ML
CEA SERPL-MCNC: 1.6 NG/ML
CHLORIDE SERPL-SCNC: 105 MMOL/L
CO2 SERPL-SCNC: 20 MMOL/L
CREAT SERPL-MCNC: 0.52 MG/DL
ESTRADIOL SERPL-MCNC: 16 PG/ML
FSH SERPL-MCNC: 39.4 IU/L
GLUCOSE SERPL-MCNC: 90 MG/DL
LH SERPL-ACNC: 29.3 IU/L
POTASSIUM SERPL-SCNC: 4.5 MMOL/L
PROT SERPL-MCNC: 7.4 G/DL
SARS-COV-2 IGG SERPL IA-ACNC: 4.81 INDEX
SARS-COV-2 IGG SERPL QL IA: POSITIVE
SODIUM SERPL-SCNC: 142 MMOL/L

## 2020-07-17 PROCEDURE — 99214 OFFICE O/P EST MOD 30 MIN: CPT

## 2020-07-17 NOTE — PLAN
[FreeTextEntry1] : 1. pre operative examination\par * patient is clinically stable , medically clear for planned surgical procedure provided PT/PTT normal\par 2. ventral hernia\par * surgery scheduled \par 3. metastatic breast carcinoma \par * oncology follow up; referral ordered\par schedule follow up for next month for annual physical

## 2020-07-17 NOTE — HISTORY OF PRESENT ILLNESS
[No Pertinent Cardiac History] : no history of aortic stenosis, atrial fibrillation, coronary artery disease, recent myocardial infarction, or implantable device/pacemaker [No Pertinent Pulmonary History] : no history of asthma, COPD, sleep apnea, or smoking [No Adverse Anesthesia Reaction] : no adverse anesthesia reaction in self or family member [(Patient denies any chest pain, claudication, dyspnea on exertion, orthopnea, palpitations or syncope)] : Patient denies any chest pain, claudication, dyspnea on exertion, orthopnea, palpitations or syncope [Chronic Anticoagulation] : no chronic anticoagulation [Chronic Kidney Disease] : no chronic kidney disease [Diabetes] : no diabetes [FreeTextEntry1] : ventral hernia repair [FreeTextEntry4] : 54 years old female with h/o metastatic breast carcinoma; s/p left mastectomy; here for medical pre operative consult for clearance scheduled for ventral hernia repair on Monday 07/20 /2020; offers no acute complains [FreeTextEntry3] : DR COLLIN PERSAUD [FreeTextEntry2] : 07/20/2020

## 2020-07-17 NOTE — PHYSICAL EXAM
[No Acute Distress] : no acute distress [Well Nourished] : well nourished [Well Developed] : well developed [Well-Appearing] : well-appearing [Normal Sclera/Conjunctiva] : normal sclera/conjunctiva [PERRL] : pupils equal round and reactive to light [Normal Outer Ear/Nose] : the outer ears and nose were normal in appearance [EOMI] : extraocular movements intact [No JVD] : no jugular venous distention [Normal Oropharynx] : the oropharynx was normal [Supple] : supple [Thyroid Normal, No Nodules] : the thyroid was normal and there were no nodules present [No Lymphadenopathy] : no lymphadenopathy [Clear to Auscultation] : lungs were clear to auscultation bilaterally [No Respiratory Distress] : no respiratory distress  [No Accessory Muscle Use] : no accessory muscle use [Normal Rate] : normal rate  [Regular Rhythm] : with a regular rhythm [Normal S1, S2] : normal S1 and S2 [No Murmur] : no murmur heard [No Abdominal Bruit] : a ~M bruit was not heard ~T in the abdomen [No Carotid Bruits] : no carotid bruits [No Varicosities] : no varicosities [Pedal Pulses Present] : the pedal pulses are present [No Edema] : there was no peripheral edema [No Palpable Aorta] : no palpable aorta [No Extremity Clubbing/Cyanosis] : no extremity clubbing/cyanosis [Non Tender] : non-tender [Non-distended] : non-distended [Soft] : abdomen soft [No HSM] : no HSM [No Masses] : no abdominal mass palpated [Normal Bowel Sounds] : normal bowel sounds [Normal Posterior Cervical Nodes] : no posterior cervical lymphadenopathy [No CVA Tenderness] : no CVA  tenderness [Normal Anterior Cervical Nodes] : no anterior cervical lymphadenopathy [Grossly Normal Strength/Tone] : grossly normal strength/tone [No Joint Swelling] : no joint swelling [No Spinal Tenderness] : no spinal tenderness [No Focal Deficits] : no focal deficits [Coordination Grossly Intact] : coordination grossly intact [No Rash] : no rash [Normal Gait] : normal gait [Deep Tendon Reflexes (DTR)] : deep tendon reflexes were 2+ and symmetric [Normal Insight/Judgement] : insight and judgment were intact [Normal Affect] : the affect was normal

## 2020-07-17 NOTE — ASSESSMENT
[Patient Optimized for Surgery] : Patient optimized for surgery [As per surgery] : as per surgery [FreeTextEntry4] : patient to have PT/PTT drawn today , will review

## 2020-07-18 ENCOUNTER — APPOINTMENT (OUTPATIENT)
Dept: DISASTER EMERGENCY | Facility: CLINIC | Age: 55
End: 2020-07-18

## 2020-07-18 PROBLEM — K31.82 DIEULAFOY LESION (HEMORRHAGIC) OF STOMACH AND DUODENUM: Chronic | Status: ACTIVE | Noted: 2020-07-13

## 2020-07-18 PROBLEM — K43.2 INCISIONAL HERNIA WITHOUT OBSTRUCTION OR GANGRENE: Chronic | Status: ACTIVE | Noted: 2020-07-13

## 2020-07-18 PROBLEM — C50.919 MALIGNANT NEOPLASM OF UNSPECIFIED SITE OF UNSPECIFIED FEMALE BREAST: Chronic | Status: ACTIVE | Noted: 2019-01-09

## 2020-07-18 PROBLEM — C50.919 MALIGNANT NEOPLASM OF UNSPECIFIED SITE OF UNSPECIFIED FEMALE BREAST: Chronic | Status: ACTIVE | Noted: 2020-07-13

## 2020-07-19 ENCOUNTER — TRANSCRIPTION ENCOUNTER (OUTPATIENT)
Age: 55
End: 2020-07-19

## 2020-07-19 LAB — SARS-COV-2 N GENE NPH QL NAA+PROBE: NOT DETECTED

## 2020-07-20 ENCOUNTER — RESULT REVIEW (OUTPATIENT)
Age: 55
End: 2020-07-20

## 2020-07-20 ENCOUNTER — APPOINTMENT (OUTPATIENT)
Dept: SURGICAL ONCOLOGY | Facility: HOSPITAL | Age: 55
End: 2020-07-20

## 2020-07-20 ENCOUNTER — OUTPATIENT (OUTPATIENT)
Dept: OUTPATIENT SERVICES | Facility: HOSPITAL | Age: 55
LOS: 1 days | End: 2020-07-20
Payer: COMMERCIAL

## 2020-07-20 VITALS
OXYGEN SATURATION: 99 % | HEIGHT: 60 IN | SYSTOLIC BLOOD PRESSURE: 132 MMHG | RESPIRATION RATE: 18 BRPM | DIASTOLIC BLOOD PRESSURE: 84 MMHG | WEIGHT: 154.1 LBS | TEMPERATURE: 98 F | HEART RATE: 67 BPM

## 2020-07-20 VITALS
SYSTOLIC BLOOD PRESSURE: 106 MMHG | TEMPERATURE: 98 F | RESPIRATION RATE: 17 BRPM | HEART RATE: 67 BPM | DIASTOLIC BLOOD PRESSURE: 69 MMHG | OXYGEN SATURATION: 99 %

## 2020-07-20 DIAGNOSIS — Z90.12 ACQUIRED ABSENCE OF LEFT BREAST AND NIPPLE: Chronic | ICD-10-CM

## 2020-07-20 DIAGNOSIS — Z98.51 TUBAL LIGATION STATUS: Chronic | ICD-10-CM

## 2020-07-20 DIAGNOSIS — Z90.49 ACQUIRED ABSENCE OF OTHER SPECIFIED PARTS OF DIGESTIVE TRACT: Chronic | ICD-10-CM

## 2020-07-20 DIAGNOSIS — K43.2 INCISIONAL HERNIA WITHOUT OBSTRUCTION OR GANGRENE: ICD-10-CM

## 2020-07-20 DIAGNOSIS — Z90.3 ACQUIRED ABSENCE OF STOMACH [PART OF]: Chronic | ICD-10-CM

## 2020-07-20 LAB
APTT BLD: 34.4 SEC
BLD GP AB SCN SERPL QL: NEGATIVE — SIGNIFICANT CHANGE UP
INR PPP: 1.06 RATIO
PT BLD: 12.5 SEC
RH IG SCN BLD-IMP: POSITIVE — SIGNIFICANT CHANGE UP

## 2020-07-20 PROCEDURE — 49655: CPT

## 2020-07-20 PROCEDURE — S2900: CPT

## 2020-07-20 PROCEDURE — 88302 TISSUE EXAM BY PATHOLOGIST: CPT | Mod: 26

## 2020-07-20 PROCEDURE — S2900 ROBOTIC SURGICAL SYSTEM: CPT | Mod: NC

## 2020-07-20 PROCEDURE — 49568: CPT

## 2020-07-20 PROCEDURE — 88302 TISSUE EXAM BY PATHOLOGIST: CPT

## 2020-07-20 PROCEDURE — 49560: CPT

## 2020-07-20 PROCEDURE — 86900 BLOOD TYPING SEROLOGIC ABO: CPT

## 2020-07-20 PROCEDURE — C9399: CPT

## 2020-07-20 PROCEDURE — 86901 BLOOD TYPING SEROLOGIC RH(D): CPT

## 2020-07-20 PROCEDURE — C1781: CPT

## 2020-07-20 PROCEDURE — 86850 RBC ANTIBODY SCREEN: CPT

## 2020-07-20 PROCEDURE — 44050 REDUCE BOWEL OBSTRUCTION: CPT

## 2020-07-20 RX ORDER — HYDROMORPHONE HYDROCHLORIDE 2 MG/ML
0.25 INJECTION INTRAMUSCULAR; INTRAVENOUS; SUBCUTANEOUS
Refills: 0 | Status: DISCONTINUED | OUTPATIENT
Start: 2020-07-20 | End: 2020-07-20

## 2020-07-20 RX ORDER — OXYCODONE HYDROCHLORIDE 5 MG/1
1 TABLET ORAL
Qty: 15 | Refills: 0
Start: 2020-07-20

## 2020-07-20 RX ORDER — SODIUM CHLORIDE 9 MG/ML
3 INJECTION INTRAMUSCULAR; INTRAVENOUS; SUBCUTANEOUS EVERY 8 HOURS
Refills: 0 | Status: DISCONTINUED | OUTPATIENT
Start: 2020-07-20 | End: 2020-07-20

## 2020-07-20 RX ORDER — CEFAZOLIN SODIUM 1 G
2000 VIAL (EA) INJECTION ONCE
Refills: 0 | Status: DISCONTINUED | OUTPATIENT
Start: 2020-07-20 | End: 2020-07-20

## 2020-07-20 RX ORDER — LIDOCAINE HCL 20 MG/ML
0.2 VIAL (ML) INJECTION ONCE
Refills: 0 | Status: DISCONTINUED | OUTPATIENT
Start: 2020-07-20 | End: 2020-07-20

## 2020-07-20 RX ORDER — ONDANSETRON 8 MG/1
4 TABLET, FILM COATED ORAL ONCE
Refills: 0 | Status: COMPLETED | OUTPATIENT
Start: 2020-07-20 | End: 2020-07-20

## 2020-07-20 RX ORDER — CHLORHEXIDINE GLUCONATE 213 G/1000ML
1 SOLUTION TOPICAL ONCE
Refills: 0 | Status: DISCONTINUED | OUTPATIENT
Start: 2020-07-20 | End: 2020-07-20

## 2020-07-20 RX ORDER — SODIUM CHLORIDE 9 MG/ML
3 INJECTION INTRAMUSCULAR; INTRAVENOUS; SUBCUTANEOUS EVERY 8 HOURS
Refills: 0 | Status: DISCONTINUED | OUTPATIENT
Start: 2020-07-20 | End: 2020-08-04

## 2020-07-20 RX ORDER — SODIUM CHLORIDE 9 MG/ML
1000 INJECTION, SOLUTION INTRAVENOUS
Refills: 0 | Status: DISCONTINUED | OUTPATIENT
Start: 2020-07-20 | End: 2020-07-20

## 2020-07-20 RX ADMIN — ONDANSETRON 4 MILLIGRAM(S): 8 TABLET, FILM COATED ORAL at 12:30

## 2020-07-20 RX ADMIN — SODIUM CHLORIDE 75 MILLILITER(S): 9 INJECTION, SOLUTION INTRAVENOUS at 12:33

## 2020-07-20 RX ADMIN — HYDROMORPHONE HYDROCHLORIDE 0.25 MILLIGRAM(S): 2 INJECTION INTRAMUSCULAR; INTRAVENOUS; SUBCUTANEOUS at 15:26

## 2020-07-20 NOTE — ASU DISCHARGE PLAN (ADULT/PEDIATRIC) - ASU DC SPECIAL INSTRUCTIONSFT
Remove the outer dressing tomorrow and shower  Leave the steri strips in place, they will fall off on their own  Do not do heavy lifting or exert yourself until cleared by Dr. Hayden  Make an appointment in 1-2 weeks with Dr. Hayden to follow up  Resume a regular diet and all of your home medications  Take tylenol and motrin for pain as needed. Take oxycodone as needed as prescribed for severe pain. Do not drive while taking pain medications

## 2020-07-20 NOTE — ASU DISCHARGE PLAN (ADULT/PEDIATRIC) - PAIN MANAGEMENT
oxycodone to [facility] in [place]/Pres[facility]

## 2020-07-20 NOTE — BRIEF OPERATIVE NOTE - NSICDXBRIEFPROCEDURE_GEN_ALL_CORE_FT
PROCEDURES:  Robot-assisted repair of ventral hernia using da Scarlett Xi 20-Jul-2020 11:15:14 with mesh Venessa Noriega

## 2020-07-20 NOTE — BRIEF OPERATIVE NOTE - OPERATION/FINDINGS
robot docked, adhesions lysed, two defect identified. superior defect closed with mesh underlay 6.8cm secured in place with running vlock. inferior defect small and closed primarily with vlock

## 2020-07-20 NOTE — CHART NOTE - NSCHARTNOTEFT_GEN_A_CORE
STATUS POST:      SUBJECTIVE: Pt seen in PACU 4 hours after procedure. Pt tolerated procedure well. Pt reports that she had nausea and non bilious non bloody vomiting.  It has since resolved and she has been tolerating sips.  She is appropriately tender in the abdomen around the incision and the incisions are c/d/i.  Patient is passing flatus, not having bowel movements.  SOB:  [ ] YES [x ] NO  Chest Discomfort: [ ] YES [ x] NO    Nausea: [x ] YES [ ] NO           Vomiting: [x ] YES [ ] NO  Flatus: [x ] YES [ ] NO             Bowel Movement: [ ] YES [x ] NO  Diarrhea: [ ] YES [x ] NO         Void: [ ]YES [x ]No  Constipation: [ ] YES [x ] NO     Pain (0-10):              Pain Control Adequate: [x ] YES [ ] NO  Tolerating PO sips of water: [x ] YES [ ] NO      OBJECTIVE:  PHYSICAL EXAM:  Gen: AAOx3, NAD, appears comfortable resting in chair  HEENT: NC/AT  RESP: Non labored breathing  Cardio: appears well perfused  ABDOMEN: Soft, mildly tender around incisions, ND. Incision site dressing C/D/I, wounds are not erythematous    Vital Signs Last 24 Hrs  T(C): 36.3 (20 Jul 2020 14:00), Max: 36.8 (20 Jul 2020 06:03)  T(F): 97.3 (20 Jul 2020 14:00), Max: 98.2 (20 Jul 2020 06:03)  HR: 61 (20 Jul 2020 14:00) (59 - 72)  BP: 98/50 (20 Jul 2020 14:00) (98/50 - 132/84)  BP(mean): 71 (20 Jul 2020 14:00) (71 - 80)  RR: 16 (20 Jul 2020 14:00) (16 - 18)  SpO2: 98% (20 Jul 2020 14:00) (98% - 100%)      A/P: 54y Female s/p Robot assisted hernia repair w/ mesh  - Diet: Regular  - Activity:OOB and into chair  -Pending void   -Pending food  -D/C to home after food and void STATUS POST:      SUBJECTIVE: Pt seen in PACU 2 hours after procedure. Pt tolerated procedure well. Pt reports that she had nausea and non bilious non bloody vomiting.  It has since resolved and she has been tolerating sips.  She is appropriately tender in the abdomen around the incision and the incisions are c/d/i.  Patient is passing flatus, not having bowel movements.  SOB:  [ ] YES [x ] NO  Chest Discomfort: [ ] YES [ x] NO    Nausea: [x ] YES [ ] NO           Vomiting: [x ] YES [ ] NO  Flatus: [x ] YES [ ] NO             Bowel Movement: [ ] YES [x ] NO  Diarrhea: [ ] YES [x ] NO         Void: [ ]YES [x ]No  Constipation: [ ] YES [x ] NO     Pain (0-10):              Pain Control Adequate: [x ] YES [ ] NO  Tolerating PO sips of water: [x ] YES [ ] NO      OBJECTIVE:  PHYSICAL EXAM:  Gen: AAOx3, NAD, appears comfortable resting in chair  HEENT: NC/AT  RESP: Non labored breathing  Cardio: appears well perfused  ABDOMEN: Soft, mildly tender around incisions, ND. Incision site dressing C/D/I, wounds are not erythematous    Vital Signs Last 24 Hrs  T(C): 36.3 (20 Jul 2020 14:00), Max: 36.8 (20 Jul 2020 06:03)  T(F): 97.3 (20 Jul 2020 14:00), Max: 98.2 (20 Jul 2020 06:03)  HR: 61 (20 Jul 2020 14:00) (59 - 72)  BP: 98/50 (20 Jul 2020 14:00) (98/50 - 132/84)  BP(mean): 71 (20 Jul 2020 14:00) (71 - 80)  RR: 16 (20 Jul 2020 14:00) (16 - 18)  SpO2: 98% (20 Jul 2020 14:00) (98% - 100%)      A/P: 54y Female s/p Robot assisted hernia repair w/ mesh  - Diet: Regular  - Activity:OOB and into chair  -Pending void   -Pending food  -D/C to home after food and void

## 2020-07-22 LAB — SURGICAL PATHOLOGY STUDY: SIGNIFICANT CHANGE UP

## 2020-07-30 ENCOUNTER — APPOINTMENT (OUTPATIENT)
Dept: SURGICAL ONCOLOGY | Facility: CLINIC | Age: 55
End: 2020-07-30
Payer: COMMERCIAL

## 2020-07-30 VITALS
DIASTOLIC BLOOD PRESSURE: 73 MMHG | WEIGHT: 149 LBS | SYSTOLIC BLOOD PRESSURE: 111 MMHG | BODY MASS INDEX: 31.28 KG/M2 | HEART RATE: 72 BPM | OXYGEN SATURATION: 100 % | HEIGHT: 58 IN

## 2020-07-30 PROCEDURE — 99024 POSTOP FOLLOW-UP VISIT: CPT

## 2020-07-30 NOTE — HISTORY OF PRESENT ILLNESS
[de-identified] : Patient is a 52 y/o female undergoing treatment for metastatic breast cancer who presented to [facility] with bleeding gastric ulcer that failed endoscopic and IR therapy.  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  She is continuing on treatment for metastatic breast cancer.  She reports occasional gas discomfort, but denies nausea/emesis.  Her weight is stable.  She denies melena or blood in stool.\par \par 04/28/2020: Patient presents to office with complaints of one month history of abdominal bulging.  She reports mild pain associated, but denies nausea/emesis.  She has had weight gain since her last visit and continues with Exemestane.  Her bowel movements are normal.\par \par 06/16/2020: Since her last visit, patient reports abdominal bulging has increased in size.  She denies worsening pain, nausea/emesis.  CT abdomen/pelvis demonstrates a 5 cm supraumbilical incisional hernia with bowel.\par \par 07/30/2020: Patient is s/p robotic incisional hernia repair 07/20/2020.  She reports recovering well.  She has improving mild abdominal pain.  She is tolerating diet.

## 2020-07-30 NOTE — ASSESSMENT
[FreeTextEntry1] : Doing well.\par \par No heavy or strenuous activity for one month.\par Follow up in 3 months.

## 2020-07-30 NOTE — PHYSICAL EXAM
[FreeTextEntry1] : Abdomen: soft, nontender/nondistended.  Left lateral abdominal incisions healing well.

## 2020-07-30 NOTE — REASON FOR VISIT
[Family Member] : family member [Post-Op] : a post-op for [FreeTextEntry2] : incisional hernia repair

## 2020-08-19 ENCOUNTER — NON-APPOINTMENT (OUTPATIENT)
Age: 55
End: 2020-08-19

## 2020-08-19 ENCOUNTER — APPOINTMENT (OUTPATIENT)
Dept: INTERNAL MEDICINE | Facility: CLINIC | Age: 55
End: 2020-08-19
Payer: COMMERCIAL

## 2020-08-19 VITALS
DIASTOLIC BLOOD PRESSURE: 80 MMHG | HEART RATE: 63 BPM | SYSTOLIC BLOOD PRESSURE: 131 MMHG | WEIGHT: 152 LBS | TEMPERATURE: 97.3 F | HEIGHT: 58 IN | RESPIRATION RATE: 17 BRPM | BODY MASS INDEX: 31.91 KG/M2 | OXYGEN SATURATION: 99 %

## 2020-08-19 DIAGNOSIS — Z12.11 ENCOUNTER FOR SCREENING FOR MALIGNANT NEOPLASM OF COLON: ICD-10-CM

## 2020-08-19 DIAGNOSIS — Z00.00 ENCOUNTER FOR GENERAL ADULT MEDICAL EXAMINATION W/OUT ABNORMAL FINDINGS: ICD-10-CM

## 2020-08-19 DIAGNOSIS — Z98.890 OTHER SPECIFIED POSTPROCEDURAL STATES: ICD-10-CM

## 2020-08-19 DIAGNOSIS — Z87.19 OTHER SPECIFIED POSTPROCEDURAL STATES: ICD-10-CM

## 2020-08-19 DIAGNOSIS — R21 RASH AND OTHER NONSPECIFIC SKIN ERUPTION: ICD-10-CM

## 2020-08-19 PROCEDURE — 93000 ELECTROCARDIOGRAM COMPLETE: CPT

## 2020-08-19 PROCEDURE — 99396 PREV VISIT EST AGE 40-64: CPT | Mod: 25

## 2020-08-19 NOTE — PLAN
[FreeTextEntry1] : 1.annual physical exam\par * routine general labs done\par * age appropriate health recommendations reviewed, discussed\par 2. metastatic breast carcinoma\par * stable, follow up with oncologist DR FRANSISCO ARMSTRONG\par 3.s/p incisional hernia repair healed well, no complains\par 4. skin rash resolved\par 5. HM\par * FIT test ordered\par * schedule follow up in two weeks to review labs results

## 2020-08-19 NOTE — HEALTH RISK ASSESSMENT
[No] : No [Fair] :  ~his/her~ mood as fair [0] : 2) Feeling down, depressed, or hopeless: Not at all (0) [No falls in past year] : Patient reported no falls in the past year [HIV test declined] : HIV test declined [Hepatitis C test declined] : Hepatitis C test declined [With Family] : lives with family [Health Literacy] : health literacy [Cultural] : cultural [Less Than High School] : less than high school [On disability] : on disability [] :  [# Of Children ___] : has [unfilled] children [Fully functional (bathing, dressing, toileting, transferring, walking, feeding)] : Fully functional (bathing, dressing, toileting, transferring, walking, feeding) [Fully functional (using the telephone, shopping, preparing meals, housekeeping, doing laundry, using] : Fully functional and needs no help or supervision to perform IADLs (using the telephone, shopping, preparing meals, housekeeping, doing laundry, using transportation, managing medications and managing finances) [Smoke Detector] : smoke detector [Seat Belt] :  uses seat belt [Carbon Monoxide Detector] : carbon monoxide detector [] : No [Change in mental status noted] : No change in mental status noted [Sexually Active] : not sexually active [Reports changes in hearing] : Reports no changes in hearing [Reports changes in vision] : Reports no changes in vision [Reports changes in dental health] : Reports no changes in dental health [Guns at Home] : no guns at home [MammogramDate] : 2018

## 2020-08-19 NOTE — HISTORY OF PRESENT ILLNESS
[de-identified] : 54 years old female here for annual physical exam, states rash on hands resolved; s/p incisional hernia repair doing well, denies pain

## 2020-08-19 NOTE — PHYSICAL EXAM
[No Acute Distress] : no acute distress [Well Nourished] : well nourished [Well Developed] : well developed [Well-Appearing] : well-appearing [Normal Sclera/Conjunctiva] : normal sclera/conjunctiva [PERRL] : pupils equal round and reactive to light [EOMI] : extraocular movements intact [Normal Outer Ear/Nose] : the outer ears and nose were normal in appearance [Normal Oropharynx] : the oropharynx was normal [No JVD] : no jugular venous distention [No Lymphadenopathy] : no lymphadenopathy [Supple] : supple [Thyroid Normal, No Nodules] : the thyroid was normal and there were no nodules present [No Respiratory Distress] : no respiratory distress  [No Accessory Muscle Use] : no accessory muscle use [Clear to Auscultation] : lungs were clear to auscultation bilaterally [Normal Rate] : normal rate  [Normal S1, S2] : normal S1 and S2 [Regular Rhythm] : with a regular rhythm [No Carotid Bruits] : no carotid bruits [No Murmur] : no murmur heard [Pedal Pulses Present] : the pedal pulses are present [No Varicosities] : no varicosities [No Abdominal Bruit] : a ~M bruit was not heard ~T in the abdomen [No Extremity Clubbing/Cyanosis] : no extremity clubbing/cyanosis [No Edema] : there was no peripheral edema [No Palpable Aorta] : no palpable aorta [Non-distended] : non-distended [Non Tender] : non-tender [Soft] : abdomen soft [No Masses] : no abdominal mass palpated [No HSM] : no HSM [Normal Anterior Cervical Nodes] : no anterior cervical lymphadenopathy [Normal Posterior Cervical Nodes] : no posterior cervical lymphadenopathy [Normal Bowel Sounds] : normal bowel sounds [No Spinal Tenderness] : no spinal tenderness [No CVA Tenderness] : no CVA  tenderness [No Joint Swelling] : no joint swelling [Grossly Normal Strength/Tone] : grossly normal strength/tone [No Rash] : no rash [Coordination Grossly Intact] : coordination grossly intact [No Focal Deficits] : no focal deficits [Normal Gait] : normal gait [Normal Affect] : the affect was normal [Normal Insight/Judgement] : insight and judgment were intact

## 2020-08-20 LAB
25(OH)D3 SERPL-MCNC: 26 NG/ML
ALBUMIN SERPL ELPH-MCNC: 4.8 G/DL
ALP BLD-CCNC: 146 U/L
ALT SERPL-CCNC: 19 U/L
ANION GAP SERPL CALC-SCNC: 12 MMOL/L
APPEARANCE: CLEAR
AST SERPL-CCNC: 24 U/L
BACTERIA: NEGATIVE
BASOPHILS # BLD AUTO: 0.02 K/UL
BASOPHILS NFR BLD AUTO: 0.3 %
BILIRUB SERPL-MCNC: <0.2 MG/DL
BILIRUBIN URINE: NEGATIVE
BLOOD URINE: NEGATIVE
BUN SERPL-MCNC: 17 MG/DL
CALCIUM SERPL-MCNC: 9.4 MG/DL
CHLORIDE SERPL-SCNC: 104 MMOL/L
CHOLEST SERPL-MCNC: 169 MG/DL
CHOLEST/HDLC SERPL: 4.5 RATIO
CO2 SERPL-SCNC: 24 MMOL/L
COLOR: NORMAL
CREAT SERPL-MCNC: 0.67 MG/DL
EOSINOPHIL # BLD AUTO: 0.19 K/UL
EOSINOPHIL NFR BLD AUTO: 2.5 %
ESTIMATED AVERAGE GLUCOSE: 105 MG/DL
GLUCOSE QUALITATIVE U: NEGATIVE
GLUCOSE SERPL-MCNC: 94 MG/DL
HBA1C MFR BLD HPLC: 5.3 %
HCT VFR BLD CALC: 38.6 %
HDLC SERPL-MCNC: 37 MG/DL
HGB BLD-MCNC: 11.9 G/DL
HYALINE CASTS: 0 /LPF
IMM GRANULOCYTES NFR BLD AUTO: 0.3 %
KETONES URINE: NEGATIVE
LDLC SERPL CALC-MCNC: 78 MG/DL
LEUKOCYTE ESTERASE URINE: NEGATIVE
LYMPHOCYTES # BLD AUTO: 3.79 K/UL
LYMPHOCYTES NFR BLD AUTO: 50.3 %
MAN DIFF?: NORMAL
MCHC RBC-ENTMCNC: 27.9 PG
MCHC RBC-ENTMCNC: 30.8 GM/DL
MCV RBC AUTO: 90.4 FL
MICROSCOPIC-UA: NORMAL
MONOCYTES # BLD AUTO: 0.67 K/UL
MONOCYTES NFR BLD AUTO: 8.9 %
NEUTROPHILS # BLD AUTO: 2.85 K/UL
NEUTROPHILS NFR BLD AUTO: 37.7 %
NITRITE URINE: NEGATIVE
PH URINE: 6.5
PLATELET # BLD AUTO: 265 K/UL
POTASSIUM SERPL-SCNC: 5.1 MMOL/L
PROT SERPL-MCNC: 7.2 G/DL
PROTEIN URINE: NORMAL
RBC # BLD: 4.27 M/UL
RBC # FLD: 14.7 %
RED BLOOD CELLS URINE: 3 /HPF
SODIUM SERPL-SCNC: 141 MMOL/L
SPECIFIC GRAVITY URINE: 1.02
SQUAMOUS EPITHELIAL CELLS: 1 /HPF
T4 FREE SERPL-MCNC: 1 NG/DL
TRIGL SERPL-MCNC: 269 MG/DL
TSH SERPL-ACNC: 3.11 UIU/ML
UROBILINOGEN URINE: NORMAL
VIT B12 SERPL-MCNC: 1073 PG/ML
WBC # FLD AUTO: 7.54 K/UL
WHITE BLOOD CELLS URINE: 1 /HPF

## 2020-08-23 ENCOUNTER — LABORATORY RESULT (OUTPATIENT)
Age: 55
End: 2020-08-23

## 2020-08-27 ENCOUNTER — APPOINTMENT (OUTPATIENT)
Dept: INTERNAL MEDICINE | Facility: CLINIC | Age: 55
End: 2020-08-27
Payer: COMMERCIAL

## 2020-08-27 ENCOUNTER — OUTPATIENT (OUTPATIENT)
Dept: OUTPATIENT SERVICES | Facility: HOSPITAL | Age: 55
LOS: 1 days | Discharge: HOME | End: 2020-08-27

## 2020-08-27 VITALS
TEMPERATURE: 97.9 F | RESPIRATION RATE: 16 BRPM | BODY MASS INDEX: 32.32 KG/M2 | WEIGHT: 154 LBS | HEART RATE: 69 BPM | HEIGHT: 58 IN | DIASTOLIC BLOOD PRESSURE: 60 MMHG | OXYGEN SATURATION: 98 % | SYSTOLIC BLOOD PRESSURE: 130 MMHG

## 2020-08-27 DIAGNOSIS — C50.919 MALIGNANT NEOPLASM OF UNSPECIFIED SITE OF UNSPECIFIED FEMALE BREAST: ICD-10-CM

## 2020-08-27 DIAGNOSIS — Z90.49 ACQUIRED ABSENCE OF OTHER SPECIFIED PARTS OF DIGESTIVE TRACT: Chronic | ICD-10-CM

## 2020-08-27 DIAGNOSIS — Z98.51 TUBAL LIGATION STATUS: Chronic | ICD-10-CM

## 2020-08-27 DIAGNOSIS — Z90.3 ACQUIRED ABSENCE OF STOMACH [PART OF]: Chronic | ICD-10-CM

## 2020-08-27 DIAGNOSIS — Z90.12 ACQUIRED ABSENCE OF LEFT BREAST AND NIPPLE: Chronic | ICD-10-CM

## 2020-08-27 PROCEDURE — 90471 IMMUNIZATION ADMIN: CPT

## 2020-08-27 PROCEDURE — 99214 OFFICE O/P EST MOD 30 MIN: CPT | Mod: 25

## 2020-08-27 PROCEDURE — 90686 IIV4 VACC NO PRSV 0.5 ML IM: CPT

## 2020-08-27 NOTE — HISTORY OF PRESENT ILLNESS
[FreeTextEntry1] : follow up for labs results\par Interview and discussion conducted in Burmese by Burmese speaking physician\par  [de-identified] : patient here today to review and discuss labs results, no acute complains\par

## 2020-08-27 NOTE — PHYSICAL EXAM
[No Acute Distress] : no acute distress [Well Nourished] : well nourished [Well Developed] : well developed [Normal Sclera/Conjunctiva] : normal sclera/conjunctiva [Well-Appearing] : well-appearing [PERRL] : pupils equal round and reactive to light [EOMI] : extraocular movements intact [Normal Outer Ear/Nose] : the outer ears and nose were normal in appearance [Normal Oropharynx] : the oropharynx was normal [No Lymphadenopathy] : no lymphadenopathy [No JVD] : no jugular venous distention [Thyroid Normal, No Nodules] : the thyroid was normal and there were no nodules present [Supple] : supple [No Respiratory Distress] : no respiratory distress  [No Accessory Muscle Use] : no accessory muscle use [Normal Rate] : normal rate  [Regular Rhythm] : with a regular rhythm [Clear to Auscultation] : lungs were clear to auscultation bilaterally [No Murmur] : no murmur heard [Normal S1, S2] : normal S1 and S2 [No Abdominal Bruit] : a ~M bruit was not heard ~T in the abdomen [No Carotid Bruits] : no carotid bruits [No Varicosities] : no varicosities [Pedal Pulses Present] : the pedal pulses are present [No Edema] : there was no peripheral edema [No Palpable Aorta] : no palpable aorta [No Extremity Clubbing/Cyanosis] : no extremity clubbing/cyanosis [Non-distended] : non-distended [Soft] : abdomen soft [Non Tender] : non-tender [No Masses] : no abdominal mass palpated [No HSM] : no HSM [Normal Posterior Cervical Nodes] : no posterior cervical lymphadenopathy [Normal Bowel Sounds] : normal bowel sounds [Normal Anterior Cervical Nodes] : no anterior cervical lymphadenopathy [No CVA Tenderness] : no CVA  tenderness [No Joint Swelling] : no joint swelling [No Spinal Tenderness] : no spinal tenderness [No Rash] : no rash [Grossly Normal Strength/Tone] : grossly normal strength/tone [No Focal Deficits] : no focal deficits [Coordination Grossly Intact] : coordination grossly intact [Normal Insight/Judgement] : insight and judgment were intact [Normal Affect] : the affect was normal [Normal Gait] : normal gait

## 2020-08-27 NOTE — PLAN
[FreeTextEntry1] : 1. metastatic breast carcinoma\par * clinically stable\par 2. hypertriglyceridemia\par low cholesterol, low triglycerides diet,dietary counseling given; dietary avoidance discussed; diet and exercise reviewed with patient\par '3. vitamin d deficiency\par * start oral supplement\par 4. HM\par * influenza vaccine\par schedule follow up in three months\par

## 2020-09-02 ENCOUNTER — APPOINTMENT (OUTPATIENT)
Dept: HEMATOLOGY ONCOLOGY | Facility: CLINIC | Age: 55
End: 2020-09-02
Payer: COMMERCIAL

## 2020-09-02 ENCOUNTER — RESULT REVIEW (OUTPATIENT)
Age: 55
End: 2020-09-02

## 2020-09-02 VITALS
TEMPERATURE: 98.8 F | RESPIRATION RATE: 17 BRPM | DIASTOLIC BLOOD PRESSURE: 77 MMHG | SYSTOLIC BLOOD PRESSURE: 113 MMHG | WEIGHT: 154.76 LBS | HEIGHT: 59.92 IN | BODY MASS INDEX: 30.38 KG/M2 | OXYGEN SATURATION: 98 % | HEART RATE: 65 BPM

## 2020-09-02 LAB
BASOPHILS # BLD AUTO: 0.02 K/UL — SIGNIFICANT CHANGE UP (ref 0–0.2)
BASOPHILS NFR BLD AUTO: 0.2 % — SIGNIFICANT CHANGE UP (ref 0–2)
EOSINOPHIL # BLD AUTO: 0.17 K/UL — SIGNIFICANT CHANGE UP (ref 0–0.5)
EOSINOPHIL NFR BLD AUTO: 1.8 % — SIGNIFICANT CHANGE UP (ref 0–6)
HCT VFR BLD CALC: 37.5 % — SIGNIFICANT CHANGE UP (ref 34.5–45)
HGB BLD-MCNC: 12.1 G/DL — SIGNIFICANT CHANGE UP (ref 11.5–15.5)
IMM GRANULOCYTES NFR BLD AUTO: 0.3 % — SIGNIFICANT CHANGE UP (ref 0–1.5)
LYMPHOCYTES # BLD AUTO: 3.83 K/UL — HIGH (ref 1–3.3)
LYMPHOCYTES # BLD AUTO: 41.7 % — SIGNIFICANT CHANGE UP (ref 13–44)
MCHC RBC-ENTMCNC: 28.2 PG — SIGNIFICANT CHANGE UP (ref 27–34)
MCHC RBC-ENTMCNC: 32.3 GM/DL — SIGNIFICANT CHANGE UP (ref 32–36)
MCV RBC AUTO: 87.4 FL — SIGNIFICANT CHANGE UP (ref 80–100)
MONOCYTES # BLD AUTO: 0.65 K/UL — SIGNIFICANT CHANGE UP (ref 0–0.9)
MONOCYTES NFR BLD AUTO: 7.1 % — SIGNIFICANT CHANGE UP (ref 2–14)
NEUTROPHILS # BLD AUTO: 4.49 K/UL — SIGNIFICANT CHANGE UP (ref 1.8–7.4)
NEUTROPHILS NFR BLD AUTO: 48.9 % — SIGNIFICANT CHANGE UP (ref 43–77)
NRBC # BLD: 0 /100 WBCS — SIGNIFICANT CHANGE UP (ref 0–0)
PLATELET # BLD AUTO: 310 K/UL — SIGNIFICANT CHANGE UP (ref 150–400)
RBC # BLD: 4.29 M/UL — SIGNIFICANT CHANGE UP (ref 3.8–5.2)
RBC # FLD: 14.6 % — HIGH (ref 10.3–14.5)
WBC # BLD: 9.19 K/UL — SIGNIFICANT CHANGE UP (ref 3.8–10.5)
WBC # FLD AUTO: 9.19 K/UL — SIGNIFICANT CHANGE UP (ref 3.8–10.5)

## 2020-09-02 PROCEDURE — 99214 OFFICE O/P EST MOD 30 MIN: CPT

## 2020-09-02 NOTE — PHYSICAL EXAM
[Fully active, able to carry on all pre-disease performance without restriction] : Status 0 - Fully active, able to carry on all pre-disease performance without restriction [Normal] : affect appropriate [de-identified] : R breast s/p mastectomy with a well healed scar. R breast without any palp masses, nipple retraction. \par R s/p MRM with well healed scar, no masses. Lw/o nipple retarction skin dimpling o rpalp masses. B/L ax neg [de-identified] : well healing surgical scar with asscoiated tenderness but otherwise unremarkable

## 2020-09-02 NOTE — HISTORY OF PRESENT ILLNESS
[Medical Office: ([facility])___] : at the medical office located in  [Home] : at home, [unfilled] , at the time of the visit. [Patient] : the patient [Self] : self [Other:____] : [unfilled] [de-identified] : The patients' history of present illness began in 2012 at which time she was found to have a right breast cancer for which she underwent a right modified radical mastectomy/axillary lymph node dissection with the finding of an ER positive 20%, TX positive 10%, HER-2/lisa 2+, FISH amplified breast cancer per outside physician report (I do not have a copy that pathology report for my review).  She went on to receive adjuvant dose-dense AC chemotherapy for 4 cycles followed by Taxol plus Herceptin as well as adjuvant radiation therapy.  She was subsequently begun adjuvant tamoxifen therapy.  Her last menstrual period was in 2014.\par \par The patient then began to experience weight loss beginning in November 2018 and over 3-month period of time lost 30 pounds per her report.  At approximately the same time she developed increasing shortness of breath, and ultimately presented to [facility] Emergency Department.  She subsequently had a CT scan of the chest, which showed multiple mediastinal hilar right subclavian lymph nodes corresponding to abnormal FDG activity on PET scan consistent with metastatic carcinoma; there were tiny nodularities in both lungs- too small to accurately characterize- not present on prior examination and measuring up to 2 mm in both lungs.  The possibility of metastasis was a consideration; she had no bony metastases; the liver demonstrated tiny poorly defined lesions less than 5 mm and difficult to characterize and visible on prior examinations with the possibility of metastasis also considered.  A PET-CT scan showed multiple hypermetabolic lymph nodes in the mediastinum, right subclavian region and both adebayo felt to represent tumor; there were stellate lesions in the left breast region, felt to represent benign scars; the lungs demonstrated no significant abnormality with multiple tiny lung nodules seen on CT as previously noted; liver demonstrated no significant abnormality with multiple small poorly defined nodules seen on CT per my previous comments.  The patient underwent a left breast core biopsy of the area of concern with a finding of fat necrosis and foreign body giant cell reaction as well as a separate fine-needle aspiration consistent with a ruptured cyst/fat necrosis.  The patient consequently saw her oncologist, Dr. Carias, who sent the patient for ultrasound-guided core biopsies of the mediastinal lymph nodes with a finding of carcinoma, consistent with breast primary with estrogen receptor returning positive, greater than 50%, progesterone receptor negative, and HER-2/lisa 0 per outside report. The patient was subsequently begun on docetaxel, pertuzumab, trastuzumab, and exemestane beginning at the early 4/19, and subsequently switched to weekly paclitaxel secondary to side effects and continuing  on trastuzumab / pertuzumab every 3 weeks as well as exemestane. The patient was last treated per her report on approximately June 12, 2019, despite outside records that show ongoing weekly treatments early July 2019.  \par \par The patient also reports that beginning in approximately 12/18, she started to develop slowly progressive diffuse rash with associated pruritus, having seen multiple dermatologists.  Most recently, she saw Dr. Courtney Cowart, a [facility] physician practicing at [place], [place], who diagnosed disseminated herpes simplex for which she was begun on valacyclovir, as well as evidence of MRSA for which was treated with doxycycline beginning in May 2019.  In June 2019, the patient presented to [facility] Emergency Department complaining of progressive "weakness and shaking." She was found to be anemic and had melena.  She underwent endoscopy and VIR procedure to stop gastric bleeding, which was unsuccessful.  Consequently, she had a distal gastrectomy.  She was discharged from [facility] on 07/08/2019.\par \par The patient was initial consultation 7/24/19 for a further opinion regarding treatment recommendations. She noted ongoing weakness, fatigue, and generalized malaise, although all of these had begun to improve gradually.  Her last chemotherapy as noted above was approximately at 06/12/2019.  She reports that her primary oncologist, Dr. Carias, told to discontinue exemestane upon admission to [facility] with the consideration restarting it post discharge.  \par \par I obtained her outside slides for review at [facility] and it was confirmed that she had met breast cancer ER+ TX neg and Her 2 lisa neg.\par \par Consequently I recommended restarting exemestane.  \par \par \par  [de-identified] : Pt seen today for a routine f/u visit.\par \par On Exemestane and tolerating well. She notes a good appetite gradually increasing weight and excellent performance status. \par \par Had recent ventral abd hernia repair with Dr Hayden - notes mild residual post op pain but overall feels better. .  \par \par CT CAP 2/20 : no mets

## 2020-09-05 LAB
ALBUMIN SERPL ELPH-MCNC: 4.7 G/DL
ALP BLD-CCNC: 143 U/L
ALT SERPL-CCNC: 21 U/L
ANION GAP SERPL CALC-SCNC: 17 MMOL/L
AST SERPL-CCNC: 24 U/L
BILIRUB SERPL-MCNC: 0.2 MG/DL
BUN SERPL-MCNC: 14 MG/DL
CALCIUM SERPL-MCNC: 9.5 MG/DL
CANCER AG27-29 SERPL-ACNC: 12.4 U/ML
CEA SERPL-MCNC: 1.6 NG/ML
CHLORIDE SERPL-SCNC: 104 MMOL/L
CO2 SERPL-SCNC: 23 MMOL/L
CREAT SERPL-MCNC: 0.5 MG/DL
GLUCOSE SERPL-MCNC: 97 MG/DL
POTASSIUM SERPL-SCNC: 4.5 MMOL/L
PROT SERPL-MCNC: 7.2 G/DL
SODIUM SERPL-SCNC: 144 MMOL/L

## 2020-10-13 ENCOUNTER — APPOINTMENT (OUTPATIENT)
Dept: SURGICAL ONCOLOGY | Facility: CLINIC | Age: 55
End: 2020-10-13
Payer: COMMERCIAL

## 2020-10-13 VITALS
SYSTOLIC BLOOD PRESSURE: 128 MMHG | HEART RATE: 66 BPM | OXYGEN SATURATION: 98 % | DIASTOLIC BLOOD PRESSURE: 79 MMHG | BODY MASS INDEX: 31.04 KG/M2 | HEIGHT: 59 IN | WEIGHT: 154 LBS | RESPIRATION RATE: 17 BRPM

## 2020-10-13 DIAGNOSIS — K43.2 INCISIONAL HERNIA W/OUT OBSTRUCTION OR GANGRENE: ICD-10-CM

## 2020-10-13 PROCEDURE — 99024 POSTOP FOLLOW-UP VISIT: CPT

## 2020-10-13 NOTE — PHYSICAL EXAM
[FreeTextEntry1] : Abdomen: soft, nontender/nondistended.  Left lateral abdominal incisions well healed.  No evidence of recurrent incisional hernia.

## 2020-10-13 NOTE — HISTORY OF PRESENT ILLNESS
[de-identified] : Patient is a 54 y/o female undergoing treatment for metastatic breast cancer who presented to [facility] with bleeding gastric ulcer that failed endoscopic and IR therapy.  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  She is continuing on treatment for metastatic breast cancer.  She reports occasional gas discomfort, but denies nausea/emesis.  Her weight is stable.  She denies melena or blood in stool.\par \par 04/28/2020: Patient presents to office with complaints of one month history of abdominal bulging.  She reports mild pain associated, but denies nausea/emesis.  She has had weight gain since her last visit and continues with Exemestane.  Her bowel movements are normal.\par \par 06/16/2020: Since her last visit, patient reports abdominal bulging has increased in size.  She denies worsening pain, nausea/emesis.  CT abdomen/pelvis demonstrates a 5 cm supraumbilical incisional hernia with bowel.\par \par 07/30/2020: Patient is s/p robotic incisional hernia repair 07/20/2020.  She reports recovering well.  She has improving mild abdominal pain.  She is tolerating diet.\par \par 10/13/2020: Doing well.  Tolerating diet without abdominal pain, nausea/emesis.  No further abdominal bulging.

## 2020-11-03 ENCOUNTER — APPOINTMENT (OUTPATIENT)
Dept: INTERNAL MEDICINE | Facility: CLINIC | Age: 55
End: 2020-11-03
Payer: COMMERCIAL

## 2020-11-03 VITALS
TEMPERATURE: 98.7 F | HEART RATE: 65 BPM | SYSTOLIC BLOOD PRESSURE: 112 MMHG | BODY MASS INDEX: 31.04 KG/M2 | DIASTOLIC BLOOD PRESSURE: 74 MMHG | OXYGEN SATURATION: 99 % | RESPIRATION RATE: 17 BRPM | WEIGHT: 154 LBS | HEIGHT: 59 IN

## 2020-11-03 DIAGNOSIS — Z23 ENCOUNTER FOR IMMUNIZATION: ICD-10-CM

## 2020-11-03 LAB
25(OH)D3 SERPL-MCNC: 27.5 NG/ML
ALBUMIN SERPL ELPH-MCNC: 4.6 G/DL
ALP BLD-CCNC: 155 U/L
ALT SERPL-CCNC: 18 U/L
ANION GAP SERPL CALC-SCNC: 10 MMOL/L
AST SERPL-CCNC: 22 U/L
BILIRUB SERPL-MCNC: 0.2 MG/DL
BUN SERPL-MCNC: 18 MG/DL
CALCIUM SERPL-MCNC: 9.3 MG/DL
CHLORIDE SERPL-SCNC: 106 MMOL/L
CHOLEST SERPL-MCNC: 174 MG/DL
CO2 SERPL-SCNC: 25 MMOL/L
CREAT SERPL-MCNC: 0.5 MG/DL
GLUCOSE SERPL-MCNC: 102 MG/DL
HDLC SERPL-MCNC: 40 MG/DL
LDLC SERPL CALC-MCNC: 103 MG/DL
NONHDLC SERPL-MCNC: 133 MG/DL
POTASSIUM SERPL-SCNC: 4.5 MMOL/L
PROT SERPL-MCNC: 7.4 G/DL
SODIUM SERPL-SCNC: 141 MMOL/L
TRIGL SERPL-MCNC: 153 MG/DL

## 2020-11-03 PROCEDURE — 99214 OFFICE O/P EST MOD 30 MIN: CPT | Mod: 25

## 2020-11-03 PROCEDURE — 90670 PCV13 VACCINE IM: CPT

## 2020-11-03 PROCEDURE — 90471 IMMUNIZATION ADMIN: CPT

## 2020-11-03 PROCEDURE — 99072 ADDL SUPL MATRL&STAF TM PHE: CPT

## 2020-11-03 NOTE — ASSESSMENT
[FreeTextEntry1] : 1. metastatic breast carcinoma\par * patient stable ; follow up with oncology\par * on Exemestane 25 mg\par 2. hypertriglyceridemia\par * dietary counselling\par * check lipid panel today\par 3. vitamin d deficiency\par * check level\par * continue oral supplement\par 4. HM\par * Prevnar vaccine administered\par * schedule follow up in two weeks for labs results review

## 2020-11-03 NOTE — HISTORY OF PRESENT ILLNESS
[FreeTextEntry1] : follow up breast carcinoma, hyperlipidemia\par Interview and discussion conducted in [place] by [place] speaking physician\par  [de-identified] : 55 years old female here for follow up, states feeling well, no acute complains, she was seen by oncologist in September, stable,

## 2020-11-28 ENCOUNTER — OUTPATIENT (OUTPATIENT)
Dept: OUTPATIENT SERVICES | Facility: HOSPITAL | Age: 55
LOS: 1 days | Discharge: HOME | End: 2020-11-28

## 2020-11-28 DIAGNOSIS — Z90.3 ACQUIRED ABSENCE OF STOMACH [PART OF]: Chronic | ICD-10-CM

## 2020-11-28 DIAGNOSIS — Z90.49 ACQUIRED ABSENCE OF OTHER SPECIFIED PARTS OF DIGESTIVE TRACT: Chronic | ICD-10-CM

## 2020-11-28 DIAGNOSIS — Z98.51 TUBAL LIGATION STATUS: Chronic | ICD-10-CM

## 2020-11-28 DIAGNOSIS — C50.919 MALIGNANT NEOPLASM OF UNSPECIFIED SITE OF UNSPECIFIED FEMALE BREAST: ICD-10-CM

## 2020-11-28 DIAGNOSIS — Z90.12 ACQUIRED ABSENCE OF LEFT BREAST AND NIPPLE: Chronic | ICD-10-CM

## 2020-12-11 ENCOUNTER — APPOINTMENT (OUTPATIENT)
Dept: INTERNAL MEDICINE | Facility: CLINIC | Age: 55
End: 2020-12-11
Payer: COMMERCIAL

## 2020-12-11 VITALS
HEART RATE: 67 BPM | OXYGEN SATURATION: 98 % | SYSTOLIC BLOOD PRESSURE: 122 MMHG | HEIGHT: 59 IN | TEMPERATURE: 98.2 F | WEIGHT: 155 LBS | RESPIRATION RATE: 17 BRPM | BODY MASS INDEX: 31.25 KG/M2 | DIASTOLIC BLOOD PRESSURE: 77 MMHG

## 2020-12-11 PROCEDURE — 99214 OFFICE O/P EST MOD 30 MIN: CPT

## 2020-12-11 PROCEDURE — 99072 ADDL SUPL MATRL&STAF TM PHE: CPT

## 2020-12-11 NOTE — HISTORY OF PRESENT ILLNESS
[FreeTextEntry1] : follow up for labs results\par Interview and discussion conducted in Kyrgyz by Kyrgyz speaking physician\par \par  [de-identified] : patient here today to review and discuss labs results, no acute complains\par

## 2020-12-11 NOTE — ASSESSMENT
[FreeTextEntry1] : 1. metastatic breast carcinoma\par * patient stable, follow up with oncologist, scheduled for 12/14/2020\par 2. hypercholesterolemia\par low cholesterol, low triglycerides diet,dietary counseling given; dietary avoidance discussed; diet and exercise reviewed with patient\par 3. vitamin d deficiency\par * to continue oral weekly supplement, renewed\par 4. colon cancer screening\par * gastroenterologist referral\par * schedule follow up in three months

## 2020-12-14 ENCOUNTER — RESULT REVIEW (OUTPATIENT)
Age: 55
End: 2020-12-14

## 2020-12-14 ENCOUNTER — APPOINTMENT (OUTPATIENT)
Dept: HEMATOLOGY ONCOLOGY | Facility: CLINIC | Age: 55
End: 2020-12-14
Payer: COMMERCIAL

## 2020-12-14 VITALS
OXYGEN SATURATION: 99 % | DIASTOLIC BLOOD PRESSURE: 73 MMHG | SYSTOLIC BLOOD PRESSURE: 109 MMHG | HEIGHT: 58.98 IN | BODY MASS INDEX: 31.47 KG/M2 | HEART RATE: 80 BPM | WEIGHT: 156.09 LBS | RESPIRATION RATE: 16 BRPM | TEMPERATURE: 96.8 F

## 2020-12-14 LAB
BASOPHILS # BLD AUTO: 0.01 K/UL — SIGNIFICANT CHANGE UP (ref 0–0.2)
BASOPHILS NFR BLD AUTO: 0.1 % — SIGNIFICANT CHANGE UP (ref 0–2)
EOSINOPHIL # BLD AUTO: 0.12 K/UL — SIGNIFICANT CHANGE UP (ref 0–0.5)
EOSINOPHIL NFR BLD AUTO: 1.7 % — SIGNIFICANT CHANGE UP (ref 0–6)
HCT VFR BLD CALC: 34.9 % — SIGNIFICANT CHANGE UP (ref 34.5–45)
HGB BLD-MCNC: 11.6 G/DL — SIGNIFICANT CHANGE UP (ref 11.5–15.5)
IMM GRANULOCYTES NFR BLD AUTO: 0.1 % — SIGNIFICANT CHANGE UP (ref 0–1.5)
LYMPHOCYTES # BLD AUTO: 3.03 K/UL — SIGNIFICANT CHANGE UP (ref 1–3.3)
LYMPHOCYTES # BLD AUTO: 42.4 % — SIGNIFICANT CHANGE UP (ref 13–44)
MCHC RBC-ENTMCNC: 27.8 PG — SIGNIFICANT CHANGE UP (ref 27–34)
MCHC RBC-ENTMCNC: 33.2 G/DL — SIGNIFICANT CHANGE UP (ref 32–36)
MCV RBC AUTO: 83.7 FL — SIGNIFICANT CHANGE UP (ref 80–100)
MONOCYTES # BLD AUTO: 0.43 K/UL — SIGNIFICANT CHANGE UP (ref 0–0.9)
MONOCYTES NFR BLD AUTO: 6 % — SIGNIFICANT CHANGE UP (ref 2–14)
NEUTROPHILS # BLD AUTO: 3.54 K/UL — SIGNIFICANT CHANGE UP (ref 1.8–7.4)
NEUTROPHILS NFR BLD AUTO: 49.7 % — SIGNIFICANT CHANGE UP (ref 43–77)
NRBC # BLD: 0 /100 WBCS — SIGNIFICANT CHANGE UP (ref 0–0)
PLATELET # BLD AUTO: 269 K/UL — SIGNIFICANT CHANGE UP (ref 150–400)
RBC # BLD: 4.17 M/UL — SIGNIFICANT CHANGE UP (ref 3.8–5.2)
RBC # FLD: 15.5 % — HIGH (ref 10.3–14.5)
WBC # BLD: 7.14 K/UL — SIGNIFICANT CHANGE UP (ref 3.8–10.5)
WBC # FLD AUTO: 7.14 K/UL — SIGNIFICANT CHANGE UP (ref 3.8–10.5)

## 2020-12-14 PROCEDURE — 99214 OFFICE O/P EST MOD 30 MIN: CPT

## 2020-12-14 PROCEDURE — 99072 ADDL SUPL MATRL&STAF TM PHE: CPT

## 2020-12-14 RX ORDER — LORATADINE 10 MG/1
10 TABLET ORAL DAILY
Qty: 10 | Refills: 0 | Status: DISCONTINUED | COMMUNITY
Start: 2020-08-14 | End: 2020-12-14

## 2020-12-14 RX ORDER — MOMETASONE FUROATE 1 MG/G
0.1 CREAM TOPICAL DAILY
Qty: 1 | Refills: 0 | Status: DISCONTINUED | COMMUNITY
Start: 2020-08-14 | End: 2020-12-14

## 2020-12-14 NOTE — HISTORY OF PRESENT ILLNESS
[Home] : at home, [unfilled] , at the time of the visit. [Medical Office: ([facility])___] : at the medical office located in  [Other:____] : [unfilled] [Patient] : the patient [Self] : self [de-identified] : The patients' history of present illness began in 2012 at which time she was found to have a right breast cancer for which she underwent a right modified radical mastectomy/axillary lymph node dissection with the finding of an ER positive 20%, LA positive 10%, HER-2/lisa 2+, FISH amplified breast cancer per outside physician report (I do not have a copy that pathology report for my review).  She went on to receive adjuvant dose-dense AC chemotherapy for 4 cycles followed by Taxol plus Herceptin as well as adjuvant radiation therapy.  She was subsequently begun adjuvant tamoxifen therapy.  Her last menstrual period was in 2014.\par \par The patient then began to experience weight loss beginning in November 2018 and over 3-month period of time lost 30 pounds per her report.  At approximately the same time she developed increasing shortness of breath, and ultimately presented to [facility] Emergency Department.  She subsequently had a CT scan of the chest, which showed multiple mediastinal hilar right subclavian lymph nodes corresponding to abnormal FDG activity on PET scan consistent with metastatic carcinoma; there were tiny nodularities in both lungs- too small to accurately characterize- not present on prior examination and measuring up to 2 mm in both lungs.  The possibility of metastasis was a consideration; she had no bony metastases; the liver demonstrated tiny poorly defined lesions less than 5 mm and difficult to characterize and visible on prior examinations with the possibility of metastasis also considered.  A PET-CT scan showed multiple hypermetabolic lymph nodes in the mediastinum, right subclavian region and both adebayo felt to represent tumor; there were stellate lesions in the left breast region, felt to represent benign scars; the lungs demonstrated no significant abnormality with multiple tiny lung nodules seen on CT as previously noted; liver demonstrated no significant abnormality with multiple small poorly defined nodules seen on CT per my previous comments.  The patient underwent a left breast core biopsy of the area of concern with a finding of fat necrosis and foreign body giant cell reaction as well as a separate fine-needle aspiration consistent with a ruptured cyst/fat necrosis.  The patient consequently saw her oncologist, Dr. Carias, who sent the patient for ultrasound-guided core biopsies of the mediastinal lymph nodes with a finding of carcinoma, consistent with breast primary with estrogen receptor returning positive, greater than 50%, progesterone receptor negative, and HER-2/lisa 0 per outside report. The patient was subsequently begun on docetaxel, pertuzumab, trastuzumab, and exemestane beginning at the early 4/19, and subsequently switched to weekly paclitaxel secondary to side effects and continuing  on trastuzumab / pertuzumab every 3 weeks as well as exemestane. The patient was last treated per her report on approximately June 12, 2019, despite outside records that show ongoing weekly treatments early July 2019.  \par \par The patient also reports that beginning in approximately 12/18, she started to develop slowly progressive diffuse rash with associated pruritus, having seen multiple dermatologists.  Most recently, she saw Dr. Courtney Cowart, a [facility] physician practicing at [place], [place], who diagnosed disseminated herpes simplex for which she was begun on valacyclovir, as well as evidence of MRSA for which was treated with doxycycline beginning in May 2019.  In June 2019, the patient presented to [facility] Emergency Department complaining of progressive "weakness and shaking." She was found to be anemic and had melena.  She underwent endoscopy and VIR procedure to stop gastric bleeding, which was unsuccessful.  Consequently, she had a distal gastrectomy.  She was discharged from [facility] on 07/08/2019.\par \par The patient was initial consultation 7/24/19 for a further opinion regarding treatment recommendations. She noted ongoing weakness, fatigue, and generalized malaise, although all of these had begun to improve gradually.  Her last chemotherapy as noted above was approximately at 06/12/2019.  She reports that her primary oncologist, Dr. Carias, told to discontinue exemestane upon admission to [facility] with the consideration restarting it post discharge.  \par \par I obtained her outside slides for review at [facility] and it was confirmed that she had met breast cancer ER+ LA neg and Her 2 lisa neg.\par \par Consequently I recommended restarting exemestane.  \par \par \par  [de-identified] : Pt seen today for a routine f/u visit.\par \par On Exemestane and tolerating well. She notes a good appetite, gradually increasing weight and excellent performance status. She denies any residual sxs of depression: "I am back to normal"\par \par CT chest 2/20: no mets\par CT CAP 5/20 : no mets

## 2020-12-15 LAB
ALBUMIN SERPL ELPH-MCNC: 4.5 G/DL
ALP BLD-CCNC: 155 U/L
ALT SERPL-CCNC: 24 U/L
ANION GAP SERPL CALC-SCNC: 16 MMOL/L
AST SERPL-CCNC: 24 U/L
BILIRUB SERPL-MCNC: 0.2 MG/DL
BUN SERPL-MCNC: 17 MG/DL
CALCIUM SERPL-MCNC: 9.1 MG/DL
CANCER AG27-29 SERPL-ACNC: 11.6 U/ML
CEA SERPL-MCNC: 1.3 NG/ML
CHLORIDE SERPL-SCNC: 108 MMOL/L
CO2 SERPL-SCNC: 19 MMOL/L
CREAT SERPL-MCNC: 0.64 MG/DL
GLUCOSE SERPL-MCNC: 96 MG/DL
POTASSIUM SERPL-SCNC: 4.4 MMOL/L
PROT SERPL-MCNC: 7.1 G/DL
SODIUM SERPL-SCNC: 143 MMOL/L

## 2020-12-23 PROBLEM — Z12.11 ENCOUNTER FOR FIT (FECAL IMMUNOCHEMICAL TEST) SCREENING: Status: RESOLVED | Noted: 2020-08-19 | Resolved: 2020-12-23

## 2021-01-07 ENCOUNTER — APPOINTMENT (OUTPATIENT)
Dept: NUCLEAR MEDICINE | Facility: IMAGING CENTER | Age: 56
End: 2021-01-07
Payer: COMMERCIAL

## 2021-01-07 ENCOUNTER — APPOINTMENT (OUTPATIENT)
Dept: CT IMAGING | Facility: IMAGING CENTER | Age: 56
End: 2021-01-07
Payer: COMMERCIAL

## 2021-01-07 ENCOUNTER — RESULT REVIEW (OUTPATIENT)
Age: 56
End: 2021-01-07

## 2021-01-07 ENCOUNTER — APPOINTMENT (OUTPATIENT)
Dept: RADIOLOGY | Facility: IMAGING CENTER | Age: 56
End: 2021-01-07
Payer: COMMERCIAL

## 2021-01-07 ENCOUNTER — OUTPATIENT (OUTPATIENT)
Dept: OUTPATIENT SERVICES | Facility: HOSPITAL | Age: 56
LOS: 1 days | End: 2021-01-07
Payer: COMMERCIAL

## 2021-01-07 DIAGNOSIS — Z90.49 ACQUIRED ABSENCE OF OTHER SPECIFIED PARTS OF DIGESTIVE TRACT: Chronic | ICD-10-CM

## 2021-01-07 DIAGNOSIS — Z90.3 ACQUIRED ABSENCE OF STOMACH [PART OF]: Chronic | ICD-10-CM

## 2021-01-07 DIAGNOSIS — Z00.8 ENCOUNTER FOR OTHER GENERAL EXAMINATION: ICD-10-CM

## 2021-01-07 DIAGNOSIS — Z98.51 TUBAL LIGATION STATUS: Chronic | ICD-10-CM

## 2021-01-07 DIAGNOSIS — Z90.12 ACQUIRED ABSENCE OF LEFT BREAST AND NIPPLE: Chronic | ICD-10-CM

## 2021-01-07 PROCEDURE — 78306 BONE IMAGING WHOLE BODY: CPT | Mod: 26

## 2021-01-07 PROCEDURE — 71260 CT THORAX DX C+: CPT

## 2021-01-07 PROCEDURE — 78830 RP LOCLZJ TUM SPECT W/CT 1: CPT | Mod: 26

## 2021-01-07 PROCEDURE — 71260 CT THORAX DX C+: CPT | Mod: 26

## 2021-01-07 PROCEDURE — 74177 CT ABD & PELVIS W/CONTRAST: CPT | Mod: 26

## 2021-01-07 PROCEDURE — 77080 DXA BONE DENSITY AXIAL: CPT | Mod: 26

## 2021-01-07 PROCEDURE — A9561: CPT

## 2021-01-07 PROCEDURE — 74177 CT ABD & PELVIS W/CONTRAST: CPT

## 2021-01-07 PROCEDURE — 78830 RP LOCLZJ TUM SPECT W/CT 1: CPT

## 2021-01-07 PROCEDURE — 78306 BONE IMAGING WHOLE BODY: CPT

## 2021-01-07 PROCEDURE — 77080 DXA BONE DENSITY AXIAL: CPT

## 2021-02-03 DIAGNOSIS — R05 COUGH: ICD-10-CM

## 2021-02-12 ENCOUNTER — APPOINTMENT (OUTPATIENT)
Dept: GASTROENTEROLOGY | Facility: CLINIC | Age: 56
End: 2021-02-12
Payer: COMMERCIAL

## 2021-02-12 VITALS
DIASTOLIC BLOOD PRESSURE: 66 MMHG | OXYGEN SATURATION: 96 % | SYSTOLIC BLOOD PRESSURE: 111 MMHG | WEIGHT: 158 LBS | TEMPERATURE: 98.8 F | BODY MASS INDEX: 31.85 KG/M2 | HEART RATE: 69 BPM | HEIGHT: 59 IN

## 2021-02-12 DIAGNOSIS — Z12.11 ENCOUNTER FOR SCREENING FOR MALIGNANT NEOPLASM OF COLON: ICD-10-CM

## 2021-02-12 DIAGNOSIS — Z01.818 ENCOUNTER FOR OTHER PREPROCEDURAL EXAMINATION: ICD-10-CM

## 2021-02-12 PROCEDURE — 99072 ADDL SUPL MATRL&STAF TM PHE: CPT

## 2021-02-12 PROCEDURE — 99203 OFFICE O/P NEW LOW 30 MIN: CPT

## 2021-02-12 NOTE — HISTORY OF PRESENT ILLNESS
[None] : had no significant interval events [Heartburn] : denies heartburn [Nausea] : denies nausea [Vomiting] : denies vomiting [Diarrhea] : denies diarrhea [Constipation] : denies constipation [Yellow Skin Or Eyes (Jaundice)] : denies jaundice [Abdominal Pain] : denies abdominal pain [Abdominal Swelling] : denies abdominal swelling [Rectal Pain] : denies rectal pain [Wt Gain ___ Lbs] : recent [unfilled] ~Upound(s) weight gain [Peptic Ulcer Disease] : peptic ulcer disease [Cholelithiasis] : cholelithiasis [Malignancy] : malignancy [Abdominal Surgery] : abdominal surgery [Cholecystectomy] : cholecystectomy [Wt Loss ___ Lbs] : no recent weight loss [GERD] : no gastroesophageal reflux disease [Hiatus Hernia] : no hiatus hernia [Pancreatitis] : no pancreatitis [Kidney Stone] : no kidney stone [Inflammatory Bowel Disease] : no inflammatory bowel disease [Irritable Bowel Syndrome] : no irritable bowel syndrome [Diverticulitis] : no diverticulitis [Alcohol Abuse] : no alcohol abuse [Appendectomy] : no appendectomy [de-identified] : The patient is a 55-year-old  female with past medical history significant for left breast cancer, s/p left mastectomy with resultant metastasis to the lung and currently on Exemestane and history of bleeding peptic ulcer disease, s/p surgery in 2019 who was referred to my office by Dr. Adolph Nieto for colon cancer screening. I was asked to render an opinion for consultation for the above complaints.   The patient states that she is feeling fine.  The patient denies any abdominal pain.  The patient denies any abdominal gas and bloating.  The patient denies any nausea or vomiting.  The patient denies any gastroesophageal reflux disease or dysphagia. The patient denies any atypical chest pain, shortness of breath or palpitations.  The patient denies any diaphoresis. The patient denies any constipation or diarrhea.  The patient has 1 bowel movement a day.  The patient denies a change in bowel habits.  The patient denies a change in caliber of stool.  The patient denies having mucus discharge with the bowel movements.  The patient denies any bright red blood per rectum, melena or hematemesis.  The patient denies any rectal pain or rectal pruritus. The patient denies any weight loss or anorexia.  The patient admits to gaining weight recently.  She denies any fevers or chills.  The patient denies any jaundice or pruritus.  The patient denies any back pain.  The patient admits to having a prior upper endoscopy performed by another gastroenterologist.  The patient admits to a history of bleeding peptic ulcer disease, s/p surgery in 2019. The patient's last menstrual period was age 49. The patient is a .  The patient's first menstrual period was at age 15. The patient denies any significant family history of GI problems.  The patient admits to a family history of GI problems.  The patient’s 2 sisters had a history of gallbladder issues. [de-identified] : (-) smoking, (-) ETOH, (-) IVDA\par

## 2021-02-12 NOTE — ASSESSMENT
[FreeTextEntry1] : History of Peptic Ulcer Disease: The patient has a prior history of peptic ulcer disease. The patient is to avoid nonsteroidal anti-inflammatory drugs and aspirin. The patient required surgery for the bleeding peptic ulcer disease.   I recommend a repeat upper endoscopy to reassess for ulcer healing.  The patient was told of the risks and benefits of the procedure.  The patient was told of the risks of perforation, emergency surgery, bleeding, infections and missed lesions. The patient agreed and will schedule for procedure.  The patient is to be n.p.o. after midnight.  The patient is to return for the procedure. \par Colon Cancer screening: I recommend colonoscopy for colon cancer screening over the age of 45 to assess for colonic polyps. The patient was told of the risks and benefits of the procedure.  The patient was told of the risks of perforation, emergency surgery, bleeding, infections and missed lesions. The patient agreed and will schedule for the procedure. The patient is to be n.p.o. after midnight and bowel prep was given.  The patient is to return for the procedure. \par Colonoscopy: I recommend a colonoscopy to assess the symptoms.  The patient was told of the risks and benefits of the procedure.  The patient was told of the risks of perforation, emergency surgery, bleeding, infections and missed lesions.  The patient agreed and will schedule for the procedure.  The patient is to be n.p.o. after midnight and bowel prep was given.  The patient is to return for the procedure. \par Upper Endoscopy: I recommend an upper endoscopy  to assess the symptoms for peptic ulcer disease versus esophagitis.  The patient was told of the risks and benefits of the procedure.  The patient was told of the risks of perforation, emergency surgery, bleeding, infections and missed lesions. The patient agreed and will schedule for procedure. The patient is to be n.p.o. after midnight.  The patient is to return for the procedure. \par Follow-up: The patient is to follow-up in the office in 4 weeks to reassess the symptoms. The patient was told to call the office if any further problems. \par

## 2021-02-16 LAB — HEMOCCULT STL QL: NEGATIVE

## 2021-03-12 ENCOUNTER — APPOINTMENT (OUTPATIENT)
Dept: INTERNAL MEDICINE | Facility: CLINIC | Age: 56
End: 2021-03-12
Payer: COMMERCIAL

## 2021-03-12 VITALS
SYSTOLIC BLOOD PRESSURE: 118 MMHG | TEMPERATURE: 98.1 F | BODY MASS INDEX: 31.65 KG/M2 | DIASTOLIC BLOOD PRESSURE: 75 MMHG | RESPIRATION RATE: 17 BRPM | OXYGEN SATURATION: 99 % | HEART RATE: 66 BPM | HEIGHT: 59 IN | WEIGHT: 157 LBS

## 2021-03-12 DIAGNOSIS — Z90.12 ACQUIRED ABSENCE OF LEFT BREAST AND NIPPLE: ICD-10-CM

## 2021-03-12 DIAGNOSIS — M25.512 PAIN IN LEFT SHOULDER: ICD-10-CM

## 2021-03-12 DIAGNOSIS — R06.83 SNORING: ICD-10-CM

## 2021-03-12 DIAGNOSIS — G89.29 PAIN IN LEFT SHOULDER: ICD-10-CM

## 2021-03-12 PROCEDURE — 99072 ADDL SUPL MATRL&STAF TM PHE: CPT

## 2021-03-12 PROCEDURE — 99214 OFFICE O/P EST MOD 30 MIN: CPT

## 2021-03-12 NOTE — ASSESSMENT
[FreeTextEntry1] : 1. metastatic breast carcinoma\par * stable ; follow up with oncologist\par 2. hypercholesterolemia\par * check lipid panel\par * dietary counselling\par 3. vitamin d deficiency\par * check serum level\par * continue oral supplement\par 4. chronic left shoulder pain\par * trial of meloxicam as needed\par 5. osteopenia\par * on vitamin d supplement\par 6. loud snoring\par * sleep study referral\par * will follow up in one month

## 2021-03-12 NOTE — HISTORY OF PRESENT ILLNESS
[FreeTextEntry1] : follow up metastatic breast carcinoma, hyperlipidemia\par Interview and discussion conducted in [place] by [place]ican speaking physician\par  [de-identified] : 55 years old female here for follow up, states doing well, complain of pain left shoulder, worsened with movements, for about one month mild to moderate, she was seen by gastroenterologist DR SCALES , scheduled for colonoscopy in May; also followed with oncologist note reviewed, had DEXA scan for osteoporosis screening, she at high risk , carcinoma stable; had surveillance bone scan, ct chest and abdomen all negatives; complains of loud snoring , dry throat with phlegm and intermittent cough; would like to do sleep study

## 2021-03-15 ENCOUNTER — OUTPATIENT (OUTPATIENT)
Dept: OUTPATIENT SERVICES | Facility: HOSPITAL | Age: 56
LOS: 1 days | Discharge: HOME | End: 2021-03-15

## 2021-03-15 DIAGNOSIS — C50.919 MALIGNANT NEOPLASM OF UNSPECIFIED SITE OF UNSPECIFIED FEMALE BREAST: ICD-10-CM

## 2021-03-15 DIAGNOSIS — Z90.3 ACQUIRED ABSENCE OF STOMACH [PART OF]: Chronic | ICD-10-CM

## 2021-03-15 DIAGNOSIS — Z98.51 TUBAL LIGATION STATUS: Chronic | ICD-10-CM

## 2021-03-15 DIAGNOSIS — Z90.12 ACQUIRED ABSENCE OF LEFT BREAST AND NIPPLE: Chronic | ICD-10-CM

## 2021-03-15 DIAGNOSIS — Z90.49 ACQUIRED ABSENCE OF OTHER SPECIFIED PARTS OF DIGESTIVE TRACT: Chronic | ICD-10-CM

## 2021-03-15 LAB
25(OH)D3 SERPL-MCNC: 27.8 NG/ML
ALBUMIN SERPL ELPH-MCNC: 4.5 G/DL
ALP BLD-CCNC: 160 U/L
ALT SERPL-CCNC: 16 U/L
ANION GAP SERPL CALC-SCNC: 16 MMOL/L
AST SERPL-CCNC: 22 U/L
BILIRUB SERPL-MCNC: 0.2 MG/DL
BUN SERPL-MCNC: 15 MG/DL
CALCIUM SERPL-MCNC: 9.2 MG/DL
CHLORIDE SERPL-SCNC: 105 MMOL/L
CHOLEST SERPL-MCNC: 161 MG/DL
CO2 SERPL-SCNC: 21 MMOL/L
CREAT SERPL-MCNC: 0.51 MG/DL
GLUCOSE SERPL-MCNC: 87 MG/DL
HDLC SERPL-MCNC: 38 MG/DL
LDLC SERPL CALC-MCNC: 94 MG/DL
NONHDLC SERPL-MCNC: 122 MG/DL
POTASSIUM SERPL-SCNC: 4.5 MMOL/L
PROT SERPL-MCNC: 7.5 G/DL
SODIUM SERPL-SCNC: 142 MMOL/L
TRIGL SERPL-MCNC: 144 MG/DL

## 2021-03-17 ENCOUNTER — RESULT REVIEW (OUTPATIENT)
Age: 56
End: 2021-03-17

## 2021-03-17 ENCOUNTER — APPOINTMENT (OUTPATIENT)
Dept: MRI IMAGING | Facility: IMAGING CENTER | Age: 56
End: 2021-03-17
Payer: COMMERCIAL

## 2021-03-17 ENCOUNTER — OUTPATIENT (OUTPATIENT)
Dept: OUTPATIENT SERVICES | Facility: HOSPITAL | Age: 56
LOS: 1 days | End: 2021-03-17
Payer: COMMERCIAL

## 2021-03-17 ENCOUNTER — APPOINTMENT (OUTPATIENT)
Dept: HEMATOLOGY ONCOLOGY | Facility: CLINIC | Age: 56
End: 2021-03-17
Payer: COMMERCIAL

## 2021-03-17 VITALS
TEMPERATURE: 97.5 F | SYSTOLIC BLOOD PRESSURE: 134 MMHG | HEIGHT: 58.98 IN | DIASTOLIC BLOOD PRESSURE: 83 MMHG | RESPIRATION RATE: 17 BRPM | HEART RATE: 67 BPM | BODY MASS INDEX: 32.36 KG/M2 | WEIGHT: 160.5 LBS | OXYGEN SATURATION: 99 %

## 2021-03-17 DIAGNOSIS — Z90.49 ACQUIRED ABSENCE OF OTHER SPECIFIED PARTS OF DIGESTIVE TRACT: Chronic | ICD-10-CM

## 2021-03-17 DIAGNOSIS — Z98.51 TUBAL LIGATION STATUS: Chronic | ICD-10-CM

## 2021-03-17 DIAGNOSIS — C50.919 MALIGNANT NEOPLASM OF UNSPECIFIED SITE OF UNSPECIFIED FEMALE BREAST: ICD-10-CM

## 2021-03-17 DIAGNOSIS — Z90.12 ACQUIRED ABSENCE OF LEFT BREAST AND NIPPLE: Chronic | ICD-10-CM

## 2021-03-17 DIAGNOSIS — Z90.3 ACQUIRED ABSENCE OF STOMACH [PART OF]: Chronic | ICD-10-CM

## 2021-03-17 LAB
ALBUMIN SERPL ELPH-MCNC: 4.4 G/DL
ALP BLD-CCNC: 155 U/L
ALT SERPL-CCNC: 17 U/L
ANION GAP SERPL CALC-SCNC: 12 MMOL/L
APTT BLD: 31.1 SEC
AST SERPL-CCNC: 22 U/L
BASOPHILS # BLD AUTO: 0.02 K/UL — SIGNIFICANT CHANGE UP (ref 0–0.2)
BASOPHILS NFR BLD AUTO: 0.3 % — SIGNIFICANT CHANGE UP (ref 0–2)
BILIRUB SERPL-MCNC: 0.3 MG/DL
BUN SERPL-MCNC: 14 MG/DL
CALCIUM SERPL-MCNC: 9.5 MG/DL
CEA SERPL-MCNC: 1.5 NG/ML
CHLORIDE SERPL-SCNC: 105 MMOL/L
CO2 SERPL-SCNC: 25 MMOL/L
CREAT SERPL-MCNC: 0.53 MG/DL
EOSINOPHIL # BLD AUTO: 0.1 K/UL — SIGNIFICANT CHANGE UP (ref 0–0.5)
EOSINOPHIL NFR BLD AUTO: 1.5 % — SIGNIFICANT CHANGE UP (ref 0–6)
GLUCOSE SERPL-MCNC: 99 MG/DL
HCT VFR BLD CALC: 35.8 % — SIGNIFICANT CHANGE UP (ref 34.5–45)
HGB BLD-MCNC: 11.4 G/DL — LOW (ref 11.5–15.5)
IMM GRANULOCYTES NFR BLD AUTO: 0.1 % — SIGNIFICANT CHANGE UP (ref 0–1.5)
INR PPP: 1.04 RATIO
LYMPHOCYTES # BLD AUTO: 3.49 K/UL — HIGH (ref 1–3.3)
LYMPHOCYTES # BLD AUTO: 51.8 % — HIGH (ref 13–44)
MCHC RBC-ENTMCNC: 26 PG — LOW (ref 27–34)
MCHC RBC-ENTMCNC: 31.8 G/DL — LOW (ref 32–36)
MCV RBC AUTO: 81.7 FL — SIGNIFICANT CHANGE UP (ref 80–100)
MONOCYTES # BLD AUTO: 0.44 K/UL — SIGNIFICANT CHANGE UP (ref 0–0.9)
MONOCYTES NFR BLD AUTO: 6.5 % — SIGNIFICANT CHANGE UP (ref 2–14)
NEUTROPHILS # BLD AUTO: 2.68 K/UL — SIGNIFICANT CHANGE UP (ref 1.8–7.4)
NEUTROPHILS NFR BLD AUTO: 39.8 % — LOW (ref 43–77)
NRBC # BLD: 0 /100 WBCS — SIGNIFICANT CHANGE UP (ref 0–0)
PLATELET # BLD AUTO: 319 K/UL — SIGNIFICANT CHANGE UP (ref 150–400)
POTASSIUM SERPL-SCNC: 4.3 MMOL/L
PROT SERPL-MCNC: 7.3 G/DL
PT BLD: 12.3 SEC
RBC # BLD: 4.38 M/UL — SIGNIFICANT CHANGE UP (ref 3.8–5.2)
RBC # FLD: 14.6 % — HIGH (ref 10.3–14.5)
SODIUM SERPL-SCNC: 142 MMOL/L
WBC # BLD: 6.74 K/UL — SIGNIFICANT CHANGE UP (ref 3.8–10.5)
WBC # FLD AUTO: 6.74 K/UL — SIGNIFICANT CHANGE UP (ref 3.8–10.5)

## 2021-03-17 PROCEDURE — 70553 MRI BRAIN STEM W/O & W/DYE: CPT | Mod: 26

## 2021-03-17 PROCEDURE — 99215 OFFICE O/P EST HI 40 MIN: CPT

## 2021-03-17 PROCEDURE — 99072 ADDL SUPL MATRL&STAF TM PHE: CPT

## 2021-03-17 PROCEDURE — 70553 MRI BRAIN STEM W/O & W/DYE: CPT

## 2021-03-17 PROCEDURE — A9585: CPT

## 2021-03-18 ENCOUNTER — APPOINTMENT (OUTPATIENT)
Dept: RADIATION ONCOLOGY | Facility: CLINIC | Age: 56
End: 2021-03-18
Payer: COMMERCIAL

## 2021-03-18 ENCOUNTER — NON-APPOINTMENT (OUTPATIENT)
Age: 56
End: 2021-03-18

## 2021-03-18 ENCOUNTER — OUTPATIENT (OUTPATIENT)
Dept: OUTPATIENT SERVICES | Facility: HOSPITAL | Age: 56
LOS: 1 days | Discharge: HOME | End: 2021-03-18
Payer: MEDICARE

## 2021-03-18 ENCOUNTER — OUTPATIENT (OUTPATIENT)
Dept: OUTPATIENT SERVICES | Facility: HOSPITAL | Age: 56
LOS: 1 days | Discharge: HOME | End: 2021-03-18
Payer: COMMERCIAL

## 2021-03-18 DIAGNOSIS — Z90.49 ACQUIRED ABSENCE OF OTHER SPECIFIED PARTS OF DIGESTIVE TRACT: Chronic | ICD-10-CM

## 2021-03-18 DIAGNOSIS — Z98.51 TUBAL LIGATION STATUS: Chronic | ICD-10-CM

## 2021-03-18 DIAGNOSIS — Z90.3 ACQUIRED ABSENCE OF STOMACH [PART OF]: Chronic | ICD-10-CM

## 2021-03-18 DIAGNOSIS — Z78.9 OTHER SPECIFIED HEALTH STATUS: ICD-10-CM

## 2021-03-18 DIAGNOSIS — Z90.12 ACQUIRED ABSENCE OF LEFT BREAST AND NIPPLE: Chronic | ICD-10-CM

## 2021-03-18 PROCEDURE — 99214 OFFICE O/P EST MOD 30 MIN: CPT | Mod: 95

## 2021-03-18 PROCEDURE — 99072 ADDL SUPL MATRL&STAF TM PHE: CPT

## 2021-03-18 PROCEDURE — 77263 THER RADIOLOGY TX PLNG CPLX: CPT

## 2021-03-18 NOTE — HISTORY OF PRESENT ILLNESS
[de-identified] : The patients' history of present illness began in 2012 at which time she was found to have a right breast cancer for which she underwent a right modified radical mastectomy/axillary lymph node dissection with the finding of an ER positive 20%, OK positive 10%, HER-2/lisa 2+, FISH amplified breast cancer per outside physician report (I do not have a copy that pathology report for my review).  She went on to receive adjuvant dose-dense AC chemotherapy for 4 cycles followed by Taxol plus Herceptin as well as adjuvant radiation therapy.  She was subsequently begun adjuvant tamoxifen therapy.  Her last menstrual period was in 2014.\par \par The patient then began to experience weight loss beginning in November 2018 and over 3-month period of time lost 30 pounds per her report.  At approximately the same time she developed increasing shortness of breath, and ultimately presented to [facility] Emergency Department.  She subsequently had a CT scan of the chest, which showed multiple mediastinal hilar right subclavian lymph nodes corresponding to abnormal FDG activity on PET scan consistent with metastatic carcinoma; there were tiny nodularities in both lungs- too small to accurately characterize- not present on prior examination and measuring up to 2 mm in both lungs.  The possibility of metastasis was a consideration; she had no bony metastases; the liver demonstrated tiny poorly defined lesions less than 5 mm and difficult to characterize and visible on prior examinations with the possibility of metastasis also considered.  A PET-CT scan showed multiple hypermetabolic lymph nodes in the mediastinum, right subclavian region and both adebayo felt to represent tumor; there were stellate lesions in the left breast region, felt to represent benign scars; the lungs demonstrated no significant abnormality with multiple tiny lung nodules seen on CT as previously noted; liver demonstrated no significant abnormality with multiple small poorly defined nodules seen on CT per my previous comments.  The patient underwent a left breast core biopsy of the area of concern with a finding of fat necrosis and foreign body giant cell reaction as well as a separate fine-needle aspiration consistent with a ruptured cyst/fat necrosis.  The patient consequently saw her oncologist, Dr. Carias, who sent the patient for ultrasound-guided core biopsies of the mediastinal lymph nodes with a finding of carcinoma, consistent with breast primary with estrogen receptor returning positive, greater than 50%, progesterone receptor negative, and HER-2/lisa 0 per outside report. The patient was subsequently begun on docetaxel, pertuzumab, trastuzumab, and exemestane beginning at the early 4/19, and subsequently switched to weekly paclitaxel secondary to side effects and continuing  on trastuzumab / pertuzumab every 3 weeks as well as exemestane. The patient was last treated per her report on approximately June 12, 2019, despite outside records that show ongoing weekly treatments early July 2019.  \par \par The patient also reports that beginning in approximately 12/18, she started to develop slowly progressive diffuse rash with associated pruritus, having seen multiple dermatologists.  Most recently, she saw Dr. Courtney Cowart, a [facility] physician practicing at [place], [place], who diagnosed disseminated herpes simplex for which she was begun on valacyclovir, as well as evidence of MRSA for which was treated with doxycycline beginning in May 2019.  In June 2019, the patient presented to [facility] Emergency Department complaining of progressive "weakness and shaking." She was found to be anemic and had melena.  She underwent endoscopy and VIR procedure to stop gastric bleeding, which was unsuccessful.  Consequently, she had a distal gastrectomy.  She was discharged from [facility] on 07/08/2019.\par \par The patient was initial consultation 7/24/19 for a further opinion regarding treatment recommendations. She noted ongoing weakness, fatigue, and generalized malaise, although all of these had begun to improve gradually.  Her last chemotherapy as noted above was approximately at 06/12/2019.  She reports that her primary oncologist, Dr. Carias, told to discontinue exemestane upon admission to [facility] with the consideration restarting it post discharge.  \par \par I obtained her outside slides for review at [facility] and it was confirmed that she had met breast cancer ER+ OK neg and Her 2 lisa neg.\par \par Consequently I recommended restarting exemestane.  \par \par \par  [de-identified] : Pt seen today for a routine f/u visit.\par \par On Exemestane and tolerating well. She notes a good appetite, gradually increasing weight and excellent performance status. \par \par She reports that 4 days ago she had uncontrollable clenching of her L hand followed by an unusual feeling going up her arm then down the left side of her body then leading to uncontrolled shaking of her LLE. She denies any loss on consciousness , tongue biting, falling. She denies any HA/dizz/change in vision, loss of continence or any further similar or other neurologic symptoms. \par \par BS 1/7/21\par IMPRESSION: Bone scan demonstrates:\par \par No definite scan evidence for osseous metastasis\par \par Mild diffuse increased uptake in the skull raises the possibility of Paget's disease.\par \par Degenerative disease in the major joints\par \par CT CAP 1/7/21\par IMPRESSION:\par Subcentimeter pulmonary nodules, some of which have resolved as compared to CT dated 1/22/2019.\par \par Thoracic lymph nodes are decreased in size as compared to 1/22/2019.\par \par No evidence of metastatic disease in the abdomen or pelvis

## 2021-03-18 NOTE — PHYSICAL EXAM
[Fully active, able to carry on all pre-disease performance without restriction] : Status 0 - Fully active, able to carry on all pre-disease performance without restriction [Normal] : affect appropriate [de-identified] : R w/o nipple retraction skin dimpling or palp masses; L s/p MRM with flap reconstruction, no palp masses. B/L ax neg

## 2021-03-18 NOTE — DISCUSSION/SUMMARY
[FreeTextEntry1] : PAtient with ER + metastatic breast cancer, on exemestane. Was scheduled for a routine f/up this morning. Reported an episode of what seemed to be a focal seizure over the weekend. An urgent brain MRI done this evening reveals a brain mass with surrounding edema, not yet formally read. \par Spoke with patient's daughter Eden, advised of the preliminary findings and started on dexamethasone 8mg tonight, and bid from then on. Will obtain urgent rad-onc and neurosurgery consultations.\par \par Above plan d/w .

## 2021-03-19 LAB — CANCER AG27-29 SERPL-ACNC: 16.6 U/ML

## 2021-03-22 PROCEDURE — 77334 RADIATION TREATMENT AID(S): CPT | Mod: 26

## 2021-03-22 PROCEDURE — 77290 THER RAD SIMULAJ FIELD CPLX: CPT | Mod: 26

## 2021-03-23 PROCEDURE — 77295 3-D RADIOTHERAPY PLAN: CPT | Mod: 26

## 2021-03-23 PROCEDURE — 77300 RADIATION THERAPY DOSE PLAN: CPT | Mod: 26

## 2021-03-23 PROCEDURE — 77334 RADIATION TREATMENT AID(S): CPT | Mod: 26

## 2021-03-24 ENCOUNTER — APPOINTMENT (OUTPATIENT)
Dept: NEUROSURGERY | Facility: CLINIC | Age: 56
End: 2021-03-24
Payer: COMMERCIAL

## 2021-03-24 DIAGNOSIS — C71.9 MALIGNANT NEOPLASM OF BRAIN, UNSPECIFIED: ICD-10-CM

## 2021-03-24 PROCEDURE — 99202 OFFICE O/P NEW SF 15 MIN: CPT

## 2021-03-24 PROCEDURE — 61799 SRS CRAN LES COMPLEX ADDL: CPT

## 2021-03-24 PROCEDURE — 99072 ADDL SUPL MATRL&STAF TM PHE: CPT

## 2021-03-24 PROCEDURE — 61798 SRS CRANIAL LESION COMPLEX: CPT

## 2021-03-24 NOTE — REVIEW OF SYSTEMS
[Negative] : Psychiatric [Eye Pain] : no eye pain [Dysphagia] : no dysphagia [Loss of Hearing] : no loss of hearing [Confused] : no confusion [Dizziness] : no dizziness [Fainting] : no fainting

## 2021-03-24 NOTE — HISTORY OF PRESENT ILLNESS
[FreeTextEntry1] : Ms. Shelley is seen today through [facility] for which she provides verbal consent on 3/18/2021 at 2:46 PM. [employer]  [id number] used for visit today.\par \par Ms. Shelley is a 55 year old woman with significant past medical history of breast cancer diagnosed in 2012 s/p radical mastectomy and  adjuvant chemotherapy. She subsequently had a recurrence in 2018 and has been receiving systemic therapy. She currently has stable disease on exemestane. She recently had a focal seizure left upper/lower extremity. MRI showed 3 lesions -  \par \par Lesion 1: Large dural based enhancing lesion is seen involving the high medial right frontal region. This lesion does appear to cross the midline and measures approximately 3.2 x 2.7 x 3.5 cm. Small amount of adjacent susceptibility is identified. Surrounding edema is identified. Localized mass effect is identified. Right to left shift (7.2 mm) seen.\par \par Lesion 2: Enhancing lesion is seen involving the right upper tiffany. This lesion measures approximately 0.5 x 0.4 cm.\par \par Lesion 3: Enhancing lesion is seen involving the superior left cerebellar region. This lesion measures approximately 1.2 x 1.0 cm. Surrounding edema is identified.\par \par Oncological history as per Dr. Martin's note:\par  The patients' history of present illness began in 2012 at which time she was found to have a right breast cancer for which she underwent a right modified radical mastectomy/axillary lymph node dissection with the finding of an ER positive 20%, VT positive 10%, HER-2/lisa 2+, FISH amplified breast cancer per outside physician report (I do not have a copy that pathology report for my review). She went on to receive adjuvant dose-dense AC chemotherapy for 4 cycles followed by Taxol plus Herceptin as well as adjuvant radiation therapy. She was subsequently begun adjuvant tamoxifen therapy. Her last menstrual period was in 2014.\par \par The patient then began to experience weight loss beginning in November 2018 and over 3-month period of time lost 30 pounds per her report. At approximately the same time she developed increasing shortness of breath, and ultimately presented to [facility] Emergency Department. She subsequently had a CT scan of the chest, which showed multiple mediastinal hilar right subclavian lymph nodes corresponding to abnormal FDG activity on PET scan consistent with metastatic carcinoma; there were tiny nodularities in both lungs- too small to accurately characterize- not present on prior examination and measuring up to 2 mm in both lungs. The possibility of metastasis was a consideration; she had no bony metastases; the liver demonstrated tiny poorly defined lesions less than 5 mm and difficult to characterize and visible on prior examinations with the possibility of metastasis also considered. A PET-CT scan showed multiple hypermetabolic lymph nodes in the mediastinum, right subclavian region and both adebayo felt to represent tumor; there were stellate lesions in the left breast region, felt to represent benign scars; the lungs demonstrated no significant abnormality with multiple tiny lung nodules seen on CT as previously noted; liver demonstrated no significant abnormality with multiple small poorly defined nodules seen on CT per my previous comments. The patient underwent a left breast core biopsy of the area of concern with a finding of fat necrosis and foreign body giant cell reaction as well as a separate fine-needle aspiration consistent with a ruptured cyst/fat necrosis. The patient consequently saw her oncologist, Dr. Carias, who sent the patient for ultrasound-guided core biopsies of the mediastinal lymph nodes with a finding of carcinoma, consistent with breast primary with estrogen receptor returning positive, greater than 50%, progesterone receptor negative, and HER-2/lisa 0 per outside report. The patient was subsequently begun on docetaxel, pertuzumab, trastuzumab, and exemestane beginning at the early 4/19, and subsequently switched to weekly paclitaxel secondary to side effects and continuing on trastuzumab / pertuzumab every 3 weeks as well as exemestane. The patient was last treated per her report on approximately June 12, 2019, despite outside records that show ongoing weekly treatments early July 2019. \par \par The patient also reports that beginning in approximately 12/18, she started to develop slowly progressive diffuse rash with associated pruritus, having seen multiple dermatologists. Most recently, she saw Dr. Courtney Cowart, a [facility] physician practicing at [place], [place], who diagnosed disseminated herpes simplex for which she was begun on valacyclovir, as well as evidence of MRSA for which was treated with doxycycline beginning in May 2019. In June 2019, the patient presented to [facility] Emergency Department complaining of progressive "weakness and shaking." She was found to be anemic and had melena. She underwent endoscopy and VIR procedure to stop gastric bleeding, which was unsuccessful. Consequently, she had a distal gastrectomy. She was discharged from [facility] on 07/08/2019.\par \par The patient was initial consultation 7/24/19 for a further opinion regarding treatment recommendations. She noted ongoing weakness, fatigue, and generalized malaise, although all of these had begun to improve gradually. Her last chemotherapy as noted above was approximately at 06/12/2019. She reports that her primary oncologist, Dr. Carias, told to discontinue exemestane upon admission to [facility] with the consideration restarting it post discharge. \par \par I obtained her outside slides for review at [facility] and it was confirmed that she had met breast cancer ER+ VT neg and Her 2 lisa neg.\par \par Today she feels well. Noted a left sided seizure on saturday lasting about 1 minute, which stopped on its own. Was recently prescriped keppra and dexamethasone 8mg BID. No confusion, dizziness, nausea, focal weakness. On examestane.

## 2021-03-24 NOTE — ASSESSMENT
[FreeTextEntry1] : 3 brain mets (R brainstem, L cerebellum, R frontal) from breast cancer (ER+, MO-, HER2-) \par \par Explained about GK SRS for her brain mets regarding natural course, risk, benefit, and alternatives, etc. \par Answered to all questions. \par \par Plan: GK SRS (03/23/2021-03/25/2021, 24Gy, 27Gy, 27Gy for each mass)\par -Taper down Decadron slowly in a week \par -maintain keppra

## 2021-03-24 NOTE — PHYSICAL EXAM
[General Appearance - Alert] : alert [General Appearance - Well Nourished] : well nourished [Oriented To Time, Place, And Person] : oriented to person, place, and time [Impaired Insight] : insight and judgment were intact [Affect] : the affect was normal

## 2021-03-24 NOTE — REASON FOR VISIT
[Consultation] : a consultation visit [Consideration for Non-Curative Therapy] : consideration for non-curative therapy for [Brain Metastasis] : brain metastasis [FreeTextEntry1] : GK SRS (03/23/2021-03/25/2021) \par on 3 brain mets (R brainstem, L cerebellum, R frontal) from breast cancer (ER+, NV-, HER2-) \par -24Gy, 27Gy, 27Gy for each mass

## 2021-03-25 PROCEDURE — 77435 SBRT MANAGEMENT: CPT

## 2021-03-28 NOTE — REVIEW OF SYSTEMS
[Eye Pain] : no eye pain [Dysphagia] : no dysphagia [Loss of Hearing] : no loss of hearing [Confused] : no confusion [Dizziness] : no dizziness [Fainting] : no fainting

## 2021-03-28 NOTE — HISTORY OF PRESENT ILLNESS
[FreeTextEntry1] : Ms. Shelley is seen today through TE[facility]HE[facility] for which she provides verbal consent on 3/18/2021 at 2:46 PM. [employer]  [id number] used for visit today.\par \par Ms. Shelley is a 55 year old woman with significant past medical history of breast cancer diagnosed in 2012 s/p radical mastectomy and  adjuvant chemotherapy. She subsequently had a recurrence in 2018 and has been receiving systemic therapy. She currently has stable disease on exemestane. She recently had a focal seizure left upper/lower extremity. MRI showed 3 lesions -  \par \par Lesion 1: Large dural based enhancing lesion is seen involving the high medial right frontal region. This lesion does appear to cross the midline and measures approximately 3.2 x 2.7 x 3.5 cm. Small amount of adjacent susceptibility is identified. Surrounding edema is identified. Localized mass effect is identified. Right to left shift (7.2 mm) seen.\par \par Lesion 2: Enhancing lesion is seen involving the right upper tiffany. This lesion measures approximately 0.5 x 0.4 cm.\par \par Lesion 3: Enhancing lesion is seen involving the superior left cerebellar region. This lesion measures approximately 1.2 x 1.0 cm. Surrounding edema is identified.\par \par Oncological history as per Dr. Martin's note:\par  The patients' history of present illness began in 2012 at which time she was found to have a right breast cancer for which she underwent a right modified radical mastectomy/axillary lymph node dissection with the finding of an ER positive 20%, MO positive 10%, HER-2/lisa 2+, FISH amplified breast cancer per outside physician report (I do not have a copy that pathology report for my review). She went on to receive adjuvant dose-dense AC chemotherapy for 4 cycles followed by Taxol plus Herceptin as well as adjuvant radiation therapy. She was subsequently begun adjuvant tamoxifen therapy. Her last menstrual period was in 2014.\par \par The patient then began to experience weight loss beginning in November 2018 and over 3-month period of time lost 30 pounds per her report. At approximately the same time she developed increasing shortness of breath, and ultimately presented to [facility] Emergency Department. She subsequently had a CT scan of the chest, which showed multiple mediastinal hilar right subclavian lymph nodes corresponding to abnormal FDG activity on PET scan consistent with metastatic carcinoma; there were tiny nodularities in both lungs- too small to accurately characterize- not present on prior examination and measuring up to 2 mm in both lungs. The possibility of metastasis was a consideration; she had no bony metastases; the liver demonstrated tiny poorly defined lesions less than 5 mm and difficult to characterize and visible on prior examinations with the possibility of metastasis also considered. A PET-CT scan showed multiple hypermetabolic lymph nodes in the mediastinum, right subclavian region and both adebayo felt to represent tumor; there were stellate lesions in the left breast region, felt to represent benign scars; the lungs demonstrated no significant abnormality with multiple tiny lung nodules seen on CT as previously noted; liver demonstrated no significant abnormality with multiple small poorly defined nodules seen on CT per my previous comments. The patient underwent a left breast core biopsy of the area of concern with a finding of fat necrosis and foreign body giant cell reaction as well as a separate fine-needle aspiration consistent with a ruptured cyst/fat necrosis. The patient consequently saw her oncologist, Dr. Carias, who sent the patient for ultrasound-guided core biopsies of the mediastinal lymph nodes with a finding of carcinoma, consistent with breast primary with estrogen receptor returning positive, greater than 50%, progesterone receptor negative, and HER-2/lisa 0 per outside report. The patient was subsequently begun on docetaxel, pertuzumab, trastuzumab, and exemestane beginning at the early 4/19, and subsequently switched to weekly paclitaxel secondary to side effects and continuing on trastuzumab / pertuzumab every 3 weeks as well as exemestane. The patient was last treated per her report on approximately June 12, 2019, despite outside records that show ongoing weekly treatments early July 2019. \par \par The patient also reports that beginning in approximately 12/18, she started to develop slowly progressive diffuse rash with associated pruritus, having seen multiple dermatologists. Most recently, she saw Dr. Courtney Cowart, a [facility] [place] [facility] physician practicing at [place], [place], who diagnosed disseminated herpes simplex for which she was begun on valacyclovir, as well as evidence of MRSA for which was treated with doxycycline beginning in May 2019. In June 2019, the patient presented to [facility] Emergency Department complaining of progressive "weakness and shaking." She was found to be anemic and had melena. She underwent endoscopy and VIR procedure to stop gastric bleeding, which was unsuccessful. Consequently, she had a distal gastrectomy. She was discharged from [facility] on 07/08/2019.\par \par The patient was initial consultation 7/24/19 for a further opinion regarding treatment recommendations. She noted ongoing weakness, fatigue, and generalized malaise, although all of these had begun to improve gradually. Her last chemotherapy as noted above was approximately at 06/12/2019. She reports that her primary oncologist, Dr. Carias, told to discontinue exemestane upon admission to [facility] with the consideration restarting it post discharge. I obtained her outside slides for review at [facility] [place] [facility] and it was confirmed that she had met breast cancer ER+ MO neg and Her 2 lisa neg.  \par \par Today she feels well. Noted a left sided seizure on Saturday lasting about 1 minute, which stopped on its own. Was recently prescriped keppra and dexamethasone 8mg BID. No confusion, dizziness, nausea, focal weakness. On examestane.

## 2021-03-29 ENCOUNTER — NON-APPOINTMENT (OUTPATIENT)
Age: 56
End: 2021-03-29

## 2021-04-02 ENCOUNTER — NON-APPOINTMENT (OUTPATIENT)
Age: 56
End: 2021-04-02

## 2021-04-06 ENCOUNTER — NON-APPOINTMENT (OUTPATIENT)
Age: 56
End: 2021-04-06

## 2021-04-20 ENCOUNTER — APPOINTMENT (OUTPATIENT)
Dept: INTERNAL MEDICINE | Facility: CLINIC | Age: 56
End: 2021-04-20
Payer: COMMERCIAL

## 2021-04-20 VITALS
RESPIRATION RATE: 16 BRPM | HEIGHT: 58 IN | OXYGEN SATURATION: 95 % | HEART RATE: 80 BPM | BODY MASS INDEX: 32.75 KG/M2 | DIASTOLIC BLOOD PRESSURE: 72 MMHG | TEMPERATURE: 98 F | WEIGHT: 156 LBS | SYSTOLIC BLOOD PRESSURE: 114 MMHG

## 2021-04-20 PROCEDURE — 99072 ADDL SUPL MATRL&STAF TM PHE: CPT

## 2021-04-20 PROCEDURE — 99214 OFFICE O/P EST MOD 30 MIN: CPT

## 2021-04-20 NOTE — ASSESSMENT
[FreeTextEntry1] : 1. brain metastases\par * completed RT; follow up with oncologist and RT on June\par * patient on medication for seizure\par * finished tapering steroids\par 2. metastatic breast carcinoma\par * on exemestane\par * poor prognoses \par 3. vitamin d deficiency\par * continue weekly oral supplement, renewed\par 4. hypercholesterolemia\par low cholesterol, low triglycerides diet,dietary counseling given; dietary avoidance discussed; diet and exercise reviewed with patient\par * will follow up in three months

## 2021-04-20 NOTE — HISTORY OF PRESENT ILLNESS
[FreeTextEntry1] : follow up metastatic breast carcinoma, hyperlipidemia\par Interview and discussion conducted in Malaysian by Malaysian speaking physician\par  [de-identified] : 55 years old female with metastatic breast carcinoma, recently found with brain mass with edema after seizure episode on March 17; she was started on tapering dexamethasone treatment and radiation therapy; she today states feeling well, denies headache, no motor weakness, no dizziness; no visual changes or tremors, she finished dexamethasone today, schedule to follow up with RT in June, as well with oncologist; today to review labs results; MRI brain report reviewed, oncologist, RT consult reviewed

## 2021-04-30 DIAGNOSIS — Z01.818 ENCOUNTER FOR OTHER PREPROCEDURAL EXAMINATION: ICD-10-CM

## 2021-05-03 ENCOUNTER — APPOINTMENT (OUTPATIENT)
Dept: DISASTER EMERGENCY | Facility: CLINIC | Age: 56
End: 2021-05-03

## 2021-05-04 LAB — SARS-COV-2 N GENE NPH QL NAA+PROBE: NOT DETECTED

## 2021-05-06 ENCOUNTER — APPOINTMENT (OUTPATIENT)
Dept: GASTROENTEROLOGY | Facility: HOSPITAL | Age: 56
End: 2021-05-06

## 2021-05-06 ENCOUNTER — RESULT REVIEW (OUTPATIENT)
Age: 56
End: 2021-05-06

## 2021-05-06 ENCOUNTER — OUTPATIENT (OUTPATIENT)
Dept: OUTPATIENT SERVICES | Facility: HOSPITAL | Age: 56
LOS: 1 days | End: 2021-05-06
Payer: COMMERCIAL

## 2021-05-06 DIAGNOSIS — Z90.3 ACQUIRED ABSENCE OF STOMACH [PART OF]: Chronic | ICD-10-CM

## 2021-05-06 DIAGNOSIS — Z87.11 PERSONAL HISTORY OF PEPTIC ULCER DISEASE: ICD-10-CM

## 2021-05-06 DIAGNOSIS — Z12.11 ENCOUNTER FOR SCREENING FOR MALIGNANT NEOPLASM OF COLON: ICD-10-CM

## 2021-05-06 DIAGNOSIS — Z98.51 TUBAL LIGATION STATUS: Chronic | ICD-10-CM

## 2021-05-06 DIAGNOSIS — Z90.12 ACQUIRED ABSENCE OF LEFT BREAST AND NIPPLE: Chronic | ICD-10-CM

## 2021-05-06 DIAGNOSIS — Z90.49 ACQUIRED ABSENCE OF OTHER SPECIFIED PARTS OF DIGESTIVE TRACT: Chronic | ICD-10-CM

## 2021-05-06 PROCEDURE — 88305 TISSUE EXAM BY PATHOLOGIST: CPT | Mod: 26

## 2021-05-06 PROCEDURE — 88312 SPECIAL STAINS GROUP 1: CPT

## 2021-05-06 PROCEDURE — 43239 EGD BIOPSY SINGLE/MULTIPLE: CPT

## 2021-05-06 PROCEDURE — G0121 COLON CA SCRN NOT HI RSK IND: CPT

## 2021-05-06 PROCEDURE — 88305 TISSUE EXAM BY PATHOLOGIST: CPT

## 2021-05-06 PROCEDURE — 88312 SPECIAL STAINS GROUP 1: CPT | Mod: 26

## 2021-05-06 PROCEDURE — G0121: CPT

## 2021-05-06 NOTE — CHART NOTE - NSCHARTNOTEFT_GEN_A_CORE
Esophagogastroduodenoscopy Report:    Indication:             Hx. of peptic ulcer disease  Referring MD:       Dr. Adolph Nieto  Instrument:  #  Anesthesia:            MAC    Consent:  Informed consent was obtained from the patient after providing any opportunity for questions  Procedure: The gastroscope was gently passed through the incisoral orifice into the oral cavity and under direct visualization the esophagus was intubated. The endoscope was passed down the esophagus, through the stomach and into proximal jejunum. Color, texture, mucosa and anatomy of the esophagus, stomach, and duodenum were carefully examined with the scope. The patient tolerated the procedure well. After completion of the examination, the patient was transferred to the recovery room.     Procedure: Upper endoscopy and biopsies    Preparation: NPO     Findings:     Oropharynx:	   Normal appearing oropharynx.  Esophagus:	   Normal appearing esophagus with no ulcers, erosions, erythema noted.  Biopsy taken.  EG-junction:	   Normal appearing E-G junction at 35 cm. No hiatal hernia noted. No ulcers, erosions or erythema noted.  Cardia:	                 Normal appearing gastric mucosa with no ulcers, erosions or erythema noted. (+) Clear liquid suctioned.  Body:	                 Normal appearing gastric mucosa with no ulcers, erosions or erythema noted.  Biopsy taken.  Antrum:	                 Normal appearing gastric mucosa with no ulcers, erosions or erythema noted.  Biopsy taken.  Pylorus:	                 Normal appearing pylorus and pyloric channel with no ulcers, erosions or erythema noted.     Duodenal Bulb:         Normal appearing duodenal mucosa with no ulcers, erosions or erythema noted. Biopsy taken.  2nd portion:	   Normal appearing duodenal mucosa with no ulcers, erosions or erythema noted.  Biopsy taken.  3rd portion:	   Not visualized.      EBL:0    Impression: Small hiatal hernia, Mild diffuse bile gastritis    Plan:    1. Avoid late-night meals and dietary indiscretions.  2. Avoid fried and fatty foods.  3. Avoid nonsteroidal anti-inflammatory drugs and aspirin.  4. Will check path results.  5. Recommend a trial of pantoprazole 40 mg once a day for 3 months.  6. Followup in office in 4 weeks to reassess the symptoms and discuss the findings.      Procedure Start Time:   Procedure End Time:         Attending:       Kash Albarran M.D. Esophagogastroduodenoscopy Report:    Indication:             Hx. of peptic ulcer disease  Referring MD:       Dr. Adolph Nieto  Instrument:  #        [id number]  Anesthesia:            MAC    Consent:  Informed consent was obtained from the patient after providing any opportunity for questions  Procedure: The gastroscope was gently passed through the incisoral orifice into the oral cavity and under direct visualization the esophagus was intubated. The endoscope was passed down the esophagus, through the stomach and into proximal jejunum. Color, texture, mucosa and anatomy of the esophagus, stomach, and duodenum were carefully examined with the scope. The patient tolerated the procedure well. After completion of the examination, the patient was transferred to the recovery room.     Procedure: Upper endoscopy and biopsies    Preparation: NPO     Findings:     Oropharynx:	   Normal appearing oropharynx.  Esophagus:	   Normal appearing esophagus with no ulcers, erosions, erythema noted.  Biopsy taken.  EG-junction:	   Normal appearing E-G junction at 38 cm. No hiatal hernia noted. No ulcers, erosions or erythema noted.  Cardia:	                 Mild diffuse erythema suggestive of gastritis but no ulcers or erosions noted.  (+) Bile suctioned.  Body:	                 Mild diffuse erythema suggestive of gastritis but no ulcers or erosions noted. Biopsy taken.  Anastomotic Site:	   Normal appearing anastomotic site mucosa with shortened gastric pouch, s/p Billroth II gastrectomy with no ulcers, erosions or erythema noted.  Biopsy taken.   Afferent Loop::         Normal appearing small bowel mucosa with no ulcers, erosions or erythema noted. Biopsy taken.  Efferent Loop:	   Normal appearing small bowel mucosa with no ulcers, erosions or erythema noted.  Biopsy taken.        EBL:0    Impression: 1. Mild diffuse bile gastritis 2.  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 Avoid late-night meals and dietary indiscretions.  2. Avoid fried and fatty foods.  3. Avoid nonsteroidal anti-inflammatory drugs and aspirin.  4. Will check path results.  5. Recommend a trial of pantoprazole 40 mg once a day for 3 months.  6. Followup in office in 4 weeks to reassess the symptoms and discuss the findings.      Procedure Start Time:  7:47 am  Procedure End Time:   7:52 am      Attending:       Kash Albarran M.D.

## 2021-05-06 NOTE — CHART NOTE - NSCHARTNOTEFT_GEN_A_CORE
Colonoscopy Report:    Indication:          Colon cancer screening  Referring MD:    Dr. Adolph Nieto  Instrument:        #  Anesthesia:         MAC    Consent:  Informed consent was obtained from the patient after providing any opportunity for questions  Procedure: After placing the patient in the left lateral decubitus position, the colonoscope was gently inserted into the rectum and advanced to the terminal ileum and cecum. Color, texture, mucosa, and anatomy of the colon were carefully examined with the scope. The patient tolerated the procedure well. After completion of the exam, the patient was transferred to the recovery room.     Procedure: Colonoscopy    Preparation: Nulytely (Adequate Prep)    Findings:     Anal Canal:	      Normal appearing anal canal. (+) Small internal hemorrhoids noted on retroflex view.  Rectum:	                    Normal appearing rectal mucosa with no polyps, masses, diverticulosis, AVMs or proctitis noted.  Sigmoid Colon:  	      Normal appearing colonic mucosa with no polyps, masses, diverticulosis, AVMs or colitis noted.  Descending Colon:       Normal appearing colonic mucosa with no polyps, masses, diverticulosis, AVMs or colitis noted.  Splenic Flexure:	      Normal appearing colonic mucosa with no polyps, masses, diverticulosis, AVMs or colitis noted.  Transverse Colon:        Normal appearing colonic mucosa with no polyps, masses, diverticulosis, AVMs or colitis noted.  Hepatic Flexure:	      Normal appearing colonic mucosa with no polyps, masses, diverticulosis, AVMs or colitis noted.  Ascending Colon: 	      Normal appearing colonic mucosa with no polyps, masses, diverticulosis, AVMs or colitis noted.  Cecum:	                    Normal appearing colonic mucosa with no polyps, masses, diverticulosis, AVMs or colitis noted.  Ileo-cecal Valve:	      Normal appearing ileo-cecal valve mucosa with no polyps, masses, AVMs or ileo-colitis noted.  Ileum:                          Normal ileal mucosa with no polyps, masses, diverticulosis, AVMs or ileitis noted.  	    EBL:0    Impression:  1.       A/P:      Procedure Start Time:    Cecum Reached Time:   Procedure End Time:     Total Withdrawal Time:       Attending:       Kash Albarran M.D. Colonoscopy Report:    Indication:          Colon cancer screening  Referring MD:    Dr. Adolph Nieto  Instrument:        # [id number]  Anesthesia:         MAC    Consent:  Informed consent was obtained from the patient after providing any opportunity for questions  Procedure: After placing the patient in the left lateral decubitus position, the colonoscope was gently inserted into the rectum and advanced to the terminal ileum and cecum. Color, texture, mucosa, and anatomy of the colon were carefully examined with the scope. The patient tolerated the procedure well. After completion of the exam, the patient was transferred to the recovery room.     Procedure: Colonoscopy    Preparation: Nulytely (Adequate Prep)    Findings:     Anal Canal:	      Normal appearing anal canal. (+) Small internal hemorrhoids noted on retroflex view.  Rectum:	                    Normal appearing rectal mucosa with no polyps, masses, diverticulosis, AVMs or proctitis noted.  Sigmoid Colon:  	      Normal appearing colonic mucosa with no polyps, masses, diverticulosis, AVMs or colitis noted.  Long and tortuous colon.   Descending Colon:       Normal appearing colonic mucosa with no polyps, masses, diverticulosis, AVMs or colitis noted.  Splenic Flexure:	      Normal appearing colonic mucosa with no polyps, masses, diverticulosis, AVMs or colitis noted.  Transverse Colon:        Normal appearing colonic mucosa with no polyps, masses, diverticulosis, AVMs or colitis noted.  Hepatic Flexure:	      Normal appearing colonic mucosa with no polyps, masses, diverticulosis, AVMs or colitis noted.  Ascending Colon: 	      Normal appearing colonic mucosa with no polyps, masses, diverticulosis, AVMs or colitis noted.  Cecum:	                    Normal appearing colonic mucosa with no polyps, masses, diverticulosis, AVMs or colitis noted.  Ileo-cecal Valve:	      Normal appearing ileo-cecal valve mucosa with no polyps, masses, AVMs or ileo-colitis noted.  Ileum:                          Normal ileal mucosa with no polyps, masses, diverticulosis, AVMs or ileitis noted.  	    EBL:0    Impression:  1. Normal but long and tortuous colon 2. Small internal hemorrhoids    A/P:     1. Recommend a high fiber diet  2. Consider a trial of Anusol HC suppositories one per rectum q.h.s. and Anusol HC 2.5% cream applied to affected area twice a day BID hemorrhoidal bleeding or pain.  3. Recommend a repeat colonoscopy in 10 years to reassess for colonic polyps unless symptomatic.  4. Followup in the office in 4 weeks to reassess symptoms and discussed the findings.      Procedure Start Time:   7:56 am  Cecum Reached Time:   8:00 am  Procedure End Time:     8:07 am  Total Withdrawal Time:  7 Minutes      Attending:       Kash Albarran M.D.

## 2021-05-10 LAB — SURGICAL PATHOLOGY STUDY: SIGNIFICANT CHANGE UP

## 2021-05-17 ENCOUNTER — APPOINTMENT (OUTPATIENT)
Dept: GASTROENTEROLOGY | Facility: CLINIC | Age: 56
End: 2021-05-17

## 2021-05-24 ENCOUNTER — APPOINTMENT (OUTPATIENT)
Dept: GASTROENTEROLOGY | Facility: CLINIC | Age: 56
End: 2021-05-24
Payer: COMMERCIAL

## 2021-05-24 VITALS
SYSTOLIC BLOOD PRESSURE: 111 MMHG | WEIGHT: 163 LBS | DIASTOLIC BLOOD PRESSURE: 79 MMHG | HEIGHT: 58 IN | TEMPERATURE: 97.2 F | HEART RATE: 74 BPM | BODY MASS INDEX: 34.22 KG/M2 | OXYGEN SATURATION: 99 %

## 2021-05-24 DIAGNOSIS — K64.8 OTHER HEMORRHOIDS: ICD-10-CM

## 2021-05-24 PROCEDURE — 99072 ADDL SUPL MATRL&STAF TM PHE: CPT

## 2021-05-24 PROCEDURE — 99215 OFFICE O/P EST HI 40 MIN: CPT

## 2021-05-24 NOTE — ASSESSMENT
[FreeTextEntry1] : Gastritis: The patient has a history of gastritis. The patient is to avoid nonsteroidal anti-inflammatory drugs and aspirin. I recommend a trial of pantoprazole 40 mg once a day for 3 months for the symptoms. \par H. pylori Gastritis: The patient was found to have H. pylori gastritis on recent upper endoscopy.  I recommend a trial of Clarithromycin 500 mg 2 times a day, Amoxicilin 500mg twice a day and Omeprazole 40 mg twice a day for 14 days for H. pylori eradication.  The patient is to return in 2 months for H. pylori breath test to assess for eradication of the bacteria. \par CMV Infection: The recent upper endoscopy performed at the [facility] [place] GI endoscopy suite on May 6, 2021 revealed mild diffuse bile gastritis and s/p Billroth II gastrectomy with shortened gastric pouch.  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  The patient has a history of metastatic breast cancer. The patient and I had a long discussion regarding the findings and the potential risk of major diseases associated with CMV such as pneumonia, colitis, esophagitis, hepatitis, and CNS diseases such as encephalitis and poly-radiculitis. The patient was also told her the possibility of developing leukemia adrenal colitis and oral ulcers.  The patient denies any history of HIV. The patient denies any malaise, swelling, fevers, muscle aches, respiratory symptoms, lymphadenopathy, arthralgias or headaches.  I recommend evaluation with infectious disease specialist regarding possible treatment for the CMV with ganciclovir versus foscarnet versus Cidofovir (CMV DNA polyperase inhibitors).I recommend an infectious disease consultation to assess for possible treatment of CMV infection.  The case was discussed with the patient at length and with the patient's PMD, Dr. Adolph Nieto.\par Internal Hemorrhoids: The patient is to consider a trial of Anusol H. C. suppositories one per rectum nightly and Anusol HC2 .5% cream apply to affected area twice a day p.r.n. hemorrhoidal bleeding or pain. \par I recommend a repeat colonoscopy in 10 years to reassess for colonic polyps pending patient’s health unless symptomatic.  The patient agreed and will follow up for the procedure. \par History of Peptic Ulcer Disease: The patient has a prior history of peptic ulcer disease. The patient is to avoid nonsteroidal anti-inflammatory drugs and aspirin. The patient required surgery for the bleeding peptic ulcer disease. I recommend a repeat upper endoscopy to reassess for ulcer healing if symptomatic. \par Follow-up: The patient is to follow-up in the office in 4 weeks to reassess the symptoms. The patient was told to call the office if any further problems. \par

## 2021-05-24 NOTE — HISTORY OF PRESENT ILLNESS
[None] : had no significant interval events [Heartburn] : denies heartburn [Nausea] : denies nausea [Vomiting] : denies vomiting [Diarrhea] : denies diarrhea [Constipation] : denies constipation [Yellow Skin Or Eyes (Jaundice)] : denies jaundice [Abdominal Pain] : denies abdominal pain [Abdominal Swelling] : denies abdominal swelling [Rectal Pain] : denies rectal pain [Wt Gain ___ Lbs] : recent [unfilled] ~Upound(s) weight gain [Peptic Ulcer Disease] : peptic ulcer disease [Cholelithiasis] : cholelithiasis [Cholecystectomy] : cholecystectomy [Wt Loss ___ Lbs] : no recent weight loss [GERD] : no gastroesophageal reflux disease [Hiatus Hernia] : no hiatus hernia [Pancreatitis] : no pancreatitis [Kidney Stone] : no kidney stone [Inflammatory Bowel Disease] : no inflammatory bowel disease [Irritable Bowel Syndrome] : no irritable bowel syndrome [Diverticulitis] : no diverticulitis [Alcohol Abuse] : no alcohol abuse [Malignancy] : no malignancy [Abdominal Surgery] : no abdominal surgery [Appendectomy] : no appendectomy [de-identified] : The patient has a history significant for left breast cancer, s/p left mastectomy with resultant metastasis to the lung and currently on Exemestane and history of bleeding peptic ulcer disease, s/p surgery in 2019. The patient was recently diagnosed with brain tumors and is currently recovering status post gamma knife radiation treatment. The patient is to followup with the neurosurgeon and neurologist.   She was previously on steroids but stopped x 3 weeks.   The patient states that she is feeling better. The patient denies any jaundice or pruritus.  The patient denies any chronic lower back pain. The patient denies any abdominal pain.  The patient denies any abdominal gas and bloating.  The patient denies any nausea or vomiting.  The patient denies any gastroesophageal reflux disease or dysphagia.  The patient denies any atypical chest pain, shortness of breath or palpitations.  The patient denies any diaphoresis. The patient denies any constipation or diarrhea.  The patient has 1 bowel movement a day. The patient denies a change in bowel habits.  The patient denies a change in caliber of stool.  The patient denies having mucus discharge with the bowel movements.  The patient denies any bright red blood per rectum, melena or hematemesis.  The patient denies any rectal pain or rectal pruritus.  The patient denies any weight loss or anorexia.  The patient admits to gaining weight recently.  She denies any fevers or chills.  The patient had an upper endoscopy and colonoscopy to the terminal ileum performed at the [facility] GI endoscopy suite on May 6, 2021. The patient had an upper endoscopy at the [facility] GI endoscopy suite on May 6, 2021. The upper endoscopy was performed up to the level of the second portion of the duodenum. 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  Biopsies were taken of the distal esophagus, antrum, body of stomach and duodenum to assess for esophagitis, gastritis and duodenitis. 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  The colonoscopy to the terminal ileum revealed a normal but long and tortuous colon and small internal hemorrhoids.  There were no polyps, masses, diverticulosis, AVMs or colitis noted.  The patient tolerated the procedures well.  The patient admits to having a prior upper endoscopy performed by another gastroenterologist. The patient admits to a history of bleeding peptic ulcer disease, s/p surgery in 2019. The patient denies any significant family history of GI problems. The patient admits to a family history of GI problems. The patient’s 2 sisters had a history of gallbladder issues.  [de-identified] : (-) smoking, (-) ETOH, (-) IVDA\par

## 2021-06-02 ENCOUNTER — APPOINTMENT (OUTPATIENT)
Dept: RADIATION ONCOLOGY | Facility: CLINIC | Age: 56
End: 2021-06-02
Payer: COMMERCIAL

## 2021-06-02 ENCOUNTER — APPOINTMENT (OUTPATIENT)
Dept: MRI IMAGING | Facility: IMAGING CENTER | Age: 56
End: 2021-06-02
Payer: MEDICARE

## 2021-06-02 ENCOUNTER — OUTPATIENT (OUTPATIENT)
Dept: OUTPATIENT SERVICES | Facility: HOSPITAL | Age: 56
LOS: 1 days | End: 2021-06-02
Payer: MEDICARE

## 2021-06-02 VITALS
BODY MASS INDEX: 34.07 KG/M2 | TEMPERATURE: 97.6 F | HEART RATE: 73 BPM | RESPIRATION RATE: 16 BRPM | DIASTOLIC BLOOD PRESSURE: 75 MMHG | SYSTOLIC BLOOD PRESSURE: 133 MMHG | OXYGEN SATURATION: 98 % | WEIGHT: 163.03 LBS

## 2021-06-02 DIAGNOSIS — Z98.51 TUBAL LIGATION STATUS: Chronic | ICD-10-CM

## 2021-06-02 DIAGNOSIS — Z90.49 ACQUIRED ABSENCE OF OTHER SPECIFIED PARTS OF DIGESTIVE TRACT: Chronic | ICD-10-CM

## 2021-06-02 DIAGNOSIS — Z90.3 ACQUIRED ABSENCE OF STOMACH [PART OF]: Chronic | ICD-10-CM

## 2021-06-02 DIAGNOSIS — C79.31 SECONDARY MALIGNANT NEOPLASM OF BRAIN: ICD-10-CM

## 2021-06-02 DIAGNOSIS — Z90.12 ACQUIRED ABSENCE OF LEFT BREAST AND NIPPLE: Chronic | ICD-10-CM

## 2021-06-02 DIAGNOSIS — Z00.8 ENCOUNTER FOR OTHER GENERAL EXAMINATION: ICD-10-CM

## 2021-06-02 PROCEDURE — A9585: CPT

## 2021-06-02 PROCEDURE — 70553 MRI BRAIN STEM W/O & W/DYE: CPT | Mod: 26,MH

## 2021-06-02 PROCEDURE — 99024 POSTOP FOLLOW-UP VISIT: CPT

## 2021-06-02 PROCEDURE — 70553 MRI BRAIN STEM W/O & W/DYE: CPT

## 2021-06-04 NOTE — HISTORY OF PRESENT ILLNESS
[FreeTextEntry1] : Ms. Shelley was seen initially for consult on 3/18//2021.  She completed radiation therapy on 3/25/2021. She completed 2700 cgy of radiation over 3 fractions 3/2303/25/2021.  She presents for a PTE\par \par ONCOLOGY HISTORY\par Ms. Shelley is a 55 year old woman with significant past medical history of breast cancer diagnosed in 2012 s/p radical mastectomy and  adjuvant chemotherapy. She subsequently had a recurrence in 2018 and has been receiving systemic therapy. She currently has stable disease on exemestane. \par Oncological history as per Dr. Martin's note:\par  The patients' history of present illness began in 2012 at which time she was found to have a right breast cancer for which she underwent a right modified radical mastectomy/axillary lymph node dissection with the finding of an ER positive 20%, UT positive 10%, HER-2/lisa 2+, FISH amplified breast cancer per outside physician report.  She went on to receive adjuvant dose-dense AC chemotherapy for 4 cycles followed by Taxol plus Herceptin as well as adjuvant radiation therapy. She was subsequently begun adjuvant tamoxifen therapy. Her last menstrual period was in 2014.\par \par The patient then began to experience weight loss beginning in November 2018 and over 3-month period of time lost 30 pounds per her report. At approximately the same time she developed increasing shortness of breath, and ultimately presented to [facility] Emergency Department. She subsequently had a CT scan of the chest, which showed multiple mediastinal hilar right subclavian lymph nodes corresponding to abnormal FDG activity on PET scan consistent with metastatic carcinoma; there were tiny nodularities in both lungs- too small to accurately characterize- not present on prior examination and measuring up to 2 mm in both lungs. The possibility of metastasis was a consideration; she had no bony metastases; the liver demonstrated tiny poorly defined lesions less than 5 mm and difficult to characterize and visible on prior examinations with the possibility of metastasis also considered. A PET-CT scan showed multiple hypermetabolic lymph nodes in the mediastinum, right subclavian region and both adebayo felt to represent tumor; there were stellate lesions in the left breast region, felt to represent benign scars; the lungs demonstrated no significant abnormality with multiple tiny lung nodules seen on CT as previously noted; liver demonstrated no significant abnormality with multiple small poorly defined nodules seen on CT per my previous comments. The patient underwent a left breast core biopsy of the area of concern with a finding of fat necrosis and foreign body giant cell reaction as well as a separate fine-needle aspiration consistent with a ruptured cyst/fat necrosis. The patient consequently saw her oncologist, Dr. Carias, who sent the patient for ultrasound-guided core biopsies of the mediastinal lymph nodes with a finding of carcinoma, consistent with breast primary with estrogen receptor returning positive, greater than 50%, progesterone receptor negative, and HER-2/lisa 0 per outside report. The patient was subsequently begun on docetaxel, pertuzumab, trastuzumab, and exemestane beginning at the early 4/19, and subsequently switched to weekly paclitaxel secondary to side effects and continuing on trastuzumab / pertuzumab every 3 weeks as well as exemestane. \par \par The patient was initial consultation 7/24/19 for a further opinion regarding treatment recommendations. She noted ongoing weakness, fatigue, and generalized malaise, although all of these had begun to improve gradually. Her last chemotherapy as noted above was approximately at 06/12/2019. She reports that her primary oncologist, Dr. Carias, told to discontinue exemestane upon admission to [facility] with the consideration restarting it post discharge. Slides for review at [facility] and it was confirmed that she had met breast cancer ER+ UT neg and Her 2 lisa neg.  \par \par At the time of initial consult she felt well. Noted a left sided seizure in the days prior to consult lasting about 1 minute, which stopped on its own. Was recently prescribed keppra and dexamethasone 8mg BID. No confusion, dizziness, nausea, focal weakness. On examestane.  She recently had a focal seizure left upper/lower extremity. MRI showed 3 lesions -  \par \par Lesion 1: Large dural based enhancing lesion is seen involving the high medial right frontal region. This lesion does appear to cross the midline and measures approximately 3.2 x 2.7 x 3.5 cm. Small amount of adjacent susceptibility is identified. Surrounding edema is identified. Localized mass effect is identified. Right to left shift (7.2 mm) seen.\par \par Lesion 2: Enhancing lesion is seen involving the right upper tiffany. This lesion measures approximately 0.5 x 0.4 cm.\par \par Lesion 3: Enhancing lesion is seen involving the superior left cerebellar region. This lesion measures approximately 1.2 x 1.0 cm. Surrounding edema is identified.\par \par \par 6/2/2021- Ms. Shelley presents today for follow up. [place] [facility] [id number] for [place] was used.  MRI scheduled today at 145. Has not seen Dr. Martin since radiation. States she  is feeling fine. States she has intermittent sensation of "pulling" to her forehead. Denies any dizziness, confusion, headache, nausea, vomiting, visual disturbances, weakness. States 3 weeks ago she had an episode where her left upper extremity "was shaking" for one minute, denies any LOC.

## 2021-06-20 ENCOUNTER — OUTPATIENT (OUTPATIENT)
Dept: OUTPATIENT SERVICES | Facility: HOSPITAL | Age: 56
LOS: 1 days | Discharge: HOME | End: 2021-06-20

## 2021-06-20 DIAGNOSIS — Z90.49 ACQUIRED ABSENCE OF OTHER SPECIFIED PARTS OF DIGESTIVE TRACT: Chronic | ICD-10-CM

## 2021-06-20 DIAGNOSIS — Z90.3 ACQUIRED ABSENCE OF STOMACH [PART OF]: Chronic | ICD-10-CM

## 2021-06-20 DIAGNOSIS — Z98.51 TUBAL LIGATION STATUS: Chronic | ICD-10-CM

## 2021-06-20 DIAGNOSIS — C50.919 MALIGNANT NEOPLASM OF UNSPECIFIED SITE OF UNSPECIFIED FEMALE BREAST: ICD-10-CM

## 2021-06-20 DIAGNOSIS — Z90.12 ACQUIRED ABSENCE OF LEFT BREAST AND NIPPLE: Chronic | ICD-10-CM

## 2021-06-22 ENCOUNTER — NON-APPOINTMENT (OUTPATIENT)
Age: 56
End: 2021-06-22

## 2021-06-22 ENCOUNTER — APPOINTMENT (OUTPATIENT)
Dept: INFECTIOUS DISEASE | Facility: CLINIC | Age: 56
End: 2021-06-22
Payer: MEDICARE

## 2021-06-22 VITALS
BODY MASS INDEX: 34 KG/M2 | WEIGHT: 162 LBS | DIASTOLIC BLOOD PRESSURE: 75 MMHG | HEIGHT: 58 IN | OXYGEN SATURATION: 99 % | HEART RATE: 70 BPM | SYSTOLIC BLOOD PRESSURE: 113 MMHG | TEMPERATURE: 98.8 F

## 2021-06-22 LAB
ALBUMIN SERPL ELPH-MCNC: 4.3 G/DL
ALP BLD-CCNC: 135 U/L
ALT SERPL-CCNC: 11 U/L
ANION GAP SERPL CALC-SCNC: 11 MMOL/L
AST SERPL-CCNC: 21 U/L
BASOPHILS # BLD AUTO: 0.02 K/UL
BASOPHILS NFR BLD AUTO: 0.3 %
BILIRUB SERPL-MCNC: 0.2 MG/DL
BUN SERPL-MCNC: 15 MG/DL
CALCIUM SERPL-MCNC: 9.1 MG/DL
CHLORIDE SERPL-SCNC: 106 MMOL/L
CO2 SERPL-SCNC: 23 MMOL/L
CREAT SERPL-MCNC: 0.63 MG/DL
EOSINOPHIL # BLD AUTO: 0.19 K/UL
EOSINOPHIL NFR BLD AUTO: 2.6 %
GLUCOSE SERPL-MCNC: 96 MG/DL
HCT VFR BLD CALC: 31.8 %
HGB BLD-MCNC: 10 G/DL
IMM GRANULOCYTES NFR BLD AUTO: 0.3 %
LYMPHOCYTES # BLD AUTO: 3.1 K/UL
LYMPHOCYTES NFR BLD AUTO: 43.1 %
MAN DIFF?: NORMAL
MCHC RBC-ENTMCNC: 25.4 PG
MCHC RBC-ENTMCNC: 31.4 GM/DL
MCV RBC AUTO: 80.7 FL
MONOCYTES # BLD AUTO: 0.62 K/UL
MONOCYTES NFR BLD AUTO: 8.6 %
NEUTROPHILS # BLD AUTO: 3.24 K/UL
NEUTROPHILS NFR BLD AUTO: 45.1 %
PLATELET # BLD AUTO: 312 K/UL
POTASSIUM SERPL-SCNC: 4.6 MMOL/L
PROT SERPL-MCNC: 6.7 G/DL
RBC # BLD: 3.94 M/UL
RBC # FLD: 17.9 %
SODIUM SERPL-SCNC: 140 MMOL/L
WBC # FLD AUTO: 7.19 K/UL

## 2021-06-22 PROCEDURE — 99203 OFFICE O/P NEW LOW 30 MIN: CPT

## 2021-06-22 PROCEDURE — 99072 ADDL SUPL MATRL&STAF TM PHE: CPT

## 2021-06-23 ENCOUNTER — RESULT REVIEW (OUTPATIENT)
Age: 56
End: 2021-06-23

## 2021-06-23 ENCOUNTER — APPOINTMENT (OUTPATIENT)
Dept: HEMATOLOGY ONCOLOGY | Facility: CLINIC | Age: 56
End: 2021-06-23
Payer: MEDICARE

## 2021-06-23 VITALS
HEART RATE: 65 BPM | RESPIRATION RATE: 17 BRPM | WEIGHT: 162.26 LBS | TEMPERATURE: 97.2 F | DIASTOLIC BLOOD PRESSURE: 76 MMHG | BODY MASS INDEX: 34.06 KG/M2 | HEIGHT: 57.99 IN | SYSTOLIC BLOOD PRESSURE: 113 MMHG | OXYGEN SATURATION: 99 %

## 2021-06-23 LAB
BASOPHILS # BLD AUTO: 0.03 K/UL — SIGNIFICANT CHANGE UP (ref 0–0.2)
BASOPHILS NFR BLD AUTO: 0.4 % — SIGNIFICANT CHANGE UP (ref 0–2)
CMV IGG SERPL QL: >10 U/ML
CMV IGG SERPL-IMP: POSITIVE
CMV IGM SERPL QL: <8 AU/ML
CMV IGM SERPL QL: NEGATIVE
EOSINOPHIL # BLD AUTO: 0.27 K/UL — SIGNIFICANT CHANGE UP (ref 0–0.5)
EOSINOPHIL NFR BLD AUTO: 3.3 % — SIGNIFICANT CHANGE UP (ref 0–6)
HCT VFR BLD CALC: 32.2 % — LOW (ref 34.5–45)
HGB BLD-MCNC: 9.9 G/DL — LOW (ref 11.5–15.5)
HIV1+2 AB SPEC QL IA.RAPID: NONREACTIVE
IMM GRANULOCYTES NFR BLD AUTO: 0.2 % — SIGNIFICANT CHANGE UP (ref 0–1.5)
LYMPHOCYTES # BLD AUTO: 3.13 K/UL — SIGNIFICANT CHANGE UP (ref 1–3.3)
LYMPHOCYTES # BLD AUTO: 38.8 % — SIGNIFICANT CHANGE UP (ref 13–44)
MCHC RBC-ENTMCNC: 25 PG — LOW (ref 27–34)
MCHC RBC-ENTMCNC: 30.7 G/DL — LOW (ref 32–36)
MCV RBC AUTO: 81.3 FL — SIGNIFICANT CHANGE UP (ref 80–100)
MONOCYTES # BLD AUTO: 0.58 K/UL — SIGNIFICANT CHANGE UP (ref 0–0.9)
MONOCYTES NFR BLD AUTO: 7.2 % — SIGNIFICANT CHANGE UP (ref 2–14)
NEUTROPHILS # BLD AUTO: 4.03 K/UL — SIGNIFICANT CHANGE UP (ref 1.8–7.4)
NEUTROPHILS NFR BLD AUTO: 50.1 % — SIGNIFICANT CHANGE UP (ref 43–77)
NRBC # BLD: 0 /100 WBCS — SIGNIFICANT CHANGE UP (ref 0–0)
PLATELET # BLD AUTO: 290 K/UL — SIGNIFICANT CHANGE UP (ref 150–400)
RBC # BLD: 3.96 M/UL — SIGNIFICANT CHANGE UP (ref 3.8–5.2)
RBC # FLD: 17.5 % — HIGH (ref 10.3–14.5)
WBC # BLD: 8.06 K/UL — SIGNIFICANT CHANGE UP (ref 3.8–10.5)
WBC # FLD AUTO: 8.06 K/UL — SIGNIFICANT CHANGE UP (ref 3.8–10.5)

## 2021-06-23 PROCEDURE — 99072 ADDL SUPL MATRL&STAF TM PHE: CPT

## 2021-06-23 PROCEDURE — 99215 OFFICE O/P EST HI 40 MIN: CPT

## 2021-06-23 RX ORDER — AMOXICILLIN 500 MG/1
500 TABLET, FILM COATED ORAL TWICE DAILY
Qty: 56 | Refills: 0 | Status: DISCONTINUED | COMMUNITY
Start: 2021-05-24 | End: 2021-06-23

## 2021-06-23 RX ORDER — CLARITHROMYCIN 500 MG/1
500 TABLET, FILM COATED ORAL TWICE DAILY
Qty: 28 | Refills: 0 | Status: DISCONTINUED | COMMUNITY
Start: 2021-05-24 | End: 2021-06-23

## 2021-06-23 NOTE — CONSULT LETTER
[Dear  ___] : Dear  [unfilled], [Courtesy Letter:] : I had the pleasure of seeing your patient, [unfilled], in my office today. [Please see my note below.] : Please see my note below. [Consult Closing:] : Thank you very much for allowing me to participate in the care of this patient.  If you have any questions, please do not hesitate to contact me. [Sincerely,] : Sincerely, [FreeTextEntry2] : Dr. Kash Albarran [DrErica  ___] : Dr. PAREDES [FreeTextEntry3] : \par Smita Brown MD\par  of Medicine\par Division of Infectious Diseases\par [facility] [employer]\par [address] [place] [address] DrErica\[place] [address]\par Tel: [phone]\par Fax: [phone] [DrErica ___] : Dr. PAREDES

## 2021-06-23 NOTE — HISTORY OF PRESENT ILLNESS
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   S/p radical mastectomy and adjuvant chemotherapy.\par Had recurrence in 2018 s/p chemo.\par In 2018 she lost 30 lbs and was found to have multiple mediastinal hilar right subclavian lymph nodes c/w metastatic cancer. Tiny nodularities noted in both lungs as well.  Later found to have brain mets s/p seizure.  \par \par She is s/p SRS and dexamethasone.  Steroids ended late March 2021.  \par \par She underwent routine EGD/colonscopy and was found to have H pylori and pathology c/w CMV as anastomotic site from gastrectomy.  S/p gastrectomy for bleeding in 2019.  \par \par Path: 5/6/21:\par Anastomotic site: Chronic mild active gastritis with numerous enlarged nuclei morphologically consistent with CMV inclusions.  Gastric body with H pylori.\par \par She denies abdominal pain, nausea, vomiting, diarrhea, fever, chills, cough.

## 2021-06-23 NOTE — ASSESSMENT
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  Monitor CBC and CMP closely.  \par Check CMV serology and pcr as well.\par May be able to trend pcr if she has detectable viral load.  \par \par D/w GI.\par May need repeat EGD if not able to trend pcr.

## 2021-06-23 NOTE — PHYSICAL EXAM
[General Appearance - Alert] : alert [General Appearance - In No Acute Distress] : in no acute distress [Sclera] : the sclera and conjunctiva were normal [PERRL With Normal Accommodation] : pupils were equal in size, round, reactive to light [Extraocular Movements] : extraocular movements were intact [Outer Ear] : the ears and nose were normal in appearance [Oropharynx] : the oropharynx was normal with no thrush [Neck Appearance] : the appearance of the neck was normal [Neck Cervical Mass (___cm)] : no neck mass was observed [Jugular Venous Distention Increased] : there was no jugular-venous distention [Auscultation Breath Sounds / Voice Sounds] : lungs were clear to auscultation bilaterally [Heart Rate And Rhythm] : heart rate was normal and rhythm regular [Heart Sounds] : normal S1 and S2 [Edema] : there was no peripheral edema [Abdomen Soft] : soft [Bowel Sounds] : normal bowel sounds [Abdomen Tenderness] : non-tender [Cervical Lymph Nodes Enlarged Posterior Bilaterally] : posterior cervical [Supraclavicular Lymph Nodes Enlarged Bilaterally] : supraclavicular [Cervical Lymph Nodes Enlarged Anterior Bilaterally] : anterior cervical [Musculoskeletal - Swelling] : no joint swelling [Skin Color & Pigmentation] : normal skin color and pigmentation [] : no rash [No Focal Deficits] : no focal deficits [Oriented To Time, Place, And Person] : oriented to person, place, and time [Affect] : the affect was normal

## 2021-06-24 ENCOUNTER — NON-APPOINTMENT (OUTPATIENT)
Age: 56
End: 2021-06-24

## 2021-06-24 LAB
ALBUMIN SERPL ELPH-MCNC: 4.2 G/DL
ALP BLD-CCNC: 139 U/L
ALT SERPL-CCNC: 14 U/L
ANION GAP SERPL CALC-SCNC: 10 MMOL/L
AST SERPL-CCNC: 23 U/L
BILIRUB SERPL-MCNC: 0.2 MG/DL
BUN SERPL-MCNC: 19 MG/DL
CALCIUM SERPL-MCNC: 9 MG/DL
CANCER AG27-29 SERPL-ACNC: 11.6 U/ML
CEA SERPL-MCNC: 1.1 NG/ML
CHLORIDE SERPL-SCNC: 106 MMOL/L
CO2 SERPL-SCNC: 23 MMOL/L
CREAT SERPL-MCNC: 0.66 MG/DL
GLUCOSE SERPL-MCNC: 94 MG/DL
POTASSIUM SERPL-SCNC: 4.5 MMOL/L
PROT SERPL-MCNC: 6.8 G/DL
SODIUM SERPL-SCNC: 140 MMOL/L

## 2021-06-24 RX ORDER — SODIUM SULFATE, POTASSIUM SULFATE, MAGNESIUM SULFATE 17.5; 3.13; 1.6 G/ML; G/ML; G/ML
17.5-3.13-1.6 SOLUTION, CONCENTRATE ORAL
Qty: 354 | Refills: 0 | Status: DISCONTINUED | COMMUNITY
Start: 2021-02-12

## 2021-06-24 NOTE — HISTORY OF PRESENT ILLNESS
[de-identified] : The patients' history of present illness began in 2012 at which time she was found to have a right breast cancer for which she underwent a right modified radical mastectomy/axillary lymph node dissection with the finding of an ER positive 20%, MS positive 10%, HER-2/lisa 2+, FISH amplified breast cancer per outside physician report (I do not have a copy that pathology report for my review).  She went on to receive adjuvant dose-dense AC chemotherapy for 4 cycles followed by Taxol plus Herceptin as well as adjuvant radiation therapy.  She was subsequently begun adjuvant tamoxifen therapy.  Her last menstrual period was in 2014.\par \par The patient then began to experience weight loss beginning in November 2018 and over 3-month period of time lost 30 pounds per her report.  At approximately the same time she developed increasing shortness of breath, and ultimately presented to [facility] Emergency Department.  She subsequently had a CT scan of the chest, which showed multiple mediastinal hilar right subclavian lymph nodes corresponding to abnormal FDG activity on PET scan consistent with metastatic carcinoma; there were tiny nodularities in both lungs- too small to accurately characterize- not present on prior examination and measuring up to 2 mm in both lungs.  The possibility of metastasis was a consideration; she had no bony metastases; the liver demonstrated tiny poorly defined lesions less than 5 mm and difficult to characterize and visible on prior examinations with the possibility of metastasis also considered.  A PET-CT scan showed multiple hypermetabolic lymph nodes in the mediastinum, right subclavian region and both adebayo felt to represent tumor; there were stellate lesions in the left breast region, felt to represent benign scars; the lungs demonstrated no significant abnormality with multiple tiny lung nodules seen on CT as previously noted; liver demonstrated no significant abnormality with multiple small poorly defined nodules seen on CT per my previous comments.  The patient underwent a left breast core biopsy of the area of concern with a finding of fat necrosis and foreign body giant cell reaction as well as a separate fine-needle aspiration consistent with a ruptured cyst/fat necrosis.  The patient consequently saw her oncologist, Dr. Carias, who sent the patient for ultrasound-guided core biopsies of the mediastinal lymph nodes with a finding of carcinoma, consistent with breast primary with estrogen receptor returning positive, greater than 50%, progesterone receptor negative, and HER-2/lisa 0 per outside report. The patient was subsequently begun on docetaxel, pertuzumab, trastuzumab, and exemestane beginning at the early 4/19, and subsequently switched to weekly paclitaxel secondary to side effects and continuing  on trastuzumab / pertuzumab every 3 weeks as well as exemestane. The patient was last treated per her report on approximately June 12, 2019, despite outside records that show ongoing weekly treatments early July 2019.  \par \par The patient also reports that beginning in approximately 12/18, she started to develop slowly progressive diffuse rash with associated pruritus, having seen multiple dermatologists.  Most recently, she saw Dr. Courtney Cowart, a [facility] physician practicing at [place], [place], who diagnosed disseminated herpes simplex for which she was begun on valacyclovir, as well as evidence of MRSA for which was treated with doxycycline beginning in May 2019.  In June 2019, the patient presented to [facility] Emergency Department complaining of progressive "weakness and shaking." She was found to be anemic and had melena.  She underwent endoscopy and VIR procedure to stop gastric bleeding, which was unsuccessful.  Consequently, she had a distal gastrectomy.  She was discharged from [facility] on 07/08/2019.\par \par The patient was initial consultation 7/24/19 for a further opinion regarding treatment recommendations. She noted ongoing weakness, fatigue, and generalized malaise, although all of these had begun to improve gradually.  Her last chemotherapy as noted above was approximately at 06/12/2019.  She reports that her primary oncologist, Dr. Carias, told to discontinue exemestane upon admission to [facility] with the consideration restarting it post discharge.  \par \par I obtained her outside slides for review at [facility] and it was confirmed that she had met breast cancer ER+ MS neg and Her 2 lisa neg.\par \par Consequently I recommended restarting exemestane.  \par \par \par  [de-identified] : Pt seen today for a routine f/u visit.\par \par On Exemestane and tolerating well. She notes a good appetite, gradually increasing weight and excellent performance status. \par \par She was seen last on 3/17/21 and reported that 4 days earlier  she had uncontrollable clenching of her L hand followed by an unusual feeling going up her arm then down the left side of her body then leading to uncontrolled shaking of her LLE. She denies any loss on consciousness , tongue biting, falling. She denies any HA/dizz/change in vision, loss of continence or any further similar or other neurologic symptoms. She then had an urgent MRI head which demonstrated:\par \par Abnormal enhancing lesions in the posterior fossa and supratentorial region are identified.\par \par Lesion 1: Large dural based enhancing lesion is seen involving the high medial right frontal region. This lesion does appear to cross the midline and measures approximately 3.2 x 2.7 x 3.5 cm. Small amount of adjacent susceptibility is identified. Surrounding edema is identified. Localized mass effect is identified. Right to left shift (7.2 mm) seen.\par \par Lesion 2: Enhancing lesion is seen involving the right upper tiffany. This lesion measures approximately 0.5 x 0.4 cm.\par \par Lesion 3: Enhancing lesion is seen involving the superior left cerebellar region. This lesion measures approximately 1.2 x 1.0 cm. Surrounding edema is identified.\par \par She was seen by Dr Hwang and treated with Gamma knife SRS to the 3 brain lesions. \par \par A repeat MRI 6/2/21 demonstrated:\par \par -Interval decrease in size of 3 enhancing lesions, largest in the high medial right frontal lobe measuring up to 2.0 cm.\par -Improvement of vasogenic edema with resolution of leftward midline shift.\par -No new lesions are identified\par \par In the interim, she underwent routine EGD/colonoscopy and was found to have H pylori and pathology c/w CMV at the anastomotic site from gastrectomy (s/p gastrectomy for bleeding in 2019) She was treated for her H Pylori and seen by ID recently and started on Valcyte 900 mg po BID x 3 weeks.\par \par The pt notes a good appetite, stable weight and performance status. She denies any recent seizure activity or neurologic sxs. \par \par BS 1/7/21\par IMPRESSION: Bone scan demonstrates:\par \par No definite scan evidence for osseous metastasis\par \par Mild diffuse increased uptake in the skull raises the possibility of Paget's disease.\par \par Degenerative disease in the major joints\par \par CT CAP 1/7/21\par IMPRESSION:\par Subcentimeter pulmonary nodules, some of which have resolved as compared to CT dated 1/22/2019.\par \par Thoracic lymph nodes are decreased in size as compared to 1/22/2019.\par \par No evidence of metastatic disease in the abdomen or pelvis

## 2021-06-28 LAB — CMV DNA SPEC QL NAA+PROBE: NOT DETECTED IU/ML

## 2021-06-29 ENCOUNTER — RESULT REVIEW (OUTPATIENT)
Age: 56
End: 2021-06-29

## 2021-07-02 ENCOUNTER — APPOINTMENT (OUTPATIENT)
Dept: INFECTIOUS DISEASE | Facility: CLINIC | Age: 56
End: 2021-07-02
Payer: MEDICARE

## 2021-07-02 VITALS
HEART RATE: 71 BPM | HEIGHT: 60 IN | BODY MASS INDEX: 31.8 KG/M2 | TEMPERATURE: 98.9 F | OXYGEN SATURATION: 98 % | SYSTOLIC BLOOD PRESSURE: 115 MMHG | WEIGHT: 162 LBS | DIASTOLIC BLOOD PRESSURE: 73 MMHG

## 2021-07-02 LAB
BASOPHILS # BLD AUTO: 0.02 K/UL
BASOPHILS NFR BLD AUTO: 0.3 %
EOSINOPHIL # BLD AUTO: 0.12 K/UL
EOSINOPHIL NFR BLD AUTO: 2 %
HCT VFR BLD CALC: 31.2 %
HGB BLD-MCNC: 10 G/DL
IMM GRANULOCYTES NFR BLD AUTO: 0.3 %
LYMPHOCYTES # BLD AUTO: 2.8 K/UL
LYMPHOCYTES NFR BLD AUTO: 46.9 %
MAN DIFF?: NORMAL
MCHC RBC-ENTMCNC: 26.7 PG
MCHC RBC-ENTMCNC: 32.1 GM/DL
MCV RBC AUTO: 83.4 FL
MONOCYTES # BLD AUTO: 0.34 K/UL
MONOCYTES NFR BLD AUTO: 5.7 %
NEUTROPHILS # BLD AUTO: 2.67 K/UL
NEUTROPHILS NFR BLD AUTO: 44.8 %
PLATELET # BLD AUTO: 300 K/UL
RBC # BLD: 3.74 M/UL
RBC # FLD: 17.7 %
WBC # FLD AUTO: 5.97 K/UL

## 2021-07-02 PROCEDURE — 99214 OFFICE O/P EST MOD 30 MIN: CPT

## 2021-07-02 PROCEDURE — 99072 ADDL SUPL MATRL&STAF TM PHE: CPT

## 2021-07-02 NOTE — PHYSICAL EXAM
[General Appearance - Alert] : alert [General Appearance - In No Acute Distress] : in no acute distress [Sclera] : the sclera and conjunctiva were normal [PERRL With Normal Accommodation] : pupils were equal in size, round, reactive to light [Extraocular Movements] : extraocular movements were intact [Outer Ear] : the ears and nose were normal in appearance [Oropharynx] : the oropharynx was normal with no thrush [Neck Appearance] : the appearance of the neck was normal [Neck Cervical Mass (___cm)] : no neck mass was observed [Jugular Venous Distention Increased] : there was no jugular-venous distention [Auscultation Breath Sounds / Voice Sounds] : lungs were clear to auscultation bilaterally [Heart Rate And Rhythm] : heart rate was normal and rhythm regular [Heart Sounds] : normal S1 and S2 [Edema] : there was no peripheral edema [Bowel Sounds] : normal bowel sounds [Abdomen Soft] : soft [Abdomen Tenderness] : non-tender [Cervical Lymph Nodes Enlarged Posterior Bilaterally] : posterior cervical [Cervical Lymph Nodes Enlarged Anterior Bilaterally] : anterior cervical [Supraclavicular Lymph Nodes Enlarged Bilaterally] : supraclavicular [Musculoskeletal - Swelling] : no joint swelling [Skin Color & Pigmentation] : normal skin color and pigmentation [] : no rash [No Focal Deficits] : no focal deficits [Oriented To Time, Place, And Person] : oriented to person, place, and time [Affect] : the affect was normal

## 2021-07-02 NOTE — ASSESSMENT
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  2 more weeks left. \par Check  labs today.  Monitor CBC and CMP closely.  \par  CMV serology with evidence of remote infection.  PCR was not detected. Unable to trend pcr for evidence of clearance of infection.  \par Likely will need repeat EGD to document clearance.\par \par Will d/w GI on timing.  Maybe can pursue few weeks after completion of therapy.\par \par F/up with me 2 weeks to repeat labs again.

## 2021-07-02 NOTE — CONSULT LETTER
[Dear  ___] : Dear  [unfilled], [Courtesy Letter:] : I had the pleasure of seeing your patient, [unfilled], in my office today. [Please see my note below.] : Please see my note below. [Consult Closing:] : Thank you very much for allowing me to participate in the care of this patient.  If you have any questions, please do not hesitate to contact me. [Sincerely,] : Sincerely, [FreeTextEntry2] : Dr. Kash Albarran [FreeTextEntry3] : \par Smita Brown MD\par  of Medicine\par Division of Infectious Diseases\par [facility] [employer] [facility] at [facility]\par [address] [place] [address] DrErica\[place] [address]\par Tel: [phone]\par Fax: [phone] [DrErica  ___] : Dr. PAREDES [DrErcia ___] : Dr. PAREDES

## 2021-07-02 NOTE — HISTORY OF PRESENT ILLNESS
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   S/p radical mastectomy and adjuvant chemotherapy.\par Had recurrence in 2018 s/p chemo.\par In 2018 she lost 30 lbs and was found to have multiple mediastinal hilar right subclavian lymph nodes c/w metastatic cancer. Tiny nodularities noted in both lungs as well.  Later found to have brain mets s/p seizure.  \par \par She is s/p SRS and dexamethasone.  Steroids ended late March 2021.  \par \par She underwent routine EGD/colonscopy and was found to have H pylori and pathology c/w CMV as anastomotic site from gastrectomy.  S/p gastrectomy for bleeding ulcer in 2019.  \par \par Path: 5/6/21:\par Anastomotic site: Chronic mild active gastritis with numerous enlarged nuclei morphologically consistent with CMV inclusions.  Gastric body with H pylori.\par \par She denies abdominal pain, nausea, vomiting, diarrhea, fever, chills, cough. \par Started valcyte last visit. Now on day 7 or 8. Tolerating medication. No side effects.  \par Feels at baseline.  Denies dyspepsia.

## 2021-07-06 LAB
ALBUMIN SERPL ELPH-MCNC: 4.3 G/DL
ALP BLD-CCNC: 141 U/L
ALT SERPL-CCNC: 14 U/L
ANION GAP SERPL CALC-SCNC: 13 MMOL/L
AST SERPL-CCNC: 20 U/L
BILIRUB SERPL-MCNC: 0.2 MG/DL
BUN SERPL-MCNC: 19 MG/DL
CALCIUM SERPL-MCNC: 8.5 MG/DL
CHLORIDE SERPL-SCNC: 106 MMOL/L
CO2 SERPL-SCNC: 22 MMOL/L
CREAT SERPL-MCNC: 0.71 MG/DL
GLUCOSE SERPL-MCNC: 90 MG/DL
POTASSIUM SERPL-SCNC: 5 MMOL/L
PROT SERPL-MCNC: 6.5 G/DL
SODIUM SERPL-SCNC: 141 MMOL/L

## 2021-07-08 ENCOUNTER — APPOINTMENT (OUTPATIENT)
Dept: RADIOLOGY | Facility: IMAGING CENTER | Age: 56
End: 2021-07-08
Payer: MEDICARE

## 2021-07-08 ENCOUNTER — APPOINTMENT (OUTPATIENT)
Dept: CT IMAGING | Facility: IMAGING CENTER | Age: 56
End: 2021-07-08
Payer: MEDICARE

## 2021-07-08 ENCOUNTER — APPOINTMENT (OUTPATIENT)
Dept: NUCLEAR MEDICINE | Facility: IMAGING CENTER | Age: 56
End: 2021-07-08
Payer: MEDICARE

## 2021-07-08 ENCOUNTER — OUTPATIENT (OUTPATIENT)
Dept: OUTPATIENT SERVICES | Facility: HOSPITAL | Age: 56
LOS: 1 days | End: 2021-07-08
Payer: MEDICARE

## 2021-07-08 ENCOUNTER — RESULT REVIEW (OUTPATIENT)
Age: 56
End: 2021-07-08

## 2021-07-08 DIAGNOSIS — Z90.49 ACQUIRED ABSENCE OF OTHER SPECIFIED PARTS OF DIGESTIVE TRACT: Chronic | ICD-10-CM

## 2021-07-08 DIAGNOSIS — C50.919 MALIGNANT NEOPLASM OF UNSPECIFIED SITE OF UNSPECIFIED FEMALE BREAST: ICD-10-CM

## 2021-07-08 DIAGNOSIS — Z98.51 TUBAL LIGATION STATUS: Chronic | ICD-10-CM

## 2021-07-08 DIAGNOSIS — Z90.12 ACQUIRED ABSENCE OF LEFT BREAST AND NIPPLE: Chronic | ICD-10-CM

## 2021-07-08 DIAGNOSIS — Z90.3 ACQUIRED ABSENCE OF STOMACH [PART OF]: Chronic | ICD-10-CM

## 2021-07-08 PROCEDURE — 78830 RP LOCLZJ TUM SPECT W/CT 1: CPT | Mod: 26

## 2021-07-08 PROCEDURE — 78306 BONE IMAGING WHOLE BODY: CPT | Mod: 26,MH

## 2021-07-08 PROCEDURE — 71260 CT THORAX DX C+: CPT

## 2021-07-08 PROCEDURE — 74177 CT ABD & PELVIS W/CONTRAST: CPT | Mod: 26,MH

## 2021-07-08 PROCEDURE — 74177 CT ABD & PELVIS W/CONTRAST: CPT

## 2021-07-08 PROCEDURE — 82565 ASSAY OF CREATININE: CPT

## 2021-07-08 PROCEDURE — 71260 CT THORAX DX C+: CPT | Mod: 26,MH

## 2021-07-08 PROCEDURE — 78830 RP LOCLZJ TUM SPECT W/CT 1: CPT

## 2021-07-08 PROCEDURE — 78306 BONE IMAGING WHOLE BODY: CPT

## 2021-07-08 PROCEDURE — A9561: CPT

## 2021-07-16 ENCOUNTER — APPOINTMENT (OUTPATIENT)
Dept: INFECTIOUS DISEASE | Facility: CLINIC | Age: 56
End: 2021-07-16
Payer: MEDICARE

## 2021-07-16 VITALS
DIASTOLIC BLOOD PRESSURE: 76 MMHG | HEIGHT: 60 IN | HEART RATE: 70 BPM | BODY MASS INDEX: 31.8 KG/M2 | OXYGEN SATURATION: 99 % | WEIGHT: 162 LBS | SYSTOLIC BLOOD PRESSURE: 107 MMHG | TEMPERATURE: 98.5 F

## 2021-07-16 PROCEDURE — 99213 OFFICE O/P EST LOW 20 MIN: CPT

## 2021-07-16 NOTE — CONSULT LETTER
[Dear  ___] : Dear  [unfilled], [Courtesy Letter:] : I had the pleasure of seeing your patient, [unfilled], in my office today. [Please see my note below.] : Please see my note below. [Consult Closing:] : Thank you very much for allowing me to participate in the care of this patient.  If you have any questions, please do not hesitate to contact me. [Sincerely,] : Sincerely, [DrErica  ___] : Dr. PAREDES [DrEirca ___] : Dr. PAREDES [FreeTextEntry2] : Dr. Kash Albarran [FreeTextEntry3] : \par Smita Brown MD\par  of Medicine\par Division of Infectious Diseases\par [facility] [employer] [facility] [employer] [facility] at [facility]\par [address] [place] [address] DrErica\[place] [address]\par Tel: [phone]\par Fax: [phone]

## 2021-07-16 NOTE — HISTORY OF PRESENT ILLNESS
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   S/p radical mastectomy and adjuvant chemotherapy.\par Had recurrence in 2018 s/p chemo.\par In 2018 she lost 30 lbs and was found to have multiple mediastinal hilar right subclavian lymph nodes c/w metastatic cancer. Tiny nodularities noted in both lungs as well.  Later found to have brain mets s/p seizure.  \par \par She is s/p SRS and dexamethasone.  Steroids ended late March 2021.  \par \par She underwent routine EGD/colonscopy and was found to have H pylori and pathology c/w CMV as anastomotic site from gastrectomy.  S/p gastrectomy for bleeding ulcer in 2019.  \par \par Path: 5/6/21:\par Anastomotic site: Chronic mild active gastritis with numerous enlarged nuclei morphologically consistent with CMV inclusions.  Gastric body with H pylori.\par \par She denies abdominal pain, nausea, vomiting, diarrhea, fever, chills, cough. \par Completed 3 weeks of valcyte last visit.  Tolerated medication. No side effects.  \par Feels at baseline.  Denies dyspepsia. \par To see GI 7/19/21

## 2021-07-19 ENCOUNTER — APPOINTMENT (OUTPATIENT)
Dept: GASTROENTEROLOGY | Facility: CLINIC | Age: 56
End: 2021-07-19
Payer: MEDICARE

## 2021-07-19 VITALS
BODY MASS INDEX: 31.02 KG/M2 | HEART RATE: 70 BPM | HEIGHT: 60 IN | SYSTOLIC BLOOD PRESSURE: 101 MMHG | OXYGEN SATURATION: 99 % | TEMPERATURE: 97.8 F | DIASTOLIC BLOOD PRESSURE: 66 MMHG | WEIGHT: 158 LBS

## 2021-07-19 DIAGNOSIS — R10.13 EPIGASTRIC PAIN: ICD-10-CM

## 2021-07-19 DIAGNOSIS — A04.8 OTHER SPECIFIED BACTERIAL INTESTINAL INFECTIONS: ICD-10-CM

## 2021-07-19 LAB
ALBUMIN SERPL ELPH-MCNC: 4.4 G/DL
ALP BLD-CCNC: 149 U/L
ALT SERPL-CCNC: 17 U/L
ANION GAP SERPL CALC-SCNC: 11 MMOL/L
AST SERPL-CCNC: 21 U/L
BASOPHILS # BLD AUTO: 0.02 K/UL
BASOPHILS NFR BLD AUTO: 0.3 %
BILIRUB SERPL-MCNC: 0.3 MG/DL
BUN SERPL-MCNC: 13 MG/DL
CALCIUM SERPL-MCNC: 9.3 MG/DL
CHLORIDE SERPL-SCNC: 106 MMOL/L
CO2 SERPL-SCNC: 25 MMOL/L
CREAT SERPL-MCNC: 0.58 MG/DL
EOSINOPHIL # BLD AUTO: 0.07 K/UL
EOSINOPHIL NFR BLD AUTO: 1.2 %
GLUCOSE SERPL-MCNC: 95 MG/DL
HCT VFR BLD CALC: 31.4 %
HGB BLD-MCNC: 9.6 G/DL
IMM GRANULOCYTES NFR BLD AUTO: 0.3 %
LYMPHOCYTES # BLD AUTO: 2.94 K/UL
LYMPHOCYTES NFR BLD AUTO: 50.4 %
MAN DIFF?: NORMAL
MCHC RBC-ENTMCNC: 24.4 PG
MCHC RBC-ENTMCNC: 30.6 GM/DL
MCV RBC AUTO: 79.9 FL
MONOCYTES # BLD AUTO: 0.39 K/UL
MONOCYTES NFR BLD AUTO: 6.7 %
NEUTROPHILS # BLD AUTO: 2.39 K/UL
NEUTROPHILS NFR BLD AUTO: 41.1 %
PLATELET # BLD AUTO: 346 K/UL
POTASSIUM SERPL-SCNC: 4.8 MMOL/L
PROT SERPL-MCNC: 6.9 G/DL
RBC # BLD: 3.93 M/UL
RBC # FLD: 16.9 %
SODIUM SERPL-SCNC: 142 MMOL/L
WBC # FLD AUTO: 5.83 K/UL

## 2021-07-19 PROCEDURE — 99214 OFFICE O/P EST MOD 30 MIN: CPT

## 2021-07-19 NOTE — ASSESSMENT
[FreeTextEntry1] : Dyspepsia: The patient complains of dyspeptic symptoms.  The patient was advised to abide by an anti-gas diet.  The patient was given a pamphlet for anti-gas.  The patient and I reviewed the anti-gas diet at length. The patient is to start on a trial of Phazyme one tablet 3 times a day p.r.n. abdominal pain and gas.\par The patient was recently diagnosed with brain tumors and is currently recovering status post gamma knife radiation treatment. The patient is to followup with the neurosurgeon and neurologist\par Gastritis: The patient has a history of gastritis. The patient is to avoid nonsteroidal anti-inflammatory drugs and aspirin. I recommend a trial of pantoprazole 40 mg once a day for 3 months for the symptoms. \par H. pylori Gastritis: The patient was found to have H. pylori gastritis on recent upper endoscopy.  The patient completed a trial of Clarithromycin 500 mg 2 times a day, Amoxicilin 500mg twice a day and Omeprazole 40 mg twice a day for 14 days for H. pylori eradication.  I recommend an H. pylori breath test to assess for eradication of the bacteria. \par CMV Infection: The recent upper endoscopy performed at the [facility] GI endoscopy suite on May 6, 2021 revealed mild diffuse bile gastritis and s/p Billroth II gastrectomy with shortened gastric pouch.  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  The patient was seen by infectious disease, Dr. Halie Waller and Dr. Smita Brown.  The patient was treated with Valganciclovir (Valcyte 900 mg po BID x 3 weeks. ) for CMV infection.  \par Internal Hemorrhoids: The patient is to consider a trial of Anusol H. C. suppositories one per rectum nightly and Anusol HC2 .5% cream apply to affected area twice a day p.r.n. hemorrhoidal bleeding or pain. \par I recommend a repeat colonoscopy in 10 years to reassess for colonic polyps pending patient’s health unless symptomatic.  The patient agreed and will follow up for the procedure. \par History of Peptic Ulcer Disease: The patient has a prior history of peptic ulcer disease. The patient is to avoid nonsteroidal anti-inflammatory drugs and aspirin. The patient required surgery for the bleeding peptic ulcer disease. I recommend a repeat upper endoscopy to reassess for ulcer healing if symptomatic. \par Follow-up: The patient is to follow-up in the office in 3 months to reassess the symptoms. The patient was told to call the office if any further problems. \par

## 2021-07-19 NOTE — HISTORY OF PRESENT ILLNESS
[None] : had no significant interval events [Heartburn] : denies heartburn [Nausea] : denies nausea [Vomiting] : denies vomiting [Diarrhea] : denies diarrhea [Constipation] : denies constipation [Yellow Skin Or Eyes (Jaundice)] : denies jaundice [Abdominal Pain] : denies abdominal pain [Rectal Pain] : denies rectal pain [Abdominal Swelling] : abdominal swelling [Cholelithiasis] : cholelithiasis [Cholecystectomy] : cholecystectomy [Wt Gain ___ Lbs] : no recent weight gain [Wt Loss ___ Lbs] : no recent weight loss [GERD] : no gastroesophageal reflux disease [Hiatus Hernia] : no hiatus hernia [Peptic Ulcer Disease] : no peptic ulcer disease [Pancreatitis] : no pancreatitis [Kidney Stone] : no kidney stone [Inflammatory Bowel Disease] : no inflammatory bowel disease [Irritable Bowel Syndrome] : no irritable bowel syndrome [Diverticulitis] : no diverticulitis [Alcohol Abuse] : no alcohol abuse [Malignancy] : no malignancy [Abdominal Surgery] : no abdominal surgery [Appendectomy] : no appendectomy [de-identified] : The patient has a history significant for left breast cancer, s/p left mastectomy with resultant metastasis to the lung and currently on Exemestane and history of bleeding peptic ulcer disease, s/p surgery in 2019. The patient was recently diagnosed with brain tumors and is currently recovering status post gamma knife radiation treatment. The patient is to followup with the neurosurgeon and neurologist. The patient states that she is feeling better. The patient denies any jaundice or pruritus.  The patient denies any chronic lower back pain. The patient denies any abdominal pain.  The patient complains of abdominal gas and bloating.  The patient denies any nausea or vomiting.  The patient denies any gastroesophageal reflux disease or dysphagia.  The patient denies any atypical chest pain, shortness of breath or palpitations.  The patient denies any diaphoresis. The patient denies any constipation or diarrhea.  The patient has 1 bowel movement a day.  The patient denies a change in bowel habits.  The patient denies a change in caliber of stool.  The patient denies having mucus discharge with the bowel movements.  The patient denies any bright red blood per rectum, melena or hematemesis.  The patient denies any rectal pain or rectal pruritus.  The patient denies any weight loss or anorexia.  She denies any fevers or chills.  The patient had an upper endoscopy and colonoscopy to the terminal ileum performed at the [facility] GI endoscopy suite on May 6, 2021. The patient had an upper endoscopy at the [facility] GI endoscopy suite on May 6, 2021. The upper endoscopy was performed up to the level of the second portion of the duodenum. 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  Biopsies were taken of the distal esophagus, antrum, body of stomach and duodenum to assess for esophagitis, gastritis and duodenitis. 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  The patient completed a trial of Clarithromycin 500 mg 2 times a day, Amoxicilin 500mg twice a day and Omeprazole 40 mg twice a day for 14 days for H. pylori eradication.  The patient was seen by infectious disease, Dr. Halie Waller and Dr. Smita Brown.  The patient was treated with Valganciclovir (Valcyte 900 mg po BID x 3 weeks. ) for CMV infection.  The colonoscopy to the terminal ileum revealed a normal but long and tortuous colon and small internal hemorrhoids.  There were no polyps, masses, diverticulosis, AVMs or colitis noted.  The patient tolerated the procedures well.  The patient admits to having a prior upper endoscopy performed by another gastroenterologist. The patient admits to a history of bleeding peptic ulcer disease, s/p surgery in 2019. The patient denies any significant family history of GI problems. The patient admits to a family history of GI problems. The patient’s 2 sisters had a history of gallbladder issues.  [de-identified] : (-) smoking, (-) ETOH, (-) IVDA\par

## 2021-07-20 ENCOUNTER — APPOINTMENT (OUTPATIENT)
Dept: INTERNAL MEDICINE | Facility: CLINIC | Age: 56
End: 2021-07-20
Payer: MEDICARE

## 2021-07-20 ENCOUNTER — NON-APPOINTMENT (OUTPATIENT)
Age: 56
End: 2021-07-20

## 2021-07-20 VITALS
DIASTOLIC BLOOD PRESSURE: 70 MMHG | WEIGHT: 158 LBS | OXYGEN SATURATION: 99 % | SYSTOLIC BLOOD PRESSURE: 120 MMHG | HEART RATE: 61 BPM | TEMPERATURE: 98.1 F | HEIGHT: 60 IN | RESPIRATION RATE: 16 BRPM | BODY MASS INDEX: 31.02 KG/M2

## 2021-07-20 LAB — UREA BREATH TEST QL: NEGATIVE

## 2021-07-20 PROCEDURE — 99214 OFFICE O/P EST MOD 30 MIN: CPT

## 2021-07-20 NOTE — HISTORY OF PRESENT ILLNESS
[FreeTextEntry1] : follow up\par Interview and discussion conducted in [place] by [place] speaking physician\par  [de-identified] : 55 years old female with metastatic breast carcinoma , h/o CMV infection s/p treatment with valganciclovir for three weeks , completed, she was evaluated by GI and ID recently; to repeat EGD in three months; she reports no symptoms, no headache, no dizziness, completed RT , scheduled for follow up in September

## 2021-07-20 NOTE — ASSESSMENT
[FreeTextEntry1] : 1. metastatic breast carcinoma\par * s/p radiation to brain, stable, [facility]\par 2. h/o CMV \par * completed treatment for three weeks with valganciclovir\par * follow up with GI and ID; to repeat EGD in three months\par 3. hypercholesterolemia\par * dietary counselling\par * check fasting lipids\par 4. 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

## 2021-07-21 LAB
CHOLEST SERPL-MCNC: 166 MG/DL
FERRITIN SERPL-MCNC: 15 NG/ML
HDLC SERPL-MCNC: 38 MG/DL
IRON SATN MFR SERPL: 5 %
IRON SERPL-MCNC: 27 UG/DL
LDLC SERPL CALC-MCNC: 94 MG/DL
NONHDLC SERPL-MCNC: 127 MG/DL
TIBC SERPL-MCNC: 518 UG/DL
TRIGL SERPL-MCNC: 165 MG/DL
UIBC SERPL-MCNC: 491 UG/DL

## 2021-08-06 ENCOUNTER — APPOINTMENT (OUTPATIENT)
Dept: INTERNAL MEDICINE | Facility: CLINIC | Age: 56
End: 2021-08-06
Payer: MEDICARE

## 2021-08-06 VITALS
HEART RATE: 78 BPM | SYSTOLIC BLOOD PRESSURE: 111 MMHG | TEMPERATURE: 98.1 F | OXYGEN SATURATION: 96 % | DIASTOLIC BLOOD PRESSURE: 71 MMHG | BODY MASS INDEX: 31.41 KG/M2 | WEIGHT: 160 LBS | HEIGHT: 60 IN | RESPIRATION RATE: 16 BRPM

## 2021-08-06 DIAGNOSIS — E78.1 PURE HYPERGLYCERIDEMIA: ICD-10-CM

## 2021-08-06 PROCEDURE — 99214 OFFICE O/P EST MOD 30 MIN: CPT

## 2021-08-06 NOTE — ASSESSMENT
[FreeTextEntry1] : 1. metastatic breast carcinoma\par * to follow up with oncologist next month\par 2. hyperglyceridemia\par low cholesterol, low triglycerides diet,dietary counseling given; dietary avoidance discussed; diet and exercise reviewed with patient\par 3. iron deficiency\par * start oral liquid ferrous sulfate three times a week\par * will follow up in three months

## 2021-08-06 NOTE — HISTORY OF PRESENT ILLNESS
[FreeTextEntry1] : follow up for labs results\par Interview and discussion conducted in [place] by [place] speaking physician\par  [de-identified] : patient here today to review and discuss labs results, no acute complains\par

## 2021-09-18 ENCOUNTER — OUTPATIENT (OUTPATIENT)
Dept: OUTPATIENT SERVICES | Facility: HOSPITAL | Age: 56
LOS: 1 days | Discharge: HOME | End: 2021-09-18

## 2021-09-18 DIAGNOSIS — C50.919 MALIGNANT NEOPLASM OF UNSPECIFIED SITE OF UNSPECIFIED FEMALE BREAST: ICD-10-CM

## 2021-09-18 DIAGNOSIS — Z90.12 ACQUIRED ABSENCE OF LEFT BREAST AND NIPPLE: Chronic | ICD-10-CM

## 2021-09-18 DIAGNOSIS — Z90.49 ACQUIRED ABSENCE OF OTHER SPECIFIED PARTS OF DIGESTIVE TRACT: Chronic | ICD-10-CM

## 2021-09-18 DIAGNOSIS — Z90.3 ACQUIRED ABSENCE OF STOMACH [PART OF]: Chronic | ICD-10-CM

## 2021-09-18 DIAGNOSIS — Z98.51 TUBAL LIGATION STATUS: Chronic | ICD-10-CM

## 2021-09-21 ENCOUNTER — RESULT REVIEW (OUTPATIENT)
Age: 56
End: 2021-09-21

## 2021-09-21 ENCOUNTER — APPOINTMENT (OUTPATIENT)
Dept: HEMATOLOGY ONCOLOGY | Facility: CLINIC | Age: 56
End: 2021-09-21
Payer: MEDICARE

## 2021-09-21 VITALS
HEIGHT: 60 IN | SYSTOLIC BLOOD PRESSURE: 127 MMHG | BODY MASS INDEX: 31.6 KG/M2 | OXYGEN SATURATION: 98 % | HEART RATE: 68 BPM | RESPIRATION RATE: 17 BRPM | WEIGHT: 160.94 LBS | TEMPERATURE: 97.7 F | DIASTOLIC BLOOD PRESSURE: 81 MMHG

## 2021-09-21 LAB
BASOPHILS # BLD AUTO: 0.02 K/UL — SIGNIFICANT CHANGE UP (ref 0–0.2)
BASOPHILS NFR BLD AUTO: 0.3 % — SIGNIFICANT CHANGE UP (ref 0–2)
EOSINOPHIL # BLD AUTO: 0.14 K/UL — SIGNIFICANT CHANGE UP (ref 0–0.5)
EOSINOPHIL NFR BLD AUTO: 2.1 % — SIGNIFICANT CHANGE UP (ref 0–6)
HCT VFR BLD CALC: 32.1 % — LOW (ref 34.5–45)
HGB BLD-MCNC: 9.9 G/DL — LOW (ref 11.5–15.5)
IMM GRANULOCYTES NFR BLD AUTO: 0.3 % — SIGNIFICANT CHANGE UP (ref 0–1.5)
LYMPHOCYTES # BLD AUTO: 2.43 K/UL — SIGNIFICANT CHANGE UP (ref 1–3.3)
LYMPHOCYTES # BLD AUTO: 36.3 % — SIGNIFICANT CHANGE UP (ref 13–44)
MCHC RBC-ENTMCNC: 22.8 PG — LOW (ref 27–34)
MCHC RBC-ENTMCNC: 30.8 G/DL — LOW (ref 32–36)
MCV RBC AUTO: 73.9 FL — LOW (ref 80–100)
MONOCYTES # BLD AUTO: 0.62 K/UL — SIGNIFICANT CHANGE UP (ref 0–0.9)
MONOCYTES NFR BLD AUTO: 9.3 % — SIGNIFICANT CHANGE UP (ref 2–14)
NEUTROPHILS # BLD AUTO: 3.47 K/UL — SIGNIFICANT CHANGE UP (ref 1.8–7.4)
NEUTROPHILS NFR BLD AUTO: 51.7 % — SIGNIFICANT CHANGE UP (ref 43–77)
NRBC # BLD: 0 /100 WBCS — SIGNIFICANT CHANGE UP (ref 0–0)
PLATELET # BLD AUTO: 329 K/UL — SIGNIFICANT CHANGE UP (ref 150–400)
RBC # BLD: 4.35 M/UL — SIGNIFICANT CHANGE UP (ref 3.8–5.2)
RBC # FLD: 18.1 % — HIGH (ref 10.3–14.5)
WBC # BLD: 6.7 K/UL — SIGNIFICANT CHANGE UP (ref 3.8–10.5)
WBC # FLD AUTO: 6.7 K/UL — SIGNIFICANT CHANGE UP (ref 3.8–10.5)

## 2021-09-21 PROCEDURE — 99214 OFFICE O/P EST MOD 30 MIN: CPT

## 2021-09-21 NOTE — PHYSICAL EXAM
[Fully active, able to carry on all pre-disease performance without restriction] : Status 0 - Fully active, able to carry on all pre-disease performance without restriction [Normal] : affect appropriate [de-identified] : R w/o nipple retraction skin dimpling or palp masses; L s/p MRM with flap reconstruction, no palp masses except for prior medial and lateral nodularity c/w fat necoris and unchanged.. B/L ax neg

## 2021-09-21 NOTE — HISTORY OF PRESENT ILLNESS
[de-identified] : The patients' history of present illness began in 2012 at which time she was found to have a right breast cancer for which she underwent a right modified radical mastectomy/axillary lymph node dissection with the finding of an ER positive 20%, TN positive 10%, HER-2/lisa 2+, FISH amplified breast cancer per outside physician report (I do not have a copy that pathology report for my review).  She went on to receive adjuvant dose-dense AC chemotherapy for 4 cycles followed by Taxol plus Herceptin as well as adjuvant radiation therapy.  She was subsequently begun adjuvant tamoxifen therapy.  Her last menstrual period was in 2014.\par \par The patient then began to experience weight loss beginning in November 2018 and over 3-month period of time lost 30 pounds per her report.  At approximately the same time she developed increasing shortness of breath, and ultimately presented to [facility] Emergency Department.  She subsequently had a CT scan of the chest, which showed multiple mediastinal hilar right subclavian lymph nodes corresponding to abnormal FDG activity on PET scan consistent with metastatic carcinoma; there were tiny nodularities in both lungs- too small to accurately characterize- not present on prior examination and measuring up to 2 mm in both lungs.  The possibility of metastasis was a consideration; she had no bony metastases; the liver demonstrated tiny poorly defined lesions less than 5 mm and difficult to characterize and visible on prior examinations with the possibility of metastasis also considered.  A PET-CT scan showed multiple hypermetabolic lymph nodes in the mediastinum, right subclavian region and both adebayo felt to represent tumor; there were stellate lesions in the left breast region, felt to represent benign scars; the lungs demonstrated no significant abnormality with multiple tiny lung nodules seen on CT as previously noted; liver demonstrated no significant abnormality with multiple small poorly defined nodules seen on CT per my previous comments.  The patient underwent a left breast core biopsy of the area of concern with a finding of fat necrosis and foreign body giant cell reaction as well as a separate fine-needle aspiration consistent with a ruptured cyst/fat necrosis.  The patient consequently saw her oncologist, Dr. Carias, who sent the patient for ultrasound-guided core biopsies of the mediastinal lymph nodes with a finding of carcinoma, consistent with breast primary with estrogen receptor returning positive, greater than 50%, progesterone receptor negative, and HER-2/lisa 0 per outside report. The patient was subsequently begun on docetaxel, pertuzumab, trastuzumab, and exemestane beginning at the early 4/19, and subsequently switched to weekly paclitaxel secondary to side effects and continuing  on trastuzumab / pertuzumab every 3 weeks as well as exemestane. The patient was last treated per her report on approximately June 12, 2019, despite outside records that show ongoing weekly treatments early July 2019.  \par \par The patient also reports that beginning in approximately 12/18, she started to develop slowly progressive diffuse rash with associated pruritus, having seen multiple dermatologists.  Most recently, she saw Dr. Courtney Cowart, a [facility] physician practicing at [place], [place], who diagnosed disseminated herpes simplex for which she was begun on valacyclovir, as well as evidence of MRSA for which was treated with doxycycline beginning in May 2019.  In June 2019, the patient presented to [facility] Emergency Department complaining of progressive "weakness and shaking." She was found to be anemic and had melena.  She underwent endoscopy and VIR procedure to stop gastric bleeding, which was unsuccessful.  Consequently, she had a distal gastrectomy.  She was discharged from [facility] on 07/08/2019.\par \par The patient was initial consultation 7/24/19 for a further opinion regarding treatment recommendations. She noted ongoing weakness, fatigue, and generalized malaise, although all of these had begun to improve gradually.  Her last chemotherapy as noted above was approximately at 06/12/2019.  She reports that her primary oncologist, Dr. Carias, told to discontinue exemestane upon admission to [facility] with the consideration restarting it post discharge.  \par \par I obtained her outside slides for review at [facility] and it was confirmed that she had met breast cancer ER+ TN neg and Her 2 lisa neg.\par \par Consequently I recommended restarting exemestane.  \par \par In 3/21 she had L sided seizures and found to have abnormal enhancing lesions in the posterior fossa and supratentorial region are identified.\par \par Lesion 1: Large dural based enhancing lesion is seen involving the high medial right frontal region. This lesion does appear to cross the midline and measures approximately 3.2 x 2.7 x 3.5 cm. Small amount of adjacent susceptibility is identified. Surrounding edema is identified. Localized mass effect is identified. Right to left shift (7.2 mm) seen.\par \par Lesion 2: Enhancing lesion is seen involving the right upper tiffany. This lesion measures approximately 0.5 x 0.4 cm.\par \par Lesion 3: Enhancing lesion is seen involving the superior left cerebellar region. This lesion measures approximately 1.2 x 1.0 cm. Surrounding edema is identified.\par \par She was seen by Dr Hwang and treated with Gamma knife SRS to the 3 brain lesions. \par \par A repeat MRI 6/2/21 demonstrated:\par \par -Interval decrease in size of 3 enhancing lesions, largest in the high medial right frontal lobe measuring up to 2.0 cm.\par -Improvement of vasogenic edema with resolution of leftward midline shift.\par -No new lesions are identified\par \par \par \par  [de-identified] : Pt seen today for a routine f/u visit.\par \par On Exemestane and tolerating well. She notes a good appetite, gradually increasing weight and excellent performance status. \par \par She denies any treatment related side effects or any other complaints.  \par \par CT \par IMPRESSION:\par Stable appearance of the chest, abdomen, and pelvis compared with 1/7/2021.\par \par MRI 6/2/21\par IMPRESSION:\par \par -Interval decrease in size of 3 enhancing lesions, largest in the high medial right frontal lobe measuring up to 2.0 cm.\par -Improvement of vasogenic edema with resolution of leftward midline shift.\par -No new lesions are identified\par \par \par BS 1/7/21\par IMPRESSION: Bone scan demonstrates:\par \par No definite scan evidence for osseous metastasis\par \par Mild diffuse increased uptake in the skull raises the possibility of Paget's disease.\par \par Degenerative disease in the major joints\par \par

## 2021-09-23 LAB
ALBUMIN SERPL ELPH-MCNC: 4.3 G/DL
ALP BLD-CCNC: 153 U/L
ALT SERPL-CCNC: 30 U/L
ANION GAP SERPL CALC-SCNC: 13 MMOL/L
AST SERPL-CCNC: 25 U/L
BILIRUB SERPL-MCNC: 0.3 MG/DL
BUN SERPL-MCNC: 13 MG/DL
CALCIUM SERPL-MCNC: 9.2 MG/DL
CANCER AG27-29 SERPL-ACNC: 10.6 U/ML
CEA SERPL-MCNC: 1.6 NG/ML
CHLORIDE SERPL-SCNC: 105 MMOL/L
CO2 SERPL-SCNC: 22 MMOL/L
CREAT SERPL-MCNC: 0.53 MG/DL
GLUCOSE SERPL-MCNC: 99 MG/DL
POTASSIUM SERPL-SCNC: 4.5 MMOL/L
PROT SERPL-MCNC: 6.7 G/DL
SODIUM SERPL-SCNC: 141 MMOL/L

## 2021-09-24 ENCOUNTER — APPOINTMENT (OUTPATIENT)
Dept: MRI IMAGING | Facility: IMAGING CENTER | Age: 56
End: 2021-09-24
Payer: MEDICARE

## 2021-09-24 ENCOUNTER — OUTPATIENT (OUTPATIENT)
Dept: OUTPATIENT SERVICES | Facility: HOSPITAL | Age: 56
LOS: 1 days | End: 2021-09-24
Payer: MEDICARE

## 2021-09-24 DIAGNOSIS — Z90.3 ACQUIRED ABSENCE OF STOMACH [PART OF]: Chronic | ICD-10-CM

## 2021-09-24 DIAGNOSIS — C79.31 SECONDARY MALIGNANT NEOPLASM OF BRAIN: ICD-10-CM

## 2021-09-24 DIAGNOSIS — Z90.49 ACQUIRED ABSENCE OF OTHER SPECIFIED PARTS OF DIGESTIVE TRACT: Chronic | ICD-10-CM

## 2021-09-24 DIAGNOSIS — Z98.51 TUBAL LIGATION STATUS: Chronic | ICD-10-CM

## 2021-09-24 DIAGNOSIS — Z90.12 ACQUIRED ABSENCE OF LEFT BREAST AND NIPPLE: Chronic | ICD-10-CM

## 2021-09-24 PROCEDURE — 70553 MRI BRAIN STEM W/O & W/DYE: CPT

## 2021-09-24 PROCEDURE — A9585: CPT

## 2021-09-24 PROCEDURE — 70553 MRI BRAIN STEM W/O & W/DYE: CPT | Mod: 26,MH

## 2021-09-24 NOTE — ASU PATIENT PROFILE, ADULT - TEACHING/LEARNING CULTURAL CONSIDERATIONS
Problem: Respiratory Impairment - Respiratory Therapy 253  Intervention: Inhaled medication delivery  Note: Intervention Status  Done  Intervention: Inhaled gas administration  Note: Intervention Status  Done  Intervention: Respiratory support devices  Note: Intervention Status  Done      none

## 2021-09-29 ENCOUNTER — APPOINTMENT (OUTPATIENT)
Dept: RADIATION ONCOLOGY | Facility: CLINIC | Age: 56
End: 2021-09-29
Payer: MEDICARE

## 2021-09-29 VITALS
TEMPERATURE: 97.6 F | WEIGHT: 163.69 LBS | DIASTOLIC BLOOD PRESSURE: 73 MMHG | SYSTOLIC BLOOD PRESSURE: 119 MMHG | BODY MASS INDEX: 31.97 KG/M2 | OXYGEN SATURATION: 100 % | HEART RATE: 70 BPM | RESPIRATION RATE: 17 BRPM

## 2021-09-29 PROCEDURE — 99213 OFFICE O/P EST LOW 20 MIN: CPT

## 2021-09-29 RX ORDER — VALGANCICLOVIR HYDROCHLORIDE 450 MG/1
450 TABLET ORAL TWICE DAILY
Qty: 84 | Refills: 0 | Status: DISCONTINUED | COMMUNITY
Start: 2021-06-22 | End: 2021-09-29

## 2021-09-29 RX ORDER — FAMOTIDINE 40 MG/1
40 TABLET, FILM COATED ORAL
Qty: 60 | Refills: 3 | Status: DISCONTINUED | COMMUNITY
Start: 2021-07-19 | End: 2021-09-29

## 2021-09-29 RX ORDER — PANTOPRAZOLE 40 MG/1
40 TABLET, DELAYED RELEASE ORAL DAILY
Qty: 30 | Refills: 3 | Status: DISCONTINUED | COMMUNITY
Start: 2021-06-10 | End: 2021-09-29

## 2021-09-29 NOTE — PHYSICAL EXAM
[Oriented To Time, Place, And Person] : oriented to person, place, and time [Normal] : no joint swelling, no clubbing or cyanosis of the fingernails and muscle strength and tone were normal [No Focal Deficits] : no focal deficits

## 2021-09-29 NOTE — HISTORY OF PRESENT ILLNESS
[FreeTextEntry1] : Ms. Lei was seen initially for consult on 3/18//2021.  She completed radiation therapy on 3/25/2021. She completed 2700 cgy of radiation over 3 fractions 3/2303/25/2021.  She presents for a PTE\par \par ONCOLOGY HISTORY\par Ms. Lei is a 55 year old woman with significant past medical history of breast cancer diagnosed in 2012 s/p radical mastectomy and  adjuvant chemotherapy. She subsequently had a recurrence in 2018 and has been receiving systemic therapy. She currently has stable disease on exemestane. \par Oncological history as per Dr. Martin's note:\par  The patients' history of present illness began in 2012 at which time she was found to have a right breast cancer for which she underwent a right modified radical mastectomy/axillary lymph node dissection with the finding of an ER positive 20%, NH positive 10%, HER-2/lisa 2+, FISH amplified breast cancer per outside physician report.  She went on to receive adjuvant dose-dense AC chemotherapy for 4 cycles followed by Taxol plus Herceptin as well as adjuvant radiation therapy. She was subsequently begun adjuvant tamoxifen therapy. Her last menstrual period was in 2014.\par \par The patient then began to experience weight loss beginning in November 2018 and over 3-month period of time lost 30 pounds per her report. At approximately the same time she developed increasing shortness of breath, and ultimately presented to [facility] Emergency Department. She subsequently had a CT scan of the chest, which showed multiple mediastinal hilar right subclavian lymph nodes corresponding to abnormal FDG activity on PET scan consistent with metastatic carcinoma; there were tiny nodularities in both lungs- too small to accurately characterize- not present on prior examination and measuring up to 2 mm in both lungs. The possibility of metastasis was a consideration; she had no bony metastases; the liver demonstrated tiny poorly defined lesions less than 5 mm and difficult to characterize and visible on prior examinations with the possibility of metastasis also considered. A PET-CT scan showed multiple hypermetabolic lymph nodes in the mediastinum, right subclavian region and both adebayo felt to represent tumor; there were stellate lesions in the left breast region, felt to represent benign scars; the lungs demonstrated no significant abnormality with multiple tiny lung nodules seen on CT as previously noted; liver demonstrated no significant abnormality with multiple small poorly defined nodules seen on CT per my previous comments. The patient underwent a left breast core biopsy of the area of concern with a finding of fat necrosis and foreign body giant cell reaction as well as a separate fine-needle aspiration consistent with a ruptured cyst/fat necrosis. The patient consequently saw her oncologist, Dr. Carias, who sent the patient for ultrasound-guided core biopsies of the mediastinal lymph nodes with a finding of carcinoma, consistent with breast primary with estrogen receptor returning positive, greater than 50%, progesterone receptor negative, and HER-2/lisa 0 per outside report. The patient was subsequently begun on docetaxel, pertuzumab, trastuzumab, and exemestane beginning at the early 4/19, and subsequently switched to weekly paclitaxel secondary to side effects and continuing on trastuzumab / pertuzumab every 3 weeks as well as exemestane. \par \par The patient was initial consultation 7/24/19 for a further opinion regarding treatment recommendations. She noted ongoing weakness, fatigue, and generalized malaise, although all of these had begun to improve gradually. Her last chemotherapy as noted above was approximately at 06/12/2019. She reports that her primary oncologist, Dr. Carias, told to discontinue exemestane upon admission to [facility] with the consideration restarting it post discharge. Slides for review at [facility] and it was confirmed that she had met breast cancer ER+ [place] neg and Her 2 lisa neg.  \par \par At the time of initial consult she felt well. Noted a left sided seizure in the days prior to consult lasting about 1 minute, which stopped on its own. Was recently prescribed keppra and dexamethasone 8mg BID. No confusion, dizziness, nausea, focal weakness. On examestane.  She recently had a focal seizure left upper/lower extremity. MRI showed 3 lesions -  \par \par Lesion 1: Large dural based enhancing lesion is seen involving the high medial right frontal region. This lesion does appear to cross the midline and measures approximately 3.2 x 2.7 x 3.5 cm. Small amount of adjacent susceptibility is identified. Surrounding edema is identified. Localized mass effect is identified. Right to left shift (7.2 mm) seen.\par \par Lesion 2: Enhancing lesion is seen involving the right upper tiffany. This lesion measures approximately 0.5 x 0.4 cm.\par \par Lesion 3: Enhancing lesion is seen involving the superior left cerebellar region. This lesion measures approximately 1.2 x 1.0 cm. Surrounding edema is identified.\par \par \par 6/2/2021- Ms. Lei presents today for follow up. [place] [employer] [id number] for Portuguese was used.  MRI scheduled today at 145. Has not seen Dr. Martin since radiation. States she  is feeling fine. States she has intermittent sensation of "pulling" to her forehead. Denies any dizziness, confusion, headache, nausea, vomiting, visual disturbances, weakness. States 3 weeks ago she had an episode where her left upper extremity "was shaking" for one minute, denies any LOC. \par \par 9/29/2021- Ms. lei presents today for follow up. She is seen today with assistance of  [id number]. MRI brain done 9/24/2021 showed Previously noted enhancing lesions in the posterior fossa and supratentorial region have either decreased in size or are no longer seen which is compatible with response to therapy. Continue close interval follow-up is recommended.\par \par Continues to follow with Dr. Martin. continues on examestane with plans to repeat body scans in november or december. \par \par Feeling very well. no headaches, no new weakness, no numbness or tingling, no nausea or vomiting. overall doing well.

## 2021-09-29 NOTE — REVIEW OF SYSTEMS
[Negative] : Psychiatric [Eye Pain] : no eye pain [Dysphagia] : no dysphagia [Loss of Hearing] : no loss of hearing [Confused] : no confusion [Dizziness] : no dizziness [Fainting] : no fainting [Difficulty Walking] : no difficulty walking [de-identified] : no longer with "tightness' to head

## 2021-10-20 ENCOUNTER — APPOINTMENT (OUTPATIENT)
Dept: GASTROENTEROLOGY | Facility: CLINIC | Age: 56
End: 2021-10-20
Payer: MEDICARE

## 2021-10-20 VITALS
OXYGEN SATURATION: 99 % | DIASTOLIC BLOOD PRESSURE: 69 MMHG | SYSTOLIC BLOOD PRESSURE: 107 MMHG | WEIGHT: 161 LBS | HEART RATE: 72 BPM | HEIGHT: 60 IN | BODY MASS INDEX: 31.61 KG/M2 | TEMPERATURE: 97.2 F

## 2021-10-20 DIAGNOSIS — K21.9 GASTRO-ESOPHAGEAL REFLUX DISEASE W/OUT ESOPHAGITIS: ICD-10-CM

## 2021-10-20 DIAGNOSIS — Z90.3 ACQUIRED ABSENCE OF STOMACH [PART OF]: ICD-10-CM

## 2021-10-20 PROCEDURE — 99214 OFFICE O/P EST MOD 30 MIN: CPT

## 2021-10-20 NOTE — HISTORY OF PRESENT ILLNESS
[None] : had no significant interval events [Nausea] : denies nausea [Vomiting] : denies vomiting [Diarrhea] : denies diarrhea [Constipation] : denies constipation [Yellow Skin Or Eyes (Jaundice)] : denies jaundice [Abdominal Pain] : denies abdominal pain [Abdominal Swelling] : denies abdominal swelling [Rectal Pain] : denies rectal pain [Wt Gain ___ Lbs] : recent [unfilled] ~Upound(s) weight gain [Heartburn] : heartburn [GERD] : gastroesophageal reflux disease [Cholelithiasis] : cholelithiasis [Cholecystectomy] : cholecystectomy [Wt Loss ___ Lbs] : no recent weight loss [Hiatus Hernia] : no hiatus hernia [Peptic Ulcer Disease] : no peptic ulcer disease [Pancreatitis] : no pancreatitis [Kidney Stone] : no kidney stone [Inflammatory Bowel Disease] : no inflammatory bowel disease [Irritable Bowel Syndrome] : no irritable bowel syndrome [Diverticulitis] : no diverticulitis [Alcohol Abuse] : no alcohol abuse [Malignancy] : no malignancy [Abdominal Surgery] : no abdominal surgery [Appendectomy] : no appendectomy [de-identified] : The patient has a history significant for left breast cancer, s/p left mastectomy with resultant metastasis to the lung and currently on Exemestane and history of bleeding peptic ulcer disease, s/p surgery in 2019. The patient was recently diagnosed with brain tumors and is currently recovering status post gamma knife radiation treatment. The patient is to followup with the neurosurgeon and neurologist. The patient states that she is feeling better. The patient denies any jaundice or pruritus.  The patient denies any chronic lower back pain. The patient denies any abdominal pain.  The patient denies any abdominal gas and bloating.  The patient denies any nausea or vomiting.  The patient complains of occasional gastroesophageal reflux disease but denies any dysphagia.  The patient denies taking medications for the gastroesophageal reflux disease.  The gastroesophageal reflux disease is worse after meals and late at night and in the early morning. The gastroesophageal reflux disease previously improved with proton pump inhibitors, H2 blockers and antacids.   The patient denies any atypical chest pain, shortness of breath or palpitations.  The patient denies any diaphoresis. The patient denies any constipation or diarrhea.  The patient has 1 bowel movement a day.  The patient denies a change in bowel habits.  The patient denies a change in caliber of stool.  The patient denies having mucus discharge with the bowel movements.  The patient denies any bright red blood per rectum, melena or hematemesis.  The patient denies any rectal pain or rectal pruritus.  The patient denies any weight loss or anorexia.  The patient admits to gaining weight recently.  She denies any fevers or chills.  The patient had an upper endoscopy and colonoscopy to the terminal [place]um [place] [facility] [place], 2021. The patient had an upper endoscopy [place] [facility] [place] [place] on [place] 6, 2021. The upper endoscopy was performed up to the level of the second portion of the duodenum. 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 Biopsies were taken of the distal esophagus, antrum, body of stomach and duodenum to assess for esophagitis, gastritis and duodenitis. 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 The patient completed a trial of Clarithromycin 500 mg 2 times a day, Amoxicilin 500mg twice a day and Omeprazole 40 mg twice a day for 14 days for H. pylori eradication. The H. pylori breath test performed on July 19, 2021 was not detected.  The patient was seen by infectious disease, Dr. Halie Waller and Dr. Smita Brown. The patient was treated with Valganciclovir (Valcyte 900 mg po BID x 3 weeks.) for CMV infection. The colonoscopy to the terminal ileum revealed a normal but long and tortuous colon and small internal hemorrhoids. There were no polyps, masses, diverticulosis, AVMs or colitis noted. The patient tolerated the procedures well. The patient admits to having a prior upper endoscopy performed by another gastroenterologist. The patient admits to a history of bleeding peptic ulcer disease, s/p surgery in 2019. The patient denies any significant family history of GI problems. The patient admits to a family history of GI problems. The patient’s 2 sisters had a history of gallbladder issues.  [de-identified] : (-) smoking, (-) ETOH, (-) IVDA\par

## 2021-10-20 NOTE — ASSESSMENT
[FreeTextEntry1] : GERD: The patient was advised to avoid late-night meals and dietary indiscretions.  The patient was advised to avoid fried and fatty foods.  The patient was advised to abide by an anti-GERD diet. The patient was given a pamphlet for anti-GERD.  The patient and I reviewed the anti-GERD diet at length. I recommend a trial of Pepcid 40 mg twice a day x 3 months for the symptoms.\par Brain Tumor: The patient was recently diagnosed with brain tumors and is currently recovering status post gamma knife radiation treatment. The patient is to followup with the neurosurgeon and neurologist\par Gastritis: The patient has a history of gastritis. The patient is to avoid nonsteroidal anti-inflammatory drugs and aspirin. I recommend a trial of pantoprazole 40 mg once a day for 3 months for the symptoms. \par H. pylori Gastritis: The patient was found to have H. pylori gastritis on recent upper endoscopy. The patient completed a trial of Clarithromycin 500 mg 2 times a day, Amoxicillin 500mg twice a day and Omeprazole 40 mg twice a day for 14 days for H. pylori eradication. The H. pylori breath test performed on July 19, 2021 was not detected.\par CMV Infection: The recent upper endoscopy performed at the [facility] end[facility] suite on May 6, 2021 revealed mild diffuse bile gastritis and s/p Billroth II gastrectomy with shortened gastric pouch. 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 The patient was seen by infectious disease, Dr. Halie Waller and Dr. Smita Brown. The patient was treated with Valganciclovir (Valcyte 900 mg po BID x 3 weeks.) for CMV infection. \par Internal Hemorrhoids: The patient is to consider a trial of Anusol H. C. suppositories one per rectum nightly and Anusol HC2.5% cream apply to affected area twice a day p.r.n. hemorrhoidal bleeding or pain. \par I recommend a repeat colonoscopy in 10 years to reassess for colonic polyps pending patient’s health unless symptomatic. The patient agreed and will follow up for the procedure. \par History of Peptic Ulcer Disease: The patient has a prior history of peptic ulcer disease. The patient is to avoid nonsteroidal anti-inflammatory drugs and aspirin. The patient required surgery for the bleeding peptic ulcer disease. I recommend a repeat upper endoscopy to reassess for ulcer healing if symptomatic. \par Follow-up: The patient is to follow-up in the office in 6 months to reassess the symptoms. The patient was told to call the office if any further problems. \par \par

## 2021-10-22 ENCOUNTER — APPOINTMENT (OUTPATIENT)
Dept: GASTROENTEROLOGY | Facility: CLINIC | Age: 56
End: 2021-10-22

## 2021-12-08 ENCOUNTER — RESULT REVIEW (OUTPATIENT)
Age: 56
End: 2021-12-08

## 2021-12-17 ENCOUNTER — APPOINTMENT (OUTPATIENT)
Dept: CT IMAGING | Facility: IMAGING CENTER | Age: 56
End: 2021-12-17
Payer: MEDICARE

## 2021-12-17 ENCOUNTER — OUTPATIENT (OUTPATIENT)
Dept: OUTPATIENT SERVICES | Facility: HOSPITAL | Age: 56
LOS: 1 days | End: 2021-12-17
Payer: MEDICARE

## 2021-12-17 DIAGNOSIS — Z90.3 ACQUIRED ABSENCE OF STOMACH [PART OF]: Chronic | ICD-10-CM

## 2021-12-17 DIAGNOSIS — Z90.12 ACQUIRED ABSENCE OF LEFT BREAST AND NIPPLE: Chronic | ICD-10-CM

## 2021-12-17 DIAGNOSIS — C50.919 MALIGNANT NEOPLASM OF UNSPECIFIED SITE OF UNSPECIFIED FEMALE BREAST: ICD-10-CM

## 2021-12-17 DIAGNOSIS — Z90.49 ACQUIRED ABSENCE OF OTHER SPECIFIED PARTS OF DIGESTIVE TRACT: Chronic | ICD-10-CM

## 2021-12-17 DIAGNOSIS — Z98.51 TUBAL LIGATION STATUS: Chronic | ICD-10-CM

## 2021-12-17 PROCEDURE — 74177 CT ABD & PELVIS W/CONTRAST: CPT | Mod: 26,MG

## 2021-12-17 PROCEDURE — 71260 CT THORAX DX C+: CPT | Mod: MG

## 2021-12-17 PROCEDURE — 74177 CT ABD & PELVIS W/CONTRAST: CPT | Mod: MG

## 2021-12-17 PROCEDURE — G1004: CPT

## 2021-12-17 PROCEDURE — 71260 CT THORAX DX C+: CPT | Mod: 26,MG

## 2021-12-17 PROCEDURE — 82565 ASSAY OF CREATININE: CPT

## 2021-12-21 ENCOUNTER — OUTPATIENT (OUTPATIENT)
Dept: OUTPATIENT SERVICES | Facility: HOSPITAL | Age: 56
LOS: 1 days | Discharge: HOME | End: 2021-12-21

## 2021-12-21 DIAGNOSIS — Z98.51 TUBAL LIGATION STATUS: Chronic | ICD-10-CM

## 2021-12-21 DIAGNOSIS — Z90.3 ACQUIRED ABSENCE OF STOMACH [PART OF]: Chronic | ICD-10-CM

## 2021-12-21 DIAGNOSIS — C50.919 MALIGNANT NEOPLASM OF UNSPECIFIED SITE OF UNSPECIFIED FEMALE BREAST: ICD-10-CM

## 2021-12-21 DIAGNOSIS — Z90.12 ACQUIRED ABSENCE OF LEFT BREAST AND NIPPLE: Chronic | ICD-10-CM

## 2021-12-21 DIAGNOSIS — Z90.49 ACQUIRED ABSENCE OF OTHER SPECIFIED PARTS OF DIGESTIVE TRACT: Chronic | ICD-10-CM

## 2021-12-22 ENCOUNTER — RESULT REVIEW (OUTPATIENT)
Age: 56
End: 2021-12-22

## 2021-12-22 ENCOUNTER — APPOINTMENT (OUTPATIENT)
Dept: HEMATOLOGY ONCOLOGY | Facility: CLINIC | Age: 56
End: 2021-12-22
Payer: MEDICARE

## 2021-12-22 VITALS
WEIGHT: 166.01 LBS | RESPIRATION RATE: 18 BRPM | TEMPERATURE: 97.2 F | BODY MASS INDEX: 32.59 KG/M2 | DIASTOLIC BLOOD PRESSURE: 81 MMHG | HEART RATE: 66 BPM | HEIGHT: 60 IN | OXYGEN SATURATION: 92 % | SYSTOLIC BLOOD PRESSURE: 122 MMHG

## 2021-12-22 LAB
BASOPHILS # BLD AUTO: 0.03 K/UL — SIGNIFICANT CHANGE UP (ref 0–0.2)
BASOPHILS NFR BLD AUTO: 0.4 % — SIGNIFICANT CHANGE UP (ref 0–2)
EOSINOPHIL # BLD AUTO: 0.15 K/UL — SIGNIFICANT CHANGE UP (ref 0–0.5)
EOSINOPHIL NFR BLD AUTO: 2.1 % — SIGNIFICANT CHANGE UP (ref 0–6)
HCT VFR BLD CALC: 32 % — LOW (ref 34.5–45)
HGB BLD-MCNC: 9.5 G/DL — LOW (ref 11.5–15.5)
IMM GRANULOCYTES NFR BLD AUTO: 0.3 % — SIGNIFICANT CHANGE UP (ref 0–1.5)
LYMPHOCYTES # BLD AUTO: 2.69 K/UL — SIGNIFICANT CHANGE UP (ref 1–3.3)
LYMPHOCYTES # BLD AUTO: 37.3 % — SIGNIFICANT CHANGE UP (ref 13–44)
MCHC RBC-ENTMCNC: 21.6 PG — LOW (ref 27–34)
MCHC RBC-ENTMCNC: 29.7 G/DL — LOW (ref 32–36)
MCV RBC AUTO: 72.7 FL — LOW (ref 80–100)
MONOCYTES # BLD AUTO: 0.65 K/UL — SIGNIFICANT CHANGE UP (ref 0–0.9)
MONOCYTES NFR BLD AUTO: 9 % — SIGNIFICANT CHANGE UP (ref 2–14)
NEUTROPHILS # BLD AUTO: 3.68 K/UL — SIGNIFICANT CHANGE UP (ref 1.8–7.4)
NEUTROPHILS NFR BLD AUTO: 50.9 % — SIGNIFICANT CHANGE UP (ref 43–77)
NRBC # BLD: 0 /100 WBCS — SIGNIFICANT CHANGE UP (ref 0–0)
PLATELET # BLD AUTO: 331 K/UL — SIGNIFICANT CHANGE UP (ref 150–400)
RBC # BLD: 4.4 M/UL — SIGNIFICANT CHANGE UP (ref 3.8–5.2)
RBC # FLD: 18.3 % — HIGH (ref 10.3–14.5)
WBC # BLD: 7.22 K/UL — SIGNIFICANT CHANGE UP (ref 3.8–10.5)
WBC # FLD AUTO: 7.22 K/UL — SIGNIFICANT CHANGE UP (ref 3.8–10.5)

## 2021-12-22 PROCEDURE — 99214 OFFICE O/P EST MOD 30 MIN: CPT

## 2021-12-22 NOTE — HISTORY OF PRESENT ILLNESS
[de-identified] : The patients' history of present illness began in 2012 at which time she was found to have a right breast cancer for which she underwent a right modified radical mastectomy/axillary lymph node dissection with the finding of an ER positive 20%, WI positive 10%, HER-2/lisa 2+, FISH amplified breast cancer per outside physician report (I do not have a copy that pathology report for my review).  She went on to receive adjuvant dose-dense AC chemotherapy for 4 cycles followed by Taxol plus Herceptin as well as adjuvant radiation therapy.  She was subsequently begun adjuvant tamoxifen therapy.  Her last menstrual period was in 2014.\par \par The patient then began to experience weight loss beginning in November 2018 and over 3-month period of time lost 30 pounds per her report.  At approximately the same time she developed increasing shortness of breath, and ultimately presented to [facility] Emergency Department.  She subsequently had a CT scan of the chest, which showed multiple mediastinal hilar right subclavian lymph nodes corresponding to abnormal FDG activity on PET scan consistent with metastatic carcinoma; there were tiny nodularities in both lungs- too small to accurately characterize- not present on prior examination and measuring up to 2 mm in both lungs.  The possibility of metastasis was a consideration; she had no bony metastases; the liver demonstrated tiny poorly defined lesions less than 5 mm and difficult to characterize and visible on prior examinations with the possibility of metastasis also considered.  A PET-CT scan showed multiple hypermetabolic lymph nodes in the mediastinum, right subclavian region and both adebayo felt to represent tumor; there were stellate lesions in the left breast region, felt to represent benign scars; the lungs demonstrated no significant abnormality with multiple tiny lung nodules seen on CT as previously noted; liver demonstrated no significant abnormality with multiple small poorly defined nodules seen on CT per my previous comments.  The patient underwent a left breast core biopsy of the area of concern with a finding of fat necrosis and foreign body giant cell reaction as well as a separate fine-needle aspiration consistent with a ruptured cyst/fat necrosis.  The patient consequently saw her oncologist, Dr. Carias, who sent the patient for ultrasound-guided core biopsies of the mediastinal lymph nodes with a finding of carcinoma, consistent with breast primary with estrogen receptor returning positive, greater than 50%, progesterone receptor negative, and HER-2/lisa 0 per outside report. The patient was subsequently begun on docetaxel, pertuzumab, trastuzumab, and exemestane beginning at the early 4/19, and subsequently switched to weekly paclitaxel secondary to side effects and continuing  on trastuzumab / pertuzumab every 3 weeks as well as exemestane. The patient was last treated per her report on approximately June 12, 2019, despite outside records that show ongoing weekly treatments early July 2019.  \par \par The patient also reports that beginning in approximately 12/18, she started to develop slowly progressive diffuse rash with associated pruritus, having seen multiple dermatologists.  Most recently, she saw Dr. Courtney Cowart, a [facility] physician practicing at [place], [place], who diagnosed disseminated herpes simplex for which she was begun on valacyclovir, as well as evidence of MRSA for which was treated with doxycycline beginning in May 2019.  In June 2019, the patient presented to [facility] Emergency Department complaining of progressive "weakness and shaking." She was found to be anemic and had melena.  She underwent endoscopy and VIR procedure to stop gastric bleeding, which was unsuccessful.  Consequently, she had a distal gastrectomy.  She was discharged from [facility] on 07/08/2019.\par \par The patient was initial consultation 7/24/19 for a further opinion regarding treatment recommendations. She noted ongoing weakness, fatigue, and generalized malaise, although all of these had begun to improve gradually.  Her last chemotherapy as noted above was approximately at 06/12/2019.  She reports that her primary oncologist, Dr. Carias, told to discontinue exemestane upon admission to [facility] with the consideration restarting it post discharge.  \par \par I obtained her outside slides for review at [facility] and it was confirmed that she had met breast cancer ER+ WI neg and Her 2 lisa neg.\par \par Consequently I recommended restarting exemestane.  \par \par In 3/21 she had L sided seizures and found to have abnormal enhancing lesions in the posterior fossa and supratentorial region are identified.\par \par Lesion 1: Large dural based enhancing lesion is seen involving the high medial right frontal region. This lesion does appear to cross the midline and measures approximately 3.2 x 2.7 x 3.5 cm. Small amount of adjacent susceptibility is identified. Surrounding edema is identified. Localized mass effect is identified. Right to left shift (7.2 mm) seen.\par \par Lesion 2: Enhancing lesion is seen involving the right upper tiffany. This lesion measures approximately 0.5 x 0.4 cm.\par \par Lesion 3: Enhancing lesion is seen involving the superior left cerebellar region. This lesion measures approximately 1.2 x 1.0 cm. Surrounding edema is identified.\par \par She was seen by Dr Hawng and treated with Gamma knife SRS to the 3 brain lesions. \par \par A repeat MRI 6/2/21 demonstrated:\par \par -Interval decrease in size of 3 enhancing lesions, largest in the high medial right frontal lobe measuring up to 2.0 cm.\par -Improvement of vasogenic edema with resolution of leftward midline shift.\par -No new lesions are identified\par \par \par \par  [de-identified] : Pt seen today for a routine f/u visit.  [employer], [place], ID# [id number], used for duration of visit. \par \par On Exemestane and tolerating well.  She notes a good appetite, stable weight and excellent performance status. \par \par She denies any treatment related side effects or any other complaints.  \par \par CT scan, 12/17/21- IMPRESSION:  Stable examination compared with prior imaging 7/8/2021.\par \par CT CAP 7/21\par IMPRESSION:\par Stable appearance of the chest, abdomen, and pelvis compared with 1/7/2021.\par \par MRI 6/2/21\par IMPRESSION:\par \par -Interval decrease in size of 3 enhancing lesions, largest in the high medial right frontal lobe measuring up to 2.0 cm.\par -Improvement of vasogenic edema with resolution of leftward midline shift.\par -No new lesions are identified\par \par \par BS 1/7/21\par IMPRESSION: Bone scan demonstrates:\par \par No definite scan evidence for osseous metastasis\par \par Mild diffuse increased uptake in the skull raises the possibility of Paget's disease.\par \par Degenerative disease in the major joints\par \par

## 2021-12-22 NOTE — PHYSICAL EXAM
[Fully active, able to carry on all pre-disease performance without restriction] : Status 0 - Fully active, able to carry on all pre-disease performance without restriction [Normal] : affect appropriate [de-identified] : Right breast with no discrete palpable masses, no palpable axillary nodes.  Left reconstructed breast with medial and lateral nodularity c/w fat necrosis, stable per patient, no other discrete palpable masses, no palpable axillary nodes.

## 2021-12-23 LAB
ALBUMIN SERPL ELPH-MCNC: 4.5 G/DL
ALP BLD-CCNC: 172 U/L
ALT SERPL-CCNC: 20 U/L
ANION GAP SERPL CALC-SCNC: 14 MMOL/L
AST SERPL-CCNC: 28 U/L
BILIRUB SERPL-MCNC: <0.2 MG/DL
BUN SERPL-MCNC: 12 MG/DL
CALCIUM SERPL-MCNC: 9.2 MG/DL
CANCER AG27-29 SERPL-ACNC: 10.1 U/ML
CEA SERPL-MCNC: 0.9 NG/ML
CHLORIDE SERPL-SCNC: 106 MMOL/L
CO2 SERPL-SCNC: 21 MMOL/L
CREAT SERPL-MCNC: 0.55 MG/DL
GLUCOSE SERPL-MCNC: 100 MG/DL
POTASSIUM SERPL-SCNC: 4.7 MMOL/L
PROT SERPL-MCNC: 7 G/DL
SODIUM SERPL-SCNC: 141 MMOL/L

## 2021-12-25 NOTE — PHYSICAL EXAM
[No Focal Deficits] : no focal deficits [Normal] : oriented to person, place and time, the affect was normal, the mood was normal and not anxious [Oriented To Time, Place, And Person] : oriented to person, place, and time

## 2021-12-30 ENCOUNTER — OUTPATIENT (OUTPATIENT)
Dept: OUTPATIENT SERVICES | Facility: HOSPITAL | Age: 56
LOS: 1 days | End: 2021-12-30
Payer: MEDICARE

## 2021-12-30 ENCOUNTER — APPOINTMENT (OUTPATIENT)
Dept: RADIATION ONCOLOGY | Facility: CLINIC | Age: 56
End: 2021-12-30
Payer: MEDICARE

## 2021-12-30 ENCOUNTER — APPOINTMENT (OUTPATIENT)
Dept: MRI IMAGING | Facility: IMAGING CENTER | Age: 56
End: 2021-12-30
Payer: MEDICARE

## 2021-12-30 DIAGNOSIS — Z90.12 ACQUIRED ABSENCE OF LEFT BREAST AND NIPPLE: Chronic | ICD-10-CM

## 2021-12-30 DIAGNOSIS — C79.31 SECONDARY MALIGNANT NEOPLASM OF BRAIN: ICD-10-CM

## 2021-12-30 DIAGNOSIS — Z98.51 TUBAL LIGATION STATUS: Chronic | ICD-10-CM

## 2021-12-30 DIAGNOSIS — Z90.49 ACQUIRED ABSENCE OF OTHER SPECIFIED PARTS OF DIGESTIVE TRACT: Chronic | ICD-10-CM

## 2021-12-30 DIAGNOSIS — Z90.3 ACQUIRED ABSENCE OF STOMACH [PART OF]: Chronic | ICD-10-CM

## 2021-12-30 PROCEDURE — 70553 MRI BRAIN STEM W/O & W/DYE: CPT | Mod: 26,MH

## 2021-12-30 PROCEDURE — A9585: CPT

## 2021-12-30 PROCEDURE — 70553 MRI BRAIN STEM W/O & W/DYE: CPT

## 2021-12-30 PROCEDURE — 99024 POSTOP FOLLOW-UP VISIT: CPT

## 2022-01-01 NOTE — HISTORY OF PRESENT ILLNESS
[Home] : at home, [unfilled] , at the time of the visit. [Medical Office: ([facility])___] : at the medical office located in  [Verbal consent obtained from patient] : the patient, [unfilled] [FreeTextEntry1] : Ms. Shelley was seen initially for consult on 3/18//2021.  She completed radiation therapy on 3/25/2021. She completed 2700 cgy of radiation over 3 fractions 3/2303/25/2021.  She presents for a f/u visit.\par \par ONCOLOGY HISTORY\par Ms. Shelley was diagnosed with breast ca in 2012 s/p radical mastectomy and  adjuvant chemotherapy.  She had ER positive 20%, GA positive 10%, HER-2/lisa 2+, FISH amplified breast cancer per outside physician report.  She went on to receive adjuvant dose-dense AC chemotherapy for 4 cycles followed by Taxol plus Herceptin as well as adjuvant radiation therapy. She was subsequently begun adjuvant tamoxifen therapy. Her last menstrual period was in 2014. The patient then began to experience weight loss beginning in November 2018 and over 3-month period of time lost 30 pounds per her report. At approximately the same time she developed increasing shortness of breath, and ultimately presented to [facility] Emergency Department. She subsequently had a CT scan of the chest, which showed multiple mediastinal hilar right subclavian lymph nodes corresponding to abnormal FDG activity on PET scan consistent with metastatic carcinoma; there were tiny nodularities in both lungs- too small to accurately characterize- not present on prior examination and measuring up to 2 mm in both lungs. The possibility of metastasis was a consideration; she had no bony metastases; the liver demonstrated tiny poorly defined lesions less than 5 mm and difficult to characterize and visible on prior examinations with the possibility of metastasis also considered. A PET-CT scan showed multiple hypermetabolic lymph nodes in the mediastinum, right subclavian region and both adebayo felt to represent tumor; there were stellate lesions in the left breast region, felt to represent benign scars; the lungs demonstrated no significant abnormality with multiple tiny lung nodules seen on CT as previously noted; liver demonstrated no significant abnormality with multiple small poorly defined nodules seen on CT per my previous comments. The patient underwent a left breast core biopsy of the area of concern with a finding of fat necrosis and foreign body giant cell reaction as well as a separate fine-needle aspiration consistent with a ruptured cyst/fat necrosis. The patient consequently saw her oncologist, Dr. Carias, who sent the patient for ultrasound-guided core biopsies of the mediastinal lymph nodes with a finding of carcinoma, consistent with breast primary with estrogen receptor returning positive, greater than 50%, progesterone receptor negative, and HER-2/lisa 0 per outside report. The patient was subsequently begun on docetaxel, pertuzumab, trastuzumab, and exemestane beginning at the early 4/19, and subsequently switched to weekly paclitaxel secondary to side effects and continuing on trastuzumab / pertuzumab every 3 weeks as well as exemestane. The patient was seen for initial consultation 7/24/19 for a further opinion regarding treatment recommendations. She noted ongoing weakness, fatigue, and generalized malaise, although all of these had begun to improve gradually. Her last chemotherapy as noted above was approximately at 06/12/2019. She reports that her primary oncologist, Dr. Carias, told to discontinue exemestane upon admission to [facility] with the consideration restarting it post discharge. Slides for review at [facility] and it was confirmed that she had met breast cancer ER+ GA neg and Her 2 lisa neg.  At the time of initial consult she felt well. Noted a left sided seizure in the days prior to consult lasting about 1 minute, which stopped on its own. Was recently prescribed keppra and dexamethasone 8mg BID. No confusion, dizziness, nausea, focal weakness. On examestane.  She recently had a focal seizure left upper/lower extremity. MRI showed 3 lesions -  \par \par Lesion 1: Large dural based enhancing lesion is seen involving the high medial right frontal region. This lesion does appear to cross the midline and measures approximately 3.2 x 2.7 x 3.5 cm. Small amount of adjacent susceptibility is identified. Surrounding edema is identified. Localized mass effect is identified. Right to left shift (7.2 mm) seen.\par \par Lesion 2: Enhancing lesion is seen involving the right upper tiffany. This lesion measures approximately 0.5 x 0.4 cm.\par \par Lesion 3: Enhancing lesion is seen involving the superior left cerebellar region. This lesion measures approximately 1.2 x 1.0 cm. Surrounding edema is identified. She was treated with GK SRS.\par \par \par 6/2/2021- Ms. Shelley presents today for follow up. [employer] [id number] for [place] was used.  MRI scheduled today at 145. Has not seen Dr. Martin since radiation. States she  is feeling fine. States she has intermittent sensation of "pulling" to her forehead. Denies any dizziness, confusion, headache, nausea, vomiting, visual disturbances, weakness. States 3 weeks ago she had an episode where her left upper extremity "was shaking" for one minute, denies any LOC. \par \par 9/29/2021- Ms. Shelley presents today for follow up. She is seen today with assistance of  [id number]. MRI brain done 9/24/2021 showed Previously noted enhancing lesions in the posterior fossa and supratentorial region have either decreased in size or are no longer seen which is compatible with response to therapy. Continue close interval follow-up is recommended. Continues to follow with Dr. Martin. continues on examestane with plans to repeat body scans in november or december.  Feeling very well. no headaches, no new weakness, no numbness or tingling, no nausea or vomiting. overall doing well. \par \par 12/30/2021: Today Ms. Shelley presents for follow-up, [employer]  . MRI brain showed: "Right high paramedian region lesion measures similar in size though there is increased edema seen in association when compared with the prior 9/24/2021. Question of vague enhancement on the surface of the cervical medullary junction and along the surface of the brainstem extending into the spinal cord level. Consideration for possible leptomeningeal pathology should be given. Recommend correlation with CSF as clinically warranted."\par Per our review, no leptomeningeal disease appreciated. The vague focus of enhancement likely represents a blood vessel. The swelling of the right high paramedian region lesion actually displays decreased edema, compared to prior.\par  Denies any nausea, vomiting, headache, numbness or tingling. Denies any issues with balance or hearing.  [Interpreters_IDNumber] : [id number] [Interpreters_FullName] : [employer] [FreeTextEntry4] :

## 2022-01-01 NOTE — REASON FOR VISIT
[Routine Follow-Up] : routine follow-up visit for [Brain Metastasis] : brain metastasis [[employer] ] : provided by [employer]   [Interpreters_IDNumber] : [id number] [Interpreters_FullName] : [employer]

## 2022-01-20 ENCOUNTER — APPOINTMENT (OUTPATIENT)
Dept: INTERNAL MEDICINE | Facility: CLINIC | Age: 57
End: 2022-01-20
Payer: MEDICARE

## 2022-01-20 VITALS
SYSTOLIC BLOOD PRESSURE: 119 MMHG | HEART RATE: 67 BPM | BODY MASS INDEX: 32.2 KG/M2 | RESPIRATION RATE: 18 BRPM | HEIGHT: 60 IN | TEMPERATURE: 98.4 F | DIASTOLIC BLOOD PRESSURE: 79 MMHG | OXYGEN SATURATION: 100 % | WEIGHT: 164 LBS

## 2022-01-20 DIAGNOSIS — Z00.00 ENCOUNTER FOR GENERAL ADULT MEDICAL EXAMINATION W/OUT ABNORMAL FINDINGS: ICD-10-CM

## 2022-01-20 DIAGNOSIS — E61.1 IRON DEFICIENCY: ICD-10-CM

## 2022-01-20 PROCEDURE — 99214 OFFICE O/P EST MOD 30 MIN: CPT

## 2022-01-20 NOTE — ASSESSMENT
[FreeTextEntry1] : 1. 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 iron deficiency\par * check serum iron levels\par * continue oral supplement\par 4. hypercholesterolemia\par * lab for fasting lipids\par * low cholesterol diet counselling\par 5. need for influenza vaccination\par * influenza vaccine administered\par 6. HM\par * routine general labs done\par * follow up one month

## 2022-01-20 NOTE — HISTORY OF PRESENT ILLNESS
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

## 2022-01-21 LAB
25(OH)D3 SERPL-MCNC: 21.4 NG/ML
ALBUMIN SERPL ELPH-MCNC: 4.6 G/DL
ALP BLD-CCNC: 186 U/L
ALT SERPL-CCNC: 20 U/L
ANION GAP SERPL CALC-SCNC: 13 MMOL/L
APPEARANCE: CLEAR
AST SERPL-CCNC: 19 U/L
BACTERIA: NEGATIVE
BASOPHILS # BLD AUTO: 0.02 K/UL
BASOPHILS NFR BLD AUTO: 0.3 %
BILIRUB SERPL-MCNC: 0.3 MG/DL
BILIRUBIN URINE: NEGATIVE
BLOOD URINE: NORMAL
BUN SERPL-MCNC: 13 MG/DL
CALCIUM SERPL-MCNC: 9 MG/DL
CHLORIDE SERPL-SCNC: 106 MMOL/L
CHOLEST SERPL-MCNC: 174 MG/DL
CO2 SERPL-SCNC: 23 MMOL/L
COLOR: NORMAL
CREAT SERPL-MCNC: 0.57 MG/DL
EOSINOPHIL # BLD AUTO: 0.16 K/UL
EOSINOPHIL NFR BLD AUTO: 2.8 %
ESTIMATED AVERAGE GLUCOSE: 123 MG/DL
FERRITIN SERPL-MCNC: 6 NG/ML
GLUCOSE QUALITATIVE U: NEGATIVE
GLUCOSE SERPL-MCNC: 93 MG/DL
HBA1C MFR BLD HPLC: 5.9 %
HCT VFR BLD CALC: 32 %
HDLC SERPL-MCNC: 41 MG/DL
HGB BLD-MCNC: 9.3 G/DL
HYALINE CASTS: 0 /LPF
IMM GRANULOCYTES NFR BLD AUTO: 0.2 %
IRON SATN MFR SERPL: 4 %
IRON SERPL-MCNC: 18 UG/DL
KETONES URINE: NEGATIVE
LDLC SERPL CALC-MCNC: 104 MG/DL
LEUKOCYTE ESTERASE URINE: ABNORMAL
LYMPHOCYTES # BLD AUTO: 2.5 K/UL
LYMPHOCYTES NFR BLD AUTO: 43.2 %
MAN DIFF?: NORMAL
MCHC RBC-ENTMCNC: 20.6 PG
MCHC RBC-ENTMCNC: 29.1 GM/DL
MCV RBC AUTO: 71 FL
MICROSCOPIC-UA: NORMAL
MONOCYTES # BLD AUTO: 0.48 K/UL
MONOCYTES NFR BLD AUTO: 8.3 %
NEUTROPHILS # BLD AUTO: 2.62 K/UL
NEUTROPHILS NFR BLD AUTO: 45.2 %
NITRITE URINE: NEGATIVE
NONHDLC SERPL-MCNC: 133 MG/DL
PH URINE: 6
PLATELET # BLD AUTO: 402 K/UL
POTASSIUM SERPL-SCNC: 5.1 MMOL/L
PROT SERPL-MCNC: 7.1 G/DL
PROTEIN URINE: NEGATIVE
RBC # BLD: 4.51 M/UL
RBC # FLD: 18.4 %
RED BLOOD CELLS URINE: 2 /HPF
SODIUM SERPL-SCNC: 142 MMOL/L
SPECIFIC GRAVITY URINE: 1.01
SQUAMOUS EPITHELIAL CELLS: 1 /HPF
T4 FREE SERPL-MCNC: 1.1 NG/DL
TIBC SERPL-MCNC: 511 UG/DL
TRIGL SERPL-MCNC: 143 MG/DL
TSH SERPL-ACNC: 3.07 UIU/ML
UIBC SERPL-MCNC: 493 UG/DL
UROBILINOGEN URINE: NORMAL
VIT B12 SERPL-MCNC: >2000 PG/ML
WBC # FLD AUTO: 5.79 K/UL
WHITE BLOOD CELLS URINE: 4 /HPF

## 2022-02-25 ENCOUNTER — APPOINTMENT (OUTPATIENT)
Dept: INTERNAL MEDICINE | Facility: CLINIC | Age: 57
End: 2022-02-25
Payer: MEDICARE

## 2022-02-25 VITALS
OXYGEN SATURATION: 100 % | DIASTOLIC BLOOD PRESSURE: 78 MMHG | RESPIRATION RATE: 17 BRPM | HEART RATE: 69 BPM | WEIGHT: 165 LBS | HEIGHT: 60 IN | TEMPERATURE: 98 F | SYSTOLIC BLOOD PRESSURE: 121 MMHG | BODY MASS INDEX: 32.39 KG/M2

## 2022-02-25 DIAGNOSIS — D63.0 ANEMIA IN NEOPLASTIC DISEASE: ICD-10-CM

## 2022-02-25 PROCEDURE — 99214 OFFICE O/P EST MOD 30 MIN: CPT

## 2022-02-25 NOTE — HISTORY OF PRESENT ILLNESS
[FreeTextEntry1] : follow up\par Interview and discussion conducted in Barbadian by Barbadian speaking physician\par  [de-identified] : patient here today to review and discuss labs results, no acute complains\par

## 2022-02-25 NOTE — ASSESSMENT
[FreeTextEntry1] : 1. brain metastases\par * completed RT therapy, to follow up with oncologist next month for MRI\par 2. hypercholesterolemia\par low cholesterol, low triglycerides diet,dietary counseling given; dietary avoidance discussed; diet and exercise reviewed with patient\par 3. prediabetes\par Dietary counseling given, dietary avoidance discussed, diet and exercise reviewed with patient; patient reminded of importance of aerobic exercise, weight control, dietary compliance and regular glucose monitoring\par 4. vitamin d deficiency\par * continue oral supplement, refilled\par 5. anemia of chronic disease\par * continue ferrous sulfate\par * follow up in six months

## 2022-03-25 ENCOUNTER — OUTPATIENT (OUTPATIENT)
Dept: OUTPATIENT SERVICES | Facility: HOSPITAL | Age: 57
LOS: 1 days | Discharge: HOME | End: 2022-03-25

## 2022-03-25 DIAGNOSIS — Z98.51 TUBAL LIGATION STATUS: Chronic | ICD-10-CM

## 2022-03-25 DIAGNOSIS — Z90.3 ACQUIRED ABSENCE OF STOMACH [PART OF]: Chronic | ICD-10-CM

## 2022-03-25 DIAGNOSIS — Z90.49 ACQUIRED ABSENCE OF OTHER SPECIFIED PARTS OF DIGESTIVE TRACT: Chronic | ICD-10-CM

## 2022-03-25 DIAGNOSIS — C50.919 MALIGNANT NEOPLASM OF UNSPECIFIED SITE OF UNSPECIFIED FEMALE BREAST: ICD-10-CM

## 2022-03-25 DIAGNOSIS — Z90.12 ACQUIRED ABSENCE OF LEFT BREAST AND NIPPLE: Chronic | ICD-10-CM

## 2022-03-29 ENCOUNTER — APPOINTMENT (OUTPATIENT)
Dept: HEMATOLOGY ONCOLOGY | Facility: CLINIC | Age: 57
End: 2022-03-29
Payer: MEDICARE

## 2022-03-29 ENCOUNTER — RESULT REVIEW (OUTPATIENT)
Age: 57
End: 2022-03-29

## 2022-03-29 VITALS
DIASTOLIC BLOOD PRESSURE: 81 MMHG | BODY MASS INDEX: 32.46 KG/M2 | HEART RATE: 75 BPM | TEMPERATURE: 97.8 F | SYSTOLIC BLOOD PRESSURE: 128 MMHG | RESPIRATION RATE: 16 BRPM | OXYGEN SATURATION: 100 % | HEIGHT: 60 IN | WEIGHT: 165.35 LBS

## 2022-03-29 DIAGNOSIS — E55.9 VITAMIN D DEFICIENCY, UNSPECIFIED: ICD-10-CM

## 2022-03-29 LAB
BASOPHILS # BLD AUTO: 0.01 K/UL — SIGNIFICANT CHANGE UP (ref 0–0.2)
BASOPHILS NFR BLD AUTO: 0.1 % — SIGNIFICANT CHANGE UP (ref 0–2)
EOSINOPHIL # BLD AUTO: 0.07 K/UL — SIGNIFICANT CHANGE UP (ref 0–0.5)
EOSINOPHIL NFR BLD AUTO: 1 % — SIGNIFICANT CHANGE UP (ref 0–6)
HCT VFR BLD CALC: 32.2 % — LOW (ref 34.5–45)
HGB BLD-MCNC: 9.6 G/DL — LOW (ref 11.5–15.5)
IMM GRANULOCYTES NFR BLD AUTO: 0.1 % — SIGNIFICANT CHANGE UP (ref 0–1.5)
LYMPHOCYTES # BLD AUTO: 2.53 K/UL — SIGNIFICANT CHANGE UP (ref 1–3.3)
LYMPHOCYTES # BLD AUTO: 36.5 % — SIGNIFICANT CHANGE UP (ref 13–44)
MCHC RBC-ENTMCNC: 20.6 PG — LOW (ref 27–34)
MCHC RBC-ENTMCNC: 29.8 G/DL — LOW (ref 32–36)
MCV RBC AUTO: 69.1 FL — LOW (ref 80–100)
MONOCYTES # BLD AUTO: 0.65 K/UL — SIGNIFICANT CHANGE UP (ref 0–0.9)
MONOCYTES NFR BLD AUTO: 9.4 % — SIGNIFICANT CHANGE UP (ref 2–14)
NEUTROPHILS # BLD AUTO: 3.66 K/UL — SIGNIFICANT CHANGE UP (ref 1.8–7.4)
NEUTROPHILS NFR BLD AUTO: 52.9 % — SIGNIFICANT CHANGE UP (ref 43–77)
NRBC # BLD: 0 /100 WBCS — SIGNIFICANT CHANGE UP (ref 0–0)
PLATELET # BLD AUTO: 362 K/UL — SIGNIFICANT CHANGE UP (ref 150–400)
RBC # BLD: 4.66 M/UL — SIGNIFICANT CHANGE UP (ref 3.8–5.2)
RBC # FLD: 20.4 % — HIGH (ref 10.3–14.5)
WBC # BLD: 6.93 K/UL — SIGNIFICANT CHANGE UP (ref 3.8–10.5)
WBC # FLD AUTO: 6.93 K/UL — SIGNIFICANT CHANGE UP (ref 3.8–10.5)

## 2022-03-29 PROCEDURE — 99215 OFFICE O/P EST HI 40 MIN: CPT

## 2022-03-29 RX ORDER — FAMOTIDINE 40 MG/1
40 TABLET, FILM COATED ORAL
Qty: 60 | Refills: 3 | Status: DISCONTINUED | COMMUNITY
Start: 2021-10-20 | End: 2022-03-29

## 2022-03-30 ENCOUNTER — APPOINTMENT (OUTPATIENT)
Dept: MRI IMAGING | Facility: IMAGING CENTER | Age: 57
End: 2022-03-30
Payer: MEDICARE

## 2022-03-30 ENCOUNTER — OUTPATIENT (OUTPATIENT)
Dept: OUTPATIENT SERVICES | Facility: HOSPITAL | Age: 57
LOS: 1 days | End: 2022-03-30
Payer: MEDICARE

## 2022-03-30 DIAGNOSIS — Z90.3 ACQUIRED ABSENCE OF STOMACH [PART OF]: Chronic | ICD-10-CM

## 2022-03-30 DIAGNOSIS — Z98.51 TUBAL LIGATION STATUS: Chronic | ICD-10-CM

## 2022-03-30 DIAGNOSIS — Z90.49 ACQUIRED ABSENCE OF OTHER SPECIFIED PARTS OF DIGESTIVE TRACT: Chronic | ICD-10-CM

## 2022-03-30 DIAGNOSIS — C79.31 SECONDARY MALIGNANT NEOPLASM OF BRAIN: ICD-10-CM

## 2022-03-30 DIAGNOSIS — Z90.12 ACQUIRED ABSENCE OF LEFT BREAST AND NIPPLE: Chronic | ICD-10-CM

## 2022-03-30 LAB
ALBUMIN SERPL ELPH-MCNC: 4.8 G/DL
ALP BLD-CCNC: 164 U/L
ALT SERPL-CCNC: 18 U/L
ANION GAP SERPL CALC-SCNC: 13 MMOL/L
AST SERPL-CCNC: 22 U/L
BILIRUB SERPL-MCNC: 0.2 MG/DL
BUN SERPL-MCNC: 13 MG/DL
CALCIUM SERPL-MCNC: 9.4 MG/DL
CEA SERPL-MCNC: 1.1 NG/ML
CHLORIDE SERPL-SCNC: 106 MMOL/L
CO2 SERPL-SCNC: 23 MMOL/L
CREAT SERPL-MCNC: 0.5 MG/DL
EGFR: 111 ML/MIN/1.73M2
GLUCOSE SERPL-MCNC: 103 MG/DL
POTASSIUM SERPL-SCNC: 4.9 MMOL/L
PROT SERPL-MCNC: 7.4 G/DL
SODIUM SERPL-SCNC: 142 MMOL/L

## 2022-03-30 PROCEDURE — G1004: CPT

## 2022-03-30 PROCEDURE — A9585: CPT

## 2022-03-30 PROCEDURE — 70553 MRI BRAIN STEM W/O & W/DYE: CPT | Mod: 26,ME

## 2022-03-30 PROCEDURE — 70553 MRI BRAIN STEM W/O & W/DYE: CPT | Mod: ME

## 2022-03-31 ENCOUNTER — RESULT REVIEW (OUTPATIENT)
Age: 57
End: 2022-03-31

## 2022-03-31 ENCOUNTER — APPOINTMENT (OUTPATIENT)
Dept: RADIATION ONCOLOGY | Facility: CLINIC | Age: 57
End: 2022-03-31
Payer: MEDICARE

## 2022-03-31 VITALS
BODY MASS INDEX: 32.23 KG/M2 | RESPIRATION RATE: 16 BRPM | DIASTOLIC BLOOD PRESSURE: 79 MMHG | HEART RATE: 76 BPM | WEIGHT: 165.01 LBS | TEMPERATURE: 97.16 F | OXYGEN SATURATION: 98 % | SYSTOLIC BLOOD PRESSURE: 121 MMHG

## 2022-03-31 PROCEDURE — 99215 OFFICE O/P EST HI 40 MIN: CPT

## 2022-03-31 NOTE — PHYSICAL EXAM
[Normal] : no focal deficits [de-identified] : Strength 5/5 in upper and lower extremity.  Gait normal.  No cranial nerve deficits.  No difficulty with understanding or speech

## 2022-03-31 NOTE — REASON FOR VISIT
[Routine Follow-Up] : routine follow-up visit for [Brain Metastasis] : brain metastasis [[employer] ] : provided by [employer]   [Family Member] : family member [Interpreters_IDNumber] : [id number] [Interpreters_FullName] : Goran [TWNoteComboBox1] : [place]orese

## 2022-03-31 NOTE — PHYSICAL EXAM
[Fully active, able to carry on all pre-disease performance without restriction] : Status 0 - Fully active, able to carry on all pre-disease performance without restriction [Normal] : affect appropriate [de-identified] : Right breast with no discrete palpable masses, no palpable axillary nodes.  Left reconstructed breast with medial and lateral nodularity c/w fat necrosis, stable per patient, no other discrete palpable masses, no palpable axillary nodes.

## 2022-03-31 NOTE — HISTORY OF PRESENT ILLNESS
[de-identified] : The patients' history of present illness began in 2012 at which time she was found to have a right breast cancer for which she underwent a right modified radical mastectomy/axillary lymph node dissection with the finding of an ER positive 20%, FL positive 10%, HER-2/lisa 2+, FISH amplified breast cancer per outside physician report (I do not have a copy that pathology report for my review).  She went on to receive adjuvant dose-dense AC chemotherapy for 4 cycles followed by Taxol plus Herceptin as well as adjuvant radiation therapy.  She was subsequently begun adjuvant tamoxifen therapy.  Her last menstrual period was in 2014.\par \par The patient then began to experience weight loss beginning in November 2018 and over 3-month period of time lost 30 pounds per her report.  At approximately the same time she developed increasing shortness of breath, and ultimately presented to [facility] Emergency Department.  She subsequently had a CT scan of the chest, which showed multiple mediastinal hilar right subclavian lymph nodes corresponding to abnormal FDG activity on PET scan consistent with metastatic carcinoma; there were tiny nodularities in both lungs- too small to accurately characterize- not present on prior examination and measuring up to 2 mm in both lungs.  The possibility of metastasis was a consideration; she had no bony metastases; the liver demonstrated tiny poorly defined lesions less than 5 mm and difficult to characterize and visible on prior examinations with the possibility of metastasis also considered.  A PET-CT scan showed multiple hypermetabolic lymph nodes in the mediastinum, right subclavian region and both adebayo felt to represent tumor; there were stellate lesions in the left breast region, felt to represent benign scars; the lungs demonstrated no significant abnormality with multiple tiny lung nodules seen on CT as previously noted; liver demonstrated no significant abnormality with multiple small poorly defined nodules seen on CT per my previous comments.  The patient underwent a left breast core biopsy of the area of concern with a finding of fat necrosis and foreign body giant cell reaction as well as a separate fine-needle aspiration consistent with a ruptured cyst/fat necrosis.  The patient consequently saw her oncologist, Dr. Carias, who sent the patient for ultrasound-guided core biopsies of the mediastinal lymph nodes with a finding of carcinoma, consistent with breast primary with estrogen receptor returning positive, greater than 50%, progesterone receptor negative, and HER-2/lisa 0 per outside report. The patient was subsequently begun on docetaxel, pertuzumab, trastuzumab, and exemestane beginning at the early 4/19, and subsequently switched to weekly paclitaxel secondary to side effects and continuing  on trastuzumab / pertuzumab every 3 weeks as well as exemestane. The patient was last treated per her report on approximately June 12, 2019, despite outside records that show ongoing weekly treatments early July 2019.  \par \par The patient also reports that beginning in approximately 12/18, she started to develop slowly progressive diffuse rash with associated pruritus, having seen multiple dermatologists.  Most recently, she saw Dr. Courtney Cowart, a [facility] physician practicing at [place], [place], who diagnosed disseminated herpes simplex for which she was begun on valacyclovir, as well as evidence of MRSA for which was treated with doxycycline beginning in May 2019.  In June 2019, the patient presented to [facility] Emergency Department complaining of progressive "weakness and shaking." She was found to be anemic and had melena.  She underwent endoscopy and VIR procedure to stop gastric bleeding, which was unsuccessful.  Consequently, she had a distal gastrectomy.  She was discharged from [facility] on 07/08/2019.\par \par The patient was initial consultation 7/24/19 for a further opinion regarding treatment recommendations. She noted ongoing weakness, fatigue, and generalized malaise, although all of these had begun to improve gradually.  Her last chemotherapy as noted above was approximately at 06/12/2019.  She reports that her primary oncologist, Dr. Carias, told to discontinue exemestane upon admission to [facility] with the consideration restarting it post discharge.  \par \par I obtained her outside slides for review at [facility] and it was confirmed that she had met breast cancer ER+ FL neg and Her 2 lisa neg.\par \par Consequently I recommended restarting exemestane.  \par \par In 3/21 she had L sided seizures and found to have abnormal enhancing lesions in the posterior fossa and supratentorial region are identified.\par \par Lesion 1: Large dural based enhancing lesion is seen involving the high medial right frontal region. This lesion does appear to cross the midline and measures approximately 3.2 x 2.7 x 3.5 cm. Small amount of adjacent susceptibility is identified. Surrounding edema is identified. Localized mass effect is identified. Right to left shift (7.2 mm) seen.\par \par Lesion 2: Enhancing lesion is seen involving the right upper tiffany. This lesion measures approximately 0.5 x 0.4 cm.\par \par Lesion 3: Enhancing lesion is seen involving the superior left cerebellar region. This lesion measures approximately 1.2 x 1.0 cm. Surrounding edema is identified.\par \par She was seen by Dr Hwang and treated with Gamma knife SRS to the 3 brain lesions. \par \par A repeat MRI 6/2/21 demonstrated:\par \par -Interval decrease in size of 3 enhancing lesions, largest in the high medial right frontal lobe measuring up to 2.0 cm.\par -Improvement of vasogenic edema with resolution of leftward midline shift.\par -No new lesions are identified\par \par \par \par  [de-identified] : Pt seen today for a routine f/u visit.  \par \par On Exemestane and tolerating well. Denies any treatment related side effects. \par \par She notes a good appetite, stable weight and excellent performance status. \par \par She denies any other complaints.  \par \par CT scan, 12/17/21- IMPRESSION:  Stable examination compared with prior imaging 7/8/2021.\par \par MRI head 12/30/21\par IMPRESSION:\par Right high paramedian region lesion measures similar in size though there \par is increased edema seen in association when compared with the prior \par 9/24/2021. Question of vague enhancement on the surface of the cervical \par medullary junction and along the surface of the brainstem extending into \par the spinal cord level. Consideration for possible leptomeningeal \par pathology should be given. Recommend correlation with CSF as clinically \par warranted.\par \par DEXA 1/21 osteopenia\par \par

## 2022-03-31 NOTE — HISTORY OF PRESENT ILLNESS
[Home] : at home, [unfilled] , at the time of the visit. [Medical Office: ([facility])___] : at the medical office located in  [Verbal consent obtained from patient] : the patient, [unfilled] [FreeTextEntry1] : Ms. Shelley was seen initially for consult on 3/18//2021.  She completed radiation therapy on 3/25/2021. She completed 2700 cgy of radiation over 3 fractions 3/2303/25/2021.  She presents for a f/u visit.\par \par ONCOLOGY HISTORY\par Ms. Shelley was diagnosed with breast ca in 2012 s/p radical mastectomy and  adjuvant chemotherapy.  She had ER positive 20%, NC positive 10%, HER-2/lisa 2+, FISH amplified breast cancer per outside physician report.  She went on to receive adjuvant dose-dense AC chemotherapy for 4 cycles followed by Taxol plus Herceptin as well as adjuvant radiation therapy. She was subsequently begun adjuvant tamoxifen therapy. Her last menstrual period was in 2014. The patient then began to experience weight loss beginning in November 2018 and over 3-month period of time lost 30 pounds per her report. At approximately the same time she developed increasing shortness of breath, and ultimately presented to [facility] Emergency Department. She subsequently had a CT scan of the chest, which showed multiple mediastinal hilar right subclavian lymph nodes corresponding to abnormal FDG activity on PET scan consistent with metastatic carcinoma; there were tiny nodularities in both lungs- too small to accurately characterize- not present on prior examination and measuring up to 2 mm in both lungs. The possibility of metastasis was a consideration; she had no bony metastases; the liver demonstrated tiny poorly defined lesions less than 5 mm and difficult to characterize and visible on prior examinations with the possibility of metastasis also considered. A PET-CT scan showed multiple hypermetabolic lymph nodes in the mediastinum, right subclavian region and both adebayo felt to represent tumor; there were stellate lesions in the left breast region, felt to represent benign scars; the lungs demonstrated no significant abnormality with multiple tiny lung nodules seen on CT as previously noted; liver demonstrated no significant abnormality with multiple small poorly defined nodules seen on CT per my previous comments. The patient underwent a left breast core biopsy of the area of concern with a finding of fat necrosis and foreign body giant cell reaction as well as a separate fine-needle aspiration consistent with a ruptured cyst/fat necrosis. The patient consequently saw her oncologist, Dr. Carias, who sent the patient for ultrasound-guided core biopsies of the mediastinal lymph nodes with a finding of carcinoma, consistent with breast primary with estrogen receptor returning positive, greater than 50%, progesterone receptor negative, and HER-2/lisa 0 per outside report. The patient was subsequently begun on docetaxel, pertuzumab, trastuzumab, and exemestane beginning at the early 4/19, and subsequently switched to weekly paclitaxel secondary to side effects and continuing on trastuzumab / pertuzumab every 3 weeks as well as exemestane. The patient was seen for initial consultation 7/24/19 for a further opinion regarding treatment recommendations. She noted ongoing weakness, fatigue, and generalized malaise, although all of these had begun to improve gradually. Her last chemotherapy as noted above was approximately at 06/12/2019. She reports that her primary oncologist, Dr. Carias, told to discontinue exemestane upon admission to [facility] with the consideration restarting it post discharge. Slides for review at [facility] and it was confirmed that she had met breast cancer ER+ [place] neg and Her 2 lisa neg.  At the time of initial consult she felt well. Noted a left sided seizure in the days prior to consult lasting about 1 minute, which stopped on its own. Was recently prescribed keppra and dexamethasone 8mg BID. No confusion, dizziness, nausea, focal weakness. On examestane.  She recently had a focal seizure left upper/lower extremity. MRI showed 3 lesions -  \par \par Lesion 1: Large dural based enhancing lesion is seen involving the high medial right frontal region. This lesion does appear to cross the midline and measures approximately 3.2 x 2.7 x 3.5 cm. Small amount of adjacent susceptibility is identified. Surrounding edema is identified. Localized mass effect is identified. Right to left shift (7.2 mm) seen.\par \par Lesion 2: Enhancing lesion is seen involving the right upper tiffany. This lesion measures approximately 0.5 x 0.4 cm.\par \par Lesion 3: Enhancing lesion is seen involving the superior left cerebellar region. This lesion measures approximately 1.2 x 1.0 cm. Surrounding edema is identified. She was treated with GK SRS.\par \par \par 6/2/2021- Ms. Shelley presents today for follow up. [facility] [employer] 201216 for Amharic was used.  MRI scheduled today at 145. Has not seen Dr. Martin since radiation. States she  is feeling fine. States she has intermittent sensation of "pulling" to her forehead. Denies any dizziness, confusion, headache, nausea, vomiting, visual disturbances, weakness. States 3 weeks ago she had an episode where her left upper extremity "was shaking" for one minute, denies any LOC. \par \par 9/29/2021- Ms. Shelley presents today for follow up. She is seen today with assistance of  [id number]. MRI brain done 9/24/2021 showed Previously noted enhancing lesions in the posterior fossa and supratentorial region have either decreased in size or are no longer seen which is compatible with response to therapy. Continue close interval follow-up is recommended. Continues to follow with Dr. Martin. continues on examestane with plans to repeat body scans in november or december.  Feeling very well. no headaches, no new weakness, no numbness or tingling, no nausea or vomiting. overall doing well. \par \par 12/30/2021: Today Ms. Shelley presents for follow-up, [employer]  . MRI brain showed: "Right high paramedian region lesion measures similar in size though there is increased edema seen in association when compared with the prior 9/24/2021. Question of vague enhancement on the surface of the cervical medullary junction and along the surface of the brainstem extending into the spinal cord level. Consideration for possible leptomeningeal pathology should be given. Recommend correlation with CSF as clinically warranted."\par Per our review, no leptomeningeal disease appreciated. The vague focus of enhancement likely represents a blood vessel. The swelling of the right high paramedian region lesion actually displays decreased edema, compared to prior.\par  Denies any nausea, vomiting, headache, numbness or tingling. Denies any issues with balance or hearing. \par \par 3/31/2022: Pt presents for follow-up.  Clinically feeling well. Denies headache, nausea, vomiting, or other complaints.  Denies interval symptoms since last f/u.  Denies being on steroids or reportedly any recent seizuer events. \par Vietnamese pacific : Goran [phone] [Interpreters_IDNumber] : [id number] [Interpreters_FullName] : Vane [FreeTextEntry4] :

## 2022-04-04 LAB — CANCER AG27-29 SERPL-ACNC: 11.1 U/ML

## 2022-04-14 NOTE — H&P ADULT - PROBLEM SELECTOR PLAN 4
-likely LGIB w 2 weeks of hematochezia 5x per day  -reports normal colonoscopy approx 2 years ago. Denies hx of diverticulosis or hemorrhoids. No prev hx of GI bleeds.   -will keep NPO. Will c/w GI. Vitals q4. BP WNL. no

## 2022-04-18 ENCOUNTER — RESULT REVIEW (OUTPATIENT)
Age: 57
End: 2022-04-18

## 2022-04-19 ENCOUNTER — APPOINTMENT (OUTPATIENT)
Dept: NUCLEAR MEDICINE | Facility: IMAGING CENTER | Age: 57
End: 2022-04-19
Payer: MEDICARE

## 2022-04-19 ENCOUNTER — OUTPATIENT (OUTPATIENT)
Dept: OUTPATIENT SERVICES | Facility: HOSPITAL | Age: 57
LOS: 1 days | End: 2022-04-19
Payer: MEDICARE

## 2022-04-19 DIAGNOSIS — Z90.12 ACQUIRED ABSENCE OF LEFT BREAST AND NIPPLE: Chronic | ICD-10-CM

## 2022-04-19 DIAGNOSIS — C79.31 SECONDARY MALIGNANT NEOPLASM OF BRAIN: ICD-10-CM

## 2022-04-19 DIAGNOSIS — Z90.3 ACQUIRED ABSENCE OF STOMACH [PART OF]: Chronic | ICD-10-CM

## 2022-04-19 DIAGNOSIS — Z98.51 TUBAL LIGATION STATUS: Chronic | ICD-10-CM

## 2022-04-19 DIAGNOSIS — Z90.49 ACQUIRED ABSENCE OF OTHER SPECIFIED PARTS OF DIGESTIVE TRACT: Chronic | ICD-10-CM

## 2022-04-19 PROCEDURE — 78608 BRAIN IMAGING (PET): CPT | Mod: 26,PS,MG

## 2022-04-19 PROCEDURE — 78999 UNLISTED MISC PX DX NUC MED: CPT | Mod: 26,MH

## 2022-04-19 PROCEDURE — G1004: CPT

## 2022-04-20 ENCOUNTER — APPOINTMENT (OUTPATIENT)
Dept: GASTROENTEROLOGY | Facility: CLINIC | Age: 57
End: 2022-04-20
Payer: MEDICARE

## 2022-04-20 VITALS
TEMPERATURE: 207.14 F | HEIGHT: 60 IN | BODY MASS INDEX: 31.8 KG/M2 | SYSTOLIC BLOOD PRESSURE: 122 MMHG | HEART RATE: 69 BPM | OXYGEN SATURATION: 99 % | DIASTOLIC BLOOD PRESSURE: 74 MMHG | WEIGHT: 162 LBS

## 2022-04-20 DIAGNOSIS — Z87.11 PERSONAL HISTORY OF PEPTIC ULCER DISEASE: ICD-10-CM

## 2022-04-20 DIAGNOSIS — Z98.0 INTESTINAL BYPASS AND ANASTOMOSIS STATUS: ICD-10-CM

## 2022-04-20 DIAGNOSIS — Z86.19 PERSONAL HISTORY OF OTHER INFECTIOUS AND PARASITIC DISEASES: ICD-10-CM

## 2022-04-20 DIAGNOSIS — B25.9 CYTOMEGALOVIRAL DISEASE, UNSPECIFIED: ICD-10-CM

## 2022-04-20 DIAGNOSIS — K29.30 CHRONIC SUPERFICIAL GASTRITIS W/OUT BLEEDING: ICD-10-CM

## 2022-04-20 PROCEDURE — 99213 OFFICE O/P EST LOW 20 MIN: CPT

## 2022-04-20 NOTE — HISTORY OF PRESENT ILLNESS
[None] : had no significant interval events [Heartburn] : denies heartburn [Nausea] : denies nausea [Vomiting] : denies vomiting [Diarrhea] : denies diarrhea [Constipation] : denies constipation [Yellow Skin Or Eyes (Jaundice)] : denies jaundice [Abdominal Pain] : denies abdominal pain [Abdominal Swelling] : denies abdominal swelling [Rectal Pain] : denies rectal pain [Cholelithiasis] : cholelithiasis [Malignancy] : malignancy [Cholecystectomy] : cholecystectomy [Wt Gain ___ Lbs] : no recent weight gain [Wt Loss ___ Lbs] : no recent weight loss [GERD] : no gastroesophageal reflux disease [Hiatus Hernia] : no hiatus hernia [Peptic Ulcer Disease] : no peptic ulcer disease [Pancreatitis] : no pancreatitis [Kidney Stone] : no kidney stone [Inflammatory Bowel Disease] : no inflammatory bowel disease [Irritable Bowel Syndrome] : no irritable bowel syndrome [Diverticulitis] : no diverticulitis [Alcohol Abuse] : no alcohol abuse [Abdominal Surgery] : no abdominal surgery [Appendectomy] : no appendectomy [de-identified] : The patient has a history significant for left breast cancer, s/p left mastectomy with resultant metastasis to the lung and currently on Exemestane and history of bleeding peptic ulcer disease, s/p surgery in 2019. The patient was recently diagnosed with brain tumors and is currently recovering status post gamma knife radiation treatment. The patient is to followup with the neurosurgeon and neurologist. The patient states that she is feeling better. The patient denies any jaundice or pruritus.  The patient denies any chronic lower back pain. The patient denies any abdominal pain.  The patient denies any abdominal gas and bloating.  The patient denies any nausea or vomiting.  The patient denies any gastroesophageal reflux disease or dysphagia.  The patient denies any atypical chest pain, shortness of breath or palpitations.  The patient denies any diaphoresis. The patient denies any constipation or diarrhea.  The patient has 1 bowel movement a day. The patient denies a change in bowel habits.  The patient denies a change in caliber of stool.  The patient denies admits to having mucus discharge with the bowel movements.  The patient denies any bright red blood per rectum, melena or hematemesis.  The patient denies any rectal pain or rectal pruritus.  The patient denies any weight loss or anorexia.  She denies any fevers or chills.  The patient had an upper endoscopy and colonoscopy to the terminal ileum performed at the [facility] endoscopy suite on May 6, 2021. The patient had an upper endoscopy at the [facility] endoscopy suite on May 6, 2021. The upper endoscopy was performed up to the level of the second portion of the duodenum. 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 Biopsies were taken of the distal esophagus, antrum, body of stomach and duodenum to assess for esophagitis, gastritis and duodenitis. 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 The patient completed a trial of Clarithromycin 500 mg 2 times a day, Amoxicillin 500mg 2 tablets twice a day and Omeprazole 40 mg twice a day for 14 days for H. pylori eradication. The H. pylori breath test performed on July 19, 2021 was not detected. The patient was seen by infectious disease, Dr. Halie Waller and Dr. Smita Brown. The patient was treated with Valganciclovir (Valcyte 900 mg po BID x 3 weeks.) for CMV infection. The colonoscopy to the terminal ileum revealed a normal but long and tortuous colon and small internal hemorrhoids. There were no polyps, masses, diverticulosis, AVMs or colitis noted. The patient tolerated the procedures well. The patient admits to having a prior upper endoscopy performed by another gastroenterologist. The patient admits to a history of bleeding peptic ulcer disease, s/p surgery in 2019. The patient denies any significant family history of GI problems. The patient admits to a family history of GI problems. The patient’s 2 sisters had a history of gallbladder issues.  [de-identified] : (-) smoking, (-) ETOH, (-) IVDA\par

## 2022-04-20 NOTE — ASSESSMENT
[FreeTextEntry1] : Brain Tumor: The patient was recently diagnosed with brain tumors and is currently recovering status post gamma knife radiation treatment. The patient is to followup with the neurosurgeon and neurologist\par Gastritis: The patient has a history of gastritis. The patient is to avoid nonsteroidal anti-inflammatory drugs and aspirin. I recommend a trial of pantoprazole 40 mg once a day for 3 months for the symptoms. \par H. pylori Gastritis: The patient was found to have H. pylori gastritis on recent upper endoscopy. The patient completed a trial of Clarithromycin 500 mg 2 times a day, Amoxicillin 500mg twice a day and Omeprazole 40 mg twice a day for 14 days for H. pylori eradication. The H. pylori breath test performed on July 19, 2021 was not detected.\par CMV Infection: The recent upper endoscopy performed at the [facility] [place] [facility] endoscopy suite on May 6, 2021 revealed mild diffuse bile gastritis and s/p Billroth II gastrectomy with shortened gastric pouch. 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 The patient was seen by infectious disease, Dr. Halie Waller and Dr. Smita Brown. The patient was treated with Valganciclovir (Valcyte 900 mg po BID x 3 weeks.) for CMV infection. \par Internal Hemorrhoids: The patient is to consider a trial of Anusol H. C. suppositories one per rectum nightly and Anusol HC2.5% cream apply to affected area twice a day p.r.n. hemorrhoidal bleeding or pain. \par I recommend a repeat colonoscopy in 9 years to reassess for colonic polyps pending patient’s health unless symptomatic. The patient agreed and will follow up for the procedure. \par History of Peptic Ulcer Disease: The patient has a prior history of peptic ulcer disease. The patient is to avoid nonsteroidal anti-inflammatory drugs and aspirin. The patient required surgery for the bleeding peptic ulcer disease. I recommend a repeat upper endoscopy to reassess for ulcer healing if symptomatic. \par Follow-up: The patient is to follow-up in the office in 1 year to reassess the symptoms. The patient was told to call the office if any further problems. \par

## 2022-04-26 PROCEDURE — 78999 UNLISTED MISC PX DX NUC MED: CPT

## 2022-04-26 PROCEDURE — A9552: CPT

## 2022-04-26 PROCEDURE — G1004: CPT

## 2022-04-26 PROCEDURE — 78608 BRAIN IMAGING (PET): CPT | Mod: MG

## 2022-04-27 ENCOUNTER — RESULT REVIEW (OUTPATIENT)
Age: 57
End: 2022-04-27

## 2022-04-27 ENCOUNTER — APPOINTMENT (OUTPATIENT)
Dept: MRI IMAGING | Facility: IMAGING CENTER | Age: 57
End: 2022-04-27
Payer: MEDICARE

## 2022-04-27 ENCOUNTER — OUTPATIENT (OUTPATIENT)
Dept: OUTPATIENT SERVICES | Facility: HOSPITAL | Age: 57
LOS: 1 days | End: 2022-04-27
Payer: MEDICARE

## 2022-04-27 DIAGNOSIS — C79.31 SECONDARY MALIGNANT NEOPLASM OF BRAIN: ICD-10-CM

## 2022-04-27 DIAGNOSIS — Z90.49 ACQUIRED ABSENCE OF OTHER SPECIFIED PARTS OF DIGESTIVE TRACT: Chronic | ICD-10-CM

## 2022-04-27 DIAGNOSIS — Z98.51 TUBAL LIGATION STATUS: Chronic | ICD-10-CM

## 2022-04-27 DIAGNOSIS — Z90.3 ACQUIRED ABSENCE OF STOMACH [PART OF]: Chronic | ICD-10-CM

## 2022-04-27 DIAGNOSIS — Z90.12 ACQUIRED ABSENCE OF LEFT BREAST AND NIPPLE: Chronic | ICD-10-CM

## 2022-04-27 PROCEDURE — G1004: CPT

## 2022-04-27 PROCEDURE — 70553 MRI BRAIN STEM W/O & W/DYE: CPT | Mod: 26,ME

## 2022-04-27 PROCEDURE — 76390 MR SPECTROSCOPY: CPT | Mod: 26,GY,MH

## 2022-04-27 PROCEDURE — 76390 MR SPECTROSCOPY: CPT | Mod: ME

## 2022-04-27 PROCEDURE — A9585: CPT

## 2022-04-27 PROCEDURE — 76390 MR SPECTROSCOPY: CPT | Mod: 26,ME

## 2022-04-27 PROCEDURE — 70553 MRI BRAIN STEM W/O & W/DYE: CPT | Mod: ME

## 2022-04-29 ENCOUNTER — APPOINTMENT (OUTPATIENT)
Dept: MRI IMAGING | Facility: IMAGING CENTER | Age: 57
End: 2022-04-29

## 2022-05-04 ENCOUNTER — APPOINTMENT (OUTPATIENT)
Dept: RADIATION ONCOLOGY | Facility: CLINIC | Age: 57
End: 2022-05-04
Payer: MEDICARE

## 2022-05-04 VITALS
TEMPERATURE: 98.96 F | DIASTOLIC BLOOD PRESSURE: 83 MMHG | SYSTOLIC BLOOD PRESSURE: 128 MMHG | BODY MASS INDEX: 32.59 KG/M2 | HEART RATE: 70 BPM | RESPIRATION RATE: 17 BRPM | OXYGEN SATURATION: 100 % | WEIGHT: 166.89 LBS

## 2022-05-04 PROCEDURE — 99214 OFFICE O/P EST MOD 30 MIN: CPT

## 2022-05-05 ENCOUNTER — OUTPATIENT (OUTPATIENT)
Dept: OUTPATIENT SERVICES | Facility: HOSPITAL | Age: 57
LOS: 1 days | Discharge: HOME | End: 2022-05-05

## 2022-05-05 ENCOUNTER — NON-APPOINTMENT (OUTPATIENT)
Age: 57
End: 2022-05-05

## 2022-05-05 DIAGNOSIS — Z90.3 ACQUIRED ABSENCE OF STOMACH [PART OF]: Chronic | ICD-10-CM

## 2022-05-05 DIAGNOSIS — Z98.51 TUBAL LIGATION STATUS: Chronic | ICD-10-CM

## 2022-05-05 DIAGNOSIS — Z90.49 ACQUIRED ABSENCE OF OTHER SPECIFIED PARTS OF DIGESTIVE TRACT: Chronic | ICD-10-CM

## 2022-05-05 DIAGNOSIS — Z90.12 ACQUIRED ABSENCE OF LEFT BREAST AND NIPPLE: Chronic | ICD-10-CM

## 2022-05-05 PROCEDURE — 77263 THER RADIOLOGY TX PLNG CPLX: CPT

## 2022-05-05 NOTE — REASON FOR VISIT
[Routine Follow-Up] : routine follow-up visit for [Brain Metastasis] : brain metastasis [Family Member] : family member [[employer] ] : provided by [employer]   [Interpreters_IDNumber] : [id number] [Interpreters_FullName] : Goran [TWNoteComboBox1] : Citizen of [place]

## 2022-05-05 NOTE — PHYSICAL EXAM
[Normal] : no joint swelling, no clubbing or cyanosis of the fingernails and muscle strength and tone were normal [de-identified] : Strength 5/5 in upper and lower extremity.  Gait normal.  No cranial nerve deficits.  No difficulty with understanding or speech

## 2022-05-05 NOTE — HISTORY OF PRESENT ILLNESS
[FreeTextEntry1] : Ms. Shelley was seen initially for consult on 3/18//2021.  She completed radiation therapy on 3/25/2021. She completed 2700 cgy of radiation over 3 fractions 3/2303/25/2021.  She presents for a f/u visit.\par \par ONCOLOGY HISTORY\par Ms. Shelley was diagnosed with breast ca in 2012 s/p radical mastectomy and  adjuvant chemotherapy.  She had ER positive 20%, MO positive 10%, HER-2/lisa 2+, FISH amplified breast cancer per outside physician report.  She went on to receive adjuvant dose-dense AC chemotherapy for 4 cycles followed by Taxol plus Herceptin as well as adjuvant radiation therapy. She was subsequently begun adjuvant tamoxifen therapy. Her last menstrual period was in 2014. The patient then began to experience weight loss beginning in November 2018 and over 3-month period of time lost 30 pounds per her report. At approximately the same time she developed increasing shortness of breath, and ultimately presented to [facility] Emergency Department. She subsequently had a CT scan of the chest, which showed multiple mediastinal hilar right subclavian lymph nodes corresponding to abnormal FDG activity on PET scan consistent with metastatic carcinoma; there were tiny nodularities in both lungs- too small to accurately characterize- not present on prior examination and measuring up to 2 mm in both lungs. The possibility of metastasis was a consideration; she had no bony metastases; the liver demonstrated tiny poorly defined lesions less than 5 mm and difficult to characterize and visible on prior examinations with the possibility of metastasis also considered. A PET-CT scan showed multiple hypermetabolic lymph nodes in the mediastinum, right subclavian region and both adebayo felt to represent tumor; there were stellate lesions in the left breast region, felt to represent benign scars; the lungs demonstrated no significant abnormality with multiple tiny lung nodules seen on CT as previously noted; liver demonstrated no significant abnormality with multiple small poorly defined nodules seen on CT per my previous comments. The patient underwent a left breast core biopsy of the area of concern with a finding of fat necrosis and foreign body giant cell reaction as well as a separate fine-needle aspiration consistent with a ruptured cyst/fat necrosis. The patient consequently saw her oncologist, Dr. Carias, who sent the patient for ultrasound-guided core biopsies of the mediastinal lymph nodes with a finding of carcinoma, consistent with breast primary with estrogen receptor returning positive, greater than 50%, progesterone receptor negative, and HER-2/lisa 0 per outside report. The patient was subsequently begun on docetaxel, pertuzumab, trastuzumab, and exemestane beginning at the early 4/19, and subsequently switched to weekly paclitaxel secondary to side effects and continuing on trastuzumab / pertuzumab every 3 weeks as well as exemestane. The patient was seen for initial consultation 7/24/19 for a further opinion regarding treatment recommendations. She noted ongoing weakness, fatigue, and generalized malaise, although all of these had begun to improve gradually. Her last chemotherapy as noted above was approximately at 06/12/2019. She reports that her primary oncologist, Dr. Carias, told to discontinue exemestane upon admission to [facility] with the consideration restarting it post discharge. Slides for review at [facility] and it was confirmed that she had met breast cancer ER+ MO neg and Her 2 lisa neg.  At the time of initial consult she felt well. Noted a left sided seizure in the days prior to consult lasting about 1 minute, which stopped on its own. Was recently prescribed keppra and dexamethasone 8mg BID. No confusion, dizziness, nausea, focal weakness. On examestane.  She recently had a focal seizure left upper/lower extremity. MRI showed 3 lesions -  \par \par Lesion 1: Large dural based enhancing lesion is seen involving the high medial right frontal region. This lesion does appear to cross the midline and measures approximately 3.2 x 2.7 x 3.5 cm. Small amount of adjacent susceptibility is identified. Surrounding edema is identified. Localized mass effect is identified. Right to left shift (7.2 mm) seen.\par \par Lesion 2: Enhancing lesion is seen involving the right upper tiffany. This lesion measures approximately 0.5 x 0.4 cm.\par \par Lesion 3: Enhancing lesion is seen involving the superior left cerebellar region. This lesion measures approximately 1.2 x 1.0 cm. Surrounding edema is identified. She was treated with GK SRS.\par \par \par 6/2/2021- Ms. Shelley presents today for follow up. [employer] [id number] for [place] was used.  MRI scheduled today at 145. Has not seen Dr. Martin since radiation. States she  is feeling fine. States she has intermittent sensation of "pulling" to her forehead. Denies any dizziness, confusion, headache, nausea, vomiting, visual disturbances, weakness. States 3 weeks ago she had an episode where her left upper extremity "was shaking" for one minute, denies any LOC. \par \par 9/29/2021- Ms. Shelley presents today for follow up. She is seen today with assistance of  [id number]. MRI brain done 9/24/2021 showed Previously noted enhancing lesions in the posterior fossa and supratentorial region have either decreased in size or are no longer seen which is compatible with response to therapy. Continue close interval follow-up is recommended. Continues to follow with Dr. Martin. continues on examestane with plans to repeat body scans in november or december.  Feeling very well. no headaches, no new weakness, no numbness or tingling, no nausea or vomiting. overall doing well. \par \par 12/30/2021: Today Ms. Shelley presents for follow-up, [facility]  . MRI brain showed: "Right high paramedian region lesion measures similar in size though there is increased edema seen in association when compared with the prior 9/24/2021. Question of vague enhancement on the surface of the cervical medullary junction and along the surface of the brainstem extending into the spinal cord level. Consideration for possible leptomeningeal pathology should be given. Recommend correlation with CSF as clinically warranted."\par Per our review, no leptomeningeal disease appreciated. The vague focus of enhancement likely represents a blood vessel. The swelling of the right high paramedian region lesion actually displays decreased edema, compared to prior.\par  Denies any nausea, vomiting, headache, numbness or tingling. Denies any issues with balance or hearing. \par \par 3/31/2022: Pt presents for follow-up.  Clinically feeling well. Denies headache, nausea, vomiting, or other complaints.  Denies interval symptoms since last f/u.  Denies being on steroids or reportedly any recent seizure events. \par [place] : Goran [id number]\par \par 5/4/2022- Ms. Shelley presents today for follow up. \par  [id number] used for visit today.Brain MRI 4/27/2022 showed \par IMPRESSION: Right medial frontal parasagittal parenchymal lesion is larger when compared with 3/30/2022 and 12/30/2021 suggesting a combination of post therapy change and neoplasm supported by MR spectroscopy and MR perfusion. There is a 5 mm nodule in the right parietal white matter which is new since 12/30/2021 but is slightly larger in retrospect since 3/30/2022. No evidence of leptomeningeal enhancement at the craniocervical junction, this was likely artifactual and is no longer identified..\par \par follows with Dr. Martin. continue on exemestane. \par \par Today she feels well. denies headaches, nausea, vomiting, focal weakness, numbness, tingling.

## 2022-05-06 ENCOUNTER — APPOINTMENT (OUTPATIENT)
Dept: CT IMAGING | Facility: IMAGING CENTER | Age: 57
End: 2022-05-06
Payer: MEDICARE

## 2022-05-06 ENCOUNTER — OUTPATIENT (OUTPATIENT)
Dept: OUTPATIENT SERVICES | Facility: HOSPITAL | Age: 57
LOS: 1 days | End: 2022-05-06
Payer: MEDICARE

## 2022-05-06 ENCOUNTER — APPOINTMENT (OUTPATIENT)
Dept: NUCLEAR MEDICINE | Facility: IMAGING CENTER | Age: 57
End: 2022-05-06
Payer: MEDICARE

## 2022-05-06 DIAGNOSIS — C50.919 MALIGNANT NEOPLASM OF UNSPECIFIED SITE OF UNSPECIFIED FEMALE BREAST: ICD-10-CM

## 2022-05-06 DIAGNOSIS — Z98.51 TUBAL LIGATION STATUS: Chronic | ICD-10-CM

## 2022-05-06 DIAGNOSIS — Z90.49 ACQUIRED ABSENCE OF OTHER SPECIFIED PARTS OF DIGESTIVE TRACT: Chronic | ICD-10-CM

## 2022-05-06 DIAGNOSIS — Z90.12 ACQUIRED ABSENCE OF LEFT BREAST AND NIPPLE: Chronic | ICD-10-CM

## 2022-05-06 DIAGNOSIS — Z90.3 ACQUIRED ABSENCE OF STOMACH [PART OF]: Chronic | ICD-10-CM

## 2022-05-06 PROCEDURE — 74177 CT ABD & PELVIS W/CONTRAST: CPT | Mod: MG

## 2022-05-06 PROCEDURE — 71260 CT THORAX DX C+: CPT | Mod: 26,MG

## 2022-05-06 PROCEDURE — A9561: CPT

## 2022-05-06 PROCEDURE — 74177 CT ABD & PELVIS W/CONTRAST: CPT | Mod: 26,MG

## 2022-05-06 PROCEDURE — 78306 BONE IMAGING WHOLE BODY: CPT | Mod: 26,MG

## 2022-05-06 PROCEDURE — 78306 BONE IMAGING WHOLE BODY: CPT | Mod: MG

## 2022-05-06 PROCEDURE — 71260 CT THORAX DX C+: CPT | Mod: MG

## 2022-05-06 PROCEDURE — G1004: CPT

## 2022-05-11 ENCOUNTER — APPOINTMENT (OUTPATIENT)
Dept: NEUROSURGERY | Facility: CLINIC | Age: 57
End: 2022-05-11
Payer: MEDICARE

## 2022-05-11 PROCEDURE — 61796 SRS CRANIAL LESION SIMPLE: CPT

## 2022-05-11 PROCEDURE — 77432 STEREOTACTIC RADIATION TRMT: CPT

## 2022-05-11 PROCEDURE — 77334 RADIATION TREATMENT AID(S): CPT | Mod: 26

## 2022-05-11 PROCEDURE — 77295 3-D RADIOTHERAPY PLAN: CPT | Mod: 26

## 2022-05-11 PROCEDURE — 77290 THER RAD SIMULAJ FIELD CPLX: CPT | Mod: 26

## 2022-05-11 PROCEDURE — 77300 RADIATION THERAPY DOSE PLAN: CPT | Mod: 26

## 2022-05-16 ENCOUNTER — NON-APPOINTMENT (OUTPATIENT)
Age: 57
End: 2022-05-16

## 2022-06-14 ENCOUNTER — OUTPATIENT (OUTPATIENT)
Dept: OUTPATIENT SERVICES | Facility: HOSPITAL | Age: 57
LOS: 1 days | Discharge: HOME | End: 2022-06-14

## 2022-06-14 DIAGNOSIS — Z98.51 TUBAL LIGATION STATUS: Chronic | ICD-10-CM

## 2022-06-14 DIAGNOSIS — Z90.12 ACQUIRED ABSENCE OF LEFT BREAST AND NIPPLE: Chronic | ICD-10-CM

## 2022-06-14 DIAGNOSIS — Z90.3 ACQUIRED ABSENCE OF STOMACH [PART OF]: Chronic | ICD-10-CM

## 2022-06-14 DIAGNOSIS — C50.919 MALIGNANT NEOPLASM OF UNSPECIFIED SITE OF UNSPECIFIED FEMALE BREAST: ICD-10-CM

## 2022-06-14 DIAGNOSIS — Z90.49 ACQUIRED ABSENCE OF OTHER SPECIFIED PARTS OF DIGESTIVE TRACT: Chronic | ICD-10-CM

## 2022-06-21 ENCOUNTER — RESULT REVIEW (OUTPATIENT)
Age: 57
End: 2022-06-21

## 2022-06-21 ENCOUNTER — APPOINTMENT (OUTPATIENT)
Dept: HEMATOLOGY ONCOLOGY | Facility: CLINIC | Age: 57
End: 2022-06-21
Payer: MEDICARE

## 2022-06-21 VITALS
SYSTOLIC BLOOD PRESSURE: 114 MMHG | DIASTOLIC BLOOD PRESSURE: 77 MMHG | BODY MASS INDEX: 31.69 KG/M2 | WEIGHT: 162.24 LBS | OXYGEN SATURATION: 98 % | HEART RATE: 75 BPM | RESPIRATION RATE: 17 BRPM | TEMPERATURE: 97.5 F

## 2022-06-21 DIAGNOSIS — J39.2 OTHER DISEASES OF PHARYNX: ICD-10-CM

## 2022-06-21 LAB
ALBUMIN SERPL ELPH-MCNC: 4.5 G/DL
ALP BLD-CCNC: 162 U/L
ALT SERPL-CCNC: 16 U/L
ANION GAP SERPL CALC-SCNC: 10 MMOL/L
AST SERPL-CCNC: 20 U/L
BASOPHILS # BLD AUTO: 0.03 K/UL — SIGNIFICANT CHANGE UP (ref 0–0.2)
BASOPHILS NFR BLD AUTO: 0.5 % — SIGNIFICANT CHANGE UP (ref 0–2)
BILIRUB SERPL-MCNC: 0.2 MG/DL
BUN SERPL-MCNC: 18 MG/DL
CALCIUM SERPL-MCNC: 9.2 MG/DL
CEA SERPL-MCNC: 1.5 NG/ML
CHLORIDE SERPL-SCNC: 106 MMOL/L
CO2 SERPL-SCNC: 24 MMOL/L
CREAT SERPL-MCNC: 0.54 MG/DL
EGFR: 109 ML/MIN/1.73M2
EOSINOPHIL # BLD AUTO: 0.13 K/UL — SIGNIFICANT CHANGE UP (ref 0–0.5)
EOSINOPHIL NFR BLD AUTO: 2.1 % — SIGNIFICANT CHANGE UP (ref 0–6)
GLUCOSE SERPL-MCNC: 110 MG/DL
HCT VFR BLD CALC: 29.3 % — LOW (ref 34.5–45)
HGB BLD-MCNC: 8.9 G/DL — LOW (ref 11.5–15.5)
IMM GRANULOCYTES NFR BLD AUTO: 0.3 % — SIGNIFICANT CHANGE UP (ref 0–1.5)
LYMPHOCYTES # BLD AUTO: 2.4 K/UL — SIGNIFICANT CHANGE UP (ref 1–3.3)
LYMPHOCYTES # BLD AUTO: 38.2 % — SIGNIFICANT CHANGE UP (ref 13–44)
MCHC RBC-ENTMCNC: 20.7 PG — LOW (ref 27–34)
MCHC RBC-ENTMCNC: 30.4 G/DL — LOW (ref 32–36)
MCV RBC AUTO: 68.1 FL — LOW (ref 80–100)
MONOCYTES # BLD AUTO: 0.59 K/UL — SIGNIFICANT CHANGE UP (ref 0–0.9)
MONOCYTES NFR BLD AUTO: 9.4 % — SIGNIFICANT CHANGE UP (ref 2–14)
NEUTROPHILS # BLD AUTO: 3.12 K/UL — SIGNIFICANT CHANGE UP (ref 1.8–7.4)
NEUTROPHILS NFR BLD AUTO: 49.5 % — SIGNIFICANT CHANGE UP (ref 43–77)
NRBC # BLD: 0 /100 WBCS — SIGNIFICANT CHANGE UP (ref 0–0)
PLATELET # BLD AUTO: 378 K/UL — SIGNIFICANT CHANGE UP (ref 150–400)
POTASSIUM SERPL-SCNC: 4.8 MMOL/L
PROT SERPL-MCNC: 6.9 G/DL
RBC # BLD: 4.3 M/UL — SIGNIFICANT CHANGE UP (ref 3.8–5.2)
RBC # FLD: 18.3 % — HIGH (ref 10.3–14.5)
SODIUM SERPL-SCNC: 140 MMOL/L
WBC # BLD: 6.29 K/UL — SIGNIFICANT CHANGE UP (ref 3.8–10.5)
WBC # FLD AUTO: 6.29 K/UL — SIGNIFICANT CHANGE UP (ref 3.8–10.5)

## 2022-06-21 PROCEDURE — 99214 OFFICE O/P EST MOD 30 MIN: CPT

## 2022-06-21 NOTE — PHYSICAL EXAM
[Fully active, able to carry on all pre-disease performance without restriction] : Status 0 - Fully active, able to carry on all pre-disease performance without restriction [Normal] : affect appropriate [de-identified] : Right breast with no discrete palpable masses, no palpable axillary nodes.  Left reconstructed breast with medial and lateral nodularity c/w fat necrosis, stable, no other discrete palpable masses, no palpable axillary nodes.

## 2022-06-21 NOTE — HISTORY OF PRESENT ILLNESS
[de-identified] : The patients' history of present illness began in 2012 at which time she was found to have a right breast cancer for which she underwent a right modified radical mastectomy/axillary lymph node dissection with the finding of an ER positive 20%, CT positive 10%, HER-2/lisa 2+, FISH amplified breast cancer per outside physician report (I do not have a copy that pathology report for my review).  She went on to receive adjuvant dose-dense AC chemotherapy for 4 cycles followed by Taxol plus Herceptin as well as adjuvant radiation therapy.  She was subsequently begun adjuvant tamoxifen therapy.  Her last menstrual period was in 2014.\par \par The patient then began to experience weight loss beginning in November 2018 and over 3-month period of time lost 30 pounds per her report.  At approximately the same time she developed increasing shortness of breath, and ultimately presented to [facility] Emergency Department.  She subsequently had a CT scan of the chest, which showed multiple mediastinal hilar right subclavian lymph nodes corresponding to abnormal FDG activity on PET scan consistent with metastatic carcinoma; there were tiny nodularities in both lungs- too small to accurately characterize- not present on prior examination and measuring up to 2 mm in both lungs.  The possibility of metastasis was a consideration; she had no bony metastases; the liver demonstrated tiny poorly defined lesions less than 5 mm and difficult to characterize and visible on prior examinations with the possibility of metastasis also considered.  A PET-CT scan showed multiple hypermetabolic lymph nodes in the mediastinum, right subclavian region and both adebayo felt to represent tumor; there were stellate lesions in the left breast region, felt to represent benign scars; the lungs demonstrated no significant abnormality with multiple tiny lung nodules seen on CT as previously noted; liver demonstrated no significant abnormality with multiple small poorly defined nodules seen on CT per my previous comments.  The patient underwent a left breast core biopsy of the area of concern with a finding of fat necrosis and foreign body giant cell reaction as well as a separate fine-needle aspiration consistent with a ruptured cyst/fat necrosis.  The patient consequently saw her oncologist, Dr. Carias, who sent the patient for ultrasound-guided core biopsies of the mediastinal lymph nodes with a finding of carcinoma, consistent with breast primary with estrogen receptor returning positive, greater than 50%, progesterone receptor negative, and HER-2/lisa 0 per outside report. The patient was subsequently begun on docetaxel, pertuzumab, trastuzumab, and exemestane beginning at the early 4/19, and subsequently switched to weekly paclitaxel secondary to side effects and continuing  on trastuzumab / pertuzumab every 3 weeks as well as exemestane. The patient was last treated per her report on approximately June 12, 2019, despite outside records that show ongoing weekly treatments early July 2019.  \par \par The patient also reports that beginning in approximately 12/18, she started to develop slowly progressive diffuse rash with associated pruritus, having seen multiple dermatologists.  Most recently, she saw Dr. Courtney Cowart, a [facility] physician practicing at [place], [place], who diagnosed disseminated herpes simplex for which she was begun on valacyclovir, as well as evidence of MRSA for which was treated with doxycycline beginning in May 2019.  In June 2019, the patient presented to [facility] Emergency Department complaining of progressive "weakness and shaking." She was found to be anemic and had melena.  She underwent endoscopy and VIR procedure to stop gastric bleeding, which was unsuccessful.  Consequently, she had a distal gastrectomy.  She was discharged from [facility] on 07/08/2019.\par \par The patient was initial consultation 7/24/19 for a further opinion regarding treatment recommendations. She noted ongoing weakness, fatigue, and generalized malaise, although all of these had begun to improve gradually.  Her last chemotherapy as noted above was approximately at 06/12/2019.  She reports that her primary oncologist, Dr. Carias, told to discontinue exemestane upon admission to [facility] with the consideration restarting it post discharge.  \par \par I obtained her outside slides for review at [facility] and it was confirmed that she had met breast cancer ER+ CT neg and Her 2 lisa neg.\par \par Consequently I recommended restarting exemestane.  \par \par In 3/21 she had L sided seizures and found to have abnormal enhancing lesions in the posterior fossa and supratentorial region are identified.\par \par Lesion 1: Large dural based enhancing lesion is seen involving the high medial right frontal region. This lesion does appear to cross the midline and measures approximately 3.2 x 2.7 x 3.5 cm. Small amount of adjacent susceptibility is identified. Surrounding edema is identified. Localized mass effect is identified. Right to left shift (7.2 mm) seen.\par \par Lesion 2: Enhancing lesion is seen involving the right upper tiffany. This lesion measures approximately 0.5 x 0.4 cm.\par \par Lesion 3: Enhancing lesion is seen involving the superior left cerebellar region. This lesion measures approximately 1.2 x 1.0 cm. Surrounding edema is identified.\par \par She was seen by Dr Hwang and treated with Gamma knife SRS to the 3 brain lesions. \par \par A repeat MRI 6/2/21 demonstrated:\par \par -Interval decrease in size of 3 enhancing lesions, largest in the high medial right frontal lobe measuring up to 2.0 cm.\par -Improvement of vasogenic edema with resolution of leftward midline shift.\par -No new lesions are identified\par \par \par \par  [de-identified] : Pt seen today for a routine f/u visit.  \par \par On Exemestane and tolerating well. Denies any treatment related side effects. \par \par She notes a good appetite, stable weight and excellent performance status. \par \par She complains of occasional scratchy throat for past 3 weeks, which causes her to cough.  Did COVID swab that was negative.  Denies any f/c, SOB, CP, d/c. \par \par CT c/a/p, 5/6/22- IMPRESSION: Stable exam since 12/17/2021\par \par Bone scan, 5/6/22-  IMPRESSION: Bone scan demonstrates:\par \par No definite scan evidence of osseous metastasis.\par \par Mild diffuse increased uptake in the calvarium, unchanged, probably hyperostosis or Paget's disease.\par Heterotopic ossification in the medial aspect of the left breast, unchanged.\par Degenerative disease in the major joints.\par No significant interval change compared to the previous bone scan of 7/8/2021.\par \par CT scan, 12/17/21- IMPRESSION:  Stable examination compared with prior imaging 7/8/2021.\par \par MRI head 12/30/21\par IMPRESSION:\par Right high paramedian region lesion measures similar in size though there \par is increased edema seen in association when compared with the prior \par 9/24/2021. Question of vague enhancement on the surface of the cervical \par medullary junction and along the surface of the brainstem extending into \par the spinal cord level. Consideration for possible leptomeningeal \par pathology should be given. Recommend correlation with CSF as clinically \par warranted.\par \par DEXA 1/21 osteopenia\par \par

## 2022-06-22 LAB — CANCER AG27-29 SERPL-ACNC: 12.7 U/ML

## 2022-06-28 ENCOUNTER — APPOINTMENT (OUTPATIENT)
Dept: HEMATOLOGY ONCOLOGY | Facility: CLINIC | Age: 57
End: 2022-06-28

## 2022-07-08 ENCOUNTER — APPOINTMENT (OUTPATIENT)
Dept: MRI IMAGING | Facility: IMAGING CENTER | Age: 57
End: 2022-07-08

## 2022-07-08 ENCOUNTER — OUTPATIENT (OUTPATIENT)
Dept: OUTPATIENT SERVICES | Facility: HOSPITAL | Age: 57
LOS: 1 days | End: 2022-07-08
Payer: MEDICARE

## 2022-07-08 DIAGNOSIS — C79.31 SECONDARY MALIGNANT NEOPLASM OF BRAIN: ICD-10-CM

## 2022-07-08 DIAGNOSIS — Z90.3 ACQUIRED ABSENCE OF STOMACH [PART OF]: Chronic | ICD-10-CM

## 2022-07-08 DIAGNOSIS — Z90.12 ACQUIRED ABSENCE OF LEFT BREAST AND NIPPLE: Chronic | ICD-10-CM

## 2022-07-08 DIAGNOSIS — Z98.51 TUBAL LIGATION STATUS: Chronic | ICD-10-CM

## 2022-07-08 DIAGNOSIS — Z90.49 ACQUIRED ABSENCE OF OTHER SPECIFIED PARTS OF DIGESTIVE TRACT: Chronic | ICD-10-CM

## 2022-07-08 PROCEDURE — A9585: CPT

## 2022-07-08 PROCEDURE — G1004: CPT

## 2022-07-08 PROCEDURE — 70553 MRI BRAIN STEM W/O & W/DYE: CPT | Mod: 26,MG

## 2022-07-08 PROCEDURE — 70553 MRI BRAIN STEM W/O & W/DYE: CPT | Mod: MG

## 2022-07-13 ENCOUNTER — APPOINTMENT (OUTPATIENT)
Dept: RADIATION ONCOLOGY | Facility: CLINIC | Age: 57
End: 2022-07-13

## 2022-07-13 VITALS
DIASTOLIC BLOOD PRESSURE: 80 MMHG | SYSTOLIC BLOOD PRESSURE: 125 MMHG | HEART RATE: 61 BPM | RESPIRATION RATE: 18 BRPM | TEMPERATURE: 97.16 F | BODY MASS INDEX: 31.87 KG/M2 | WEIGHT: 162.35 LBS | HEIGHT: 60 IN

## 2022-07-13 PROCEDURE — 99024 POSTOP FOLLOW-UP VISIT: CPT

## 2022-07-13 NOTE — PHYSICAL EXAM
[] : no respiratory distress [Normal] : normal heart rate and rhythm, normal S1 and S2, and no murmurs present [Oriented To Time, Place, And Person] : oriented to person, place, and time [de-identified] : Strength 5/5 in upper and lower extremity.  Gait normal.  No cranial nerve deficits.  No difficulty with understanding or speech

## 2022-07-13 NOTE — HISTORY OF PRESENT ILLNESS
[FreeTextEntry1] : Ms. Shelley was seen initially for consult on 3/18//2021.  She completed radiation therapy on 3/25/2021. She completed 2 radiation over 3 fractions 3/23-3/25/2021 to a right frontal, left cerebellar, and right brainstem lesion.\par She additionally completed radiation to a right parietal lesion, 1800 cgy on 5/11/2022 over 1 fraction.\par  She presents for a f/u visit.\par \par ONCOLOGY HISTORY\par Ms. Shelley was diagnosed with breast ca in 2012 s/p radical mastectomy and  adjuvant chemotherapy.  She had ER positive 20%, KY positive 10%, HER-2/lisa 2+, FISH amplified breast cancer per outside physician report.  She went on to receive adjuvant dose-dense AC chemotherapy for 4 cycles followed by Taxol plus Herceptin as well as adjuvant radiation therapy. She was subsequently begun adjuvant tamoxifen therapy. Her last menstrual period was in 2014. The patient then began to experience weight loss beginning in November 2018 and over 3-month period of time lost 30 pounds per her report. At approximately the same time she developed increasing shortness of breath, and ultimately presented to [facility] Emergency Department. She subsequently had a CT scan of the chest, which showed multiple mediastinal hilar right subclavian lymph nodes corresponding to abnormal FDG activity on PET scan consistent with metastatic carcinoma; there were tiny nodularities in both lungs- too small to accurately characterize- not present on prior examination and measuring up to 2 mm in both lungs. The possibility of metastasis was a consideration; she had no bony metastases; the liver demonstrated tiny poorly defined lesions less than 5 mm and difficult to characterize and visible on prior examinations with the possibility of metastasis also considered. A PET-CT scan showed multiple hypermetabolic lymph nodes in the mediastinum, right subclavian region and both adebayo felt to represent tumor; there were stellate lesions in the left breast region, felt to represent benign scars; the lungs demonstrated no significant abnormality with multiple tiny lung nodules seen on CT as previously noted; liver demonstrated no significant abnormality with multiple small poorly defined nodules seen on CT per my previous comments. The patient underwent a left breast core biopsy of the area of concern with a finding of fat necrosis and foreign body giant cell reaction as well as a separate fine-needle aspiration consistent with a ruptured cyst/fat necrosis. The patient consequently saw her oncologist, Dr. Carias, who sent the patient for ultrasound-guided core biopsies of the mediastinal lymph nodes with a finding of carcinoma, consistent with breast primary with estrogen receptor returning positive, greater than 50%, progesterone receptor negative, and HER-2/lisa 0 per outside report. The patient was subsequently begun on docetaxel, pertuzumab, trastuzumab, and exemestane beginning at the early 4/19, and subsequently switched to weekly paclitaxel secondary to side effects and continuing on trastuzumab / pertuzumab every 3 weeks as well as exemestane. The patient was seen for initial consultation 7/24/19 for a further opinion regarding treatment recommendations. She noted ongoing weakness, fatigue, and generalized malaise, although all of these had begun to improve gradually. Her last chemotherapy as noted above was approximately at 06/12/2019. She reports that her primary oncologist, Dr. Carias, told to discontinue exemestane upon admission to [facility] with the consideration restarting it post discharge. Slides for review at [facility] and it was confirmed that she had met breast cancer ER+ KY neg and Her 2 lisa neg.  At the time of initial consult she felt well. Noted a left sided seizure in the days prior to consult lasting about 1 minute, which stopped on its own. Was recently prescribed keppra and dexamethasone 8mg BID. No confusion, dizziness, nausea, focal weakness. On examestane.  She recently had a focal seizure left upper/lower extremity. MRI showed 3 lesions -  \par \par Lesion 1: Large dural based enhancing lesion is seen involving the high medial right frontal region. This lesion does appear to cross the midline and measures approximately 3.2 x 2.7 x 3.5 cm. Small amount of adjacent susceptibility is identified. Surrounding edema is identified. Localized mass effect is identified. Right to left shift (7.2 mm) seen.\par \par Lesion 2: Enhancing lesion is seen involving the right upper tiffany. This lesion measures approximately 0.5 x 0.4 cm.\par \par Lesion 3: Enhancing lesion is seen involving the superior left cerebellar region. This lesion measures approximately 1.2 x 1.0 cm. Surrounding edema is identified. She was treated with GK SRS.\par \par \par 6/2/2021- Ms. Shelley presents today for follow up. [employer] [id number] for [place] was used.  MRI scheduled today at 145. Has not seen Dr. Raptis since radiation. States she  is feeling fine. States she has intermittent sensation of "pulling" to her forehead. Denies any dizziness, confusion, headache, nausea, vomiting, visual disturbances, weakness. States 3 weeks ago she had an episode where her left upper extremity "was shaking" for one minute, denies any LOC. \par \par 9/29/2021- Ms. Shelley presents today for follow up. She is seen today with assistance of  [id number]. MRI brain done 9/24/2021 showed Previously noted enhancing lesions in the posterior fossa and supratentorial region have either decreased in size or are no longer seen which is compatible with response to therapy. Continue close interval follow-up is recommended. Continues to follow with Dr. Martin. continues on examestane with plans to repeat body scans in november or december.  Feeling very well. no headaches, no new weakness, no numbness or tingling, no nausea or vomiting. overall doing well. \par \par 12/30/2021: Today Ms. Shelley presents for follow-up, [facility]  . MRI brain showed: "Right high paramedian region lesion measures similar in size though there is increased edema seen in association when compared with the prior 9/24/2021. Question of vague enhancement on the surface of the cervical medullary junction and along the surface of the brainstem extending into the spinal cord level. Consideration for possible leptomeningeal pathology should be given. Recommend correlation with CSF as clinically warranted."\par Per our review, no leptomeningeal disease appreciated. The vague focus of enhancement likely represents a blood vessel. The swelling of the right high paramedian region lesion actually displays decreased edema, compared to prior.\par  Denies any nausea, vomiting, headache, numbness or tingling. Denies any issues with balance or hearing. \par \par 3/31/2022: Pt presents for follow-up.  Clinically feeling well. Denies headache, nausea, vomiting, or other complaints.  Denies interval symptoms since last f/u.  Denies being on steroids or reportedly any recent seizure events. \par [place] : Goran [id number]\par \par 5/4/2022- Ms. Shelley presents today for follow up. \par  [id number] used for visit today.Brain MRI 4/27/2022 showed \par IMPRESSION: Right medial frontal parasagittal parenchymal lesion is larger when compared with 3/30/2022 and 12/30/2021 suggesting a combination of post therapy change and neoplasm supported by MR spectroscopy and MR perfusion. There is a 5 mm nodule in the right parietal white matter which is new since 12/30/2021 but is slightly larger in retrospect since 3/30/2022. No evidence of leptomeningeal enhancement at the craniocervical junction, this was likely artifactual and is no longer identified..\par \par follows with Dr. Martin. continue on exemestane. \par \par Today she feels well. denies headaches, nausea, vomiting, focal weakness, numbness, tingling.\par \par 7/13/2022- ms. Shelley presents today for follow up. \par \par Brain MRI done 7/8.Continues on exemestane and following with Dr. Martin. \par \par CT CAP 5/6/2022 showed Stable exam since 12/17/2021\par \par Bone scan 5/6 without definite evidence of bone mets.\par \par Today she is feeling very well. denies headaches, nasuea, vomiting, focal weakness, numbness, tingling, confusion

## 2022-07-13 NOTE — REASON FOR VISIT
[Routine Follow-Up] : routine follow-up visit for [Brain Metastasis] : brain metastasis [Family Member] : family member [[employer] ] : provided by [employer]   [Interpreters_IDNumber] : [id number] [[email or web address]] : [place]ese

## 2022-09-14 ENCOUNTER — INPATIENT (INPATIENT)
Facility: HOSPITAL | Age: 57
LOS: 0 days | Discharge: HOME | DRG: 70 | End: 2022-09-15
Attending: STUDENT IN AN ORGANIZED HEALTH CARE EDUCATION/TRAINING PROGRAM | Admitting: STUDENT IN AN ORGANIZED HEALTH CARE EDUCATION/TRAINING PROGRAM
Payer: MEDICARE

## 2022-09-14 VITALS
OXYGEN SATURATION: 99 % | HEART RATE: 94 BPM | HEIGHT: 60 IN | SYSTOLIC BLOOD PRESSURE: 144 MMHG | TEMPERATURE: 98 F | DIASTOLIC BLOOD PRESSURE: 82 MMHG | RESPIRATION RATE: 18 BRPM | WEIGHT: 149.91 LBS

## 2022-09-14 DIAGNOSIS — R56.9 UNSPECIFIED CONVULSIONS: ICD-10-CM

## 2022-09-14 DIAGNOSIS — Z98.51 TUBAL LIGATION STATUS: Chronic | ICD-10-CM

## 2022-09-14 DIAGNOSIS — Z90.3 ACQUIRED ABSENCE OF STOMACH [PART OF]: Chronic | ICD-10-CM

## 2022-09-14 DIAGNOSIS — Z29.9 ENCOUNTER FOR PROPHYLACTIC MEASURES, UNSPECIFIED: ICD-10-CM

## 2022-09-14 DIAGNOSIS — Z90.49 ACQUIRED ABSENCE OF OTHER SPECIFIED PARTS OF DIGESTIVE TRACT: Chronic | ICD-10-CM

## 2022-09-14 DIAGNOSIS — G93.89 OTHER SPECIFIED DISORDERS OF BRAIN: ICD-10-CM

## 2022-09-14 DIAGNOSIS — Z90.12 ACQUIRED ABSENCE OF LEFT BREAST AND NIPPLE: Chronic | ICD-10-CM

## 2022-09-14 LAB
ALBUMIN SERPL ELPH-MCNC: 3.7 G/DL — SIGNIFICANT CHANGE UP (ref 3.5–5)
ALP SERPL-CCNC: 146 U/L — HIGH (ref 40–120)
ALT FLD-CCNC: 31 U/L DA — SIGNIFICANT CHANGE UP (ref 10–60)
ANION GAP SERPL CALC-SCNC: 13 MMOL/L — SIGNIFICANT CHANGE UP (ref 5–17)
ANION GAP SERPL CALC-SCNC: 7 MMOL/L — SIGNIFICANT CHANGE UP (ref 5–17)
APTT BLD: 32.3 SEC — SIGNIFICANT CHANGE UP (ref 27.5–35.5)
AST SERPL-CCNC: 22 U/L — SIGNIFICANT CHANGE UP (ref 10–40)
BASOPHILS # BLD AUTO: 0.02 K/UL — SIGNIFICANT CHANGE UP (ref 0–0.2)
BASOPHILS NFR BLD AUTO: 0.2 % — SIGNIFICANT CHANGE UP (ref 0–2)
BILIRUB SERPL-MCNC: 0.2 MG/DL — SIGNIFICANT CHANGE UP (ref 0.2–1.2)
BUN SERPL-MCNC: 18 MG/DL — SIGNIFICANT CHANGE UP (ref 7–18)
BUN SERPL-MCNC: 24 MG/DL — HIGH (ref 7–18)
CALCIUM SERPL-MCNC: 8.9 MG/DL — SIGNIFICANT CHANGE UP (ref 8.4–10.5)
CALCIUM SERPL-MCNC: 9.1 MG/DL — SIGNIFICANT CHANGE UP (ref 8.4–10.5)
CHLORIDE SERPL-SCNC: 108 MMOL/L — SIGNIFICANT CHANGE UP (ref 96–108)
CHLORIDE SERPL-SCNC: 110 MMOL/L — HIGH (ref 96–108)
CO2 SERPL-SCNC: 21 MMOL/L — LOW (ref 22–31)
CO2 SERPL-SCNC: 24 MMOL/L — SIGNIFICANT CHANGE UP (ref 22–31)
CREAT SERPL-MCNC: 0.73 MG/DL — SIGNIFICANT CHANGE UP (ref 0.5–1.3)
CREAT SERPL-MCNC: 0.81 MG/DL — SIGNIFICANT CHANGE UP (ref 0.5–1.3)
EGFR: 86 ML/MIN/1.73M2 — SIGNIFICANT CHANGE UP
EGFR: 97 ML/MIN/1.73M2 — SIGNIFICANT CHANGE UP
EOSINOPHIL # BLD AUTO: 0.19 K/UL — SIGNIFICANT CHANGE UP (ref 0–0.5)
EOSINOPHIL NFR BLD AUTO: 2 % — SIGNIFICANT CHANGE UP (ref 0–6)
GLUCOSE SERPL-MCNC: 109 MG/DL — HIGH (ref 70–99)
GLUCOSE SERPL-MCNC: 185 MG/DL — HIGH (ref 70–99)
HCT VFR BLD CALC: 40 % — SIGNIFICANT CHANGE UP (ref 34.5–45)
HCT VFR BLD CALC: 40.7 % — SIGNIFICANT CHANGE UP (ref 34.5–45)
HGB BLD-MCNC: 12.9 G/DL — SIGNIFICANT CHANGE UP (ref 11.5–15.5)
HGB BLD-MCNC: 12.9 G/DL — SIGNIFICANT CHANGE UP (ref 11.5–15.5)
IMM GRANULOCYTES NFR BLD AUTO: 0.4 % — SIGNIFICANT CHANGE UP (ref 0–1.5)
INR BLD: 0.98 RATIO — SIGNIFICANT CHANGE UP (ref 0.88–1.16)
LYMPHOCYTES # BLD AUTO: 5.14 K/UL — HIGH (ref 1–3.3)
LYMPHOCYTES # BLD AUTO: 54.9 % — HIGH (ref 13–44)
MCHC RBC-ENTMCNC: 25.6 PG — LOW (ref 27–34)
MCHC RBC-ENTMCNC: 25.6 PG — LOW (ref 27–34)
MCHC RBC-ENTMCNC: 31.7 GM/DL — LOW (ref 32–36)
MCHC RBC-ENTMCNC: 32.3 GM/DL — SIGNIFICANT CHANGE UP (ref 32–36)
MCV RBC AUTO: 79.5 FL — LOW (ref 80–100)
MCV RBC AUTO: 80.8 FL — SIGNIFICANT CHANGE UP (ref 80–100)
MONOCYTES # BLD AUTO: 0.54 K/UL — SIGNIFICANT CHANGE UP (ref 0–0.9)
MONOCYTES NFR BLD AUTO: 5.8 % — SIGNIFICANT CHANGE UP (ref 2–14)
NEUTROPHILS # BLD AUTO: 3.44 K/UL — SIGNIFICANT CHANGE UP (ref 1.8–7.4)
NEUTROPHILS NFR BLD AUTO: 36.7 % — LOW (ref 43–77)
NRBC # BLD: 0 /100 WBCS — SIGNIFICANT CHANGE UP (ref 0–0)
NRBC # BLD: 0 /100 WBCS — SIGNIFICANT CHANGE UP (ref 0–0)
PLATELET # BLD AUTO: 288 K/UL — SIGNIFICANT CHANGE UP (ref 150–400)
PLATELET # BLD AUTO: 296 K/UL — SIGNIFICANT CHANGE UP (ref 150–400)
POTASSIUM SERPL-MCNC: 3.9 MMOL/L — SIGNIFICANT CHANGE UP (ref 3.5–5.3)
POTASSIUM SERPL-MCNC: 3.9 MMOL/L — SIGNIFICANT CHANGE UP (ref 3.5–5.3)
POTASSIUM SERPL-SCNC: 3.9 MMOL/L — SIGNIFICANT CHANGE UP (ref 3.5–5.3)
POTASSIUM SERPL-SCNC: 3.9 MMOL/L — SIGNIFICANT CHANGE UP (ref 3.5–5.3)
PROT SERPL-MCNC: 7.7 G/DL — SIGNIFICANT CHANGE UP (ref 6–8.3)
PROTHROM AB SERPL-ACNC: 11.7 SEC — SIGNIFICANT CHANGE UP (ref 10.5–13.4)
RBC # BLD: 5.03 M/UL — SIGNIFICANT CHANGE UP (ref 3.8–5.2)
RBC # BLD: 5.04 M/UL — SIGNIFICANT CHANGE UP (ref 3.8–5.2)
RBC # FLD: 24.7 % — HIGH (ref 10.3–14.5)
RBC # FLD: 24.8 % — HIGH (ref 10.3–14.5)
SARS-COV-2 RNA SPEC QL NAA+PROBE: SIGNIFICANT CHANGE UP
SODIUM SERPL-SCNC: 141 MMOL/L — SIGNIFICANT CHANGE UP (ref 135–145)
SODIUM SERPL-SCNC: 142 MMOL/L — SIGNIFICANT CHANGE UP (ref 135–145)
TROPONIN I, HIGH SENSITIVITY RESULT: 26.5 NG/L — SIGNIFICANT CHANGE UP
WBC # BLD: 7.7 K/UL — SIGNIFICANT CHANGE UP (ref 3.8–10.5)
WBC # BLD: 9.37 K/UL — SIGNIFICANT CHANGE UP (ref 3.8–10.5)
WBC # FLD AUTO: 7.7 K/UL — SIGNIFICANT CHANGE UP (ref 3.8–10.5)
WBC # FLD AUTO: 9.37 K/UL — SIGNIFICANT CHANGE UP (ref 3.8–10.5)

## 2022-09-14 PROCEDURE — 70498 CT ANGIOGRAPHY NECK: CPT | Mod: 26,MA

## 2022-09-14 PROCEDURE — 99223 1ST HOSP IP/OBS HIGH 75: CPT | Mod: GC

## 2022-09-14 PROCEDURE — 0042T: CPT | Mod: MA

## 2022-09-14 PROCEDURE — 99223 1ST HOSP IP/OBS HIGH 75: CPT

## 2022-09-14 PROCEDURE — 70553 MRI BRAIN STEM W/O & W/DYE: CPT | Mod: 26

## 2022-09-14 PROCEDURE — 71045 X-RAY EXAM CHEST 1 VIEW: CPT | Mod: 26

## 2022-09-14 PROCEDURE — 95816 EEG AWAKE AND DROWSY: CPT | Mod: 26

## 2022-09-14 PROCEDURE — 99285 EMERGENCY DEPT VISIT HI MDM: CPT

## 2022-09-14 PROCEDURE — 70496 CT ANGIOGRAPHY HEAD: CPT | Mod: 26,MA

## 2022-09-14 RX ORDER — DEXAMETHASONE 0.5 MG/5ML
6 ELIXIR ORAL ONCE
Refills: 0 | Status: COMPLETED | OUTPATIENT
Start: 2022-09-14 | End: 2022-09-14

## 2022-09-14 RX ORDER — DEXAMETHASONE 0.5 MG/5ML
4 ELIXIR ORAL EVERY 6 HOURS
Refills: 0 | Status: DISCONTINUED | OUTPATIENT
Start: 2022-09-14 | End: 2022-09-15

## 2022-09-14 RX ORDER — LEVETIRACETAM 250 MG/1
1000 TABLET, FILM COATED ORAL ONCE
Refills: 0 | Status: COMPLETED | OUTPATIENT
Start: 2022-09-14 | End: 2022-09-14

## 2022-09-14 RX ORDER — LEVETIRACETAM 250 MG/1
1000 TABLET, FILM COATED ORAL EVERY 12 HOURS
Refills: 0 | Status: DISCONTINUED | OUTPATIENT
Start: 2022-09-14 | End: 2022-09-15

## 2022-09-14 RX ORDER — ACETAMINOPHEN 500 MG
650 TABLET ORAL EVERY 6 HOURS
Refills: 0 | Status: DISCONTINUED | OUTPATIENT
Start: 2022-09-14 | End: 2022-09-15

## 2022-09-14 RX ORDER — ENOXAPARIN SODIUM 100 MG/ML
40 INJECTION SUBCUTANEOUS EVERY 24 HOURS
Refills: 0 | Status: DISCONTINUED | OUTPATIENT
Start: 2022-09-14 | End: 2022-09-15

## 2022-09-14 RX ADMIN — Medication 4 MILLIGRAM(S): at 19:40

## 2022-09-14 RX ADMIN — Medication 4 MILLIGRAM(S): at 14:03

## 2022-09-14 RX ADMIN — Medication 6 MILLIGRAM(S): at 04:59

## 2022-09-14 RX ADMIN — LEVETIRACETAM 400 MILLIGRAM(S): 250 TABLET, FILM COATED ORAL at 19:39

## 2022-09-14 RX ADMIN — LEVETIRACETAM 400 MILLIGRAM(S): 250 TABLET, FILM COATED ORAL at 04:59

## 2022-09-14 NOTE — CONSULT NOTE ADULT - SUBJECTIVE AND OBJECTIVE BOX
HPI:  Kathy Shelley is a 56 year old RH female with a past medical history of metastatic breast cancer (to mediastinal LNs, dx in 2012 s/p chemo and mastectomy, remission in 2013, recurrence 01/19 on exemestane daily) with mets to brain known since March 2021, UGIB 2/2 bleeding malignant ulcer s/p IR embolization of L gastric arteries in 2020 who presented with left sided weakness. At baseline, the patient is ambulatory without assistive devices and is oriented to person, place, time. She had undergone radiation to her metastatic lesions including in the R frontal, L cerebellar, R brainstem and R parietal lesions and undergone SRS on 5/11/22. She was seen by her radiation oncologist who had reviewed imaging in July 2022 and it was noted that she that her R frontal lesion appeared larger but was believed not to be progression of disease but changes from radiation. She was previously on anti-seizure medications due to reported shaking of the L side of her body which was last 1-2 minutes but had not been taking it at the time of this presentation. She was also not taking any steroids during this presentation. Her last seizure was reported to be in July. During this presentation, the patient had gone to sleep at 2200 and awoke at 0240 to use the bathroom. She noted that she had shaking and numbness of the L side of her body (arm and leg) which lasted for 3-5 minutes. No reported symptoms of the R side. Afterwards, she stated that she had some weakness of the L side of her body. No reported loss of consciousness, tongue bite, urinary incontinence, falls, head trauma. She was also reported to have had some slurred speech. Currently, the patient feels that she is at her baseline and only complains of a slight headache. She had presented to the ED as a code stroke for weakness of the L side of her body. CT had showed increase in vasogenic edema as compared with prior imaging.     NIHSS: 2  MRS: 0    REVIEW OF SYSTEMS    A 10-system ROS was performed and is negative except for those items noted above and/or in the HPI.    PAST MEDICAL & SURGICAL HISTORY:  Breast cancer  2012 h/o chemo/radiation 2013    Metastatic breast cancer  to lungs  1/2019 was treated with chemo , completed 7/2019    Dieulafoy lesion of stomach  GI bleed , s/p distal gastrectomy    Incisional hernia    S/P mastectomy, left  2012    History of gastrectomy  partial/distal  7/2019    History of tubal ligation  1999    History of cholecystectomy  2000    FAMILY HISTORY:  No pertinent family history in first degree relatives    SOCIAL HISTORY:   T/E/D: no reported tobacco, alcohol, illicit drugs  Lives with family    MEDICATIONS (HOME):  Home Medications:  exemestane 25 mg oral tablet: 1 tab(s) orally once a day (20 Jul 2020 05:49)    MEDICATIONS  (STANDING):  dexAMETHasone  Injectable 4 milliGRAM(s) IV Push every 6 hours  enoxaparin Injectable 40 milliGRAM(s) SubCutaneous every 24 hours  levETIRAcetam  IVPB 1000 milliGRAM(s) IV Intermittent every 12 hours    MEDICATIONS  (PRN):  acetaminophen     Tablet .. 650 milliGRAM(s) Oral every 6 hours PRN Temp greater or equal to 38C (100.4F), Mild Pain (1 - 3)    ALLERGIES/INTOLERANCES:  Allergies  No Known Allergies    Intolerances    VITALS & EXAMINATION:  Vital Signs Last 24 Hrs  T(C): 36.8 (14 Sep 2022 11:39), Max: 36.9 (14 Sep 2022 03:32)  T(F): 98.2 (14 Sep 2022 11:39), Max: 98.4 (14 Sep 2022 03:32)  HR: 85 (14 Sep 2022 11:39) (74 - 94)  BP: 135/57 (14 Sep 2022 11:39) (108/70 - 144/82)  BP(mean): --  RR: 15 (14 Sep 2022 11:39) (15 - 18)  SpO2: 97% (14 Sep 2022 11:39) (97% - 99%)    Parameters below as of 14 Sep 2022 11:39  Patient On (Oxygen Delivery Method): room air        General:  Constitutional: Appears stated age, in no apparent distress including pain  Head: Normocephalic & atraumatic.  Extremities: L leg turned outward.    Neurological (>12):  MS: Awake, alert, oriented to person, place, situation, time. Normal affect. Follows all commands.    Language: Speech is clear, fluent with good repetition.    CNs: PERRL (R = 3mm, L = 3mm). VF intact in all 4 quadrants. EOMI no nystagmus. V1-3 intact to LT, well developed masseter muscles b/l. No facial asymmetry b/l, full eye closure strength b/l. Symmetric palate elevation in midline. Shoulder shrug intact b/l. Tongue midline, normal movements, no atrophy.    Motor: Normal muscle bulk & tone. No noticeable tremor or seizure. No pronator drift.              Deltoid	Biceps	Triceps	Wrist	Finger Flex	  R	5	5	5	5	5			  L	4-	4+	4	4	4+		    	H-Flex	H-Ext	K-Flex	K-Ext	D-Flex	P-Flex  R	5	5	5	5	5	5		   L	4	5	5	4-	4	5		     Sensation: Intact to LT b/l throughout.       Reflexes:              Biceps(C5)       BR(C6)     Triceps(C7)               Patellar(L4)    Achilles(S1)    Plantar Resp  R	1                    1	             1	                    trace               trace               Down   L	2	          1             1		        trace	      trace	     Mute     Coordination: No dysmetria to FTN    Gait:No postural instability. Slow gait but without ataxia.     LABORATORY:  CBC                       12.9   7.70  )-----------( 296      ( 14 Sep 2022 10:10 )             40.0     Chem 09-14    142  |  108  |  18  ----------------------------<  185<H>  3.9   |  21<L>  |  0.81    Ca    9.1      14 Sep 2022 10:10    TPro  7.7  /  Alb  3.7  /  TBili  0.2  /  DBili  x   /  AST  22  /  ALT  31  /  AlkPhos  146<H>  09-14    LFTs LIVER FUNCTIONS - ( 14 Sep 2022 04:07 )  Alb: 3.7 g/dL / Pro: 7.7 g/dL / ALK PHOS: 146 U/L / ALT: 31 U/L DA / AST: 22 U/L / GGT: x           Coagulopathy PT/INR - ( 14 Sep 2022 04:07 )   PT: 11.7 sec;   INR: 0.98 ratio         PTT - ( 14 Sep 2022 04:07 )  PTT:32.3 sec    Studies (EKG, EEG, EMG, etc):     Radiology (XR, CT, MR, U/S, TTE/TAMMY):    CT Brain Stroke Protocol (09.14.22 @ 04:08)  IMPRESSION:  There is an approximate 2 cm lesion again seen within the right frontal   parasagittal region with adjacent vasogenic edema and mass effect. No   midline shift. Given differences in technique, there is suggestion of   mild interval extension of vasogenic edema since prior brain MRI   7/8/2022. This can be further evaluated with MRI of the brain with and   without intravenous contrast.    No acute territorial infarct or intracranial hemorrhage.    CT Brain Perfusion Maps Stroke (09.14.22 @ 04:12)  IMPRESSION:    CT PERFUSION: There is a large perfusion defect involving the right   centrum semiovale which correlates with a large region of vasogenic edema   surrounding a right frontal parasagittal lesion. Otherwise, no evidence   for core infarct or penumbra.    CTA BRAIN: Patent anterior and posterior circulations.    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    CTA NECK: Patent anterior and posterior circulations without significant   ICA stenosis as per NASCET criteria.        < from: MR Head w/wo IV Cont (07.08.22 @ 12:19) >  IMPRESSION: No change since 4/27/2022. Right frontal parasagittal   enhancing lesion with local vasogenic edema is stable compared to the   prior exam. There is no evidence of tumor progression. This likely   represents a combination of neoplasm and post therapy change.

## 2022-09-14 NOTE — H&P ADULT - HISTORY OF PRESENT ILLNESS
56 year old female with a PMHx of metastatic breast cancer (to mediastinal LNs, dx in 2012 s/p chemo and mastectomy, remission in 2013, recurrence 01/19 on exemestane daily) with new mets to brain, UGIB 2/2 bleeding malignant ulcer s/p IR embolization of L gastric arteries in 2020 p/w left sided weakness since early this AM.  Pt went to sleep at 10pm and woke up at 2:40a to use bathroom. She noticed that the left side of her body was trembling uncontrollably and had trouble ambulating. Pt woke her  up who called EMS. EMS arrived at 2:58a and noted left facial, arm and leg weakness and slurred speech.     In ED:  Patient arrived to ED at 3:27am with all symptoms resolved on exam - NIHSS 0 at the time. 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  CTH shows 2cm mass (prev visualized with mild increase in vasogenic edema compared to previous. No reported infarct. Pt was started on aspirin, decadron and keppra

## 2022-09-14 NOTE — CONSULT NOTE ADULT - ASSESSMENT
Kathy Shelley is a 56 year old RH female with a past medical history of metastatic breast cancer (to mediastinal LNs, dx in 2012 s/p chemo and mastectomy, remission in 2013, recurrence 01/19 on exemestane daily) with mets to brain known since March 2021, UGIB 2/2 bleeding malignant ulcer s/p IR embolization of L gastric arteries in 2020 who presented with left sided weakness.     Impression: L sided shaking, numbness, and mild hemiparesis likely in setting of a focal seizure arising from the R frontal region with either a post-ictal Christiano's paralysis versus edema causing motor weakness of the L side. Provoked seizure in the setting of a known brain lesion.    Recs:  [] brain MRI w/ and w/o contrast to evaluate for progression of disease (patient was due to have MRI on 9/18). Perform MRI with perfusion if possible.  [] agree with Keppra 1000mg bid  [] currently on dexamethasone 4mg q6h, in 2 days can reduce to q8h, in another 2 days to q12, and then follow up with outpatient oncology and radiation oncology for further recommendations for steroids.   [] routine EEG study, completed pending read (appears to have some mild R frontal slowing)  [] PT/OT  [] low suspicion of stroke, no role for aspirin at this time unless MRI demonstrates an infarct  [] patient and family want information transmitted to patient's oncologist for further guidance into treatment of her underlying neoplasm and metastasis    Patient seen and discussed with neurology attending, Dr. Young.  Kathy Shelley is a 56 year old RH female with a past medical history of metastatic breast cancer (to mediastinal LNs, dx in 2012 s/p chemo and mastectomy, remission in 2013, recurrence 01/19 on exemestane daily) with mets to brain known since March 2021, UGIB 2/2 bleeding malignant ulcer s/p IR embolization of L gastric arteries in 2020 who presented with left sided weakness.     Impression: L sided shaking, numbness, and mild hemiparesis likely in setting of a focal seizure arising from the R frontal region with either a post-ictal Christiano's paralysis versus edema causing motor weakness of the L side. Provoked seizure in the setting of a known brain lesion.    Recs:  [] brain MRI w/ and w/o contrast to evaluate for progression of disease (patient was due to have MRI on 9/18). Perform MRI with perfusion if possible.  [] agree with Keppra 1000mg bid  [] currently on dexamethasone 4mg q6h, in 2 days can reduce to q8h, in another 2 days to q12, and then follow up with outpatient oncology and radiation oncology for further recommendations for steroids.   [] routine EEG study, completed pending read (appears to have some mild R frontal slowing)  [] PT/OT  [] low suspicion of stroke, no role for aspirin at this time unless MRI demonstrates an infarct  [] patient and family want information transmitted to patient's oncologist for further guidance into treatment of her underlying neoplasm and metastasis  [] may follow up outpatient with neurosurgery for her noted L ICA aneurysm    Patient seen and discussed with neurology attending, Dr. Young.  Kathy Shelley is a 56 year old RH female with a past medical history of metastatic breast cancer (to mediastinal LNs, dx in 2012 s/p chemo and mastectomy, remission in 2013, recurrence 01/19 on exemestane daily) with mets to brain known since March 2021. Of brain three mets noted on MR in early 2021, two were no longer seen on MR 9/24/21 following RT; they have been seen since then, leaving only one visible lesion: R frontal in location.  UGIB 2/2 bleeding malignant ulcer s/p IR embolization of L gastric arteries in 2020 who presented with left sided weakness.     Impression: L sided shaking, numbness, and mild hemiparesis likely in setting of a focal seizure arising from the R frontal region with either a post-ictal Christiano's paralysis versus edema causing motor weakness of the L side. Provoked seizure in the setting of a known brain lesion.    Recs:  [] brain MRI w/ and w/o contrast to evaluate for progression of disease (patient was due to have MRI on 9/18). Perform MRI with perfusion if possible.  [] agree with Keppra 1000mg bid  [] currently on dexamethasone 4mg q6h, in 2 days can reduce to q8h, in another 2 days to q12, and then follow up with outpatient oncology and radiation oncology for further recommendations for steroids.   [] routine EEG study, completed pending read (appears to have some mild R frontal slowing)  [] PT/OT  [] low suspicion of stroke, no role for aspirin at this time unless MRI demonstrates an infarct  [] patient and family want information transmitted to patient's oncologist for further guidance into treatment of her underlying neoplasm and metastasis  [] may follow up outpatient with neurosurgery for her noted L ICA aneurysm    Patient seen and discussed with neurology attending, Dr. Young.

## 2022-09-14 NOTE — CONSULT NOTE ADULT - ATTENDING COMMENTS
Pt with breast CA metastatic to brain and systemically who now presents after a likely focal seizure (L sided) and with L-sided weakness (Christiano's paralysis?) due to a R frontal metastatic lesion.  Head CT findings suspicious for progression of disease.  I agree with the resident's findings, assessment, and recommendations.

## 2022-09-14 NOTE — EEG REPORT - NS EEG TEXT BOX
[facility]  [facility]  Report of Routine EEG with Video    [facility]: [address], [place] [address], Phone [phone]  [place] Office: [address], [address], [place] [address] Phone [phone]    [facility]: [address] [place] [address], Phone [phone]  [place] Office: [address] [place] [address], Phone [phone]    Patient Name: ETHAN GIFFORD  Age: 56 year  : 1965  Patient ID: -, MRN #: -, Location: --  Referring Physician: -  EEG #:     Study Date: 2022     Technical Information:					  On Instrument: -  Placement and Labeling of Electrodes:  The EEG was performed utilizing 20 channels referential EEG connections (coronal over temporal over parasagittal montage) using all standard 10-20 electrode placements.  Recording was at a sampling rate of 256 samples per second per channel.  Time synchronized digital video recording was done simultaneously with EEG recording.  A low light infrared camera was used for low light recording.  Rahul and seizure detection algorithms were utilized.    History:   Routine study. Performed bedside  Patient awake and alert (Fijian speaking)  HV not performed since covid and photic performed  56 Y/O female presents with:  Breast cander  Brain mass  Left side droop  S/P mastectomy   Atypical seizures      Pertinent Medication  No Data.    Study Interpretation:  Findings: The background was continuous and reactive. During wakefulness, the posterior dominant rhythm consisted of well-modulated 9 Hz activity, with amplitude to 30 uV, that attenuated to eye opening.     Background Slowing:  No generalized background slowing was present.    Focal Slowing:   Intermittent theta/delta slowing in the right hemisphere.    Sleep Background:  Drowsiness was characterized by fragmentation, attenuation, and slowing of the background activity.    Sleep was characterized by the presence of vertex waves, symmetric sleep spindles and K-complexes.    Other Non-Epileptiform Findings:  None were present.    Interictal Epileptiform Activity:   None were present.    Events:  No event or seizure recorded.    Activation Procedures:   Hyperventilation was not performed.    Photic stimulation was not performed.     Artifacts:  Intermittent myogenic and movement artifacts were noted.    EEG Summary / Classification:  Abnormal EEG   - Right hemispheric slowing.    EEG Impression / Clinical Correlate:  Abnormal EEG study.  Structural or functional abnormality in the right hemisphere.  No epileptiform pattern or seizure seen.    ________________________________________    Forest Krishnamurthy MD  Attending Physician, [facility] [place]

## 2022-09-14 NOTE — ED ADULT NURSE REASSESSMENT NOTE - NS ED NURSE REASSESS COMMENT FT1
Pt sleeping on stretcher, family at bedside. multiple attempts made to give report to holding RN, but continues to defer pt at this time. charge RN aware.

## 2022-09-14 NOTE — ED ADULT TRIAGE NOTE - CHIEF COMPLAINT QUOTE
C/O LEFT FACIAL DROOP /LEFT SIDED WEAKNESS STARTED AT 2:50 AM 9/145/2022 /LAST SEEN NORMAL AT 10 PM AS PER

## 2022-09-14 NOTE — ED ADULT NURSE NOTE - OBJECTIVE STATEMENT
BIBA for c/o left sided weakness and left facial droop. Upon presentation, patient ambulatory, gait steady. Terry. Upper and lower extremities symmetrical. No facial droop, no slurred speech.

## 2022-09-14 NOTE — ED ADULT NURSE REASSESSMENT NOTE - NS ED NURSE REASSESS COMMENT FT1
Report received from Shari NIEVES, care of pt assumed at 1400, a+ox3, gross neuro intact, NIH 0, pt waiting for MRI, denies any c/o att his time. pt and family updated on plan of care, questions asked and answered.

## 2022-09-14 NOTE — H&P ADULT - NSHPPHYSICALEXAM_GEN_ALL_CORE
T(C): 36.7 (09-14-22 @ 05:07), Max: 36.9 (09-14-22 @ 03:32)  HR: 83 (09-14-22 @ 05:07) (83 - 94)  BP: 130/58 (09-14-22 @ 05:07) (130/58 - 144/82)  RR: 18 (09-14-22 @ 05:07) (17 - 18)  SpO2: 97% (09-14-22 @ 05:07) (97% - 99%)    GENERAL: patient appears well, no acute distress, appropriate, pleasant  EYES: sclera clear, no exudates  ENMT: oropharynx clear without erythema, no exudates, moist mucous membranes  NECK: supple, soft, no thyromegaly noted  LUNGS: good air entry bilaterally, clear to auscultation, symmetric breath sounds, no wheezing, rhonchi or rales appreciated  HEART: S1/S2, regular rate and rhythm, no murmurs noted, no lower extremity edema  GASTROINTESTINAL: abdomen is soft, nontender, nondistended, normoactive bowel sounds, no palpable masses  INTEGUMENT: good skin turgor, no lesions noted  MUSCULOSKELETAL: no clubbing or cyanosis, no obvious deformity  NEUROLOGIC: AAO x3, good muscle tone in 4 extremities, no obvious sensory deficits

## 2022-09-14 NOTE — H&P ADULT - PROBLEM SELECTOR PLAN 1
kp/w L sided weakness, trembling, facial droop and slurred speech per EMS   NIHSS 0 on admission  CTH brain mass with inc vasogenic edema  2/2 metastatic breast cancer  Pt follows with Neuro Dr. Anthony Hwang at [facility]  c/w keppra, decadron and aspirin   Neuro Hannah consulted kp/w L sided weakness, trembling, facial droop and slurred speech per EMS   NIHSS 0 on admission  CTH brain mass with inc vasogenic edema  2/2 metastatic breast cancer  Pt follows with Dr. Anthony Hwang at [facility]  -primary team to call in AM for records/possible txfr of care  c/w keppra, decadron and aspirin   Neuro Hannah consulted

## 2022-09-14 NOTE — ED ADULT TRIAGE NOTE - ARRIVAL FROM
Taylor Chiu presents today for   Chief Complaint   Patient presents with    New Patient     referred by pcp        Taylor Chiu preferred language for health care discussion is english/other. Is someone accompanying this pt? no    Is the patient using any DME equipment during [address]? no    Depression Screening:  3 most recent PHQ Screens 11/22/2021   Little interest or pleasure in doing things Not at all   Feeling down, depressed, irritable, or hopeless Not at all   Total Score PHQ 2 0       Learning Assessment:  No flowsheet data found. Abuse Screening:  Abuse Screening Questionnaire 11/22/2021   Do you ever feel afraid of your partner? N   Are you in a relationship with someone who physically or mentally threatens you? N   Is it safe for you to go home? Y       Fall Risk  No flowsheet data found. Pt currently taking Anticoagulant therapy? no    Coordination of Care:  1. Have you been to the ER, urgent care clinic since your last visit? Hospitalized since your last visit? no    2. Have you seen or consulted any other health care providers outside of the [address] [place] [address] since your last visit? Include any pap smears or colon screening.  no Home

## 2022-09-14 NOTE — H&P ADULT - ATTENDING COMMENTS
Patient is a 57 yo female with PMH of metastatic breast cancer (to mediastinal LNs, dx in 2012 s/p chemo and mastectomy, remission in 2013, recurrence 01/19 on exemestane daily) with new mets to brain, UGIB 2/2 bleeding malignant ulcer s/p IR embolization of L gastric arteries in 2020 p/w left sided weakness since early this AM.  Patient went to sleep at 10pm and woke up at 2:40a to use bathroom when she noticed that the left side of her body was trembling uncontrollably and had trouble ambulating. Patient woke her  up who called EMS. EMS arrived at 2:58a and noted left facial, arm and leg weakness and slurred speech.     In ED Patient arrived to ED at 3:27 am,  symptoms resolved on arrival- NIHSS 0. Telestroke consulted, pt not a TPA candidate due to brain mets, presentation likely more likely atypical seizure.    CT Head: > 2cm mass that was previously visualized on MRI brain ( Jul 22) with mild increase in vasogenic edema compared to previous. No reported infarct.  CTA Angio/ perfusion study>  4 mm aneurysm projecting superiorly at the left supraclinoid ICA   bifurcation     s/p on aspirin, decadron 6 mg and iv keppra 1000 mg.    Assessment and plan: 57 yo female with PMH of metastatic breast cancer (to mediastinal LNs, dx in 2012 s/p chemo and mastectomy, remission in 2013, recurrence 01/19 on exemestane daily) with new mets to brain, UGIB 2/2 bleeding malignant ulcer s/p IR embolization of L gastric arteries in 2020 p/w left sided weakness, s/p code stroke in ED, no TPA 2/2 brain mets, noted to have right frontal lesion, parasagittal region with adjacent vasogenic edema and mass effect.    Brain mets with vasogenic edema l/t  Atypical seizure   Metastatic breast cancer (mediastinal LNs, dx 2012 s/p chemo and mastectomy, remission in 2013, recurrence 01/19 on exemestane daily, now with Brain mets)  h/o UGI bleed/ malignant ulcer    - called EMS for lefT LE weakness> symptoms resolved in ED, NIHSS score 0.  - Teleneurology> no TPA as patient with brain mets.  - CT head > Right frontal lesion 2 cm, parasagittal region with adjacent vasogenic edema and mass effect.  - presentation likely 2/2 provoked seizure brain mets with vasogenic edema  - s/p iv decadron in ED> will continue with iv decadron 4 mg q6h  - s/p iv keppra 100 mg > c/w iv keppra 1000 mg bid  - frequent neuro checks  - Neuro consulted Dr. Young.  - Please contact/notify Dr. Anthony Hwang at [facility] in am. ( patient's Radiation oncologist at [facility])  - iv PPI od  - iv PPI

## 2022-09-14 NOTE — ED PROVIDER NOTE - QRS
Hospitalist Discharge Summary Admit Date:  2020 11:28 AM  
Name:  Irma Davis Age:  81 y.o. 
:  1938 MRN:  [id number] PCP:  Emilio Tejeda MD 
Treatment Team: Attending Provider: Nader Simmons MD; Utilization Review: Patricia Yao; Care Manager: Bryan Velasquez; Utilization Review: Pipo Ortiz; Physical Therapist: Owen Lux, PT, DPT; Occupational Therapist: Marija Barrera OT; Primary Nurse: Bolivar Kaba RN 
 
Problem List for this Hospitalization: 
Hospital Problems as of 2020 Date Reviewed: 3/11/2020 Codes Class Noted - Resolved POA Do not resuscitate (Chronic) ICD-10-CM: S58 ICD-9-CM: V49.86 Chronic 10/30/2019 - Present Yes Chronic atrial fibrillation (Chronic) ICD-10-CM: C32.31 ICD-9-CM: 427.31  2020 - Present Yes Volume overload ICD-10-CM: E87.70 ICD-9-CM: 276.69  2020 - Present Yes Cellulitis ICD-10-CM: L03.90 ICD-9-CM: 682.9  2020 - Present Cor pulmonale (HCC) ICD-10-CM: E68.80 ICD-9-CM: 416.9  2020 - Present Yes * (Principal) Lower extremity cellulitis ICD-10-CM: L03.119 ICD-9-CM: 682.6  2020 - Present Yes Bilateral lower extremity edema ICD-10-CM: R60.0 ICD-9-CM: 782.3  2020 - Present Yes Elevated liver enzymes ICD-10-CM: R74.8 ICD-9-CM: 790.5  2020 - Present Yes Epigastric pain ICD-10-CM: R10.13 ICD-9-CM: 789.06  2020 - Present Yes Bilateral pleural effusion ICD-10-CM: J90 ICD-9-CM: 511.9  2020 - Present Yes Right heart failure due to pulmonary hypertension (HCC) ICD-10-CM: I27.29, I50.810 ICD-9-CM: 428.0, 416.8  2020 - Present Yes Venous stasis dermatitis of both lower extremities (Chronic) ICD-10-CM: W39.0 ICD-9-CM: 454.1  10/29/2019 - Present Yes COPD (chronic obstructive pulmonary disease) (HCC) (Chronic) ICD-10-CM: J44.9 ICD-9-CM: 238  10/28/2019 - Present Yes S/P AVR (Chronic) ICD-10-CM: Z95.2 ICD-9-CM: V43.3  10/28/2019 - Present Yes Acquired hypothyroidism (Chronic) ICD-10-CM: E03.9 ICD-9-CM: 244.9  10/28/2019 - Present Yes Palliative care patient ICD-10-CM: Z51.5 ICD-9-CM: V66.7  10/3/2017 - Present Yes Restrictive lung disease ICD-10-CM: J98.4 ICD-9-CM: 518.89  10/3/2017 - Present Yes Overview Signed 10/3/2017  3:54 PM by Landy Slade NP  
  9/29/2017 10:15 AM - Kae Torres, RT Narrative This is complete pulmonary function performed on 9/28/2017. 
 Patient with history of restrictive lung disease and dyspnea on exertion. SPIROMETRY: 
 
FVC 2.43 l and 52 % of predicted. FEV1 1.88 l and 54 % of predicted. Ratio 77 %. There was no clinically relevant improvement with bronchodilator. LUNG VOLUMES: 
 
TLC 5.35 l and 72 % of predicted. RV 2.91 l and 107 % of predicted. DIFFUSION: 
 
The diffusing capacity was corrected for hemoglobin of 14.2 g/dL and was 12.0 mL/mmHg/minute and 37 % of predicted. IMPRESSION: 
 
Spirometry is consistent with moderately severe restrictive defect, there is no improvement with bronchodilator.  Lung volume analysis reveals mild restrictive defect concomitantly and gas trapping.  The diffusion capacity was corrected for hemoglobin and was decreased.  Overall this is consistent with a mixed ventilatory defect with both restriction which is predominant and concomitant obstruction and decreased diffusion capacity. Laya Thomas MD  
 
 
  
  
   
 MONICA (obstructive sleep apnea) (Chronic) ICD-10-CM: T18.78 
ICD-9-CM: 327.23  12/16/2013 - Present Yes Diastolic CHF, acute on chronic (HCC) ICD-10-CM: I50.33 ICD-9-CM: 428.33, 428.0  9/30/2013 - Present Yes Chronic respiratory failure with hypoxia (HCC) (Chronic) ICD-10-CM: J96.11 
ICD-9-CM: 518.83, 799.02  9/30/2013 - Present Yes Overview Addendum 10/30/2019 11:01 AM by Summer Paris MD  
  5L NC continuously Atrial fibrillation (HCC) (Chronic) ICD-10-CM: I48.91 
ICD-9-CM: 427.31  9/27/2013 - Present Yes Type 2 diabetes mellitus (HCC) (Chronic) ICD-10-CM: E11.9 ICD-9-CM: 250.00  9/27/2013 - Present Yes CAD (coronary artery disease) (Chronic) ICD-10-CM: I25.10 ICD-9-CM: 414.00  9/27/2013 - Present Yes Hospital Course: 
  
Mr. Katie Ling is a 82 yo male with PMH of cor pulmonale with COPD/ MONICA-noncompliant with home O2 but states uses 3 L NC, LE venous stasis dermatitis, AFIB on coumadin, s/p AVR, DM2, CAD s/p CABG, admitted with painful/ erythematous/ weeping bilateral LE with concern for cellulitis and volume overload/ acute on chronic diastolic CHF exacerbation. His cellulitis is managed with vancomycin/zosyn, BC NGTD. Wound care consulted and wrapped LE. Wound culture was polymicrobial. He will need to complete a course with oral doxycycline and cipro. Cardiology has evaluated his volume overload and has continued to diurese with  lasix. He has mildly elevated bilirubin and ABD US shows mild ascites. He admits to RUQ pain and notes a recent fall thus likely rib pain related. He takes chronic oxycodone for pain. His coumadin has been adjusted and current INR 2.8. he will need INR checks going forward. Discharge plans are to SNF. Disposition: [facility] Activity: Activity as tolerated Diet: DIET DIABETIC CONSISTENT CARB Regular; 2 GM NA (House Low NA) Code Status: DNR Follow Up Orders: Follow-up Appointments Procedures  FOLLOW UP VISIT Appointment in: Other (Specify) After rehab After rehab Standing Status:   Standing Number of Occurrences:   1 Order Specific Question:   Appointment in Answer: Other (Specify) Follow-up Information Follow up With Specialties Details Why Contact Info [address] [place] [address]) [address] [place] [address] [address] [facility] Final [address] [facility] 
[phone] Other, MD Emilio    Patient can only remember the practice name and not the physician Discharge meds at bottom of this note. Plan was discussed with patient. All questions answered. Patient was stable at time of discharge. Given instructions to call a physician or return if any concerns. Discharge summary and encounter summary was sent to PCP electronically via \"Comm Mgt\" link in [facility], if possible. Diagnostic Imaging/Tests:  
Xr Chest Pa Lat Result Date: 4/27/2020 CHEST X-RAY, 2 views 4/27/2020 History: Shortness of breath. Technique: PA and lateral views of the chest. Comparison: Chest x-ray 4/20/2020 Findings: Improving aeration is seen of the lungs. Stable post surgical changes overlie the mediastinal silhouette. The cardiac silhouette is mildly enlarged although not significant changed from the prior study. The lungs are expanded without evidence for pneumothorax. No evolving consolidation, or evidence of pleural effusion is seen. Prior basilar atelectatic appearing changes appear improved. Additional prior trace effusions at the costophrenic angles appear slightly improved particularly on the right. The bony thorax demonstrates no acute changes. The upper abdomen is unremarkable in appearance. IMPRESSION: 1. No worsening changes. Only improving aeration, improving basilar atelectatic appearing changes, and some improvement in trace basilar effusions are seen. Xr Chest Pa Lat Result Date: 4/20/2020 PA and lateral chest radiographs History:  sob, chf, 81 years Male Comparison: Chest radiograph October 29, 2019 Findings:  Normal cardiomediastinal silhouette with evidence of aortic valve replacement and CABG. Persistent low lung volumes with trace bilateral pleural effusions and associated mild bibasilar atelectasis and or consolidation. Probable mild interstitial edema unchanged. No evidence of pneumothorax. Visualized soft tissue and osseous structures otherwise unremarkable. Impression:   No significant interval change. Xr Forearm Rt Ap/lat Result Date: 4/20/2020 Exam:  Right forearm radiographs History:  pain, forearm pain,injury, 81 years Male Comparison: None available Findings:  No evidence of acute fracture or dislocation. Normal alignment, joint spaces preserved. Normal mineralization. Visualized soft tissues otherwise unremarkable. Impression:  No evidence of acute injury. [address] [facility] Result Date: 4/28/2020 Right Upper Quadrant Ultrasound INDICATION:  Elevated liver function tests with low albumin, hyperbilirubinemia, increased alkaline phosphatase, prior cholecystectomy. Bilateral lower extremity swelling and erythema, chronic right second just of heart failure, hyperlipidemia, diabetes, or artery disease, atrial fibrillation, hypertension, peptic ulcer disease, GERD, aortic stenosis, hypothyroidism. COMPARISON: Correlation with CT chest 10/25/2017 (no prior abdominal imaging is available at this facility) TECHNIQUE:  Sonographic gray scale and Doppler images were obtained of the right upper quadrant of the abdomen. FINDINGS: The hepatic parenchymal echotexture is markedly heterogeneous and elevated throughout, limiting sensitivity for masses. The left lobe cannot be well visualized evaluated due to overlying bowel and rib artifact. There are no discrete lesions in the visualized portions of the liver. Liver size is 15.8 cm, within normal limits. Main portal vein is patent on Doppler imaging. There is no visualized intrahepatic bile duct dilatation. The common bile duct diameter is 8 mm, a common finding after cholecystectomy. The gallbladder is surgically absent. Gallbladder fossa is unremarkable grossly. Sonographic Wen's sign is not seen.  There are no discrete lesions in the limited visualized portions of the pancreas, which is almost entirely obscured by overlying bowel artifact. The right kidney measures 12.8 cm in length. There is no hydronephrosis. There is no evidence of a solid renal mass. Color Doppler demonstrates expected right renal flow. There is small volume abdominal ascites, and a right pleural effusion is partially seen. The aorta is not well seen or evaluated due to overlying artifact. IVC appears patent on spectral Doppler. IMPRESSION: 1. Echogenic liver parenchyma, nonspecific but most often steatosis. 2. Cholecystectomy. 3. Small volume ascites, and right pleural effusion. Echocardiogram results: No results found for this visit on 04/27/20. Procedures done this admission: * No surgery found * All Micro Results Procedure Component Value Units Date/Time CULTURE, BLOOD [[id number]] Collected:  04/27/20 1445 Order Status:  Completed Specimen:  Blood Updated:  05/01/20 0577 Special Requests: --     
  RIGHT ANTERIOR Culture result: NO GROWTH 4 DAYS     
 CULTURE, BLOOD [[id number]] Collected:  04/27/20 1618 Order Status:  Completed Specimen:  Blood Updated:  05/01/20 9626 Special Requests: --     
  LEFT Antecubital 
  
  Culture result: NO GROWTH 4 DAYS     
 CULTURE, WOUND [[id number]]  (Abnormal)  (Susceptibility) Collected:  04/27/20 2040 Order Status:  Completed Specimen:  Wound Drainage Updated:  05/01/20 2870 Special Requests: NO SPECIAL REQUESTS     
  GRAM STAIN 0 TO 12 WBC'S SEEN PER OIF  
   MANY GRAM NEGATIVE RODS     
   FEW GRAM POSITIVE COCCI Culture result: HEAVY ESCHERICHIA COLI     
   HEAVY PROTEUS MIRABILIS     
      
  HEAVY * METHICILLIN RESISTANT STAPHYLOCOCCUS AUREUS * ENTEROCOCCUS FAECALIS GROUP D ISOLATED FROM BROTH ONLY RESULTS VERIFIED, PHONED TO AND READ BACK BY 
MERCY JACOBSEN RN ON 5/1/20 @0735, TA Labs: Results:  
   
BMP, Mg, Phos Recent Labs 05/01/20 
5642 04/30/20 
0008 04/29/20 
0408 NA  --  140 138 K  --  4.0 3.1*  
CL  --  101 99 CO2  --  36* 34* AGAP  --  3* 5* BUN  --  24* 21  
CREA  --  1.07 1.08  
CA  --  8.2* 8.1*  
GLU  --  112* 136* MG 2.3 2.2 2.1  
  
CBC Recent Labs 05/01/20 
5234 04/30/20 
7894 04/29/20 
0408 WBC 7.4 6.5 7.3  
RBC 4.58 4.66 4.63  
HGB 14.0 14.4 14.0  
HCT 42.4 44.4 43.0  188 183 LFT No results for input(s): SGOT, ALT, TBIL, AP, TP, ALB, GLOB, AGRAT, GPT in the last 72 hours. Cardiac Testing Lab Results Component Value Date/Time  (H) 10/28/2019 01:24 PM  
  (H) 04/30/2019 10:34 AM  
  (H) 01/02/2019 11:40 AM  
 BNP 30 09/30/2013 05:17 AM  
  09/28/2013 06:48 AM  
  09/27/2013 12:10 PM  
 Troponin-I, Qt. 0.02 04/27/2020 02:28 PM  
 Troponin-I, Qt. 0.02 04/20/2020 04:02 PM  
 Troponin-I, Qt. <0.02 (L) 10/29/2019 01:47 PM  
  
Coagulation Tests Lab Results Component Value Date/Time Prothrombin time 30.6 (H) 05/01/2020 04:16 AM  
 Prothrombin time 35.5 (H) 04/30/2020 04:17 AM  
 Prothrombin time 34.3 (H) 04/29/2020 04:08 AM  
 INR 2.8 05/01/2020 04:16 AM  
 INR 3.4 04/30/2020 04:17 AM  
 INR 3.3 04/29/2020 04:08 AM  
  
A1c Lab Results Component Value Date/Time Hemoglobin A1c 7.0 (H) 10/30/2019 07:28 AM  
 Hemoglobin A1c 6.4 (H) 06/02/2018 06:58 AM  
 Hemoglobin A1c 6.6 (H) 06/22/2017 07:45 AM  
  
Lipid Panel Lab Results Component Value Date/Time Cholesterol, total 167 03/21/2019 09:21 AM  
 HDL Cholesterol 33 (L) 03/21/2019 09:21 AM  
 LDL, calculated 92 03/21/2019 09:21 AM  
 VLDL, calculated 42 (H) 03/21/2019 09:21 AM  
 Triglyceride 210 (H) 03/21/2019 09:21 AM  
 CHOL/HDL Ratio 5.1 03/21/2019 09:21 AM  
  
Thyroid Panel Lab Results Component Value Date/Time  TSH 3.830 (H) 04/29/2020 04:08 AM  
 TSH 3.210 11/06/2019 02:34 PM  
 TSH 2.560 06/02/2018 11:45 AM  
 T4, Total 13.6 06/20/2017 06:04 PM  
    
Most Recent UA No results found for: COLOR, APPRN, REFSG, MARGIE, PROTU, Robert Rink, BILU, BLDU, UROU, BRYAN, LEUKU, WBCU, RBCU, UEPI, BACTU, CASTS, UCRY, MUCUS, UCOM Allergies Allergen Reactions  Celexa [Citalopram] Other (comments) Ineffective per pt  Cymbalta [Duloxetine] Other (comments) Altered mental state  Gabapentin Other (comments) Altered mental status  Lidocaine Unknown (comments)  Sertraline Unknown (comments)  Sulfa (Sulfonamide Antibiotics) Unknown (comments) Immunization History Administered Date(s) Administered  Influenza High Dose Vaccine PF 09/28/2015, 09/25/2017, 09/10/2018  Influenza Vaccine 10/15/2013, 10/06/2014, 10/01/2017  Influenza Vaccine (Tri) Adjuvanted 09/25/2017  Pneumococcal Conjugate (PCV-13) 09/28/2015  Pneumococcal Polysaccharide (PPSV-23) 01/04/2010, 01/13/2015, 01/01/2016  TB Skin Test (PPD) 06/12/2017, 06/18/2017, 04/18/2018  TB Skin Test (PPD) Intradermal 06/12/2017, 06/18/2017, 04/18/2018, 06/02/2018, 01/05/2019, 10/28/2019, 04/27/2020  Tdap 10/12/2016  Zoster Vaccine, Live 01/10/2012 All Labs from Last 24 Hrs: 
Recent Results (from the past 24 hour(s)) GLUCOSE, POC Collection Time: 04/30/20  4:53 PM  
Result Value Ref Range Glucose (POC) 124 (H) 65 - 100 mg/dL PLEASE READ & DOCUMENT PPD TEST IN 72 HRS Collection Time: 04/30/20  7:00 PM  
Result Value Ref Range PPD Negative Negative  
 mm Induration 0 0 - 5 mm GLUCOSE, POC Collection Time: 04/30/20  9:09 PM  
Result Value Ref Range Glucose (POC) 120 (H) 65 - 100 mg/dL MAGNESIUM Collection Time: 05/01/20  4:16 AM  
Result Value Ref Range Magnesium 2.3 1.8 - 2.4 mg/dL PROTHROMBIN TIME + INR Collection Time: 05/01/20  4:16 AM  
Result Value Ref Range Prothrombin time 30.6 (H) 12.0 - 14.7 sec INR 2.8    
CBC W/O DIFF Collection Time: 05/01/20  4:16 AM  
Result Value Ref Range WBC 7.4 4.3 - 11.1 K/uL  
 RBC 4.58 4.23 - 5.6 M/uL  
 HGB 14.0 13.6 - 17.2 g/dL HCT 42.4 41.1 - 50.3 % MCV 92.6 79.6 - 97.8 FL  
 MCH 30.6 26.1 - 32.9 PG  
 MCHC 33.0 31.4 - 35.0 g/dL  
 RDW 15.9 (H) 11.9 - 14.6 % PLATELET 802 025 - 185 K/uL MPV 12.1 9.4 - 12.3 FL ABSOLUTE NRBC 0.00 0.0 - 0.2 K/uL GLUCOSE, POC Collection Time: 05/01/20  7:29 AM  
Result Value Ref Range Glucose (POC) 109 (H) 65 - 100 mg/dL GLUCOSE, POC Collection Time: 05/01/20 11:53 AM  
Result Value Ref Range Glucose (POC) 121 (H) 65 - 100 mg/dL Discharge Exam: 
Patient Vitals for the past 24 hrs: 
 Temp Pulse Resp BP SpO2  
05/01/20 1107 97.8 °F (36.6 °C) 71 18 129/72 93 % 05/01/20 0828     96 % 05/01/20 0654 98.2 °F (36.8 °C) 71 18 106/64 95 % 05/01/20 0416 97.9 °F (36.6 °C) 72 18 110/69 95 % 05/01/20 0018 97.7 °F (36.5 °C) 72 18 96/55 90 % 04/30/20 2034     96 % 04/30/20 1935 98.2 °F (36.8 °C) 75 18 113/73 97 % 04/30/20 1554 98.3 °F (36.8 °C) 76 18 105/66 95 % 04/30/20 1305     97 % Oxygen Therapy O2 Sat (%): 93 % (05/01/20 1107) Pulse via Oximetry: 65 beats per minute (05/01/20 0828) O2 Device: Nasal cannula (05/01/20 0828) O2 Flow Rate (L/min): 3 l/min (05/01/20 0828) Estimated body mass index is 31.17 kg/m² as calculated from the following: 
  Height as of this encounter: 5' 8\" (1.727 m). Weight as of this encounter: 93 kg (205 lb). Intake/Output Summary (Last 24 hours) at 5/1/2020 1206 Last data filed at 5/1/2020 1107 Gross per 24 hour Intake 1738 ml Output 1750 ml Net -12 ml *Note that automatically entered I/Os may not be accurate; dependent on patient compliance with collection and accurate  by assistants. General:    Well nourished. Alert. No distress, elderly CV:   Regular rate and rhythm. III/VI BREE LUSB, LE wrapped Lungs:  Clear to auscultation bilaterally. No wheezing, rhonchi, or rales. Abdomen: Soft, nontender, nondistended. Extremities: Warm and dry. Neurologic:  grossly intact. Psych:  Normal mood and affect. Current Med List in Hospital:  
Current Facility-Administered Medications Medication Dose Route Frequency  furosemide (LASIX) tablet 40 mg  40 mg Oral ACB&D  Vancomycin Trough Level Reminder   Other ONCE  warfarin (COUMADIN) tablet 3 mg  3 mg Oral QPM  
 levothyroxine (SYNTHROID) tablet 100 mcg  100 mcg Oral ACB  vancomycin (VANCOCIN) 1,000 mg in 0.9% sodium chloride (MBP/ADV) 250 mL  1,000 mg IntraVENous Q18H  
 alcohol 62% (NOZIN) nasal  1 Ampule  1 Ampule Topical Q12H  pantoprazole (PROTONIX) tablet 40 mg  40 mg Oral ACB  docusate sodium (COLACE) capsule 100 mg  100 mg Oral BID  acetaminophen (TYLENOL) tablet 650 mg  650 mg Oral Q6H PRN  
 albuterol (PROVENTIL VENTOLIN) nebulizer solution 2.5 mg  2.5 mg Nebulization TID RT  
 aspirin delayed-release tablet 81 mg  81 mg Oral DAILY  atorvastatin (LIPITOR) tablet 20 mg  20 mg Oral DAILY  carvediloL (COREG) tablet 12.5 mg  12.5 mg Oral BID WITH MEALS  ferrous sulfate tablet 325 mg  1 Tab Oral DAILY WITH BREAKFAST  lisinopriL (PRINIVIL, ZESTRIL) tablet 10 mg  10 mg Oral DAILY  magnesium oxide (MAG-OX) tablet 400 mg  400 mg Oral DAILY  memantine (NAMENDA) tablet 10 mg  10 mg Oral BID  multivitamin, tx-iron-ca-min (THERA-M w/ IRON) tablet 1 Tab  1 Tab Oral DAILY  oxyCODONE IR (ROXICODONE) tablet 10 mg  10 mg Oral Q8H PRN  
 sodium chloride (NS) flush 5-40 mL  5-40 mL IntraVENous Q8H  
 sodium chloride (NS) flush 5-40 mL  5-40 mL IntraVENous PRN  
 insulin lispro (HUMALOG) injection   SubCUTAneous AC&HS  polyethylene glycol (MIRALAX) packet 17 g  17 g Oral DAILY  piperacillin-tazobactam (ZOSYN) 3.375 g in 0.9% sodium chloride (MBP/ADV) 100 mL  3.375 g IntraVENous Q8H  
 pantothenic ac-min oil-pet,hyd (AQUAPHOR) 41 % ointment   Topical PRN Discharge Info:  
Current Discharge Medication List  
  
START taking these medications Details pantoprazole (PROTONIX) 40 mg tablet Take 1 Tab by mouth Daily (before breakfast). Qty: 30 Tab, Refills: 0  
  
insulin lispro (HUMALOG) 100 unit/mL injection Per sliding scale prior to meals and bedtime 
Qty: 1 Vial, Refills: 0  
  
docusate sodium (COLACE) 100 mg capsule Take 1 Cap by mouth two (2) times a day for 90 days. Qty: 60 Cap, Refills: 0  
  
doxycycline (ADOXA) 100 mg tablet Take 1 Tab by mouth two (2) times a day for 7 days. Qty: 14 Tab, Refills: 0  
  
ciprofloxacin HCl (CIPRO) 500 mg tablet Take 1 Tab by mouth two (2) times a day for 7 days. Qty: 14 Tab, Refills: 0 CONTINUE these medications which have CHANGED Details  
furosemide (LASIX) 40 mg tablet Take 1 Tab by mouth every twelve (12) hours. Qty: 60 Tab, Refills: 0  
  
levothyroxine (SYNTHROID) 100 mcg tablet Take 1 Tab by mouth Daily (before breakfast). Qty: 30 Tab, Refills: 0  
  
warfarin (COUMADIN) 3 mg tablet Take 1 Tab by mouth every evening. Qty: 10 Tab, Refills: 0  
  
oxyCODONE IR (ROXICODONE) 10 mg tab immediate release tablet Take 1 Tab by mouth every eight (8) hours as needed for Pain for up to 3 days. Max Daily Amount: 30 mg. 
Qty: 10 Tab, Refills: 0 Associated Diagnoses: Cellulitis of left lower extremity CONTINUE these medications which have NOT CHANGED Details  
cyanocobalamin (VITAMIN B-12) 1,000 mcg/mL injection 1,000 mcg by IntraMUSCular route every thirty (30) days. Jardiance 10 mg tablet Take 1 Tab by mouth daily. Qty: 90 Tab, Refills: 3 Associated Diagnoses: Type 2 diabetes mellitus without complication, unspecified whether long term insulin use ([address] Diastolic CHF, acute on chronic ([address] Coronary artery disease involving coronary bypass graft of native heart without angina pectoris  
  
atorvastatin (LIPITOR) 40 mg tablet Take 20 mg by mouth daily. lisinopril (PRINIVIL, ZESTRIL) 20 mg tablet Take 10 mg by mouth daily. mineral oil-hydrophil petrolat (AQUAPHOR) ointment Apply 1 Each to affected area as needed for Dry Skin or Itching. apply daily as needed to any areas of dry/itchy skin  
  
polyethylene glycol (MIRALAX) 17 gram/dose powder Take 17 g by mouth daily as needed for Other (constipation). inhalational spacing device Use as directed with MDI Qty: 1 Device, Refills: 1  
  
carvedilol (COREG) 12.5 mg tablet Take 12.5 mg by mouth two (2) times daily (with meals). has 25mg tablets  
  
diclofenac (VOLTAREN) 1 % gel Apply  to affected area four (4) times daily. albuterol (PROVENTIL VENTOLIN) 2.5 mg /3 mL (0.083 %) nebulizer solution 3 mL by Nebulization route every six (6) hours. Qty: 120 Each, Refills: 0  
  
magnesium oxide (MAG-OX) 400 mg tablet Take 400 mg by mouth daily. potassium chloride (K-DUR, KLOR-CON) 20 mEq tablet Take 1 Tab by mouth daily. Qty: 30 Tab, Refills: 0  
  
aspirin delayed-release 81 mg tablet Take 81 mg by mouth daily. memantine (NAMENDA) 10 mg tablet Take 10 mg by mouth two (2) times a day. ferrous sulfate 324 mg (65 mg iron) tablet Take 324 mg by mouth daily. ascorbic acid, vitamin C, (VITAMIN C) 500 mg tablet Take 500 mg by mouth two (2) times a day. multivitamin (ONE A DAY) tablet Take 1 Tab by mouth daily. nitroglycerin (NITROSTAT) 0.4 mg SL tablet 0.4 mg by SubLINGual route every five (5) minutes as needed for Chest Pain. STOP taking these medications  
  
 acetaminophen (TYLENOL) 500 mg tablet Comments:  
Reason for Stopping:   
   
 metOLazone (ZAROXOLYN) 5 mg tablet Comments:  
Reason for Stopping:   
   
 senna-docusate (SENNA PLUS) 8.6-50 mg per tablet Comments:  
Reason for Stopping:   
   
 albuterol (PROVENTIL HFA, VENTOLIN HFA, PROAIR HFA) 90 mcg/actuation inhaler Comments:  
Reason for Stopping:   
   
  
 
 
 
Time spent in patient discharge planning and coordination 45 minutes. Signed: Eduardo Dudley MD 
 86 ms

## 2022-09-14 NOTE — ED PROVIDER NOTE - OBJECTIVE STATEMENT
Pt went to sleep at 10p wokeat 2:40a to use bathroom, and wke  up complaining of left sided extremity trembling.  EMS arrived at 2:58a and noted left facial, arm and leg weakness and slurred speech.  On evlaution upon arrival Pt went to sleep at 10p wokeat 2:40a to use bathroom, and woke  up complaining of left sided extremity trembling.  EMS arrived at 2:58a and noted left facial, arm and leg weakness and slurred speech.  On evaluation upon arrival 3:27am Pt is A&O x 3, no dysarthria, no facial asymmetry, no focal deficits noted.  Pt denies chest pain, no shortness of breath, no fever.  Pmhx : Brain CA.  Meds: [facility]    3:45a- I spoke to tele stroke neurologist - pt is not TPA candidate due to hx of brain mass. Pt with possible atypical seizure. Pt to be admitted for neuromonitoring.  ASA if no bleed on CT head. Pt went to sleep at 10p woke at 2:40a to use bathroom, and woke  up complaining of left sided extremity trembling.  EMS arrived at 2:58a and noted left facial, arm and leg weakness and slurred speech.  On evaluation upon arrival 3:27am Pt is A&O x 3, no dysarthria, no facial asymmetry, no focal deficits noted.  Pt denies chest pain, no shortness of breath, no fever.  Pmhx : Brain CA.  Meds: [facility]    3:45a- I spoke to tele stroke neurologist - pt is not TPA candidate due to hx of brain mass. Pt with possible atypical seizure. Pt to be admitted for neuromonitoring.  ASA if no bleed on CT head.    4:10a- Radiologist reproted no infacrt, no bleed, 2cm mass frontal lobe with mild increase vasogenic edema compared to previous.

## 2022-09-14 NOTE — H&P ADULT - ASSESSMENT
56F with PMHx metastatic breast cancer to LN, and new to brain p/w CVA like features. s/p code stroke. CTH shows 2cm mass with inc vasogenic edema. Admitted for neuromonitoring and stabilization.

## 2022-09-14 NOTE — ED PROVIDER NOTE - CLINICAL SUMMARY MEDICAL DECISION MAKING FREE TEXT BOX
Pt admitted for neuro monitoring. ASA ordered as per tele stroke neurologist.  Decadron  ordered for increased vasogenic edema, Keppra ordered for atypical seizure.  MAR and Dr. López endorsed. Pt agrees with admission. I had a detailed discussion with the patient and/or guardian regarding the historical points, exam findings, and any diagnostic results supporting the admit diagnosis.

## 2022-09-15 ENCOUNTER — TRANSCRIPTION ENCOUNTER (OUTPATIENT)
Age: 57
End: 2022-09-15

## 2022-09-15 ENCOUNTER — OUTPATIENT (OUTPATIENT)
Dept: OUTPATIENT SERVICES | Facility: HOSPITAL | Age: 57
LOS: 1 days | Discharge: HOME | End: 2022-09-15

## 2022-09-15 VITALS
SYSTOLIC BLOOD PRESSURE: 108 MMHG | RESPIRATION RATE: 18 BRPM | DIASTOLIC BLOOD PRESSURE: 72 MMHG | OXYGEN SATURATION: 96 % | TEMPERATURE: 98 F | HEART RATE: 70 BPM

## 2022-09-15 DIAGNOSIS — Z90.12 ACQUIRED ABSENCE OF LEFT BREAST AND NIPPLE: Chronic | ICD-10-CM

## 2022-09-15 DIAGNOSIS — C50.919 MALIGNANT NEOPLASM OF UNSPECIFIED SITE OF UNSPECIFIED FEMALE BREAST: ICD-10-CM

## 2022-09-15 DIAGNOSIS — Z98.51 TUBAL LIGATION STATUS: Chronic | ICD-10-CM

## 2022-09-15 DIAGNOSIS — Z90.3 ACQUIRED ABSENCE OF STOMACH [PART OF]: Chronic | ICD-10-CM

## 2022-09-15 DIAGNOSIS — Z90.49 ACQUIRED ABSENCE OF OTHER SPECIFIED PARTS OF DIGESTIVE TRACT: Chronic | ICD-10-CM

## 2022-09-15 LAB
ALBUMIN SERPL ELPH-MCNC: 3.5 G/DL — SIGNIFICANT CHANGE UP (ref 3.5–5)
ALP SERPL-CCNC: 120 U/L — SIGNIFICANT CHANGE UP (ref 40–120)
ALT FLD-CCNC: 28 U/L DA — SIGNIFICANT CHANGE UP (ref 10–60)
ANION GAP SERPL CALC-SCNC: 10 MMOL/L — SIGNIFICANT CHANGE UP (ref 5–17)
AST SERPL-CCNC: 17 U/L — SIGNIFICANT CHANGE UP (ref 10–40)
BILIRUB SERPL-MCNC: 0.2 MG/DL — SIGNIFICANT CHANGE UP (ref 0.2–1.2)
BUN SERPL-MCNC: 14 MG/DL — SIGNIFICANT CHANGE UP (ref 7–18)
CALCIUM SERPL-MCNC: 9 MG/DL — SIGNIFICANT CHANGE UP (ref 8.4–10.5)
CHLORIDE SERPL-SCNC: 108 MMOL/L — SIGNIFICANT CHANGE UP (ref 96–108)
CO2 SERPL-SCNC: 23 MMOL/L — SIGNIFICANT CHANGE UP (ref 22–31)
CREAT SERPL-MCNC: 0.59 MG/DL — SIGNIFICANT CHANGE UP (ref 0.5–1.3)
EGFR: 106 ML/MIN/1.73M2 — SIGNIFICANT CHANGE UP
GLUCOSE SERPL-MCNC: 161 MG/DL — HIGH (ref 70–99)
HCT VFR BLD CALC: 39.3 % — SIGNIFICANT CHANGE UP (ref 34.5–45)
HGB BLD-MCNC: 12.6 G/DL — SIGNIFICANT CHANGE UP (ref 11.5–15.5)
MCHC RBC-ENTMCNC: 25.6 PG — LOW (ref 27–34)
MCHC RBC-ENTMCNC: 32.1 GM/DL — SIGNIFICANT CHANGE UP (ref 32–36)
MCV RBC AUTO: 79.7 FL — LOW (ref 80–100)
NRBC # BLD: 0 /100 WBCS — SIGNIFICANT CHANGE UP (ref 0–0)
PLATELET # BLD AUTO: 296 K/UL — SIGNIFICANT CHANGE UP (ref 150–400)
POTASSIUM SERPL-MCNC: 4.1 MMOL/L — SIGNIFICANT CHANGE UP (ref 3.5–5.3)
POTASSIUM SERPL-SCNC: 4.1 MMOL/L — SIGNIFICANT CHANGE UP (ref 3.5–5.3)
PROT SERPL-MCNC: 7.3 G/DL — SIGNIFICANT CHANGE UP (ref 6–8.3)
RBC # BLD: 4.93 M/UL — SIGNIFICANT CHANGE UP (ref 3.8–5.2)
RBC # FLD: 24.5 % — HIGH (ref 10.3–14.5)
SODIUM SERPL-SCNC: 141 MMOL/L — SIGNIFICANT CHANGE UP (ref 135–145)
WBC # BLD: 15.23 K/UL — HIGH (ref 3.8–10.5)
WBC # FLD AUTO: 15.23 K/UL — HIGH (ref 3.8–10.5)

## 2022-09-15 PROCEDURE — 82962 GLUCOSE BLOOD TEST: CPT

## 2022-09-15 PROCEDURE — 36415 COLL VENOUS BLD VENIPUNCTURE: CPT

## 2022-09-15 PROCEDURE — 80053 COMPREHEN METABOLIC PANEL: CPT

## 2022-09-15 PROCEDURE — 95957 EEG DIGITAL ANALYSIS: CPT

## 2022-09-15 PROCEDURE — 99239 HOSP IP/OBS DSCHRG MGMT >30: CPT

## 2022-09-15 PROCEDURE — 99285 EMERGENCY DEPT VISIT HI MDM: CPT | Mod: 25

## 2022-09-15 PROCEDURE — 85025 COMPLETE CBC W/AUTO DIFF WBC: CPT

## 2022-09-15 PROCEDURE — 80048 BASIC METABOLIC PNL TOTAL CA: CPT

## 2022-09-15 PROCEDURE — 85730 THROMBOPLASTIN TIME PARTIAL: CPT

## 2022-09-15 PROCEDURE — 87635 SARS-COV-2 COVID-19 AMP PRB: CPT

## 2022-09-15 PROCEDURE — 93005 ELECTROCARDIOGRAM TRACING: CPT

## 2022-09-15 PROCEDURE — 71045 X-RAY EXAM CHEST 1 VIEW: CPT

## 2022-09-15 PROCEDURE — 70553 MRI BRAIN STEM W/O & W/DYE: CPT

## 2022-09-15 PROCEDURE — 0042T: CPT | Mod: MA

## 2022-09-15 PROCEDURE — 84484 ASSAY OF TROPONIN QUANT: CPT

## 2022-09-15 PROCEDURE — 70498 CT ANGIOGRAPHY NECK: CPT | Mod: MA

## 2022-09-15 PROCEDURE — 85610 PROTHROMBIN TIME: CPT

## 2022-09-15 PROCEDURE — 70450 CT HEAD/BRAIN W/O DYE: CPT | Mod: MA

## 2022-09-15 PROCEDURE — 95819 EEG AWAKE AND ASLEEP: CPT

## 2022-09-15 PROCEDURE — 70496 CT ANGIOGRAPHY HEAD: CPT | Mod: MA

## 2022-09-15 PROCEDURE — 85027 COMPLETE CBC AUTOMATED: CPT

## 2022-09-15 PROCEDURE — A9585: CPT

## 2022-09-15 RX ORDER — DEXAMETHASONE 0.5 MG/5ML
2 ELIXIR ORAL EVERY 6 HOURS
Refills: 0 | Status: DISCONTINUED | OUTPATIENT
Start: 2022-09-15 | End: 2022-09-15

## 2022-09-15 RX ORDER — LEVETIRACETAM 250 MG/1
1000 TABLET, FILM COATED ORAL
Refills: 0 | Status: DISCONTINUED | OUTPATIENT
Start: 2022-09-15 | End: 2022-09-15

## 2022-09-15 RX ORDER — DEXAMETHASONE 0.5 MG/5ML
1 ELIXIR ORAL
Qty: 14 | Refills: 0
Start: 2022-09-15 | End: 2022-09-17

## 2022-09-15 RX ORDER — INFLUENZA VIRUS VACCINE 15; 15; 15; 15 UG/.5ML; UG/.5ML; UG/.5ML; UG/.5ML
0.5 SUSPENSION INTRAMUSCULAR ONCE
Refills: 0 | Status: COMPLETED | OUTPATIENT
Start: 2022-09-15 | End: 2022-09-15

## 2022-09-15 RX ORDER — DEXAMETHASONE 0.5 MG/5ML
4 ELIXIR ORAL EVERY 6 HOURS
Refills: 0 | Status: DISCONTINUED | OUTPATIENT
Start: 2022-09-15 | End: 2022-09-15

## 2022-09-15 RX ORDER — LEVETIRACETAM 250 MG/1
1 TABLET, FILM COATED ORAL
Qty: 60 | Refills: 0
Start: 2022-09-15 | End: 2022-10-14

## 2022-09-15 RX ADMIN — LEVETIRACETAM 400 MILLIGRAM(S): 250 TABLET, FILM COATED ORAL at 06:50

## 2022-09-15 RX ADMIN — Medication 4 MILLIGRAM(S): at 01:50

## 2022-09-15 RX ADMIN — Medication 4 MILLIGRAM(S): at 05:54

## 2022-09-15 RX ADMIN — ENOXAPARIN SODIUM 40 MILLIGRAM(S): 100 INJECTION SUBCUTANEOUS at 05:56

## 2022-09-15 RX ADMIN — Medication 4 MILLIGRAM(S): at 11:57

## 2022-09-15 NOTE — DISCHARGE NOTE NURSING/CASE MANAGEMENT/SOCIAL WORK - NSDCPEFALRISK_GEN_ALL_CORE
For information on Fall & Injury Prevention, visit: [email or web address] OR  [email or web address] OR  https://www.cdc.gov/steadi/patient.html

## 2022-09-15 NOTE — PATIENT PROFILE ADULT - FALL HARM RISK - RISK INTERVENTIONS

## 2022-09-15 NOTE — DISCHARGE NOTE PROVIDER - NSDCPNSUBOBJ_GEN_ALL_CORE
Patient seen and examined at bedside. No acute events overnight. Neurological symptoms have no recurred. Patient with MRI brain showing right frontal parasagittal lesion which has increased in size compared to prior imaging with some vasogenic edema. Neurology saw patient and believe she had partial focal seizure. They recommend decadron taper and started Keppra for seizure prophylaxis. Patient with very close outpatient follow up with her radiation oncologist Jaiden this coming Tuesday. In addition, she will follow up with Dr. Martin.

## 2022-09-15 NOTE — DISCHARGE NOTE PROVIDER - NSDCFUSCHEDAPPT_GEN_ALL_CORE_FT
Gaurav Martin  [facility]  [facility]  Scheduled Appointment: 09/20/2022    Anthony Hwang  [facility]  RADMED [address]  Scheduled Appointment: 09/20/2022    Adolph Nieto  [facility]  Med [facility] [address] [facility]  Scheduled Appointment: 10/18/2022

## 2022-09-15 NOTE — DISCHARGE NOTE PROVIDER - ATTENDING DISCHARGE PHYSICAL EXAMINATION:
T(C): 36.5 (09-15-22 @ 15:18), Max: 36.8 (09-15-22 @ 07:46)  HR: 70 (09-15-22 @ 15:18) (67 - 81)  BP: 108/72 (09-15-22 @ 15:18) (105/63 - 119/55)  RR: 18 (09-15-22 @ 15:18) (16 - 18)  SpO2: 96% (09-15-22 @ 15:18) (95% - 97%)    GEN: female in NAD, appears comfortable, no diaphoresis  EYES: No scleral injection, PERRL, EOMI  ENTM: neck supple & symmetric without tracheal deviation, moist membranes, no gross hearing impairment, thyroid gland not enlarged  CV: +S1/S2, no m/r/g, no abdominal bruit, no LE edema  RESP: breathing comfortably, no respiratory accessory muscle use, CTAB, no w/r/r  GI: normoactive BS, soft, NTND, no rebounding/guarding, no palpable masses  LYMPHATICS: no LAD or tenderness to palpation  NEURO: AOx3, no focal deficits, CNII-XII grossly intact  PSYCH: No SI/HI/AVH, appropriate affect, appropriate insight/judgment   SKIN: no petechiae, ecchymosis or maculopapular rash noted

## 2022-09-15 NOTE — DISCHARGE NOTE PROVIDER - NSDCCPCAREPLAN_GEN_ALL_CORE_FT
PRINCIPAL DISCHARGE DIAGNOSIS  Diagnosis: Brain mass  Assessment and Plan of Treatment: You were noted to have a brain lesion on admission. A brain lesion is an abnormal growth or mass of cells in or around your brain. Brain masses can be malignant (cancerous) or benign (noncancerous). Some tumors grow quickly, while others are slow growing. Your Head CT scan showed a brain mass with increase vasogenic edema. Head MRI showed Increase in size of a right frontal parasagittal lesion since comparison MRI, now measuring up to 2.5 cm. Increasing surrounding vasogenic edema, No new lesion identified, and No acute infarct.  An electroencephalogram (EEG) showed right hemispheric slowing. Neurology was part of your management. You were managed with LEVETIRACETAM 1000 MG TWICE DAILY AND DEXAMETHASONE 4 MG EVERY 6 HOURS. Please continue to take LEVETIRACETAM 1000 MG TWICE DAILY AND ******DEXAMETHASONE 4 MG EVERY ******* TAPER HERE EDIT!!!!!!!! and FOLLOW WITH DR CURRIE ON TUESDAY 9/20/2022. FOLLOW UP WITH YOUR PCP WITHIN A WEEK FROM DISCHARGE.          PRINCIPAL DISCHARGE DIAGNOSIS  Diagnosis: Brain mass  Assessment and Plan of Treatment: Based on the symptoms you presented with at the emergency department, a stroke workup was performed to rule out the previous mentioned. You were noted to have a brain lesion on admission. A brain lesion is an abnormal growth or mass of cells in or around your brain. Brain masses can be malignant (cancerous) or benign (noncancerous). Some tumors grow quickly, while others are slow growing. Your Head CT scan showed a brain mass with increase vasogenic edema. Head MRI showed Increase in size of a right frontal parasagittal lesion since comparison MRI, now measuring up to 2.5 cm. Increasing surrounding vasogenic edema, No new lesion identified, and No acute infarct.  An electroencephalogram (EEG) showed right hemispheric slowing. Neurology was part of your management. You were managed with LEVETIRACETAM 1000 MG TWICE DAILY AND DEXAMETHASONE 4 MG EVERY 6 HOURS.  LEVETIRACETAM 1000 MG TWICE DAILY. PLEASE TAKE DEXAMETHASONE 4 MG EVERY 6 HOURS, THEN EVERY 8 HOURS FROM 9/16/2022 TO 9/17/2022, THEN EVERY 12 HOURS FROM 9/18/2022 TO 9/20/2022. FOLLOW WITH DR CURRIE ON TUESDAY 9/20/2022. FOLLOW UP WITH YOUR PCP WITHIN A WEEK FROM DISCHARGE.       PRINCIPAL DISCHARGE DIAGNOSIS  Diagnosis: Brain mass  Assessment and Plan of Treatment: Based on the symptoms you presented with at the emergency department, a stroke workup was performed to rule out the previous mentioned. You were noted to have a brain lesion on admission. A brain lesion is an abnormal growth or mass of cells in or around your brain. Brain masses can be malignant (cancerous) or benign (noncancerous). Some tumors grow quickly, while others are slow growing. Your Head CT scan showed a brain mass with increase vasogenic edema. Head MRI showed Increase in size of a right frontal parasagittal lesion from previous MRI, now measuring up to 2.5 cm. Increasing surrounding vasogenic edema, No new lesion identified, and No acute infarct.  An electroencephalogram (EEG) showed right hemispheric slowing. Neurology was was consulted who recommended anti seizure medications and steroids to manage cerebral edema-swelling. YOU WERE MANAGED WITH LEVETIRACETAM 1000 MG TWICE DAILY AND DEXAMETHASONE 4 MG EVERY 6 HOURS X 2 DAYS THEN EVERY 8 HOURS FROM 9/16/2022 TO 9/17/2022, THEN EVERY 12 HOURS FROM 9/18/2022 TO 9/20/2022. PLEASE FOLLOW WITH DR CURRIE ON TUESDAY 9/20/2022 AND FOLLOW UP WITH YOUR PCP WITHIN A WEEK FROM DISCHARGE.      SECONDARY DISCHARGE DIAGNOSES  Diagnosis: Metastatic breast cancer  Assessment and Plan of Treatment: You have history of metastatic breast cancer. Please follow up with your PCP and Oncologist within a week from discharge.

## 2022-09-15 NOTE — PATIENT PROFILE ADULT - HOME ACCESSIBILITY CONCERNS
[address] [address]  [phone]  Dept: [phone]    PRE-OP EVALUATION:  Today's date: 2019    Virgie Schroeder (: 1983) presents for pre-operative evaluation assessment as requested by Dr. Rodriguez.  He requires evaluation and anesthesia risk assessment prior to undergoing surgery/procedure for treatment of left eye retina repair .    Proposed Surgery/ Procedure: left eye retina repair  Date of Surgery/ Procedure: 2019  Time of Surgery/ Procedure: 6:45 AM  Hospital/Surgical Facility: [facility]  Fax number for surgical facility: [phone], and [phone]  Primary Physician: Ruma Porter  Type of Anesthesia Anticipated: to be determined    Patient has a Health Care Directive or Living Will:  NO    1. NO - Do you have a history of heart attack, stroke, stent, bypass or surgery on an artery in the head, neck, heart or legs?  2. NO - Do you ever have any pain or discomfort in your chest?  3. NO - Do you have a history of  Heart Failure?  4. NO - Are you troubled by shortness of breath when: walking on the level, up a slight hill or at night?  5. NO - Do you currently have a cold, bronchitis or other respiratory infection?  6. NO - Do you have a cough, shortness of breath or wheezing?  7. NO - Do you sometimes get pains in the calves of your legs when you walk?  8. NO - Do you or anyone in your family have previous history of blood clots?  9. NO - Do you or does anyone in your family have a serious bleeding problem such as prolonged bleeding following surgeries or cuts?  10. NO - Have you ever had problems with anemia or been told to take iron pills?  11. NO - Have you had any abnormal blood loss such as black, tarry or bloody stools, or abnormal vaginal bleeding?  12. NO - Have you ever had a blood transfusion?  13. NO - Have you or any of your relatives ever had problems with anesthesia?  14.  NO - Do you have sleep apnea, excessive snoring or daytime drowsiness?  15. NO - Do you have any prosthetic heart valves?  16. NO - Do you have prosthetic joints?  17. NO - Is there any chance that you may be pregnant?      HPI:     HPI related to upcoming procedure: Patient with history of diabetes, type I been diabetic for over 25 years.  History of retinal neuropathy, diabetic retinopathy.  Comes today for retina surgery.  He reports blood sugars been well stable denies any low blood sugar, denies being lightheaded or dizzy, denies chest pain or shortness of breath, denies recent sickness.    See problem list for active medical problems.  Problems all longstanding and stable, except as noted/documented.  See ROS for pertinent symptoms related to these conditions.      MEDICAL HISTORY:     Patient Active Problem List    Diagnosis Date Noted     Major depression in complete remission (H) 07/26/2016     Priority: Medium     Type 1 diabetes mellitus with hyperglycemia (H) 10/17/2015     Priority: Medium     Lipoma of skin and subcutaneous tissue 05/15/2015     Priority: Medium     Erectile dysfunction 05/15/2015     Priority: Medium     Bursitis of shoulder 03/21/2012     Priority: Medium     Has in both       Hyperlipidemia LDL goal <100 03/21/2012     Priority: Medium     Type 1 diabetes, HbA1c goal < 7% (H) 02/20/2012     Priority: Medium     Anxiety 02/20/2012     Priority: Medium      Past Medical History:   Diagnosis Date     Bursitis of shoulder 3/21/2012     Moderate major depression (H) 2/20/2012    Tried celexa but had side effects-dry hands, increase anxiety, decreased libido        Type 1 diabetes, HbA1c goal < 7% (H) 2/20/2012     No past surgical history on file.  Current Outpatient Medications   Medication Sig Dispense Refill     blood glucose monitoring (NO BRAND SPECIFIED) meter device kit Use to test blood sugar 3 times daily or as directed. 1 kit 0     ibuprofen (ADVIL,MOTRIN) 800 MG tablet Take 1  "tablet by mouth every 8 hours as needed for pain. (Patient not taking: Reported on 12/11/2018) 90 tablet 1     insulin NPH (HUMULIN N, NOVOLIN N) 100 UNIT/ML VIAL Inject 40 Units Subcutaneous 2 times daily       insulin regular (HUMULIN R, NOVOLIN R) 100 UNIT/ML VIAL Inject Subcutaneous 3 times daily (before meals)       Insulin Syringe-Needle U-100 (BD INSULIN SYRINGE ULTRAFINE) 30G X 1/2\" 1 ML MISC 1 each daily. Use one syringe 1 daily or as directed. 100 each prn     lisinopril (PRINIVIL,ZESTRIL) 5 MG tablet Take 1 tablet (5 mg) by mouth daily (Patient not taking: Reported on 12/11/2018) 90 tablet 1     Omega-3 Fatty Acids (FISH OIL) 435 MG CAPS Take 1 tablet by mouth 2 times daily.       order for DME Equipment being ordered: Please provide patient with the Chio c-crowdstyle 14 day sensors to be used with the Chio monitor. Please include all supplies needed. 2 each 11     order for DME Test strips for pt's glucometer, contour machine Test TID and prn. 200 each 4     simvastatin (ZOCOR) 10 MG tablet Take 1 tablet (10 mg) by mouth At Bedtime (Patient not taking: Reported on 12/11/2018) 90 tablet 1     VITAMIN D, CHOLECALCIFEROL, PO Take 4 capsules by mouth daily.       OTC products: None, except as noted above    No Known Allergies   Latex Allergy: NO    Social History     Tobacco Use     Smoking status: Never Smoker     Smokeless tobacco: Never Used   Substance Use Topics     Alcohol use: No     History   Drug Use     Comment: weed        REVIEW OF SYSTEMS:   Constitutional, neuro, ENT, endocrine, pulmonary, cardiac, gastrointestinal, genitourinary, musculoskeletal, integument and psychiatric systems are negative, except as otherwise noted.    EXAM:   There were no vitals taken for this visit.    GENERAL APPEARANCE: healthy, alert and no distress     EYES: EOMI,  PERRL     HENT: ear canals and TM's normal and nose and mouth without ulcers or lesions     NECK: no adenopathy, no asymmetry, masses, or scars and " thyroid normal to palpation     RESP: lungs clear to auscultation - no rales, rhonchi or wheezes     CV: regular rates and rhythm, normal S1 S2, no S3 or S4 and no murmur, click or rub     ABDOMEN:  soft, nontender, no HSM or masses and bowel sounds normal     MS: extremities normal- no gross deformities noted, no evidence of inflammation in joints, FROM in all extremities.     SKIN: no suspicious lesions or rashes     NEURO: Normal strength and tone, sensory exam grossly normal, mentation intact and speech normal     PSYCH: mentation appears normal. and affect normal/bright     LYMPHATICS: No cervical adenopathy    DIAGNOSTICS:   No labs or EKG required for low risk surgery (cataract, skin procedure, breast biopsy, etc)    Recent Labs   Lab Test 07/26/16  0815 05/15/15  1644  03/20/12  0820     --   --  136   POTASSIUM 4.4  --   --  4.5   CR 0.87 0.87   < > 0.91   A1C 11.0* 9.6*   < > 10.5*    < > = values in this interval not displayed.      Lab Results   Component Value Date    A1C 7.4 11/06/2019    A1C 11.0 07/26/2016    A1C 9.6 05/15/2015    A1C 9.1 02/24/2015    A1C 13.5 07/23/2013         IMPRESSION:   Reason for surgery/procedure: Diabetic retinopathy  Diagnosis/reason for consult: left eye surgery, Retina    The proposed surgical procedure is considered LOW risk.    REVISED CARDIAC RISK INDEX  The patient has the following serious cardiovascular risks for perioperative complications such as (MI, PE, VFib and 3  AV Block):  No serious cardiac risks  INTERPRETATION: 0 risks: Class I (very low risk - 0.4% complication rate)    The patient has the following additional risks for perioperative complications:  No identified additional risks     Diagnosis Comments   1. Preop general physical exam     2. Type 1 diabetes mellitus with mild nonproliferative retinopathy of both eyes without macular edema (H)     3. Need for prophylactic vaccination and inoculation against influenza  INFLUENZA VACCINE IM > 6  MONTHS VALENT IIV4 [11683], Vaccine Administration, Initial [43671]   4. Type 1 diabetes, HbA1c goal < 7% (H)  HIV Screening, HEMOGLOBIN A1C, BASIC METABOLIC PANEL    5. Major depression in complete remission (H)     6. Hyperlipidemia LDL goal <100           RECOMMENDATIONS:       --Patient is to take all scheduled medications on the day of surgery EXCEPT for modifications listed below.    APPROVAL GIVEN to proceed with proposed procedure, without further diagnostic evaluation     Patient Instructions     Before Your Surgery      Call your surgeon if there is any change in your health. This includes signs of a cold or flu (such as a sore throat, runny nose, cough, rash or fever).    Do not smoke, drink alcohol or take over the counter medicine (unless your surgeon or primary care doctor tells you to) for the 24 hours before and after surgery.    If you take prescribed drugs: Follow your doctor s orders about which medicines to take and which to stop until after surgery.    Eating and drinking prior to surgery: follow the instructions from your surgeon    Take a shower or bath the night before surgery. Use the soap your surgeon gave you to gently clean your skin. If you do not have soap from your surgeon, use your regular soap. Do not shave or scrub the surgery site.  Wear clean pajamas and have clean sheets on your bed.     Nothing to eat or drink after Midnight.  Hold all NSAID and blood thinner 7 days, before surgery ( Aspirin, Ibuprofen, Naproxen, etc, ), and Coumadin.  May take blood pressure medication, the morning of your surgery with sip of water, as directed.    Do not take any of long acting nsulin in AM of surgery.  May take 1/2, of your short acting based on sliding scale      Signed Electronically by: Jaci Butcher MD    Copy of this evaluation report is provided to requesting physician.    [facility] Preop Guidelines    Revised Cardiac Risk Index   none

## 2022-09-15 NOTE — PATIENT PROFILE ADULT - NSPROMEDSADMININFO_GEN_A_NUR
pt to ER via squad from urgent care. Patient states he has had diarrhea x 3 days. reports bilateral feet cramping today. pt states he had loss of vision bilateral eyes x 1-2 hours earlier today. Patient reports completely resolved on own. EMS reports urgent care bp was elevated in 785B/209V systolic. Upon their assessment, patient was hypotensive in route. no concerns

## 2022-09-15 NOTE — DISCHARGE NOTE PROVIDER - NSDCMRMEDTOKEN_GEN_ALL_CORE_FT
exemestane 25 mg oral tablet: 1 tab(s) orally once a day  oxyCODONE 5 mg oral tablet: 1 tab(s) orally every 4 hours, As Needed -for severe pain MDD:6   dexamethasone 4 mg oral tablet: 1 tab(s) orally every 6 hours till tonight 9/15  1 tab(s) orally every 8 hours 9/16, 9/17  1 tab(s) orally every 12 hours 9/18 till 9/20 when you see your doctor on 9/20  exemestane 25 mg oral tablet: 1 tab(s) orally once a day  famotidine 40 mg oral tablet: 1 tab(s) orally 2 times a day  fluticasone 27.5 mcg/inh nasal spray: 1 spray(s) nasal once a day  levETIRAcetam 1000 mg oral tablet: 1 tab(s) orally 2 times a day  valGANciclovir 450 mg oral tablet: 2 tab(s) orally 2 times a day  Vitamin D2 50 mcg (2000 intl units) oral capsule: 1 cap(s) orally once a day

## 2022-09-15 NOTE — PATIENT PROFILE ADULT - NSPROPTRIGHTSUPPORTPERSON_GEN_A_NUR
Continue to take plenty of fluids, take Tylenol or ibuprofen as needed for pain. follow-up with primary care provider for repeat evaluation.  The x-ray did show a Subcentimeter nodular density projects in the right infrahilar region. Nonemergent chest CT can be considered for further evaluation.  Please follow up with primary care provider regarding this.   same name as above

## 2022-09-18 ENCOUNTER — APPOINTMENT (OUTPATIENT)
Dept: MRI IMAGING | Facility: IMAGING CENTER | Age: 57
End: 2022-09-18

## 2022-09-19 ENCOUNTER — NON-APPOINTMENT (OUTPATIENT)
Age: 57
End: 2022-09-19

## 2022-09-20 ENCOUNTER — RESULT REVIEW (OUTPATIENT)
Age: 57
End: 2022-09-20

## 2022-09-20 ENCOUNTER — APPOINTMENT (OUTPATIENT)
Dept: RADIATION ONCOLOGY | Facility: CLINIC | Age: 57
End: 2022-09-20

## 2022-09-20 ENCOUNTER — APPOINTMENT (OUTPATIENT)
Dept: HEMATOLOGY ONCOLOGY | Facility: CLINIC | Age: 57
End: 2022-09-20

## 2022-09-20 VITALS
HEART RATE: 53 BPM | TEMPERATURE: 96.98 F | BODY MASS INDEX: 31.44 KG/M2 | WEIGHT: 160.16 LBS | SYSTOLIC BLOOD PRESSURE: 128 MMHG | HEIGHT: 60 IN | DIASTOLIC BLOOD PRESSURE: 83 MMHG | RESPIRATION RATE: 17 BRPM | OXYGEN SATURATION: 98 %

## 2022-09-20 VITALS
OXYGEN SATURATION: 99 % | WEIGHT: 160.94 LBS | BODY MASS INDEX: 31.43 KG/M2 | RESPIRATION RATE: 16 BRPM | SYSTOLIC BLOOD PRESSURE: 118 MMHG | TEMPERATURE: 97.3 F | HEART RATE: 79 BPM | DIASTOLIC BLOOD PRESSURE: 78 MMHG

## 2022-09-20 LAB
BASOPHILS # BLD AUTO: 0.03 K/UL — SIGNIFICANT CHANGE UP (ref 0–0.2)
BASOPHILS NFR BLD AUTO: 0.2 % — SIGNIFICANT CHANGE UP (ref 0–2)
EOSINOPHIL # BLD AUTO: 0 K/UL — SIGNIFICANT CHANGE UP (ref 0–0.5)
EOSINOPHIL NFR BLD AUTO: 0 % — SIGNIFICANT CHANGE UP (ref 0–6)
HCT VFR BLD CALC: 42 % — SIGNIFICANT CHANGE UP (ref 34.5–45)
HGB BLD-MCNC: 13.7 G/DL — SIGNIFICANT CHANGE UP (ref 11.5–15.5)
IMM GRANULOCYTES NFR BLD AUTO: 2.3 % — HIGH (ref 0–0.9)
LYMPHOCYTES # BLD AUTO: 1.58 K/UL — SIGNIFICANT CHANGE UP (ref 1–3.3)
LYMPHOCYTES # BLD AUTO: 12.7 % — LOW (ref 13–44)
MCHC RBC-ENTMCNC: 26 PG — LOW (ref 27–34)
MCHC RBC-ENTMCNC: 32.6 G/DL — SIGNIFICANT CHANGE UP (ref 32–36)
MCV RBC AUTO: 79.7 FL — LOW (ref 80–100)
MONOCYTES # BLD AUTO: 0.7 K/UL — SIGNIFICANT CHANGE UP (ref 0–0.9)
MONOCYTES NFR BLD AUTO: 5.6 % — SIGNIFICANT CHANGE UP (ref 2–14)
NEUTROPHILS # BLD AUTO: 9.83 K/UL — HIGH (ref 1.8–7.4)
NEUTROPHILS NFR BLD AUTO: 79.2 % — HIGH (ref 43–77)
NRBC # BLD: 0 /100 WBCS — SIGNIFICANT CHANGE UP (ref 0–0)
PLATELET # BLD AUTO: 290 K/UL — SIGNIFICANT CHANGE UP (ref 150–400)
RBC # BLD: 5.27 M/UL — HIGH (ref 3.8–5.2)
RBC # FLD: 24 % — HIGH (ref 10.3–14.5)
WBC # BLD: 12.43 K/UL — HIGH (ref 3.8–10.5)
WBC # FLD AUTO: 12.43 K/UL — HIGH (ref 3.8–10.5)

## 2022-09-20 PROCEDURE — 99215 OFFICE O/P EST HI 40 MIN: CPT | Mod: 25

## 2022-09-20 PROCEDURE — 99215 OFFICE O/P EST HI 40 MIN: CPT

## 2022-09-21 NOTE — PHYSICAL EXAM
[Fully active, able to carry on all pre-disease performance without restriction] : Status 0 - Fully active, able to carry on all pre-disease performance without restriction [Normal] : affect appropriate [de-identified] : Right breast with no discrete palpable masses, no palpable axillary nodes.  Left reconstructed breast with medial and lateral nodularity c/w fat necrosis, stable, no other discrete palpable masses, no palpable axillary nodes.

## 2022-09-21 NOTE — HISTORY OF PRESENT ILLNESS
[de-identified] : The patients' history of present illness began in 2012 at which time she was found to have a right breast cancer for which she underwent a right modified radical mastectomy/axillary lymph node dissection with the finding of an ER positive 20%, WV positive 10%, HER-2/lisa 2+, FISH amplified breast cancer per outside physician report (I do not have a copy that pathology report for my review).  She went on to receive adjuvant dose-dense AC chemotherapy for 4 cycles followed by Taxol plus Herceptin as well as adjuvant radiation therapy.  She was subsequently begun adjuvant tamoxifen therapy.  Her last menstrual period was in 2014.\par \par The patient then began to experience weight loss beginning in November 2018 and over 3-month period of time lost 30 pounds per her report.  At approximately the same time she developed increasing shortness of breath, and ultimately presented to [facility] Emergency Department.  She subsequently had a CT scan of the chest, which showed multiple mediastinal hilar right subclavian lymph nodes corresponding to abnormal FDG activity on PET scan consistent with metastatic carcinoma; there were tiny nodularities in both lungs- too small to accurately characterize- not present on prior examination and measuring up to 2 mm in both lungs.  The possibility of metastasis was a consideration; she had no bony metastases; the liver demonstrated tiny poorly defined lesions less than 5 mm and difficult to characterize and visible on prior examinations with the possibility of metastasis also considered.  A PET-CT scan showed multiple hypermetabolic lymph nodes in the mediastinum, right subclavian region and both adebayo felt to represent tumor; there were stellate lesions in the left breast region, felt to represent benign scars; the lungs demonstrated no significant abnormality with multiple tiny lung nodules seen on CT as previously noted; liver demonstrated no significant abnormality with multiple small poorly defined nodules seen on CT per my previous comments.  The patient underwent a left breast core biopsy of the area of concern with a finding of fat necrosis and foreign body giant cell reaction as well as a separate fine-needle aspiration consistent with a ruptured cyst/fat necrosis.  The patient consequently saw her oncologist, Dr. Carias, who sent the patient for ultrasound-guided core biopsies of the mediastinal lymph nodes with a finding of carcinoma, consistent with breast primary with estrogen receptor returning positive, greater than 50%, progesterone receptor negative, and HER-2/lisa 0 per outside report. The patient was subsequently begun on docetaxel, pertuzumab, trastuzumab, and exemestane beginning at the early 4/19, and subsequently switched to weekly paclitaxel secondary to side effects and continuing  on trastuzumab / pertuzumab every 3 weeks as well as exemestane. The patient was last treated per her report on approximately June 12, 2019, despite outside records that show ongoing weekly treatments early July 2019.  \par \par The patient also reports that beginning in approximately 12/18, she started to develop slowly progressive diffuse rash with associated pruritus, having seen multiple dermatologists.  Most recently, she saw Dr. Courtney Cowart, a [facility] physician practicing at [place], [place], who diagnosed disseminated herpes simplex for which she was begun on valacyclovir, as well as evidence of MRSA for which was treated with doxycycline beginning in May 2019.  In June 2019, the patient presented to [facility] Emergency Department complaining of progressive "weakness and shaking." She was found to be anemic and had melena.  She underwent endoscopy and VIR procedure to stop gastric bleeding, which was unsuccessful.  Consequently, she had a distal gastrectomy.  She was discharged from [facility] on 07/08/2019.\par \par The patient was initial consultation 7/24/19 for a further opinion regarding treatment recommendations. She noted ongoing weakness, fatigue, and generalized malaise, although all of these had begun to improve gradually.  Her last chemotherapy as noted above was approximately at 06/12/2019.  She reports that her primary oncologist, Dr. Carias, told to discontinue exemestane upon admission to [facility] with the consideration restarting it post discharge.  \par \par I obtained her outside slides for review at [facility] and it was confirmed that she had met breast cancer ER+ WV neg and Her 2 lisa neg.\par \par Consequently I recommended restarting exemestane.  \par \par In 3/21 she had L sided seizures and found to have abnormal enhancing lesions in the posterior fossa and supratentorial region are identified.\par \par Lesion 1: Large dural based enhancing lesion is seen involving the high medial right frontal region. This lesion does appear to cross the midline and measures approximately 3.2 x 2.7 x 3.5 cm. Small amount of adjacent susceptibility is identified. Surrounding edema is identified. Localized mass effect is identified. Right to left shift (7.2 mm) seen.\par \par Lesion 2: Enhancing lesion is seen involving the right upper tiffany. This lesion measures approximately 0.5 x 0.4 cm.\par \par Lesion 3: Enhancing lesion is seen involving the superior left cerebellar region. This lesion measures approximately 1.2 x 1.0 cm. Surrounding edema is identified.\par \par She was seen by Dr Hwang and treated with Gamma knife SRS to the 3 brain lesions. \par \par A repeat MRI 6/2/21 demonstrated:\par \par -Interval decrease in size of 3 enhancing lesions, largest in the high medial right frontal lobe measuring up to 2.0 cm.\par -Improvement of vasogenic edema with resolution of leftward midline shift.\par -No new lesions are identified\par \par \par \par  [de-identified] : Pt seen today for a routine f/u visit.  \par \par On Exemestane and tolerating well. Denies any treatment related side effects. \par \par On 9/14/22 had a witnessed L sided seizure and admitted to [facility]. A brain MRI done 9/14/2022 showed: \par -Increase in size of a right frontal parasagittal lesion since comparison MRI, now measuring up to 2.5 cm. Increasing surrounding vasogenic edema.\par -No new lesion identified. No acute infarct.\par \par She was d/c'd on dexamethasone taper to 4mg BID and keppra 1000mg bId. \par \par She notes a good appetite, stable weight and excellent performance status. \par \par MRI head 9/14/22\par IMPRESSION:\par -Increase in size of a right frontal parasagittal lesion since comparison MRI, now measuring up to 2.5 cm. Increasing surrounding vasogenic edema.\par -No new lesion identified. No acute infarct\par \par CT c/a/p, 5/6/22- IMPRESSION: Stable exam since 12/17/2021\par \par Bone scan, 5/6/22-  IMPRESSION: Bone scan demonstrates:\par \par No definite scan evidence of osseous metastasis.\par \par Mild diffuse increased uptake in the calvarium, unchanged, probably hyperostosis or Paget's disease.\par Heterotopic ossification in the medial aspect of the left breast, unchanged.\par Degenerative disease in the major joints.\par No significant interval change compared to the previous bone scan of 7/8/2021.\par \par CT scan, 12/17/21- IMPRESSION:  Stable examination compared with prior imaging 7/8/2021.\par \par MRI head 12/30/21\par IMPRESSION:\par Right high paramedian region lesion measures similar in size though there \par is increased edema seen in association when compared with the prior \par 9/24/2021. Question of vague enhancement on the surface of the cervical \par medullary junction and along the surface of the brainstem extending into \par the spinal cord level. Consideration for possible leptomeningeal \par pathology should be given. Recommend correlation with CSF as clinically \par warranted.\par \par DEXA 1/21 osteopenia\par \par

## 2022-09-22 ENCOUNTER — APPOINTMENT (OUTPATIENT)
Dept: NEUROLOGY | Facility: CLINIC | Age: 57
End: 2022-09-22

## 2022-09-22 ENCOUNTER — NON-APPOINTMENT (OUTPATIENT)
Age: 57
End: 2022-09-22

## 2022-09-22 VITALS
HEART RATE: 70 BPM | WEIGHT: 160 LBS | TEMPERATURE: 206.96 F | DIASTOLIC BLOOD PRESSURE: 65 MMHG | BODY MASS INDEX: 31.41 KG/M2 | OXYGEN SATURATION: 97 % | HEIGHT: 60 IN | SYSTOLIC BLOOD PRESSURE: 106 MMHG

## 2022-09-22 PROCEDURE — 99215 OFFICE O/P EST HI 40 MIN: CPT

## 2022-09-22 NOTE — PHYSICAL EXAM
[General Appearance - Alert] : alert [General Appearance - In No Acute Distress] : in no acute distress [Person] : oriented to person [Place] : oriented to place [Time] : oriented to time [Concentration Intact] : normal concentrating ability [Naming Objects] : no difficulty naming common objects [Fluency] : fluency intact [Comprehension] : comprehension intact [Vocabulary] : adequate range of vocabulary [Cranial Nerves Optic (II)] : visual acuity intact bilaterally,  visual fields full to confrontation, pupils equal round and reactive to light [Cranial Nerves Oculomotor (III)] : extraocular motion intact [Cranial Nerves Trigeminal (V)] : facial sensation intact symmetrically [Cranial Nerves Facial (VII)] : face symmetrical [Cranial Nerves Vestibulocochlear (VIII)] : hearing was intact bilaterally [Cranial Nerves Glossopharyngeal (IX)] : tongue and palate midline [Cranial Nerves Accessory (XI - Cranial And Spinal)] : head turning and shoulder shrug symmetric [Cranial Nerves Hypoglossal (XII)] : there was no tongue deviation with protrusion [Motor Tone] : muscle tone was normal in all four extremities [Motor Strength] : muscle strength was normal in all four extremities [Involuntary Movements] : no involuntary movements were seen [Sensation Tactile Decrease] : light touch was intact [Abnormal Walk] : normal gait [Balance] : balance was intact [Coordination - Dysmetria Impaired Finger-to-Nose Bilateral] : not present [Coordination - Dysmetria Impaired Heel-to-Shin Bilateral] : not present [Plantar Reflex Right Only] : normal on the right [Plantar Reflex Left Only] : normal on the left

## 2022-09-22 NOTE — DATA REVIEWED
[de-identified] : Hematology oncology note appreciated\par Radiation oncology noted\par PET scan noted

## 2022-09-22 NOTE — ASSESSMENT
[FreeTextEntry1] : The patient has recurrent left frontal brain mass, consistent with metastasis, and now with subsequent focal simple seizures.  Seizures are well controlled on Keppra 1000 mg twice a day.  She has the recurrent left frontal mass surrounded by swelling and is on dexamethasone 1 g twice a day.  Advised patient to continue with Keppra 1000 mg twice a day for seizure prophylaxis.  Advised on importance of seizure and fall precautions.  We will refer patient to neuro oncologist for evaluation and treatment of brain metastasis.\par \par I spent the time noted on the day of this patient encounter preparing for, providing and documenting the above E/M service and counseling and educate patient on differential, workup, disease course, and treatment/management. Education was provided to the patient during this encounter. All questions and concerns were answered and addressed in detail. The patient verbalized understanding and agreed to plan. Patient was advised to continue to monitor for neurologic symptoms and to notify my office or go to the nearest emergency room if there are any changes. Any orders/referrals and communications were provided as well. \par Side effects of the above medications were discussed in detail including but not limited to applicable black box warning and teratogenicity as appropriate. \par Patient was advised to bring previous records to my office, including CD of imaging, when applicable. \par \par

## 2022-09-22 NOTE — CONSULT LETTER
[Dear  ___] : Dear  [unfilled], [Consult Letter:] : I had the pleasure of evaluating your patient, [unfilled]. [Please see my note below.] : Please see my note below. [Consult Closing:] : Thank you very much for allowing me to participate in the care of this patient.  If you have any questions, please do not hesitate to contact me. [Sincerely,] : Sincerely, [FreeTextEntry3] : Destinee Joya MD, MPH\par

## 2022-09-22 NOTE — REASON FOR VISIT
[Consultation] : a consultation visit [Source: ______] : History obtained from [unfilled] [FreeTextEntry1] : brain mass

## 2022-09-22 NOTE — HISTORY OF PRESENT ILLNESS
[FreeTextEntry1] : 57 yo female with ER/NC positive HER2 FISH amplified breast cancer initially diagnosed in 2012 s/p radical mastectomy/LN dissection and adjuvant chemotherapy found to have metastatic disease in 2018.  Biopsy of the mediastinal lymph nodes identified ER+ NC and HER 2 negative.  She received docetaxel/pertuzumab/Trastuzumab/exemestane last dose 6/2019.  \par \par In 3/2021 she was found to have supratentorial metastases, with the largest lesion being in the right frontal region measuring 3.5 cm.  She was treated with hypofractionated SRS ot three lesions in total, including the left cerebellum and right brainstem.  She completed treatment in March 2021.  She has been doing well clinically without symptoms.  In March she presented with an MRI that suggested increase in size of the right frontal lesion.  Patient has not been seen by neuro oncologist as per history.\par \par She has had annual episodes of seizures in 2021.  She has had total of 2 seizures with the most recent seizure in September 2022.  The seizures are described as left-sided numbness and shaking, no loss of consciousness but the patient is aphasic during that time.  The patient has been given dexamethasone 1gram bid  and Keppra 1 gram bid.  She has not had further seizures.  No SE to the meds.

## 2022-09-23 LAB
ALBUMIN SERPL ELPH-MCNC: 4.6 G/DL
ALP BLD-CCNC: 140 U/L
ALT SERPL-CCNC: 39 U/L
ANION GAP SERPL CALC-SCNC: 18 MMOL/L
APTT BLD: 26.5 SEC
AST SERPL-CCNC: 20 U/L
BILIRUB SERPL-MCNC: 0.2 MG/DL
BUN SERPL-MCNC: 19 MG/DL
CALCIUM SERPL-MCNC: 8.9 MG/DL
CANCER AG27-29 SERPL-ACNC: 10.3 U/ML
CEA SERPL-MCNC: 1.6 NG/ML
CHLORIDE SERPL-SCNC: 105 MMOL/L
CO2 SERPL-SCNC: 18 MMOL/L
CREAT SERPL-MCNC: 0.51 MG/DL
EGFR: 110 ML/MIN/1.73M2
GLUCOSE SERPL-MCNC: 126 MG/DL
INR PPP: 1.08 RATIO
POTASSIUM SERPL-SCNC: 4.7 MMOL/L
PROT SERPL-MCNC: 6.8 G/DL
PT BLD: 12.7 SEC
SODIUM SERPL-SCNC: 141 MMOL/L

## 2022-09-24 NOTE — REASON FOR VISIT
[Routine Follow-Up] : routine follow-up visit for [Brain Metastasis] : brain metastasis [Family Member] : family member [[employer] ] : provided by [employer]   [Interpreters_IDNumber] : [id number] [TWNoteComboBox1] : English

## 2022-09-24 NOTE — PHYSICAL EXAM
[] : no respiratory distress [Normal] : no focal deficits [Oriented To Time, Place, And Person] : oriented to person, place, and time [de-identified] : Strength 5/5 in upper and lower extremity.  Gait normal.  No cranial nerve deficits.  No difficulty with understanding or speech

## 2022-09-24 NOTE — HISTORY OF PRESENT ILLNESS
[FreeTextEntry1] : Ms. Shelley was seen initially for consult on 3/18//2021.  She completed radiation therapy on 3/25/2021. She completed 2 radiation over 3 fractions 3/23-3/25/2021 to a right frontal, left cerebellar, and right brainstem lesion.\par She additionally completed radiation to a right parietal lesion, 1800 cgy on 5/11/2022 over 1 fraction.\par  She presents for a f/u visit.\par \par ONCOLOGY HISTORY\par Ms. Shelley was diagnosed with breast ca in 2012 s/p radical mastectomy and  adjuvant chemotherapy.  She had ER positive 20%, NC positive 10%, HER-2/lisa 2+, FISH amplified breast cancer per outside physician report.  She went on to receive adjuvant dose-dense AC chemotherapy for 4 cycles followed by Taxol plus Herceptin as well as adjuvant radiation therapy. She was subsequently begun adjuvant tamoxifen therapy. Her last menstrual period was in 2014. The patient then began to experience weight loss beginning in November 2018 and over 3-month period of time lost 30 pounds per her report. At approximately the same time she developed increasing shortness of breath, and ultimately presented to [facility] Emergency Department. She subsequently had a CT scan of the chest, which showed multiple mediastinal hilar right subclavian lymph nodes corresponding to abnormal FDG activity on PET scan consistent with metastatic carcinoma; there were tiny nodularities in both lungs- too small to accurately characterize- not present on prior examination and measuring up to 2 mm in both lungs. The possibility of metastasis was a consideration; she had no bony metastases; the liver demonstrated tiny poorly defined lesions less than 5 mm and difficult to characterize and visible on prior examinations with the possibility of metastasis also considered. A PET-CT scan showed multiple hypermetabolic lymph nodes in the mediastinum, right subclavian region and both adebayo felt to represent tumor; there were stellate lesions in the left breast region, felt to represent benign scars; the lungs demonstrated no significant abnormality with multiple tiny lung nodules seen on CT as previously noted; liver demonstrated no significant abnormality with multiple small poorly defined nodules seen on CT per my previous comments. The patient underwent a left breast core biopsy of the area of concern with a finding of fat necrosis and foreign body giant cell reaction as well as a separate fine-needle aspiration consistent with a ruptured cyst/fat necrosis. The patient consequently saw her oncologist, Dr. Carias, who sent the patient for ultrasound-guided core biopsies of the mediastinal lymph nodes with a finding of carcinoma, consistent with breast primary with estrogen receptor returning positive, greater than 50%, progesterone receptor negative, and HER-2/lisa 0 per outside report. The patient was subsequently begun on docetaxel, pertuzumab, trastuzumab, and exemestane beginning at the early 4/19, and subsequently switched to weekly paclitaxel secondary to side effects and continuing on trastuzumab / pertuzumab every 3 weeks as well as exemestane. The patient was seen for initial consultation 7/24/19 for a further opinion regarding treatment recommendations. She noted ongoing weakness, fatigue, and generalized malaise, although all of these had begun to improve gradually. Her last chemotherapy as noted above was approximately at 06/12/2019. She reports that her primary oncologist, Dr. Carias, told to discontinue exemestane upon admission to [facility] with the consideration restarting it post discharge. Slides for review at [facility] and it was confirmed that she had met breast cancer ER+ NC neg and Her 2 lisa neg.  At the time of initial consult she felt well. Noted a left sided seizure in the days prior to consult lasting about 1 minute, which stopped on its own. Was recently prescribed keppra and dexamethasone 8mg BID. No confusion, dizziness, nausea, focal weakness. On examestane.  She recently had a focal seizure left upper/lower extremity. MRI showed 3 lesions -  \par \par Lesion 1: Large dural based enhancing lesion is seen involving the high medial right frontal region. This lesion does appear to cross the midline and measures approximately 3.2 x 2.7 x 3.5 cm. Small amount of adjacent susceptibility is identified. Surrounding edema is identified. Localized mass effect is identified. Right to left shift (7.2 mm) seen.\par \par Lesion 2: Enhancing lesion is seen involving the right upper tiffany. This lesion measures approximately 0.5 x 0.4 cm.\par \par Lesion 3: Enhancing lesion is seen involving the superior left cerebellar region. This lesion measures approximately 1.2 x 1.0 cm. Surrounding edema is identified. She was treated with GK SRS.\par \par \par 6/2/2021- Ms. Shelley presents today for follow up. [employer] Interpretter [id number] for [place] was used.  MRI scheduled today at 145. Has not seen Dr. Raptis since radiation. States she  is feeling fine. States she has intermittent sensation of "pulling" to her forehead. Denies any dizziness, confusion, headache, nausea, vomiting, visual disturbances, weakness. States 3 weeks ago she had an episode where her left upper extremity "was shaking" for one minute, denies any LOC. \par \par 9/29/2021- Ms. Shelley presents today for follow up. She is seen today with assistance of  [id number]. MRI brain done 9/24/2021 showed Previously noted enhancing lesions in the posterior fossa and supratentorial region have either decreased in size or are no longer seen which is compatible with response to therapy. Continue close interval follow-up is recommended. Continues to follow with Dr. Martin. continues on examestane with plans to repeat body scans in november or december.  Feeling very well. no headaches, no new weakness, no numbness or tingling, no nausea or vomiting. overall doing well. \par \par 12/30/2021: Today Ms. Shelley presents for follow-up, [facility]  . MRI brain showed: "Right high paramedian region lesion measures similar in size though there is increased edema seen in association when compared with the prior 9/24/2021. Question of vague enhancement on the surface of the cervical medullary junction and along the surface of the brainstem extending into the spinal cord level. Consideration for possible leptomeningeal pathology should be given. Recommend correlation with CSF as clinically warranted."\par Per our review, no leptomeningeal disease appreciated. The vague focus of enhancement likely represents a blood vessel. The swelling of the right high paramedian region lesion actually displays decreased edema, compared to prior.\par  Denies any nausea, vomiting, headache, numbness or tingling. Denies any issues with balance or hearing. \par \par 3/31/2022: Pt presents for follow-up.  Clinically feeling well. Denies headache, nausea, vomiting, or other complaints.  Denies interval symptoms since last f/u.  Denies being on steroids or reportedly any recent seizure events. \par [place] : Goran [id number]\par \par 5/4/2022- Ms. Shelley presents today for follow up. \par  [id number] used for visit today.Brain MRI 4/27/2022 showed \par IMPRESSION: Right medial frontal parasagittal parenchymal lesion is larger when compared with 3/30/2022 and 12/30/2021 suggesting a combination of post therapy change and neoplasm supported by MR spectroscopy and MR perfusion. There is a 5 mm nodule in the right parietal white matter which is new since 12/30/2021 but is slightly larger in retrospect since 3/30/2022. No evidence of leptomeningeal enhancement at the craniocervical junction, this was likely artifactual and is no longer identified..\par \par follows with Dr. Martin. continue on exemestane. \par \par Today she feels well. denies headaches, nausea, vomiting, focal weakness, numbness, tingling.\par \par 7/13/2022- ms. Shelley presents today for follow up. \par \par Brain MRI done 7/8.Continues on exemestane and following with Dr. Martin. \par \par CT CAP 5/6/2022 showed Stable exam since 12/17/2021\par \par Bone scan 5/6 without definite evidence of bone mets.\par \par Today she is feeling very well. denies headaches, nasuea, vomiting, focal weakness, numbness, tingling, confusion\par \par 9/20/2022- Ms. Shelley presents today for follow up. pacific  [id number] used for visit today.\par She presented to [facility] on 9/14/22 with a seizure, left sided.\par A brain MRI done 9/14/2022 showed \par -Increase in size of a right frontal parasagittal lesion since comparison MRI, now measuring up to 2.5 cm. Increasing surrounding vasogenic edema.\par -No new lesion identified. No acute infarct.\par \par Discharged on dexamethasone taper to 4mg BID and keppra 1000mg bId. \par feeling well. no further seizures. no headaches, no nausea, no focal weakness, no numbness or tingling. in retrospect prior to seizure denies any focal neurological symptoms.

## 2022-09-26 ENCOUNTER — RESULT REVIEW (OUTPATIENT)
Age: 57
End: 2022-09-26

## 2022-09-30 ENCOUNTER — APPOINTMENT (OUTPATIENT)
Dept: MRI IMAGING | Facility: IMAGING CENTER | Age: 57
End: 2022-09-30

## 2022-09-30 ENCOUNTER — OUTPATIENT (OUTPATIENT)
Dept: OUTPATIENT SERVICES | Facility: HOSPITAL | Age: 57
LOS: 1 days | End: 2022-09-30
Payer: MEDICARE

## 2022-09-30 DIAGNOSIS — Z98.51 TUBAL LIGATION STATUS: Chronic | ICD-10-CM

## 2022-09-30 DIAGNOSIS — C79.31 SECONDARY MALIGNANT NEOPLASM OF BRAIN: ICD-10-CM

## 2022-09-30 DIAGNOSIS — Z90.49 ACQUIRED ABSENCE OF OTHER SPECIFIED PARTS OF DIGESTIVE TRACT: Chronic | ICD-10-CM

## 2022-09-30 DIAGNOSIS — Z90.3 ACQUIRED ABSENCE OF STOMACH [PART OF]: Chronic | ICD-10-CM

## 2022-09-30 DIAGNOSIS — Z90.12 ACQUIRED ABSENCE OF LEFT BREAST AND NIPPLE: Chronic | ICD-10-CM

## 2022-09-30 PROCEDURE — 70553 MRI BRAIN STEM W/O & W/DYE: CPT | Mod: 26,MH

## 2022-09-30 PROCEDURE — 70553 MRI BRAIN STEM W/O & W/DYE: CPT

## 2022-09-30 PROCEDURE — A9585: CPT

## 2022-10-06 ENCOUNTER — APPOINTMENT (OUTPATIENT)
Dept: RADIATION ONCOLOGY | Facility: CLINIC | Age: 57
End: 2022-10-06

## 2022-10-06 VITALS
OXYGEN SATURATION: 98 % | TEMPERATURE: 97.16 F | DIASTOLIC BLOOD PRESSURE: 75 MMHG | WEIGHT: 160.82 LBS | RESPIRATION RATE: 18 BRPM | HEIGHT: 60 IN | SYSTOLIC BLOOD PRESSURE: 116 MMHG | HEART RATE: 65 BPM | BODY MASS INDEX: 31.57 KG/M2

## 2022-10-06 PROCEDURE — 99215 OFFICE O/P EST HI 40 MIN: CPT | Mod: 25

## 2022-10-06 NOTE — REASON FOR VISIT
[Routine Follow-Up] : routine follow-up visit for [Brain Metastasis] : brain metastasis [Family Member] : family member [[employer] ] : provided by [employer]   [Interpreters_IDNumber] : [id number] [TWNoteComboBox1] : [place]

## 2022-10-06 NOTE — HISTORY OF PRESENT ILLNESS
[FreeTextEntry1] : Ms. Shelley was seen initially for consult on 3/18//2021.  She completed radiation therapy on 3/25/2021. She completed 2 radiation over 3 fractions 3/23-3/25/2021 to a right frontal, left cerebellar, and right brainstem lesion.\par She additionally completed radiation to a right parietal lesion, 1800 cgy on 5/11/2022 over 1 fraction.\par  She presents for a f/u visit.\par \par ONCOLOGY HISTORY\par Ms. Shelley was diagnosed with breast ca in 2012 s/p radical mastectomy and  adjuvant chemotherapy.  She had ER positive 20%, AZ positive 10%, HER-2/lisa 2+, FISH amplified breast cancer per outside physician report.  She went on to receive adjuvant dose-dense AC chemotherapy for 4 cycles followed by Taxol plus Herceptin as well as adjuvant radiation therapy. She was subsequently begun adjuvant tamoxifen therapy. Her last menstrual period was in 2014. The patient then began to experience weight loss beginning in November 2018 and over 3-month period of time lost 30 pounds per her report. At approximately the same time she developed increasing shortness of breath, and ultimately presented to [facility] Emergency Department. She subsequently had a CT scan of the chest, which showed multiple mediastinal hilar right subclavian lymph nodes corresponding to abnormal FDG activity on PET scan consistent with metastatic carcinoma; there were tiny nodularities in both lungs- too small to accurately characterize- not present on prior examination and measuring up to 2 mm in both lungs. The possibility of metastasis was a consideration; she had no bony metastases; the liver demonstrated tiny poorly defined lesions less than 5 mm and difficult to characterize and visible on prior examinations with the possibility of metastasis also considered. A PET-CT scan showed multiple hypermetabolic lymph nodes in the mediastinum, right subclavian region and both adebayo felt to represent tumor; there were stellate lesions in the left breast region, felt to represent benign scars; the lungs demonstrated no significant abnormality with multiple tiny lung nodules seen on CT as previously noted; liver demonstrated no significant abnormality with multiple small poorly defined nodules seen on CT per my previous comments. The patient underwent a left breast core biopsy of the area of concern with a finding of fat necrosis and foreign body giant cell reaction as well as a separate fine-needle aspiration consistent with a ruptured cyst/fat necrosis. The patient consequently saw her oncologist, Dr. Carias, who sent the patient for ultrasound-guided core biopsies of the mediastinal lymph nodes with a finding of carcinoma, consistent with breast primary with estrogen receptor returning positive, greater than 50%, progesterone receptor negative, and HER-2/lisa 0 per outside report. The patient was subsequently begun on docetaxel, pertuzumab, trastuzumab, and exemestane beginning at the early 4/19, and subsequently switched to weekly paclitaxel secondary to side effects and continuing on trastuzumab / pertuzumab every 3 weeks as well as exemestane. The patient was seen for initial consultation 7/24/19 for a further opinion regarding treatment recommendations. She noted ongoing weakness, fatigue, and generalized malaise, although all of these had begun to improve gradually. Her last chemotherapy as noted above was approximately at 06/12/2019. She reports that her primary oncologist, Dr. Carias, told to discontinue exemestane upon admission to [facility] with the consideration restarting it post discharge. Slides for review at [facility] and it was confirmed that she had met breast cancer ER+ AZ neg and Her 2 lisa neg.  At the time of initial consult she felt well. Noted a left sided seizure in the days prior to consult lasting about 1 minute, which stopped on its own. Was recently prescribed keppra and dexamethasone 8mg BID. No confusion, dizziness, nausea, focal weakness. On examestane.  She recently had a focal seizure left upper/lower extremity. MRI showed 3 lesions -  \par \par Lesion 1: Large dural based enhancing lesion is seen involving the high medial right frontal region. This lesion does appear to cross the midline and measures approximately 3.2 x 2.7 x 3.5 cm. Small amount of adjacent susceptibility is identified. Surrounding edema is identified. Localized mass effect is identified. Right to left shift (7.2 mm) seen.\par \par Lesion 2: Enhancing lesion is seen involving the right upper tiffany. This lesion measures approximately 0.5 x 0.4 cm.\par \par Lesion 3: Enhancing lesion is seen involving the superior left cerebellar region. This lesion measures approximately 1.2 x 1.0 cm. Surrounding edema is identified. She was treated with GK SRS.\par \par \par 6/2/2021- Ms. Shelley presents today for follow up. [employer]ter [id number] for Persian was used.  MRI scheduled today at 145. Has not seen Dr. Raptis since radiation. States she  is feeling fine. States she has intermittent sensation of "pulling" to her forehead. Denies any dizziness, confusion, headache, nausea, vomiting, visual disturbances, weakness. States 3 weeks ago she had an episode where her left upper extremity "was shaking" for one minute, denies any LOC. \par \par 9/29/2021- Ms. Shelley presents today for follow up. She is seen today with assistance of  [id number]. MRI brain done 9/24/2021 showed Previously noted enhancing lesions in the posterior fossa and supratentorial region have either decreased in size or are no longer seen which is compatible with response to therapy. Continue close interval follow-up is recommended. Continues to follow with Dr. Martin. continues on examestane with plans to repeat body scans in november or december.  Feeling very well. no headaches, no new weakness, no numbness or tingling, no nausea or vomiting. overall doing well. \par \par 12/30/2021: Today Ms. Shelley presents for follow-up, [facility]  . MRI brain showed: "Right high paramedian region lesion measures similar in size though there is increased edema seen in association when compared with the prior 9/24/2021. Question of vague enhancement on the surface of the cervical medullary junction and along the surface of the brainstem extending into the spinal cord level. Consideration for possible leptomeningeal pathology should be given. Recommend correlation with CSF as clinically warranted."\par Per our review, no leptomeningeal disease appreciated. The vague focus of enhancement likely represents a blood vessel. The swelling of the right high paramedian region lesion actually displays decreased edema, compared to prior.\par  Denies any nausea, vomiting, headache, numbness or tingling. Denies any issues with balance or hearing. \par \par 3/31/2022: Pt presents for follow-up.  Clinically feeling well. Denies headache, nausea, vomiting, or other complaints.  Denies interval symptoms since last f/u.  Denies being on steroids or reportedly any recent seizure events. \par Citizen of [place] pac[place] : [place] [id number]\par \par 5/4/2022- Ms. Shelley presents today for follow up. \par  [id number] used for visit today.Brain MRI 4/27/2022 showed \par IMPRESSION: Right medial frontal parasagittal parenchymal lesion is larger when compared with 3/30/2022 and 12/30/2021 suggesting a combination of post therapy change and neoplasm supported by MR spectroscopy and MR perfusion. There is a 5 mm nodule in the right parietal white matter which is new since 12/30/2021 but is slightly larger in retrospect since 3/30/2022. No evidence of leptomeningeal enhancement at the craniocervical junction, this was likely artifactual and is no longer identified..\par \par follows with Dr. Martin. continue on exemestane. \par \par Today she feels well. denies headaches, nausea, vomiting, focal weakness, numbness, tingling.\par \par 7/13/2022- ms. Shelley presents today for follow up. \par \par Brain MRI done 7/8.Continues on exemestane and following with Dr. Martin. \par \par CT CAP 5/6/2022 showed Stable exam since 12/17/2021\par \par Bone scan 5/6 without definite evidence of bone mets.\par \par Today she is feeling very well. denies headaches, nasuea, vomiting, focal weakness, numbness, tingling, confusion\par \par 9/20/2022- Ms. Shelley presents today for follow up. pacific  [id number] used for visit today.\par She presented to [facility] on 9/14/22 with a seizure, left sided.\par A brain MRI done 9/14/2022 showed \par -Increase in size of a right frontal parasagittal lesion since comparison MRI, now measuring up to 2.5 cm. Increasing surrounding vasogenic edema.\par -No new lesion identified. No acute infarct.\par \par Discharged on dexamethasone taper to 4mg BID and keppra 1000mg bId. \par feeling well. no further seizures. no headaches, no nausea, no focal weakness, no numbness or tingling. in retrospect prior to seizure denies any focal neurological symptoms.\par \par 10/6/2022- Ms. Shelley presents today for follow up. MRI brain 9/30/2022 showed Right frontal parasagittal parenchymal enhancing mass increased size and increased vasogenic edema compared with 7/8/2022. MR perfusion and MR spectroscopy are suggestive of post therapy change rather than neoplasm progression. Question tiny 2.5 mm nodular enhancing focus right cingulate gyrus could represent an additional focus. Recommend follow-up. tapered to 2mg Dexa bid 2 weeks.

## 2022-10-06 NOTE — PHYSICAL EXAM
[] : no respiratory distress [Normal] : no focal deficits [Oriented To Time, Place, And Person] : oriented to person, place, and time [de-identified] : Strength 5/5 in upper and lower extremity.  Gait normal.  No cranial nerve deficits.  No difficulty with understanding or speech

## 2022-10-11 ENCOUNTER — TRANSCRIPTION ENCOUNTER (OUTPATIENT)
Age: 57
End: 2022-10-11

## 2022-10-12 ENCOUNTER — APPOINTMENT (OUTPATIENT)
Dept: NUCLEAR MEDICINE | Facility: IMAGING CENTER | Age: 57
End: 2022-10-12

## 2022-10-12 ENCOUNTER — OUTPATIENT (OUTPATIENT)
Dept: OUTPATIENT SERVICES | Facility: HOSPITAL | Age: 57
LOS: 1 days | End: 2022-10-12
Payer: MEDICARE

## 2022-10-12 ENCOUNTER — APPOINTMENT (OUTPATIENT)
Dept: CT IMAGING | Facility: IMAGING CENTER | Age: 57
End: 2022-10-12

## 2022-10-12 DIAGNOSIS — Z90.3 ACQUIRED ABSENCE OF STOMACH [PART OF]: Chronic | ICD-10-CM

## 2022-10-12 DIAGNOSIS — Z90.49 ACQUIRED ABSENCE OF OTHER SPECIFIED PARTS OF DIGESTIVE TRACT: Chronic | ICD-10-CM

## 2022-10-12 DIAGNOSIS — Z98.51 TUBAL LIGATION STATUS: Chronic | ICD-10-CM

## 2022-10-12 DIAGNOSIS — C50.919 MALIGNANT NEOPLASM OF UNSPECIFIED SITE OF UNSPECIFIED FEMALE BREAST: ICD-10-CM

## 2022-10-12 DIAGNOSIS — Z90.12 ACQUIRED ABSENCE OF LEFT BREAST AND NIPPLE: Chronic | ICD-10-CM

## 2022-10-12 PROCEDURE — 71260 CT THORAX DX C+: CPT | Mod: 26,MH

## 2022-10-12 PROCEDURE — 74177 CT ABD & PELVIS W/CONTRAST: CPT | Mod: 26,MH

## 2022-10-12 PROCEDURE — 78306 BONE IMAGING WHOLE BODY: CPT | Mod: 26,MH

## 2022-10-12 PROCEDURE — 71260 CT THORAX DX C+: CPT

## 2022-10-12 PROCEDURE — A9561: CPT

## 2022-10-12 PROCEDURE — 78306 BONE IMAGING WHOLE BODY: CPT

## 2022-10-12 PROCEDURE — 74177 CT ABD & PELVIS W/CONTRAST: CPT

## 2022-10-18 ENCOUNTER — APPOINTMENT (OUTPATIENT)
Dept: INTERNAL MEDICINE | Facility: CLINIC | Age: 57
End: 2022-10-18

## 2022-10-18 VITALS
HEART RATE: 62 BPM | BODY MASS INDEX: 31.22 KG/M2 | SYSTOLIC BLOOD PRESSURE: 110 MMHG | RESPIRATION RATE: 18 BRPM | OXYGEN SATURATION: 98 % | TEMPERATURE: 207.14 F | HEIGHT: 60 IN | DIASTOLIC BLOOD PRESSURE: 75 MMHG | WEIGHT: 159 LBS

## 2022-10-18 PROCEDURE — G0008: CPT

## 2022-10-18 PROCEDURE — 90686 IIV4 VACC NO PRSV 0.5 ML IM: CPT

## 2022-10-18 PROCEDURE — 99214 OFFICE O/P EST MOD 30 MIN: CPT | Mod: 25

## 2022-10-18 RX ORDER — FERROUS SULFATE 220 (44)/5
220 (44 FE) SOLUTION, ORAL ORAL
Qty: 473 | Refills: 0 | Status: DISCONTINUED | COMMUNITY
Start: 2021-08-06

## 2022-10-18 RX ORDER — FLUTICASONE PROPIONATE 50 UG/1
50 SPRAY, METERED NASAL
Qty: 16 | Refills: 0 | Status: DISCONTINUED | COMMUNITY
Start: 2022-06-23

## 2022-10-18 NOTE — HISTORY OF PRESENT ILLNESS
[FreeTextEntry1] : follow up\par Interview and discussion conducted in Togolese by Togolese speaking physician\par  [de-identified] : 57 years old female with metastatic breast carcinoma, prediabetes, hypercholesterolemia, presents for six months follow up, states feeling overall well  been followed by oncologist, neurologist and radiation oncologist, currently on radiation treatment , steroids and Keppra for brain mets; had episode of seizure in September , was hospitalized at [facility], today no complaints

## 2022-10-18 NOTE — PLAN
[FreeTextEntry1] : 1. metastatic breast carcinoma/brain metastasis\par * to follow with radiation oncologist and oncologist\par * continue dexamethasone \par 2. prediabetes\par * lab for A1c\par * Dietary counseling given, dietary avoidance discussed, diet and exercise reviewed with patient; patient reminded of importance of aerobic exercise, weight control, dietary compliance and regular glucose monitoring\par 3. hypercholesterolemia\par * lab for fasting lipids\par * low cholesterol diet counselling\par 4. seizures\par * continue levetiracetam,lab for serum level\par 5. need for influenza vaccination\par * influenza vaccine administered\par * follow up one month

## 2022-10-19 ENCOUNTER — RESULT REVIEW (OUTPATIENT)
Age: 57
End: 2022-10-19

## 2022-10-19 ENCOUNTER — APPOINTMENT (OUTPATIENT)
Dept: HEMATOLOGY ONCOLOGY | Facility: CLINIC | Age: 57
End: 2022-10-19

## 2022-10-19 VITALS
OXYGEN SATURATION: 97 % | BODY MASS INDEX: 31.86 KG/M2 | WEIGHT: 163.14 LBS | HEART RATE: 69 BPM | DIASTOLIC BLOOD PRESSURE: 71 MMHG | RESPIRATION RATE: 16 BRPM | TEMPERATURE: 97 F | SYSTOLIC BLOOD PRESSURE: 108 MMHG

## 2022-10-19 LAB
ALBUMIN SERPL ELPH-MCNC: 4.2 G/DL
ALP BLD-CCNC: 127 U/L
ALT SERPL-CCNC: 49 U/L
ANION GAP SERPL CALC-SCNC: 13 MMOL/L
ANISOCYTOSIS BLD QL: SLIGHT — SIGNIFICANT CHANGE UP
AST SERPL-CCNC: 23 U/L
BASOPHILS # BLD AUTO: 0 K/UL — SIGNIFICANT CHANGE UP (ref 0–0.2)
BASOPHILS NFR BLD AUTO: 0 % — SIGNIFICANT CHANGE UP (ref 0–2)
BILIRUB SERPL-MCNC: 0.2 MG/DL
BUN SERPL-MCNC: 19 MG/DL
CALCIUM SERPL-MCNC: 8.8 MG/DL
CHLORIDE SERPL-SCNC: 99 MMOL/L
CHOLEST SERPL-MCNC: 215 MG/DL
CO2 SERPL-SCNC: 26 MMOL/L
CREAT SERPL-MCNC: 0.57 MG/DL
DACRYOCYTES BLD QL SMEAR: SLIGHT — SIGNIFICANT CHANGE UP
EGFR: 107 ML/MIN/1.73M2
ELLIPTOCYTES BLD QL SMEAR: SLIGHT — SIGNIFICANT CHANGE UP
EOSINOPHIL # BLD AUTO: 0 K/UL — SIGNIFICANT CHANGE UP (ref 0–0.5)
EOSINOPHIL NFR BLD AUTO: 0 % — SIGNIFICANT CHANGE UP (ref 0–6)
ESTIMATED AVERAGE GLUCOSE: 137 MG/DL
GLUCOSE SERPL-MCNC: 92 MG/DL
HBA1C MFR BLD HPLC: 6.4 %
HCT VFR BLD CALC: 40.1 % — SIGNIFICANT CHANGE UP (ref 34.5–45)
HDLC SERPL-MCNC: 78 MG/DL
HGB BLD-MCNC: 13.3 G/DL — SIGNIFICANT CHANGE UP (ref 11.5–15.5)
LDLC SERPL CALC-MCNC: 119 MG/DL
LYMPHOCYTES # BLD AUTO: 1.78 K/UL — SIGNIFICANT CHANGE UP (ref 1–3.3)
LYMPHOCYTES # BLD AUTO: 18 % — SIGNIFICANT CHANGE UP (ref 13–44)
MCHC RBC-ENTMCNC: 27.6 PG — SIGNIFICANT CHANGE UP (ref 27–34)
MCHC RBC-ENTMCNC: 33.2 G/DL — SIGNIFICANT CHANGE UP (ref 32–36)
MCV RBC AUTO: 83.2 FL — SIGNIFICANT CHANGE UP (ref 80–100)
METAMYELOCYTES # FLD: 1 % — HIGH (ref 0–0)
MONOCYTES # BLD AUTO: 0.3 K/UL — SIGNIFICANT CHANGE UP (ref 0–0.9)
MONOCYTES NFR BLD AUTO: 3 % — SIGNIFICANT CHANGE UP (ref 2–14)
MYELOCYTES NFR BLD: 2 % — HIGH (ref 0–0)
NEUTROPHILS # BLD AUTO: 7.43 K/UL — HIGH (ref 1.8–7.4)
NEUTROPHILS NFR BLD AUTO: 75 % — SIGNIFICANT CHANGE UP (ref 43–77)
NONHDLC SERPL-MCNC: 137 MG/DL
NRBC # BLD: 0 /100 — SIGNIFICANT CHANGE UP (ref 0–0)
NRBC # BLD: SIGNIFICANT CHANGE UP /100 WBCS (ref 0–0)
PLAT MORPH BLD: NORMAL — SIGNIFICANT CHANGE UP
PLATELET # BLD AUTO: 244 K/UL — SIGNIFICANT CHANGE UP (ref 150–400)
POIKILOCYTOSIS BLD QL AUTO: SLIGHT — SIGNIFICANT CHANGE UP
POTASSIUM SERPL-SCNC: 4.8 MMOL/L
PROT SERPL-MCNC: 6.1 G/DL
RBC # BLD: 4.82 M/UL — SIGNIFICANT CHANGE UP (ref 3.8–5.2)
RBC # FLD: 21.6 % — HIGH (ref 10.3–14.5)
RBC BLD AUTO: ABNORMAL
SODIUM SERPL-SCNC: 138 MMOL/L
TARGETS BLD QL SMEAR: SLIGHT — SIGNIFICANT CHANGE UP
TRIGL SERPL-MCNC: 90 MG/DL
VARIANT LYMPHS # BLD: 1 % — SIGNIFICANT CHANGE UP (ref 0–6)
WBC # BLD: 9.91 K/UL — SIGNIFICANT CHANGE UP (ref 3.8–10.5)
WBC # FLD AUTO: 9.91 K/UL — SIGNIFICANT CHANGE UP (ref 3.8–10.5)

## 2022-10-19 PROCEDURE — 99215 OFFICE O/P EST HI 40 MIN: CPT

## 2022-10-20 LAB
ALBUMIN SERPL ELPH-MCNC: 4.1 G/DL
ALP BLD-CCNC: 116 U/L
ALT SERPL-CCNC: 46 U/L
ANION GAP SERPL CALC-SCNC: 12 MMOL/L
APPEARANCE: CLEAR
AST SERPL-CCNC: 24 U/L
BILIRUB SERPL-MCNC: 0.2 MG/DL
BILIRUBIN URINE: NEGATIVE
BLOOD URINE: NEGATIVE
BUN SERPL-MCNC: 24 MG/DL
CALCIUM SERPL-MCNC: 8.4 MG/DL
CHLORIDE SERPL-SCNC: 100 MMOL/L
CO2 SERPL-SCNC: 25 MMOL/L
COLOR: NORMAL
CREAT SERPL-MCNC: 0.49 MG/DL
EGFR: 111 ML/MIN/1.73M2
GLUCOSE QUALITATIVE U: NEGATIVE
GLUCOSE SERPL-MCNC: 107 MG/DL
KETONES URINE: NEGATIVE
LEUKOCYTE ESTERASE URINE: NEGATIVE
NITRITE URINE: NEGATIVE
PH URINE: 6.5
POTASSIUM SERPL-SCNC: 5.2 MMOL/L
PROT SERPL-MCNC: 5.9 G/DL
PROTEIN URINE: NEGATIVE
SODIUM SERPL-SCNC: 136 MMOL/L
SPECIFIC GRAVITY URINE: 1.02
UROBILINOGEN URINE: NORMAL

## 2022-10-20 NOTE — PHYSICAL EXAM
[Fully active, able to carry on all pre-disease performance without restriction] : Status 0 - Fully active, able to carry on all pre-disease performance without restriction [Normal] : affect appropriate [de-identified] : Right breast with no discrete palpable masses, no palpable axillary nodes.  Left reconstructed breast with medial and lateral nodularity c/w fat necrosis, stable, no other discrete palpable masses, no palpable axillary nodes.

## 2022-10-20 NOTE — HISTORY OF PRESENT ILLNESS
[de-identified] : The patients' history of present illness began in 2012 at which time she was found to have a right breast cancer for which she underwent a right modified radical mastectomy/axillary lymph node dissection with the finding of an ER positive 20%, GA positive 10%, HER-2/lisa 2+, FISH amplified breast cancer per outside physician report (I do not have a copy that pathology report for my review).  She went on to receive adjuvant dose-dense AC chemotherapy for 4 cycles followed by Taxol plus Herceptin as well as adjuvant radiation therapy.  She was subsequently begun adjuvant tamoxifen therapy.  Her last menstrual period was in 2014.\par \par The patient then began to experience weight loss beginning in November 2018 and over 3-month period of time lost 30 pounds per her report.  At approximately the same time she developed increasing shortness of breath, and ultimately presented to [facility] Emergency Department.  She subsequently had a CT scan of the chest, which showed multiple mediastinal hilar right subclavian lymph nodes corresponding to abnormal FDG activity on PET scan consistent with metastatic carcinoma; there were tiny nodularities in both lungs- too small to accurately characterize- not present on prior examination and measuring up to 2 mm in both lungs.  The possibility of metastasis was a consideration; she had no bony metastases; the liver demonstrated tiny poorly defined lesions less than 5 mm and difficult to characterize and visible on prior examinations with the possibility of metastasis also considered.  A PET-CT scan showed multiple hypermetabolic lymph nodes in the mediastinum, right subclavian region and both adebayo felt to represent tumor; there were stellate lesions in the left breast region, felt to represent benign scars; the lungs demonstrated no significant abnormality with multiple tiny lung nodules seen on CT as previously noted; liver demonstrated no significant abnormality with multiple small poorly defined nodules seen on CT per my previous comments.  The patient underwent a left breast core biopsy of the area of concern with a finding of fat necrosis and foreign body giant cell reaction as well as a separate fine-needle aspiration consistent with a ruptured cyst/fat necrosis.  The patient consequently saw her oncologist, Dr. Carias, who sent the patient for ultrasound-guided core biopsies of the mediastinal lymph nodes with a finding of carcinoma, consistent with breast primary with estrogen receptor returning positive, greater than 50%, progesterone receptor negative, and HER-2/lisa 0 per outside report. The patient was subsequently begun on docetaxel, pertuzumab, trastuzumab, and exemestane beginning at the early 4/19, and subsequently switched to weekly paclitaxel secondary to side effects and continuing  on trastuzumab / pertuzumab every 3 weeks as well as exemestane. The patient was last treated per her report on approximately June 12, 2019, despite outside records that show ongoing weekly treatments early July 2019.  \par \par The patient also reports that beginning in approximately 12/18, she started to develop slowly progressive diffuse rash with associated pruritus, having seen multiple dermatologists.  Most recently, she saw Dr. Courtney oCwart, a [facility] physician practicing at [place], [place], who diagnosed disseminated herpes simplex for which she was begun on valacyclovir, as well as evidence of MRSA for which was treated with doxycycline beginning in May 2019.  In June 2019, the patient presented to [facility] Emergency Department complaining of progressive "weakness and shaking." She was found to be anemic and had melena.  She underwent endoscopy and VIR procedure to stop gastric bleeding, which was unsuccessful.  Consequently, she had a distal gastrectomy.  She was discharged from [facility] on 07/08/2019.\par \par The patient was initial consultation 7/24/19 for a further opinion regarding treatment recommendations. She noted ongoing weakness, fatigue, and generalized malaise, although all of these had begun to improve gradually.  Her last chemotherapy as noted above was approximately at 06/12/2019.  She reports that her primary oncologist, Dr. Carias, told to discontinue exemestane upon admission to [facility] with the consideration restarting it post discharge.  \par \par I obtained her outside slides for review at [facility] and it was confirmed that she had met breast cancer ER+ GA neg and Her 2 lisa neg.\par \par Consequently I recommended restarting exemestane.  \par \par In 3/21 she had L sided seizures and found to have abnormal enhancing lesions in the posterior fossa and supratentorial region are identified.\par \par Lesion 1: Large dural based enhancing lesion is seen involving the high medial right frontal region. This lesion does appear to cross the midline and measures approximately 3.2 x 2.7 x 3.5 cm. Small amount of adjacent susceptibility is identified. Surrounding edema is identified. Localized mass effect is identified. Right to left shift (7.2 mm) seen.\par \par Lesion 2: Enhancing lesion is seen involving the right upper tiffany. This lesion measures approximately 0.5 x 0.4 cm.\par \par Lesion 3: Enhancing lesion is seen involving the superior left cerebellar region. This lesion measures approximately 1.2 x 1.0 cm. Surrounding edema is identified.\par \par She was seen by Dr Hwang and treated with Gamma knife SRS to the 3 brain lesions. \par \par A repeat MRI 6/2/21 demonstrated:\par \par -Interval decrease in size of 3 enhancing lesions, largest in the high medial right frontal lobe measuring up to 2.0 cm.\par -Improvement of vasogenic edema with resolution of leftward midline shift.\par -No new lesions are identified\par \par \par \par  [de-identified] : Pt seen today for f/u visit.  Translation service used for duration of visit, [place], ID# [id number]\par \par On 9/14/22 had a witnessed L sided seizure and admitted to [facility]. A brain MRI done 9/14/2022 showed: \par -Increase in size of a right frontal parasagittal lesion since comparison MRI, now measuring up to 2.5 cm. Increasing surrounding vasogenic edema.\par -No new lesion identified. No acute infarct.\par \par She was d/c'd on dexamethasone taper to 4 mg BID and keppra 1000 mg bId.  Currently tapered to 2 mg.   She denies any seizure activity.  \par \par She complains of some weakness left hand. \par \par She states she is having difficulty falling asleep at night because of steroids.  \par \par On Exemestane and tolerating well.  Denies any treatment related side effects. \par \par She denies any other complaints.  She notes a good appetite, stable weight and excellent performance status. \par \par CT scan c/a/p, 10/12/22- IMPRESSION:\par A new 2.7 cm right adnexal cyst with thin septation. Consider follow-up with pelvic ultrasound as indicated versus attention at short-term follow-up imaging.\par \par Bone scan, 10/12/22- IMPRESSION: Bone scan demonstrates:\par No definite scan evidence of osseous metastasis.\par Mild diffuse increased uptake in the calvarium, not significant change, probably hyperostosis or Paget's disease.\par Heterotopic ossification in the medial aspect of the left chest, unchanged.\par Degenerative disease in the major joints.\par No significant interval change compared to the previous bone scan of 5/6/2022.\par \par MRI head 9/14/22\par IMPRESSION:\par -Increase in size of a right frontal parasagittal lesion since comparison MRI, now measuring up to 2.5 cm. Increasing surrounding vasogenic edema.\par -No new lesion identified. No acute infarct\par \par CT c/a/p, 5/6/22- IMPRESSION: Stable exam since 12/17/2021\par \par Bone scan, 5/6/22-  IMPRESSION: Bone scan demonstrates:\par \par No definite scan evidence of osseous metastasis.\par \par Mild diffuse increased uptake in the calvarium, unchanged, probably hyperostosis or Paget's disease.\par Heterotopic ossification in the medial aspect of the left breast, unchanged.\par Degenerative disease in the major joints.\par No significant interval change compared to the previous bone scan of 7/8/2021.\par \par CT scan, 12/17/21- IMPRESSION:  Stable examination compared with prior imaging 7/8/2021.\par \par MRI head 12/30/21\par IMPRESSION:\par Right high paramedian region lesion measures similar in size though there \par is increased edema seen in association when compared with the prior \par 9/24/2021. Question of vague enhancement on the surface of the cervical \par medullary junction and along the surface of the brainstem extending into \par the spinal cord level. Consideration for possible leptomeningeal \par pathology should be given. Recommend correlation with CSF as clinically \par warranted.\par \par DEXA 1/21 osteopenia\par \par

## 2022-10-25 LAB — LEVETIRACETAM SERPL-MCNC: 7.6 UG/ML

## 2022-10-26 ENCOUNTER — APPOINTMENT (OUTPATIENT)
Dept: NEUROLOGY | Facility: CLINIC | Age: 57
End: 2022-10-26

## 2022-10-28 ENCOUNTER — APPOINTMENT (OUTPATIENT)
Dept: INFUSION THERAPY | Facility: HOSPITAL | Age: 57
End: 2022-10-28

## 2022-10-28 ENCOUNTER — NON-APPOINTMENT (OUTPATIENT)
Age: 57
End: 2022-10-28

## 2022-10-28 ENCOUNTER — RESULT REVIEW (OUTPATIENT)
Age: 57
End: 2022-10-28

## 2022-10-28 ENCOUNTER — APPOINTMENT (OUTPATIENT)
Dept: HEMATOLOGY ONCOLOGY | Facility: CLINIC | Age: 57
End: 2022-10-28

## 2022-10-28 LAB
ANISOCYTOSIS BLD QL: SLIGHT — SIGNIFICANT CHANGE UP
BASOPHILS # BLD AUTO: 0 K/UL — SIGNIFICANT CHANGE UP (ref 0–0.2)
BASOPHILS NFR BLD AUTO: 0 % — SIGNIFICANT CHANGE UP (ref 0–2)
CEA SERPL-MCNC: 3 NG/ML — SIGNIFICANT CHANGE UP (ref 0–3.8)
ELLIPTOCYTES BLD QL SMEAR: SLIGHT — SIGNIFICANT CHANGE UP
EOSINOPHIL # BLD AUTO: 0 K/UL — SIGNIFICANT CHANGE UP (ref 0–0.5)
EOSINOPHIL NFR BLD AUTO: 0 % — SIGNIFICANT CHANGE UP (ref 0–6)
HCT VFR BLD CALC: 37.8 % — SIGNIFICANT CHANGE UP (ref 34.5–45)
HCT VFR BLD CALC: 40.7 % — SIGNIFICANT CHANGE UP (ref 34.5–45)
HGB BLD-MCNC: 12.7 G/DL — SIGNIFICANT CHANGE UP (ref 11.5–15.5)
HGB BLD-MCNC: 13.2 G/DL — SIGNIFICANT CHANGE UP (ref 11.5–15.5)
LYMPHOCYTES # BLD AUTO: 1.67 K/UL — SIGNIFICANT CHANGE UP (ref 1–3.3)
LYMPHOCYTES # BLD AUTO: 16 % — SIGNIFICANT CHANGE UP (ref 13–44)
MCHC RBC-ENTMCNC: 27.9 PG — SIGNIFICANT CHANGE UP (ref 27–34)
MCHC RBC-ENTMCNC: 28.1 PG — SIGNIFICANT CHANGE UP (ref 27–34)
MCHC RBC-ENTMCNC: 32.4 G/DL — SIGNIFICANT CHANGE UP (ref 32–36)
MCHC RBC-ENTMCNC: 33.6 G/DL — SIGNIFICANT CHANGE UP (ref 32–36)
MCV RBC AUTO: 83.6 FL — SIGNIFICANT CHANGE UP (ref 80–100)
MCV RBC AUTO: 86 FL — SIGNIFICANT CHANGE UP (ref 80–100)
METAMYELOCYTES # FLD: 2 % — HIGH (ref 0–0)
MONOCYTES # BLD AUTO: 0.63 K/UL — SIGNIFICANT CHANGE UP (ref 0–0.9)
MONOCYTES NFR BLD AUTO: 6 % — SIGNIFICANT CHANGE UP (ref 2–14)
MYELOCYTES NFR BLD: 3 % — HIGH (ref 0–0)
NEUTROPHILS # BLD AUTO: 7.63 K/UL — HIGH (ref 1.8–7.4)
NEUTROPHILS NFR BLD AUTO: 73 % — SIGNIFICANT CHANGE UP (ref 43–77)
NRBC # BLD: 0 /100 — SIGNIFICANT CHANGE UP (ref 0–0)
NRBC # BLD: SIGNIFICANT CHANGE UP /100 WBCS (ref 0–0)
PLAT MORPH BLD: NORMAL — SIGNIFICANT CHANGE UP
PLATELET # BLD AUTO: 198 K/UL — SIGNIFICANT CHANGE UP (ref 150–400)
PLATELET # BLD AUTO: 212 K/UL — SIGNIFICANT CHANGE UP (ref 150–400)
POIKILOCYTOSIS BLD QL AUTO: SLIGHT — SIGNIFICANT CHANGE UP
RBC # BLD: 4.52 M/UL — SIGNIFICANT CHANGE UP (ref 3.8–5.2)
RBC # BLD: 4.73 M/UL — SIGNIFICANT CHANGE UP (ref 3.8–5.2)
RBC # FLD: 20.9 % — HIGH (ref 10.3–14.5)
RBC # FLD: 21.1 % — HIGH (ref 10.3–14.5)
RBC BLD AUTO: ABNORMAL
TARGETS BLD QL SMEAR: SLIGHT — SIGNIFICANT CHANGE UP
WBC # BLD: 10.45 K/UL — SIGNIFICANT CHANGE UP (ref 3.8–10.5)
WBC # BLD: 9.88 K/UL — SIGNIFICANT CHANGE UP (ref 3.8–10.5)
WBC # FLD AUTO: 10.45 K/UL — SIGNIFICANT CHANGE UP (ref 3.8–10.5)
WBC # FLD AUTO: 9.88 K/UL — SIGNIFICANT CHANGE UP (ref 3.8–10.5)

## 2022-10-28 RX ORDER — ERGOCALCIFEROL 1.25 MG/1
1 CAPSULE ORAL
Qty: 0 | Refills: 0 | DISCHARGE

## 2022-10-28 RX ORDER — VALGANCICLOVIR 450 MG/1
2 TABLET, FILM COATED ORAL
Qty: 0 | Refills: 0 | DISCHARGE

## 2022-10-28 RX ORDER — FLUTICASONE PROPIONATE 50 MCG
1 SPRAY, SUSPENSION NASAL
Qty: 0 | Refills: 0 | DISCHARGE

## 2022-10-28 RX ORDER — FAMOTIDINE 10 MG/ML
1 INJECTION INTRAVENOUS
Qty: 0 | Refills: 0 | DISCHARGE

## 2022-10-28 RX ORDER — EXEMESTANE 25 MG/1
1 TABLET, SUGAR COATED ORAL
Qty: 0 | Refills: 0 | DISCHARGE

## 2022-10-29 LAB
ALBUMIN SERPL ELPH-MCNC: 4.2 G/DL — SIGNIFICANT CHANGE UP (ref 3.3–5)
ALP SERPL-CCNC: 105 U/L — SIGNIFICANT CHANGE UP (ref 40–120)
ALT FLD-CCNC: 59 U/L — HIGH (ref 10–45)
ANION GAP SERPL CALC-SCNC: 15 MMOL/L — SIGNIFICANT CHANGE UP (ref 5–17)
APPEARANCE UR: CLEAR — SIGNIFICANT CHANGE UP
AST SERPL-CCNC: 25 U/L — SIGNIFICANT CHANGE UP (ref 10–40)
BILIRUB SERPL-MCNC: 0.2 MG/DL — SIGNIFICANT CHANGE UP (ref 0.2–1.2)
BILIRUB UR-MCNC: NEGATIVE — SIGNIFICANT CHANGE UP
BUN SERPL-MCNC: 21 MG/DL — SIGNIFICANT CHANGE UP (ref 7–23)
CALCIUM SERPL-MCNC: 8.7 MG/DL — SIGNIFICANT CHANGE UP (ref 8.4–10.5)
CHLORIDE SERPL-SCNC: 102 MMOL/L — SIGNIFICANT CHANGE UP (ref 96–108)
CO2 SERPL-SCNC: 23 MMOL/L — SIGNIFICANT CHANGE UP (ref 22–31)
COLOR SPEC: SIGNIFICANT CHANGE UP
CREAT SERPL-MCNC: 0.48 MG/DL — LOW (ref 0.5–1.3)
DIFF PNL FLD: NEGATIVE — SIGNIFICANT CHANGE UP
EGFR: 111 ML/MIN/1.73M2 — SIGNIFICANT CHANGE UP
GLUCOSE SERPL-MCNC: 128 MG/DL — HIGH (ref 70–99)
GLUCOSE UR QL: NEGATIVE — SIGNIFICANT CHANGE UP
KETONES UR-MCNC: NEGATIVE — SIGNIFICANT CHANGE UP
LEUKOCYTE ESTERASE UR-ACNC: NEGATIVE — SIGNIFICANT CHANGE UP
NITRITE UR-MCNC: NEGATIVE — SIGNIFICANT CHANGE UP
PH UR: 6 — SIGNIFICANT CHANGE UP (ref 5–8)
POTASSIUM SERPL-MCNC: 4.7 MMOL/L — SIGNIFICANT CHANGE UP (ref 3.5–5.3)
POTASSIUM SERPL-SCNC: 4.7 MMOL/L — SIGNIFICANT CHANGE UP (ref 3.5–5.3)
PROT SERPL-MCNC: 5.9 G/DL — LOW (ref 6–8.3)
PROT UR-MCNC: SIGNIFICANT CHANGE UP
SODIUM SERPL-SCNC: 140 MMOL/L — SIGNIFICANT CHANGE UP (ref 135–145)
SP GR SPEC: 1.02 — SIGNIFICANT CHANGE UP (ref 1.01–1.02)
UROBILINOGEN FLD QL: SIGNIFICANT CHANGE UP

## 2022-10-31 DIAGNOSIS — Z51.11 ENCOUNTER FOR ANTINEOPLASTIC CHEMOTHERAPY: ICD-10-CM

## 2022-10-31 LAB — CANCER AG27-29 SERPL-ACNC: 6.9 U/ML — SIGNIFICANT CHANGE UP (ref 0–38.6)

## 2022-11-11 ENCOUNTER — RESULT REVIEW (OUTPATIENT)
Age: 57
End: 2022-11-11

## 2022-11-11 ENCOUNTER — APPOINTMENT (OUTPATIENT)
Dept: INFUSION THERAPY | Facility: HOSPITAL | Age: 57
End: 2022-11-11

## 2022-11-11 ENCOUNTER — APPOINTMENT (OUTPATIENT)
Dept: HEMATOLOGY ONCOLOGY | Facility: CLINIC | Age: 57
End: 2022-11-11

## 2022-11-11 VITALS
WEIGHT: 163.14 LBS | RESPIRATION RATE: 16 BRPM | OXYGEN SATURATION: 98 % | HEIGHT: 60 IN | TEMPERATURE: 98 F | DIASTOLIC BLOOD PRESSURE: 73 MMHG | HEART RATE: 78 BPM | SYSTOLIC BLOOD PRESSURE: 107 MMHG | BODY MASS INDEX: 32.03 KG/M2

## 2022-11-11 LAB
ALBUMIN SERPL ELPH-MCNC: 4.1 G/DL — SIGNIFICANT CHANGE UP (ref 3.3–5)
ALP SERPL-CCNC: 87 U/L — SIGNIFICANT CHANGE UP (ref 40–120)
ALT FLD-CCNC: 56 U/L — HIGH (ref 10–45)
ANION GAP SERPL CALC-SCNC: 11 MMOL/L — SIGNIFICANT CHANGE UP (ref 5–17)
ANISOCYTOSIS BLD QL: SLIGHT — SIGNIFICANT CHANGE UP
APPEARANCE UR: CLEAR — SIGNIFICANT CHANGE UP
AST SERPL-CCNC: 27 U/L — SIGNIFICANT CHANGE UP (ref 10–40)
BASOPHILS # BLD AUTO: 0 K/UL — SIGNIFICANT CHANGE UP (ref 0–0.2)
BASOPHILS NFR BLD AUTO: 0 % — SIGNIFICANT CHANGE UP (ref 0–2)
BILIRUB SERPL-MCNC: 0.2 MG/DL — SIGNIFICANT CHANGE UP (ref 0.2–1.2)
BILIRUB UR-MCNC: NEGATIVE — SIGNIFICANT CHANGE UP
BUN SERPL-MCNC: 19 MG/DL — SIGNIFICANT CHANGE UP (ref 7–23)
CALCIUM SERPL-MCNC: 8.5 MG/DL — SIGNIFICANT CHANGE UP (ref 8.4–10.5)
CEA SERPL-MCNC: 3 NG/ML — SIGNIFICANT CHANGE UP (ref 0–3.8)
CHLORIDE SERPL-SCNC: 104 MMOL/L — SIGNIFICANT CHANGE UP (ref 96–108)
CO2 SERPL-SCNC: 24 MMOL/L — SIGNIFICANT CHANGE UP (ref 22–31)
COLOR SPEC: SIGNIFICANT CHANGE UP
CREAT SERPL-MCNC: 0.48 MG/DL — LOW (ref 0.5–1.3)
DIFF PNL FLD: NEGATIVE — SIGNIFICANT CHANGE UP
EGFR: 111 ML/MIN/1.73M2 — SIGNIFICANT CHANGE UP
ELLIPTOCYTES BLD QL SMEAR: SLIGHT — SIGNIFICANT CHANGE UP
EOSINOPHIL # BLD AUTO: 0 K/UL — SIGNIFICANT CHANGE UP (ref 0–0.5)
EOSINOPHIL NFR BLD AUTO: 0 % — SIGNIFICANT CHANGE UP (ref 0–6)
GLUCOSE SERPL-MCNC: 115 MG/DL — HIGH (ref 70–99)
GLUCOSE UR QL: NEGATIVE — SIGNIFICANT CHANGE UP
HCT VFR BLD CALC: 36.2 % — SIGNIFICANT CHANGE UP (ref 34.5–45)
HGB BLD-MCNC: 12.2 G/DL — SIGNIFICANT CHANGE UP (ref 11.5–15.5)
KETONES UR-MCNC: NEGATIVE — SIGNIFICANT CHANGE UP
LEUKOCYTE ESTERASE UR-ACNC: NEGATIVE — SIGNIFICANT CHANGE UP
LYMPHOCYTES # BLD AUTO: 1.52 K/UL — SIGNIFICANT CHANGE UP (ref 1–3.3)
LYMPHOCYTES # BLD AUTO: 16 % — SIGNIFICANT CHANGE UP (ref 13–44)
MCHC RBC-ENTMCNC: 28.6 PG — SIGNIFICANT CHANGE UP (ref 27–34)
MCHC RBC-ENTMCNC: 33.7 G/DL — SIGNIFICANT CHANGE UP (ref 32–36)
MCV RBC AUTO: 84.8 FL — SIGNIFICANT CHANGE UP (ref 80–100)
METAMYELOCYTES # FLD: 1 % — HIGH (ref 0–0)
MONOCYTES # BLD AUTO: 0.57 K/UL — SIGNIFICANT CHANGE UP (ref 0–0.9)
MONOCYTES NFR BLD AUTO: 6 % — SIGNIFICANT CHANGE UP (ref 2–14)
MYELOCYTES NFR BLD: 3 % — HIGH (ref 0–0)
NEUTROPHILS # BLD AUTO: 7.04 K/UL — SIGNIFICANT CHANGE UP (ref 1.8–7.4)
NEUTROPHILS NFR BLD AUTO: 74 % — SIGNIFICANT CHANGE UP (ref 43–77)
NITRITE UR-MCNC: NEGATIVE — SIGNIFICANT CHANGE UP
NRBC # BLD: 0 /100 — SIGNIFICANT CHANGE UP (ref 0–0)
NRBC # BLD: SIGNIFICANT CHANGE UP /100 WBCS (ref 0–0)
PH UR: 6 — SIGNIFICANT CHANGE UP (ref 5–8)
PLAT MORPH BLD: NORMAL — SIGNIFICANT CHANGE UP
PLATELET # BLD AUTO: 202 K/UL — SIGNIFICANT CHANGE UP (ref 150–400)
POIKILOCYTOSIS BLD QL AUTO: SLIGHT — SIGNIFICANT CHANGE UP
POTASSIUM SERPL-MCNC: 4.3 MMOL/L — SIGNIFICANT CHANGE UP (ref 3.5–5.3)
POTASSIUM SERPL-SCNC: 4.3 MMOL/L — SIGNIFICANT CHANGE UP (ref 3.5–5.3)
PROT SERPL-MCNC: 6 G/DL — SIGNIFICANT CHANGE UP (ref 6–8.3)
PROT UR-MCNC: NEGATIVE — SIGNIFICANT CHANGE UP
RBC # BLD: 4.27 M/UL — SIGNIFICANT CHANGE UP (ref 3.8–5.2)
RBC # FLD: 20.1 % — HIGH (ref 10.3–14.5)
RBC BLD AUTO: ABNORMAL
SODIUM SERPL-SCNC: 139 MMOL/L — SIGNIFICANT CHANGE UP (ref 135–145)
SP GR SPEC: 1.02 — SIGNIFICANT CHANGE UP (ref 1.01–1.02)
TARGETS BLD QL SMEAR: SLIGHT — SIGNIFICANT CHANGE UP
UROBILINOGEN FLD QL: SIGNIFICANT CHANGE UP
WBC # BLD: 9.51 K/UL — SIGNIFICANT CHANGE UP (ref 3.8–10.5)
WBC # FLD AUTO: 9.51 K/UL — SIGNIFICANT CHANGE UP (ref 3.8–10.5)

## 2022-11-11 PROCEDURE — 99214 OFFICE O/P EST MOD 30 MIN: CPT

## 2022-11-12 ENCOUNTER — OUTPATIENT (OUTPATIENT)
Dept: OUTPATIENT SERVICES | Facility: HOSPITAL | Age: 57
LOS: 1 days | End: 2022-11-12
Payer: MEDICARE

## 2022-11-12 ENCOUNTER — APPOINTMENT (OUTPATIENT)
Dept: MRI IMAGING | Facility: IMAGING CENTER | Age: 57
End: 2022-11-12

## 2022-11-12 DIAGNOSIS — Z98.51 TUBAL LIGATION STATUS: Chronic | ICD-10-CM

## 2022-11-12 DIAGNOSIS — Z90.3 ACQUIRED ABSENCE OF STOMACH [PART OF]: Chronic | ICD-10-CM

## 2022-11-12 DIAGNOSIS — Z90.12 ACQUIRED ABSENCE OF LEFT BREAST AND NIPPLE: Chronic | ICD-10-CM

## 2022-11-12 DIAGNOSIS — C79.31 SECONDARY MALIGNANT NEOPLASM OF BRAIN: ICD-10-CM

## 2022-11-12 DIAGNOSIS — Z90.49 ACQUIRED ABSENCE OF OTHER SPECIFIED PARTS OF DIGESTIVE TRACT: Chronic | ICD-10-CM

## 2022-11-12 PROCEDURE — A9585: CPT

## 2022-11-12 PROCEDURE — 70553 MRI BRAIN STEM W/O & W/DYE: CPT | Mod: 26,MH

## 2022-11-12 PROCEDURE — 70553 MRI BRAIN STEM W/O & W/DYE: CPT

## 2022-11-14 LAB — CANCER AG27-29 SERPL-ACNC: 8.5 U/ML — SIGNIFICANT CHANGE UP (ref 0–38.6)

## 2022-11-14 NOTE — PHYSICAL EXAM
[Fully active, able to carry on all pre-disease performance without restriction] : Status 0 - Fully active, able to carry on all pre-disease performance without restriction [Normal] : affect appropriate [de-identified] : mild - mod Cushingoid appearance [de-identified] : Right breast with no discrete palpable masses, no palpable axillary nodes.  Left reconstructed breast with medial and lateral nodularity c/w fat necrosis, stable, no other discrete palpable masses, no palpable axillary nodes.

## 2022-11-14 NOTE — HISTORY OF PRESENT ILLNESS
[de-identified] : The patients' history of present illness began in 2012 at which time she was found to have a right breast cancer for which she underwent a right modified radical mastectomy/axillary lymph node dissection with the finding of an ER positive 20%, AL positive 10%, HER-2/lisa 2+, FISH amplified breast cancer per outside physician report (I do not have a copy that pathology report for my review).  She went on to receive adjuvant dose-dense AC chemotherapy for 4 cycles followed by Taxol plus Herceptin as well as adjuvant radiation therapy.  She was subsequently begun adjuvant tamoxifen therapy.  Her last menstrual period was in 2014.\par \par The patient then began to experience weight loss beginning in November 2018 and over 3-month period of time lost 30 pounds per her report.  At approximately the same time she developed increasing shortness of breath, and ultimately presented to [facility] Emergency Department.  She subsequently had a CT scan of the chest, which showed multiple mediastinal hilar right subclavian lymph nodes corresponding to abnormal FDG activity on PET scan consistent with metastatic carcinoma; there were tiny nodularities in both lungs- too small to accurately characterize- not present on prior examination and measuring up to 2 mm in both lungs.  The possibility of metastasis was a consideration; she had no bony metastases; the liver demonstrated tiny poorly defined lesions less than 5 mm and difficult to characterize and visible on prior examinations with the possibility of metastasis also considered.  A PET-CT scan showed multiple hypermetabolic lymph nodes in the mediastinum, right subclavian region and both adebayo felt to represent tumor; there were stellate lesions in the left breast region, felt to represent benign scars; the lungs demonstrated no significant abnormality with multiple tiny lung nodules seen on CT as previously noted; liver demonstrated no significant abnormality with multiple small poorly defined nodules seen on CT per my previous comments.  The patient underwent a left breast core biopsy of the area of concern with a finding of fat necrosis and foreign body giant cell reaction as well as a separate fine-needle aspiration consistent with a ruptured cyst/fat necrosis.  The patient consequently saw her oncologist, Dr. Carias, who sent the patient for ultrasound-guided core biopsies of the mediastinal lymph nodes with a finding of carcinoma, consistent with breast primary with estrogen receptor returning positive, greater than 50%, progesterone receptor negative, and HER-2/lisa 0 per outside report. The patient was subsequently begun on docetaxel, pertuzumab, trastuzumab, and exemestane beginning at the early 4/19, and subsequently switched to weekly paclitaxel secondary to side effects and continuing  on trastuzumab / pertuzumab every 3 weeks as well as exemestane. The patient was last treated per her report on approximately June 12, 2019, despite outside records that show ongoing weekly treatments early July 2019.  \par \par The patient also reports that beginning in approximately 12/18, she started to develop slowly progressive diffuse rash with associated pruritus, having seen multiple dermatologists.  Most recently, she saw Dr. Courtney Cowart, a [facility] physician practicing at [place], [place], who diagnosed disseminated herpes simplex for which she was begun on valacyclovir, as well as evidence of MRSA for which was treated with doxycycline beginning in May 2019.  In June 2019, the patient presented to [facility] Emergency Department complaining of progressive "weakness and shaking." She was found to be anemic and had melena.  She underwent endoscopy and VIR procedure to stop gastric bleeding, which was unsuccessful.  Consequently, she had a distal gastrectomy.  She was discharged from [facility] on 07/08/2019.\par \par The patient was initial consultation 7/24/19 for a further opinion regarding treatment recommendations. She noted ongoing weakness, fatigue, and generalized malaise, although all of these had begun to improve gradually.  Her last chemotherapy as noted above was approximately at 06/12/2019.  She reports that her primary oncologist, Dr. Carias, told to discontinue exemestane upon admission to [facility] with the consideration restarting it post discharge.  \par \par I obtained her outside slides for review at [facility] and it was confirmed that she had met breast cancer ER+ AL neg and Her 2 lisa neg.\par \par Consequently I recommended restarting exemestane.  \par \par In 3/21 she had L sided seizures and found to have abnormal enhancing lesions in the posterior fossa and supratentorial region are identified.\par \par Lesion 1: Large dural based enhancing lesion is seen involving the high medial right frontal region. This lesion does appear to cross the midline and measures approximately 3.2 x 2.7 x 3.5 cm. Small amount of adjacent susceptibility is identified. Surrounding edema is identified. Localized mass effect is identified. Right to left shift (7.2 mm) seen.\par \par Lesion 2: Enhancing lesion is seen involving the right upper tiffany. This lesion measures approximately 0.5 x 0.4 cm.\par \par Lesion 3: Enhancing lesion is seen involving the superior left cerebellar region. This lesion measures approximately 1.2 x 1.0 cm. Surrounding edema is identified.\par \par She was seen by Dr Hwang and treated with Gamma knife SRS to the 3 brain lesions. \par \par A repeat MRI 6/2/21 demonstrated:\par \par -Interval decrease in size of 3 enhancing lesions, largest in the high medial right frontal lobe measuring up to 2.0 cm.\par -Improvement of vasogenic edema with resolution of leftward midline shift.\par -No new lesions are identified\par \par \par \par  [de-identified] : Pt seen today for f/u visit.  \par \par On 9/14/22 had a witnessed L sided seizure and admitted to [facility]. A brain MRI done 9/14/2022 showed: \par -Increase in size of a right frontal parasagittal lesion since comparison MRI, now measuring up to 2.5 cm. Increasing surrounding vasogenic edema.\par -No new lesion identified. No acute infarct.\par \par She was d/c'd on dexamethasone taper to 4 mg BID and keppra 1000 mg bId.  Currently tapered to 2 mg.   \par \par She notes occasional twitching of her L and R hand (not at the same time) but these have significantly decreased. \par \par Remains on xxemestane.  Denies any treatment related side effects. \par \par She denies any other complaints.  \par \par Notes a good appetite, stable weight and excellent performance status. \par \par CT scan c/a/p, 10/12/22- IMPRESSION:\par A new 2.7 cm right adnexal cyst with thin septation. Consider follow-up with pelvic ultrasound as indicated versus attention at short-term follow-up imaging.\par \par Bone scan, 10/12/22- IMPRESSION: Bone scan demonstrates:\par No definite scan evidence of osseous metastasis.\par Mild diffuse increased uptake in the calvarium, not significant change, probably hyperostosis or Paget's disease.\par Heterotopic ossification in the medial aspect of the left chest, unchanged.\par Degenerative disease in the major joints.\par No significant interval change compared to the previous bone scan of 5/6/2022.\par \par MRI head 9/14/22\par IMPRESSION:\par -Increase in size of a right frontal parasagittal lesion since comparison MRI, now measuring up to 2.5 cm. Increasing surrounding vasogenic edema.\par -No new lesion identified. No acute infarct\par \par CT c/a/p, 5/6/22- IMPRESSION: Stable exam since 12/17/2021\par \par Bone scan, 5/6/22-  IMPRESSION: Bone scan demonstrates:\par \par No definite scan evidence of osseous metastasis.\par \par Mild diffuse increased uptake in the calvarium, unchanged, probably hyperostosis or Paget's disease.\par Heterotopic ossification in the medial aspect of the left breast, unchanged.\par Degenerative disease in the major joints.\par No significant interval change compared to the previous bone scan of 7/8/2021.\par \par CT scan, 12/17/21- IMPRESSION:  Stable examination compared with prior imaging 7/8/2021.\par \par MRI head 12/30/21\par IMPRESSION:\par Right high paramedian region lesion measures similar in size though there \par is increased edema seen in association when compared with the prior \par 9/24/2021. Question of vague enhancement on the surface of the cervical \par medullary junction and along the surface of the brainstem extending into \par the spinal cord level. Consideration for possible leptomeningeal \par pathology should be given. Recommend correlation with CSF as clinically \par warranted.\par \par DEXA 1/21 osteopenia\par \par

## 2022-11-17 ENCOUNTER — APPOINTMENT (OUTPATIENT)
Dept: RADIATION ONCOLOGY | Facility: CLINIC | Age: 57
End: 2022-11-17

## 2022-11-17 VITALS
BODY MASS INDEX: 32.7 KG/M2 | OXYGEN SATURATION: 96 % | DIASTOLIC BLOOD PRESSURE: 76 MMHG | RESPIRATION RATE: 17 BRPM | WEIGHT: 166.56 LBS | TEMPERATURE: 97.7 F | HEART RATE: 68 BPM | HEIGHT: 60 IN | SYSTOLIC BLOOD PRESSURE: 117 MMHG

## 2022-11-17 PROCEDURE — 99214 OFFICE O/P EST MOD 30 MIN: CPT | Mod: 25

## 2022-11-18 ENCOUNTER — APPOINTMENT (OUTPATIENT)
Dept: INFUSION THERAPY | Facility: HOSPITAL | Age: 57
End: 2022-11-18

## 2022-11-18 ENCOUNTER — APPOINTMENT (OUTPATIENT)
Dept: HEMATOLOGY ONCOLOGY | Facility: CLINIC | Age: 57
End: 2022-11-18

## 2022-11-29 ENCOUNTER — OUTPATIENT (OUTPATIENT)
Dept: OUTPATIENT SERVICES | Facility: HOSPITAL | Age: 57
LOS: 1 days | Discharge: HOME | End: 2022-11-29

## 2022-11-29 DIAGNOSIS — C50.919 MALIGNANT NEOPLASM OF UNSPECIFIED SITE OF UNSPECIFIED FEMALE BREAST: ICD-10-CM

## 2022-11-29 DIAGNOSIS — Z98.51 TUBAL LIGATION STATUS: Chronic | ICD-10-CM

## 2022-11-29 DIAGNOSIS — Z90.3 ACQUIRED ABSENCE OF STOMACH [PART OF]: Chronic | ICD-10-CM

## 2022-11-29 DIAGNOSIS — Z90.12 ACQUIRED ABSENCE OF LEFT BREAST AND NIPPLE: Chronic | ICD-10-CM

## 2022-11-29 DIAGNOSIS — Z90.49 ACQUIRED ABSENCE OF OTHER SPECIFIED PARTS OF DIGESTIVE TRACT: Chronic | ICD-10-CM

## 2022-12-02 ENCOUNTER — NON-APPOINTMENT (OUTPATIENT)
Age: 57
End: 2022-12-02

## 2022-12-02 ENCOUNTER — RESULT REVIEW (OUTPATIENT)
Age: 57
End: 2022-12-02

## 2022-12-02 ENCOUNTER — APPOINTMENT (OUTPATIENT)
Dept: HEMATOLOGY ONCOLOGY | Facility: CLINIC | Age: 57
End: 2022-12-02

## 2022-12-02 ENCOUNTER — APPOINTMENT (OUTPATIENT)
Dept: INFUSION THERAPY | Facility: HOSPITAL | Age: 57
End: 2022-12-02

## 2022-12-02 VITALS
RESPIRATION RATE: 16 BRPM | BODY MASS INDEX: 33.85 KG/M2 | TEMPERATURE: 97.9 F | DIASTOLIC BLOOD PRESSURE: 80 MMHG | OXYGEN SATURATION: 97 % | SYSTOLIC BLOOD PRESSURE: 119 MMHG | WEIGHT: 172.4 LBS | HEART RATE: 90 BPM | HEIGHT: 60 IN

## 2022-12-02 DIAGNOSIS — Z79.899 OTHER LONG TERM (CURRENT) DRUG THERAPY: ICD-10-CM

## 2022-12-02 LAB
ALBUMIN SERPL ELPH-MCNC: 3.4 G/DL — SIGNIFICANT CHANGE UP (ref 3.3–5)
ALP SERPL-CCNC: 80 U/L — SIGNIFICANT CHANGE UP (ref 40–120)
ALT FLD-CCNC: 39 U/L — SIGNIFICANT CHANGE UP (ref 10–45)
ANION GAP SERPL CALC-SCNC: 10 MMOL/L — SIGNIFICANT CHANGE UP (ref 5–17)
APPEARANCE UR: ABNORMAL
AST SERPL-CCNC: 29 U/L — SIGNIFICANT CHANGE UP (ref 10–40)
BACTERIA # UR AUTO: ABNORMAL
BASOPHILS # BLD AUTO: 0.05 K/UL — SIGNIFICANT CHANGE UP (ref 0–0.2)
BASOPHILS NFR BLD AUTO: 0.8 % — SIGNIFICANT CHANGE UP (ref 0–2)
BILIRUB SERPL-MCNC: 0.2 MG/DL — SIGNIFICANT CHANGE UP (ref 0.2–1.2)
BILIRUB UR-MCNC: NEGATIVE — SIGNIFICANT CHANGE UP
BUN SERPL-MCNC: 11 MG/DL — SIGNIFICANT CHANGE UP (ref 7–23)
CALCIUM SERPL-MCNC: 8.3 MG/DL — LOW (ref 8.4–10.5)
CEA SERPL-MCNC: 2.4 NG/ML — SIGNIFICANT CHANGE UP (ref 0–3.8)
CHLORIDE SERPL-SCNC: 107 MMOL/L — SIGNIFICANT CHANGE UP (ref 96–108)
CO2 SERPL-SCNC: 23 MMOL/L — SIGNIFICANT CHANGE UP (ref 22–31)
COLOR SPEC: YELLOW — SIGNIFICANT CHANGE UP
CREAT SERPL-MCNC: 0.4 MG/DL — LOW (ref 0.5–1.3)
DIFF PNL FLD: ABNORMAL
EGFR: 116 ML/MIN/1.73M2 — SIGNIFICANT CHANGE UP
EOSINOPHIL # BLD AUTO: 0.09 K/UL — SIGNIFICANT CHANGE UP (ref 0–0.5)
EOSINOPHIL NFR BLD AUTO: 1.4 % — SIGNIFICANT CHANGE UP (ref 0–6)
EPI CELLS # UR: 1 /HPF — SIGNIFICANT CHANGE UP (ref 0–5)
GLUCOSE SERPL-MCNC: 87 MG/DL — SIGNIFICANT CHANGE UP (ref 70–99)
GLUCOSE UR QL: NEGATIVE — SIGNIFICANT CHANGE UP
HCT VFR BLD CALC: 34.8 % — SIGNIFICANT CHANGE UP (ref 34.5–45)
HGB BLD-MCNC: 11.5 G/DL — SIGNIFICANT CHANGE UP (ref 11.5–15.5)
HYALINE CASTS # UR AUTO: 1 /LPF — SIGNIFICANT CHANGE UP (ref 0–7)
IMM GRANULOCYTES NFR BLD AUTO: 2.2 % — HIGH (ref 0–0.9)
KETONES UR-MCNC: NEGATIVE — SIGNIFICANT CHANGE UP
LEUKOCYTE ESTERASE UR-ACNC: ABNORMAL
LYMPHOCYTES # BLD AUTO: 1.84 K/UL — SIGNIFICANT CHANGE UP (ref 1–3.3)
LYMPHOCYTES # BLD AUTO: 29.3 % — SIGNIFICANT CHANGE UP (ref 13–44)
MCHC RBC-ENTMCNC: 29.9 PG — SIGNIFICANT CHANGE UP (ref 27–34)
MCHC RBC-ENTMCNC: 33 G/DL — SIGNIFICANT CHANGE UP (ref 32–36)
MCV RBC AUTO: 90.6 FL — SIGNIFICANT CHANGE UP (ref 80–100)
MONOCYTES # BLD AUTO: 0.41 K/UL — SIGNIFICANT CHANGE UP (ref 0–0.9)
MONOCYTES NFR BLD AUTO: 6.5 % — SIGNIFICANT CHANGE UP (ref 2–14)
NEUTROPHILS # BLD AUTO: 3.74 K/UL — SIGNIFICANT CHANGE UP (ref 1.8–7.4)
NEUTROPHILS NFR BLD AUTO: 59.8 % — SIGNIFICANT CHANGE UP (ref 43–77)
NITRITE UR-MCNC: POSITIVE
NRBC # BLD: 0 /100 WBCS — SIGNIFICANT CHANGE UP (ref 0–0)
PH UR: 6.5 — SIGNIFICANT CHANGE UP (ref 5–8)
PLATELET # BLD AUTO: 233 K/UL — SIGNIFICANT CHANGE UP (ref 150–400)
POTASSIUM SERPL-MCNC: 4.3 MMOL/L — SIGNIFICANT CHANGE UP (ref 3.5–5.3)
POTASSIUM SERPL-SCNC: 4.3 MMOL/L — SIGNIFICANT CHANGE UP (ref 3.5–5.3)
PROT SERPL-MCNC: 5.8 G/DL — LOW (ref 6–8.3)
PROT UR-MCNC: SIGNIFICANT CHANGE UP
RBC # BLD: 3.84 M/UL — SIGNIFICANT CHANGE UP (ref 3.8–5.2)
RBC # FLD: 19.1 % — HIGH (ref 10.3–14.5)
RBC CASTS # UR COMP ASSIST: 2 /HPF — SIGNIFICANT CHANGE UP (ref 0–4)
SODIUM SERPL-SCNC: 141 MMOL/L — SIGNIFICANT CHANGE UP (ref 135–145)
SP GR SPEC: 1.02 — SIGNIFICANT CHANGE UP (ref 1.01–1.02)
UROBILINOGEN FLD QL: SIGNIFICANT CHANGE UP
WBC # BLD: 6.27 K/UL — SIGNIFICANT CHANGE UP (ref 3.8–10.5)
WBC # FLD AUTO: 6.27 K/UL — SIGNIFICANT CHANGE UP (ref 3.8–10.5)
WBC UR QL: 129 /HPF — HIGH (ref 0–5)

## 2022-12-02 PROCEDURE — 99214 OFFICE O/P EST MOD 30 MIN: CPT

## 2022-12-02 RX ORDER — DEXAMETHASONE 4 MG/1
4 TABLET ORAL TWICE DAILY
Refills: 0 | Status: DISCONTINUED | COMMUNITY
Start: 2022-09-20 | End: 2022-12-02

## 2022-12-02 RX ORDER — DEXAMETHASONE 2 MG/1
2 TABLET ORAL TWICE DAILY
Qty: 60 | Refills: 1 | Status: DISCONTINUED | COMMUNITY
Start: 2022-10-06 | End: 2022-12-02

## 2022-12-04 PROBLEM — Z79.899 HIGH RISK MEDICATION USE: Status: ACTIVE | Noted: 2022-11-14

## 2022-12-04 NOTE — PHYSICAL EXAM
[Fully active, able to carry on all pre-disease performance without restriction] : Status 0 - Fully active, able to carry on all pre-disease performance without restriction [Normal] : affect appropriate [de-identified] : mild - mod Cushingoid appearance [de-identified] : Right breast with no discrete palpable masses, no palpable axillary nodes.  Left reconstructed breast with medial and lateral nodularity c/w fat necrosis, stable, no other discrete palpable masses, no palpable axillary nodes.

## 2022-12-04 NOTE — REASON FOR VISIT
[Follow-Up Visit] : a follow-up [Pre-Treatment Visit] : a pre-treatment [Spouse] : spouse [FreeTextEntry2] : Metastatic breast cancer

## 2022-12-04 NOTE — HISTORY OF PRESENT ILLNESS
[de-identified] : The patients' history of present illness began in 2012 at which time she was found to have a right breast cancer for which she underwent a right modified radical mastectomy/axillary lymph node dissection with the finding of an ER positive 20%, AK positive 10%, HER-2/lisa 2+, FISH amplified breast cancer per outside physician report (I do not have a copy that pathology report for my review).  She went on to receive adjuvant dose-dense AC chemotherapy for 4 cycles followed by Taxol plus Herceptin as well as adjuvant radiation therapy.  She was subsequently begun adjuvant tamoxifen therapy.  Her last menstrual period was in 2014.\par \par The patient then began to experience weight loss beginning in November 2018 and over 3-month period of time lost 30 pounds per her report.  At approximately the same time she developed increasing shortness of breath, and ultimately presented to [facility] Emergency Department.  She subsequently had a CT scan of the chest, which showed multiple mediastinal hilar right subclavian lymph nodes corresponding to abnormal FDG activity on PET scan consistent with metastatic carcinoma; there were tiny nodularities in both lungs- too small to accurately characterize- not present on prior examination and measuring up to 2 mm in both lungs.  The possibility of metastasis was a consideration; she had no bony metastases; the liver demonstrated tiny poorly defined lesions less than 5 mm and difficult to characterize and visible on prior examinations with the possibility of metastasis also considered.  A PET-CT scan showed multiple hypermetabolic lymph nodes in the mediastinum, right subclavian region and both adebayo felt to represent tumor; there were stellate lesions in the left breast region, felt to represent benign scars; the lungs demonstrated no significant abnormality with multiple tiny lung nodules seen on CT as previously noted; liver demonstrated no significant abnormality with multiple small poorly defined nodules seen on CT per my previous comments.  The patient underwent a left breast core biopsy of the area of concern with a finding of fat necrosis and foreign body giant cell reaction as well as a separate fine-needle aspiration consistent with a ruptured cyst/fat necrosis.  The patient consequently saw her oncologist, Dr. Carias, who sent the patient for ultrasound-guided core biopsies of the mediastinal lymph nodes with a finding of carcinoma, consistent with breast primary with estrogen receptor returning positive, greater than 50%, progesterone receptor negative, and HER-2/lisa 0 per outside report. The patient was subsequently begun on docetaxel, pertuzumab, trastuzumab, and exemestane beginning at the early 4/19, and subsequently switched to weekly paclitaxel secondary to side effects and continuing  on trastuzumab / pertuzumab every 3 weeks as well as exemestane. The patient was last treated per her report on approximately June 12, 2019, despite outside records that show ongoing weekly treatments early July 2019.  \par \par The patient also reports that beginning in approximately 12/18, she started to develop slowly progressive diffuse rash with associated pruritus, having seen multiple dermatologists.  Most recently, she saw Dr. Courtney Cowart, a [facility] physician practicing at [place], [place], who diagnosed disseminated herpes simplex for which she was begun on valacyclovir, as well as evidence of MRSA for which was treated with doxycycline beginning in May 2019.  In June 2019, the patient presented to [facility] Emergency Department complaining of progressive "weakness and shaking." She was found to be anemic and had melena.  She underwent endoscopy and VIR procedure to stop gastric bleeding, which was unsuccessful.  Consequently, she had a distal gastrectomy.  She was discharged from [facility] on 07/08/2019.\par \par The patient was initial consultation 7/24/19 for a further opinion regarding treatment recommendations. She noted ongoing weakness, fatigue, and generalized malaise, although all of these had begun to improve gradually.  Her last chemotherapy as noted above was approximately at 06/12/2019.  She reports that her primary oncologist, Dr. Carias, told to discontinue exemestane upon admission to [facility] with the consideration restarting it post discharge.  \par \par I obtained her outside slides for review at [facility] and it was confirmed that she had met breast cancer ER+ AK neg and Her 2 lisa neg.\par \par Consequently I recommended restarting exemestane.  \par \par In 3/21 she had L sided seizures and found to have abnormal enhancing lesions in the posterior fossa and supratentorial region are identified.\par \par Lesion 1: Large dural based enhancing lesion is seen involving the high medial right frontal region. This lesion does appear to cross the midline and measures approximately 3.2 x 2.7 x 3.5 cm. Small amount of adjacent susceptibility is identified. Surrounding edema is identified. Localized mass effect is identified. Right to left shift (7.2 mm) seen.\par \par Lesion 2: Enhancing lesion is seen involving the right upper tiffany. This lesion measures approximately 0.5 x 0.4 cm.\par \par Lesion 3: Enhancing lesion is seen involving the superior left cerebellar region. This lesion measures approximately 1.2 x 1.0 cm. Surrounding edema is identified.\par \par She was seen by Dr Hwang and treated with Gamma knife SRS to the 3 brain lesions. \par \par A repeat MRI 6/2/21 demonstrated:\par \par -Interval decrease in size of 3 enhancing lesions, largest in the high medial right frontal lobe measuring up to 2.0 cm.\par -Improvement of vasogenic edema with resolution of leftward midline shift.\par -No new lesions are identified\par \par On 9/14/22 had a witnessed L sided seizure and admitted to [facility]. A brain MRI done 9/14/2022 showed: \par -Increase in size of a right frontal parasagittal lesion since comparison MRI, now measuring up to 2.5 cm. Increasing surrounding vasogenic edema.\par -No new lesion identified. No acute infarct.  She was d/c'd on dexamethasone taper to 4 mg BID and keppra 1000 mg bId.  Currently tapered to 2 mg.   \par \par Patient was started on bevacizumab on 10/28/22 for presumed tumor necrosis related edema. \par \par  [de-identified] : Pt seen today for f/u visit.  On 9/14/22 had a witnessed L sided seizure and admitted to [facility]. A brain MRI done 9/14/2022 showed: \par -Increase in size of a right frontal parasagittal lesion since comparison MRI, now measuring up to 2.5 cm. Increasing surrounding vasogenic edema.\par -No new lesion identified. No acute infarct.  She was d/c'd on dexamethasone taper to 4 mg BID and keppra 1000 mg bId.  Currently tapered to 2 mg.   \par \par Patient was started on bevacizumab on 10/28/22\par \par She states she dex was d/c 4 days ago.  \par \par She notes twitching of her L and R hand (not at the same time) have significantly decreased, states rarely occurs.\par  \par Remains on exemestane.  Denies any treatment related side effects. \par \par She denies any other complaints.  \par \par Notes a good appetite, stable weight and excellent performance status. \par \par CT scan c/a/p, 10/12/22- IMPRESSION:\par A new 2.7 cm right adnexal cyst with thin septation. Consider follow-up with pelvic ultrasound as indicated versus attention at short-term follow-up imaging.\par \par Bone scan, 10/12/22- IMPRESSION: Bone scan demonstrates:\par No definite scan evidence of osseous metastasis.\par Mild diffuse increased uptake in the calvarium, not significant change, probably hyperostosis or Paget's disease.\par Heterotopic ossification in the medial aspect of the left chest, unchanged.\par Degenerative disease in the major joints.\par No significant interval change compared to the previous bone scan of 5/6/2022.\par \par MRI head 9/14/22\par IMPRESSION:\par -Increase in size of a right frontal parasagittal lesion since comparison MRI, now measuring up to 2.5 cm. Increasing surrounding vasogenic edema.\par -No new lesion identified. No acute infarct\par \par CT c/a/p, 5/6/22- IMPRESSION: Stable exam since 12/17/2021\par \par Bone scan, 5/6/22-  IMPRESSION: Bone scan demonstrates:\par \par No definite scan evidence of osseous metastasis.\par \par Mild diffuse increased uptake in the calvarium, unchanged, probably hyperostosis or Paget's disease.\par Heterotopic ossification in the medial aspect of the left breast, unchanged.\par Degenerative disease in the major joints.\par No significant interval change compared to the previous bone scan of 7/8/2021.\par \par CT scan, 12/17/21- IMPRESSION:  Stable examination compared with prior imaging 7/8/2021.\par \par MRI head 12/30/21\par IMPRESSION:\par Right high paramedian region lesion measures similar in size though there \par is increased edema seen in association when compared with the prior \par 9/24/2021. Question of vague enhancement on the surface of the cervical \par medullary junction and along the surface of the brainstem extending into \par the spinal cord level. Consideration for possible leptomeningeal \par pathology should be given. Recommend correlation with CSF as clinically \par warranted.\par \par DEXA 1/21 osteopenia\par \par

## 2022-12-05 DIAGNOSIS — C79.31 SECONDARY MALIGNANT NEOPLASM OF BRAIN: ICD-10-CM

## 2022-12-05 DIAGNOSIS — Z51.11 ENCOUNTER FOR ANTINEOPLASTIC CHEMOTHERAPY: ICD-10-CM

## 2022-12-05 LAB — CANCER AG27-29 SERPL-ACNC: 8.4 U/ML — SIGNIFICANT CHANGE UP (ref 0–38.6)

## 2022-12-05 NOTE — PHYSICAL THERAPY INITIAL EVALUATION ADULT - GAIT DEVIATIONS NOTED, PT EVAL
decreased apurva/increased time in double stance/decreased step length/decreased weight-shifting ability No adenopathy or splenomegaly. No cervical or inguinal lymphadenopathy.

## 2022-12-08 ENCOUNTER — APPOINTMENT (OUTPATIENT)
Dept: INTERNAL MEDICINE | Facility: CLINIC | Age: 57
End: 2022-12-08

## 2022-12-08 VITALS
DIASTOLIC BLOOD PRESSURE: 67 MMHG | WEIGHT: 172 LBS | HEIGHT: 60 IN | TEMPERATURE: 97.3 F | SYSTOLIC BLOOD PRESSURE: 103 MMHG | OXYGEN SATURATION: 96 % | HEART RATE: 74 BPM | RESPIRATION RATE: 16 BRPM | BODY MASS INDEX: 33.77 KG/M2

## 2022-12-08 PROCEDURE — 99214 OFFICE O/P EST MOD 30 MIN: CPT

## 2022-12-08 NOTE — HISTORY OF PRESENT ILLNESS
[FreeTextEntry1] : follow up\par Interview and discussion conducted in Afghan by Afghan speaking physician\par  [de-identified] : 57 years old female presents for follow up to review and discuss labs test results; offers no complaints

## 2022-12-08 NOTE — PLAN
[FreeTextEntry1] : * metastatic breast carcinoma stable; follow with oncologist\par * dietary adherence compliance stressed\par * low cholesterol, low triglycerides diet,dietary counseling given; dietary avoidance discussed; diet and exercise reviewed with patient\par * Dietary counseling given, dietary avoidance discussed, diet and exercise reviewed with patient; patient reminded of importance of aerobic exercise, weight control, dietary compliance and regular glucose monitoring\par * follow up three months.

## 2022-12-14 NOTE — ED PROVIDER NOTE - NSCAREINITIATED _GEN_ER
Hakeem Cardoso(Resident) Minoxidil Counseling: Minoxidil is a topical medication which can increase blood flow where it is applied. It is uncertain how this medication increases hair growth. Side effects are uncommon and include stinging and allergic reactions.

## 2022-12-16 ENCOUNTER — LABORATORY RESULT (OUTPATIENT)
Age: 57
End: 2022-12-16

## 2022-12-16 ENCOUNTER — RESULT REVIEW (OUTPATIENT)
Age: 57
End: 2022-12-16

## 2022-12-16 ENCOUNTER — APPOINTMENT (OUTPATIENT)
Dept: HEMATOLOGY ONCOLOGY | Facility: CLINIC | Age: 57
End: 2022-12-16

## 2022-12-16 ENCOUNTER — APPOINTMENT (OUTPATIENT)
Dept: INFUSION THERAPY | Facility: HOSPITAL | Age: 57
End: 2022-12-16

## 2022-12-16 VITALS
TEMPERATURE: 98.2 F | BODY MASS INDEX: 33.67 KG/M2 | SYSTOLIC BLOOD PRESSURE: 118 MMHG | WEIGHT: 171.52 LBS | OXYGEN SATURATION: 96 % | DIASTOLIC BLOOD PRESSURE: 78 MMHG | RESPIRATION RATE: 16 BRPM | HEIGHT: 60 IN | HEART RATE: 72 BPM

## 2022-12-16 LAB
ALBUMIN SERPL ELPH-MCNC: 3.9 G/DL — SIGNIFICANT CHANGE UP (ref 3.3–5)
ALP SERPL-CCNC: 106 U/L — SIGNIFICANT CHANGE UP (ref 40–120)
ALT FLD-CCNC: 39 U/L — SIGNIFICANT CHANGE UP (ref 10–45)
ANION GAP SERPL CALC-SCNC: 12 MMOL/L — SIGNIFICANT CHANGE UP (ref 5–17)
AST SERPL-CCNC: 47 U/L — HIGH (ref 10–40)
BASOPHILS # BLD AUTO: 0.03 K/UL — SIGNIFICANT CHANGE UP (ref 0–0.2)
BASOPHILS NFR BLD AUTO: 0.4 % — SIGNIFICANT CHANGE UP (ref 0–2)
BILIRUB SERPL-MCNC: 0.3 MG/DL — SIGNIFICANT CHANGE UP (ref 0.2–1.2)
BUN SERPL-MCNC: 11 MG/DL — SIGNIFICANT CHANGE UP (ref 7–23)
CALCIUM SERPL-MCNC: 8.7 MG/DL — SIGNIFICANT CHANGE UP (ref 8.4–10.5)
CEA SERPL-MCNC: 1.9 NG/ML — SIGNIFICANT CHANGE UP (ref 0–3.8)
CHLORIDE SERPL-SCNC: 101 MMOL/L — SIGNIFICANT CHANGE UP (ref 96–108)
CO2 SERPL-SCNC: 23 MMOL/L — SIGNIFICANT CHANGE UP (ref 22–31)
CREAT SERPL-MCNC: 0.53 MG/DL — SIGNIFICANT CHANGE UP (ref 0.5–1.3)
EGFR: 108 ML/MIN/1.73M2 — SIGNIFICANT CHANGE UP
EOSINOPHIL # BLD AUTO: 0.03 K/UL — SIGNIFICANT CHANGE UP (ref 0–0.5)
EOSINOPHIL NFR BLD AUTO: 0.4 % — SIGNIFICANT CHANGE UP (ref 0–6)
GLUCOSE SERPL-MCNC: 97 MG/DL — SIGNIFICANT CHANGE UP (ref 70–99)
HCT VFR BLD CALC: 36.7 % — SIGNIFICANT CHANGE UP (ref 34.5–45)
HGB BLD-MCNC: 11.9 G/DL — SIGNIFICANT CHANGE UP (ref 11.5–15.5)
IMM GRANULOCYTES NFR BLD AUTO: 0.5 % — SIGNIFICANT CHANGE UP (ref 0–0.9)
LYMPHOCYTES # BLD AUTO: 2.99 K/UL — SIGNIFICANT CHANGE UP (ref 1–3.3)
LYMPHOCYTES # BLD AUTO: 35.9 % — SIGNIFICANT CHANGE UP (ref 13–44)
MCHC RBC-ENTMCNC: 29.6 PG — SIGNIFICANT CHANGE UP (ref 27–34)
MCHC RBC-ENTMCNC: 32.4 G/DL — SIGNIFICANT CHANGE UP (ref 32–36)
MCV RBC AUTO: 91.3 FL — SIGNIFICANT CHANGE UP (ref 80–100)
MONOCYTES # BLD AUTO: 1.24 K/UL — HIGH (ref 0–0.9)
MONOCYTES NFR BLD AUTO: 14.9 % — HIGH (ref 2–14)
NEUTROPHILS # BLD AUTO: 3.99 K/UL — SIGNIFICANT CHANGE UP (ref 1.8–7.4)
NEUTROPHILS NFR BLD AUTO: 47.9 % — SIGNIFICANT CHANGE UP (ref 43–77)
NRBC # BLD: 0 /100 WBCS — SIGNIFICANT CHANGE UP (ref 0–0)
PLATELET # BLD AUTO: 299 K/UL — SIGNIFICANT CHANGE UP (ref 150–400)
POTASSIUM SERPL-MCNC: 4.6 MMOL/L — SIGNIFICANT CHANGE UP (ref 3.5–5.3)
POTASSIUM SERPL-SCNC: 4.6 MMOL/L — SIGNIFICANT CHANGE UP (ref 3.5–5.3)
PROT SERPL-MCNC: 6.6 G/DL — SIGNIFICANT CHANGE UP (ref 6–8.3)
RBC # BLD: 4.02 M/UL — SIGNIFICANT CHANGE UP (ref 3.8–5.2)
RBC # FLD: 17.2 % — HIGH (ref 10.3–14.5)
SODIUM SERPL-SCNC: 136 MMOL/L — SIGNIFICANT CHANGE UP (ref 135–145)
WBC # BLD: 8.32 K/UL — SIGNIFICANT CHANGE UP (ref 3.8–10.5)
WBC # FLD AUTO: 8.32 K/UL — SIGNIFICANT CHANGE UP (ref 3.8–10.5)

## 2022-12-16 PROCEDURE — 99214 OFFICE O/P EST MOD 30 MIN: CPT

## 2022-12-16 NOTE — PHYSICAL EXAM
[Fully active, able to carry on all pre-disease performance without restriction] : Status 0 - Fully active, able to carry on all pre-disease performance without restriction [Normal] : well developed, well nourished, in no acute distress [de-identified] : Right breast with no discrete palpable masses, no palpable axillary nodes.  Left reconstructed breast with medial and lateral nodularity c/w fat necrosis, stable, no other discrete palpable masses, no palpable axillary nodes.

## 2022-12-16 NOTE — REASON FOR VISIT
[Follow-Up Visit] : a follow-up [Pre-Treatment Visit] : a pre-treatment [FreeTextEntry2] : Metastatic breast cancer

## 2022-12-16 NOTE — HISTORY OF PRESENT ILLNESS
[de-identified] : The patients' history of present illness began in 2012 at which time she was found to have a right breast cancer for which she underwent a right modified radical mastectomy/axillary lymph node dissection with the finding of an ER positive 20%, IN positive 10%, HER-2/lisa 2+, FISH amplified breast cancer per outside physician report (I do not have a copy that pathology report for my review).  She went on to receive adjuvant dose-dense AC chemotherapy for 4 cycles followed by Taxol plus Herceptin as well as adjuvant radiation therapy.  She was subsequently begun adjuvant tamoxifen therapy.  Her last menstrual period was in 2014.\par \par The patient then began to experience weight loss beginning in November 2018 and over 3-month period of time lost 30 pounds per her report.  At approximately the same time she developed increasing shortness of breath, and ultimately presented to [facility] Emergency Department.  She subsequently had a CT scan of the chest, which showed multiple mediastinal hilar right subclavian lymph nodes corresponding to abnormal FDG activity on PET scan consistent with metastatic carcinoma; there were tiny nodularities in both lungs- too small to accurately characterize- not present on prior examination and measuring up to 2 mm in both lungs.  The possibility of metastasis was a consideration; she had no bony metastases; the liver demonstrated tiny poorly defined lesions less than 5 mm and difficult to characterize and visible on prior examinations with the possibility of metastasis also considered.  A PET-CT scan showed multiple hypermetabolic lymph nodes in the mediastinum, right subclavian region and both adebayo felt to represent tumor; there were stellate lesions in the left breast region, felt to represent benign scars; the lungs demonstrated no significant abnormality with multiple tiny lung nodules seen on CT as previously noted; liver demonstrated no significant abnormality with multiple small poorly defined nodules seen on CT per my previous comments.  The patient underwent a left breast core biopsy of the area of concern with a finding of fat necrosis and foreign body giant cell reaction as well as a separate fine-needle aspiration consistent with a ruptured cyst/fat necrosis.  The patient consequently saw her oncologist, Dr. Carias, who sent the patient for ultrasound-guided core biopsies of the mediastinal lymph nodes with a finding of carcinoma, consistent with breast primary with estrogen receptor returning positive, greater than 50%, progesterone receptor negative, and HER-2/lisa 0 per outside report. The patient was subsequently begun on docetaxel, pertuzumab, trastuzumab, and exemestane beginning at the early 4/19, and subsequently switched to weekly paclitaxel secondary to side effects and continuing  on trastuzumab / pertuzumab every 3 weeks as well as exemestane. The patient was last treated per her report on approximately June 12, 2019, despite outside records that show ongoing weekly treatments early July 2019.  \par \par The patient also reports that beginning in approximately 12/18, she started to develop slowly progressive diffuse rash with associated pruritus, having seen multiple dermatologists.  Most recently, she saw Dr. Courtney Cowart, a [facility] physician practicing at [place], [place], who diagnosed disseminated herpes simplex for which she was begun on valacyclovir, as well as evidence of MRSA for which was treated with doxycycline beginning in May 2019.  In June 2019, the patient presented to [facility] Emergency Department complaining of progressive "weakness and shaking." She was found to be anemic and had melena.  She underwent endoscopy and VIR procedure to stop gastric bleeding, which was unsuccessful.  Consequently, she had a distal gastrectomy.  She was discharged from [facility] on 07/08/2019.\par \par The patient was initial consultation 7/24/19 for a further opinion regarding treatment recommendations. She noted ongoing weakness, fatigue, and generalized malaise, although all of these had begun to improve gradually.  Her last chemotherapy as noted above was approximately at 06/12/2019.  She reports that her primary oncologist, Dr. Carias, told to discontinue exemestane upon admission to [facility] with the consideration restarting it post discharge.  \par \par I obtained her outside slides for review at [facility] and it was confirmed that she had met breast cancer ER+ IN neg and Her 2 lisa neg.\par \par Consequently I recommended restarting exemestane.  \par \par In 3/21 she had L sided seizures and found to have abnormal enhancing lesions in the posterior fossa and supratentorial region are identified.\par \par Lesion 1: Large dural based enhancing lesion is seen involving the high medial right frontal region. This lesion does appear to cross the midline and measures approximately 3.2 x 2.7 x 3.5 cm. Small amount of adjacent susceptibility is identified. Surrounding edema is identified. Localized mass effect is identified. Right to left shift (7.2 mm) seen.\par \par Lesion 2: Enhancing lesion is seen involving the right upper tiffany. This lesion measures approximately 0.5 x 0.4 cm.\par \par Lesion 3: Enhancing lesion is seen involving the superior left cerebellar region. This lesion measures approximately 1.2 x 1.0 cm. Surrounding edema is identified.\par \par She was seen by Dr Hwang and treated with Gamma knife SRS to the 3 brain lesions. \par \par A repeat MRI 6/2/21 demonstrated:\par \par -Interval decrease in size of 3 enhancing lesions, largest in the high medial right frontal lobe measuring up to 2.0 cm.\par -Improvement of vasogenic edema with resolution of leftward midline shift.\par -No new lesions are identified\par \par On 9/14/22 had a witnessed L sided seizure and admitted to [facility]. A brain MRI done 9/14/2022 showed: \par -Increase in size of a right frontal parasagittal lesion since comparison MRI, now measuring up to 2.5 cm. Increasing surrounding vasogenic edema.\par -No new lesion identified. No acute infarct.  She was d/c'd on dexamethasone taper to 4 mg BID and keppra 1000 mg bId.  Currently tapered to 2 mg.   \par \par Patient was started on bevacizumab on 10/28/22 for presumed tumor necrosis related edema. \par \par  [de-identified] : Pt seen today for f/u visit.  On 9/14/22 had a witnessed L sided seizure and admitted to [facility]. A brain MRI done 9/14/2022 showed: \par -Increase in size of a right frontal parasagittal lesion since comparison MRI, now measuring up to 2.5 cm. Increasing surrounding vasogenic edema.\par -No new lesion identified. No acute infarct.  She was d/c'd on dexamethasone taper to 4 mg BID and keppra 1000 mg bId.  Currently tapered to 2 mg.   \par \par Patient was started on bevacizumab on 10/28/22\par \par She states the twitching of her hands have resolved. \par  \par Remains on exemestane.  Denies any treatment related side effects. \par \par She states for past two days she has had nasal congestion and mild nonproductive cough.  She denies any fever/chills, sob, CP, dizziness, HA.\par \par She denies any other complaints.  \par \par She states overall she is feeling good.  Notes a good appetite, stable weight and excellent performance status. \par \par CT scan c/a/p, 10/12/22- IMPRESSION:\par A new 2.7 cm right adnexal cyst with thin septation. Consider follow-up with pelvic ultrasound as indicated versus attention at short-term follow-up imaging.\par \par Bone scan, 10/12/22- IMPRESSION: Bone scan demonstrates:\par No definite scan evidence of osseous metastasis.\par Mild diffuse increased uptake in the calvarium, not significant change, probably hyperostosis or Paget's disease.\par Heterotopic ossification in the medial aspect of the left chest, unchanged.\par Degenerative disease in the major joints.\par No significant interval change compared to the previous bone scan of 5/6/2022.\par \par MRI head 9/14/22\par IMPRESSION:\par -Increase in size of a right frontal parasagittal lesion since comparison MRI, now measuring up to 2.5 cm. Increasing surrounding vasogenic edema.\par -No new lesion identified. No acute infarct\par \par CT c/a/p, 5/6/22- IMPRESSION: Stable exam since 12/17/2021\par \par Bone scan, 5/6/22-  IMPRESSION: Bone scan demonstrates:\par \par No definite scan evidence of osseous metastasis.\par \par Mild diffuse increased uptake in the calvarium, unchanged, probably hyperostosis or Paget's disease.\par Heterotopic ossification in the medial aspect of the left breast, unchanged.\par Degenerative disease in the major joints.\par No significant interval change compared to the previous bone scan of 7/8/2021.\par \par CT scan, 12/17/21- IMPRESSION:  Stable examination compared with prior imaging 7/8/2021.\par \par MRI head 12/30/21\par IMPRESSION:\par Right high paramedian region lesion measures similar in size though there \par is increased edema seen in association when compared with the prior \par 9/24/2021. Question of vague enhancement on the surface of the cervical \par medullary junction and along the surface of the brainstem extending into \par the spinal cord level. Consideration for possible leptomeningeal \par pathology should be given. Recommend correlation with CSF as clinically \par warranted.\par \par DEXA 1/21 osteopenia\par \par

## 2022-12-17 LAB
APPEARANCE UR: CLEAR — SIGNIFICANT CHANGE UP
BACTERIA # UR AUTO: ABNORMAL
BILIRUB UR-MCNC: NEGATIVE — SIGNIFICANT CHANGE UP
CANCER AG27-29 SERPL-ACNC: 10.8 U/ML — SIGNIFICANT CHANGE UP (ref 0–38.6)
COLOR SPEC: YELLOW — SIGNIFICANT CHANGE UP
DIFF PNL FLD: ABNORMAL
EPI CELLS # UR: 2 /HPF — SIGNIFICANT CHANGE UP (ref 0–5)
GLUCOSE UR QL: NEGATIVE — SIGNIFICANT CHANGE UP
HYALINE CASTS # UR AUTO: 0 /LPF — SIGNIFICANT CHANGE UP (ref 0–7)
KETONES UR-MCNC: NEGATIVE — SIGNIFICANT CHANGE UP
LEUKOCYTE ESTERASE UR-ACNC: ABNORMAL
NITRITE UR-MCNC: NEGATIVE — SIGNIFICANT CHANGE UP
PH UR: 6 — SIGNIFICANT CHANGE UP (ref 5–8)
PROT UR-MCNC: ABNORMAL
RBC CASTS # UR COMP ASSIST: 12 /HPF — HIGH (ref 0–4)
SP GR SPEC: 1.04 — HIGH (ref 1.01–1.02)
UROBILINOGEN FLD QL: SIGNIFICANT CHANGE UP
WBC UR QL: 366 /HPF — HIGH (ref 0–5)

## 2022-12-20 NOTE — ASSESSMENT
[Work-up necessary to assess local, regional or metastatic recurrence] : Work-up necessary to assess local, regional or metastatic recurrence
L TKA pending for 1/9/2023

## 2022-12-30 ENCOUNTER — APPOINTMENT (OUTPATIENT)
Dept: HEMATOLOGY ONCOLOGY | Facility: CLINIC | Age: 57
End: 2022-12-30

## 2022-12-30 ENCOUNTER — APPOINTMENT (OUTPATIENT)
Dept: INFUSION THERAPY | Facility: HOSPITAL | Age: 57
End: 2022-12-30

## 2023-01-18 ENCOUNTER — RESULT REVIEW (OUTPATIENT)
Age: 58
End: 2023-01-18

## 2023-01-18 ENCOUNTER — APPOINTMENT (OUTPATIENT)
Dept: HEMATOLOGY ONCOLOGY | Facility: CLINIC | Age: 58
End: 2023-01-18
Payer: MEDICARE

## 2023-01-18 VITALS
HEART RATE: 71 BPM | BODY MASS INDEX: 32.8 KG/M2 | RESPIRATION RATE: 16 BRPM | HEIGHT: 60.04 IN | TEMPERATURE: 97 F | OXYGEN SATURATION: 98 % | SYSTOLIC BLOOD PRESSURE: 119 MMHG | WEIGHT: 169.29 LBS | DIASTOLIC BLOOD PRESSURE: 77 MMHG

## 2023-01-18 LAB
ALBUMIN SERPL ELPH-MCNC: 4.2 G/DL
ALP BLD-CCNC: 107 U/L
ALT SERPL-CCNC: 22 U/L
ANION GAP SERPL CALC-SCNC: 12 MMOL/L
AST SERPL-CCNC: 28 U/L
BASOPHILS # BLD AUTO: 0.02 K/UL — SIGNIFICANT CHANGE UP (ref 0–0.2)
BASOPHILS NFR BLD AUTO: 0.3 % — SIGNIFICANT CHANGE UP (ref 0–2)
BILIRUB SERPL-MCNC: 0.2 MG/DL
BUN SERPL-MCNC: 11 MG/DL
CALCIUM SERPL-MCNC: 9.1 MG/DL
CANCER AG27-29 SERPL-ACNC: 9.7 U/ML
CEA SERPL-MCNC: 1.2 NG/ML
CHLORIDE SERPL-SCNC: 106 MMOL/L
CO2 SERPL-SCNC: 24 MMOL/L
CREAT SERPL-MCNC: 0.57 MG/DL
EGFR: 107 ML/MIN/1.73M2
EOSINOPHIL # BLD AUTO: 0.2 K/UL — SIGNIFICANT CHANGE UP (ref 0–0.5)
EOSINOPHIL NFR BLD AUTO: 2.7 % — SIGNIFICANT CHANGE UP (ref 0–6)
GLUCOSE SERPL-MCNC: 109 MG/DL
HCT VFR BLD CALC: 35.9 % — SIGNIFICANT CHANGE UP (ref 34.5–45)
HGB BLD-MCNC: 11.5 G/DL — SIGNIFICANT CHANGE UP (ref 11.5–15.5)
IMM GRANULOCYTES NFR BLD AUTO: 0.3 % — SIGNIFICANT CHANGE UP (ref 0–0.9)
LYMPHOCYTES # BLD AUTO: 3.13 K/UL — SIGNIFICANT CHANGE UP (ref 1–3.3)
LYMPHOCYTES # BLD AUTO: 42.9 % — SIGNIFICANT CHANGE UP (ref 13–44)
MCHC RBC-ENTMCNC: 27.9 PG — SIGNIFICANT CHANGE UP (ref 27–34)
MCHC RBC-ENTMCNC: 32 G/DL — SIGNIFICANT CHANGE UP (ref 32–36)
MCV RBC AUTO: 87.1 FL — SIGNIFICANT CHANGE UP (ref 80–100)
MONOCYTES # BLD AUTO: 0.63 K/UL — SIGNIFICANT CHANGE UP (ref 0–0.9)
MONOCYTES NFR BLD AUTO: 8.6 % — SIGNIFICANT CHANGE UP (ref 2–14)
NEUTROPHILS # BLD AUTO: 3.29 K/UL — SIGNIFICANT CHANGE UP (ref 1.8–7.4)
NEUTROPHILS NFR BLD AUTO: 45.2 % — SIGNIFICANT CHANGE UP (ref 43–77)
NRBC # BLD: 0 /100 WBCS — SIGNIFICANT CHANGE UP (ref 0–0)
PLATELET # BLD AUTO: 270 K/UL — SIGNIFICANT CHANGE UP (ref 150–400)
POTASSIUM SERPL-SCNC: 4.6 MMOL/L
PROT SERPL-MCNC: 6.4 G/DL
RBC # BLD: 4.12 M/UL — SIGNIFICANT CHANGE UP (ref 3.8–5.2)
RBC # FLD: 15.6 % — HIGH (ref 10.3–14.5)
SODIUM SERPL-SCNC: 143 MMOL/L
WBC # BLD: 7.29 K/UL — SIGNIFICANT CHANGE UP (ref 3.8–10.5)
WBC # FLD AUTO: 7.29 K/UL — SIGNIFICANT CHANGE UP (ref 3.8–10.5)

## 2023-01-18 PROCEDURE — 99214 OFFICE O/P EST MOD 30 MIN: CPT

## 2023-01-18 RX ORDER — MULTIVITAMIN
TABLET ORAL
Refills: 0 | Status: ACTIVE | COMMUNITY

## 2023-01-18 RX ORDER — SULFAMETHOXAZOLE AND TRIMETHOPRIM 800; 160 MG/1; MG/1
800-160 TABLET ORAL TWICE DAILY
Qty: 6 | Refills: 0 | Status: DISCONTINUED | COMMUNITY
Start: 2022-12-06 | End: 2023-01-18

## 2023-01-18 NOTE — PHYSICAL EXAM
[Fully active, able to carry on all pre-disease performance without restriction] : Status 0 - Fully active, able to carry on all pre-disease performance without restriction [Normal] : affect appropriate [de-identified] : Right breast with no discrete palpable masses, no palpable axillary nodes.  Left reconstructed breast with medial and lateral nodularity c/w fat necrosis, stable, no other discrete palpable masses, no palpable axillary nodes.

## 2023-01-18 NOTE — HISTORY OF PRESENT ILLNESS
[de-identified] : The patients' history of present illness began in 2012 at which time she was found to have a right breast cancer for which she underwent a right modified radical mastectomy/axillary lymph node dissection with the finding of an ER positive 20%, VT positive 10%, HER-2/lisa 2+, FISH amplified breast cancer per outside physician report (I do not have a copy that pathology report for my review).  She went on to receive adjuvant dose-dense AC chemotherapy for 4 cycles followed by Taxol plus Herceptin as well as adjuvant radiation therapy.  She was subsequently begun adjuvant tamoxifen therapy.  Her last menstrual period was in 2014.\par \par The patient then began to experience weight loss beginning in November 2018 and over 3-month period of time lost 30 pounds per her report.  At approximately the same time she developed increasing shortness of breath, and ultimately presented to [facility] Emergency Department.  She subsequently had a CT scan of the chest, which showed multiple mediastinal hilar right subclavian lymph nodes corresponding to abnormal FDG activity on PET scan consistent with metastatic carcinoma; there were tiny nodularities in both lungs- too small to accurately characterize- not present on prior examination and measuring up to 2 mm in both lungs.  The possibility of metastasis was a consideration; she had no bony metastases; the liver demonstrated tiny poorly defined lesions less than 5 mm and difficult to characterize and visible on prior examinations with the possibility of metastasis also considered.  A PET-CT scan showed multiple hypermetabolic lymph nodes in the mediastinum, right subclavian region and both adebayo felt to represent tumor; there were stellate lesions in the left breast region, felt to represent benign scars; the lungs demonstrated no significant abnormality with multiple tiny lung nodules seen on CT as previously noted; liver demonstrated no significant abnormality with multiple small poorly defined nodules seen on CT per my previous comments.  The patient underwent a left breast core biopsy of the area of concern with a finding of fat necrosis and foreign body giant cell reaction as well as a separate fine-needle aspiration consistent with a ruptured cyst/fat necrosis.  The patient consequently saw her oncologist, Dr. Carias, who sent the patient for ultrasound-guided core biopsies of the mediastinal lymph nodes with a finding of carcinoma, consistent with breast primary with estrogen receptor returning positive, greater than 50%, progesterone receptor negative, and HER-2/lisa 0 per outside report. The patient was subsequently begun on docetaxel, pertuzumab, trastuzumab, and exemestane beginning at the early 4/19, and subsequently switched to weekly paclitaxel secondary to side effects and continuing  on trastuzumab / pertuzumab every 3 weeks as well as exemestane. The patient was last treated per her report on approximately June 12, 2019, despite outside records that show ongoing weekly treatments early July 2019.  \par \par The patient also reports that beginning in approximately 12/18, she started to develop slowly progressive diffuse rash with associated pruritus, having seen multiple dermatologists.  Most recently, she saw Dr. Courtney Cowart, a [facility] physician practicing at [place], [place], who diagnosed disseminated herpes simplex for which she was begun on valacyclovir, as well as evidence of MRSA for which was treated with doxycycline beginning in May 2019.  In June 2019, the patient presented to [facility] Emergency Department complaining of progressive "weakness and shaking." She was found to be anemic and had melena.  She underwent endoscopy and VIR procedure to stop gastric bleeding, which was unsuccessful.  Consequently, she had a distal gastrectomy.  She was discharged from [facility] on 07/08/2019.\par \par The patient was initial consultation 7/24/19 for a further opinion regarding treatment recommendations. She noted ongoing weakness, fatigue, and generalized malaise, although all of these had begun to improve gradually.  Her last chemotherapy as noted above was approximately at 06/12/2019.  She reports that her primary oncologist, Dr. Carias, told to discontinue exemestane upon admission to [facility] with the consideration restarting it post discharge.  \par \par I obtained her outside slides for review at [facility] and it was confirmed that she had met breast cancer ER+ VT neg and Her 2 lisa neg.\par \par Consequently I recommended restarting exemestane.  \par \par In 3/21 she had L sided seizures and found to have abnormal enhancing lesions in the posterior fossa and supratentorial region are identified.\par \par Lesion 1: Large dural based enhancing lesion is seen involving the high medial right frontal region. This lesion does appear to cross the midline and measures approximately 3.2 x 2.7 x 3.5 cm. Small amount of adjacent susceptibility is identified. Surrounding edema is identified. Localized mass effect is identified. Right to left shift (7.2 mm) seen.\par \par Lesion 2: Enhancing lesion is seen involving the right upper tiffany. This lesion measures approximately 0.5 x 0.4 cm.\par \par Lesion 3: Enhancing lesion is seen involving the superior left cerebellar region. This lesion measures approximately 1.2 x 1.0 cm. Surrounding edema is identified.\par \par She was seen by Dr Hwang and treated with Gamma knife SRS to the 3 brain lesions. \par \par A repeat MRI 6/2/21 demonstrated:\par \par -Interval decrease in size of 3 enhancing lesions, largest in the high medial right frontal lobe measuring up to 2.0 cm.\par -Improvement of vasogenic edema with resolution of leftward midline shift.\par -No new lesions are identified\par \par On 9/14/22 had a witnessed L sided seizure and admitted to [facility]. A brain MRI done 9/14/2022 showed: \par -Increase in size of a right frontal parasagittal lesion since comparison MRI, now measuring up to 2.5 cm. Increasing surrounding vasogenic edema.\par -No new lesion identified. No acute infarct.  She was d/c'd on dexamethasone taper to 4 mg BID and keppra 1000 mg bId.  Currently tapered to 2 mg.   \par \par Patient was started on bevacizumab on 10/28/22 for presumed tumor necrosis related edema, completed 12/16/22. \par \par  [de-identified] : Pt seen today for f/u visit.  Patient was started on bevacizumab on 10/28/22, completed 12/16/22.\par \par Remains on exemestane.  Denies any treatment related complaints. \par \par She states the twitching of her hands has completely resolved. \par \par She states for the past three weeks she has been seeing floaters in bilateral eyes.  She denies any vision changes, double vision, headache, dizziness, pain.  \par \par She states she saw her GYN to follow up on CT scan findings and she states her GYN recommended we repeat scans next month.\par \par She denies any other complaints.  \par \par She states overall she is feeling good.  Notes a good appetite, stable weight and excellent performance status. \par \par CT scan c/a/p, 10/12/22- IMPRESSION:\par A new 2.7 cm right adnexal cyst with thin septation. Consider follow-up with pelvic ultrasound as indicated versus attention at short-term follow-up imaging.\par \par Bone scan, 10/12/22- IMPRESSION: Bone scan demonstrates:\par No definite scan evidence of osseous metastasis.\par Mild diffuse increased uptake in the calvarium, not significant change, probably hyperostosis or Paget's disease.\par Heterotopic ossification in the medial aspect of the left chest, unchanged.\par Degenerative disease in the major joints.\par No significant interval change compared to the previous bone scan of 5/6/2022.\par \par MRI head 9/14/22\par IMPRESSION:\par -Increase in size of a right frontal parasagittal lesion since comparison MRI, now measuring up to 2.5 cm. Increasing surrounding vasogenic edema.\par -No new lesion identified. No acute infarct\par \par CT c/a/p, 5/6/22- IMPRESSION: Stable exam since 12/17/2021\par \par Bone scan, 5/6/22-  IMPRESSION: Bone scan demonstrates:\par \par No definite scan evidence of osseous metastasis.\par \par Mild diffuse increased uptake in the calvarium, unchanged, probably hyperostosis or Paget's disease.\par Heterotopic ossification in the medial aspect of the left breast, unchanged.\par Degenerative disease in the major joints.\par No significant interval change compared to the previous bone scan of 7/8/2021.\par \par CT scan, 12/17/21- IMPRESSION:  Stable examination compared with prior imaging 7/8/2021.\par \par MRI head 12/30/21\par IMPRESSION:\par Right high paramedian region lesion measures similar in size though there \par is increased edema seen in association when compared with the prior \par 9/24/2021. Question of vague enhancement on the surface of the cervical \par medullary junction and along the surface of the brainstem extending into \par the spinal cord level. Consideration for possible leptomeningeal \par pathology should be given. Recommend correlation with CSF as clinically \par warranted.\par \par DEXA 1/21 osteopenia\par \par

## 2023-01-27 ENCOUNTER — APPOINTMENT (OUTPATIENT)
Dept: NEUROLOGY | Facility: CLINIC | Age: 58
End: 2023-01-27

## 2023-02-03 ENCOUNTER — OUTPATIENT (OUTPATIENT)
Dept: OUTPATIENT SERVICES | Facility: HOSPITAL | Age: 58
LOS: 1 days | Discharge: HOME | End: 2023-02-03

## 2023-02-03 DIAGNOSIS — Z90.12 ACQUIRED ABSENCE OF LEFT BREAST AND NIPPLE: Chronic | ICD-10-CM

## 2023-02-03 DIAGNOSIS — Z90.49 ACQUIRED ABSENCE OF OTHER SPECIFIED PARTS OF DIGESTIVE TRACT: Chronic | ICD-10-CM

## 2023-02-03 DIAGNOSIS — C50.919 MALIGNANT NEOPLASM OF UNSPECIFIED SITE OF UNSPECIFIED FEMALE BREAST: ICD-10-CM

## 2023-02-03 DIAGNOSIS — Z90.3 ACQUIRED ABSENCE OF STOMACH [PART OF]: Chronic | ICD-10-CM

## 2023-02-03 DIAGNOSIS — Z98.51 TUBAL LIGATION STATUS: Chronic | ICD-10-CM

## 2023-02-10 ENCOUNTER — APPOINTMENT (OUTPATIENT)
Dept: HEMATOLOGY ONCOLOGY | Facility: CLINIC | Age: 58
End: 2023-02-10

## 2023-02-11 ENCOUNTER — OUTPATIENT (OUTPATIENT)
Dept: OUTPATIENT SERVICES | Facility: HOSPITAL | Age: 58
LOS: 1 days | End: 2023-02-11
Payer: MEDICARE

## 2023-02-11 ENCOUNTER — APPOINTMENT (OUTPATIENT)
Dept: MRI IMAGING | Facility: IMAGING CENTER | Age: 58
End: 2023-02-11
Payer: MEDICARE

## 2023-02-11 DIAGNOSIS — C79.31 SECONDARY MALIGNANT NEOPLASM OF BRAIN: ICD-10-CM

## 2023-02-11 PROCEDURE — 70553 MRI BRAIN STEM W/O & W/DYE: CPT

## 2023-02-11 PROCEDURE — A9585: CPT

## 2023-02-11 PROCEDURE — 70553 MRI BRAIN STEM W/O & W/DYE: CPT | Mod: 26,MH

## 2023-02-13 ENCOUNTER — RESULT REVIEW (OUTPATIENT)
Age: 58
End: 2023-02-13

## 2023-02-15 ENCOUNTER — NON-APPOINTMENT (OUTPATIENT)
Age: 58
End: 2023-02-15

## 2023-02-15 ENCOUNTER — APPOINTMENT (OUTPATIENT)
Dept: HEMATOLOGY ONCOLOGY | Facility: CLINIC | Age: 58
End: 2023-02-15

## 2023-02-15 ENCOUNTER — APPOINTMENT (OUTPATIENT)
Dept: RADIATION ONCOLOGY | Facility: CLINIC | Age: 58
End: 2023-02-15
Payer: MEDICARE

## 2023-02-15 VITALS
DIASTOLIC BLOOD PRESSURE: 79 MMHG | RESPIRATION RATE: 17 BRPM | HEART RATE: 72 BPM | OXYGEN SATURATION: 97 % | BODY MASS INDEX: 32.57 KG/M2 | SYSTOLIC BLOOD PRESSURE: 115 MMHG | WEIGHT: 165.9 LBS | TEMPERATURE: 97.7 F | HEIGHT: 60 IN

## 2023-02-15 PROCEDURE — 99214 OFFICE O/P EST MOD 30 MIN: CPT

## 2023-02-15 NOTE — REASON FOR VISIT
[Routine Follow-Up] : routine follow-up visit for [Brain Metastasis] : brain metastasis [Family Member] : family member [[employer] ] : provided by [employer]   [Interpreters_IDNumber] : [id number] [[id number]] : [place]

## 2023-02-15 NOTE — PHYSICAL EXAM
[] : no respiratory distress [Normal] : no focal deficits [Oriented To Time, Place, And Person] : oriented to person, place, and time [de-identified] : Strength 5/5 in upper and lower extremity.  Gait normal.  No cranial nerve deficits.  No difficulty with understanding or speech

## 2023-02-15 NOTE — HISTORY OF PRESENT ILLNESS
[FreeTextEntry1] : Ms. Shelley was seen initially for consult on 3/18//2021.  She completed SRS between 3/23-3/25/2021 to a right frontal, left cerebellar, and right brainstem lesion.and additional SRS to a right parietal lesion, 1800 cgy on 5/11/2022 over 1 fraction.\par She presents for a f/u visit.\par \par ONCOLOGY HISTORY\par Ms. Shelley was diagnosed with breast ca in 2012 s/p radical mastectomy and  adjuvant chemotherapy.  She had ER positive 20%, [place] positive 10%, HER-2/lisa 2+, FISH amplified breast cancer per outside physician report.  She went on to receive adjuvant dose-dense AC chemotherapy for 4 cycles followed by Taxol plus Herceptin as well as adjuvant radiation therapy. She was subsequently begun adjuvant tamoxifen therapy. Her last menstrual period was in 2014. The patient then began to experience weight loss beginning in November 2018 and over 3-month period of time lost 30 pounds per her report. At approximately the same time she developed increasing shortness of breath, and ultimately presented to [facility] Emergency Department. She subsequently had a CT scan of the chest, which showed multiple mediastinal hilar right subclavian lymph nodes corresponding to abnormal FDG activity on PET scan consistent with metastatic carcinoma; there were tiny nodularities in both lungs- too small to accurately characterize- not present on prior examination and measuring up to 2 mm in both lungs. The possibility of metastasis was a consideration; she had no bony metastases; the liver demonstrated tiny poorly defined lesions less than 5 mm and difficult to characterize and visible on prior examinations with the possibility of metastasis also considered. A PET-CT scan showed multiple hypermetabolic lymph nodes in the mediastinum, right subclavian region and both adebayo felt to represent tumor; there were stellate lesions in the left breast region, felt to represent benign scars; the lungs demonstrated no significant abnormality with multiple tiny lung nodules seen on CT as previously noted; liver demonstrated no significant abnormality with multiple small poorly defined nodules seen on CT per my previous comments. The patient underwent a left breast core biopsy of the area of concern with a finding of fat necrosis and foreign body giant cell reaction as well as a separate fine-needle aspiration consistent with a ruptured cyst/fat necrosis. The patient consequently saw her oncologist, Dr. Carias, who sent the patient for ultrasound-guided core biopsies of the mediastinal lymph nodes with a finding of carcinoma, consistent with breast primary with estrogen receptor returning positive, greater than 50%, progesterone receptor negative, and HER-2/lisa 0 per outside report. The patient was subsequently begun on docetaxel, pertuzumab, trastuzumab, and exemestane beginning at the early 4/19, and subsequently switched to weekly paclitaxel secondary to side effects and continuing on trastuzumab / pertuzumab every 3 weeks as well as exemestane. The patient was seen for initial consultation 7/24/19 for a further opinion regarding treatment recommendations. She noted ongoing weakness, fatigue, and generalized malaise, although all of these had begun to improve gradually. Her last chemotherapy as noted above was approximately at 06/12/2019. She reports that her primary oncologist, Dr. Carias, told to discontinue exemestane upon admission to [facility] with the consideration restarting it post discharge. Slides for review at [facility] and it was confirmed that she had met breast cancer ER+ [place] neg and Her 2 lisa neg.  At the time of initial consult she felt well. Noted a left sided seizure in the days prior to consult lasting about 1 minute, which stopped on its own. Was recently prescribed keppra and dexamethasone 8mg BID. No confusion, dizziness, nausea, focal weakness. On examestane.  She recently had a focal seizure left upper/lower extremity. MRI showed 3 lesions -  \par \par Lesion 1: Large dural based enhancing lesion is seen involving the high medial right frontal region. This lesion does appear to cross the midline and measures approximately 3.2 x 2.7 x 3.5 cm. Small amount of adjacent susceptibility is identified. Surrounding edema is identified. Localized mass effect is identified. Right to left shift (7.2 mm) seen.\par \par Lesion 2: Enhancing lesion is seen involving the right upper tiffany. This lesion measures approximately 0.5 x 0.4 cm.\par \par Lesion 3: Enhancing lesion is seen involving the superior left cerebellar region. This lesion measures approximately 1.2 x 1.0 cm. Surrounding edema is identified. She was treated with GK SRS.\par \par \par 6/2/2021- Ms. Shelley presents today for follow up. [employer] Interpretter [id number] for [place] was used.  MRI scheduled today at 145. Has not seen Dr. Martin since radiation. States she  is feeling fine. States she has intermittent sensation of "pulling" to her forehead. Denies any dizziness, confusion, headache, nausea, vomiting, visual disturbances, weakness. States 3 weeks ago she had an episode where her left upper extremity "was shaking" for one minute, denies any LOC. \par \par 9/29/2021- Ms. Shelley presents today for follow up. She is seen today with assistance of  [id number]. MRI brain done 9/24/2021 showed Previously noted enhancing lesions in the posterior fossa and supratentorial region have either decreased in size or are no longer seen which is compatible with response to therapy. Continue close interval follow-up is recommended. Continues to follow with Dr. Martin. continues on examestane with plans to repeat body scans in november or december.  Feeling very well. no headaches, no new weakness, no numbness or tingling, no nausea or vomiting. overall doing well. \par \par 12/30/2021: Today Ms. Shelley presents for follow-up, [employer]  . MRI brain showed: "Right high paramedian region lesion measures similar in size though there is increased edema seen in association when compared with the prior 9/24/2021. Question of vague enhancement on the surface of the cervical medullary junction and along the surface of the brainstem extending into the spinal cord level. Consideration for possible leptomeningeal pathology should be given. Recommend correlation with CSF as clinically warranted."\par Per our review, no leptomeningeal disease appreciated. The vague focus of enhancement likely represents a blood vessel. The swelling of the right high paramedian region lesion actually displays decreased edema, compared to prior.\par  Denies any nausea, vomiting, headache, numbness or tingling. Denies any issues with balance or hearing. \par \par 3/31/2022: Pt presents for follow-up.  Clinically feeling well. Denies headache, nausea, vomiting, or other complaints.  Denies interval symptoms since last f/u.  Denies being on steroids or reportedly any recent seizure events. \par [place] pacific : Goran [id number]\par \par 5/4/2022- Ms. Shelley presents today for follow up. \par  [id number] used for visit today.Brain MRI 4/27/2022 showed \par IMPRESSION: Right medial frontal parasagittal parenchymal lesion is larger when compared with 3/30/2022 and 12/30/2021 suggesting a combination of post therapy change and neoplasm supported by MR spectroscopy and MR perfusion. There is a 5 mm nodule in the right parietal white matter which is new since 12/30/2021 but is slightly larger in retrospect since 3/30/2022. No evidence of leptomeningeal enhancement at the craniocervical junction, this was likely artifactual and is no longer identified.  Follows with Dr. Martin. continue on exemestane. Today she feels well. denies headaches, nausea, vomiting, focal weakness, numbness, tingling.\par \par 7/13/2022- Ms. Shelley presents today for follow up. Brain MRI done 7/8. Continues on exemestane and following with Dr. Martin. \par CT CAP 5/6/2022 showed Stable exam since 12/17/2021.  Bone scan 5/6 without definite evidence of bone mets.  Today she is feeling very well. denies headaches, nasuea, vomiting, focal weakness, numbness, tingling, confusion\par \par 9/20/2022- Ms. Shelley presents today for follow up. pacific  [id number] used for visit today.\par She presented to [facility] on 9/14/22 with a seizure, left sided. A brain MRI done 9/14/2022 showed \par -Increase in size of a right frontal parasagittal lesion since comparison MRI, now measuring up to 2.5 cm. Increasing surrounding vasogenic edema. -No new lesion identified. No acute infarct.  Discharged on dexamethasone taper to 4mg BID and keppra 1000mg bId.  Feeling well. no further seizures. no headaches, no nausea, no focal weakness, no numbness or tingling. in retrospect prior to seizure denies any focal neurological symptoms.\par \par 10/6/2022- Ms. Shelley presents today for follow up. MRI brain 9/30/2022 showed Right frontal parasagittal parenchymal enhancing mass increased size and increased vasogenic edema compared with 7/8/2022. MR perfusion and MR spectroscopy are suggestive of post therapy change rather than neoplasm progression. Question tiny 2.5 mm nodular enhancing focus right cingulate gyrus could represent an additional focus. Recommend follow-up. tapered to 2mg Dexa bid 2 weeks. \par \par 11/17/2022- Ms. Shelley presents today for follow up.  [id number] used for visit. She started avastin on 10/28 and received her second dose on 11/11. Continues on exemestane. MRI brain 11/12/2022 showed Decreased right frontal parasagittal lesion when compared to 9/30/2022, with significant decreased vasogenic edema consistent with response to treatment. MR perfusion is suggestive of a combination of neoplasm and post therapy change. Continued follow-up. CT CAP 10/12/2022 showed A new 2.7 cm right adnexal cyst with thin septation. Consider follow-up with pelvic ultrasound as indicated versus attention at short-term follow-up imaging.  Feels well overall. no headaches, no nausea, no focal weakness, no numbness or tingling. notes some weakness to bilateral things recently, trouble going up the stairs.

## 2023-02-15 NOTE — REASON FOR VISIT
[Routine Follow-Up] : routine follow-up visit for [Brain Metastasis] : brain metastasis [Family Member] : family member [[employer] ] : provided by [employer]   [Interpreters_IDNumber] : [id number] [[id number]] : [place]

## 2023-02-15 NOTE — REVIEW OF SYSTEMS
[Negative] : Psychiatric [Confused] : no confusion [Dizziness] : no dizziness [de-identified] : denies headaches

## 2023-02-15 NOTE — HISTORY OF PRESENT ILLNESS
[FreeTextEntry1] : Ms. Shelley was seen initially for consult on 3/18//2021. She completed SRS between 3/23-3/25/2021 to a right frontal, left cerebellar, and right brainstem lesion.and additional SRS to a right parietal lesion, 1800 cgy on 5/11/2022 over 1 fraction.\par She presents for a f/u visit.\par \par ONCOLOGY HISTORY\par Ms. Shelley was diagnosed with breast ca in 2012 s/p radical mastectomy and adjuvant chemotherapy. She had ER positive 20%, [place] positive 10%, HER-2/lisa 2+, FISH amplified breast cancer per outside physician report. She went on to receive adjuvant dose-dense AC chemotherapy for 4 cycles followed by Taxol plus Herceptin as well as adjuvant radiation therapy. She was subsequently begun adjuvant tamoxifen therapy. Her last menstrual period was in 2014. The patient then began to experience weight loss beginning in November 2018 and over 3-month period of time lost 30 pounds per her report. At approximately the same time she developed increasing shortness of breath, and ultimately presented to [facility] Emergency Department. She subsequently had a CT scan of the chest, which showed multiple mediastinal hilar right subclavian lymph nodes corresponding to abnormal FDG activity on PET scan consistent with metastatic carcinoma; there were tiny nodularities in both lungs- too small to accurately characterize- not present on prior examination and measuring up to 2 mm in both lungs. The possibility of metastasis was a consideration; she had no bony metastases; the liver demonstrated tiny poorly defined lesions less than 5 mm and difficult to characterize and visible on prior examinations with the possibility of metastasis also considered. A PET-CT scan showed multiple hypermetabolic lymph nodes in the mediastinum, right subclavian region and both adebayo felt to represent tumor; there were stellate lesions in the left breast region, felt to represent benign scars; the lungs demonstrated no significant abnormality with multiple tiny lung nodules seen on CT as previously noted; liver demonstrated no significant abnormality with multiple small poorly defined nodules seen on CT per my previous comments. The patient underwent a left breast core biopsy of the area of concern with a finding of fat necrosis and foreign body giant cell reaction as well as a separate fine-needle aspiration consistent with a ruptured cyst/fat necrosis. The patient consequently saw her oncologist, Dr. Carias, who sent the patient for ultrasound-guided core biopsies of the mediastinal lymph nodes with a finding of carcinoma, consistent with breast primary with estrogen receptor returning positive, greater than 50%, progesterone receptor negative, and HER-2/lisa 0 per outside report. The patient was subsequently begun on docetaxel, pertuzumab, trastuzumab, and exemestane beginning at the early 4/19, and subsequently switched to weekly paclitaxel secondary to side effects and continuing on trastuzumab / pertuzumab every 3 weeks as well as exemestane. The patient was seen for initial consultation 7/24/19 for a further opinion regarding treatment recommendations. She noted ongoing weakness, fatigue, and generalized malaise, although all of these had begun to improve gradually. Her last chemotherapy as noted above was approximately at 06/12/2019. She reports that her primary oncologist, Dr. Carias, told to discontinue exemestane upon admission to [facility] with the consideration restarting it post discharge. Slides for review at [facility] and it was confirmed that she had met breast cancer ER+ [place] neg and Her 2 lisa neg. At the time of initial consult she felt well. Noted a left sided seizure in the days prior to consult lasting about 1 minute, which stopped on its own. Was recently prescribed keppra and dexamethasone 8mg BID. No confusion, dizziness, nausea, focal weakness. On examestane. She recently had a focal seizure left upper/lower extremity. MRI showed 3 lesions - \par \par Lesion 1: Large dural based enhancing lesion is seen involving the high medial right frontal region. This lesion does appear to cross the midline and measures approximately 3.2 x 2.7 x 3.5 cm. Small amount of adjacent susceptibility is identified. Surrounding edema is identified. Localized mass effect is identified. Right to left shift (7.2 mm) seen.\par \par Lesion 2: Enhancing lesion is seen involving the right upper tiffany. This lesion measures approximately 0.5 x 0.4 cm.\par \par Lesion 3: Enhancing lesion is seen involving the superior left cerebellar region. This lesion measures approximately 1.2 x 1.0 cm. Surrounding edema is identified. She was treated with GK SRS.\par \par \par 6/2/2021- Ms. Shelley presents today for follow up. [employer] Interpretter [id number] for [place] was used. MRI scheduled today at 145. Has not seen Dr. Martin since radiation. States she is feeling fine. States she has intermittent sensation of "pulling" to her forehead. Denies any dizziness, confusion, headache, nausea, vomiting, visual disturbances, weakness. States 3 weeks ago she had an episode where her left upper extremity "was shaking" for one minute, denies any LOC. \par \par 9/29/2021- Ms. Shelley presents today for follow up. She is seen today with assistance of  [id number]. MRI brain done 9/24/2021 showed Previously noted enhancing lesions in the posterior fossa and supratentorial region have either decreased in size or are no longer seen which is compatible with response to therapy. Continue close interval follow-up is recommended. Continues to follow with Dr. Martin. continues on examestane with plans to repeat body scans in november or december. Feeling very well. no headaches, no new weakness, no numbness or tingling, no nausea or vomiting. overall doing well. \par \par 12/30/2021: Today Ms. Shelley presents for follow-up, [employer]  . MRI brain showed: "Right high paramedian region lesion measures similar in size though there is increased edema seen in association when compared with the prior 9/24/2021. Question of vague enhancement on the surface of the cervical medullary junction and along the surface of the brainstem extending into the spinal cord level. Consideration for possible leptomeningeal pathology should be given. Recommend correlation with CSF as clinically warranted."\par Per our review, no leptomeningeal disease appreciated. The vague focus of enhancement likely represents a blood vessel. The swelling of the right high paramedian region lesion actually displays decreased edema, compared to prior.\par  Denies any nausea, vomiting, headache, numbness or tingling. Denies any issues with balance or hearing. \par \par 3/31/2022: Pt presents for follow-up. Clinically feeling well. Denies headache, nausea, vomiting, or other complaints. Denies interval symptoms since last f/u. Denies being on steroids or reportedly any recent seizure events. \par [place] pacific : Goran [id number]\par \par 5/4/2022- Ms. Shelley presents today for follow up. \par  [id number] used for visit today.Brain MRI 4/27/2022 showed \par IMPRESSION: Right medial frontal parasagittal parenchymal lesion is larger when compared with 3/30/2022 and 12/30/2021 suggesting a combination of post therapy change and neoplasm supported by MR spectroscopy and MR perfusion. There is a 5 mm nodule in the right parietal white matter which is new since 12/30/2021 but is slightly larger in retrospect since 3/30/2022. No evidence of leptomeningeal enhancement at the craniocervical junction, this was likely artifactual and is no longer identified. Follows with Dr. Martin. continue on exemestane. Today she feels well. denies headaches, nausea, vomiting, focal weakness, numbness, tingling.\par \par 7/13/2022- Ms. Shelley presents today for follow up. Brain MRI done 7/8. Continues on exemestane and following with Dr. Martin. \par CT CAP 5/6/2022 showed Stable exam since 12/17/2021. Bone scan 5/6 without definite evidence of bone mets. Today she is feeling very well. denies headaches, nasuea, vomiting, focal weakness, numbness, tingling, confusion\par \par 9/20/2022- Ms. Shelley presents today for follow up. pacific  [id number] used for visit today.\par She presented to [facility] on 9/14/22 with a seizure, left sided. A brain MRI done 9/14/2022 showed \par -Increase in size of a right frontal parasagittal lesion since comparison MRI, now measuring up to 2.5 cm. Increasing surrounding vasogenic edema. -No new lesion identified. No acute infarct. Discharged on dexamethasone taper to 4mg BID and keppra 1000mg bId. Feeling well. no further seizures. no headaches, no nausea, no focal weakness, no numbness or tingling. in retrospect prior to seizure denies any focal neurological symptoms.\par \par 10/6/2022- Ms. Shelley presents today for follow up. MRI brain 9/30/2022 showed Right frontal parasagittal parenchymal enhancing mass increased size and increased vasogenic edema compared with 7/8/2022. MR perfusion and MR spectroscopy are suggestive of post therapy change rather than neoplasm progression. Question tiny 2.5 mm nodular enhancing focus right cingulate gyrus could represent an additional focus. Recommend follow-up. tapered to 2mg Dexa bid 2 weeks. \par \par 11/17/2022- Ms. Shelley presents today for follow up.  [id number] used for visit. She started avastin on 10/28 and received her second dose on 11/11. Continues on exemestane. MRI brain 11/12/2022 showed Decreased right frontal parasagittal lesion when compared to 9/30/2022, with significant decreased vasogenic edema consistent with response to treatment. MR perfusion is suggestive of a combination of neoplasm and post therapy change. Continued follow-up. CT CAP 10/12/2022 showed A new 2.7 cm right adnexal cyst with thin septation. Consider follow-up with pelvic ultrasound as indicated versus attention at short-term follow-up imaging. Feels well overall. no headaches, no nausea, no focal weakness, no numbness or tingling. notes some weakness to bilateral things recently, trouble going up the stairs. \par \par 2/23/2023: Ms. Shelley presents to clinic for follow-up.  [id number] used for visit.\par MRI-Brain performed on 2/11/2023 showed Enhancing lesion is again seen involving the high medial right frontal cortex which continues to decrease in size. Decreased surrounding edema is seen as well. There is decreased cerebral blood flow and cerebral blood volume seen. This could be compatible with treatment-related changes though the possibility of underlying tumor with response to therapy must be considered as well. Continued close interval follow-up is recommended\par \par Ms. Shelley has a CT- Abd/Pel scheduled for 2/18/2023. continues on exemestane. No headaches, no nausea, no weakness, no seizures. notes some intermittent and infrequent visual disturbances on the right side over the last month. Last avastin 12/16/2022.

## 2023-02-17 ENCOUNTER — APPOINTMENT (OUTPATIENT)
Dept: MRI IMAGING | Facility: IMAGING CENTER | Age: 58
End: 2023-02-17

## 2023-02-18 ENCOUNTER — OUTPATIENT (OUTPATIENT)
Dept: OUTPATIENT SERVICES | Facility: HOSPITAL | Age: 58
LOS: 1 days | End: 2023-02-18
Payer: MEDICARE

## 2023-02-18 ENCOUNTER — APPOINTMENT (OUTPATIENT)
Dept: CT IMAGING | Facility: IMAGING CENTER | Age: 58
End: 2023-02-18
Payer: MEDICARE

## 2023-02-18 DIAGNOSIS — Z90.49 ACQUIRED ABSENCE OF OTHER SPECIFIED PARTS OF DIGESTIVE TRACT: Chronic | ICD-10-CM

## 2023-02-18 DIAGNOSIS — Z98.51 TUBAL LIGATION STATUS: Chronic | ICD-10-CM

## 2023-02-18 DIAGNOSIS — Z90.12 ACQUIRED ABSENCE OF LEFT BREAST AND NIPPLE: Chronic | ICD-10-CM

## 2023-02-18 DIAGNOSIS — C50.919 MALIGNANT NEOPLASM OF UNSPECIFIED SITE OF UNSPECIFIED FEMALE BREAST: ICD-10-CM

## 2023-02-18 DIAGNOSIS — Z90.3 ACQUIRED ABSENCE OF STOMACH [PART OF]: Chronic | ICD-10-CM

## 2023-02-18 PROCEDURE — 74177 CT ABD & PELVIS W/CONTRAST: CPT

## 2023-02-18 PROCEDURE — 74177 CT ABD & PELVIS W/CONTRAST: CPT | Mod: 26,MH

## 2023-03-24 ENCOUNTER — APPOINTMENT (OUTPATIENT)
Dept: NEUROLOGY | Facility: CLINIC | Age: 58
End: 2023-03-24
Payer: MEDICARE

## 2023-03-24 VITALS
BODY MASS INDEX: 31.41 KG/M2 | OXYGEN SATURATION: 98 % | HEIGHT: 60 IN | DIASTOLIC BLOOD PRESSURE: 71 MMHG | TEMPERATURE: 96.6 F | SYSTOLIC BLOOD PRESSURE: 123 MMHG | WEIGHT: 160 LBS | HEART RATE: 72 BPM

## 2023-03-24 PROCEDURE — 99214 OFFICE O/P EST MOD 30 MIN: CPT

## 2023-03-24 NOTE — PHYSICAL EXAM
[General Appearance - Alert] : alert [General Appearance - In No Acute Distress] : in no acute distress [Person] : oriented to person [Concentration Intact] : normal concentrating ability [Naming Objects] : no difficulty naming common objects [Fluency] : fluency intact [Comprehension] : comprehension intact [Cranial Nerves Optic (II)] : visual acuity intact bilaterally,  visual fields full to confrontation, pupils equal round and reactive to light [Cranial Nerves Oculomotor (III)] : extraocular motion intact [Cranial Nerves Trigeminal (V)] : facial sensation intact symmetrically [Cranial Nerves Facial (VII)] : face symmetrical [Motor Tone] : muscle tone was normal in all four extremities [Motor Strength] : muscle strength was normal in all four extremities [Sensation Tactile Decrease] : light touch was intact [Abnormal Walk] : normal gait [Balance] : balance was intact [Coordination - Dysmetria Impaired Finger-to-Nose Bilateral] : not present [Coordination - Dysmetria Impaired Heel-to-Shin Bilateral] : not present

## 2023-03-24 NOTE — ASSESSMENT
[FreeTextEntry1] : The patient has recurrent left frontal brain mass, consistent with metastasis, and now with subsequent focal simple seizures. Seizures are well controlled on Keppra 1000 mg twice a day. . Advised patient to continue with Keppra 1000 mg twice a day for seizure prophylaxis. Advised on importance of seizure and fall precautions.\par \par Patient has been referred to neuro oncologist.  She is following up with radiation oncology as well as hematology oncology.\par \par I spent the time noted on the day of this patient encounter preparing for, providing and documenting the above E/M service and counseling and educate patient on differential, workup, disease course, and treatment/management. Education was provided to the patient during this encounter. All questions and concerns were answered and addressed in detail. The patient verbalized understanding and agreed to plan. Patient was advised to continue to monitor for neurologic symptoms and to notify my office or go to the nearest emergency room if there are any changes. Any orders/referrals and communications were provided as well. \par Side effects of the above medications were discussed in detail including but not limited to applicable black box warning and teratogenicity as appropriate. \par Patient was advised to bring previous records to my office, including CD of imaging, when applicable. \par \par  \par

## 2023-03-24 NOTE — HISTORY OF PRESENT ILLNESS
[FreeTextEntry1] : pt declines , son interpreted via phone per patient request\par \par 56 yo female with ER/WV positive HER2 FISH amplified breast cancer initially diagnosed in 2012 s/p radical mastectomy/LN dissection and adjuvant chemotherapy found to have metastatic disease in 2018. Biopsy of the mediastinal lymph nodes identified ER+ WV and HER 2 negative. She received docetaxel/pertuzumab/Trastuzumab/exemestane last dose 6/2019. \par \par In 3/2021 she was found to have supratentorial metastases, with the largest lesion being in the right frontal region measuring 3.5 cm. She was treated with hypofractionated SRS ot three lesions in total, including the left cerebellum and right brainstem. She completed treatment in March 2021. She has been doing well clinically without symptoms. In March she presented with an MRI that suggested increase in size of the right frontal lesion. Patient has not been seen by neuro oncologist as per history.\par \par She has had annual episodes of seizures in 2021. She has had total of 2 seizures with the most recent seizure in September 2022. The seizures are described as left-sided numbness and shaking, no loss of consciousness but the patient is aphasic during that time.  Patient was started on Keppra 1000 mg twice a day.  Her seizures have been well controlled and she has not had any interval seizures since then.  There is no side effects of the Keppra.\par

## 2023-03-31 ENCOUNTER — NON-APPOINTMENT (OUTPATIENT)
Age: 58
End: 2023-03-31

## 2023-03-31 ENCOUNTER — APPOINTMENT (OUTPATIENT)
Dept: OPHTHALMOLOGY | Facility: CLINIC | Age: 58
End: 2023-03-31
Payer: MEDICARE

## 2023-03-31 PROCEDURE — 92083 EXTENDED VISUAL FIELD XM: CPT

## 2023-03-31 PROCEDURE — 99204 OFFICE O/P NEW MOD 45 MIN: CPT

## 2023-04-05 ENCOUNTER — OUTPATIENT (OUTPATIENT)
Dept: OUTPATIENT SERVICES | Facility: HOSPITAL | Age: 58
LOS: 1 days | Discharge: HOME | End: 2023-04-05

## 2023-04-05 DIAGNOSIS — Z98.51 TUBAL LIGATION STATUS: Chronic | ICD-10-CM

## 2023-04-05 DIAGNOSIS — Z90.12 ACQUIRED ABSENCE OF LEFT BREAST AND NIPPLE: Chronic | ICD-10-CM

## 2023-04-05 DIAGNOSIS — C50.919 MALIGNANT NEOPLASM OF UNSPECIFIED SITE OF UNSPECIFIED FEMALE BREAST: ICD-10-CM

## 2023-04-05 DIAGNOSIS — Z90.3 ACQUIRED ABSENCE OF STOMACH [PART OF]: Chronic | ICD-10-CM

## 2023-04-05 DIAGNOSIS — Z90.49 ACQUIRED ABSENCE OF OTHER SPECIFIED PARTS OF DIGESTIVE TRACT: Chronic | ICD-10-CM

## 2023-04-07 ENCOUNTER — APPOINTMENT (OUTPATIENT)
Dept: HEMATOLOGY ONCOLOGY | Facility: CLINIC | Age: 58
End: 2023-04-07

## 2023-04-20 ENCOUNTER — APPOINTMENT (OUTPATIENT)
Dept: INTERNAL MEDICINE | Facility: CLINIC | Age: 58
End: 2023-04-20
Payer: MEDICARE

## 2023-04-20 VITALS
HEART RATE: 60 BPM | SYSTOLIC BLOOD PRESSURE: 110 MMHG | BODY MASS INDEX: 32.2 KG/M2 | TEMPERATURE: 97.9 F | RESPIRATION RATE: 17 BRPM | WEIGHT: 164 LBS | OXYGEN SATURATION: 99 % | HEIGHT: 60 IN | DIASTOLIC BLOOD PRESSURE: 73 MMHG

## 2023-04-20 LAB
BASOPHILS # BLD AUTO: 0.02 K/UL
BASOPHILS NFR BLD AUTO: 0.3 %
EOSINOPHIL # BLD AUTO: 0.13 K/UL
EOSINOPHIL NFR BLD AUTO: 2 %
ESTIMATED AVERAGE GLUCOSE: 134 MG/DL
HBA1C MFR BLD HPLC: 6.3 %
HCT VFR BLD CALC: 35.5 %
HGB BLD-MCNC: 11.3 G/DL
IMM GRANULOCYTES NFR BLD AUTO: 0.2 %
LYMPHOCYTES # BLD AUTO: 2.97 K/UL
LYMPHOCYTES NFR BLD AUTO: 46.3 %
MAN DIFF?: NORMAL
MCHC RBC-ENTMCNC: 25.5 PG
MCHC RBC-ENTMCNC: 31.8 GM/DL
MCV RBC AUTO: 80 FL
MONOCYTES # BLD AUTO: 0.55 K/UL
MONOCYTES NFR BLD AUTO: 8.6 %
NEUTROPHILS # BLD AUTO: 2.74 K/UL
NEUTROPHILS NFR BLD AUTO: 42.6 %
PLATELET # BLD AUTO: 292 K/UL
RBC # BLD: 4.44 M/UL
RBC # FLD: 16.9 %
WBC # FLD AUTO: 6.42 K/UL

## 2023-04-20 PROCEDURE — 99214 OFFICE O/P EST MOD 30 MIN: CPT

## 2023-04-20 NOTE — PLAN
[FreeTextEntry1] : * fasting general blood work done\par * low cholesterol, low triglycerides diet,dietary counseling given; dietary avoidance discussed; diet and exercise reviewed with patient\par * Dietary counseling given, dietary avoidance discussed, diet and exercise reviewed with patient; patient reminded of importance of aerobic exercise, weight control, dietary compliance and regular glucose monitoring\par * c/w levetiracetam\par * to follow up with oncologist and neurologist\par * f/u one month.

## 2023-04-21 LAB
25(OH)D3 SERPL-MCNC: 30.6 NG/ML
ALBUMIN SERPL ELPH-MCNC: 4.5 G/DL
ALP BLD-CCNC: 128 U/L
ALT SERPL-CCNC: 21 U/L
ANION GAP SERPL CALC-SCNC: 14 MMOL/L
AST SERPL-CCNC: 23 U/L
BILIRUB SERPL-MCNC: 0.3 MG/DL
BUN SERPL-MCNC: 16 MG/DL
CALCIUM SERPL-MCNC: 9 MG/DL
CHLORIDE SERPL-SCNC: 102 MMOL/L
CHOLEST SERPL-MCNC: 183 MG/DL
CO2 SERPL-SCNC: 24 MMOL/L
CREAT SERPL-MCNC: 0.57 MG/DL
EGFR: 107 ML/MIN/1.73M2
GLUCOSE SERPL-MCNC: 83 MG/DL
HDLC SERPL-MCNC: 39 MG/DL
LDLC SERPL CALC-MCNC: 116 MG/DL
NONHDLC SERPL-MCNC: 144 MG/DL
POTASSIUM SERPL-SCNC: 4.7 MMOL/L
PROT SERPL-MCNC: 7 G/DL
SODIUM SERPL-SCNC: 140 MMOL/L
T4 FREE SERPL-MCNC: 1.3 NG/DL
TRIGL SERPL-MCNC: 143 MG/DL
TSH SERPL-ACNC: 1.13 UIU/ML
VIT B12 SERPL-MCNC: 669 PG/ML

## 2023-04-23 LAB
APPEARANCE: CLEAR
BACTERIA: NEGATIVE
BILIRUBIN URINE: NEGATIVE
BLOOD URINE: NEGATIVE
COLOR: YELLOW
GLUCOSE QUALITATIVE U: NEGATIVE
HYALINE CASTS: 0 /LPF
KETONES URINE: NEGATIVE
LEUKOCYTE ESTERASE URINE: ABNORMAL
MICROSCOPIC-UA: NORMAL
NITRITE URINE: NEGATIVE
PH URINE: 6
PROTEIN URINE: NORMAL
RED BLOOD CELLS URINE: 3 /HPF
SPECIFIC GRAVITY URINE: 1.03
SQUAMOUS EPITHELIAL CELLS: 4
UROBILINOGEN URINE: NORMAL
WHITE BLOOD CELLS URINE: 7 /HPF

## 2023-05-09 ENCOUNTER — RESULT REVIEW (OUTPATIENT)
Age: 58
End: 2023-05-09

## 2023-05-09 ENCOUNTER — APPOINTMENT (OUTPATIENT)
Dept: HEMATOLOGY ONCOLOGY | Facility: CLINIC | Age: 58
End: 2023-05-09
Payer: MEDICARE

## 2023-05-09 VITALS
HEART RATE: 68 BPM | TEMPERATURE: 98 F | SYSTOLIC BLOOD PRESSURE: 127 MMHG | HEIGHT: 60.04 IN | WEIGHT: 164.24 LBS | BODY MASS INDEX: 31.83 KG/M2 | RESPIRATION RATE: 16 BRPM | DIASTOLIC BLOOD PRESSURE: 81 MMHG | OXYGEN SATURATION: 98 %

## 2023-05-09 LAB
ALBUMIN SERPL ELPH-MCNC: 4.4 G/DL
ALP BLD-CCNC: 125 U/L
ALT SERPL-CCNC: 14 U/L
ANION GAP SERPL CALC-SCNC: 11 MMOL/L
AST SERPL-CCNC: 18 U/L
BASOPHILS # BLD AUTO: 0.02 K/UL — SIGNIFICANT CHANGE UP (ref 0–0.2)
BASOPHILS NFR BLD AUTO: 0.3 % — SIGNIFICANT CHANGE UP (ref 0–2)
BILIRUB SERPL-MCNC: 0.2 MG/DL
BUN SERPL-MCNC: 15 MG/DL
CALCIUM SERPL-MCNC: 9.3 MG/DL
CEA SERPL-MCNC: 1.3 NG/ML
CHLORIDE SERPL-SCNC: 105 MMOL/L
CO2 SERPL-SCNC: 25 MMOL/L
CREAT SERPL-MCNC: 0.55 MG/DL
EGFR: 108 ML/MIN/1.73M2
EOSINOPHIL # BLD AUTO: 0.17 K/UL — SIGNIFICANT CHANGE UP (ref 0–0.5)
EOSINOPHIL NFR BLD AUTO: 2.3 % — SIGNIFICANT CHANGE UP (ref 0–6)
GLUCOSE SERPL-MCNC: 102 MG/DL
HCT VFR BLD CALC: 35.1 % — SIGNIFICANT CHANGE UP (ref 34.5–45)
HGB BLD-MCNC: 10.9 G/DL — LOW (ref 11.5–15.5)
IMM GRANULOCYTES NFR BLD AUTO: 0.3 % — SIGNIFICANT CHANGE UP (ref 0–0.9)
LYMPHOCYTES # BLD AUTO: 2.94 K/UL — SIGNIFICANT CHANGE UP (ref 1–3.3)
LYMPHOCYTES # BLD AUTO: 39.1 % — SIGNIFICANT CHANGE UP (ref 13–44)
MCHC RBC-ENTMCNC: 24.7 PG — LOW (ref 27–34)
MCHC RBC-ENTMCNC: 31.1 G/DL — LOW (ref 32–36)
MCV RBC AUTO: 79.4 FL — LOW (ref 80–100)
MONOCYTES # BLD AUTO: 0.51 K/UL — SIGNIFICANT CHANGE UP (ref 0–0.9)
MONOCYTES NFR BLD AUTO: 6.8 % — SIGNIFICANT CHANGE UP (ref 2–14)
NEUTROPHILS # BLD AUTO: 3.86 K/UL — SIGNIFICANT CHANGE UP (ref 1.8–7.4)
NEUTROPHILS NFR BLD AUTO: 51.2 % — SIGNIFICANT CHANGE UP (ref 43–77)
NRBC # BLD: 0 /100 WBCS — SIGNIFICANT CHANGE UP (ref 0–0)
PLATELET # BLD AUTO: 273 K/UL — SIGNIFICANT CHANGE UP (ref 150–400)
POTASSIUM SERPL-SCNC: 4.7 MMOL/L
PROT SERPL-MCNC: 6.8 G/DL
RBC # BLD: 4.42 M/UL — SIGNIFICANT CHANGE UP (ref 3.8–5.2)
RBC # FLD: 16.9 % — HIGH (ref 10.3–14.5)
SODIUM SERPL-SCNC: 141 MMOL/L
WBC # BLD: 7.52 K/UL — SIGNIFICANT CHANGE UP (ref 3.8–10.5)
WBC # FLD AUTO: 7.52 K/UL — SIGNIFICANT CHANGE UP (ref 3.8–10.5)

## 2023-05-09 PROCEDURE — 99214 OFFICE O/P EST MOD 30 MIN: CPT

## 2023-05-09 NOTE — HISTORY OF PRESENT ILLNESS
[de-identified] : The patients' history of present illness began in 2012 at which time she was found to have a right breast cancer for which she underwent a right modified radical mastectomy/axillary lymph node dissection with the finding of an ER positive 20%, MI positive 10%, HER-2/lisa 2+, FISH amplified breast cancer per outside physician report (I do not have a copy that pathology report for my review).  She went on to receive adjuvant dose-dense AC chemotherapy for 4 cycles followed by Taxol plus Herceptin as well as adjuvant radiation therapy.  She was subsequently begun adjuvant tamoxifen therapy.  Her last menstrual period was in 2014.\par \par The patient then began to experience weight loss beginning in November 2018 and over 3-month period of time lost 30 pounds per her report.  At approximately the same time she developed increasing shortness of breath, and ultimately presented to [facility] Emergency Department.  She subsequently had a CT scan of the chest, which showed multiple mediastinal hilar right subclavian lymph nodes corresponding to abnormal FDG activity on PET scan consistent with metastatic carcinoma; there were tiny nodularities in both lungs- too small to accurately characterize- not present on prior examination and measuring up to 2 mm in both lungs.  The possibility of metastasis was a consideration; she had no bony metastases; the liver demonstrated tiny poorly defined lesions less than 5 mm and difficult to characterize and visible on prior examinations with the possibility of metastasis also considered.  A PET-CT scan showed multiple hypermetabolic lymph nodes in the mediastinum, right subclavian region and both adebayo felt to represent tumor; there were stellate lesions in the left breast region, felt to represent benign scars; the lungs demonstrated no significant abnormality with multiple tiny lung nodules seen on CT as previously noted; liver demonstrated no significant abnormality with multiple small poorly defined nodules seen on CT per my previous comments.  The patient underwent a left breast core biopsy of the area of concern with a finding of fat necrosis and foreign body giant cell reaction as well as a separate fine-needle aspiration consistent with a ruptured cyst/fat necrosis.  The patient consequently saw her oncologist, Dr. Carias, who sent the patient for ultrasound-guided core biopsies of the mediastinal lymph nodes with a finding of carcinoma, consistent with breast primary with estrogen receptor returning positive, greater than 50%, progesterone receptor negative, and HER-2/lisa 0 per outside report. The patient was subsequently begun on docetaxel, pertuzumab, trastuzumab, and exemestane beginning at the early 4/19, and subsequently switched to weekly paclitaxel secondary to side effects and continuing  on trastuzumab / pertuzumab every 3 weeks as well as exemestane. The patient was last treated per her report on approximately June 12, 2019, despite outside records that show ongoing weekly treatments early July 2019.  \par \par The patient also reports that beginning in approximately 12/18, she started to develop slowly progressive diffuse rash with associated pruritus, having seen multiple dermatologists.  Most recently, she saw Dr. Courtney Cowart, a [facility] physician practicing at [place], [place], who diagnosed disseminated herpes simplex for which she was begun on valacyclovir, as well as evidence of MRSA for which was treated with doxycycline beginning in May 2019.  In June 2019, the patient presented to [facility] Emergency Department complaining of progressive "weakness and shaking." She was found to be anemic and had melena.  She underwent endoscopy and VIR procedure to stop gastric bleeding, which was unsuccessful.  Consequently, she had a distal gastrectomy.  She was discharged from [facility] on 07/08/2019.\par \par The patient was initial consultation 7/24/19 for a further opinion regarding treatment recommendations. She noted ongoing weakness, fatigue, and generalized malaise, although all of these had begun to improve gradually.  Her last chemotherapy as noted above was approximately at 06/12/2019.  She reports that her primary oncologist, Dr. Carias, told to discontinue exemestane upon admission to [facility] with the consideration restarting it post discharge.  \par \par I obtained her outside slides for review at [facility] and it was confirmed that she had met breast cancer ER+ MI neg and Her 2 lisa neg.\par \par Consequently I recommended restarting exemestane.  \par \par In 3/21 she had L sided seizures and found to have abnormal enhancing lesions in the posterior fossa and supratentorial region are identified.\par \par Lesion 1: Large dural based enhancing lesion is seen involving the high medial right frontal region. This lesion does appear to cross the midline and measures approximately 3.2 x 2.7 x 3.5 cm. Small amount of adjacent susceptibility is identified. Surrounding edema is identified. Localized mass effect is identified. Right to left shift (7.2 mm) seen.\par \par Lesion 2: Enhancing lesion is seen involving the right upper tiffany. This lesion measures approximately 0.5 x 0.4 cm.\par \par Lesion 3: Enhancing lesion is seen involving the superior left cerebellar region. This lesion measures approximately 1.2 x 1.0 cm. Surrounding edema is identified.\par \par She was seen by Dr Hwang and treated with Gamma knife SRS to the 3 brain lesions. \par \par A repeat MRI 6/2/21 demonstrated:\par \par -Interval decrease in size of 3 enhancing lesions, largest in the high medial right frontal lobe measuring up to 2.0 cm.\par -Improvement of vasogenic edema with resolution of leftward midline shift.\par -No new lesions are identified\par \par On 9/14/22 had a witnessed L sided seizure and admitted to [facility]. A brain MRI done 9/14/2022 showed: \par -Increase in size of a right frontal parasagittal lesion since comparison MRI, now measuring up to 2.5 cm. Increasing surrounding vasogenic edema.\par -No new lesion identified. No acute infarct.  She was d/c'd on dexamethasone taper to 4 mg BID and keppra 1000 mg bId.  Currently tapered to 2 mg.   \par \par Patient was started on bevacizumab on 10/28/22 for presumed tumor necrosis related edema, completed 12/16/22. \par \par  [de-identified] : Pt seen today for f/u visit.  Patient was started on bevacizumab on 10/28/22, completed 12/16/22.\par \par Remains on exemestane.  Denies any treatment related complaints. \par \par The patient saw ophthalmologist for follow up of black spot she is seeing in right eye only when outside.  Exam was negative, felt to be floaters. \par \par She denies any other complaints.  \par \par She states overall she is feeling good.  Notes a good appetite, stable weight and excellent performance status. \par \par CT scan c/a/p, 2/18/23- IMPRESSION:  \par No evidence of intra-abdominal metastatic disease.\par Interval resolution of previously identified right adnexal cyst.\par \par CT scan c/a/p, 10/12/22- IMPRESSION:\par A new 2.7 cm right adnexal cyst with thin septation. Consider follow-up with pelvic ultrasound as indicated versus attention at short-term follow-up imaging.\par \par Bone scan, 10/12/22- IMPRESSION: Bone scan demonstrates:\par No definite scan evidence of osseous metastasis.\par Mild diffuse increased uptake in the calvarium, not significant change, probably hyperostosis or Paget's disease.\par Heterotopic ossification in the medial aspect of the left chest, unchanged.\par Degenerative disease in the major joints.\par No significant interval change compared to the previous bone scan of 5/6/2022.\par \par MRI head 9/14/22\par IMPRESSION:\par -Increase in size of a right frontal parasagittal lesion since comparison MRI, now measuring up to 2.5 cm. Increasing surrounding vasogenic edema.\par -No new lesion identified. No acute infarct\par \par CT c/a/p, 5/6/22- IMPRESSION: Stable exam since 12/17/2021\par \par Bone scan, 5/6/22-  IMPRESSION: Bone scan demonstrates:\par \par No definite scan evidence of osseous metastasis.\par \par Mild diffuse increased uptake in the calvarium, unchanged, probably hyperostosis or Paget's disease.\par Heterotopic ossification in the medial aspect of the left breast, unchanged.\par Degenerative disease in the major joints.\par No significant interval change compared to the previous bone scan of 7/8/2021.\par \par CT scan, 12/17/21- IMPRESSION:  Stable examination compared with prior imaging 7/8/2021.\par \par MRI head 12/30/21\par IMPRESSION:\par Right high paramedian region lesion measures similar in size though there \par is increased edema seen in association when compared with the prior \par 9/24/2021. Question of vague enhancement on the surface of the cervical \par medullary junction and along the surface of the brainstem extending into \par the spinal cord level. Consideration for possible leptomeningeal \par pathology should be given. Recommend correlation with CSF as clinically \par warranted.\par \par DEXA 1/21 osteopenia\par \par

## 2023-05-09 NOTE — PHYSICAL EXAM
[Fully active, able to carry on all pre-disease performance without restriction] : Status 0 - Fully active, able to carry on all pre-disease performance without restriction [Normal] : affect appropriate [de-identified] : Right breast with no discrete palpable masses, no palpable axillary nodes.  Left reconstructed breast with medial and lateral nodularity c/w fat necrosis, stable, no other discrete palpable masses, no palpable axillary nodes.

## 2023-05-10 LAB — CANCER AG27-29 SERPL-ACNC: 10.9 U/ML

## 2023-06-07 ENCOUNTER — OUTPATIENT (OUTPATIENT)
Dept: OUTPATIENT SERVICES | Facility: HOSPITAL | Age: 58
LOS: 1 days | End: 2023-06-07
Payer: MEDICARE

## 2023-06-07 ENCOUNTER — APPOINTMENT (OUTPATIENT)
Dept: ULTRASOUND IMAGING | Facility: IMAGING CENTER | Age: 58
End: 2023-06-07
Payer: MEDICARE

## 2023-06-07 DIAGNOSIS — Z90.12 ACQUIRED ABSENCE OF LEFT BREAST AND NIPPLE: Chronic | ICD-10-CM

## 2023-06-07 DIAGNOSIS — Z90.3 ACQUIRED ABSENCE OF STOMACH [PART OF]: Chronic | ICD-10-CM

## 2023-06-07 DIAGNOSIS — Z00.8 ENCOUNTER FOR OTHER GENERAL EXAMINATION: ICD-10-CM

## 2023-06-07 DIAGNOSIS — Z90.49 ACQUIRED ABSENCE OF OTHER SPECIFIED PARTS OF DIGESTIVE TRACT: Chronic | ICD-10-CM

## 2023-06-07 DIAGNOSIS — Z98.51 TUBAL LIGATION STATUS: Chronic | ICD-10-CM

## 2023-06-07 PROCEDURE — 76830 TRANSVAGINAL US NON-OB: CPT

## 2023-06-07 PROCEDURE — 76830 TRANSVAGINAL US NON-OB: CPT | Mod: 26

## 2023-06-08 ENCOUNTER — OUTPATIENT (OUTPATIENT)
Dept: OUTPATIENT SERVICES | Facility: HOSPITAL | Age: 58
LOS: 1 days | End: 2023-06-08
Payer: MEDICARE

## 2023-06-08 ENCOUNTER — APPOINTMENT (OUTPATIENT)
Dept: MRI IMAGING | Facility: CLINIC | Age: 58
End: 2023-06-08
Payer: MEDICARE

## 2023-06-08 DIAGNOSIS — Z90.49 ACQUIRED ABSENCE OF OTHER SPECIFIED PARTS OF DIGESTIVE TRACT: Chronic | ICD-10-CM

## 2023-06-08 DIAGNOSIS — Z90.3 ACQUIRED ABSENCE OF STOMACH [PART OF]: Chronic | ICD-10-CM

## 2023-06-08 DIAGNOSIS — Z90.12 ACQUIRED ABSENCE OF LEFT BREAST AND NIPPLE: Chronic | ICD-10-CM

## 2023-06-08 DIAGNOSIS — Z98.51 TUBAL LIGATION STATUS: Chronic | ICD-10-CM

## 2023-06-08 DIAGNOSIS — C79.31 SECONDARY MALIGNANT NEOPLASM OF BRAIN: ICD-10-CM

## 2023-06-08 PROCEDURE — 70553 MRI BRAIN STEM W/O & W/DYE: CPT

## 2023-06-08 PROCEDURE — A9585: CPT

## 2023-06-08 PROCEDURE — 70553 MRI BRAIN STEM W/O & W/DYE: CPT | Mod: 26,MH

## 2023-06-28 ENCOUNTER — APPOINTMENT (OUTPATIENT)
Dept: INTERNAL MEDICINE | Facility: CLINIC | Age: 58
End: 2023-06-28
Payer: MEDICARE

## 2023-06-28 VITALS
WEIGHT: 160 LBS | BODY MASS INDEX: 31.41 KG/M2 | OXYGEN SATURATION: 100 % | DIASTOLIC BLOOD PRESSURE: 71 MMHG | HEIGHT: 60 IN | HEART RATE: 66 BPM | TEMPERATURE: 97 F | SYSTOLIC BLOOD PRESSURE: 112 MMHG | RESPIRATION RATE: 16 BRPM

## 2023-06-28 DIAGNOSIS — C50.919 MALIGNANT NEOPLASM OF UNSPECIFIED SITE OF UNSPECIFIED FEMALE BREAST: ICD-10-CM

## 2023-06-28 PROCEDURE — 99214 OFFICE O/P EST MOD 30 MIN: CPT

## 2023-06-28 NOTE — PLAN
[FreeTextEntry1] : * enhancing brain lesion increased size\par * to f/u with neurologist\par * medication adherence compliance stressed\par * Dietary counseling given, dietary avoidance discussed, diet and exercise reviewed with patient; patient reminded of importance of aerobic exercise, weight control, dietary compliance and regular glucose monitoring\par * low cholesterol, low triglycerides diet,dietary counseling given; dietary avoidance discussed; diet and exercise reviewed with patient\par * f/u three months

## 2023-06-28 NOTE — HISTORY OF PRESENT ILLNESS
[FreeTextEntry1] : follow up\par Interview and discussion conducted in [place] citizen by [place] citizen speaking physician\par  [de-identified] : 57 years old female presents for follow up to review and discuss labs test results; she states had episode of seizure yesterday, lasted few seconds, states she wasn't taking levetiracetam twice a day; had MRI brain done , report reviewed, scheduled to see neurologist tomorrow

## 2023-06-29 ENCOUNTER — APPOINTMENT (OUTPATIENT)
Dept: RADIATION ONCOLOGY | Facility: CLINIC | Age: 58
End: 2023-06-29
Payer: MEDICARE

## 2023-06-29 VITALS
OXYGEN SATURATION: 100 % | DIASTOLIC BLOOD PRESSURE: 80 MMHG | HEART RATE: 65 BPM | BODY MASS INDEX: 31.41 KG/M2 | TEMPERATURE: 96.98 F | SYSTOLIC BLOOD PRESSURE: 138 MMHG | HEIGHT: 60 IN | WEIGHT: 160 LBS | RESPIRATION RATE: 17 BRPM

## 2023-06-29 DIAGNOSIS — Z78.9 OTHER SPECIFIED HEALTH STATUS: ICD-10-CM

## 2023-06-29 PROCEDURE — 99215 OFFICE O/P EST HI 40 MIN: CPT

## 2023-06-29 NOTE — HISTORY OF PRESENT ILLNESS
[FreeTextEntry1] : Ms. Shelley was seen initially for consult on 3/18//2021. She completed SRS between 3/23-3/25/2021 to a right frontal, left cerebellar, and right brainstem lesion.and additional SRS to a right parietal lesion, 1800 cgy on 5/11/2022 over 1 fraction.\par She presents for a f/u visit.\par \par ONCOLOGY HISTORY\par Ms. Shelley was diagnosed with breast ca in 2012 s/p radical mastectomy and adjuvant chemotherapy. She had ER positive 20%, VT positive 10%, HER-2/lisa 2+, FISH amplified breast cancer per outside physician report. She went on to receive adjuvant dose-dense AC chemotherapy for 4 cycles followed by Taxol plus Herceptin as well as adjuvant radiation therapy. She was subsequently begun adjuvant tamoxifen therapy. Her last menstrual period was in 2014. The patient then began to experience weight loss beginning in November 2018 and over 3-month period of time lost 30 pounds per her report. At approximately the same time she developed increasing shortness of breath, and ultimately presented to [facility] Emergency Department. She subsequently had a CT scan of the chest, which showed multiple mediastinal hilar right subclavian lymph nodes corresponding to abnormal FDG activity on PET scan consistent with metastatic carcinoma; there were tiny nodularities in both lungs- too small to accurately characterize- not present on prior examination and measuring up to 2 mm in both lungs. The possibility of metastasis was a consideration; she had no bony metastases; the liver demonstrated tiny poorly defined lesions less than 5 mm and difficult to characterize and visible on prior examinations with the possibility of metastasis also considered. A PET-CT scan showed multiple hypermetabolic lymph nodes in the mediastinum, right subclavian region and both adebayo felt to represent tumor; there were stellate lesions in the left breast region, felt to represent benign scars; the lungs demonstrated no significant abnormality with multiple tiny lung nodules seen on CT as previously noted; liver demonstrated no significant abnormality with multiple small poorly defined nodules seen on CT per my previous comments. The patient underwent a left breast core biopsy of the area of concern with a finding of fat necrosis and foreign body giant cell reaction as well as a separate fine-needle aspiration consistent with a ruptured cyst/fat necrosis. The patient consequently saw her oncologist, Dr. Carias, who sent the patient for ultrasound-guided core biopsies of the mediastinal lymph nodes with a finding of carcinoma, consistent with breast primary with estrogen receptor returning positive, greater than 50%, progesterone receptor negative, and HER-2/lisa 0 per outside report. The patient was subsequently begun on docetaxel, pertuzumab, trastuzumab, and exemestane beginning at the early 4/19, and subsequently switched to weekly paclitaxel secondary to side effects and continuing on trastuzumab / pertuzumab every 3 weeks as well as exemestane. The patient was seen for initial consultation 7/24/19 for a further opinion regarding treatment recommendations. She noted ongoing weakness, fatigue, and generalized malaise, although all of these had begun to improve gradually. Her last chemotherapy as noted above was approximately at 06/12/2019. She reports that her primary oncologist, Dr. Carias, told to discontinue exemestane upon admission to [facility] with the consideration restarting it post discharge. Slides for review at [facility] and it was confirmed that she had met breast cancer ER+ VT neg and Her 2 lisa neg. At the time of initial consult she felt well. Noted a left sided seizure in the days prior to consult lasting about 1 minute, which stopped on its own. Was recently prescribed keppra and dexamethasone 8mg BID. No confusion, dizziness, nausea, focal weakness. On examestane. She recently had a focal seizure left upper/lower extremity. MRI showed 3 lesions - \par \par Lesion 1: Large dural based enhancing lesion is seen involving the high medial right frontal region. This lesion does appear to cross the midline and measures approximately 3.2 x 2.7 x 3.5 cm. Small amount of adjacent susceptibility is identified. Surrounding edema is identified. Localized mass effect is identified. Right to left shift (7.2 mm) seen.\par \par Lesion 2: Enhancing lesion is seen involving the right upper tiffany. This lesion measures approximately 0.5 x 0.4 cm.\par \par Lesion 3: Enhancing lesion is seen involving the superior left cerebellar region. This lesion measures approximately 1.2 x 1.0 cm. Surrounding edema is identified. She was treated with GK SRS.\par \par \par 6/2/2021- Ms. Shelley presents today for follow up. [employer] Interpretter [id number] for Chinese was used. MRI scheduled today at 145. Has not seen Dr. Martin since radiation. States she is feeling fine. States she has intermittent sensation of "pulling" to her forehead. Denies any dizziness, confusion, headache, nausea, vomiting, visual disturbances, weakness. States 3 weeks ago she had an episode where her left upper extremity "was shaking" for one minute, denies any LOC. \par \par 9/29/2021- Ms. Shelley presents today for follow up. She is seen today with assistance of  [id number]. MRI brain done 9/24/2021 showed Previously noted enhancing lesions in the posterior fossa and supratentorial region have either decreased in size or are no longer seen which is compatible with response to therapy. Continue close interval follow-up is recommended. Continues to follow with Dr. Martin. continues on examestane with plans to repeat body scans in november or december. Feeling very well. no headaches, no new weakness, no numbness or tingling, no nausea or vomiting. overall doing well. \par \par 12/30/2021: Today Ms. Shelley presents for follow-up, [place] [facility]  . MRI brain showed: "Right high paramedian region lesion measures similar in size though there is increased edema seen in association when compared with the prior 9/24/2021. Question of vague enhancement on the surface of the cervical medullary junction and along the surface of the brainstem extending into the spinal cord level. Consideration for possible leptomeningeal pathology should be given. Recommend correlation with CSF as clinically warranted."\par Per our review, no leptomeningeal disease appreciated. The vague focus of enhancement likely represents a blood vessel. The swelling of the right high paramedian region lesion actually displays decreased edema, compared to prior.\par  Denies any nausea, vomiting, headache, numbness or tingling. Denies any issues with balance or hearing. \par \par 3/31/2022: Pt presents for follow-up. Clinically feeling well. Denies headache, nausea, vomiting, or other complaints. Denies interval symptoms since last f/u. Denies being on steroids or reportedly any recent seizure events. \par Citizen of the [place] pacific : [place] [id number]\par \par 5/4/2022- Ms. Shelley presents today for follow up. \par  [id number] used for visit today.Brain MRI 4/27/2022 showed \par IMPRESSION: Right medial frontal parasagittal parenchymal lesion is larger when compared with 3/30/2022 and 12/30/2021 suggesting a combination of post therapy change and neoplasm supported by MR spectroscopy and MR perfusion. There is a 5 mm nodule in the right parietal white matter which is new since 12/30/2021 but is slightly larger in retrospect since 3/30/2022. No evidence of leptomeningeal enhancement at the craniocervical junction, this was likely artifactual and is no longer identified. Follows with Dr. Martin. continue on exemestane. Today she feels well. denies headaches, nausea, vomiting, focal weakness, numbness, tingling.\par \par 7/13/2022- Ms. Shelley presents today for follow up. Brain MRI done 7/8. Continues on exemestane and following with Dr. Martin. \par CT CAP 5/6/2022 showed Stable exam since 12/17/2021. Bone scan 5/6 without definite evidence of bone mets. Today she is feeling very well. denies headaches, nasuea, vomiting, focal weakness, numbness, tingling, confusion\par \par 9/20/2022- Ms. Shelley presents today for follow up. pacific  [id number] used for visit today.\par She presented to [facility] on 9/14/22 with a seizure, left sided. A brain MRI done 9/14/2022 showed \par -Increase in size of a right frontal parasagittal lesion since comparison MRI, now measuring up to 2.5 cm. Increasing surrounding vasogenic edema. -No new lesion identified. No acute infarct. Discharged on dexamethasone taper to 4mg BID and keppra 1000mg bId. Feeling well. no further seizures. no headaches, no nausea, no focal weakness, no numbness or tingling. in retrospect prior to seizure denies any focal neurological symptoms.\par \par 10/6/2022- Ms. Shelley presents today for follow up. MRI brain 9/30/2022 showed Right frontal parasagittal parenchymal enhancing mass increased size and increased vasogenic edema compared with 7/8/2022. MR perfusion and MR spectroscopy are suggestive of post therapy change rather than neoplasm progression. Question tiny 2.5 mm nodular enhancing focus right cingulate gyrus could represent an additional focus. Recommend follow-up. tapered to 2mg Dexa bid 2 weeks. \par \par 11/17/2022- Ms. Shelley presents today for follow up.  [id number] used for visit. She started avastin on 10/28 and received her second dose on 11/11. Continues on exemestane. MRI brain 11/12/2022 showed Decreased right frontal parasagittal lesion when compared to 9/30/2022, with significant decreased vasogenic edema consistent with response to treatment. MR perfusion is suggestive of a combination of neoplasm and post therapy change. Continued follow-up. CT CAP 10/12/2022 showed A new 2.7 cm right adnexal cyst with thin septation. Consider follow-up with pelvic ultrasound as indicated versus attention at short-term follow-up imaging. Feels well overall. no headaches, no nausea, no focal weakness, no numbness or tingling. notes some weakness to bilateral things recently, trouble going up the stairs. \par \par 2/23/2023: Ms. Shelley presents to clinic for follow-up.  [id number] used for visit.\par MRI-Brain performed on 2/11/2023 showed Enhancing lesion is again seen involving the high medial right frontal cortex which continues to decrease in size. Decreased surrounding edema is seen as well. There is decreased cerebral blood flow and cerebral blood volume seen. This could be compatible with treatment-related changes though the possibility of underlying tumor with response to therapy must be considered as well. Continued close interval follow-up is recommended\par \par Ms. Shelley has a CT- Abd/Pel scheduled for 2/18/2023. continues on exemestane. No headaches, no nausea, no weakness, no seizures. notes some intermittent and infrequent visual disturbances on the right side over the last month. Last Avastin 12/16/2022.\par \par 6/29/2023- Ms. Shelley presents today for follow up. Continues to follow with Dr. Martin. Continues on Exemestane. \par \par MRI brain done 6/8/2023 \par showed Previously noted enhancing lesion involving the high medial right frontal region has increased in size with increased edema as well.\par \par CT AP 2/18/2023 showed No evidence of intra-abdominal metastatic disease.\par Interval resolution of previously identified right adnexal cyst.\par \par In the interim reports having an attack/trembling of the left hand and leg lasting~ 5mins (episode happened a week ago)\par Admits to skipping her PM AED doses for ~ 1 month\par Recalls a similar episode about 2 years ago\par Denies LOC/injuries/visual disturbance/unilateral extremity weakness\par \par

## 2023-06-29 NOTE — REVIEW OF SYSTEMS
[Negative] : Psychiatric [Confused] : no confusion [Dizziness] : no dizziness [de-identified] : left side tremors  x 1 week ago

## 2023-06-29 NOTE — REASON FOR VISIT
[Routine Follow-Up] : routine follow-up visit for [Brain Metastasis] : brain metastasis [Family Member] : family member [[employer] ] : provided by [employer]   [Interpreters_IDNumber] : [id number] [TWNoteComboBox1] : [place]

## 2023-06-29 NOTE — PHYSICAL EXAM
[General Appearance - Well Developed] : well developed [Normal] : no respiratory distress, lungs were clear to auscultation bilaterally

## 2023-07-21 ENCOUNTER — OUTPATIENT (OUTPATIENT)
Dept: OUTPATIENT SERVICES | Facility: HOSPITAL | Age: 58
LOS: 1 days | Discharge: HOME | End: 2023-07-21

## 2023-07-21 DIAGNOSIS — Z90.12 ACQUIRED ABSENCE OF LEFT BREAST AND NIPPLE: Chronic | ICD-10-CM

## 2023-07-21 DIAGNOSIS — C50.919 MALIGNANT NEOPLASM OF UNSPECIFIED SITE OF UNSPECIFIED FEMALE BREAST: ICD-10-CM

## 2023-07-21 DIAGNOSIS — Z98.51 TUBAL LIGATION STATUS: Chronic | ICD-10-CM

## 2023-07-21 DIAGNOSIS — Z90.49 ACQUIRED ABSENCE OF OTHER SPECIFIED PARTS OF DIGESTIVE TRACT: Chronic | ICD-10-CM

## 2023-07-21 DIAGNOSIS — Z90.3 ACQUIRED ABSENCE OF STOMACH [PART OF]: Chronic | ICD-10-CM

## 2023-08-01 ENCOUNTER — RESULT REVIEW (OUTPATIENT)
Age: 58
End: 2023-08-01

## 2023-08-01 ENCOUNTER — LABORATORY RESULT (OUTPATIENT)
Age: 58
End: 2023-08-01

## 2023-08-01 ENCOUNTER — APPOINTMENT (OUTPATIENT)
Dept: MRI IMAGING | Facility: IMAGING CENTER | Age: 58
End: 2023-08-01
Payer: MEDICARE

## 2023-08-01 ENCOUNTER — OUTPATIENT (OUTPATIENT)
Dept: OUTPATIENT SERVICES | Facility: HOSPITAL | Age: 58
LOS: 1 days | End: 2023-08-01
Payer: MEDICARE

## 2023-08-01 ENCOUNTER — APPOINTMENT (OUTPATIENT)
Dept: HEMATOLOGY ONCOLOGY | Facility: CLINIC | Age: 58
End: 2023-08-01
Payer: MEDICARE

## 2023-08-01 VITALS
RESPIRATION RATE: 16 BRPM | WEIGHT: 163.14 LBS | OXYGEN SATURATION: 98 % | TEMPERATURE: 207.32 F | SYSTOLIC BLOOD PRESSURE: 112 MMHG | DIASTOLIC BLOOD PRESSURE: 70 MMHG | HEART RATE: 69 BPM | BODY MASS INDEX: 31.86 KG/M2

## 2023-08-01 DIAGNOSIS — C79.31 SECONDARY MALIGNANT NEOPLASM OF BRAIN: ICD-10-CM

## 2023-08-01 DIAGNOSIS — Z98.51 TUBAL LIGATION STATUS: Chronic | ICD-10-CM

## 2023-08-01 DIAGNOSIS — Z90.12 ACQUIRED ABSENCE OF LEFT BREAST AND NIPPLE: Chronic | ICD-10-CM

## 2023-08-01 DIAGNOSIS — Z90.3 ACQUIRED ABSENCE OF STOMACH [PART OF]: Chronic | ICD-10-CM

## 2023-08-01 DIAGNOSIS — Z90.49 ACQUIRED ABSENCE OF OTHER SPECIFIED PARTS OF DIGESTIVE TRACT: Chronic | ICD-10-CM

## 2023-08-01 LAB
BASOPHILS # BLD AUTO: 0.03 K/UL — SIGNIFICANT CHANGE UP (ref 0–0.2)
BASOPHILS NFR BLD AUTO: 0.3 % — SIGNIFICANT CHANGE UP (ref 0–2)
EOSINOPHIL # BLD AUTO: 0.15 K/UL — SIGNIFICANT CHANGE UP (ref 0–0.5)
EOSINOPHIL NFR BLD AUTO: 1.5 % — SIGNIFICANT CHANGE UP (ref 0–6)
HCT VFR BLD CALC: 34.2 % — LOW (ref 34.5–45)
HGB BLD-MCNC: 10.7 G/DL — LOW (ref 11.5–15.5)
IMM GRANULOCYTES NFR BLD AUTO: 0.7 % — SIGNIFICANT CHANGE UP (ref 0–0.9)
LYMPHOCYTES # BLD AUTO: 3.36 K/UL — HIGH (ref 1–3.3)
LYMPHOCYTES # BLD AUTO: 32.9 % — SIGNIFICANT CHANGE UP (ref 13–44)
MCHC RBC-ENTMCNC: 24.2 PG — LOW (ref 27–34)
MCHC RBC-ENTMCNC: 31.3 G/DL — LOW (ref 32–36)
MCV RBC AUTO: 77.4 FL — LOW (ref 80–100)
MONOCYTES # BLD AUTO: 0.47 K/UL — SIGNIFICANT CHANGE UP (ref 0–0.9)
MONOCYTES NFR BLD AUTO: 4.6 % — SIGNIFICANT CHANGE UP (ref 2–14)
NEUTROPHILS # BLD AUTO: 6.13 K/UL — SIGNIFICANT CHANGE UP (ref 1.8–7.4)
NEUTROPHILS NFR BLD AUTO: 60 % — SIGNIFICANT CHANGE UP (ref 43–77)
NRBC # BLD: 0 /100 WBCS — SIGNIFICANT CHANGE UP (ref 0–0)
PLATELET # BLD AUTO: 346 K/UL — SIGNIFICANT CHANGE UP (ref 150–400)
RBC # BLD: 4.42 M/UL — SIGNIFICANT CHANGE UP (ref 3.8–5.2)
RBC # FLD: 17.4 % — HIGH (ref 10.3–14.5)
WBC # BLD: 10.21 K/UL — SIGNIFICANT CHANGE UP (ref 3.8–10.5)
WBC # FLD AUTO: 10.21 K/UL — SIGNIFICANT CHANGE UP (ref 3.8–10.5)

## 2023-08-01 PROCEDURE — 99214 OFFICE O/P EST MOD 30 MIN: CPT

## 2023-08-01 PROCEDURE — 70553 MRI BRAIN STEM W/O & W/DYE: CPT

## 2023-08-01 PROCEDURE — A9585: CPT

## 2023-08-01 PROCEDURE — 70553 MRI BRAIN STEM W/O & W/DYE: CPT | Mod: 26,MH

## 2023-08-01 NOTE — END OF VISIT
[FreeTextEntry3] : Patient being seen as per physician's primary plan of care.\par   [Time Spent: ___ minutes] : I have spent [unfilled] minutes of time on the encounter.

## 2023-08-01 NOTE — HISTORY OF PRESENT ILLNESS
[de-identified] : The patients' history of present illness began in 2012 at which time she was found to have a right breast cancer for which she underwent a right modified radical mastectomy/axillary lymph node dissection with the finding of an ER positive 20%, NC positive 10%, HER-2/lisa 2+, FISH amplified breast cancer per outside physician report (I do not have a copy that pathology report for my review).  She went on to receive adjuvant dose-dense AC chemotherapy for 4 cycles followed by Taxol plus Herceptin as well as adjuvant radiation therapy.  She was subsequently begun adjuvant tamoxifen therapy.  Her last menstrual period was in 2014.\par  \par  The patient then began to experience weight loss beginning in November 2018 and over 3-month period of time lost 30 pounds per her report.  At approximately the same time she developed increasing shortness of breath, and ultimately presented to [facility] Emergency Department.  She subsequently had a CT scan of the chest, which showed multiple mediastinal hilar right subclavian lymph nodes corresponding to abnormal FDG activity on PET scan consistent with metastatic carcinoma; there were tiny nodularities in both lungs- too small to accurately characterize- not present on prior examination and measuring up to 2 mm in both lungs.  The possibility of metastasis was a consideration; she had no bony metastases; the liver demonstrated tiny poorly defined lesions less than 5 mm and difficult to characterize and visible on prior examinations with the possibility of metastasis also considered.  A PET-CT scan showed multiple hypermetabolic lymph nodes in the mediastinum, right subclavian region and both adebayo felt to represent tumor; there were stellate lesions in the left breast region, felt to represent benign scars; the lungs demonstrated no significant abnormality with multiple tiny lung nodules seen on CT as previously noted; liver demonstrated no significant abnormality with multiple small poorly defined nodules seen on CT per my previous comments.  The patient underwent a left breast core biopsy of the area of concern with a finding of fat necrosis and foreign body giant cell reaction as well as a separate fine-needle aspiration consistent with a ruptured cyst/fat necrosis.  The patient consequently saw her oncologist, Dr. Carias, who sent the patient for ultrasound-guided core biopsies of the mediastinal lymph nodes with a finding of carcinoma, consistent with breast primary with estrogen receptor returning positive, greater than 50%, progesterone receptor negative, and HER-2/lisa 0 per outside report. The patient was subsequently begun on docetaxel, pertuzumab, trastuzumab, and exemestane beginning at the early 4/19, and subsequently switched to weekly paclitaxel secondary to side effects and continuing  on trastuzumab / pertuzumab every 3 weeks as well as exemestane. The patient was last treated per her report on approximately June 12, 2019, despite outside records that show ongoing weekly treatments early July 2019.  \par  \par  The patient also reports that beginning in approximately 12/18, she started to develop slowly progressive diffuse rash with associated pruritus, having seen multiple dermatologists.  Most recently, she saw Dr. Courtney Cowart, a [facility] physician practicing at [place], [place], who diagnosed disseminated herpes simplex for which she was begun on valacyclovir, as well as evidence of MRSA for which was treated with doxycycline beginning in May 2019.  In June 2019, the patient presented to [facility] Emergency Department complaining of progressive "weakness and shaking." She was found to be anemic and had melena.  She underwent endoscopy and VIR procedure to stop gastric bleeding, which was unsuccessful.  Consequently, she had a distal gastrectomy.  She was discharged from [facility] on 07/08/2019.\par  \par  The patient was initial consultation 7/24/19 for a further opinion regarding treatment recommendations. She noted ongoing weakness, fatigue, and generalized malaise, although all of these had begun to improve gradually.  Her last chemotherapy as noted above was approximately at 06/12/2019.  She reports that her primary oncologist, Dr. Carias, told to discontinue exemestane upon admission to [facility] with the consideration restarting it post discharge.  \par  \par  I obtained her outside slides for review at [facility] and it was confirmed that she had met breast cancer ER+ NC neg and Her 2 lisa neg.\par  \par  Consequently I recommended restarting exemestane.  \par  \par  In 3/21 she had L sided seizures and found to have abnormal enhancing lesions in the posterior fossa and supratentorial region are identified.\par  \par  Lesion 1: Large dural based enhancing lesion is seen involving the high medial right frontal region. This lesion does appear to cross the midline and measures approximately 3.2 x 2.7 x 3.5 cm. Small amount of adjacent susceptibility is identified. Surrounding edema is identified. Localized mass effect is identified. Right to left shift (7.2 mm) seen.\par  \par  Lesion 2: Enhancing lesion is seen involving the right upper tiffany. This lesion measures approximately 0.5 x 0.4 cm.\par  \par  Lesion 3: Enhancing lesion is seen involving the superior left cerebellar region. This lesion measures approximately 1.2 x 1.0 cm. Surrounding edema is identified.\par  \par  She was seen by Dr Hwang and treated with Gamma knife SRS to the 3 brain lesions. \par  \par  A repeat MRI 6/2/21 demonstrated:\par  \par  -Interval decrease in size of 3 enhancing lesions, largest in the high medial right frontal lobe measuring up to 2.0 cm.\par  -Improvement of vasogenic edema with resolution of leftward midline shift.\par  -No new lesions are identified\par  \par  On 9/14/22 had a witnessed L sided seizure and admitted to [facility]. A brain MRI done 9/14/2022 showed: \par  -Increase in size of a right frontal parasagittal lesion since comparison MRI, now measuring up to 2.5 cm. Increasing surrounding vasogenic edema.\par  -No new lesion identified. No acute infarct.  She was d/c'd on dexamethasone taper to 4 mg BID and keppra 1000 mg bId.  Currently tapered to 2 mg.   \par  \par  Patient was started on bevacizumab on 10/28/22 for presumed tumor necrosis related edema, completed 12/16/22. \par  \par   [de-identified] : Pt seen today for f/u visit.  Patient was started on bevacizumab on 10/28/22, completed 12/16/22.  Remains on exemestane.    Denies any treatment related complaints.   She denies any other complaints.    Notes a good appetite, stable weight and excellent performance status.   CT scan c/a/p, 2/18/23- IMPRESSION:   No evidence of intra-abdominal metastatic disease. Interval resolution of previously identified right adnexal cyst.  CT scan c/a/p, 10/12/22- IMPRESSION: A new 2.7 cm right adnexal cyst with thin septation. Consider follow-up with pelvic ultrasound as indicated versus attention at short-term follow-up imaging.  Bone scan, 10/12/22- IMPRESSION: Bone scan demonstrates: No definite scan evidence of osseous metastasis. Mild diffuse increased uptake in the calvarium, not significant change, probably hyperostosis or Paget's disease. Heterotopic ossification in the medial aspect of the left chest, unchanged. Degenerative disease in the major joints. No significant interval change compared to the previous bone scan of 5/6/2022.  MRI head 9/14/22 IMPRESSION: -Increase in size of a right frontal parasagittal lesion since comparison MRI, now measuring up to 2.5 cm. Increasing surrounding vasogenic edema. -No new lesion identified. No acute infarct  CT c/a/p, 5/6/22- IMPRESSION: Stable exam since 12/17/2021  Bone scan, 5/6/22-  IMPRESSION: Bone scan demonstrates:  No definite scan evidence of osseous metastasis.  Mild diffuse increased uptake in the calvarium, unchanged, probably hyperostosis or Paget's disease. Heterotopic ossification in the medial aspect of the left breast, unchanged. Degenerative disease in the major joints. No significant interval change compared to the previous bone scan of 7/8/2021.  CT scan, 12/17/21- IMPRESSION:  Stable examination compared with prior imaging 7/8/2021.  MRI head 12/30/21 IMPRESSION: Right high paramedian region lesion measures similar in size though there  is increased edema seen in association when compared with the prior  9/24/2021. Question of vague enhancement on the surface of the cervical  medullary junction and along the surface of the brainstem extending into  the spinal cord level. Consideration for possible leptomeningeal  pathology should be given. Recommend correlation with CSF as clinically  warranted.  DEXA 1/21 osteopenia

## 2023-08-01 NOTE — PHYSICAL EXAM
[Fully active, able to carry on all pre-disease performance without restriction] : Status 0 - Fully active, able to carry on all pre-disease performance without restriction [Normal] : affect appropriate [de-identified] : Right breast with no discrete palpable masses, no palpable axillary nodes.  Left reconstructed breast with medial and lateral nodularity c/w fat necrosis, stable, no other discrete palpable masses, no palpable axillary nodes.

## 2023-08-17 ENCOUNTER — OUTPATIENT (OUTPATIENT)
Dept: OUTPATIENT SERVICES | Facility: HOSPITAL | Age: 58
LOS: 1 days | End: 2023-08-17
Payer: MEDICARE

## 2023-08-17 ENCOUNTER — APPOINTMENT (OUTPATIENT)
Dept: NUCLEAR MEDICINE | Facility: IMAGING CENTER | Age: 58
End: 2023-08-17
Payer: MEDICARE

## 2023-08-17 ENCOUNTER — RESULT REVIEW (OUTPATIENT)
Age: 58
End: 2023-08-17

## 2023-08-17 DIAGNOSIS — Z90.3 ACQUIRED ABSENCE OF STOMACH [PART OF]: Chronic | ICD-10-CM

## 2023-08-17 DIAGNOSIS — C79.31 SECONDARY MALIGNANT NEOPLASM OF BRAIN: ICD-10-CM

## 2023-08-17 DIAGNOSIS — Z98.51 TUBAL LIGATION STATUS: Chronic | ICD-10-CM

## 2023-08-17 DIAGNOSIS — Z90.12 ACQUIRED ABSENCE OF LEFT BREAST AND NIPPLE: Chronic | ICD-10-CM

## 2023-08-17 DIAGNOSIS — G93.89 OTHER SPECIFIED DISORDERS OF BRAIN: ICD-10-CM

## 2023-08-17 DIAGNOSIS — C50.919 MALIGNANT NEOPLASM OF UNSPECIFIED SITE OF UNSPECIFIED FEMALE BREAST: ICD-10-CM

## 2023-08-17 DIAGNOSIS — Z90.49 ACQUIRED ABSENCE OF OTHER SPECIFIED PARTS OF DIGESTIVE TRACT: Chronic | ICD-10-CM

## 2023-08-17 PROCEDURE — 78608 BRAIN IMAGING (PET): CPT | Mod: 26,MH

## 2023-08-17 PROCEDURE — 78999 UNLISTED MISC PX DX NUC MED: CPT

## 2023-08-17 PROCEDURE — 78816 PET IMAGE W/CT FULL BODY: CPT

## 2023-08-17 PROCEDURE — 78816 PET IMAGE W/CT FULL BODY: CPT | Mod: 26,MH

## 2023-08-17 PROCEDURE — A9591: CPT

## 2023-08-17 PROCEDURE — 78999 UNLISTED MISC PX DX NUC MED: CPT | Mod: 26,MH

## 2023-08-23 ENCOUNTER — APPOINTMENT (OUTPATIENT)
Dept: RADIATION ONCOLOGY | Facility: CLINIC | Age: 58
End: 2023-08-23
Payer: MEDICARE

## 2023-08-23 VITALS
SYSTOLIC BLOOD PRESSURE: 121 MMHG | BODY MASS INDEX: 32.31 KG/M2 | RESPIRATION RATE: 18 BRPM | WEIGHT: 164.57 LBS | DIASTOLIC BLOOD PRESSURE: 74 MMHG | OXYGEN SATURATION: 100 % | TEMPERATURE: 97.5 F | HEART RATE: 62 BPM | HEIGHT: 60 IN

## 2023-08-23 PROCEDURE — 99215 OFFICE O/P EST HI 40 MIN: CPT | Mod: 25

## 2023-08-23 RX ORDER — DEXAMETHASONE 2 MG/1
2 TABLET ORAL DAILY
Qty: 30 | Refills: 0 | Status: DISCONTINUED | COMMUNITY
Start: 2023-06-29 | End: 2023-08-23

## 2023-08-23 RX ORDER — FAMOTIDINE 40 MG/1
40 TABLET, FILM COATED ORAL DAILY
Qty: 30 | Refills: 0 | Status: DISCONTINUED | COMMUNITY
Start: 2023-06-29 | End: 2023-08-23

## 2023-08-23 RX ORDER — LEVETIRACETAM 1000 MG/1
1000 TABLET, FILM COATED ORAL
Qty: 60 | Refills: 0 | Status: DISCONTINUED | COMMUNITY
Start: 2022-09-21 | End: 2023-08-23

## 2023-08-23 NOTE — REASON FOR VISIT
[Routine Follow-Up] : routine follow-up visit for [Brain Metastasis] : brain metastasis [[employer] ] : provided by [employer]   [Interpreters_IDNumber] : [id number] [Interpreters_FullName] : Lele [TWNoteComboBox1] : Maltese

## 2023-08-23 NOTE — REVIEW OF SYSTEMS
[Negative] : Psychiatric [Confused] : no confusion [Dizziness] : no dizziness [de-identified] : imbalance x 2 weeks

## 2023-08-23 NOTE — HISTORY OF PRESENT ILLNESS
[FreeTextEntry1] : Ms. Shelley was seen initially for consult on 3/18//2021. She completed SRS between 3/23-3/25/2021 to a right frontal, left cerebellar, and right brainstem lesion.and additional SRS to a right parietal lesion, 1800 cgy on 5/11/2022 over 1 fraction. She presents for a f/u visit.  ONCOLOGY HISTORY Ms. Shelley was diagnosed with breast ca in 2012 s/p radical mastectomy and adjuvant chemotherapy. She had ER positive 20%, CT positive 10%, HER-2/lisa 2+, FISH amplified breast cancer per outside physician report. She went on to receive adjuvant dose-dense AC chemotherapy for 4 cycles followed by Taxol plus Herceptin as well as adjuvant radiation therapy. She was subsequently begun adjuvant tamoxifen therapy. Her last menstrual period was in 2014. The patient then began to experience weight loss beginning in November 2018 and over 3-month period of time lost 30 pounds per her report. At approximately the same time she developed increasing shortness of breath, and ultimately presented to [facility] Emergency Department. She subsequently had a CT scan of the chest, which showed multiple mediastinal hilar right subclavian lymph nodes corresponding to abnormal FDG activity on PET scan consistent with metastatic carcinoma; there were tiny nodularities in both lungs- too small to accurately characterize- not present on prior examination and measuring up to 2 mm in both lungs. The possibility of metastasis was a consideration; she had no bony metastases; the liver demonstrated tiny poorly defined lesions less than 5 mm and difficult to characterize and visible on prior examinations with the possibility of metastasis also considered. A PET-CT scan showed multiple hypermetabolic lymph nodes in the mediastinum, right subclavian region and both adebayo felt to represent tumor; there were stellate lesions in the left breast region, felt to represent benign scars; the lungs demonstrated no significant abnormality with multiple tiny lung nodules seen on CT as previously noted; liver demonstrated no significant abnormality with multiple small poorly defined nodules seen on CT per my previous comments. The patient underwent a left breast core biopsy of the area of concern with a finding of fat necrosis and foreign body giant cell reaction as well as a separate fine-needle aspiration consistent with a ruptured cyst/fat necrosis. The patient consequently saw her oncologist, Dr. Carias, who sent the patient for ultrasound-guided core biopsies of the mediastinal lymph nodes with a finding of carcinoma, consistent with breast primary with estrogen receptor returning positive, greater than 50%, progesterone receptor negative, and HER-2/lisa 0 per outside report. The patient was subsequently begun on docetaxel, pertuzumab, trastuzumab, and exemestane beginning at the early 4/19, and subsequently switched to weekly paclitaxel secondary to side effects and continuing on trastuzumab / pertuzumab every 3 weeks as well as exemestane. The patient was seen for initial consultation 7/24/19 for a further opinion regarding treatment recommendations. She noted ongoing weakness, fatigue, and generalized malaise, although all of these had begun to improve gradually. Her last chemotherapy as noted above was approximately at 06/12/2019. She reports that her primary oncologist, Dr. Carias, told to discontinue exemestane upon admission to [facility] with the consideration restarting it post discharge. Slides for review at [facility] and it was confirmed that she had met breast cancer ER+ CT neg and Her 2 lisa neg. At the time of initial consult she felt well. Noted a left sided seizure in the days prior to consult lasting about 1 minute, which stopped on its own. Was recently prescribed keppra and dexamethasone 8mg BID. No confusion, dizziness, nausea, focal weakness. On examestane. She recently had a focal seizure left upper/lower extremity. MRI showed 3 lesions -   Lesion 1: Large dural based enhancing lesion is seen involving the high medial right frontal region. This lesion does appear to cross the midline and measures approximately 3.2 x 2.7 x 3.5 cm. Small amount of adjacent susceptibility is identified. Surrounding edema is identified. Localized mass effect is identified. Right to left shift (7.2 mm) seen.  Lesion 2: Enhancing lesion is seen involving the right upper tiffany. This lesion measures approximately 0.5 x 0.4 cm.  Lesion 3: Enhancing lesion is seen involving the superior left cerebellar region. This lesion measures approximately 1.2 x 1.0 cm. Surrounding edema is identified. She was treated with GK SRS.   6/2/2021- Ms. Shelley presents today for follow up. [employer]ora Interpretter [id number] for Kyr[place] was used. MRI scheduled today at 145. Has not seen Dr. Martin since radiation. States she is feeling fine. States she has intermittent sensation of "pulling" to her forehead. Denies any dizziness, confusion, headache, nausea, vomiting, visual disturbances, weakness. States 3 weeks ago she had an episode where her left upper extremity "was shaking" for one minute, denies any LOC.   9/29/2021- Ms. Shelley presents today for follow up. She is seen today with assistance of  [id number]. MRI brain done 9/24/2021 showed Previously noted enhancing lesions in the posterior fossa and supratentorial region have either decreased in size or are no longer seen which is compatible with response to therapy. Continue close interval follow-up is recommended. Continues to follow with Dr. Martin. continues on examestane with plans to repeat body scans in november or december. Feeling very well. no headaches, no new weakness, no numbness or tingling, no nausea or vomiting. overall doing well.   12/30/2021: Today Ms. Shelley presents for follow-up, [facility]  . MRI brain showed: "Right high paramedian region lesion measures similar in size though there is increased edema seen in association when compared with the prior 9/24/2021. Question of vague enhancement on the surface of the cervical medullary junction and along the surface of the brainstem extending into the spinal cord level. Consideration for possible leptomeningeal pathology should be given. Recommend correlation with CSF as clinically warranted." Per our review, no leptomeningeal disease appreciated. The vague focus of enhancement likely represents a blood vessel. The swelling of the right high paramedian region lesion actually displays decreased edema, compared to prior.  Denies any nausea, vomiting, headache, numbness or tingling. Denies any issues with balance or hearing.   3/31/2022: Pt presents for follow-up. Clinically feeling well. Denies headache, nausea, vomiting, or other complaints. Denies interval symptoms since last f/u. Denies being on steroids or reportedly any recent seizure events.  [place] pacific : Goran [id number]  5/4/2022- Ms. Shelley presents today for follow up.   [id number] used for visit today.Brain MRI 4/27/2022 showed  IMPRESSION: Right medial frontal parasagittal parenchymal lesion is larger when compared with 3/30/2022 and 12/30/2021 suggesting a combination of post therapy change and neoplasm supported by MR spectroscopy and MR perfusion. There is a 5 mm nodule in the right parietal white matter which is new since 12/30/2021 but is slightly larger in retrospect since 3/30/2022. No evidence of leptomeningeal enhancement at the craniocervical junction, this was likely artifactual and is no longer identified. Follows with Dr. Martin. continue on exemestane. Today she feels well. denies headaches, nausea, vomiting, focal weakness, numbness, tingling.  7/13/2022- Ms. Shelley presents today for follow up. Brain MRI done 7/8. Continues on exemestane and following with Dr. Martin.  CT CAP 5/6/2022 showed Stable exam since 12/17/2021. Bone scan 5/6 without definite evidence of bone mets. Today she is feeling very well. denies headaches, nasuea, vomiting, focal weakness, numbness, tingling, confusion  9/20/2022- Ms. Shelley presents today for follow up. pacific  [id number] used for visit today. She presented to [facility] on 9/14/22 with a seizure, left sided. A brain MRI done 9/14/2022 showed  -Increase in size of a right frontal parasagittal lesion since comparison MRI, now measuring up to 2.5 cm. Increasing surrounding vasogenic edema. -No new lesion identified. No acute infarct. Discharged on dexamethasone taper to 4mg BID and keppra 1000mg bId. Feeling well. no further seizures. no headaches, no nausea, no focal weakness, no numbness or tingling. in retrospect prior to seizure denies any focal neurological symptoms.  10/6/2022- Ms. Shelley presents today for follow up. MRI brain 9/30/2022 showed Right frontal parasagittal parenchymal enhancing mass increased size and increased vasogenic edema compared with 7/8/2022. MR perfusion and MR spectroscopy are suggestive of post therapy change rather than neoplasm progression. Question tiny 2.5 mm nodular enhancing focus right cingulate gyrus could represent an additional focus. Recommend follow-up. tapered to 2mg Dexa bid 2 weeks.   11/17/2022- Ms. Shelley presents today for follow up.  [id number] used for visit. She started avastin on 10/28 and received her second dose on 11/11. Continues on exemestane. MRI brain 11/12/2022 showed Decreased right frontal parasagittal lesion when compared to 9/30/2022, with significant decreased vasogenic edema consistent with response to treatment. MR perfusion is suggestive of a combination of neoplasm and post therapy change. Continued follow-up. CT CAP 10/12/2022 showed A new 2.7 cm right adnexal cyst with thin septation. Consider follow-up with pelvic ultrasound as indicated versus attention at short-term follow-up imaging. Feels well overall. no headaches, no nausea, no focal weakness, no numbness or tingling. notes some weakness to bilateral things recently, trouble going up the stairs.   2/23/2023: Ms. Shelley presents to clinic for follow-up.  [id number] used for visit. MRI-Brain performed on 2/11/2023 showed Enhancing lesion is again seen involving the high medial right frontal cortex which continues to decrease in size. Decreased surrounding edema is seen as well. There is decreased cerebral blood flow and cerebral blood volume seen. This could be compatible with treatment-related changes though the possibility of underlying tumor with response to therapy must be considered as well. Continued close interval follow-up is recommended  Ms. Shelley has a CT- Abd/Pel scheduled for 2/18/2023. continues on exemestane. No headaches, no nausea, no weakness, no seizures. notes some intermittent and infrequent visual disturbances on the right side over the last month. Last Avastin 12/16/2022.  6/29/2023- Ms. Shelley presents today for follow up. Continues to follow with Dr. Martin. Continues on Exemestane.   MRI brain done 6/8/2023  showed Previously noted enhancing lesion involving the high medial right frontal region has increased in size with increased edema as well.  CT AP 2/18/2023 showed No evidence of intra-abdominal metastatic disease. Interval resolution of previously identified right adnexal cyst.  In the interim reports having an attack/trembling of the left hand and leg lasting~ 5mins (episode happened a week ago) Admits to skipping her PM AED doses for ~ 1 month Recalls a similar episode about 2 years ago Denies LOC/injuries/visual disturbance/unilateral extremity weakness  Visit dated 8/23/2023 Patient returns for continuation of care with completed images for review. Continues to follow with Dr. Martin on Exemestane Reports noticing over the past 2 weeks being a little off balance resulting in falls but w/o injury. Denies HAs, dizziness, speech/visual disturbance. MRI brain w w/o contrast 8/1//2023 IMPRESSION: Mild increase in size of right paramedian frontal lesion with thick peripheral rim of hyperperfusion consistent with viable tumor and central hypoperfusion consistent with necrosis. Overall findings are concerning for progression of tumor.  PET/CT [place] 8/7/2023 FINDINGS: Increased FES uptake is noted in a right frontal paramedian mass with a large surrounding area of edema. The volume of FES uptake corresponds to the entire volume of the enhancing lesion seen on Brain MRI from 8/1/2023 most consistent with viable tumor in the entire mass. No additional FES positive lesions are noted in the brain. IMPRESSION: Findings most consistent with active metastatic breast cancer in the brain.
ambulatory

## 2023-08-23 NOTE — DATA REVIEWED
[FreeTextEntry1] : I have personally reviewed the most recent MRI and compared it with the previous scans.\par

## 2023-08-23 NOTE — PHYSICAL EXAM
[General Appearance - Well Developed] : well developed [Normal] : oriented to person, place and time, the affect was normal, the mood was normal and not anxious [No Focal Deficits] : no focal deficits [Oriented To Time, Place, And Person] : oriented to person, place, and time

## 2023-08-25 ENCOUNTER — OUTPATIENT (OUTPATIENT)
Dept: OUTPATIENT SERVICES | Facility: HOSPITAL | Age: 58
LOS: 1 days | Discharge: HOME | End: 2023-08-25
Payer: MEDICARE

## 2023-08-25 DIAGNOSIS — Z98.51 TUBAL LIGATION STATUS: Chronic | ICD-10-CM

## 2023-08-25 DIAGNOSIS — Z90.12 ACQUIRED ABSENCE OF LEFT BREAST AND NIPPLE: Chronic | ICD-10-CM

## 2023-08-25 DIAGNOSIS — Z90.49 ACQUIRED ABSENCE OF OTHER SPECIFIED PARTS OF DIGESTIVE TRACT: Chronic | ICD-10-CM

## 2023-08-25 DIAGNOSIS — Z90.3 ACQUIRED ABSENCE OF STOMACH [PART OF]: Chronic | ICD-10-CM

## 2023-08-29 ENCOUNTER — APPOINTMENT (OUTPATIENT)
Dept: MRI IMAGING | Facility: IMAGING CENTER | Age: 58
End: 2023-08-29

## 2023-08-29 ENCOUNTER — OUTPATIENT (OUTPATIENT)
Dept: OUTPATIENT SERVICES | Facility: HOSPITAL | Age: 58
LOS: 1 days | End: 2023-08-29
Payer: MEDICARE

## 2023-08-29 DIAGNOSIS — Z90.3 ACQUIRED ABSENCE OF STOMACH [PART OF]: Chronic | ICD-10-CM

## 2023-08-29 DIAGNOSIS — Z29.9 ENCOUNTER FOR PROPHYLACTIC MEASURES, UNSPECIFIED: ICD-10-CM

## 2023-08-29 DIAGNOSIS — Z90.49 ACQUIRED ABSENCE OF OTHER SPECIFIED PARTS OF DIGESTIVE TRACT: Chronic | ICD-10-CM

## 2023-08-29 DIAGNOSIS — Z98.51 TUBAL LIGATION STATUS: Chronic | ICD-10-CM

## 2023-08-29 DIAGNOSIS — G93.89 OTHER SPECIFIED DISORDERS OF BRAIN: ICD-10-CM

## 2023-08-29 DIAGNOSIS — Z90.12 ACQUIRED ABSENCE OF LEFT BREAST AND NIPPLE: Chronic | ICD-10-CM

## 2023-08-29 PROCEDURE — 76498 UNLISTED MR PROCEDURE: CPT

## 2023-08-29 PROCEDURE — 77290 THER RAD SIMULAJ FIELD CPLX: CPT | Mod: 26

## 2023-08-29 PROCEDURE — 77334 RADIATION TREATMENT AID(S): CPT | Mod: 26

## 2023-08-30 ENCOUNTER — APPOINTMENT (OUTPATIENT)
Dept: NEUROSURGERY | Facility: CLINIC | Age: 58
End: 2023-08-30
Payer: MEDICARE

## 2023-08-30 PROCEDURE — 77295 3-D RADIOTHERAPY PLAN: CPT | Mod: 26

## 2023-08-30 PROCEDURE — 77300 RADIATION THERAPY DOSE PLAN: CPT | Mod: 26

## 2023-08-30 PROCEDURE — 61798 SRS CRANIAL LESION COMPLEX: CPT

## 2023-08-31 RX ORDER — ACETAMINOPHEN 325 MG/1
325 TABLET ORAL
Qty: 0 | Refills: 0 | Status: COMPLETED | OUTPATIENT
Start: 2023-08-31

## 2023-08-31 NOTE — PATIENT PROFILE ADULT - NSPRONUTRITIONRISK_GEN_A_NUR
No indicators present Tranexamic Acid Pregnancy And Lactation Text: It is unknown if this medication is safe during pregnancy or breast feeding.

## 2023-09-07 PROCEDURE — 77435 SBRT MANAGEMENT: CPT

## 2023-09-14 ENCOUNTER — APPOINTMENT (OUTPATIENT)
Dept: NEUROLOGY | Facility: CLINIC | Age: 58
End: 2023-09-14
Payer: MEDICARE

## 2023-09-14 ENCOUNTER — APPOINTMENT (OUTPATIENT)
Dept: RADIATION ONCOLOGY | Facility: CLINIC | Age: 58
End: 2023-09-14
Payer: MEDICARE

## 2023-09-14 VITALS
SYSTOLIC BLOOD PRESSURE: 102 MMHG | WEIGHT: 162 LBS | TEMPERATURE: 97.6 F | OXYGEN SATURATION: 98 % | HEIGHT: 60 IN | DIASTOLIC BLOOD PRESSURE: 65 MMHG | BODY MASS INDEX: 31.8 KG/M2 | HEART RATE: 67 BPM

## 2023-09-14 PROCEDURE — 99214 OFFICE O/P EST MOD 30 MIN: CPT

## 2023-09-14 PROCEDURE — 99024 POSTOP FOLLOW-UP VISIT: CPT

## 2023-09-19 ENCOUNTER — APPOINTMENT (OUTPATIENT)
Dept: INTERNAL MEDICINE | Facility: CLINIC | Age: 58
End: 2023-09-19
Payer: MEDICARE

## 2023-09-19 VITALS
OXYGEN SATURATION: 99 % | WEIGHT: 156 LBS | SYSTOLIC BLOOD PRESSURE: 116 MMHG | BODY MASS INDEX: 30.63 KG/M2 | HEART RATE: 66 BPM | HEIGHT: 60 IN | DIASTOLIC BLOOD PRESSURE: 72 MMHG | TEMPERATURE: 97.8 F

## 2023-09-19 DIAGNOSIS — Z23 ENCOUNTER FOR IMMUNIZATION: ICD-10-CM

## 2023-09-19 PROCEDURE — 90686 IIV4 VACC NO PRSV 0.5 ML IM: CPT

## 2023-09-19 PROCEDURE — 99214 OFFICE O/P EST MOD 30 MIN: CPT

## 2023-09-19 PROCEDURE — G0008: CPT

## 2023-09-29 ENCOUNTER — APPOINTMENT (OUTPATIENT)
Dept: NEUROLOGY | Facility: CLINIC | Age: 58
End: 2023-09-29

## 2023-10-02 LAB
ALBUMIN SERPL ELPH-MCNC: 4.4 G/DL
ALP BLD-CCNC: 87 U/L
ALT SERPL-CCNC: 31 U/L
ANION GAP SERPL CALC-SCNC: 11 MMOL/L
AST SERPL-CCNC: 20 U/L
BILIRUB SERPL-MCNC: 0.2 MG/DL
BUN SERPL-MCNC: 17 MG/DL
CALCIUM SERPL-MCNC: 9.1 MG/DL
CHLORIDE SERPL-SCNC: 102 MMOL/L
CHOLEST SERPL-MCNC: 267 MG/DL
CO2 SERPL-SCNC: 28 MMOL/L
CREAT SERPL-MCNC: 0.58 MG/DL
EGFR: 105 ML/MIN/1.73M2
ESTIMATED AVERAGE GLUCOSE: 137 MG/DL
GLUCOSE SERPL-MCNC: 90 MG/DL
HBA1C MFR BLD HPLC: 6.4 %
HDLC SERPL-MCNC: 90 MG/DL
LDLC SERPL CALC-MCNC: 156 MG/DL
NONHDLC SERPL-MCNC: 177 MG/DL
POTASSIUM SERPL-SCNC: 4.8 MMOL/L
PROT SERPL-MCNC: 6.4 G/DL
SODIUM SERPL-SCNC: 141 MMOL/L
TRIGL SERPL-MCNC: 125 MG/DL

## 2023-10-03 LAB — LEVETIRACETAM SERPL-MCNC: 15.2 UG/ML

## 2023-10-13 NOTE — DISCHARGE NOTE NURSING/CASE MANAGEMENT/SOCIAL WORK - NSDPLANG ASISDEC_GEN_ALL_CORE
"Subjective     Fadumo Morley is here with mother for her annual WCC.    Parental Issues:  Questions or concerns:  either none, or only commonly asked age-specific questions    Nutrition, Elimination, and Sleep:  Nutrition:  well-balanced diet  Elimination:  normal frequency and quality of stool  Sleep:  normal for age, no snoring identified    Currently menstruating? yes; current menstrual pattern: regular every month without intermenstrual spotting    Social:  Peer relations:  no concerns  Family relations:  no concerns  School performance:  no concerns  Teen questionnaire:  reviewed  Activities:  field hockey    Objective   /76   Pulse 71   Ht 1.607 m (5' 3.25\")   Wt 66.5 kg   BMI 25.76 kg/m²   Growth chart reviewed.  Physical Exam  Vitals reviewed.   Constitutional:       General: She is not in acute distress.     Appearance: Normal appearance. She is not ill-appearing.   HENT:      Head: Normocephalic and atraumatic.      Right Ear: Tympanic membrane, ear canal and external ear normal.      Left Ear: Tympanic membrane, ear canal and external ear normal.      Nose: Nose normal.      Mouth/Throat:      Mouth: Mucous membranes are moist.      Pharynx: Oropharynx is clear.   Eyes:      Extraocular Movements: Extraocular movements intact.      Conjunctiva/sclera: Conjunctivae normal.      Pupils: Pupils are equal, round, and reactive to light.   Neck:      Thyroid: No thyroid mass or thyromegaly.   Cardiovascular:      Rate and Rhythm: Normal rate and regular rhythm.      Pulses: Normal pulses.      Heart sounds: Normal heart sounds. No murmur heard.     No gallop.   Pulmonary:      Effort: Pulmonary effort is normal. No respiratory distress.      Breath sounds: Normal breath sounds.   Chest:   Breasts:     Vicente Score is 5.   Abdominal:      General: There is no distension.      Palpations: Abdomen is soft. There is no hepatomegaly, splenomegaly or mass.      Tenderness: There is no abdominal tenderness.      " Hernia: No hernia is present.   Genitourinary:     Vicente stage (genital): 5.   Musculoskeletal:         General: No swelling, deformity or signs of injury. Normal range of motion.      Cervical back: Normal range of motion and neck supple.      Thoracic back: No scoliosis.   Lymphadenopathy:      Comments: no significant lymphadenopathy > 1 cm   Skin:     General: Skin is warm and dry.   Neurological:      General: No focal deficit present.      Motor: No weakness.   Psychiatric:         Mood and Affect: Mood normal.         Thought Content: Thought content normal.          Assessment/Plan   1. Encounter for routine child health examination without abnormal findings        2. Encounter for immunization  Flu vaccine (IIV4) age 6 months and greater, preservative free    Pfizer COVID-19 vaccine, [id number], monovalent, age 12 years and older (30 mcg/0.3 mL)      3. BMI 85th to less than 95th percentile with athletic build, pediatric           Fadumo Morley is a healthy and thriving teenager.    - Anticipatory guidance regarding development, safety, nutrition, physical activity, and sleep reviewed.  - Growth:  appropriate for age  - Development:  appropriate for age  - Social:  teenage questionnaire completed and reviewed.  Issues of smoking, vaping, substance use, sexuality, and mood discussed.    - Vaccines:  as documented  - Return in 1 year for annual well child exam or sooner if concerns arise   Patient/Caregiver Requests Family/Friend To Interpret

## 2023-10-15 ENCOUNTER — APPOINTMENT (OUTPATIENT)
Dept: MRI IMAGING | Facility: IMAGING CENTER | Age: 58
End: 2023-10-15
Payer: MEDICARE

## 2023-10-15 ENCOUNTER — OUTPATIENT (OUTPATIENT)
Dept: OUTPATIENT SERVICES | Facility: HOSPITAL | Age: 58
LOS: 1 days | End: 2023-10-15
Payer: MEDICARE

## 2023-10-15 DIAGNOSIS — G93.89 OTHER SPECIFIED DISORDERS OF BRAIN: ICD-10-CM

## 2023-10-15 DIAGNOSIS — Z90.49 ACQUIRED ABSENCE OF OTHER SPECIFIED PARTS OF DIGESTIVE TRACT: Chronic | ICD-10-CM

## 2023-10-15 DIAGNOSIS — Z90.3 ACQUIRED ABSENCE OF STOMACH [PART OF]: Chronic | ICD-10-CM

## 2023-10-15 DIAGNOSIS — Z90.12 ACQUIRED ABSENCE OF LEFT BREAST AND NIPPLE: Chronic | ICD-10-CM

## 2023-10-15 DIAGNOSIS — Z98.51 TUBAL LIGATION STATUS: Chronic | ICD-10-CM

## 2023-10-15 PROCEDURE — 70553 MRI BRAIN STEM W/O & W/DYE: CPT

## 2023-10-15 PROCEDURE — 70553 MRI BRAIN STEM W/O & W/DYE: CPT | Mod: 26,MH

## 2023-10-15 PROCEDURE — A9585: CPT

## 2023-10-16 ENCOUNTER — NON-APPOINTMENT (OUTPATIENT)
Age: 58
End: 2023-10-16

## 2023-10-17 ENCOUNTER — APPOINTMENT (OUTPATIENT)
Dept: RADIATION ONCOLOGY | Facility: CLINIC | Age: 58
End: 2023-10-17
Payer: MEDICARE

## 2023-10-17 VITALS
OXYGEN SATURATION: 98 % | DIASTOLIC BLOOD PRESSURE: 72 MMHG | WEIGHT: 167.66 LBS | BODY MASS INDEX: 32.74 KG/M2 | SYSTOLIC BLOOD PRESSURE: 111 MMHG | HEART RATE: 73 BPM

## 2023-10-17 PROCEDURE — 99024 POSTOP FOLLOW-UP VISIT: CPT

## 2023-10-17 RX ORDER — PANTOPRAZOLE 40 MG/1
40 TABLET, DELAYED RELEASE ORAL
Qty: 30 | Refills: 1 | Status: ACTIVE | COMMUNITY
Start: 2023-08-29 | End: 1900-01-01

## 2023-10-23 ENCOUNTER — NON-APPOINTMENT (OUTPATIENT)
Age: 58
End: 2023-10-23

## 2023-11-06 ENCOUNTER — OUTPATIENT (OUTPATIENT)
Dept: OUTPATIENT SERVICES | Facility: HOSPITAL | Age: 58
LOS: 1 days | Discharge: HOME | End: 2023-11-06

## 2023-11-06 DIAGNOSIS — Z90.49 ACQUIRED ABSENCE OF OTHER SPECIFIED PARTS OF DIGESTIVE TRACT: Chronic | ICD-10-CM

## 2023-11-06 DIAGNOSIS — Z98.51 TUBAL LIGATION STATUS: Chronic | ICD-10-CM

## 2023-11-06 DIAGNOSIS — C50.919 MALIGNANT NEOPLASM OF UNSPECIFIED SITE OF UNSPECIFIED FEMALE BREAST: ICD-10-CM

## 2023-11-06 DIAGNOSIS — Z90.3 ACQUIRED ABSENCE OF STOMACH [PART OF]: Chronic | ICD-10-CM

## 2023-11-06 DIAGNOSIS — Z90.12 ACQUIRED ABSENCE OF LEFT BREAST AND NIPPLE: Chronic | ICD-10-CM

## 2023-11-15 ENCOUNTER — APPOINTMENT (OUTPATIENT)
Dept: HEMATOLOGY ONCOLOGY | Facility: CLINIC | Age: 58
End: 2023-11-15
Payer: MEDICARE

## 2023-11-15 ENCOUNTER — RESULT REVIEW (OUTPATIENT)
Age: 58
End: 2023-11-15

## 2023-11-15 ENCOUNTER — APPOINTMENT (OUTPATIENT)
Dept: INTERNAL MEDICINE | Facility: CLINIC | Age: 58
End: 2023-11-15
Payer: MEDICARE

## 2023-11-15 VITALS
SYSTOLIC BLOOD PRESSURE: 124 MMHG | BODY MASS INDEX: 32.94 KG/M2 | WEIGHT: 168.65 LBS | DIASTOLIC BLOOD PRESSURE: 81 MMHG | TEMPERATURE: 96.9 F | OXYGEN SATURATION: 99 % | HEART RATE: 67 BPM | RESPIRATION RATE: 16 BRPM

## 2023-11-15 VITALS
HEIGHT: 60 IN | DIASTOLIC BLOOD PRESSURE: 81 MMHG | OXYGEN SATURATION: 98 % | HEART RATE: 75 BPM | BODY MASS INDEX: 32.98 KG/M2 | WEIGHT: 168 LBS | TEMPERATURE: 97.3 F | SYSTOLIC BLOOD PRESSURE: 134 MMHG

## 2023-11-15 LAB
BASOPHILS # BLD AUTO: 0.01 K/UL — SIGNIFICANT CHANGE UP (ref 0–0.2)
BASOPHILS # BLD AUTO: 0.01 K/UL — SIGNIFICANT CHANGE UP (ref 0–0.2)
BASOPHILS NFR BLD AUTO: 0.2 % — SIGNIFICANT CHANGE UP (ref 0–2)
BASOPHILS NFR BLD AUTO: 0.2 % — SIGNIFICANT CHANGE UP (ref 0–2)
EOSINOPHIL # BLD AUTO: 0.11 K/UL — SIGNIFICANT CHANGE UP (ref 0–0.5)
EOSINOPHIL # BLD AUTO: 0.11 K/UL — SIGNIFICANT CHANGE UP (ref 0–0.5)
EOSINOPHIL NFR BLD AUTO: 2.1 % — SIGNIFICANT CHANGE UP (ref 0–6)
EOSINOPHIL NFR BLD AUTO: 2.1 % — SIGNIFICANT CHANGE UP (ref 0–6)
HCT VFR BLD CALC: 30.1 % — LOW (ref 34.5–45)
HCT VFR BLD CALC: 30.1 % — LOW (ref 34.5–45)
HGB BLD-MCNC: 9 G/DL — LOW (ref 11.5–15.5)
HGB BLD-MCNC: 9 G/DL — LOW (ref 11.5–15.5)
IMM GRANULOCYTES NFR BLD AUTO: 0.2 % — SIGNIFICANT CHANGE UP (ref 0–0.9)
IMM GRANULOCYTES NFR BLD AUTO: 0.2 % — SIGNIFICANT CHANGE UP (ref 0–0.9)
LYMPHOCYTES # BLD AUTO: 2.05 K/UL — SIGNIFICANT CHANGE UP (ref 1–3.3)
LYMPHOCYTES # BLD AUTO: 2.05 K/UL — SIGNIFICANT CHANGE UP (ref 1–3.3)
LYMPHOCYTES # BLD AUTO: 38.8 % — SIGNIFICANT CHANGE UP (ref 13–44)
LYMPHOCYTES # BLD AUTO: 38.8 % — SIGNIFICANT CHANGE UP (ref 13–44)
MCHC RBC-ENTMCNC: 22.3 PG — LOW (ref 27–34)
MCHC RBC-ENTMCNC: 22.3 PG — LOW (ref 27–34)
MCHC RBC-ENTMCNC: 29.9 G/DL — LOW (ref 32–36)
MCHC RBC-ENTMCNC: 29.9 G/DL — LOW (ref 32–36)
MCV RBC AUTO: 74.7 FL — LOW (ref 80–100)
MCV RBC AUTO: 74.7 FL — LOW (ref 80–100)
MONOCYTES # BLD AUTO: 0.47 K/UL — SIGNIFICANT CHANGE UP (ref 0–0.9)
MONOCYTES # BLD AUTO: 0.47 K/UL — SIGNIFICANT CHANGE UP (ref 0–0.9)
MONOCYTES NFR BLD AUTO: 8.9 % — SIGNIFICANT CHANGE UP (ref 2–14)
MONOCYTES NFR BLD AUTO: 8.9 % — SIGNIFICANT CHANGE UP (ref 2–14)
NEUTROPHILS # BLD AUTO: 2.63 K/UL — SIGNIFICANT CHANGE UP (ref 1.8–7.4)
NEUTROPHILS # BLD AUTO: 2.63 K/UL — SIGNIFICANT CHANGE UP (ref 1.8–7.4)
NEUTROPHILS NFR BLD AUTO: 49.8 % — SIGNIFICANT CHANGE UP (ref 43–77)
NEUTROPHILS NFR BLD AUTO: 49.8 % — SIGNIFICANT CHANGE UP (ref 43–77)
NRBC # BLD: 0 /100 WBCS — SIGNIFICANT CHANGE UP (ref 0–0)
NRBC # BLD: 0 /100 WBCS — SIGNIFICANT CHANGE UP (ref 0–0)
PLATELET # BLD AUTO: 373 K/UL — SIGNIFICANT CHANGE UP (ref 150–400)
PLATELET # BLD AUTO: 373 K/UL — SIGNIFICANT CHANGE UP (ref 150–400)
RBC # BLD: 4.03 M/UL — SIGNIFICANT CHANGE UP (ref 3.8–5.2)
RBC # BLD: 4.03 M/UL — SIGNIFICANT CHANGE UP (ref 3.8–5.2)
RBC # FLD: 19.5 % — HIGH (ref 10.3–14.5)
RBC # FLD: 19.5 % — HIGH (ref 10.3–14.5)
WBC # BLD: 5.28 K/UL — SIGNIFICANT CHANGE UP (ref 3.8–10.5)
WBC # BLD: 5.28 K/UL — SIGNIFICANT CHANGE UP (ref 3.8–10.5)
WBC # FLD AUTO: 5.28 K/UL — SIGNIFICANT CHANGE UP (ref 3.8–10.5)
WBC # FLD AUTO: 5.28 K/UL — SIGNIFICANT CHANGE UP (ref 3.8–10.5)

## 2023-11-15 PROCEDURE — 99214 OFFICE O/P EST MOD 30 MIN: CPT

## 2023-11-15 RX ORDER — DEXAMETHASONE 1 MG/1
1 TABLET ORAL EVERY MORNING
Qty: 14 | Refills: 0 | Status: DISCONTINUED | COMMUNITY
Start: 2023-08-23 | End: 2023-11-15

## 2023-11-15 RX ORDER — ROSUVASTATIN CALCIUM 20 MG/1
20 TABLET, FILM COATED ORAL
Qty: 90 | Refills: 1 | Status: ACTIVE | COMMUNITY
Start: 2023-11-15 | End: 1900-01-01

## 2023-11-17 LAB
ALBUMIN SERPL ELPH-MCNC: 3.9 G/DL
ALP BLD-CCNC: 105 U/L
ALT SERPL-CCNC: 23 U/L
ANION GAP SERPL CALC-SCNC: 13 MMOL/L
AST SERPL-CCNC: 24 U/L
BILIRUB SERPL-MCNC: 0.2 MG/DL
BUN SERPL-MCNC: 7 MG/DL
CALCIUM SERPL-MCNC: 8.9 MG/DL
CANCER AG27-29 SERPL-ACNC: 10.2 U/ML
CEA SERPL-MCNC: 1.5 NG/ML
CHLORIDE SERPL-SCNC: 106 MMOL/L
CO2 SERPL-SCNC: 24 MMOL/L
CREAT SERPL-MCNC: 0.61 MG/DL
EGFR: 104 ML/MIN/1.73M2
GLUCOSE SERPL-MCNC: 98 MG/DL
POTASSIUM SERPL-SCNC: 4.6 MMOL/L
PROT SERPL-MCNC: 6.2 G/DL
SODIUM SERPL-SCNC: 143 MMOL/L

## 2023-12-09 NOTE — REASON FOR VISIT
[Follow-Up Visit] : a follow-up [Urgent Visit] : an urgent  [Family Member] : family member [Other: _____] : [unfilled] [FreeTextEntry2] : Metastatic breast cancer 31

## 2023-12-20 ENCOUNTER — RESULT REVIEW (OUTPATIENT)
Age: 58
End: 2023-12-20

## 2023-12-20 ENCOUNTER — APPOINTMENT (OUTPATIENT)
Dept: HEMATOLOGY ONCOLOGY | Facility: CLINIC | Age: 58
End: 2023-12-20
Payer: MEDICARE

## 2023-12-20 VITALS
OXYGEN SATURATION: 96 % | RESPIRATION RATE: 16 BRPM | BODY MASS INDEX: 32.72 KG/M2 | HEART RATE: 70 BPM | DIASTOLIC BLOOD PRESSURE: 75 MMHG | TEMPERATURE: 97.5 F | WEIGHT: 167.55 LBS | SYSTOLIC BLOOD PRESSURE: 115 MMHG

## 2023-12-20 LAB
BASOPHILS # BLD AUTO: 0.01 K/UL — SIGNIFICANT CHANGE UP (ref 0–0.2)
BASOPHILS # BLD AUTO: 0.01 K/UL — SIGNIFICANT CHANGE UP (ref 0–0.2)
BASOPHILS NFR BLD AUTO: 0.1 % — SIGNIFICANT CHANGE UP (ref 0–2)
BASOPHILS NFR BLD AUTO: 0.1 % — SIGNIFICANT CHANGE UP (ref 0–2)
EOSINOPHIL # BLD AUTO: 0.19 K/UL — SIGNIFICANT CHANGE UP (ref 0–0.5)
EOSINOPHIL # BLD AUTO: 0.19 K/UL — SIGNIFICANT CHANGE UP (ref 0–0.5)
EOSINOPHIL NFR BLD AUTO: 2.8 % — SIGNIFICANT CHANGE UP (ref 0–6)
EOSINOPHIL NFR BLD AUTO: 2.8 % — SIGNIFICANT CHANGE UP (ref 0–6)
HCT VFR BLD CALC: 28.2 % — LOW (ref 34.5–45)
HCT VFR BLD CALC: 28.2 % — LOW (ref 34.5–45)
HGB BLD-MCNC: 8.4 G/DL — LOW (ref 11.5–15.5)
HGB BLD-MCNC: 8.4 G/DL — LOW (ref 11.5–15.5)
IMM GRANULOCYTES NFR BLD AUTO: 0.1 % — SIGNIFICANT CHANGE UP (ref 0–0.9)
IMM GRANULOCYTES NFR BLD AUTO: 0.1 % — SIGNIFICANT CHANGE UP (ref 0–0.9)
LYMPHOCYTES # BLD AUTO: 2.8 K/UL — SIGNIFICANT CHANGE UP (ref 1–3.3)
LYMPHOCYTES # BLD AUTO: 2.8 K/UL — SIGNIFICANT CHANGE UP (ref 1–3.3)
LYMPHOCYTES # BLD AUTO: 41.5 % — SIGNIFICANT CHANGE UP (ref 13–44)
LYMPHOCYTES # BLD AUTO: 41.5 % — SIGNIFICANT CHANGE UP (ref 13–44)
MCHC RBC-ENTMCNC: 21.6 PG — LOW (ref 27–34)
MCHC RBC-ENTMCNC: 21.6 PG — LOW (ref 27–34)
MCHC RBC-ENTMCNC: 29.8 G/DL — LOW (ref 32–36)
MCHC RBC-ENTMCNC: 29.8 G/DL — LOW (ref 32–36)
MCV RBC AUTO: 72.5 FL — LOW (ref 80–100)
MCV RBC AUTO: 72.5 FL — LOW (ref 80–100)
MONOCYTES # BLD AUTO: 0.67 K/UL — SIGNIFICANT CHANGE UP (ref 0–0.9)
MONOCYTES # BLD AUTO: 0.67 K/UL — SIGNIFICANT CHANGE UP (ref 0–0.9)
MONOCYTES NFR BLD AUTO: 9.9 % — SIGNIFICANT CHANGE UP (ref 2–14)
MONOCYTES NFR BLD AUTO: 9.9 % — SIGNIFICANT CHANGE UP (ref 2–14)
NEUTROPHILS # BLD AUTO: 3.07 K/UL — SIGNIFICANT CHANGE UP (ref 1.8–7.4)
NEUTROPHILS # BLD AUTO: 3.07 K/UL — SIGNIFICANT CHANGE UP (ref 1.8–7.4)
NEUTROPHILS NFR BLD AUTO: 45.6 % — SIGNIFICANT CHANGE UP (ref 43–77)
NEUTROPHILS NFR BLD AUTO: 45.6 % — SIGNIFICANT CHANGE UP (ref 43–77)
NRBC # BLD: 0 /100 WBCS — SIGNIFICANT CHANGE UP (ref 0–0)
NRBC # BLD: 0 /100 WBCS — SIGNIFICANT CHANGE UP (ref 0–0)
PLATELET # BLD AUTO: 355 K/UL — SIGNIFICANT CHANGE UP (ref 150–400)
PLATELET # BLD AUTO: 355 K/UL — SIGNIFICANT CHANGE UP (ref 150–400)
RBC # BLD: 3.89 M/UL — SIGNIFICANT CHANGE UP (ref 3.8–5.2)
RBC # BLD: 3.89 M/UL — SIGNIFICANT CHANGE UP (ref 3.8–5.2)
RBC # FLD: 17.9 % — HIGH (ref 10.3–14.5)
RBC # FLD: 17.9 % — HIGH (ref 10.3–14.5)
WBC # BLD: 6.75 K/UL — SIGNIFICANT CHANGE UP (ref 3.8–10.5)
WBC # BLD: 6.75 K/UL — SIGNIFICANT CHANGE UP (ref 3.8–10.5)
WBC # FLD AUTO: 6.75 K/UL — SIGNIFICANT CHANGE UP (ref 3.8–10.5)
WBC # FLD AUTO: 6.75 K/UL — SIGNIFICANT CHANGE UP (ref 3.8–10.5)

## 2023-12-20 PROCEDURE — 99214 OFFICE O/P EST MOD 30 MIN: CPT

## 2023-12-20 RX ORDER — EXEMESTANE 25 MG/1
25 TABLET, FILM COATED ORAL
Qty: 90 | Refills: 3 | Status: ACTIVE | COMMUNITY
Start: 2019-08-09 | End: 1900-01-01

## 2023-12-21 LAB
ALBUMIN SERPL ELPH-MCNC: 4.3 G/DL
ALP BLD-CCNC: 127 U/L
ALT SERPL-CCNC: 21 U/L
ANION GAP SERPL CALC-SCNC: 12 MMOL/L
AST SERPL-CCNC: 25 U/L
BILIRUB SERPL-MCNC: 0.2 MG/DL
BUN SERPL-MCNC: 16 MG/DL
CALCIUM SERPL-MCNC: 8.9 MG/DL
CANCER AG27-29 SERPL-ACNC: 11.8 U/ML
CEA SERPL-MCNC: 1.1 NG/ML
CHLORIDE SERPL-SCNC: 105 MMOL/L
CO2 SERPL-SCNC: 23 MMOL/L
CREAT SERPL-MCNC: 0.56 MG/DL
EGFR: 106 ML/MIN/1.73M2
GLUCOSE SERPL-MCNC: 121 MG/DL
POTASSIUM SERPL-SCNC: 4.5 MMOL/L
PROT SERPL-MCNC: 6.5 G/DL
SODIUM SERPL-SCNC: 140 MMOL/L

## 2023-12-21 NOTE — REASON FOR VISIT
[Follow-Up Visit] : a follow-up [Pre-Treatment Visit] : a pre-treatment [Other: _____] : [unfilled] [FreeTextEntry2] : Metastatic breast cancer

## 2023-12-21 NOTE — HISTORY OF PRESENT ILLNESS
[de-identified] : The patients' history of present illness began in 2012 at which time she was found to have a right breast cancer for which she underwent a right modified radical mastectomy/axillary lymph node dissection with the finding of an ER positive 20%, NV positive 10%, HER-2/lisa 2+, FISH amplified breast cancer per outside physician report (I do not have a copy that pathology report for my review).  She went on to receive adjuvant dose-dense AC chemotherapy for 4 cycles followed by Taxol plus Herceptin as well as adjuvant radiation therapy.  She was subsequently begun adjuvant tamoxifen therapy.  Her last menstrual period was in 2014.  The patient then began to experience weight loss beginning in November 2018 and over 3-month period of time lost 30 pounds per her report.  At approximately the same time she developed increasing shortness of breath, and ultimately presented to [facility] Emergency Department.  She subsequently had a CT scan of the chest, which showed multiple mediastinal hilar right subclavian lymph nodes corresponding to abnormal FDG activity on PET scan consistent with metastatic carcinoma; there were tiny nodularities in both lungs- too small to accurately characterize- not present on prior examination and measuring up to 2 mm in both lungs.  The possibility of metastasis was a consideration; she had no bony metastases; the liver demonstrated tiny poorly defined lesions less than 5 mm and difficult to characterize and visible on prior examinations with the possibility of metastasis also considered.  A PET-CT scan showed multiple hypermetabolic lymph nodes in the mediastinum, right subclavian region and both adebayo felt to represent tumor; there were stellate lesions in the left breast region, felt to represent benign scars; the lungs demonstrated no significant abnormality with multiple tiny lung nodules seen on CT as previously noted; liver demonstrated no significant abnormality with multiple small poorly defined nodules seen on CT per my previous comments.  The patient underwent a left breast core biopsy of the area of concern with a finding of fat necrosis and foreign body giant cell reaction as well as a separate fine-needle aspiration consistent with a ruptured cyst/fat necrosis.  The patient consequently saw her oncologist, Dr. Carias, who sent the patient for ultrasound-guided core biopsies of the mediastinal lymph nodes with a finding of carcinoma, consistent with breast primary with estrogen receptor returning positive, greater than 50%, progesterone receptor negative, and HER-2/lisa 0 per outside report. The patient was subsequently begun on docetaxel, pertuzumab, trastuzumab, and exemestane beginning at the early 4/19, and subsequently switched to weekly paclitaxel secondary to side effects and continuing  on trastuzumab / pertuzumab every 3 weeks as well as exemestane. The patient was last treated per her report on approximately June 12, 2019, despite outside records that show ongoing weekly treatments early July 2019.    The patient also reports that beginning in approximately 12/18, she started to develop slowly progressive diffuse rash with associated pruritus, having seen multiple dermatologists.  Most recently, she saw Dr. Courtney Cowart, a [facility] physician practicing at [place], [place], who diagnosed disseminated herpes simplex for which she was begun on valacyclovir, as well as evidence of MRSA for which was treated with doxycycline beginning in May 2019.  In June 2019, the patient presented to [facility] Emergency Department complaining of progressive "weakness and shaking." She was found to be anemic and had melena.  She underwent endoscopy and VIR procedure to stop gastric bleeding, which was unsuccessful.  Consequently, she had a distal gastrectomy.  She was discharged from [facility] on 07/08/2019.  The patient was initial consultation 7/24/19 for a further opinion regarding treatment recommendations. She noted ongoing weakness, fatigue, and generalized malaise, although all of these had begun to improve gradually.  Her last chemotherapy as noted above was approximately at 06/12/2019.  She reports that her primary oncologist, Dr. Carias, told to discontinue exemestane upon admission to [facility] with the consideration restarting it post discharge.    I obtained her outside slides for review at [facility] and it was confirmed that she had met breast cancer ER+ NV neg and Her 2 lisa neg.  Consequently I recommended restarting exemestane.    In 3/21 she had L sided seizures and found to have abnormal enhancing lesions in the posterior fossa and supratentorial region are identified.  Lesion 1: Large dural based enhancing lesion is seen involving the high medial right frontal region. This lesion does appear to cross the midline and measures approximately 3.2 x 2.7 x 3.5 cm. Small amount of adjacent susceptibility is identified. Surrounding edema is identified. Localized mass effect is identified. Right to left shift (7.2 mm) seen.  Lesion 2: Enhancing lesion is seen involving the right upper tiffany. This lesion measures approximately 0.5 x 0.4 cm.  Lesion 3: Enhancing lesion is seen involving the superior left cerebellar region. This lesion measures approximately 1.2 x 1.0 cm. Surrounding edema is identified.  She was seen by Dr Hwang and treated with Gamma knife SRS to the 3 brain lesions.   A repeat MRI 6/2/21 demonstrated:  -Interval decrease in size of 3 enhancing lesions, largest in the high medial right frontal lobe measuring up to 2.0 cm. -Improvement of vasogenic edema with resolution of leftward midline shift. -No new lesions are identified  On 9/14/22 had a witnessed L sided seizure and admitted to [facility]. A brain MRI done 9/14/2022 showed:  -Increase in size of a right frontal parasagittal lesion since comparison MRI, now measuring up to 2.5 cm. Increasing surrounding vasogenic edema. -No new lesion identified. No acute infarct.  She was d/c'd on dexamethasone taper to 4 mg BID and keppra 1000 mg bId.  Currently tapered to 2 mg.     Patient was started on bevacizumab on 10/28/22 for presumed tumor necrosis related edema, completed 12/16/22.   The patient had  POD of brain lesion on scans from 8/1/23.  She is s/p right frontal re-irradiation 8/30-9/7/23 with Dr. Hwang.      [de-identified] : Pt seen today for f/u visit.    Remains on exemestane.    Denies any treatment related complaints.   She came off steroid taper in 10/2 3- c/o residual bloating but somewhat improved.   She denies any other complaints.    Notes a good appetite, stable weight and excellent performance status.   MRI Head, 10/15/23- IMPRESSION: Decreased size of right parasagittal frontal enhancing lesion, currently 1.9 x 1.4 x 2.3 cm, previously 2.4 x 1.7 x 2.7 cm (maximal AP x TV x CC dimensions). Similar extensive T2 and FLAIR hyperintense signal surrounding the lesion, likely vasogenic edema/posttreatment changes. No new abnormal parenchymal or leptomeningeal enhancement.  PET/CT cerianna, 8/17/23- IMPRESSION: Increased FES uptake in a right frontal paramedian mass likely corresponding to active metastatic breast cancer.  CT scan c/a/p, 2/18/23- IMPRESSION:   No evidence of intra-abdominal metastatic disease. Interval resolution of previously identified right adnexal cyst.  CT scan c/a/p, 10/12/22- IMPRESSION: A new 2.7 cm right adnexal cyst with thin septation. Consider follow-up with pelvic ultrasound as indicated versus attention at short-term follow-up imaging.  Bone scan, 10/12/22- IMPRESSION: Bone scan demonstrates: No definite scan evidence of osseous metastasis. Mild diffuse increased uptake in the calvarium, not significant change, probably hyperostosis or Paget's disease. Heterotopic ossification in the medial aspect of the left chest, unchanged. Degenerative disease in the major joints. No significant interval change compared to the previous bone scan of 5/6/2022.  MRI head 9/14/22 IMPRESSION: -Increase in size of a right frontal parasagittal lesion since comparison MRI, now measuring up to 2.5 cm. Increasing surrounding vasogenic edema. -No new lesion identified. No acute infarct  CT c/a/p, 5/6/22- IMPRESSION: Stable exam since 12/17/2021  Bone scan, 5/6/22-  IMPRESSION: Bone scan demonstrates:  No definite scan evidence of osseous metastasis.  Mild diffuse increased uptake in the calvarium, unchanged, probably hyperostosis or Paget's disease. Heterotopic ossification in the medial aspect of the left breast, unchanged. Degenerative disease in the major joints. No significant interval change compared to the previous bone scan of 7/8/2021.  CT scan, 12/17/21- IMPRESSION:  Stable examination compared with prior imaging 7/8/2021.  MRI head 12/30/21 IMPRESSION: Right high paramedian region lesion measures similar in size though there  is increased edema seen in association when compared with the prior  9/24/2021. Question of vague enhancement on the surface of the cervical  medullary junction and along the surface of the brainstem extending into  the spinal cord level. Consideration for possible leptomeningeal  pathology should be given. Recommend correlation with CSF as clinically  warranted.  DEXA 1/21 osteopenia

## 2023-12-21 NOTE — PHYSICAL EXAM
[Fully active, able to carry on all pre-disease performance without restriction] : Status 0 - Fully active, able to carry on all pre-disease performance without restriction [Normal] : affect appropriate [de-identified] : Right breast with no discrete palpable masses, no palpable axillary nodes.  Left reconstructed breast with medial and lateral nodularity c/w fat necrosis, stable, no other discrete palpable masses, no palpable axillary nodes.

## 2024-01-04 ENCOUNTER — APPOINTMENT (OUTPATIENT)
Dept: NEUROLOGY | Facility: CLINIC | Age: 59
End: 2024-01-04
Payer: MEDICARE

## 2024-01-04 VITALS
BODY MASS INDEX: 32.79 KG/M2 | HEIGHT: 60 IN | SYSTOLIC BLOOD PRESSURE: 106 MMHG | TEMPERATURE: 97 F | DIASTOLIC BLOOD PRESSURE: 71 MMHG | WEIGHT: 167 LBS | OXYGEN SATURATION: 99 % | HEART RATE: 70 BPM

## 2024-01-04 DIAGNOSIS — G93.89 OTHER SPECIFIED DISORDERS OF BRAIN: ICD-10-CM

## 2024-01-04 PROCEDURE — 99214 OFFICE O/P EST MOD 30 MIN: CPT

## 2024-01-04 RX ORDER — LEVETIRACETAM 1000 MG/1
1000 TABLET, FILM COATED ORAL
Qty: 180 | Refills: 1 | Status: ACTIVE | COMMUNITY
Start: 2022-09-20 | End: 1900-01-01

## 2024-01-04 RX ORDER — LEVETIRACETAM 250 MG/1
250 TABLET, FILM COATED ORAL TWICE DAILY
Qty: 180 | Refills: 1 | Status: ACTIVE | COMMUNITY
Start: 2023-09-14 | End: 1900-01-01

## 2024-01-04 NOTE — ASSESSMENT
[FreeTextEntry1] : The patient has recurrent left frontal brain mass, consistent with metastasis, and now with subsequent focal simple seizures, last sz 9/2023. Seizures well controlled on [id number] mg twice a day for seizure prophylaxis. Advised on importance of seizure and fall precautions. No driving until sz free for at least 1 year.  The patient will fu with neuro oncologist and hematology oncology.  I spent the time noted on the day of this patient encounter preparing for, providing and documenting the above E/M service and counseling and educate patient on differential, workup, disease course, and treatment/management. Education was provided to the patient during this encounter. All questions and concerns were answered and addressed in detail. The patient verbalized understanding and agreed to plan. Patient was advised to continue to monitor for neurologic symptoms and to notify my office or go to the nearest emergency room if there are any changes. Any orders/referrals and communications were provided as well.  Side effects of the above medications were discussed in detail including but not limited to applicable black box warning and teratogenicity as appropriate.  Patient was advised to bring previous records to my office, including CD of imaging, when applicable.

## 2024-01-04 NOTE — HISTORY OF PRESENT ILLNESS
[FreeTextEntry1] : 58 yo female with ER/SD positive HER2 FISH amplified breast cancer initially diagnosed in 2012 s/p radical mastectomy/LN dissection and adjuvant chemotherapy found to have metastatic disease in 2018. Biopsy of the mediastinal lymph nodes identified ER+ SD and HER 2 negative. She received docetaxel/pertuzumab/Trastuzumab/exemestane last dose 6/2019.  In 3/2021 she was found to have supratentorial metastases, with the largest lesion being in the right frontal region measuring 3.5 cm. She was treated with hypofractionated SRS ot three lesions in total, including the left cerebellum and right brainstem. She completed treatment in March 2021. She has been doing well clinically without symptoms. In March she presented with an MRI that suggested increase in size of the right frontal lesion. Patient has not been seen by neuro oncologist as per history.  She has had seizures since 2021. Initially well controlled on Keppra 1000 mg twice a day but patient is now having seizures, will plan to increase Keppra to 1250mg bid. No clincal change since last visit.

## 2024-01-12 ENCOUNTER — NON-APPOINTMENT (OUTPATIENT)
Age: 59
End: 2024-01-12

## 2024-01-17 ENCOUNTER — APPOINTMENT (OUTPATIENT)
Dept: MRI IMAGING | Facility: IMAGING CENTER | Age: 59
End: 2024-01-17
Payer: MEDICARE

## 2024-01-17 ENCOUNTER — OUTPATIENT (OUTPATIENT)
Dept: OUTPATIENT SERVICES | Facility: HOSPITAL | Age: 59
LOS: 1 days | End: 2024-01-17
Payer: COMMERCIAL

## 2024-01-17 DIAGNOSIS — Z90.12 ACQUIRED ABSENCE OF LEFT BREAST AND NIPPLE: Chronic | ICD-10-CM

## 2024-01-17 DIAGNOSIS — Z90.49 ACQUIRED ABSENCE OF OTHER SPECIFIED PARTS OF DIGESTIVE TRACT: Chronic | ICD-10-CM

## 2024-01-17 DIAGNOSIS — Z90.3 ACQUIRED ABSENCE OF STOMACH [PART OF]: Chronic | ICD-10-CM

## 2024-01-17 DIAGNOSIS — C79.31 SECONDARY MALIGNANT NEOPLASM OF BRAIN: ICD-10-CM

## 2024-01-17 DIAGNOSIS — Z98.51 TUBAL LIGATION STATUS: Chronic | ICD-10-CM

## 2024-01-17 DIAGNOSIS — Z00.8 ENCOUNTER FOR OTHER GENERAL EXAMINATION: ICD-10-CM

## 2024-01-17 PROCEDURE — 70553 MRI BRAIN STEM W/O & W/DYE: CPT

## 2024-01-17 PROCEDURE — 70553 MRI BRAIN STEM W/O & W/DYE: CPT | Mod: 26

## 2024-01-18 NOTE — REASON FOR VISIT
[Routine Follow-Up] : routine follow-up visit for [Brain Metastasis] : brain metastasis [Ad Hoc ] : provided by an ad hoc  [Interpreters_Relationshiptopatient] : Alexandre son on the telephone [TWNoteComboBox1] : [place]

## 2024-01-22 NOTE — ED ADULT NURSE NOTE - CHIEF COMPLAINT
The patient is a 56y Female complaining of  [FreeTextEntry1] : We reviewed his course. His questions were answered. PT/HEP will continue. He is getting more comfortable and his function is improving. Follow up in 3 months is planned.  Patient was seen by Dr. Jesse Martinez. Patient was seen by Edith BAIN under the supervision of Dr. Jesse Martinez. Progress note was completed by Edith BAIN.

## 2024-01-24 ENCOUNTER — APPOINTMENT (OUTPATIENT)
Dept: RADIATION ONCOLOGY | Facility: CLINIC | Age: 59
End: 2024-01-24
Payer: MEDICARE

## 2024-01-24 ENCOUNTER — APPOINTMENT (OUTPATIENT)
Dept: RADIATION ONCOLOGY | Facility: CLINIC | Age: 59
End: 2024-01-24

## 2024-01-24 PROCEDURE — 99442: CPT

## 2024-01-24 NOTE — HISTORY OF PRESENT ILLNESS
[Home] : at home, [unfilled] , at the time of the visit. [Medical Office: ([facility])___] : at the medical office located in  [Verbal consent obtained from patient] : the patient, [unfilled] [FreeTextEntry1] : Ms. Shelley was seen initially for consult on 3/18//2021.  RT hx: She completed SRS between 3/23-3/25/2021 to a right frontal, left cerebellar, and right brainstem lesion.and additional SRS to a right parietal lesion, 1800 cgy on 5/11/2022 over 1 fraction. 8-30-9/7/2023 GKRS RIGHT frontal (re-irradiated) 2750cgy over 5 FXS  ONCOLOGY HISTORY Ms. Shelley was diagnosed with breast ca in 2012 s/p radical mastectomy and adjuvant chemotherapy. She had ER positive 20%, HI positive 10%, HER-2/lisa 2+, FISH amplified breast cancer per outside physician report. She went on to receive adjuvant dose-dense AC chemotherapy for 4 cycles followed by Taxol plus Herceptin as well as adjuvant radiation therapy. She was subsequently begun adjuvant tamoxifen therapy. Her last menstrual period was in 2014. The patient then began to experience weight loss beginning in November 2018 and over 3-month period of time lost 30 pounds per her report. At approximately the same time she developed increasing shortness of breath, and ultimately presented to [facility] Emergency Department. She subsequently had a CT scan of the chest, which showed multiple mediastinal hilar right subclavian lymph nodes corresponding to abnormal FDG activity on PET scan consistent with metastatic carcinoma; there were tiny nodularities in both lungs- too small to accurately characterize- not present on prior examination and measuring up to 2 mm in both lungs. The possibility of metastasis was a consideration; she had no bony metastases; the liver demonstrated tiny poorly defined lesions less than 5 mm and difficult to characterize and visible on prior examinations with the possibility of metastasis also considered. A PET-CT scan showed multiple hypermetabolic lymph nodes in the mediastinum, right subclavian region and both adebayo felt to represent tumor; there were stellate lesions in the left breast region, felt to represent benign scars; the lungs demonstrated no significant abnormality with multiple tiny lung nodules seen on CT as previously noted; liver demonstrated no significant abnormality with multiple small poorly defined nodules seen on CT per my previous comments. The patient underwent a left breast core biopsy of the area of concern with a finding of fat necrosis and foreign body giant cell reaction as well as a separate fine-needle aspiration consistent with a ruptured cyst/fat necrosis. The patient consequently saw her oncologist, Dr. Carias, who sent the patient for ultrasound-guided core biopsies of the mediastinal lymph nodes with a finding of carcinoma, consistent with breast primary with estrogen receptor returning positive, greater than 50%, progesterone receptor negative, and HER-2/lisa 0 per outside report. The patient was subsequently begun on docetaxel, pertuzumab, trastuzumab, and exemestane beginning at the early 4/19, and subsequently switched to weekly paclitaxel secondary to side effects and continuing on trastuzumab / pertuzumab every 3 weeks as well as exemestane. The patient was seen for initial consultation 7/24/19 for a further opinion regarding treatment recommendations. She noted ongoing weakness, fatigue, and generalized malaise, although all of these had begun to improve gradually. Her last chemotherapy as noted above was approximately at 06/12/2019. She reports that her primary oncologist, Dr. Carias, told to discontinue exemestane upon admission to [facility] with the consideration restarting it post discharge. Slides for review at [facility] and it was confirmed that she had met breast cancer ER+ HI neg and Her 2 lisa neg. At the time of initial consult she felt well. Noted a left sided seizure in the days prior to consult lasting about 1 minute, which stopped on its own. Was recently prescribed keppra and dexamethasone 8mg BID. No confusion, dizziness, nausea, focal weakness. On examestane. She recently had a focal seizure left upper/lower extremity. MRI showed 3 lesions -   Lesion 1: Large dural based enhancing lesion is seen involving the high medial right frontal region. This lesion does appear to cross the midline and measures approximately 3.2 x 2.7 x 3.5 cm. Small amount of adjacent susceptibility is identified. Surrounding edema is identified. Localized mass effect is identified. Right to left shift (7.2 mm) seen.  Lesion 2: Enhancing lesion is seen involving the right upper tiffany. This lesion measures approximately 0.5 x 0.4 cm.  Lesion 3: Enhancing lesion is seen involving the superior left cerebellar region. This lesion measures approximately 1.2 x 1.0 cm. Surrounding edema is identified. She was treated with GK SRS.   6/2/2021- Ms. Shelley presents today for follow up. [employer] [id number] for Japanese was used. MRI scheduled today at 145. Has not seen Dr. Martin since radiation. States she is feeling fine. States she has intermittent sensation of "pulling" to her forehead. Denies any dizziness, confusion, headache, nausea, vomiting, visual disturbances, weakness. States 3 weeks ago she had an episode where her left upper extremity "was shaking" for one minute, denies any LOC.   9/29/2021- Ms. Shelley presents today for follow up. She is seen today with assistance of  [id number]. MRI brain done 9/24/2021 showed Previously noted enhancing lesions in the posterior fossa and supratentorial region have either decreased in size or are no longer seen which is compatible with response to therapy. Continue close interval follow-up is recommended. Continues to follow with Dr. Martin. continues on examestane with plans to repeat body scans in november or december. Feeling very well. no headaches, no new weakness, no numbness or tingling, no nausea or vomiting. overall doing well.   12/30/2021: Today Ms. Shelley presents for follow-up, [facility]  . MRI brain showed: "Right high paramedian region lesion measures similar in size though there is increased edema seen in association when compared with the prior 9/24/2021. Question of vague enhancement on the surface of the cervical medullary junction and along the surface of the brainstem extending into the spinal cord level. Consideration for possible leptomeningeal pathology should be given. Recommend correlation with CSF as clinically warranted." Per our review, no leptomeningeal disease appreciated. The vague focus of enhancement likely represents a blood vessel. The swelling of the right high paramedian region lesion actually displays decreased edema, compared to prior.  Denies any nausea, vomiting, headache, numbness or tingling. Denies any issues with balance or hearing.   3/31/2022: Pt presents for follow-up. Clinically feeling well. Denies headache, nausea, vomiting, or other complaints. Denies interval symptoms since last f/u. Denies being on steroids or reportedly any recent seizure events.  [place]i pacific : Goran [id number]  5/4/2022- Ms. Shelley presents today for follow up.   [id number] used for visit today.Brain MRI 4/27/2022 showed  IMPRESSION: Right medial frontal parasagittal parenchymal lesion is larger when compared with 3/30/2022 and 12/30/2021 suggesting a combination of post therapy change and neoplasm supported by MR spectroscopy and MR perfusion. There is a 5 mm nodule in the right parietal white matter which is new since 12/30/2021 but is slightly larger in retrospect since 3/30/2022. No evidence of leptomeningeal enhancement at the craniocervical junction, this was likely artifactual and is no longer identified. Follows with Dr. Martin. continue on exemestane. Today she feels well. denies headaches, nausea, vomiting, focal weakness, numbness, tingling.  7/13/2022- Ms. Shelley presents today for follow up. Brain MRI done 7/8. Continues on exemestane and following with Dr. Martin.  CT CAP 5/6/2022 showed Stable exam since 12/17/2021. Bone scan 5/6 without definite evidence of bone mets. Today she is feeling very well. denies headaches, nasuea, vomiting, focal weakness, numbness, tingling, confusion  9/20/2022- Ms. Shelley presents today for follow up. pacific  [id number] used for visit today. She presented to [facility] on 9/14/22 with a seizure, left sided. A brain MRI done 9/14/2022 showed  -Increase in size of a right frontal parasagittal lesion since comparison MRI, now measuring up to 2.5 cm. Increasing surrounding vasogenic edema. -No new lesion identified. No acute infarct. Discharged on dexamethasone taper to 4mg BID and keppra 1000mg bId. Feeling well. no further seizures. no headaches, no nausea, no focal weakness, no numbness or tingling. in retrospect prior to seizure denies any focal neurological symptoms.  10/6/2022- Ms. Shelley presents today for follow up. MRI brain 9/30/2022 showed Right frontal parasagittal parenchymal enhancing mass increased size and increased vasogenic edema compared with 7/8/2022. MR perfusion and MR spectroscopy are suggestive of post therapy change rather than neoplasm progression. Question tiny 2.5 mm nodular enhancing focus right cingulate gyrus could represent an additional focus. Recommend follow-up. tapered to 2mg Dexa bid 2 weeks.   11/17/2022- Ms. Shelley presents today for follow up.  [id number] used for visit. She started avastin on 10/28 and received her second dose on 11/11. Continues on exemestane. MRI brain 11/12/2022 showed Decreased right frontal parasagittal lesion when compared to 9/30/2022, with significant decreased vasogenic edema consistent with response to treatment. MR perfusion is suggestive of a combination of neoplasm and post therapy change. Continued follow-up. CT CAP 10/12/2022 showed A new 2.7 cm right adnexal cyst with thin septation. Consider follow-up with pelvic ultrasound as indicated versus attention at short-term follow-up imaging. Feels well overall. no headaches, no nausea, no focal weakness, no numbness or tingling. notes some weakness to bilateral things recently, trouble going up the stairs.   2/23/2023: Ms. Shelley presents to clinic for follow-up.  [id number] used for visit. MRI-Brain performed on 2/11/2023 showed Enhancing lesion is again seen involving the high medial right frontal cortex which continues to decrease in size. Decreased surrounding edema is seen as well. There is decreased cerebral blood flow and cerebral blood volume seen. This could be compatible with treatment-related changes though the possibility of underlying tumor with response to therapy must be considered as well. Continued close interval follow-up is recommended  Ms. Shelley has a CT- Abd/Pel scheduled for 2/18/2023. continues on exemestane. No headaches, no nausea, no weakness, no seizures. notes some intermittent and infrequent visual disturbances on the right side over the last month. Last Avastin 12/16/2022.  6/29/2023- Ms. Shelley presents today for follow up. Continues to follow with Dr. Martin. Continues on Exemestane.   MRI brain done 6/8/2023  showed Previously noted enhancing lesion involving the high medial right frontal region has increased in size with increased edema as well.  CT AP 2/18/2023 showed No evidence of intra-abdominal metastatic disease. Interval resolution of previously identified right adnexal cyst.  In the interim reports having an attack/trembling of the left hand and leg lasting~ 5mins (episode happened a week ago) Admits to skipping her PM AED doses for ~ 1 month Recalls a similar episode about 2 years ago Denies LOC/injuries/visual disturbance/unilateral extremity weakness  Visit dated 8/23/2023 Patient returns for continuation of care with completed images for review. Continues to follow with Dr. Martin on Exemestane Reports noticing over the past 2 weeks being a little off balance resulting in falls but w/o injury. Denies HAs, dizziness, speech/visual disturbance. MRI brain w w/o contrast 8/1//2023 IMPRESSION: Mild increase in size of right paramedian frontal lesion with thick peripheral rim of hyperperfusion consistent with viable tumor and central hypoperfusion consistent with necrosis. Overall findings are concerning for progression of tumor.  PET/CT [place] 8/7/2023 FINDINGS: Increased FES uptake is noted in a right frontal paramedian mass with a large surrounding area of edema. The volume of FES uptake corresponds to the entire volume of the enhancing lesion seen on Brain MRI from 8/1/2023 most consistent with viable tumor in the entire mass. No additional FES positive lesions are noted in the brain. IMPRESSION: Findings most consistent with active metastatic breast cancer in the brain.  Visit dated 9/14/2023. Patient recently completed   GKRS RIGHT frontal (re-irradiated) 2750cgy over 5 FXSs 8/30-9/7/2023. Reports she was doing well but on 9/1/2/23 noticed left sided tremor lasted a few seconds previous episodes lasted longer periods. Saw Dr Joya today and Keppra was increased (1250mg 2 x daily)) Denies unilateral extremity weakness, numbness, tingling, nausea, vomiting, headaches or other neurologic symptoms. No issues with speech or comprehension. Endorses fatigue. post treatment. She offers no new symptoms/concerns today. ON DEX 2mg 2x daily  Visit dated 10/17/2023 Patient returns for routine f/u and progress check with cranial images for review. Denies N/V, HA/unilateral extremity weakness/memory changes/gait disturbance/bowel/bladder dysfunction or other neurologic symptoms. No issues with speech or comprehension Concerned with trouble sleeping. Continues DEX 2 mg daily. On Keppra 1250mg 2xdaily managed by Dr Joya Denies seizure episodes (left side shaking)  Follows with Dr. Martin on Exemestane.   MRI brain w w/o contrast 10/15/2023 Final read pending at time of this entry  Visit dated1/24/2024 Patient returns for routine f/u with cranial images for review. Reports doing well. Denies signs of recurrent seizure episodes. Continues Keppra 1250mg BID. Denies N/V, unilateral extremity weakness/memory changes/gait disturbance/bowel/bladder dysfunction or other neurologic symptoms. No issues with speech or comprehension. Energy level okay participating in activity w/o difficulty.  Continues to follow with Dr. Martin on Exemestane. PETct scheduled for 1/25/2024 to r/o occult POD.  MRI brain w w/o contrast 1/17/2024 IMPRESSION: Lesion within the posterior medial aspect of the right frontal lobe appears smaller measuring 1.5 x 1.0 x 2.1 cm previously 1.8 x 1.3 x 2.8 cm. Marked improvement in the amount of associated edema. Smaller enhancing lesion just caudal to the above lesion within the single gyrus measuring 0.7 x 0.6 x 0.6 cm. This is new/larger from prior exam. Mild adjacent leptomeningeal enhancement

## 2024-01-24 NOTE — PHYSICAL EXAM
[General Appearance - Well Developed] : well developed [] : no respiratory distress [Heart Rate And Rhythm] : heart rate and rhythm were normal [No Focal Deficits] : no focal deficits [Oriented To Time, Place, And Person] : oriented to person, place, and time [de-identified] : LIMITED visit telephonic

## 2024-01-24 NOTE — DISEASE MANAGEMENT
[Clinical] : TNM Stage: c [N/A] : Currently not applicable [TTNM] : x [MTNM] : x [NTNM] : x Patient unable to complete

## 2024-01-24 NOTE — REVIEW OF SYSTEMS
[Fatigue] : fatigue [Insomnia] : insomnia [Negative] : Constitutional [Confused] : no confusion [Dizziness] : no dizziness [de-identified] : no seizures episodes noted

## 2024-01-25 ENCOUNTER — APPOINTMENT (OUTPATIENT)
Dept: NUCLEAR MEDICINE | Facility: CLINIC | Age: 59
End: 2024-01-25

## 2024-01-25 ENCOUNTER — APPOINTMENT (OUTPATIENT)
Dept: NUCLEAR MEDICINE | Facility: CLINIC | Age: 59
End: 2024-01-25
Payer: MEDICARE

## 2024-01-25 PROCEDURE — 78816 PET IMAGE W/CT FULL BODY: CPT | Mod: PS

## 2024-01-25 PROCEDURE — A9591: CPT

## 2024-01-25 PROCEDURE — 78999 UNLISTED MISC PX DX NUC MED: CPT

## 2024-02-06 ENCOUNTER — NON-APPOINTMENT (OUTPATIENT)
Age: 59
End: 2024-02-06

## 2024-02-08 ENCOUNTER — OUTPATIENT (OUTPATIENT)
Dept: OUTPATIENT SERVICES | Facility: HOSPITAL | Age: 59
LOS: 1 days | Discharge: HOME | End: 2024-02-08
Payer: MEDICARE

## 2024-02-08 ENCOUNTER — APPOINTMENT (OUTPATIENT)
Dept: RADIATION ONCOLOGY | Facility: CLINIC | Age: 59
End: 2024-02-08
Payer: MEDICARE

## 2024-02-08 VITALS
TEMPERATURE: 97.9 F | BODY MASS INDEX: 27.89 KG/M2 | HEART RATE: 69 BPM | HEIGHT: 64 IN | RESPIRATION RATE: 16 BRPM | SYSTOLIC BLOOD PRESSURE: 128 MMHG | OXYGEN SATURATION: 100 % | DIASTOLIC BLOOD PRESSURE: 75 MMHG | WEIGHT: 163.36 LBS

## 2024-02-08 DIAGNOSIS — Z90.12 ACQUIRED ABSENCE OF LEFT BREAST AND NIPPLE: Chronic | ICD-10-CM

## 2024-02-08 DIAGNOSIS — Z98.51 TUBAL LIGATION STATUS: Chronic | ICD-10-CM

## 2024-02-08 PROCEDURE — 99215 OFFICE O/P EST HI 40 MIN: CPT | Mod: 25

## 2024-02-08 NOTE — REASON FOR VISIT
[Routine Follow-Up] : routine follow-up visit for [Brain Metastasis] : brain metastasis [Spouse] : spouse [[employer] ] : provided by [employer]   [Interpreters_IDNumber] : [id number] [Interpreters_FullName] : Quan [[id number]] : [place]

## 2024-02-08 NOTE — HISTORY OF PRESENT ILLNESS
[FreeTextEntry1] : Ms. Shelley was seen initially for consult on 3/18//2021.  RT hx: She completed SRS between 3/23-3/25/2021 to a right frontal, left cerebellar, and right brainstem lesion.and additional SRS to a right parietal lesion, 1800 cgy on 5/11/2022 over 1 fraction. 8-30-9/7/2023 GKRS RIGHT frontal (re-irradiated) 2750cgy over 5 FXS  ONCOLOGY HISTORY Ms. Shelley was diagnosed with breast ca in 2012 s/p radical mastectomy and adjuvant chemotherapy. She had ER positive 20%, OH positive 10%, HER-2/lisa 2+, FISH amplified breast cancer per outside physician report. She went on to receive adjuvant dose-dense AC chemotherapy for 4 cycles followed by Taxol plus Herceptin as well as adjuvant radiation therapy. She was subsequently begun adjuvant tamoxifen therapy. Her last menstrual period was in 2014. The patient then began to experience weight loss beginning in November 2018 and over 3-month period of time lost 30 pounds per her report. At approximately the same time she developed increasing shortness of breath, and ultimately presented to [facility] Emergency Department. She subsequently had a CT scan of the chest, which showed multiple mediastinal hilar right subclavian lymph nodes corresponding to abnormal FDG activity on PET scan consistent with metastatic carcinoma; there were tiny nodularities in both lungs- too small to accurately characterize- not present on prior examination and measuring up to 2 mm in both lungs. The possibility of metastasis was a consideration; she had no bony metastases; the liver demonstrated tiny poorly defined lesions less than 5 mm and difficult to characterize and visible on prior examinations with the possibility of metastasis also considered. A PET-CT scan showed multiple hypermetabolic lymph nodes in the mediastinum, right subclavian region and both adebayo felt to represent tumor; there were stellate lesions in the left breast region, felt to represent benign scars; the lungs demonstrated no significant abnormality with multiple tiny lung nodules seen on CT as previously noted; liver demonstrated no significant abnormality with multiple small poorly defined nodules seen on CT per my previous comments. The patient underwent a left breast core biopsy of the area of concern with a finding of fat necrosis and foreign body giant cell reaction as well as a separate fine-needle aspiration consistent with a ruptured cyst/fat necrosis. The patient consequently saw her oncologist, Dr. Carias, who sent the patient for ultrasound-guided core biopsies of the mediastinal lymph nodes with a finding of carcinoma, consistent with breast primary with estrogen receptor returning positive, greater than 50%, progesterone receptor negative, and HER-2/lisa 0 per outside report. The patient was subsequently begun on docetaxel, pertuzumab, trastuzumab, and exemestane beginning at the early 4/19, and subsequently switched to weekly paclitaxel secondary to side effects and continuing on trastuzumab / pertuzumab every 3 weeks as well as exemestane. The patient was seen for initial consultation 7/24/19 for a further opinion regarding treatment recommendations. She noted ongoing weakness, fatigue, and generalized malaise, although all of these had begun to improve gradually. Her last chemotherapy as noted above was approximately at 06/12/2019. She reports that her primary oncologist, Dr. Carias, told to discontinue exemestane upon admission to [facility] with the consideration restarting it post discharge. Slides for review at [facility] and it was confirmed that she had met breast cancer ER+ OH neg and Her 2 lisa neg. At the time of initial consult she felt well. Noted a left sided seizure in the days prior to consult lasting about 1 minute, which stopped on its own. Was recently prescribed keppra and dexamethasone 8mg BID. No confusion, dizziness, nausea, focal weakness. On examestane. She recently had a focal seizure left upper/lower extremity. MRI showed 3 lesions -   Lesion 1: Large dural based enhancing lesion is seen involving the high medial right frontal region. This lesion does appear to cross the midline and measures approximately 3.2 x 2.7 x 3.5 cm. Small amount of adjacent susceptibility is identified. Surrounding edema is identified. Localized mass effect is identified. Right to left shift (7.2 mm) seen.  Lesion 2: Enhancing lesion is seen involving the right upper tiffany. This lesion measures approximately 0.5 x 0.4 cm.  Lesion 3: Enhancing lesion is seen involving the superior left cerebellar region. This lesion measures approximately 1.2 x 1.0 cm. Surrounding edema is identified. She was treated with GK SRS.   6/2/2021- Ms. Shelley presents today for follow up. Mayomi Interpretter [id number] for [place]ish was used. MRI scheduled today at 145. Has not seen Dr. Martin since radiation. States she is feeling fine. States she has intermittent sensation of "pulling" to her forehead. Denies any dizziness, confusion, headache, nausea, vomiting, visual disturbances, weakness. States 3 weeks ago she had an episode where her left upper extremity "was shaking" for one minute, denies any LOC.   9/29/2021- Ms. Shelley presents today for follow up. She is seen today with assistance of  [id number]. MRI brain done 9/24/2021 showed Previously noted enhancing lesions in the posterior fossa and supratentorial region have either decreased in size or are no longer seen which is compatible with response to therapy. Continue close interval follow-up is recommended. Continues to follow with Dr. Martin. continues on examestane with plans to repeat body scans in november or december. Feeling very well. no headaches, no new weakness, no numbness or tingling, no nausea or vomiting. overall doing well.   12/30/2021: Today Ms. Shelley presents for follow-up, [facility]  . MRI brain showed: "Right high paramedian region lesion measures similar in size though there is increased edema seen in association when compared with the prior 9/24/2021. Question of vague enhancement on the surface of the cervical medullary junction and along the surface of the brainstem extending into the spinal cord level. Consideration for possible leptomeningeal pathology should be given. Recommend correlation with CSF as clinically warranted." Per our review, no leptomeningeal disease appreciated. The vague focus of enhancement likely represents a blood vessel. The swelling of the right high paramedian region lesion actually displays decreased edema, compared to prior.  Denies any nausea, vomiting, headache, numbness or tingling. Denies any issues with balance or hearing.   3/31/2022: Pt presents for follow-up. Clinically feeling well. Denies headache, nausea, vomiting, or other complaints. Denies interval symptoms since last f/u. Denies being on steroids or reportedly any recent seizure events.  Monegasque pacific : Goran [id number]  5/4/2022- Ms. Shelley presents today for follow up.   [id number] used for visit today.Brain MRI 4/27/2022 showed  IMPRESSION: Right medial frontal parasagittal parenchymal lesion is larger when compared with 3/30/2022 and 12/30/2021 suggesting a combination of post therapy change and neoplasm supported by MR spectroscopy and MR perfusion. There is a 5 mm nodule in the right parietal white matter which is new since 12/30/2021 but is slightly larger in retrospect since 3/30/2022. No evidence of leptomeningeal enhancement at the craniocervical junction, this was likely artifactual and is no longer identified. Follows with Dr. Martin. continue on exemestane. Today she feels well. denies headaches, nausea, vomiting, focal weakness, numbness, tingling.  7/13/2022- Ms. Shelley presents today for follow up. Brain MRI done 7/8. Continues on exemestane and following with Dr. Martin.  CT CAP 5/6/2022 showed Stable exam since 12/17/2021. Bone scan 5/6 without definite evidence of bone mets. Today she is feeling very well. denies headaches, nasuea, vomiting, focal weakness, numbness, tingling, confusion  9/20/2022- Ms. Shelley presents today for follow up. pacific  [id number] used for visit today. She presented to [facility] on 9/14/22 with a seizure, left sided. A brain MRI done 9/14/2022 showed  -Increase in size of a right frontal parasagittal lesion since comparison MRI, now measuring up to 2.5 cm. Increasing surrounding vasogenic edema. -No new lesion identified. No acute infarct. Discharged on dexamethasone taper to 4mg BID and keppra 1000mg bId. Feeling well. no further seizures. no headaches, no nausea, no focal weakness, no numbness or tingling. in retrospect prior to seizure denies any focal neurological symptoms.  10/6/2022- Ms. Shelley presents today for follow up. MRI brain 9/30/2022 showed Right frontal parasagittal parenchymal enhancing mass increased size and increased vasogenic edema compared with 7/8/2022. MR perfusion and MR spectroscopy are suggestive of post therapy change rather than neoplasm progression. Question tiny 2.5 mm nodular enhancing focus right cingulate gyrus could represent an additional focus. Recommend follow-up. tapered to 2mg Dexa bid 2 weeks.   11/17/2022- Ms. Shelley presents today for follow up.  [id number] used for visit. She started avastin on 10/28 and received her second dose on 11/11. Continues on exemestane. MRI brain 11/12/2022 showed Decreased right frontal parasagittal lesion when compared to 9/30/2022, with significant decreased vasogenic edema consistent with response to treatment. MR perfusion is suggestive of a combination of neoplasm and post therapy change. Continued follow-up. CT CAP 10/12/2022 showed A new 2.7 cm right adnexal cyst with thin septation. Consider follow-up with pelvic ultrasound as indicated versus attention at short-term follow-up imaging. Feels well overall. no headaches, no nausea, no focal weakness, no numbness or tingling. notes some weakness to bilateral things recently, trouble going up the stairs.   2/23/2023: Ms. Shelley presents to clinic for follow-up.  [id number] used for visit. MRI-Brain performed on 2/11/2023 showed Enhancing lesion is again seen involving the high medial right frontal cortex which continues to decrease in size. Decreased surrounding edema is seen as well. There is decreased cerebral blood flow and cerebral blood volume seen. This could be compatible with treatment-related changes though the possibility of underlying tumor with response to therapy must be considered as well. Continued close interval follow-up is recommended  Ms. Shelley has a CT- Abd/Pel scheduled for 2/18/2023. continues on exemestane. No headaches, no nausea, no weakness, no seizures. notes some intermittent and infrequent visual disturbances on the right side over the last month. Last Avastin 12/16/2022.  6/29/2023- Ms. Shelley presents today for follow up. Continues to follow with Dr. Martin. Continues on Exemestane.   MRI brain done 6/8/2023  showed Previously noted enhancing lesion involving the high medial right frontal region has increased in size with increased edema as well.  CT AP 2/18/2023 showed No evidence of intra-abdominal metastatic disease. Interval resolution of previously identified right adnexal cyst.  In the interim reports having an attack/trembling of the left hand and leg lasting~ 5mins (episode happened a week ago) Admits to skipping her PM AED doses for ~ 1 month Recalls a similar episode about 2 years ago Denies LOC/injuries/visual disturbance/unilateral extremity weakness  Visit dated 8/23/2023 Patient returns for continuation of care with completed images for review. Continues to follow with Dr. Martin on Exemestane Reports noticing over the past 2 weeks being a little off balance resulting in falls but w/o injury. Denies HAs, dizziness, speech/visual disturbance. MRI brain w w/o contrast 8/1//2023 IMPRESSION: Mild increase in size of right paramedian frontal lesion with thick peripheral rim of hyperperfusion consistent with viable tumor and central hypoperfusion consistent with necrosis. Overall findings are concerning for progression of tumor.  PET/CT [place] 8/7/2023 FINDINGS: Increased FES uptake is noted in a right frontal paramedian mass with a large surrounding area of edema. The volume of FES uptake corresponds to the entire volume of the enhancing lesion seen on Brain MRI from 8/1/2023 most consistent with viable tumor in the entire mass. No additional FES positive lesions are noted in the brain. IMPRESSION: Findings most consistent with active metastatic breast cancer in the brain.  Visit dated 9/14/2023. Patient recently completed   GKRS RIGHT frontal (re-irradiated) 2750cgy over 5 FXSs 8/30-9/7/2023. Reports she was doing well but on 9/1/2/23 noticed left sided tremor lasted a few seconds previous episodes lasted longer periods. Saw Dr Joya today and Keppra was increased (1250mg 2 x daily)) Denies unilateral extremity weakness, numbness, tingling, nausea, vomiting, headaches or other neurologic symptoms. No issues with speech or comprehension. Endorses fatigue. post treatment. She offers no new symptoms/concerns today. ON DEX 2mg 2x daily  Visit dated 10/17/2023 Patient returns for routine f/u and progress check with cranial images for review. Denies N/V, HA/unilateral extremity weakness/memory changes/gait disturbance/bowel/bladder dysfunction or other neurologic symptoms. No issues with speech or comprehension Concerned with trouble sleeping. Continues DEX 2 mg daily. On Keppra 1250mg 2xdaily managed by Dr Joya Denies seizure episodes (left side shaking)  Follows with Dr. Martin on Exemestane.   MRI brain w w/o contrast 10/15/2023 Final read pending at time of this entry  Visit dated1/24/2024 Patient returns for routine f/u with cranial images for review. Reports doing well. Denies signs of recurrent seizure episodes. Continues Keppra 1250mg BID. Denies N/V, unilateral extremity weakness/memory changes/gait disturbance/bowel/bladder dysfunction or other neurologic symptoms. No issues with speech or comprehension. Energy level okay participating in activity w/o difficulty.  Continues to follow with Dr. Martin on Exemestane. PETct scheduled for 1/25/2024 to r/o occult POD.  MRI brain w w/o contrast 1/17/2024 IMPRESSION: Lesion within the posterior medial aspect of the right frontal lobe appears smaller measuring 1.5 x 1.0 x 2.1 cm previously 1.8 x 1.3 x 2.8 cm. Marked improvement in the amount of associated edema. Smaller enhancing lesion just caudal to the above lesion within the single gyrus measuring 0.7 x 0.6 x 0.6 cm. This is new/larger from prior exam. Mild adjacent leptomeningeal enhancement.  Visit dated 2/8/2024 Patient returns on the recommendation of her oncologist. Seems she established care with a new oncologist now that Dr. Martin no longer works in the practice PET ct was ordered and she was told to meet with rad/onc. Overall doing well Denies HAs/evidence of seizures. Continues utlize  AEDs.  Continues on Exemestane will be seeing Dr Connelly 2/29/2024.

## 2024-02-08 NOTE — PHYSICAL EXAM
[Oriented To Time, Place, And Person] : oriented to person, place, and time [de-identified] : LIMITED visit telephonic

## 2024-02-08 NOTE — REVIEW OF SYSTEMS
[Negative] : Respiratory [Confused] : no confusion [Dizziness] : no dizziness [de-identified] : no seizures episodes noted

## 2024-02-14 ENCOUNTER — APPOINTMENT (OUTPATIENT)
Dept: NEUROSURGERY | Facility: CLINIC | Age: 59
End: 2024-02-14
Payer: MEDICARE

## 2024-02-14 ENCOUNTER — APPOINTMENT (OUTPATIENT)
Dept: MRI IMAGING | Facility: IMAGING CENTER | Age: 59
End: 2024-02-14

## 2024-02-14 ENCOUNTER — OUTPATIENT (OUTPATIENT)
Dept: OUTPATIENT SERVICES | Facility: HOSPITAL | Age: 59
LOS: 1 days | End: 2024-02-14
Payer: COMMERCIAL

## 2024-02-14 DIAGNOSIS — Z98.51 TUBAL LIGATION STATUS: Chronic | ICD-10-CM

## 2024-02-14 DIAGNOSIS — G93.89 OTHER SPECIFIED DISORDERS OF BRAIN: ICD-10-CM

## 2024-02-14 DIAGNOSIS — Z90.49 ACQUIRED ABSENCE OF OTHER SPECIFIED PARTS OF DIGESTIVE TRACT: Chronic | ICD-10-CM

## 2024-02-14 DIAGNOSIS — Z90.12 ACQUIRED ABSENCE OF LEFT BREAST AND NIPPLE: Chronic | ICD-10-CM

## 2024-02-14 DIAGNOSIS — Z90.3 ACQUIRED ABSENCE OF STOMACH [PART OF]: Chronic | ICD-10-CM

## 2024-02-14 PROCEDURE — 77290 THER RAD SIMULAJ FIELD CPLX: CPT | Mod: 26

## 2024-02-14 PROCEDURE — 77295 3-D RADIOTHERAPY PLAN: CPT | Mod: 26

## 2024-02-14 PROCEDURE — 77300 RADIATION THERAPY DOSE PLAN: CPT | Mod: 26

## 2024-02-14 PROCEDURE — 77334 RADIATION TREATMENT AID(S): CPT | Mod: 26

## 2024-02-14 PROCEDURE — 61798 SRS CRANIAL LESION COMPLEX: CPT

## 2024-02-14 PROCEDURE — 76498 UNLISTED MR PROCEDURE: CPT

## 2024-02-14 PROCEDURE — 77263 THER RADIOLOGY TX PLNG CPLX: CPT

## 2024-02-14 PROCEDURE — A9585: CPT

## 2024-02-14 RX ORDER — ACETAMINOPHEN 325 MG/1
325 TABLET, FILM COATED ORAL
Qty: 0 | Refills: 0 | Status: COMPLETED | OUTPATIENT
Start: 2024-02-14

## 2024-02-20 PROCEDURE — 77435 SBRT MANAGEMENT: CPT

## 2024-02-20 RX ORDER — ACETAMINOPHEN 325 MG/1
325 TABLET, FILM COATED ORAL
Qty: 0 | Refills: 0 | Status: COMPLETED | OUTPATIENT
Start: 2024-02-20

## 2024-02-22 ENCOUNTER — OUTPATIENT (OUTPATIENT)
Dept: OUTPATIENT SERVICES | Facility: HOSPITAL | Age: 59
LOS: 1 days | Discharge: HOME | End: 2024-02-22

## 2024-02-22 DIAGNOSIS — Z90.49 ACQUIRED ABSENCE OF OTHER SPECIFIED PARTS OF DIGESTIVE TRACT: Chronic | ICD-10-CM

## 2024-02-22 DIAGNOSIS — Z90.12 ACQUIRED ABSENCE OF LEFT BREAST AND NIPPLE: Chronic | ICD-10-CM

## 2024-02-22 DIAGNOSIS — Z90.3 ACQUIRED ABSENCE OF STOMACH [PART OF]: Chronic | ICD-10-CM

## 2024-02-22 DIAGNOSIS — C50.919 MALIGNANT NEOPLASM OF UNSPECIFIED SITE OF UNSPECIFIED FEMALE BREAST: ICD-10-CM

## 2024-02-22 DIAGNOSIS — Z98.51 TUBAL LIGATION STATUS: Chronic | ICD-10-CM

## 2024-02-27 ENCOUNTER — APPOINTMENT (OUTPATIENT)
Dept: INTERNAL MEDICINE | Facility: CLINIC | Age: 59
End: 2024-02-27
Payer: MEDICARE

## 2024-02-27 VITALS
TEMPERATURE: 98.3 F | WEIGHT: 162 LBS | HEART RATE: 66 BPM | DIASTOLIC BLOOD PRESSURE: 77 MMHG | BODY MASS INDEX: 27.66 KG/M2 | OXYGEN SATURATION: 100 % | HEIGHT: 64 IN | SYSTOLIC BLOOD PRESSURE: 117 MMHG | RESPIRATION RATE: 16 BRPM

## 2024-02-27 DIAGNOSIS — L29.9 PRURITUS, UNSPECIFIED: ICD-10-CM

## 2024-02-27 PROCEDURE — G2211 COMPLEX E/M VISIT ADD ON: CPT

## 2024-02-27 PROCEDURE — 99214 OFFICE O/P EST MOD 30 MIN: CPT

## 2024-02-27 RX ORDER — HYDROCORTISONE 1 %
12 CREAM (GRAM) TOPICAL
Qty: 1 | Refills: 2 | Status: ACTIVE | COMMUNITY
Start: 2024-02-27 | End: 1900-01-01

## 2024-02-27 NOTE — PHYSICAL EXAM
[Well Nourished] : well nourished [No Acute Distress] : no acute distress [Well Developed] : well developed [Well-Appearing] : well-appearing [Normal Sclera/Conjunctiva] : normal sclera/conjunctiva [PERRL] : pupils equal round and reactive to light [EOMI] : extraocular movements intact [Normal Outer Ear/Nose] : the outer ears and nose were normal in appearance [Normal Oropharynx] : the oropharynx was normal [No JVD] : no jugular venous distention [No Lymphadenopathy] : no lymphadenopathy [Thyroid Normal, No Nodules] : the thyroid was normal and there were no nodules present [Supple] : supple [No Respiratory Distress] : no respiratory distress  [Clear to Auscultation] : lungs were clear to auscultation bilaterally [No Accessory Muscle Use] : no accessory muscle use [Normal Rate] : normal rate  [Regular Rhythm] : with a regular rhythm [Normal S1, S2] : normal S1 and S2 [No Murmur] : no murmur heard [No Carotid Bruits] : no carotid bruits [No Varicosities] : no varicosities [No Abdominal Bruit] : a ~M bruit was not heard ~T in the abdomen [Pedal Pulses Present] : the pedal pulses are present [No Edema] : there was no peripheral edema [No Palpable Aorta] : no palpable aorta [No Extremity Clubbing/Cyanosis] : no extremity clubbing/cyanosis [Soft] : abdomen soft [Non Tender] : non-tender [Non-distended] : non-distended [No Masses] : no abdominal mass palpated [No HSM] : no HSM [Normal Bowel Sounds] : normal bowel sounds [Normal Posterior Cervical Nodes] : no posterior cervical lymphadenopathy [Normal Anterior Cervical Nodes] : no anterior cervical lymphadenopathy [No CVA Tenderness] : no CVA  tenderness [No Spinal Tenderness] : no spinal tenderness [No Joint Swelling] : no joint swelling [Grossly Normal Strength/Tone] : grossly normal strength/tone [No Rash] : no rash [Coordination Grossly Intact] : coordination grossly intact [No Focal Deficits] : no focal deficits [Normal Gait] : normal gait [Deep Tendon Reflexes (DTR)] : deep tendon reflexes were 2+ and symmetric [Normal Affect] : the affect was normal [Normal Insight/Judgement] : insight and judgment were intact

## 2024-02-27 NOTE — PLAN
[FreeTextEntry1] : 1. metastatic brain cancer * completed RT, scheduled to repeat MRI brain, to f/u with oncologist * c/w levetiracetam 2. prediabetes * labs for a1c * Dietary counseling given, dietary avoidance discussed, diet and exercise reviewed with patient; patient reminded of importance of aerobic exercise, weight control, dietary compliance and regular glucose monitoring 3. hypercholesterolemia * lab for fasting lipids * c/w rosuvastatin * low cholesterol, low triglycerides diet,dietary counseling given; dietary avoidance discussed; diet and exercise reviewed with patient 4. skin pruritus * start ammonium lactate lotion * f/u two months.

## 2024-02-27 NOTE — HISTORY OF PRESENT ILLNESS
[FreeTextEntry1] : follow up Interview and discussion conducted in [place] by [place] sor[place] speaking physician [de-identified] : 58 years old female with metastatic breast carcinoma, hypercholesterolemia, seizures presents for follow up. completed RT for brain metastases, scheduled to repeat MRI in April, denies seizures episodes, due today for labs to monitor lipids, refers dry skin on upper back with pruritus, denies rash

## 2024-02-28 LAB
ALBUMIN SERPL ELPH-MCNC: 4.5 G/DL
ALP BLD-CCNC: 148 U/L
ALT SERPL-CCNC: 19 U/L
ANION GAP SERPL CALC-SCNC: 13 MMOL/L
AST SERPL-CCNC: 20 U/L
BILIRUB SERPL-MCNC: 0.2 MG/DL
BUN SERPL-MCNC: 21 MG/DL
CALCIUM SERPL-MCNC: 9 MG/DL
CHLORIDE SERPL-SCNC: 107 MMOL/L
CHOLEST SERPL-MCNC: 167 MG/DL
CO2 SERPL-SCNC: 23 MMOL/L
CREAT SERPL-MCNC: 0.54 MG/DL
EGFR: 107 ML/MIN/1.73M2
ESTIMATED AVERAGE GLUCOSE: 120 MG/DL
GLUCOSE SERPL-MCNC: 94 MG/DL
HBA1C MFR BLD HPLC: 5.8 %
HDLC SERPL-MCNC: 46 MG/DL
LDLC SERPL CALC-MCNC: 99 MG/DL
NONHDLC SERPL-MCNC: 120 MG/DL
POTASSIUM SERPL-SCNC: 4.7 MMOL/L
PROT SERPL-MCNC: 6.9 G/DL
SODIUM SERPL-SCNC: 142 MMOL/L
TRIGL SERPL-MCNC: 121 MG/DL

## 2024-02-29 ENCOUNTER — APPOINTMENT (OUTPATIENT)
Dept: HEMATOLOGY ONCOLOGY | Facility: CLINIC | Age: 59
End: 2024-02-29
Payer: MEDICARE

## 2024-02-29 ENCOUNTER — RESULT REVIEW (OUTPATIENT)
Age: 59
End: 2024-02-29

## 2024-02-29 VITALS
WEIGHT: 165.34 LBS | HEART RATE: 65 BPM | RESPIRATION RATE: 16 BRPM | DIASTOLIC BLOOD PRESSURE: 75 MMHG | TEMPERATURE: 97.7 F | SYSTOLIC BLOOD PRESSURE: 116 MMHG | BODY MASS INDEX: 28.38 KG/M2 | OXYGEN SATURATION: 99 %

## 2024-02-29 DIAGNOSIS — M85.80 OTHER SPECIFIED DISORDERS OF BONE DENSITY AND STRUCTURE, UNSPECIFIED SITE: ICD-10-CM

## 2024-02-29 DIAGNOSIS — D50.9 IRON DEFICIENCY ANEMIA, UNSPECIFIED: ICD-10-CM

## 2024-02-29 LAB
BASOPHILS # BLD AUTO: 0.02 K/UL — SIGNIFICANT CHANGE UP (ref 0–0.2)
BASOPHILS NFR BLD AUTO: 0.3 % — SIGNIFICANT CHANGE UP (ref 0–2)
CEA SERPL-MCNC: 1.3 NG/ML
EOSINOPHIL # BLD AUTO: 0.13 K/UL — SIGNIFICANT CHANGE UP (ref 0–0.5)
EOSINOPHIL NFR BLD AUTO: 2.2 % — SIGNIFICANT CHANGE UP (ref 0–6)
HCT VFR BLD CALC: 28.3 % — LOW (ref 34.5–45)
HGB BLD-MCNC: 8.4 G/DL — LOW (ref 11.5–15.5)
IMM GRANULOCYTES NFR BLD AUTO: 0.3 % — SIGNIFICANT CHANGE UP (ref 0–0.9)
LYMPHOCYTES # BLD AUTO: 2.4 K/UL — SIGNIFICANT CHANGE UP (ref 1–3.3)
LYMPHOCYTES # BLD AUTO: 39.9 % — SIGNIFICANT CHANGE UP (ref 13–44)
MCHC RBC-ENTMCNC: 19.7 PG — LOW (ref 27–34)
MCHC RBC-ENTMCNC: 29.7 G/DL — LOW (ref 32–36)
MCV RBC AUTO: 66.4 FL — LOW (ref 80–100)
MONOCYTES # BLD AUTO: 0.52 K/UL — SIGNIFICANT CHANGE UP (ref 0–0.9)
MONOCYTES NFR BLD AUTO: 8.6 % — SIGNIFICANT CHANGE UP (ref 2–14)
NEUTROPHILS # BLD AUTO: 2.93 K/UL — SIGNIFICANT CHANGE UP (ref 1.8–7.4)
NEUTROPHILS NFR BLD AUTO: 48.7 % — SIGNIFICANT CHANGE UP (ref 43–77)
NRBC # BLD: 0 /100 WBCS — SIGNIFICANT CHANGE UP (ref 0–0)
PLATELET # BLD AUTO: 347 K/UL — SIGNIFICANT CHANGE UP (ref 150–400)
RBC # BLD: 4.26 M/UL — SIGNIFICANT CHANGE UP (ref 3.8–5.2)
RBC # FLD: 18.5 % — HIGH (ref 10.3–14.5)
WBC # BLD: 6.02 K/UL — SIGNIFICANT CHANGE UP (ref 3.8–10.5)
WBC # FLD AUTO: 6.02 K/UL — SIGNIFICANT CHANGE UP (ref 3.8–10.5)

## 2024-02-29 PROCEDURE — G2211 COMPLEX E/M VISIT ADD ON: CPT

## 2024-02-29 PROCEDURE — 99215 OFFICE O/P EST HI 40 MIN: CPT

## 2024-03-01 LAB — CANCER AG27-29 SERPL-ACNC: 11 U/ML

## 2024-03-03 PROBLEM — D50.9 MICROCYTIC ANEMIA: Status: ACTIVE | Noted: 2024-03-03

## 2024-03-03 NOTE — HISTORY OF PRESENT ILLNESS
[100: Normal, no complaints, no evidence of disease.] : 100: Normal, no complaints, no evidence of disease. [de-identified] :  Patient initially diagnosed with right breast cancer in 2012, treated with R Modified Radical Mastectomy/ALND - path showed ER+20%, NH+10%, HER2 pos (2+ IHC, FISH amplified). Adjuvant treatment with ddAC-Taxol/herceptin; followed by endocrine therapy with tamoxifen under the care of Dr. Geovany Carias.  In 11/2018, noted to have 30Lb weight loss and dyspnea which brought her to the ER at [facility] ; CT scan of the chest, which showed multiple mediastinal hilar right subclavian lymph nodes corresponding to abnormal FDG activity on PET scan consistent with metastatic carcinoma; there were tiny nodularities in both lungs- too small to accurately characterize the liver demonstrated tiny poorly defined lesions less than 5 mm and difficult to characterize and visible on prior examinations with the possibility of metastasis also considered. PET-CT scan showed multiple hypermetabolic lymph nodes in the mediastinum, right subclavian region and both adebayo felt to represent tumor; there were stellate lesions in the left breast region, felt to represent benign scars; the lungs demonstrated no significant abnormality with multiple tiny lung nodules seen on CT as previously noted; liver demonstrated no significant abnormality with multiple small poorly defined nodules seen on CT per my previous comments.   Left breast core biopsy - fat necrosis U/S guided bx mediastinal nodes; Breast carcinoma - ER+ 50%, NH-, Her2 0 docetaxel, pertuzumab, trastuzumab, and exemestane beginning at the early 4/19, and subsequently switched to weekly paclitaxel secondary to side effects and continuing on trastuzumab / pertuzumab every 3 weeks as well as exemestane. The patient was last treated per her report on approximately June 12, 2019, despite outside records that show ongoing weekly treatments early July 2019. 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  Thereafter she established care at [facility] with Dr. Gaurav Martin. Review of slides confirmed ER+NH neg, Her2 neg disease recurrence and exemestane was resumed.  In 3/2021 she had L sided seizures and found to have abnormal enhancing lesions in the posterior fossa and supratentorial region are identified.  Lesion 1: Large dural based enhancing lesion is seen involving the high medial right frontal region. This lesion does appear to cross the midline and measures approximately 3.2 x 2.7 x 3.5 cm. Small amount of adjacent susceptibility is identified. Surrounding edema is identified. Localized mass effect is identified. Right to left shift (7.2 mm) seen.  Lesion 2: Enhancing lesion is seen involving the right upper tiffany. This lesion measures approximately 0.5 x 0.4 cm.  Lesion 3: Enhancing lesion is seen involving the superior left cerebellar region. This lesion measures approximately 1.2 x 1.0 cm. Surrounding edema is identified.  She was seen by Dr Hwang and treated with Gamma knife SRS to the 3 brain lesions.  A repeat MRI 6/2/21 demonstrated: -Interval decrease in size of 3 enhancing lesions, largest in the high medial right frontal lobe measuring up to 2.0 cm. -Improvement of vasogenic edema with resolution of leftward midline shift. -No new lesions are identified  Bone marrow aspirate/Bx in 5/2022 done with Dr. Carias was negative; unclear why it was done  On 9/14/22 had a witnessed L sided seizure and admitted to [facility]. A brain MRI done 9/14/2022 showed: -Increase in size of a right frontal parasagittal lesion since comparison MRI, now measuring up to 2.5 cm. Increasing surrounding vasogenic edema. -No new lesion identified. No acute infarct. She was d/c'd on dexamethasone taper to 4 mg BID and keppra 1000 mg bId. Currently tapered to 2 mg.  Patient was started on bevacizumab on 10/28/22 for presumed tumor necrosis related edema, completed 12/16/22.  The patient had POD of brain lesion on scans from 8/1/23. She is s/p right frontal re-irradiation 8/30-9/7/23 with Dr. Hwang. [de-identified] : 2/29/24 Transferring care from Dr. Martin to Dr. Connelly today All of the patient's prior records including radiology, pathology and prior notes reviewed; Past Medical History, Past Surgical History, Family History and Social history reviewed and updated in the patient's chart. Patient returns today to rule out progression or recurrence of breast cancer and to assess treatment toxicity. She does not have any complaints today. No new changes to her medical history. Tolerating exemestane well. Denies headaches, changes in vision, and seizures. No interval hospitalizations or urgent care visits.

## 2024-03-03 NOTE — ASSESSMENT
[FreeTextEntry1] : 57 yo woman initially diagnosed in 2012 with right breast cancer ER+/TN-/Her2+ treated with mastectomy and adjuvant ddAC-TH -- Tamoxifen; developed recurrent disease 2018 in lymph nodes and lungs; recurrence was ER+TN -, HER2 neg.   - currently remains on single agent exemestane; tolerating well - plan for repeat PET/CT scan to r/o occult radiologic POD in June/July 2024 - Discussed with patient and she will speak to her family about the option of having germline genetic testing; patient information/brochure for [employer] [facility] testing provided to patient  [facility] mets - F/U MRI's of the head scheduled for 4/29/24; following with Dr Hwang from Rad/Onc - prior history of bevacizumab use for symptomatic tumor necrosis  Microcytic Anemia - noted progressive anemia with further drop in MCV - given history of gastrectomy/GI bleed, will need to check iron studies and get GI evaluation - could be due to further blood loss vs. malabsorption which would require IV iron therapy  - Patient had the opportunity to have all their questions answered to their satisfaction - f/u in 2 month as scheduled  Stew Stewart MD PGY5 Heme/Onc Fellow

## 2024-03-03 NOTE — PHYSICAL EXAM
[Fully active, able to carry on all pre-disease performance without restriction] : Status 0 - Fully active, able to carry on all pre-disease performance without restriction [Normal] : affect appropriate [de-identified] : Right breast with no discrete palpable masses, no palpable axillary nodes. Left reconstructed breast with medial and lateral nodularity c/w fat necrosis, stable, no other discrete palpable masses, no palpable axillary nodes.

## 2024-03-05 LAB
FERRITIN SERPL-MCNC: 5 NG/ML
IRON SATN MFR SERPL: 2 %
IRON SERPL-MCNC: 12 UG/DL
TIBC SERPL-MCNC: 510 UG/DL
UIBC SERPL-MCNC: 498 UG/DL

## 2024-03-20 ENCOUNTER — APPOINTMENT (OUTPATIENT)
Dept: RADIATION ONCOLOGY | Facility: CLINIC | Age: 59
End: 2024-03-20
Payer: MEDICARE

## 2024-03-20 PROCEDURE — 99024 POSTOP FOLLOW-UP VISIT: CPT

## 2024-03-20 NOTE — PHYSICAL EXAM
[Oriented To Time, Place, And Person] : oriented to person, place, and time [de-identified] : LIMITED visit telephonic

## 2024-03-20 NOTE — REASON FOR VISIT
[Routine Follow-Up] : routine follow-up visit for [Brain Metastasis] : brain metastasis [Spouse] : spouse [Other: ______] : provided by LENA [Interpreters_IDNumber] : [id number] [Interpreters_FullName] : Divina [TWNoteComboBox1] : [place] citizen

## 2024-03-20 NOTE — REVIEW OF SYSTEMS
[Negative] : Respiratory [Confused] : no confusion [de-identified] : no seizures episodes noted, Denies HAs [Dizziness] : no dizziness

## 2024-03-20 NOTE — HISTORY OF PRESENT ILLNESS
[Home] : at home, [unfilled] , at the time of the visit. [Medical Office: ([facility])___] : at the medical office located in  [Verbal consent obtained from patient] : the patient, [unfilled] [FreeTextEntry1] : Ms. Shelley was seen initially for consult on 3/18//2021.  RT hx: She completed SRS between 3/23-3/25/2021 to a right frontal, left cerebellar, and right brainstem lesion.and additional SRS to a right parietal lesion, 1800 cgy on 5/11/2022 over 1 fraction. GKRS RIGHT frontal (re-irradiated) 2750cgy over 5 FXS8-30-9/7/2023 GKRS RIGHT frontal 24Gy over 3 Fxs 2/14-2/20/2024  ONCOLOGY HISTORY Ms. Shelley was diagnosed with breast ca in 2012 s/p radical mastectomy and adjuvant chemotherapy. She had ER positive 20%, MT positive 10%, HER-2/lisa 2+, FISH amplified breast cancer per outside physician report. She went on to receive adjuvant dose-dense AC chemotherapy for 4 cycles followed by Taxol plus Herceptin as well as adjuvant radiation therapy. She was subsequently begun adjuvant tamoxifen therapy. Her last menstrual period was in 2014. The patient then began to experience weight loss beginning in November 2018 and over 3-month period of time lost 30 pounds per her report. At approximately the same time she developed increasing shortness of breath, and ultimately presented to [facility] Emergency Department. She subsequently had a CT scan of the chest, which showed multiple mediastinal hilar right subclavian lymph nodes corresponding to abnormal FDG activity on PET scan consistent with metastatic carcinoma; there were tiny nodularities in both lungs- too small to accurately characterize- not present on prior examination and measuring up to 2 mm in both lungs. The possibility of metastasis was a consideration; she had no bony metastases; the liver demonstrated tiny poorly defined lesions less than 5 mm and difficult to characterize and visible on prior examinations with the possibility of metastasis also considered. A PET-CT scan showed multiple hypermetabolic lymph nodes in the mediastinum, right subclavian region and both adebayo felt to represent tumor; there were stellate lesions in the left breast region, felt to represent benign scars; the lungs demonstrated no significant abnormality with multiple tiny lung nodules seen on CT as previously noted; liver demonstrated no significant abnormality with multiple small poorly defined nodules seen on CT per my previous comments. The patient underwent a left breast core biopsy of the area of concern with a finding of fat necrosis and foreign body giant cell reaction as well as a separate fine-needle aspiration consistent with a ruptured cyst/fat necrosis. The patient consequently saw her oncologist, Dr. Carias, who sent the patient for ultrasound-guided core biopsies of the mediastinal lymph nodes with a finding of carcinoma, consistent with breast primary with estrogen receptor returning positive, greater than 50%, progesterone receptor negative, and HER-2/lisa 0 per outside report. The patient was subsequently begun on docetaxel, pertuzumab, trastuzumab, and exemestane beginning at the early 4/19, and subsequently switched to weekly paclitaxel secondary to side effects and continuing on trastuzumab / pertuzumab every 3 weeks as well as exemestane. The patient was seen for initial consultation 7/24/19 for a further opinion regarding treatment recommendations. She noted ongoing weakness, fatigue, and generalized malaise, although all of these had begun to improve gradually. Her last chemotherapy as noted above was approximately at 06/12/2019. She reports that her primary oncologist, Dr. Carias, told to discontinue exemestane upon admission to [facility] with the consideration restarting it post discharge. Slides for review at [facility] and it was confirmed that she had met breast cancer ER+ MT neg and Her 2 lisa neg. At the time of initial consult she felt well. Noted a left sided seizure in the days prior to consult lasting about 1 minute, which stopped on its own. Was recently prescribed keppra and dexamethasone 8mg BID. No confusion, dizziness, nausea, focal weakness. On examestane. She recently had a focal seizure left upper/lower extremity. MRI showed 3 lesions -   Lesion 1: Large dural based enhancing lesion is seen involving the high medial right frontal region. This lesion does appear to cross the midline and measures approximately 3.2 x 2.7 x 3.5 cm. Small amount of adjacent susceptibility is identified. Surrounding edema is identified. Localized mass effect is identified. Right to left shift (7.2 mm) seen.  Lesion 2: Enhancing lesion is seen involving the right upper tiffany. This lesion measures approximately 0.5 x 0.4 cm.  Lesion 3: Enhancing lesion is seen involving the superior left cerebellar region. This lesion measures approximately 1.2 x 1.0 cm. Surrounding edema is identified. She was treated with GK SRS.   6/2/2021- Ms. Shelley presents today for follow up. [employer] Interpretter [id number] for Czech was used. MRI scheduled today at 145. Has not seen Dr. Martin since radiation. States she is feeling fine. States she has intermittent sensation of "pulling" to her forehead. Denies any dizziness, confusion, headache, nausea, vomiting, visual disturbances, weakness. States 3 weeks ago she had an episode where her left upper extremity "was shaking" for one minute, denies any LOC.   9/29/2021- Ms. Shelley presents today for follow up. She is seen today with assistance of  [id number]. MRI brain done 9/24/2021 showed Previously noted enhancing lesions in the posterior fossa and supratentorial region have either decreased in size or are no longer seen which is compatible with response to therapy. Continue close interval follow-up is recommended. Continues to follow with Dr. Martin. continues on examestane with plans to repeat body scans in november or december. Feeling very well. no headaches, no new weakness, no numbness or tingling, no nausea or vomiting. overall doing well.   12/30/2021: Today Ms. Shelley presents for follow-up, [place] [facility]  . MRI brain showed: "Right high paramedian region lesion measures similar in size though there is increased edema seen in association when compared with the prior 9/24/2021. Question of vague enhancement on the surface of the cervical medullary junction and along the surface of the brainstem extending into the spinal cord level. Consideration for possible leptomeningeal pathology should be given. Recommend correlation with CSF as clinically warranted." Per our review, no leptomeningeal disease appreciated. The vague focus of enhancement likely represents a blood vessel. The swelling of the right high paramedian region lesion actually displays decreased edema, compared to prior.  Denies any nausea, vomiting, headache, numbness or tingling. Denies any issues with balance or hearing.   3/31/2022: Pt presents for follow-up. Clinically feeling well. Denies headache, nausea, vomiting, or other complaints. Denies interval symptoms since last f/u. Denies being on steroids or reportedly any recent seizure events.  [place] pacific : Goran [id number]  5/4/2022- Ms. Shelley presents today for follow up.   [id number] used for visit today.Brain MRI 4/27/2022 showed  IMPRESSION: Right medial frontal parasagittal parenchymal lesion is larger when compared with 3/30/2022 and 12/30/2021 suggesting a combination of post therapy change and neoplasm supported by MR spectroscopy and MR perfusion. There is a 5 mm nodule in the right parietal white matter which is new since 12/30/2021 but is slightly larger in retrospect since 3/30/2022. No evidence of leptomeningeal enhancement at the craniocervical junction, this was likely artifactual and is no longer identified. Follows with Dr. Martin. continue on exemestane. Today she feels well. denies headaches, nausea, vomiting, focal weakness, numbness, tingling.  7/13/2022- Ms. Shelley presents today for follow up. Brain MRI done 7/8. Continues on exemestane and following with Dr. Martin.  CT CAP 5/6/2022 showed Stable exam since 12/17/2021. Bone scan 5/6 without definite evidence of bone mets. Today she is feeling very well. denies headaches, nasuea, vomiting, focal weakness, numbness, tingling, confusion  9/20/2022- Ms. Shelley presents today for follow up. pacific  [id number] used for visit today. She [facility] on 9/14[facility]22 with a seizure, left sided. A brain MRI done 9/14/2022 showed  -Increase in size of a right frontal parasagittal lesion since comparison MRI, now measuring up to 2.5 cm. Increasing surrounding vasogenic edema. -No new lesion identified. No acute infarct. Discharged on dexamethasone taper to 4mg BID and keppra 1000mg bId. Feeling well. no further seizures. no headaches, no nausea, no focal weakness, no numbness or tingling. in retrospect prior to seizure denies any focal neurological symptoms.  10/6/2022- Ms. Shelley presents today for follow up. MRI brain 9/30/2022 showed Right frontal parasagittal parenchymal enhancing mass increased size and increased vasogenic edema compared with 7/8/2022. MR perfusion and MR spectroscopy are suggestive of post therapy change rather than neoplasm progression. Question tiny 2.5 mm nodular enhancing focus right cingulate gyrus could represent an additional focus. Recommend follow-up. tapered to 2mg Dexa bid 2 weeks.   11/17/2022- Ms. Shelley presents today for follow up.  [id number] used for visit. She started avastin on 10/28 and received her second dose on 11/11. Continues on exemestane. MRI brain 11/12/2022 showed Decreased right frontal parasagittal lesion when compared to 9/30/2022, with significant decreased vasogenic edema consistent with response to treatment. MR perfusion is suggestive of a combination of neoplasm and post therapy change. Continued follow-up. CT CAP 10/12/2022 showed A new 2.7 cm right adnexal cyst with thin septation. Consider follow-up with pelvic ultrasound as indicated versus attention at short-term follow-up imaging. Feels well overall. no headaches, no nausea, no focal weakness, no numbness or tingling. notes some weakness to bilateral things recently, trouble going up the stairs.   2/23/2023: Ms. Shelley presents to clinic for follow-up.  [id number] used for visit. MRI-Brain performed on 2/11/2023 showed Enhancing lesion is again seen involving the high medial right frontal cortex which continues to decrease in size. Decreased surrounding edema is seen as well. There is decreased cerebral blood flow and cerebral blood volume seen. This could be compatible with treatment-related changes though the possibility of underlying tumor with response to therapy must be considered as well. Continued close interval follow-up is recommended  Ms. Shelley has a CT- Abd/Pel scheduled for 2/18/2023. continues on exemestane. No headaches, no nausea, no weakness, no seizures. notes some intermittent and infrequent visual disturbances on the right side over the last month. Last Avastin 12/16/2022.  6/29/2023- Ms. Shelley presents today for follow up. Continues to follow with Dr. Martin. Continues on Exemestane.   MRI brain done 6/8/2023  showed Previously noted enhancing lesion involving the high medial right frontal region has increased in size with increased edema as well.  CT AP 2/18/2023 showed No evidence of intra-abdominal metastatic disease. Interval resolution of previously identified right adnexal cyst.  In the interim reports having an attack/trembling of the left hand and leg lasting~ 5mins (episode happened a week ago) Admits to skipping her PM AED doses for ~ 1 month Recalls a similar episode about 2 years ago Denies LOC/injuries/visual disturbance/unilateral extremity weakness  Visit dated 8/23/2023 Patient returns for continuation of care with completed images for review. Continues to follow with Dr. Martin on Exemestane Reports noticing over the past 2 weeks being a little off balance resulting in falls but w/o injury. Denies HAs, dizziness, speech/visual disturbance. MRI brain w w/o contrast 8/1//2023 IMPRESSION: Mild increase in size of right paramedian frontal lesion with thick peripheral rim of hyperperfusion consistent with viable tumor and central hypoperfusion consistent with necrosis. Overall findings are concerning for progression of tumor.  PET/CT [place] 8/7/2023 FINDINGS: Increased FES uptake is noted in a right frontal paramedian mass with a large surrounding area of edema. The volume of FES uptake corresponds to the entire volume of the enhancing lesion seen on Brain MRI from 8/1/2023 most consistent with viable tumor in the entire mass. No additional FES positive lesions are noted in the brain. IMPRESSION: Findings most consistent with active metastatic breast cancer in the brain.  Visit dated 9/14/2023. Patient recently completed   GKRS RIGHT frontal (re-irradiated) 2750cgy over 5 FXSs 8/30-9/7/2023. Reports she was doing well but on 9/1/2/23 noticed left sided tremor lasted a few seconds previous episodes lasted longer periods. Saw Dr Joya today and Keppra was increased (1250mg 2 x daily)) Denies unilateral extremity weakness, numbness, tingling, nausea, vomiting, headaches or other neurologic symptoms. No issues with speech or comprehension. Endorses fatigue. post treatment. She offers no new symptoms/concerns today. ON DEX 2mg 2x daily  Visit dated 10/17/2023 Patient returns for routine f/u and progress check with cranial images for review. Denies N/V, HA/unilateral extremity weakness/memory changes/gait disturbance/bowel/bladder dysfunction or other neurologic symptoms. No issues with speech or comprehension Concerned with trouble sleeping. Continues DEX 2 mg daily. On Keppra 1250mg 2xdaily managed by Dr Joya Denies seizure episodes (left side shaking)  Follows with Dr. Martin on Exemestane.   MRI brain w w/o contrast 10/15/2023 Final read pending at time of this entry  Visit dated1/24/2024 Patient returns for routine f/u with cranial images for review. Reports doing well. Denies signs of recurrent seizure episodes. Continues Keppra 1250mg BID. Denies N/V, unilateral extremity weakness/memory changes/gait disturbance/bowel/bladder dysfunction or other neurologic symptoms. No issues with speech or comprehension. Energy level okay participating in activity w/o difficulty.  Continues to follow with Dr. Martin on Exemestane. PETct scheduled for 1/25/2024 to r/o occult POD.  MRI brain w w/o contrast 1/17/2024 IMPRESSION: Lesion within the posterior medial aspect of the right frontal lobe appears smaller measuring 1.5 x 1.0 x 2.1 cm previously 1.8 x 1.3 x 2.8 cm. Marked improvement in the amount of associated edema. Smaller enhancing lesion just caudal to the above lesion within the single gyrus measuring 0.7 x 0.6 x 0.6 cm. This is new/larger from prior exam. Mild adjacent leptomeningeal enhancement.  Visit dated 2/8/2024 Patient returns on the recommendation of her oncologist. Seems she established care with a new oncologist now that Dr. Martin no longer works in the practice PET ct was ordered and she was told to meet with rad/onc. Overall doing well Denies HAs/evidence of seizures. Continues utlize  AEDs.  Continues on Exemestane will be seeing Dr Connelly 2/29/2024.  PETct FDG  whole body 1/25/2024 IMPRESSION: Compared to FES-PET/CT scan dated 8/17/2023: 1. Resolution of FES activity in right posterior frontal paramedian mass which is decreased in size on MRI, measuring 1.5 x 1.0 x 2.1 cm on 1/17/2024, as compared to 1.8 x 1.3 x 2.8 cm on 10/15/2023, and 2.6 x 1.7 x 2.6 cm on 8/1/2023. Findings are compatible with response to interval therapy. 2. New small, discrete focus of increased radiotracer activity in the right paramedian cingulate gyrus, corresponding to the new/larger enhancing lesion measuring 0.7 x 0.6 x 0.6 cm on recent MRI, compatible with ER-positive metastasis (SUV 6.4; image 43 of brain series). 3. The remainder of the study demonstrates no evidence of ER positive disease.   Visit dated 3/20/24 Patient returns after recently completing GKRS to RIGHT frontal lesion 24Gy over 3 Fxs 2/14-2/20/2024. Overall doing well and w/o any new concerns. Denies any recurrent seizure activity. Continues Keppra 1250 mg in AM + PM managed by Dr. Joya Denies N/V, HA/unilateral extremity weakness/memory changes/gait disturbance/bowel/bladder dysfunction or other neurologic symptoms. No issues with speech or comprehension.  Continues single agent Exemestane now under the care of Dr Connelly since. Next cranial image in April

## 2024-03-22 ENCOUNTER — APPOINTMENT (OUTPATIENT)
Dept: INFUSION THERAPY | Facility: HOSPITAL | Age: 59
End: 2024-03-22

## 2024-03-22 ENCOUNTER — NON-APPOINTMENT (OUTPATIENT)
Age: 59
End: 2024-03-22

## 2024-03-25 DIAGNOSIS — E86.0 DEHYDRATION: ICD-10-CM

## 2024-03-25 DIAGNOSIS — D50.9 IRON DEFICIENCY ANEMIA, UNSPECIFIED: ICD-10-CM

## 2024-03-25 DIAGNOSIS — C79.31 SECONDARY MALIGNANT NEOPLASM OF BRAIN: ICD-10-CM

## 2024-03-29 ENCOUNTER — APPOINTMENT (OUTPATIENT)
Dept: INFUSION THERAPY | Facility: HOSPITAL | Age: 59
End: 2024-03-29

## 2024-04-05 ENCOUNTER — APPOINTMENT (OUTPATIENT)
Dept: INFUSION THERAPY | Facility: HOSPITAL | Age: 59
End: 2024-04-05

## 2024-04-12 ENCOUNTER — APPOINTMENT (OUTPATIENT)
Dept: INFUSION THERAPY | Facility: HOSPITAL | Age: 59
End: 2024-04-12

## 2024-04-19 ENCOUNTER — APPOINTMENT (OUTPATIENT)
Dept: HEMATOLOGY ONCOLOGY | Facility: CLINIC | Age: 59
End: 2024-04-19
Payer: MEDICARE

## 2024-04-19 ENCOUNTER — RESULT REVIEW (OUTPATIENT)
Age: 59
End: 2024-04-19

## 2024-04-19 ENCOUNTER — APPOINTMENT (OUTPATIENT)
Dept: INFUSION THERAPY | Facility: HOSPITAL | Age: 59
End: 2024-04-19

## 2024-04-19 VITALS
OXYGEN SATURATION: 99 % | DIASTOLIC BLOOD PRESSURE: 74 MMHG | HEART RATE: 60 BPM | BODY MASS INDEX: 27.83 KG/M2 | SYSTOLIC BLOOD PRESSURE: 112 MMHG | WEIGHT: 162.13 LBS | TEMPERATURE: 97.9 F | RESPIRATION RATE: 17 BRPM

## 2024-04-19 DIAGNOSIS — Z79.811 LONG TERM (CURRENT) USE OF AROMATASE INHIBITORS: ICD-10-CM

## 2024-04-19 DIAGNOSIS — C50.919 MALIGNANT NEOPLASM OF UNSPECIFIED SITE OF UNSPECIFIED FEMALE BREAST: ICD-10-CM

## 2024-04-19 LAB
ALBUMIN SERPL ELPH-MCNC: 4.6 G/DL
ALP BLD-CCNC: 153 U/L
ALT SERPL-CCNC: 26 U/L
ANION GAP SERPL CALC-SCNC: 12 MMOL/L
AST SERPL-CCNC: 25 U/L
BASOPHILS # BLD AUTO: 0.02 K/UL — SIGNIFICANT CHANGE UP (ref 0–0.2)
BASOPHILS NFR BLD AUTO: 0.4 % — SIGNIFICANT CHANGE UP (ref 0–2)
BILIRUB SERPL-MCNC: 0.2 MG/DL
BUN SERPL-MCNC: 12 MG/DL
CALCIUM SERPL-MCNC: 9.3 MG/DL
CEA SERPL-MCNC: 1.4 NG/ML
CHLORIDE SERPL-SCNC: 106 MMOL/L
CO2 SERPL-SCNC: 23 MMOL/L
CREAT SERPL-MCNC: 0.56 MG/DL
EGFR: 106 ML/MIN/1.73M2
EOSINOPHIL # BLD AUTO: 0.12 K/UL — SIGNIFICANT CHANGE UP (ref 0–0.5)
EOSINOPHIL NFR BLD AUTO: 2.2 % — SIGNIFICANT CHANGE UP (ref 0–6)
GLUCOSE SERPL-MCNC: 101 MG/DL
HCT VFR BLD CALC: 36.1 % — SIGNIFICANT CHANGE UP (ref 34.5–45)
HGB BLD-MCNC: 11.1 G/DL — LOW (ref 11.5–15.5)
IMM GRANULOCYTES NFR BLD AUTO: 0.2 % — SIGNIFICANT CHANGE UP (ref 0–0.9)
LYMPHOCYTES # BLD AUTO: 2.1 K/UL — SIGNIFICANT CHANGE UP (ref 1–3.3)
LYMPHOCYTES # BLD AUTO: 38.3 % — SIGNIFICANT CHANGE UP (ref 13–44)
MCHC RBC-ENTMCNC: 23.1 PG — LOW (ref 27–34)
MCHC RBC-ENTMCNC: 30.7 G/DL — LOW (ref 32–36)
MCV RBC AUTO: 74.8 FL — LOW (ref 80–100)
MONOCYTES # BLD AUTO: 0.45 K/UL — SIGNIFICANT CHANGE UP (ref 0–0.9)
MONOCYTES NFR BLD AUTO: 8.2 % — SIGNIFICANT CHANGE UP (ref 2–14)
NEUTROPHILS # BLD AUTO: 2.79 K/UL — SIGNIFICANT CHANGE UP (ref 1.8–7.4)
NEUTROPHILS NFR BLD AUTO: 50.7 % — SIGNIFICANT CHANGE UP (ref 43–77)
NRBC # BLD: 0 /100 WBCS — SIGNIFICANT CHANGE UP (ref 0–0)
PLATELET # BLD AUTO: 245 K/UL — SIGNIFICANT CHANGE UP (ref 150–400)
POTASSIUM SERPL-SCNC: 4.9 MMOL/L
PROT SERPL-MCNC: 7 G/DL
RBC # BLD: 4.81 M/UL — SIGNIFICANT CHANGE UP (ref 3.8–5.2)
RBC # FLD: 30.2 % — HIGH (ref 10.3–14.5)
SODIUM SERPL-SCNC: 141 MMOL/L
WBC # BLD: 5.49 K/UL — SIGNIFICANT CHANGE UP (ref 3.8–10.5)
WBC # FLD AUTO: 5.49 K/UL — SIGNIFICANT CHANGE UP (ref 3.8–10.5)

## 2024-04-19 PROCEDURE — 99214 OFFICE O/P EST MOD 30 MIN: CPT

## 2024-04-23 LAB — CANCER AG27-29 SERPL-ACNC: 12.5 U/ML

## 2024-04-29 ENCOUNTER — OUTPATIENT (OUTPATIENT)
Dept: OUTPATIENT SERVICES | Facility: HOSPITAL | Age: 59
LOS: 1 days | End: 2024-04-29
Payer: COMMERCIAL

## 2024-04-29 ENCOUNTER — APPOINTMENT (OUTPATIENT)
Dept: MRI IMAGING | Facility: IMAGING CENTER | Age: 59
End: 2024-04-29
Payer: MEDICARE

## 2024-04-29 DIAGNOSIS — C79.31 SECONDARY MALIGNANT NEOPLASM OF BRAIN: ICD-10-CM

## 2024-04-29 DIAGNOSIS — Z90.3 ACQUIRED ABSENCE OF STOMACH [PART OF]: Chronic | ICD-10-CM

## 2024-04-29 DIAGNOSIS — Z00.8 ENCOUNTER FOR OTHER GENERAL EXAMINATION: ICD-10-CM

## 2024-04-29 DIAGNOSIS — Z90.49 ACQUIRED ABSENCE OF OTHER SPECIFIED PARTS OF DIGESTIVE TRACT: Chronic | ICD-10-CM

## 2024-04-29 DIAGNOSIS — Z98.51 TUBAL LIGATION STATUS: Chronic | ICD-10-CM

## 2024-04-29 DIAGNOSIS — Z90.12 ACQUIRED ABSENCE OF LEFT BREAST AND NIPPLE: Chronic | ICD-10-CM

## 2024-04-29 PROCEDURE — 70553 MRI BRAIN STEM W/O & W/DYE: CPT

## 2024-04-29 PROCEDURE — 70553 MRI BRAIN STEM W/O & W/DYE: CPT | Mod: 26

## 2024-04-29 PROCEDURE — A9585: CPT

## 2024-04-30 ENCOUNTER — APPOINTMENT (OUTPATIENT)
Dept: NUCLEAR MEDICINE | Facility: IMAGING CENTER | Age: 59
End: 2024-04-30
Payer: MEDICARE

## 2024-04-30 ENCOUNTER — OUTPATIENT (OUTPATIENT)
Dept: OUTPATIENT SERVICES | Facility: HOSPITAL | Age: 59
LOS: 1 days | End: 2024-04-30
Payer: COMMERCIAL

## 2024-04-30 DIAGNOSIS — Z98.51 TUBAL LIGATION STATUS: Chronic | ICD-10-CM

## 2024-04-30 DIAGNOSIS — Z90.49 ACQUIRED ABSENCE OF OTHER SPECIFIED PARTS OF DIGESTIVE TRACT: Chronic | ICD-10-CM

## 2024-04-30 DIAGNOSIS — Z90.12 ACQUIRED ABSENCE OF LEFT BREAST AND NIPPLE: Chronic | ICD-10-CM

## 2024-04-30 DIAGNOSIS — C50.919 MALIGNANT NEOPLASM OF UNSPECIFIED SITE OF UNSPECIFIED FEMALE BREAST: ICD-10-CM

## 2024-04-30 DIAGNOSIS — Z90.3 ACQUIRED ABSENCE OF STOMACH [PART OF]: Chronic | ICD-10-CM

## 2024-04-30 DIAGNOSIS — Z00.8 ENCOUNTER FOR OTHER GENERAL EXAMINATION: ICD-10-CM

## 2024-04-30 PROCEDURE — 78306 BONE IMAGING WHOLE BODY: CPT | Mod: 26

## 2024-04-30 PROCEDURE — 78306 BONE IMAGING WHOLE BODY: CPT

## 2024-04-30 PROCEDURE — A9561: CPT

## 2024-05-01 ENCOUNTER — APPOINTMENT (OUTPATIENT)
Dept: INTERNAL MEDICINE | Facility: CLINIC | Age: 59
End: 2024-05-01
Payer: MEDICARE

## 2024-05-01 ENCOUNTER — NON-APPOINTMENT (OUTPATIENT)
Age: 59
End: 2024-05-01

## 2024-05-01 VITALS
OXYGEN SATURATION: 99 % | HEIGHT: 64 IN | WEIGHT: 155 LBS | BODY MASS INDEX: 26.46 KG/M2 | DIASTOLIC BLOOD PRESSURE: 78 MMHG | TEMPERATURE: 98.3 F | HEART RATE: 65 BPM | SYSTOLIC BLOOD PRESSURE: 125 MMHG | RESPIRATION RATE: 17 BRPM

## 2024-05-01 DIAGNOSIS — R56.9 UNSPECIFIED CONVULSIONS: ICD-10-CM

## 2024-05-01 DIAGNOSIS — E78.00 PURE HYPERCHOLESTEROLEMIA, UNSPECIFIED: ICD-10-CM

## 2024-05-01 DIAGNOSIS — R73.03 PREDIABETES.: ICD-10-CM

## 2024-05-01 PROCEDURE — 99214 OFFICE O/P EST MOD 30 MIN: CPT

## 2024-05-01 PROCEDURE — G2211 COMPLEX E/M VISIT ADD ON: CPT

## 2024-05-01 NOTE — PLAN
[FreeTextEntry1] : 1. metastatic breast carcinoma * c/w current treatment * f/u with oncologist and RT 2. hypercholesterolemia * low cholesterol, low triglycerides diet, dietary counseling given; dietary avoidance discussed; diet and exercise reviewed with patient * c/w rosuvastatin 3. seizures * stable , on levetiracetam 4. prediabetes * Dietary counseling given, dietary avoidance discussed, diet and exercise reviewed with patient; patient reminded of importance of aerobic exercise, weight control, dietary compliance and regular glucose monitoring * to f/u in six months

## 2024-05-01 NOTE — HISTORY OF PRESENT ILLNESS
[FreeTextEntry1] : follow up Interview and discussion conducted in Hebrew by Hebrew speaking physician [de-identified] : 58 years old female with metastatic breast cancer, HLD, prediabetes presents for follow up to review and discuss labs test results; she states feeling well, seen by oncologist , had MRI brain surveillance, report reviewed.

## 2024-05-09 ENCOUNTER — APPOINTMENT (OUTPATIENT)
Dept: RADIATION ONCOLOGY | Facility: CLINIC | Age: 59
End: 2024-05-09
Payer: MEDICARE

## 2024-05-09 VITALS
TEMPERATURE: 97.2 F | DIASTOLIC BLOOD PRESSURE: 76 MMHG | HEART RATE: 62 BPM | SYSTOLIC BLOOD PRESSURE: 131 MMHG | WEIGHT: 162.92 LBS | OXYGEN SATURATION: 98 % | HEIGHT: 64 IN | BODY MASS INDEX: 27.81 KG/M2 | RESPIRATION RATE: 17 BRPM

## 2024-05-09 DIAGNOSIS — C79.31 SECONDARY MALIGNANT NEOPLASM OF BRAIN: ICD-10-CM

## 2024-05-09 PROCEDURE — 99213 OFFICE O/P EST LOW 20 MIN: CPT

## 2024-05-09 NOTE — HISTORY OF PRESENT ILLNESS
[FreeTextEntry1] : Ms. Shelley was seen initially for consult on 3/18//2021.  RT hx: She completed SRS between 3/23-3/25/2021 to a right frontal, left cerebellar, and right brainstem lesion.and additional SRS to a right parietal lesion, 1800 cgy on 5/11/2022 over 1 fraction. GKRS RIGHT frontal (re-irradiated) 2750cgy over 5 FXS8-30-9/7/2023 GKRS RIGHT frontal 24Gy over 3 Fxs 2/14-2/20/2024  ONCOLOGY HISTORY Ms. Shelley was diagnosed with breast ca in 2012 s/p radical mastectomy and adjuvant chemotherapy. She had ER positive 20%, MO positive 10%, HER-2/lisa 2+, FISH amplified breast cancer per outside physician report. She went on to receive adjuvant dose-dense AC chemotherapy for 4 cycles followed by Taxol plus Herceptin as well as adjuvant radiation therapy. She was subsequently begun adjuvant tamoxifen therapy. Her last menstrual period was in 2014. The patient then began to experience weight loss beginning in November 2018 and over 3-month period of time lost 30 pounds per her report. At approximately the same time she developed increasing shortness of breath, and ultimately presented to [facility] Emergency Department. She subsequently had a CT scan of the chest, which showed multiple mediastinal hilar right subclavian lymph nodes corresponding to abnormal FDG activity on PET scan consistent with metastatic carcinoma; there were tiny nodularities in both lungs- too small to accurately characterize- not present on prior examination and measuring up to 2 mm in both lungs. The possibility of metastasis was a consideration; she had no bony metastases; the liver demonstrated tiny poorly defined lesions less than 5 mm and difficult to characterize and visible on prior examinations with the possibility of metastasis also considered. A PET-CT scan showed multiple hypermetabolic lymph nodes in the mediastinum, right subclavian region and both adebayo felt to represent tumor; there were stellate lesions in the left breast region, felt to represent benign scars; the lungs demonstrated no significant abnormality with multiple tiny lung nodules seen on CT as previously noted; liver demonstrated no significant abnormality with multiple small poorly defined nodules seen on CT per my previous comments. The patient underwent a left breast core biopsy of the area of concern with a finding of fat necrosis and foreign body giant cell reaction as well as a separate fine-needle aspiration consistent with a ruptured cyst/fat necrosis. The patient consequently saw her oncologist, Dr. Carias, who sent the patient for ultrasound-guided core biopsies of the mediastinal lymph nodes with a finding of carcinoma, consistent with breast primary with estrogen receptor returning positive, greater than 50%, progesterone receptor negative, and HER-2/lisa 0 per outside report. The patient was subsequently begun on docetaxel, pertuzumab, trastuzumab, and exemestane beginning at the early 4/19, and subsequently switched to weekly paclitaxel secondary to side effects and continuing on trastuzumab / pertuzumab every 3 weeks as well as exemestane. The patient was seen for initial consultation 7/24/19 for a further opinion regarding treatment recommendations. She noted ongoing weakness, fatigue, and generalized malaise, although all of these had begun to improve gradually. Her last chemotherapy as noted above was approximately at 06/12/2019. She reports that her primary oncologist, Dr. Carias, told to discontinue exemestane upon admission to [facility] with the consideration restarting it post discharge. Slides for review at [facility] and it was confirmed that she had met breast cancer ER+ MO neg and Her 2 lisa neg. At the time of initial consult she felt well. Noted a left sided seizure in the days prior to consult lasting about 1 minute, which stopped on its own. Was recently prescribed keppra and dexamethasone 8mg BID. No confusion, dizziness, nausea, focal weakness. On examestane. She recently had a focal seizure left upper/lower extremity. MRI showed 3 lesions -   Lesion 1: Large dural based enhancing lesion is seen involving the high medial right frontal region. This lesion does appear to cross the midline and measures approximately 3.2 x 2.7 x 3.5 cm. Small amount of adjacent susceptibility is identified. Surrounding edema is identified. Localized mass effect is identified. Right to left shift (7.2 mm) seen.  Lesion 2: Enhancing lesion is seen involving the right upper tiffany. This lesion measures approximately 0.5 x 0.4 cm.  Lesion 3: Enhancing lesion is seen involving the superior left cerebellar region. This lesion measures approximately 1.2 x 1.0 cm. Surrounding edema is identified. She was treated with GK SRS.   6/2/2021- Ms. Shelley presents today for follow up. [employer] [id number] for Nepali was used. MRI scheduled today at 145. Has not seen Dr. Martin since radiation. States she is feeling fine. States she has intermittent sensation of "pulling" to her forehead. Denies any dizziness, confusion, headache, nausea, vomiting, visual disturbances, weakness. States 3 weeks ago she had an episode where her left upper extremity "was shaking" for one minute, denies any LOC.   9/29/2021- Ms. Shelley presents today for follow up. She is seen today with assistance of  [id number]. MRI brain done 9/24/2021 showed Previously noted enhancing lesions in the posterior fossa and supratentorial region have either decreased in size or are no longer seen which is compatible with response to therapy. Continue close interval follow-up is recommended. Continues to follow with Dr. Martin. continues on examestane with plans to repeat body scans in november or december. Feeling very well. no headaches, no new weakness, no numbness or tingling, no nausea or vomiting. overall doing well.   12/30/2021: Today Ms. Shelley presents for follow-up, [place] Tele[facility]  . MRI brain showed: "Right high paramedian region lesion measures similar in size though there is increased edema seen in association when compared with the prior 9/24/2021. Question of vague enhancement on the surface of the cervical medullary junction and along the surface of the brainstem extending into the spinal cord level. Consideration for possible leptomeningeal pathology should be given. Recommend correlation with CSF as clinically warranted." Per our review, no leptomeningeal disease appreciated. The vague focus of enhancement likely represents a blood vessel. The swelling of the right high paramedian region lesion actually displays decreased edema, compared to prior.  Denies any nausea, vomiting, headache, numbness or tingling. Denies any issues with balance or hearing.   3/31/2022: Pt presents for follow-up. Clinically feeling well. Denies headache, nausea, vomiting, or other complaints. Denies interval symptoms since last f/u. Denies being on steroids or reportedly any recent seizure events.  [place] pacific : Goran [id number]  5/4/2022- Ms. Shelley presents today for follow up.   [id number] used for visit today.Brain MRI 4/27/2022 showed  IMPRESSION: Right medial frontal parasagittal parenchymal lesion is larger when compared with 3/30/2022 and 12/30/2021 suggesting a combination of post therapy change and neoplasm supported by MR spectroscopy and MR perfusion. There is a 5 mm nodule in the right parietal white matter which is new since 12/30/2021 but is slightly larger in retrospect since 3/30/2022. No evidence of leptomeningeal enhancement at the craniocervical junction, this was likely artifactual and is no longer identified. Follows with Dr. Martin. continue on exemestane. Today she feels well. denies headaches, nausea, vomiting, focal weakness, numbness, tingling.  7/13/2022- Ms. Shelley presents today for follow up. Brain MRI done 7/8. Continues on exemestane and following with Dr. Martin.  CT CAP 5/6/2022 showed Stable exam since 12/17/2021. Bone scan 5/6 without definite evidence of bone mets. Today she is feeling very well. denies headaches, nasuea, vomiting, focal weakness, numbness, tingling, confusion  9/20/2022- Ms. Shelley presents today for follow up. pacific  [id number] used for visit today. She presented to [facility] on 9/14/22 with a seizure, left sided. A brain MRI done 9/14/2022 showed  -Increase in size of a right frontal parasagittal lesion since comparison MRI, now measuring up to 2.5 cm. Increasing surrounding vasogenic edema. -No new lesion identified. No acute infarct. Discharged on dexamethasone taper to 4mg BID and keppra 1000mg bId. Feeling well. no further seizures. no headaches, no nausea, no focal weakness, no numbness or tingling. in retrospect prior to seizure denies any focal neurological symptoms.  10/6/2022- Ms. Shelley presents today for follow up. MRI brain 9/30/2022 showed Right frontal parasagittal parenchymal enhancing mass increased size and increased vasogenic edema compared with 7/8/2022. MR perfusion and MR spectroscopy are suggestive of post therapy change rather than neoplasm progression. Question tiny 2.5 mm nodular enhancing focus right cingulate gyrus could represent an additional focus. Recommend follow-up. tapered to 2mg Dexa bid 2 weeks.   11/17/2022- Ms. Shelley presents today for follow up.  [id number] used for visit. She started avastin on 10/28 and received her second dose on 11/11. Continues on exemestane. MRI brain 11/12/2022 showed Decreased right frontal parasagittal lesion when compared to 9/30/2022, with significant decreased vasogenic edema consistent with response to treatment. MR perfusion is suggestive of a combination of neoplasm and post therapy change. Continued follow-up. CT CAP 10/12/2022 showed A new 2.7 cm right adnexal cyst with thin septation. Consider follow-up with pelvic ultrasound as indicated versus attention at short-term follow-up imaging. Feels well overall. no headaches, no nausea, no focal weakness, no numbness or tingling. notes some weakness to bilateral things recently, trouble going up the stairs.   2/23/2023: Ms. Shelley presents to clinic for follow-up.  [id number] used for visit. MRI-Brain performed on 2/11/2023 showed Enhancing lesion is again seen involving the high medial right frontal cortex which continues to decrease in size. Decreased surrounding edema is seen as well. There is decreased cerebral blood flow and cerebral blood volume seen. This could be compatible with treatment-related changes though the possibility of underlying tumor with response to therapy must be considered as well. Continued close interval follow-up is recommended  Ms. Shelley has a CT- Abd/Pel scheduled for 2/18/2023. continues on exemestane. No headaches, no nausea, no weakness, no seizures. notes some intermittent and infrequent visual disturbances on the right side over the last month. Last Avastin 12/16/2022.  6/29/2023- Ms. Shelley presents today for follow up. Continues to follow with Dr. Martin. Continues on Exemestane.   MRI brain done 6/8/2023  showed Previously noted enhancing lesion involving the high medial right frontal region has increased in size with increased edema as well.  CT AP 2/18/2023 showed No evidence of intra-abdominal metastatic disease. Interval resolution of previously identified right adnexal cyst.  In the interim reports having an attack/trembling of the left hand and leg lasting~ 5mins (episode happened a week ago) Admits to skipping her PM AED doses for ~ 1 month Recalls a similar episode about 2 years ago Denies LOC/injuries/visual disturbance/unilateral extremity weakness  Visit dated 8/23/2023 Patient returns for continuation of care with completed images for review. Continues to follow with Dr. Martin on Exemestane Reports noticing over the past 2 weeks being a little off balance resulting in falls but w/o injury. Denies HAs, dizziness, speech/visual disturbance. MRI brain w w/o contrast 8/1//2023 IMPRESSION: Mild increase in size of right paramedian frontal lesion with thick peripheral rim of hyperperfusion consistent with viable tumor and central hypoperfusion consistent with necrosis. Overall findings are concerning for progression of tumor.  PET/CT [place] 8/7/2023 FINDINGS: Increased FES uptake is noted in a right frontal paramedian mass with a large surrounding area of edema. The volume of FES uptake corresponds to the entire volume of the enhancing lesion seen on Brain MRI from 8/1/2023 most consistent with viable tumor in the entire mass. No additional FES positive lesions are noted in the brain. IMPRESSION: Findings most consistent with active metastatic breast cancer in the brain.  Visit dated 9/14/2023. Patient recently completed   GKRS RIGHT frontal (re-irradiated) 2750cgy over 5 FXSs 8/30-9/7/2023. Reports she was doing well but on 9/1/2/23 noticed left sided tremor lasted a few seconds previous episodes lasted longer periods. Saw Dr Joay today and Keppra was increased (1250mg 2 x daily)) Denies unilateral extremity weakness, numbness, tingling, nausea, vomiting, headaches or other neurologic symptoms. No issues with speech or comprehension. Endorses fatigue. post treatment. She offers no new symptoms/concerns today. ON DEX 2mg 2x daily  Visit dated 10/17/2023 Patient returns for routine f/u and progress check with cranial images for review. Denies N/V, HA/unilateral extremity weakness/memory changes/gait disturbance/bowel/bladder dysfunction or other neurologic symptoms. No issues with speech or comprehension Concerned with trouble sleeping. Continues DEX 2 mg daily. On Keppra 1250mg 2xdaily managed by Dr Joya Denies seizure episodes (left side shaking)  Follows with Dr. Martin on Exemestane.   MRI brain w w/o contrast 10/15/2023 Final read pending at time of this entry  Visit dated1/24/2024 Patient returns for routine f/u with cranial images for review. Reports doing well. Denies signs of recurrent seizure episodes. Continues Keppra 1250mg BID. Denies N/V, unilateral extremity weakness/memory changes/gait disturbance/bowel/bladder dysfunction or other neurologic symptoms. No issues with speech or comprehension. Energy level okay participating in activity w/o difficulty.  Continues to follow with Dr. Martin on Exemestane. PETct scheduled for 1/25/2024 to r/o occult POD.  MRI brain w w/o contrast 1/17/2024 IMPRESSION: Lesion within the posterior medial aspect of the right frontal lobe appears smaller measuring 1.5 x 1.0 x 2.1 cm previously 1.8 x 1.3 x 2.8 cm. Marked improvement in the amount of associated edema. Smaller enhancing lesion just caudal to the above lesion within the single gyrus measuring 0.7 x 0.6 x 0.6 cm. This is new/larger from prior exam. Mild adjacent leptomeningeal enhancement.  Visit dated 2/8/2024 Patient returns on the recommendation of her oncologist. Seems she established care with a new oncologist now that Dr. Martin no longer works in the practice PET ct was ordered and she was told to meet with rad/onc. Overall doing well Denies HAs/evidence of seizures. Continues utlize  AEDs.  Continues on Exemestane will be seeing Dr Connelly 2/29/2024.  PETct FDG  whole body 1/25/2024 IMPRESSION: Compared to FES-PET/CT scan dated 8/17/2023: 1. Resolution of FES activity in right posterior frontal paramedian mass which is decreased in size on MRI, measuring 1.5 x 1.0 x 2.1 cm on 1/17/2024, as compared to 1.8 x 1.3 x 2.8 cm on 10/15/2023, and 2.6 x 1.7 x 2.6 cm on 8/1/2023. Findings are compatible with response to interval therapy. 2. New small, discrete focus of increased radiotracer activity in the right paramedian cingulate gyrus, corresponding to the new/larger enhancing lesion measuring 0.7 x 0.6 x 0.6 cm on recent MRI, compatible with ER-positive metastasis (SUV 6.4; image 43 of brain series). 3. The remainder of the study demonstrates no evidence of ER positive disease.   Visit dated 3/20/24 Patient returns after recently completing GKRS to RIGHT frontal lesion 24Gy over 3 Fxs 2/14-2/20/2024. Overall doing well and w/o any new concerns. Denies any recurrent seizure activity. Continues Keppra 1250 mg in AM + PM managed by Dr. Joya Denies N/V, HA/unilateral extremity weakness/memory changes/gait disturbance/bowel/bladder dysfunction or other neurologic symptoms. No issues with speech or comprehension.  Continues single agent Exemestane now under the care of Dr Connelly. Next cranial image in April  Visit dated 5/9/2024 Patient returns for routine f/u and progress check with cranial images for review. Continues to do well. Denies recurrence seizure activity. Continues prescribed Antiepileptics. Minimal pain left side of head, but this doesn't limit participation in activity. Denies difficulty with speech/unilateral extremity weakness  Continues single agent Exemestane now under the care of Dr Connelly since.  MRI brain w w/o contrast 4/29/2024 IMPRESSION: 1.  Improvement of the lesion at the right cingulate gyrus. 2.  Stable enhancement at the right superior frontal gyrus, with mildly worsening vasogenic edema. 3.  Stable enhancement in the corpus callosum. 4.  Continued routine surveillance recommended.  NM bone scan 4/30/2024 IMPRESSION: No significant interval change. No evidence of osseous metastases.

## 2024-05-09 NOTE — PHYSICAL EXAM
[de-identified] : LIMITED visit telephonic [General Appearance - Well Developed] : well developed [] : no respiratory distress [No Focal Deficits] : no focal deficits

## 2024-05-09 NOTE — REASON FOR VISIT
[Other: ______] : provided by LENA [Interpreters_IDNumber] : [id number] [Interpreters_FullName] : [place] [TWNoteComboBox1] : [place]

## 2024-05-09 NOTE — REVIEW OF SYSTEMS
[Confused] : no confusion [Dizziness] : no dizziness [de-identified] : no seizures episodes noted, minimal pain left side of head

## 2024-05-15 ENCOUNTER — NON-APPOINTMENT (OUTPATIENT)
Age: 59
End: 2024-05-15

## 2024-05-15 ENCOUNTER — APPOINTMENT (OUTPATIENT)
Dept: RADIATION ONCOLOGY | Facility: CLINIC | Age: 59
End: 2024-05-15

## 2024-07-11 ENCOUNTER — OUTPATIENT (OUTPATIENT)
Dept: OUTPATIENT SERVICES | Facility: HOSPITAL | Age: 59
LOS: 1 days | Discharge: HOME | End: 2024-07-11

## 2024-07-11 DIAGNOSIS — Z90.12 ACQUIRED ABSENCE OF LEFT BREAST AND NIPPLE: Chronic | ICD-10-CM

## 2024-07-11 DIAGNOSIS — Z98.51 TUBAL LIGATION STATUS: Chronic | ICD-10-CM

## 2024-07-11 DIAGNOSIS — C50.919 MALIGNANT NEOPLASM OF UNSPECIFIED SITE OF UNSPECIFIED FEMALE BREAST: ICD-10-CM

## 2024-07-11 DIAGNOSIS — Z90.3 ACQUIRED ABSENCE OF STOMACH [PART OF]: Chronic | ICD-10-CM

## 2024-07-11 DIAGNOSIS — Z90.49 ACQUIRED ABSENCE OF OTHER SPECIFIED PARTS OF DIGESTIVE TRACT: Chronic | ICD-10-CM

## 2024-07-15 ENCOUNTER — APPOINTMENT (OUTPATIENT)
Dept: HEMATOLOGY ONCOLOGY | Facility: CLINIC | Age: 59
End: 2024-07-15
Payer: MEDICARE

## 2024-07-15 ENCOUNTER — RESULT REVIEW (OUTPATIENT)
Age: 59
End: 2024-07-15

## 2024-07-15 VITALS
TEMPERATURE: 97.5 F | DIASTOLIC BLOOD PRESSURE: 78 MMHG | RESPIRATION RATE: 16 BRPM | WEIGHT: 159.83 LBS | HEART RATE: 61 BPM | OXYGEN SATURATION: 99 % | BODY MASS INDEX: 27.43 KG/M2 | SYSTOLIC BLOOD PRESSURE: 120 MMHG

## 2024-07-15 DIAGNOSIS — C50.919 MALIGNANT NEOPLASM OF UNSPECIFIED SITE OF UNSPECIFIED FEMALE BREAST: ICD-10-CM

## 2024-07-15 LAB
BASOPHILS # BLD AUTO: 0.02 K/UL — SIGNIFICANT CHANGE UP (ref 0–0.2)
BASOPHILS NFR BLD AUTO: 0.3 % — SIGNIFICANT CHANGE UP (ref 0–2)
EOSINOPHIL # BLD AUTO: 0.15 K/UL — SIGNIFICANT CHANGE UP (ref 0–0.5)
EOSINOPHIL NFR BLD AUTO: 2.1 % — SIGNIFICANT CHANGE UP (ref 0–6)
HCT VFR BLD CALC: 37.4 % — SIGNIFICANT CHANGE UP (ref 34.5–45)
HGB BLD-MCNC: 12.2 G/DL — SIGNIFICANT CHANGE UP (ref 11.5–15.5)
IMM GRANULOCYTES NFR BLD AUTO: 0.7 % — SIGNIFICANT CHANGE UP (ref 0–0.9)
LYMPHOCYTES # BLD AUTO: 2.54 K/UL — SIGNIFICANT CHANGE UP (ref 1–3.3)
LYMPHOCYTES # BLD AUTO: 34.8 % — SIGNIFICANT CHANGE UP (ref 13–44)
MCHC RBC-ENTMCNC: 28.2 PG — SIGNIFICANT CHANGE UP (ref 27–34)
MCHC RBC-ENTMCNC: 32.6 G/DL — SIGNIFICANT CHANGE UP (ref 32–36)
MCV RBC AUTO: 86.6 FL — SIGNIFICANT CHANGE UP (ref 80–100)
MONOCYTES # BLD AUTO: 0.68 K/UL — SIGNIFICANT CHANGE UP (ref 0–0.9)
MONOCYTES NFR BLD AUTO: 9.3 % — SIGNIFICANT CHANGE UP (ref 2–14)
NEUTROPHILS # BLD AUTO: 3.85 K/UL — SIGNIFICANT CHANGE UP (ref 1.8–7.4)
NEUTROPHILS NFR BLD AUTO: 52.8 % — SIGNIFICANT CHANGE UP (ref 43–77)
NRBC # BLD: 0 /100 WBCS — SIGNIFICANT CHANGE UP (ref 0–0)
PLATELET # BLD AUTO: 208 K/UL — SIGNIFICANT CHANGE UP (ref 150–400)
RBC # BLD: 4.32 M/UL — SIGNIFICANT CHANGE UP (ref 3.8–5.2)
RBC # FLD: 17.9 % — HIGH (ref 10.3–14.5)
WBC # BLD: 7.29 K/UL — SIGNIFICANT CHANGE UP (ref 3.8–10.5)
WBC # FLD AUTO: 7.29 K/UL — SIGNIFICANT CHANGE UP (ref 3.8–10.5)

## 2024-07-15 PROCEDURE — 99214 OFFICE O/P EST MOD 30 MIN: CPT

## 2024-07-15 PROCEDURE — G2211 COMPLEX E/M VISIT ADD ON: CPT

## 2024-07-15 RX ORDER — AZITHROMYCIN 250 MG/1
250 TABLET, FILM COATED ORAL
Qty: 1 | Refills: 0 | Status: ACTIVE | COMMUNITY
Start: 2024-07-15 | End: 1900-01-01

## 2024-07-19 NOTE — PROGRESS NOTE ADULT - ASSESSMENT
GIB   on protonix   fu with GI     Herpes   on valacyclovir    s/p distal gastrectomy and billroth II reconstruction  CV stable  fu with surgery     HTN  BP labile  for now will cont to monitor verbal instruction

## 2024-08-07 NOTE — REVIEW OF SYSTEMS
[Negative] : Respiratory [Confused] : no confusion [Dizziness] : no dizziness [de-identified] : no seizures episodes noted, minimal pain left side of head

## 2024-08-07 NOTE — REASON FOR VISIT
[Routine Follow-Up] : routine follow-up visit for [Brain Metastasis] : brain metastasis [Other: ______] : provided by LENA [TWNoteComboBox1] : [place]

## 2024-08-07 NOTE — END OF VISIT
[Time Spent: ___ minutes] : I have spent [unfilled] minutes of time on the encounter.
Abdominal pain, nausea/vomiting

## 2024-08-07 NOTE — HISTORY OF PRESENT ILLNESS
[FreeTextEntry1] : Ms. Shelley was seen initially for consult on 3/18//2021.  RT hx: She completed SRS between 3/23-3/25/2021 to a right frontal, left cerebellar, and right brainstem lesion.and additional SRS to a right parietal lesion, 1800 cgy on 5/11/2022 over 1 fraction. GKRS RIGHT frontal (re-irradiated) 2750cgy over 5 FXS8-30-9/7/2023 GKRS RIGHT frontal 24Gy over 3 Fxs 2/14-2/20/2024  ONCOLOGY HISTORY Ms. Shelley was diagnosed with breast ca in 2012 s/p radical mastectomy and adjuvant chemotherapy. She had ER positive 20%, FL positive 10%, HER-2/lisa 2+, FISH amplified breast cancer per outside physician report. She went on to receive adjuvant dose-dense AC chemotherapy for 4 cycles followed by Taxol plus Herceptin as well as adjuvant radiation therapy. She was subsequently begun adjuvant tamoxifen therapy. Her last menstrual period was in 2014. The patient then began to experience weight loss beginning in November 2018 and over 3-month period of time lost 30 pounds per her report. At approximately the same time she developed increasing shortness of breath, and ultimately presented to [facility] Emergency Department. She subsequently had a CT scan of the chest, which showed multiple mediastinal hilar right subclavian lymph nodes corresponding to abnormal FDG activity on PET scan consistent with metastatic carcinoma; there were tiny nodularities in both lungs- too small to accurately characterize- not present on prior examination and measuring up to 2 mm in both lungs. The possibility of metastasis was a consideration; she had no bony metastases; the liver demonstrated tiny poorly defined lesions less than 5 mm and difficult to characterize and visible on prior examinations with the possibility of metastasis also considered. A PET-CT scan showed multiple hypermetabolic lymph nodes in the mediastinum, right subclavian region and both adebayo felt to represent tumor; there were stellate lesions in the left breast region, felt to represent benign scars; the lungs demonstrated no significant abnormality with multiple tiny lung nodules seen on CT as previously noted; liver demonstrated no significant abnormality with multiple small poorly defined nodules seen on CT per my previous comments. The patient underwent a left breast core biopsy of the area of concern with a finding of fat necrosis and foreign body giant cell reaction as well as a separate fine-needle aspiration consistent with a ruptured cyst/fat necrosis. The patient consequently saw her oncologist, Dr. Carias, who sent the patient for ultrasound-guided core biopsies of the mediastinal lymph nodes with a finding of carcinoma, consistent with breast primary with estrogen receptor returning positive, greater than 50%, progesterone receptor negative, and HER-2/lisa 0 per outside report. The patient was subsequently begun on docetaxel, pertuzumab, trastuzumab, and exemestane beginning at the early 4/19, and subsequently switched to weekly paclitaxel secondary to side effects and continuing on trastuzumab / pertuzumab every 3 weeks as well as exemestane. The patient was seen for initial consultation 7/24/19 for a further opinion regarding treatment recommendations. She noted ongoing weakness, fatigue, and generalized malaise, although all of these had begun to improve gradually. Her last chemotherapy as noted above was approximately at 06/12/2019. She reports that her primary oncologist, Dr. Carias, told to discontinue exemestane upon admission to [facility] with the consideration restarting it post discharge. Slides for review at [facility] and it was confirmed that she had met breast cancer ER+ FL neg and Her 2 lisa neg. At the time of initial consult she felt well. Noted a left sided seizure in the days prior to consult lasting about 1 minute, which stopped on its own. Was recently prescribed keppra and dexamethasone 8mg BID. No confusion, dizziness, nausea, focal weakness. On examestane. She recently had a focal seizure left upper/lower extremity. MRI showed 3 lesions -   Lesion 1: Large dural based enhancing lesion is seen involving the high medial right frontal region. This lesion does appear to cross the midline and measures approximately 3.2 x 2.7 x 3.5 cm. Small amount of adjacent susceptibility is identified. Surrounding edema is identified. Localized mass effect is identified. Right to left shift (7.2 mm) seen.  Lesion 2: Enhancing lesion is seen involving the right upper tiffany. This lesion measures approximately 0.5 x 0.4 cm.  Lesion 3: Enhancing lesion is seen involving the superior left cerebellar region. This lesion measures approximately 1.2 x 1.0 cm. Surrounding edema is identified. She was treated with GK SRS.   6/2/2021- Ms. Shelley presents today for follow up. [place] [employer] [id number] for Pashto was used. MRI scheduled today at 145. Has not seen Dr. Martin since radiation. States she is feeling fine. States she has intermittent sensation of "pulling" to her forehead. Denies any dizziness, confusion, headache, nausea, vomiting, visual disturbances, weakness. States 3 weeks ago she had an episode where her left upper extremity "was shaking" for one minute, denies any LOC.   9/29/2021- Ms. Shelley presents today for follow up. She is seen today with assistance of  [id number]. MRI brain done 9/24/2021 showed Previously noted enhancing lesions in the posterior fossa and supratentorial region have either decreased in size or are no longer seen which is compatible with response to therapy. Continue close interval follow-up is recommended. Continues to follow with Dr. Martin. continues on examestane with plans to repeat body scans in november or december. Feeling very well. no headaches, no new weakness, no numbness or tingling, no nausea or vomiting. overall doing well.   12/30/2021: Today Ms. Shelley presents for follow-up, [facility]  . MRI brain showed: "Right high paramedian region lesion measures similar in size though there is increased edema seen in association when compared with the prior 9/24/2021. Question of vague enhancement on the surface of the cervical medullary junction and along the surface of the brainstem extending into the spinal cord level. Consideration for possible leptomeningeal pathology should be given. Recommend correlation with CSF as clinically warranted." Per our review, no leptomeningeal disease appreciated. The vague focus of enhancement likely represents a blood vessel. The swelling of the right high paramedian region lesion actually displays decreased edema, compared to prior.  Denies any nausea, vomiting, headache, numbness or tingling. Denies any issues with balance or hearing.   3/31/2022: Pt presents for follow-up. Clinically feeling well. Denies headache, nausea, vomiting, or other complaints. Denies interval symptoms since last f/u. Denies being on steroids or reportedly any recent seizure events.  [employer] pacific : Goran [id number]  5/4/2022- Ms. Shelley presents today for follow up.   [id number] used for visit today.Brain MRI 4/27/2022 showed  IMPRESSION: Right medial frontal parasagittal parenchymal lesion is larger when compared with 3/30/2022 and 12/30/2021 suggesting a combination of post therapy change and neoplasm supported by MR spectroscopy and MR perfusion. There is a 5 mm nodule in the right parietal white matter which is new since 12/30/2021 but is slightly larger in retrospect since 3/30/2022. No evidence of leptomeningeal enhancement at the craniocervical junction, this was likely artifactual and is no longer identified. Follows with Dr. Martin. continue on exemestane. Today she feels well. denies headaches, nausea, vomiting, focal weakness, numbness, tingling.  7/13/2022- Ms. Shelley presents today for follow up. Brain MRI done 7/8. Continues on exemestane and following with Dr. Martin.  CT CAP 5/6/2022 showed Stable exam since 12/17/2021. Bone scan 5/6 without definite evidence of bone mets. Today she is feeling very well. denies headaches, nasuea, vomiting, focal weakness, numbness, tingling, confusion  9/20/2022- Ms. Shelley presents today for follow up. pacific  [id number] used for visit today. She presented to [facility] on 9/14/22 with a seizure, left sided. A brain MRI done 9/14/2022 showed  -Increase in size of a right frontal parasagittal lesion since comparison MRI, now measuring up to 2.5 cm. Increasing surrounding vasogenic edema. -No new lesion identified. No acute infarct. Discharged on dexamethasone taper to 4mg BID and keppra 1000mg bId. Feeling well. no further seizures. no headaches, no nausea, no focal weakness, no numbness or tingling. in retrospect prior to seizure denies any focal neurological symptoms.  10/6/2022- Ms. Shelley presents today for follow up. MRI brain 9/30/2022 showed Right frontal parasagittal parenchymal enhancing mass increased size and increased vasogenic edema compared with 7/8/2022. MR perfusion and MR spectroscopy are suggestive of post therapy change rather than neoplasm progression. Question tiny 2.5 mm nodular enhancing focus right cingulate gyrus could represent an additional focus. Recommend follow-up. tapered to 2mg Dexa bid 2 weeks.   11/17/2022- Ms. Shelley presents today for follow up.  [id number] used for visit. She started avastin on 10/28 and received her second dose on 11/11. Continues on exemestane. MRI brain 11/12/2022 showed Decreased right frontal parasagittal lesion when compared to 9/30/2022, with significant decreased vasogenic edema consistent with response to treatment. MR perfusion is suggestive of a combination of neoplasm and post therapy change. Continued follow-up. CT CAP 10/12/2022 showed A new 2.7 cm right adnexal cyst with thin septation. Consider follow-up with pelvic ultrasound as indicated versus attention at short-term follow-up imaging. Feels well overall. no headaches, no nausea, no focal weakness, no numbness or tingling. notes some weakness to bilateral things recently, trouble going up the stairs.   2/23/2023: Ms. Shelley presents to clinic for follow-up.  [id number] used for visit. MRI-Brain performed on 2/11/2023 showed Enhancing lesion is again seen involving the high medial right frontal cortex which continues to decrease in size. Decreased surrounding edema is seen as well. There is decreased cerebral blood flow and cerebral blood volume seen. This could be compatible with treatment-related changes though the possibility of underlying tumor with response to therapy must be considered as well. Continued close interval follow-up is recommended  Ms. Shelley has a CT- Abd/Pel scheduled for 2/18/2023. continues on exemestane. No headaches, no nausea, no weakness, no seizures. notes some intermittent and infrequent visual disturbances on the right side over the last month. Last Avastin 12/16/2022.  6/29/2023- Ms. Shelley presents today for follow up. Continues to follow with Dr. Martin. Continues on Exemestane.   MRI brain done 6/8/2023  showed Previously noted enhancing lesion involving the high medial right frontal region has increased in size with increased edema as well.  CT AP 2/18/2023 showed No evidence of intra-abdominal metastatic disease. Interval resolution of previously identified right adnexal cyst.  In the interim reports having an attack/trembling of the left hand and leg lasting~ 5mins (episode happened a week ago) Admits to skipping her PM AED doses for ~ 1 month Recalls a similar episode about 2 years ago Denies LOC/injuries/visual disturbance/unilateral extremity weakness  Visit dated 8/23/2023 Patient returns for continuation of care with completed images for review. Continues to follow with Dr. Martin on Exemestane Reports noticing over the past 2 weeks being a little off balance resulting in falls but w/o injury. Denies HAs, dizziness, speech/visual disturbance. MRI brain w w/o contrast 8/1//2023 IMPRESSION: Mild increase in size of right paramedian frontal lesion with thick peripheral rim of hyperperfusion consistent with viable tumor and central hypoperfusion consistent with necrosis. Overall findings are concerning for progression of tumor.  PET/CT [facility] 8/7/2023 FINDINGS: Increased FES uptake is noted in a right frontal paramedian mass with a large surrounding area of edema. The volume of FES uptake corresponds to the entire volume of the enhancing lesion seen on Brain MRI from 8/1/2023 most consistent with viable tumor in the entire mass. No additional FES positive lesions are noted in the brain. IMPRESSION: Findings most consistent with active metastatic breast cancer in the brain.  Visit dated 9/14/2023. Patient recently completed   GKRS RIGHT frontal (re-irradiated) 2750cgy over 5 FXSs 8/30-9/7/2023. Reports she was doing well but on 9/1/2/23 noticed left sided tremor lasted a few seconds previous episodes lasted longer periods. Saw Dr Joya today and Keppra was increased (1250mg 2 x daily)) Denies unilateral extremity weakness, numbness, tingling, nausea, vomiting, headaches or other neurologic symptoms. No issues with speech or comprehension. Endorses fatigue. post treatment. She offers no new symptoms/concerns today. ON DEX 2mg 2x daily  Visit dated 10/17/2023 Patient returns for routine f/u and progress check with cranial images for review. Denies N/V, HA/unilateral extremity weakness/memory changes/gait disturbance/bowel/bladder dysfunction or other neurologic symptoms. No issues with speech or comprehension Concerned with trouble sleeping. Continues DEX 2 mg daily. On Keppra 1250mg 2xdaily managed by Dr Joya Denies seizure episodes (left side shaking)  Follows with Dr. Martin on Exemestane.   MRI brain w w/o contrast 10/15/2023 Final read pending at time of this entry  Visit dated1/24/2024 Patient returns for routine f/u with cranial images for review. Reports doing well. Denies signs of recurrent seizure episodes. Continues Keppra 1250mg BID. Denies N/V, unilateral extremity weakness/memory changes/gait disturbance/bowel/bladder dysfunction or other neurologic symptoms. No issues with speech or comprehension. Energy level okay participating in activity w/o difficulty.  Continues to follow with Dr. Martin on Exemestane. PETct scheduled for 1/25/2024 to r/o occult POD.  MRI brain w w/o contrast 1/17/2024 IMPRESSION: Lesion within the posterior medial aspect of the right frontal lobe appears smaller measuring 1.5 x 1.0 x 2.1 cm previously 1.8 x 1.3 x 2.8 cm. Marked improvement in the amount of associated edema. Smaller enhancing lesion just caudal to the above lesion within the single gyrus measuring 0.7 x 0.6 x 0.6 cm. This is new/larger from prior exam. Mild adjacent leptomeningeal enhancement.  Visit dated 2/8/2024 Patient returns on the recommendation of her oncologist. Seems she established care with a new oncologist now that Dr. Martin no longer works in the practice PET ct was ordered and she was told to meet with rad/onc. Overall doing well Denies HAs/evidence of seizures. Continues utlize  AEDs.  Continues on Exemestane will be seeing Dr Connelly 2/29/2024.  PETct FDG  whole body 1/25/2024 IMPRESSION: Compared to FES-PET/CT scan dated 8/17/2023: 1. Resolution of FES activity in right posterior frontal paramedian mass which is decreased in size on MRI, measuring 1.5 x 1.0 x 2.1 cm on 1/17/2024, as compared to 1.8 x 1.3 x 2.8 cm on 10/15/2023, and 2.6 x 1.7 x 2.6 cm on 8/1/2023. Findings are compatible with response to interval therapy. 2. New small, discrete focus of increased radiotracer activity in the right paramedian cingulate gyrus, corresponding to the new/larger enhancing lesion measuring 0.7 x 0.6 x 0.6 cm on recent MRI, compatible with ER-positive metastasis (SUV 6.4; image 43 of brain series). 3. The remainder of the study demonstrates no evidence of ER positive disease.   Visit dated 3/20/24 Patient returns after recently completing GKRS to RIGHT frontal lesion 24Gy over 3 Fxs 2/14-2/20/2024. Overall doing well and w/o any new concerns. Denies any recurrent seizure activity. Continues Keppra 1250 mg in AM + PM managed by Dr. Joya Denies N/V, HA/unilateral extremity weakness/memory changes/gait disturbance/bowel/bladder dysfunction or other neurologic symptoms. No issues with speech or comprehension.  Continues single agent Exemestane now under the care of Dr Connelly. Next cranial image in April  Visit dated 5/9/2024 Patient returns for routine f/u and progress check with cranial images for review. Continues to do well. Denies recurrence seizure activity. Continues prescribed Antiepileptics. Minimal pain left side of head, but this doesn't limit participation in activity. Denies difficulty with speech/unilateral extremity weakness  Continues single agent Exemestane now under the care of Dr Connelly since.  MRI brain w w/o contrast 4/29/2024 IMPRESSION: 1.  Improvement of the lesion at the right cingulate gyrus. 2.  Stable enhancement at the right superior frontal gyrus, with mildly worsening vasogenic edema. 3.  Stable enhancement in the corpus callosum. 4.  Continued routine surveillance recommended.  NM bone scan 4/30/2024 IMPRESSION: No significant interval change. No evidence of osseous metastases.  VISIT DATED 8/14/2024 Patient presents for routine f/u and progress check with cranial images completed for review. Reports**  Continues Exemestane now under the care of Dr Connelly with plans for [place] PET 8/21/2024

## 2024-08-14 ENCOUNTER — APPOINTMENT (OUTPATIENT)
Dept: RADIATION ONCOLOGY | Facility: CLINIC | Age: 59
End: 2024-08-14

## 2024-08-21 ENCOUNTER — APPOINTMENT (OUTPATIENT)
Dept: NUCLEAR MEDICINE | Facility: CLINIC | Age: 59
End: 2024-08-21
Payer: MEDICARE

## 2024-08-21 PROCEDURE — 78999 UNLISTED MISC PX DX NUC MED: CPT

## 2024-08-21 PROCEDURE — A9591: CPT

## 2024-08-21 PROCEDURE — 78816 PET IMAGE W/CT FULL BODY: CPT | Mod: PS

## 2024-08-22 NOTE — PATIENT PROFILE ADULT - BRAND OF COVID-19 VACCINATION
Quality 130: Documentation Of Current Medications In The Medical Record: Current Medications Documented
Detail Level: Detailed
3
Moderna dose 1, 2, and 3

## 2024-08-23 NOTE — ASU PATIENT PROFILE, ADULT - NS PRO PT RIGHT SUPPORT PERSON
----- Message from Allyssa Pike MD sent at 8/22/2024  4:56 PM CDT -----  Her CMP is similar to prior except for some mild elevation of AST to 41.  She also has a mild drop in her hemoglobin from 13.5 to 11.8.  Recommend repeating her hemoglobin in 2 weeks.    
Message sent to patient via [facility] portal updating patient of lab results and provider recommendations.   
Declines

## 2024-08-26 ENCOUNTER — APPOINTMENT (OUTPATIENT)
Dept: MRI IMAGING | Facility: CLINIC | Age: 59
End: 2024-08-26
Payer: MEDICARE

## 2024-08-26 PROCEDURE — A9585: CPT | Mod: JW

## 2024-08-26 PROCEDURE — 70553 MRI BRAIN STEM W/O & W/DYE: CPT

## 2024-09-05 ENCOUNTER — APPOINTMENT (OUTPATIENT)
Dept: RADIATION ONCOLOGY | Facility: CLINIC | Age: 59
End: 2024-09-05
Payer: MEDICARE

## 2024-09-05 VITALS
DIASTOLIC BLOOD PRESSURE: 79 MMHG | TEMPERATURE: 97.7 F | RESPIRATION RATE: 18 BRPM | BODY MASS INDEX: 27.55 KG/M2 | SYSTOLIC BLOOD PRESSURE: 133 MMHG | HEART RATE: 61 BPM | HEIGHT: 64 IN | WEIGHT: 161.38 LBS | OXYGEN SATURATION: 98 %

## 2024-09-05 DIAGNOSIS — C79.31 SECONDARY MALIGNANT NEOPLASM OF BRAIN: ICD-10-CM

## 2024-09-05 PROCEDURE — 99215 OFFICE O/P EST HI 40 MIN: CPT

## 2024-09-05 NOTE — REASON FOR VISIT
[Interpreters_IDNumber] : [id number] [Interpreters_FullName] : Matthew [[id number]] : [place]

## 2024-09-05 NOTE — HISTORY OF PRESENT ILLNESS
[FreeTextEntry1] : Ms. Shelley was seen initially for consult on 3/18//2021.  RT hx: She completed SRS between 3/23-3/25/2021 to a right frontal, left cerebellar, and right brainstem lesion.and additional SRS to a right parietal lesion, 1800 cgy on 5/11/2022 over 1 fraction. GKRS RIGHT frontal (re-irradiated) 2750cgy over 5 FXS8-30-9/7/2023 GKRS RIGHT frontal 24Gy over 3 Fxs 2/14-2/20/2024  ONCOLOGY HISTORY Ms. Shelley was diagnosed with breast ca in 2012 s/p radical mastectomy and adjuvant chemotherapy. She had ER positive 20%, NE positive 10%, HER-2/lisa 2+, FISH amplified breast cancer per outside physician report. She went on to receive adjuvant dose-dense AC chemotherapy for 4 cycles followed by Taxol plus Herceptin as well as adjuvant radiation therapy. She was subsequently begun adjuvant tamoxifen therapy. Her last menstrual period was in 2014. The patient then began to experience weight loss beginning in November 2018 and over 3-month period of time lost 30 pounds per her report. At approximately the same time she developed increasing shortness of breath, and ultimately presented to [facility] Emergency [facility]. She subsequently had a CT scan of the chest, which showed multiple mediastinal hilar right subclavian lymph nodes corresponding to abnormal FDG activity on PET scan consistent with metastatic carcinoma; there were tiny nodularities in both lungs- too small to accurately characterize- not present on prior examination and measuring up to 2 mm in both lungs. The possibility of metastasis was a consideration; she had no bony metastases; the liver demonstrated tiny poorly defined lesions less than 5 mm and difficult to characterize and visible on prior examinations with the possibility of metastasis also considered. A PET-CT scan showed multiple hypermetabolic lymph nodes in the mediastinum, right subclavian region and both adebayo felt to represent tumor; there were stellate lesions in the left breast region, felt to represent benign scars; the lungs demonstrated no significant abnormality with multiple tiny lung nodules seen on CT as previously noted; liver demonstrated no significant abnormality with multiple small poorly defined nodules seen on CT per my previous comments. The patient underwent a left breast core biopsy of the area of concern with a finding of fat necrosis and foreign body giant cell reaction as well as a separate fine-needle aspiration consistent with a ruptured cyst/fat necrosis. The patient consequently saw her oncologist, Dr. Carias, who sent the patient for ultrasound-guided core biopsies of the mediastinal lymph nodes with a finding of carcinoma, consistent with breast primary with estrogen receptor returning positive, greater than 50%, progesterone receptor negative, and HER-2/lisa 0 per outside report. The patient was subsequently begun on docetaxel, pertuzumab, trastuzumab, and exemestane beginning at the early 4/19, and subsequently switched to weekly paclitaxel secondary to side effects and continuing on trastuzumab / pertuzumab every 3 weeks as well as exemestane. The patient was seen for initial consultation 7/24/19 for a further opinion regarding treatment recommendations. She noted ongoing weakness, fatigue, and generalized malaise, although all of these had begun to improve gradually. Her last chemotherapy as noted above was approximately at 06/12/2019. She reports that her primary oncologist, Dr. Carais, told to discontinue exemestane upon admission to [facility] with the consideration restarting it post discharge. Slides for review at [facility] and it was confirmed that she had met breast cancer ER+ NE neg and Her 2 lisa neg. At the time of initial consult she felt well. Noted a left sided seizure in the days prior to consult lasting about 1 minute, which stopped on its own. Was recently prescribed keppra and dexamethasone 8mg BID. No confusion, dizziness, nausea, focal weakness. On examestane. She recently had a focal seizure left upper/lower extremity. MRI showed 3 lesions -   Lesion 1: Large dural based enhancing lesion is seen involving the high medial right frontal region. This lesion does appear to cross the midline and measures approximately 3.2 x 2.7 x 3.5 cm. Small amount of adjacent susceptibility is identified. Surrounding edema is identified. Localized mass effect is identified. Right to left shift (7.2 mm) seen.  Lesion 2: Enhancing lesion is seen involving the right upper tiffany. This lesion measures approximately 0.5 x 0.4 cm.  Lesion 3: Enhancing lesion is seen involving the superior left cerebellar region. This lesion measures approximately 1.2 x 1.0 cm. Surrounding edema is identified. She was treated with GK SRS.   6/2/2021- Ms. Shelley presents today for follow up. [employer] Interpretter [id number] for [place] was used. MRI scheduled today at 145. Has not seen Dr. Martin since radiation. States she is feeling fine. States she has intermittent sensation of "pulling" to her forehead. Denies any dizziness, confusion, headache, nausea, vomiting, visual disturbances, weakness. States 3 weeks ago she had an episode where her left upper extremity "was shaking" for one minute, denies any LOC.   9/29/2021- Ms. Shelley presents today for follow up. She is seen today with assistance of  [id number]. MRI brain done 9/24/2021 showed Previously noted enhancing lesions in the posterior fossa and supratentorial region have either decreased in size or are no longer seen which is compatible with response to therapy. Continue close interval follow-up is recommended. Continues to follow with Dr. Martin. continues on examestane with plans to repeat body scans in november or december. Feeling very well. no headaches, no new weakness, no numbness or tingling, no nausea or vomiting. overall doing well.   12/30/2021: Today Ms. Shelley presents for follow-up, [place] [facility]  . MRI brain showed: "Right high paramedian region lesion measures similar in size though there is increased edema seen in association when compared with the prior 9/24/2021. Question of vague enhancement on the surface of the cervical medullary junction and along the surface of the brainstem extending into the spinal cord level. Consideration for possible leptomeningeal pathology should be given. Recommend correlation with CSF as clinically warranted." Per our review, no leptomeningeal disease appreciated. The vague focus of enhancement likely represents a blood vessel. The swelling of the right high paramedian region lesion actually displays decreased edema, compared to prior.  Denies any nausea, vomiting, headache, numbness or tingling. Denies any issues with balance or hearing.   3/31/2022: Pt presents for follow-up. Clinically feeling well. Denies headache, nausea, vomiting, or other complaints. Denies interval symptoms since last f/u. Denies being on steroids or reportedly any recent seizure events.  [place] pacific : Goran [id number]  5/4/2022- Ms. Shelley presents today for follow up.   [id number] used for visit today.Brain MRI 4/27/2022 showed  IMPRESSION: Right medial frontal parasagittal parenchymal lesion is larger when compared with 3/30/2022 and 12/30/2021 suggesting a combination of post therapy change and neoplasm supported by MR spectroscopy and MR perfusion. There is a 5 mm nodule in the right parietal white matter which is new since 12/30/2021 but is slightly larger in retrospect since 3/30/2022. No evidence of leptomeningeal enhancement at the craniocervical junction, this was likely artifactual and is no longer identified. Follows with Dr. Martin. continue on exemestane. Today she feels well. denies headaches, nausea, vomiting, focal weakness, numbness, tingling.  7/13/2022- Ms. Shelley presents today for follow up. Brain MRI done 7/8. Continues on exemestane and following with Dr. Martin.  CT CAP 5/6/2022 showed Stable exam since 12/17/2021. Bone scan 5/6 without definite evidence of bone mets. Today she is feeling very well. denies headaches, nasuea, vomiting, focal weakness, numbness, tingling, confusion  9/20/2022- Ms. Shelley presents today for follow up. pacific  [id number] used for visit today. She presented to [facility] on 9/14/22 with a seizure, left sided. A brain MRI done 9/14/2022 showed  -Increase in size of a right frontal parasagittal lesion since comparison MRI, now measuring up to 2.5 cm. Increasing surrounding vasogenic edema. -No new lesion identified. No acute infarct. Discharged on dexamethasone taper to 4mg BID and keppra 1000mg bId. Feeling well. no further seizures. no headaches, no nausea, no focal weakness, no numbness or tingling. in retrospect prior to seizure denies any focal neurological symptoms.  10/6/2022- Ms. Shelley presents today for follow up. MRI brain 9/30/2022 showed Right frontal parasagittal parenchymal enhancing mass increased size and increased vasogenic edema compared with 7/8/2022. MR perfusion and MR spectroscopy are suggestive of post therapy change rather than neoplasm progression. Question tiny 2.5 mm nodular enhancing focus right cingulate gyrus could represent an additional focus. Recommend follow-up. tapered to 2mg Dexa bid 2 weeks.   11/17/2022- Ms. Shelley presents today for follow up.  [id number] used for visit. She started avastin on 10/28 and received her second dose on 11/11. Continues on exemestane. MRI brain 11/12/2022 showed Decreased right frontal parasagittal lesion when compared to 9/30/2022, with significant decreased vasogenic edema consistent with response to treatment. MR perfusion is suggestive of a combination of neoplasm and post therapy change. Continued follow-up. CT CAP 10/12/2022 showed A new 2.7 cm right adnexal cyst with thin septation. Consider follow-up with pelvic ultrasound as indicated versus attention at short-term follow-up imaging. Feels well overall. no headaches, no nausea, no focal weakness, no numbness or tingling. notes some weakness to bilateral things recently, trouble going up the stairs.   2/23/2023: Ms. Shelley presents to clinic for follow-up.  [id number] used for visit. MRI-Brain performed on 2/11/2023 showed Enhancing lesion is again seen involving the high medial right frontal cortex which continues to decrease in size. Decreased surrounding edema is seen as well. There is decreased cerebral blood flow and cerebral blood volume seen. This could be compatible with treatment-related changes though the possibility of underlying tumor with response to therapy must be considered as well. Continued close interval follow-up is recommended  Ms. Shelley has a CT- Abd/Pel scheduled for 2/18/2023. continues on exemestane. No headaches, no nausea, no weakness, no seizures. notes some intermittent and infrequent visual disturbances on the right side over the last month. Last Avastin 12/16/2022.  6/29/2023- Ms. Shelley presents today for follow up. Continues to follow with Dr. Martin. Continues on Exemestane.   MRI brain done 6/8/2023  showed Previously noted enhancing lesion involving the high medial right frontal region has increased in size with increased edema as well.  CT AP 2/18/2023 showed No evidence of intra-abdominal metastatic disease. Interval resolution of previously identified right adnexal cyst.  In the interim reports having an attack/trembling of the left hand and leg lasting~ 5mins (episode happened a week ago) Admits to skipping her PM AED doses for ~ 1 month Recalls a similar episode about 2 years ago Denies LOC/injuries/visual disturbance/unilateral extremity weakness  Visit dated 8/23/2023 Patient returns for continuation of care with completed images for review. Continues to follow with Dr. Martin on Exemestane Reports noticing over the past 2 weeks being a little off balance resulting in falls but w/o injury. Denies HAs, dizziness, speech/visual disturbance. MRI brain w w/o contrast 8/1//2023 IMPRESSION: Mild increase in size of right paramedian frontal lesion with thick peripheral rim of hyperperfusion consistent with viable tumor and central hypoperfusion consistent with necrosis. Overall findings are concerning for progression of tumor.  PET/CT [place] 8/7/2023 FINDINGS: Increased FES uptake is noted in a right frontal paramedian mass with a large surrounding area of edema. The volume of FES uptake corresponds to the entire volume of the enhancing lesion seen on Brain MRI from 8/1/2023 most consistent with viable tumor in the entire mass. No additional FES positive lesions are noted in the brain. IMPRESSION: Findings most consistent with active metastatic breast cancer in the brain.  Visit dated 9/14/2023. Patient recently completed   GKRS RIGHT frontal (re-irradiated) 2750cgy over 5 FXSs 8/30-9/7/2023. Reports she was doing well but on 9/1/2/23 noticed left sided tremor lasted a few seconds previous episodes lasted longer periods. Saw Dr Joya today and Keppra was increased (1250mg 2 x daily)) Denies unilateral extremity weakness, numbness, tingling, nausea, vomiting, headaches or other neurologic symptoms. No issues with speech or comprehension. Endorses fatigue. post treatment. She offers no new symptoms/concerns today. ON DEX 2mg 2x daily  Visit dated 10/17/2023 Patient returns for routine f/u and progress check with cranial images for review. Denies N/V, HA/unilateral extremity weakness/memory changes/gait disturbance/bowel/bladder dysfunction or other neurologic symptoms. No issues with speech or comprehension Concerned with trouble sleeping. Continues DEX 2 mg daily. On Keppra 1250mg 2xdaily managed by Dr Joya Denies seizure episodes (left side shaking)  Follows with Dr. Martin on Exemestane.   MRI brain w w/o contrast 10/15/2023 Final read pending at time of this entry  Visit dated1/24/2024 Patient returns for routine f/u with cranial images for review. Reports doing well. Denies signs of recurrent seizure episodes. Continues Keppra 1250mg BID. Denies N/V, unilateral extremity weakness/memory changes/gait disturbance/bowel/bladder dysfunction or other neurologic symptoms. No issues with speech or comprehension. Energy level okay participating in activity w/o difficulty.  Continues to follow with Dr. Martin on Exemestane. PETct scheduled for 1/25/2024 to r/o occult POD.  MRI brain w w/o contrast 1/17/2024 IMPRESSION: Lesion within the posterior medial aspect of the right frontal lobe appears smaller measuring 1.5 x 1.0 x 2.1 cm previously 1.8 x 1.3 x 2.8 cm. Marked improvement in the amount of associated edema. Smaller enhancing lesion just caudal to the above lesion within the single gyrus measuring 0.7 x 0.6 x 0.6 cm. This is new/larger from prior exam. Mild adjacent leptomeningeal enhancement.  Visit dated 2/8/2024 Patient returns on the recommendation of her oncologist. Seems she established care with a new oncologist now that Dr. Martin no longer works in the practice PET ct was ordered and she was told to meet with rad/onc. Overall doing well Denies HAs/evidence of seizures. Continues utlize  AEDs.  Continues on Exemestane will be seeing Dr Connelly 2/29/2024.  PETct FDG  whole body 1/25/2024 IMPRESSION: Compared to FES-PET/CT scan dated 8/17/2023: 1. Resolution of FES activity in right posterior frontal paramedian mass which is decreased in size on MRI, measuring 1.5 x 1.0 x 2.1 cm on 1/17/2024, as compared to 1.8 x 1.3 x 2.8 cm on 10/15/2023, and 2.6 x 1.7 x 2.6 cm on 8/1/2023. Findings are compatible with response to interval therapy. 2. New small, discrete focus of increased radiotracer activity in the right paramedian cingulate gyrus, corresponding to the new/larger enhancing lesion measuring 0.7 x 0.6 x 0.6 cm on recent MRI, compatible with ER-positive metastasis (SUV 6.4; image 43 of brain series). 3. The remainder of the study demonstrates no evidence of ER positive disease.   Visit dated 3/20/24 Patient returns after recently completing GKRS to RIGHT frontal lesion 24Gy over 3 Fxs 2/14-2/20/2024. Overall doing well and w/o any new concerns. Denies any recurrent seizure activity. Continues Keppra 1250 mg in AM + PM managed by Dr. Joya Denies N/V, HA/unilateral extremity weakness/memory changes/gait disturbance/bowel/bladder dysfunction or other neurologic symptoms. No issues with speech or comprehension.  Continues single agent Exemestane now under the care of Dr Connelly. Next cranial image in April  Visit dated 5/9/2024 Patient returns for routine f/u and progress check with cranial images for review. Continues to do well. Denies recurrence seizure activity. Continues prescribed Antiepileptics. Minimal pain left side of head, but this doesn't limit participation in activity. Denies difficulty with speech/unilateral extremity weakness  Continues single agent Exemestane now under the care of Dr Connelly since.  MRI brain w w/o contrast 4/29/2024 IMPRESSION: 1.  Improvement of the lesion at the right cingulate gyrus. 2.  Stable enhancement at the right superior frontal gyrus, with mildly worsening vasogenic edema. 3.  Stable enhancement in the corpus callosum. 4.  Continued routine surveillance recommended.  NM bone scan 4/30/2024 IMPRESSION: No significant interval change. No evidence of osseous metastases.  VISIT DATED 9/5/2024 Patient presents for routine f/u and progress check with cranial images completed for review. Reports doing well overall she recently returned from her vacation in [facility]. Denies headaches or any new focal deficits Continues Keppra as prescribed and managed by Dr Joya w./o evidence of left facial twitches Today w/o any new concerns.  Continues Exemestane now under the care of Dr Maricel Miller PET 8/21/2024  MRI brain w w/o contrast 8/26/2024 IMPRESSION: 1.  Mixed response to therapy. 2.  Gradual progression of nodules at the right superior frontal gyrus and corpus callosum. 3.  Resolution of a lesion in the right cingulate gyrus. 4.  Cannot categorically exclude treatment related changes, correlate clinically.

## 2024-09-05 NOTE — HISTORY OF PRESENT ILLNESS
[FreeTextEntry1] : Ms. Shelley was seen initially for consult on 3/18//2021.  RT hx: She completed SRS between 3/23-3/25/2021 to a right frontal, left cerebellar, and right brainstem lesion.and additional SRS to a right parietal lesion, 1800 cgy on 5/11/2022 over 1 fraction. GKRS RIGHT frontal (re-irradiated) 2750cgy over 5 FXS8-30-9/7/2023 GKRS RIGHT frontal 24Gy over 3 Fxs 2/14-2/20/2024  ONCOLOGY HISTORY Ms. Shelley was diagnosed with breast ca in 2012 s/p radical mastectomy and adjuvant chemotherapy. She had ER positive 20%, MD positive 10%, HER-2/lisa 2+, FISH amplified breast cancer per outside physician report. She went on to receive adjuvant dose-dense AC chemotherapy for 4 cycles followed by Taxol plus Herceptin as well as adjuvant radiation therapy. She was subsequently begun adjuvant tamoxifen therapy. Her last menstrual period was in 2014. The patient then began to experience weight loss beginning in November 2018 and over 3-month period of time lost 30 pounds per her report. At approximately the same time she developed increasing shortness of breath, and ultimately presented to [facility] Emergency Department. She subsequently had a CT scan of the chest, which showed multiple mediastinal hilar right subclavian lymph nodes corresponding to abnormal FDG activity on PET scan consistent with metastatic carcinoma; there were tiny nodularities in both lungs- too small to accurately characterize- not present on prior examination and measuring up to 2 mm in both lungs. The possibility of metastasis was a consideration; she had no bony metastases; the liver demonstrated tiny poorly defined lesions less than 5 mm and difficult to characterize and visible on prior examinations with the possibility of metastasis also considered. A PET-CT scan showed multiple hypermetabolic lymph nodes in the mediastinum, right subclavian region and both adebayo felt to represent tumor; there were stellate lesions in the left breast region, felt to represent benign scars; the lungs demonstrated no significant abnormality with multiple tiny lung nodules seen on CT as previously noted; liver demonstrated no significant abnormality with multiple small poorly defined nodules seen on CT per my previous comments. The patient underwent a left breast core biopsy of the area of concern with a finding of fat necrosis and foreign body giant cell reaction as well as a separate fine-needle aspiration consistent with a ruptured cyst/fat necrosis. The patient consequently saw her oncologist, Dr. Carias, who sent the patient for ultrasound-guided core biopsies of the mediastinal lymph nodes with a finding of carcinoma, consistent with breast primary with estrogen receptor returning positive, greater than 50%, progesterone receptor negative, and HER-2/lisa 0 per outside report. The patient was subsequently begun on docetaxel, pertuzumab, trastuzumab, and exemestane beginning at the early 4/19, and subsequently switched to weekly paclitaxel secondary to side effects and continuing on trastuzumab / pertuzumab every 3 weeks as well as exemestane. The patient was seen for initial consultation 7/24/19 for a further opinion regarding treatment recommendations. She noted ongoing weakness, fatigue, and generalized malaise, although all of these had begun to improve gradually. Her last chemotherapy as noted above was approximately at 06/12/2019. She reports that her primary oncologist, Dr. Carias, told to discontinue exemestane upon admission to [facility] with the consideration restarting it post discharge. Slides for review at [facility] and it was confirmed that she had met breast cancer ER+ MD neg and Her 2 lisa neg. At the time of initial consult she felt well. Noted a left sided seizure in the days prior to consult lasting about 1 minute, which stopped on its own. Was recently prescribed keppra and dexamethasone 8mg BID. No confusion, dizziness, nausea, focal weakness. On examestane. She recently had a focal seizure left upper/lower extremity. MRI showed 3 lesions -   Lesion 1: Large dural based enhancing lesion is seen involving the high medial right frontal region. This lesion does appear to cross the midline and measures approximately 3.2 x 2.7 x 3.5 cm. Small amount of adjacent susceptibility is identified. Surrounding edema is identified. Localized mass effect is identified. Right to left shift (7.2 mm) seen.  Lesion 2: Enhancing lesion is seen involving the right upper tiffany. This lesion measures approximately 0.5 x 0.4 cm.  Lesion 3: Enhancing lesion is seen involving the superior left cerebellar region. This lesion measures approximately 1.2 x 1.0 cm. Surrounding edema is identified. She was treated with GK SRS.   6/2/2021- Ms. Shelley presents today for follow up. [employer] Interpretter [id number] for [place]ic was used. MRI scheduled today at 145. Has not seen Dr. Martin since radiation. States she is feeling fine. States she has intermittent sensation of "pulling" to her forehead. Denies any dizziness, confusion, headache, nausea, vomiting, visual disturbances, weakness. States 3 weeks ago she had an episode where her left upper extremity "was shaking" for one minute, denies any LOC.   9/29/2021- Ms. Shelley presents today for follow up. She is seen today with assistance of  [id number]. MRI brain done 9/24/2021 showed Previously noted enhancing lesions in the posterior fossa and supratentorial region have either decreased in size or are no longer seen which is compatible with response to therapy. Continue close interval follow-up is recommended. Continues to follow with Dr. Martin. continues on examestane with plans to repeat body scans in november or december. Feeling very well. no headaches, no new weakness, no numbness or tingling, no nausea or vomiting. overall doing well.   12/30/2021: Today Ms. Shelley presents for follow-up, [facility]  . MRI brain showed: "Right high paramedian region lesion measures similar in size though there is increased edema seen in association when compared with the prior 9/24/2021. Question of vague enhancement on the surface of the cervical medullary junction and along the surface of the brainstem extending into the spinal cord level. Consideration for possible leptomeningeal pathology should be given. Recommend correlation with CSF as clinically warranted." Per our review, no leptomeningeal disease appreciated. The vague focus of enhancement likely represents a blood vessel. The swelling of the right high paramedian region lesion actually displays decreased edema, compared to prior.  Denies any nausea, vomiting, headache, numbness or tingling. Denies any issues with balance or hearing.   3/31/2022: Pt presents for follow-up. Clinically feeling well. Denies headache, nausea, vomiting, or other complaints. Denies interval symptoms since last f/u. Denies being on steroids or reportedly any recent seizure events.  [place]ian pacific : Goran [id number]  5/4/2022- Ms. Shelley presents today for follow up.   [id number] used for visit today.Brain MRI 4/27/2022 showed  IMPRESSION: Right medial frontal parasagittal parenchymal lesion is larger when compared with 3/30/2022 and 12/30/2021 suggesting a combination of post therapy change and neoplasm supported by MR spectroscopy and MR perfusion. There is a 5 mm nodule in the right parietal white matter which is new since 12/30/2021 but is slightly larger in retrospect since 3/30/2022. No evidence of leptomeningeal enhancement at the craniocervical junction, this was likely artifactual and is no longer identified. Follows with Dr. Martin. continue on exemestane. Today she feels well. denies headaches, nausea, vomiting, focal weakness, numbness, tingling.  7/13/2022- Ms. Shelley presents today for follow up. Brain MRI done 7/8. Continues on exemestane and following with Dr. Martin.  CT CAP 5/6/2022 showed Stable exam since 12/17/2021. Bone scan 5/6 without definite evidence of bone mets. Today she is feeling very well. denies headaches, nasuea, vomiting, focal weakness, numbness, tingling, confusion  9/20/2022- Ms. Shelley presents today for follow up. pacific  [id number] used for visit today. She presented to [facility] on 9/14/22 with a seizure, left sided. A brain MRI done 9/14/2022 showed  -Increase in size of a right frontal parasagittal lesion since comparison MRI, now measuring up to 2.5 cm. Increasing surrounding vasogenic edema. -No new lesion identified. No acute infarct. Discharged on dexamethasone taper to 4mg BID and keppra 1000mg bId. Feeling well. no further seizures. no headaches, no nausea, no focal weakness, no numbness or tingling. in retrospect prior to seizure denies any focal neurological symptoms.  10/6/2022- Ms. Shelley presents today for follow up. MRI brain 9/30/2022 showed Right frontal parasagittal parenchymal enhancing mass increased size and increased vasogenic edema compared with 7/8/2022. MR perfusion and MR spectroscopy are suggestive of post therapy change rather than neoplasm progression. Question tiny 2.5 mm nodular enhancing focus right cingulate gyrus could represent an additional focus. Recommend follow-up. tapered to 2mg Dexa bid 2 weeks.   11/17/2022- Ms. Shelley presents today for follow up.  [id number] used for visit. She started avastin on 10/28 and received her second dose on 11/11. Continues on exemestane. MRI brain 11/12/2022 showed Decreased right frontal parasagittal lesion when compared to 9/30/2022, with significant decreased vasogenic edema consistent with response to treatment. MR perfusion is suggestive of a combination of neoplasm and post therapy change. Continued follow-up. CT CAP 10/12/2022 showed A new 2.7 cm right adnexal cyst with thin septation. Consider follow-up with pelvic ultrasound as indicated versus attention at short-term follow-up imaging. Feels well overall. no headaches, no nausea, no focal weakness, no numbness or tingling. notes some weakness to bilateral things recently, trouble going up the stairs.   2/23/2023: Ms. Shelley presents to clinic for follow-up.  [id number] used for visit. MRI-Brain performed on 2/11/2023 showed Enhancing lesion is again seen involving the high medial right frontal cortex which continues to decrease in size. Decreased surrounding edema is seen as well. There is decreased cerebral blood flow and cerebral blood volume seen. This could be compatible with treatment-related changes though the possibility of underlying tumor with response to therapy must be considered as well. Continued close interval follow-up is recommended  Ms. Shelley has a CT- Abd/Pel scheduled for 2/18/2023. continues on exemestane. No headaches, no nausea, no weakness, no seizures. notes some intermittent and infrequent visual disturbances on the right side over the last month. Last Avastin 12/16/2022.  6/29/2023- Ms. Shelley presents today for follow up. Continues to follow with Dr. Martin. Continues on Exemestane.   MRI brain done 6/8/2023  showed Previously noted enhancing lesion involving the high medial right frontal region has increased in size with increased edema as well.  CT AP 2/18/2023 showed No evidence of intra-abdominal metastatic disease. Interval resolution of previously identified right adnexal cyst.  In the interim reports having an attack/trembling of the left hand and leg lasting~ 5mins (episode happened a week ago) Admits to skipping her PM AED doses for ~ 1 month Recalls a similar episode about 2 years ago Denies LOC/injuries/visual disturbance/unilateral extremity weakness  Visit dated 8/23/2023 Patient returns for continuation of care with completed images for review. Continues to follow with Dr. Martin on Exemestane Reports noticing over the past 2 weeks being a little off balance resulting in falls but w/o injury. Denies HAs, dizziness, speech/visual disturbance. MRI brain w w/o contrast 8/1//2023 IMPRESSION: Mild increase in size of right paramedian frontal lesion with thick peripheral rim of hyperperfusion consistent with viable tumor and central hypoperfusion consistent with necrosis. Overall findings are concerning for progression of tumor.  PET/CT [place] 8/7/2023 FINDINGS: Increased FES uptake is noted in a right frontal paramedian mass with a large surrounding area of edema. The volume of FES uptake corresponds to the entire volume of the enhancing lesion seen on Brain MRI from 8/1/2023 most consistent with viable tumor in the entire mass. No additional FES positive lesions are noted in the brain. IMPRESSION: Findings most consistent with active metastatic breast cancer in the brain.  Visit dated 9/14/2023. Patient recently completed   GKRS RIGHT frontal (re-irradiated) 2750cgy over 5 FXSs 8/30-9/7/2023. Reports she was doing well but on 9/1/2/23 noticed left sided tremor lasted a few seconds previous episodes lasted longer periods. Saw Dr Joya today and Keppra was increased (1250mg 2 x daily)) Denies unilateral extremity weakness, numbness, tingling, nausea, vomiting, headaches or other neurologic symptoms. No issues with speech or comprehension. Endorses fatigue. post treatment. She offers no new symptoms/concerns today. ON DEX 2mg 2x daily  Visit dated 10/17/2023 Patient returns for routine f/u and progress check with cranial images for review. Denies N/V, HA/unilateral extremity weakness/memory changes/gait disturbance/bowel/bladder dysfunction or other neurologic symptoms. No issues with speech or comprehension Concerned with trouble sleeping. Continues DEX 2 mg daily. On Keppra 1250mg 2xdaily managed by Dr Joya Denies seizure episodes (left side shaking)  Follows with Dr. Martin on Exemestane.   MRI brain w w/o contrast 10/15/2023 Final read pending at time of this entry  Visit dated1/24/2024 Patient returns for routine f/u with cranial images for review. Reports doing well. Denies signs of recurrent seizure episodes. Continues Keppra 1250mg BID. Denies N/V, unilateral extremity weakness/memory changes/gait disturbance/bowel/bladder dysfunction or other neurologic symptoms. No issues with speech or comprehension. Energy level okay participating in activity w/o difficulty.  Continues to follow with Dr. Martin on Exemestane. PETct scheduled for 1/25/2024 to r/o occult POD.  MRI brain w w/o contrast 1/17/2024 IMPRESSION: Lesion within the posterior medial aspect of the right frontal lobe appears smaller measuring 1.5 x 1.0 x 2.1 cm previously 1.8 x 1.3 x 2.8 cm. Marked improvement in the amount of associated edema. Smaller enhancing lesion just caudal to the above lesion within the single gyrus measuring 0.7 x 0.6 x 0.6 cm. This is new/larger from prior exam. Mild adjacent leptomeningeal enhancement.  Visit dated 2/8/2024 Patient returns on the recommendation of her oncologist. Seems she established care with a new oncologist now that Dr. Martin no longer works in the practice PET ct was ordered and she was told to meet with rad/onc. Overall doing well Denies HAs/evidence of seizures. Continues utlize  AEDs.  Continues on Exemestane will be seeing Dr Connelly 2/29/2024.  PETct FDG  whole body 1/25/2024 IMPRESSION: Compared to FES-PET/CT scan dated 8/17/2023: 1. Resolution of FES activity in right posterior frontal paramedian mass which is decreased in size on MRI, measuring 1.5 x 1.0 x 2.1 cm on 1/17/2024, as compared to 1.8 x 1.3 x 2.8 cm on 10/15/2023, and 2.6 x 1.7 x 2.6 cm on 8/1/2023. Findings are compatible with response to interval therapy. 2. New small, discrete focus of increased radiotracer activity in the right paramedian cingulate gyrus, corresponding to the new/larger enhancing lesion measuring 0.7 x 0.6 x 0.6 cm on recent MRI, compatible with ER-positive metastasis (SUV 6.4; image 43 of brain series). 3. The remainder of the study demonstrates no evidence of ER positive disease.   Visit dated 3/20/24 Patient returns after recently completing GKRS to RIGHT frontal lesion 24Gy over 3 Fxs 2/14-2/20/2024. Overall doing well and w/o any new concerns. Denies any recurrent seizure activity. Continues Keppra 1250 mg in AM + PM managed by Dr. Joya Denies N/V, HA/unilateral extremity weakness/memory changes/gait disturbance/bowel/bladder dysfunction or other neurologic symptoms. No issues with speech or comprehension.  Continues single agent Exemestane now under the care of Dr Connelly. Next cranial image in April  Visit dated 5/9/2024 Patient returns for routine f/u and progress check with cranial images for review. Continues to do well. Denies recurrence seizure activity. Continues prescribed Antiepileptics. Minimal pain left side of head, but this doesn't limit participation in activity. Denies difficulty with speech/unilateral extremity weakness  Continues single agent Exemestane now under the care of Dr Connelly since.  MRI brain w w/o contrast 4/29/2024 IMPRESSION: 1.  Improvement of the lesion at the right cingulate gyrus. 2.  Stable enhancement at the right superior frontal gyrus, with mildly worsening vasogenic edema. 3.  Stable enhancement in the corpus callosum. 4.  Continued routine surveillance recommended.  NM bone scan 4/30/2024 IMPRESSION: No significant interval change. No evidence of osseous metastases.  VISIT DATED 9/5/2024 Patient presents for routine f/u and progress check with cranial images completed for review. Reports doing well overall she recently returned from her vacation in [place] 234 beds at the [facility]. Denies headaches or any new focal deficits Continues Keppra as prescribed and managed by Dr Joya w./o evidence of left facial twitches Today w/o any new concerns.  Continues Exemestane now under the care of Dr Maricel Miller PET 8/21/2024  MRI brain w w/o contrast 8/26/2024 IMPRESSION: 1.  Mixed response to therapy. 2.  Gradual progression of nodules at the right superior frontal gyrus and corpus callosum. 3.  Resolution of a lesion in the right cingulate gyrus. 4.  Cannot categorically exclude treatment related changes, correlate clinically.

## 2024-09-23 ENCOUNTER — APPOINTMENT (OUTPATIENT)
Dept: MRI IMAGING | Facility: CLINIC | Age: 59
End: 2024-09-23
Payer: MEDICARE

## 2024-09-23 PROCEDURE — 76390 MR SPECTROSCOPY: CPT

## 2024-09-23 PROCEDURE — 70553 MRI BRAIN STEM W/O & W/DYE: CPT

## 2024-09-23 PROCEDURE — A9585: CPT | Mod: JZ

## 2024-10-02 ENCOUNTER — NON-APPOINTMENT (OUTPATIENT)
Age: 59
End: 2024-10-02

## 2024-10-02 ENCOUNTER — APPOINTMENT (OUTPATIENT)
Dept: RADIATION ONCOLOGY | Facility: CLINIC | Age: 59
End: 2024-10-02
Payer: MEDICAID

## 2024-10-02 VITALS
WEIGHT: 159.83 LBS | HEART RATE: 71 BPM | BODY MASS INDEX: 27.43 KG/M2 | DIASTOLIC BLOOD PRESSURE: 84 MMHG | SYSTOLIC BLOOD PRESSURE: 135 MMHG | OXYGEN SATURATION: 99 %

## 2024-10-02 DIAGNOSIS — C79.31 SECONDARY MALIGNANT NEOPLASM OF BRAIN: ICD-10-CM

## 2024-10-02 PROCEDURE — 99215 OFFICE O/P EST HI 40 MIN: CPT

## 2024-10-03 NOTE — REVIEW OF SYSTEMS
[Confused] : no confusion [Dizziness] : no dizziness [de-identified] : LUE extremity twitching fora few seconds on 9/30/2024

## 2024-10-03 NOTE — REVIEW OF SYSTEMS
[Confused] : no confusion [Dizziness] : no dizziness [de-identified] : LUE extremity twitching fora few seconds on 9/30/2024

## 2024-10-03 NOTE — HISTORY OF PRESENT ILLNESS
[FreeTextEntry1] : Ms. Shelley was seen initially for consult on 3/18//2021.  RT hx: She completed SRS between 3/23-3/25/2021 to a right frontal, left cerebellar, and right brainstem lesion.and additional SRS to a right parietal lesion, 1800 cgy on 5/11/2022 over 1 fraction. GKRS RIGHT frontal (re-irradiated) 2750cgy over 5 FXS8-30-9/7/2023 GKRS RIGHT frontal 24Gy over 3 Fxs 2/14-2/20/2024  ONCOLOGY HISTORY Ms. Shelley was diagnosed with breast ca in 2012 s/p radical mastectomy and adjuvant chemotherapy. She had ER positive 20%, SD positive 10%, HER-2/lisa 2+, FISH amplified breast cancer per outside physician report. She went on to receive adjuvant dose-dense AC chemotherapy for 4 cycles followed by Taxol plus Herceptin as well as adjuvant radiation therapy. She was subsequently begun adjuvant tamoxifen therapy. Her last menstrual period was in 2014. The patient then began to experience weight loss beginning in November 2018 and over 3-month period of time lost 30 pounds per her report. At approximately the same time she developed increasing shortness of breath, and ultimately presented to [facility] Emergency [facility]. She subsequently had a CT scan of the chest, which showed multiple mediastinal hilar right subclavian lymph nodes corresponding to abnormal FDG activity on PET scan consistent with metastatic carcinoma; there were tiny nodularities in both lungs- too small to accurately characterize- not present on prior examination and measuring up to 2 mm in both lungs. The possibility of metastasis was a consideration; she had no bony metastases; the liver demonstrated tiny poorly defined lesions less than 5 mm and difficult to characterize and visible on prior examinations with the possibility of metastasis also considered. A PET-CT scan showed multiple hypermetabolic lymph nodes in the mediastinum, right subclavian region and both adebayo felt to represent tumor; there were stellate lesions in the left breast region, felt to represent benign scars; the lungs demonstrated no significant abnormality with multiple tiny lung nodules seen on CT as previously noted; liver demonstrated no significant abnormality with multiple small poorly defined nodules seen on CT per my previous comments. The patient underwent a left breast core biopsy of the area of concern with a finding of fat necrosis and foreign body giant cell reaction as well as a separate fine-needle aspiration consistent with a ruptured cyst/fat necrosis. The patient consequently saw her oncologist, Dr. Carias, who sent the patient for ultrasound-guided core biopsies of the mediastinal lymph nodes with a finding of carcinoma, consistent with breast primary with estrogen receptor returning positive, greater than 50%, progesterone receptor negative, and HER-2/lisa 0 per outside report. The patient was subsequently begun on docetaxel, pertuzumab, trastuzumab, and exemestane beginning at the early 4/19, and subsequently switched to weekly paclitaxel secondary to side effects and continuing on trastuzumab / pertuzumab every 3 weeks as well as exemestane. The patient was seen for initial consultation 7/24/19 for a further opinion regarding treatment recommendations. She noted ongoing weakness, fatigue, and generalized malaise, although all of these had begun to improve gradually. Her last chemotherapy as noted above was approximately at 06/12/2019. She reports that her primary oncologist, Dr. Carias, told to discontinue exemestane upon admission to [facility] with the consideration restarting it post discharge. Slides for review at [facility] and it was confirmed that she had met breast cancer ER+ SD neg and Her 2 lisa neg. At the time of initial consult she felt well. Noted a left sided seizure in the days prior to consult lasting about 1 minute, which stopped on its own. Was recently prescribed keppra and dexamethasone 8mg BID. No confusion, dizziness, nausea, focal weakness. On examestane. She recently had a focal seizure left upper/lower extremity. MRI showed 3 lesions -   Lesion 1: Large dural based enhancing lesion is seen involving the high medial right frontal region. This lesion does appear to cross the midline and measures approximately 3.2 x 2.7 x 3.5 cm. Small amount of adjacent susceptibility is identified. Surrounding edema is identified. Localized mass effect is identified. Right to left shift (7.2 mm) seen.  Lesion 2: Enhancing lesion is seen involving the right upper tiffany. This lesion measures approximately 0.5 x 0.4 cm.  Lesion 3: Enhancing lesion is seen involving the superior left cerebellar region. This lesion measures approximately 1.2 x 1.0 cm. Surrounding edema is identified. She was treated with GK SRS.   6/2/2021- Ms. Shelley presents today for follow up. [employer] Interpretter [id number] for Frisian was used. MRI scheduled today at 145. Has not seen Dr. Martin since radiation. States she is feeling fine. States she has intermittent sensation of "pulling" to her forehead. Denies any dizziness, confusion, headache, nausea, vomiting, visual disturbances, weakness. States 3 weeks ago she had an episode where her left upper extremity "was shaking" for one minute, denies any LOC.   9/29/2021- Ms. Shelley presents today for follow up. She is seen today with assistance of  [id number]. MRI brain done 9/24/2021 showed Previously noted enhancing lesions in the posterior fossa and supratentorial region have either decreased in size or are no longer seen which is compatible with response to therapy. Continue close interval follow-up is recommended. Continues to follow with Dr. Martin. continues on examestane with plans to repeat body scans in november or december. Feeling very well. no headaches, no new weakness, no numbness or tingling, no nausea or vomiting. overall doing well.   12/30/2021: Today Ms. Shelley presents for follow-up, [place] [employer]  . MRI brain showed: "Right high paramedian region lesion measures similar in size though there is increased edema seen in association when compared with the prior 9/24/2021. Question of vague enhancement on the surface of the cervical medullary junction and along the surface of the brainstem extending into the spinal cord level. Consideration for possible leptomeningeal pathology should be given. Recommend correlation with CSF as clinically warranted." Per our review, no leptomeningeal disease appreciated. The vague focus of enhancement likely represents a blood vessel. The swelling of the right high paramedian region lesion actually displays decreased edema, compared to prior.  Denies any nausea, vomiting, headache, numbness or tingling. Denies any issues with balance or hearing.   3/31/2022: Pt presents for follow-up. Clinically feeling well. Denies headache, nausea, vomiting, or other complaints. Denies interval symptoms since last f/u. Denies being on steroids or reportedly any recent seizure events.  [place] pacific : Goran [id number]  5/4/2022- Ms. Shelley presents today for follow up.   [id number] used for visit today.Brain MRI 4/27/2022 showed  IMPRESSION: Right medial frontal parasagittal parenchymal lesion is larger when compared with 3/30/2022 and 12/30/2021 suggesting a combination of post therapy change and neoplasm supported by MR spectroscopy and MR perfusion. There is a 5 mm nodule in the right parietal white matter which is new since 12/30/2021 but is slightly larger in retrospect since 3/30/2022. No evidence of leptomeningeal enhancement at the craniocervical junction, this was likely artifactual and is no longer identified. Follows with Dr. Martin. continue on exemestane. Today she feels well. denies headaches, nausea, vomiting, focal weakness, numbness, tingling.  7/13/2022- Ms. Shelley presents today for follow up. Brain MRI done 7/8. Continues on exemestane and following with Dr. Martin.  CT CAP 5/6/2022 showed Stable exam since 12/17/2021. Bone scan 5/6 without definite evidence of bone mets. Today she is feeling very well. denies headaches, nasuea, vomiting, focal weakness, numbness, tingling, confusion  9/20/2022- Ms. Shelley presents today for follow up. pacific  [id number] used for visit today. She presented to [facility] on 9/14[facility]22 with a seizure, left sided. A brain MRI done 9/14/2022 showed  -Increase in size of a right frontal parasagittal lesion since comparison MRI, now measuring up to 2.5 cm. Increasing surrounding vasogenic edema. -No new lesion identified. No acute infarct. Discharged on dexamethasone taper to 4mg BID and keppra 1000mg bId. Feeling well. no further seizures. no headaches, no nausea, no focal weakness, no numbness or tingling. in retrospect prior to seizure denies any focal neurological symptoms.  10/6/2022- Ms. Shelley presents today for follow up. MRI brain 9/30/2022 showed Right frontal parasagittal parenchymal enhancing mass increased size and increased vasogenic edema compared with 7/8/2022. MR perfusion and MR spectroscopy are suggestive of post therapy change rather than neoplasm progression. Question tiny 2.5 mm nodular enhancing focus right cingulate gyrus could represent an additional focus. Recommend follow-up. tapered to 2mg Dexa bid 2 weeks.   11/17/2022- Ms. Shelley presents today for follow up.  [id number] used for visit. She started avastin on 10/28 and received her second dose on 11/11. Continues on exemestane. MRI brain 11/12/2022 showed Decreased right frontal parasagittal lesion when compared to 9/30/2022, with significant decreased vasogenic edema consistent with response to treatment. MR perfusion is suggestive of a combination of neoplasm and post therapy change. Continued follow-up. CT CAP 10/12/2022 showed A new 2.7 cm right adnexal cyst with thin septation. Consider follow-up with pelvic ultrasound as indicated versus attention at short-term follow-up imaging. Feels well overall. no headaches, no nausea, no focal weakness, no numbness or tingling. notes some weakness to bilateral things recently, trouble going up the stairs.   2/23/2023: Ms. Shelley presents to clinic for follow-up.  [id number] used for visit. MRI-Brain performed on 2/11/2023 showed Enhancing lesion is again seen involving the high medial right frontal cortex which continues to decrease in size. Decreased surrounding edema is seen as well. There is decreased cerebral blood flow and cerebral blood volume seen. This could be compatible with treatment-related changes though the possibility of underlying tumor with response to therapy must be considered as well. Continued close interval follow-up is recommended  Ms. Shelley has a CT- Abd/Pel scheduled for 2/18/2023. continues on exemestane. No headaches, no nausea, no weakness, no seizures. notes some intermittent and infrequent visual disturbances on the right side over the last month. Last Avastin 12/16/2022.  6/29/2023- Ms. Shelley presents today for follow up. Continues to follow with Dr. Martin. Continues on Exemestane.   MRI brain done 6/8/2023  showed Previously noted enhancing lesion involving the high medial right frontal region has increased in size with increased edema as well.  CT AP 2/18/2023 showed No evidence of intra-abdominal metastatic disease. Interval resolution of previously identified right adnexal cyst.  In the interim reports having an attack/trembling of the left hand and leg lasting~ 5mins (episode happened a week ago) Admits to skipping her PM AED doses for ~ 1 month Recalls a similar episode about 2 years ago Denies LOC/injuries/visual disturbance/unilateral extremity weakness  Visit dated 8/23/2023 Patient returns for continuation of care with completed images for review. Continues to follow with Dr. Martin on Exemestane Reports noticing over the past 2 weeks being a little off balance resulting in falls but w/o injury. Denies HAs, dizziness, speech/visual disturbance. MRI brain w w/o contrast 8/1//2023 IMPRESSION: Mild increase in size of right paramedian frontal lesion with thick peripheral rim of hyperperfusion consistent with viable tumor and central hypoperfusion consistent with necrosis. Overall findings are concerning for progression of tumor.  PET/CT [place] 8/7/2023 FINDINGS: Increased FES uptake is noted in a right frontal paramedian mass with a large surrounding area of edema. The volume of FES uptake corresponds to the entire volume of the enhancing lesion seen on Brain MRI from 8/1/2023 most consistent with viable tumor in the entire mass. No additional FES positive lesions are noted in the brain. IMPRESSION: Findings most consistent with active metastatic breast cancer in the brain.  Visit dated 9/14/2023. Patient recently completed   GKRS RIGHT frontal (re-irradiated) 2750cgy over 5 FXSs 8/30-9/7/2023. Reports she was doing well but on 9/1/2/23 noticed left sided tremor lasted a few seconds previous episodes lasted longer periods. Saw Dr Joya today and Keppra was increased (1250mg 2 x daily)) Denies unilateral extremity weakness, numbness, tingling, nausea, vomiting, headaches or other neurologic symptoms. No issues with speech or comprehension. Endorses fatigue. post treatment. She offers no new symptoms/concerns today. ON DEX 2mg 2x daily  Visit dated 10/17/2023 Patient returns for routine f/u and progress check with cranial images for review. Denies N/V, HA/unilateral extremity weakness/memory changes/gait disturbance/bowel/bladder dysfunction or other neurologic symptoms. No issues with speech or comprehension Concerned with trouble sleeping. Continues DEX 2 mg daily. On Keppra 1250mg 2xdaily managed by Dr Joya Denies seizure episodes (left side shaking)  Follows with Dr. Martin on Exemestane.   MRI brain w w/o contrast 10/15/2023 Final read pending at time of this entry  Visit dated1/24/2024 Patient returns for routine f/u with cranial images for review. Reports doing well. Denies signs of recurrent seizure episodes. Continues Keppra 1250mg BID. Denies N/V, unilateral extremity weakness/memory changes/gait disturbance/bowel/bladder dysfunction or other neurologic symptoms. No issues with speech or comprehension. Energy level okay participating in activity w/o difficulty.  Continues to follow with Dr. Martin on Exemestane. PETct scheduled for 1/25/2024 to r/o occult POD.  MRI brain w w/o contrast 1/17/2024 IMPRESSION: Lesion within the posterior medial aspect of the right frontal lobe appears smaller measuring 1.5 x 1.0 x 2.1 cm previously 1.8 x 1.3 x 2.8 cm. Marked improvement in the amount of associated edema. Smaller enhancing lesion just caudal to the above lesion within the single gyrus measuring 0.7 x 0.6 x 0.6 cm. This is new/larger from prior exam. Mild adjacent leptomeningeal enhancement.  Visit dated 2/8/2024 Patient returns on the recommendation of her oncologist. Seems she established care with a new oncologist now that Dr. Martin no longer works in the practice PET ct was ordered and she was told to meet with rad/onc. Overall doing well Denies HAs/evidence of seizures. Continues utlize  AEDs.  Continues on Exemestane will be seeing Dr Connelly 2/29/2024.  PETct FDG  whole body 1/25/2024 IMPRESSION: Compared to FES-PET/CT scan dated 8/17/2023: 1. Resolution of FES activity in right posterior frontal paramedian mass which is decreased in size on MRI, measuring 1.5 x 1.0 x 2.1 cm on 1/17/2024, as compared to 1.8 x 1.3 x 2.8 cm on 10/15/2023, and 2.6 x 1.7 x 2.6 cm on 8/1/2023. Findings are compatible with response to interval therapy. 2. New small, discrete focus of increased radiotracer activity in the right paramedian cingulate gyrus, corresponding to the new/larger enhancing lesion measuring 0.7 x 0.6 x 0.6 cm on recent MRI, compatible with ER-positive metastasis (SUV 6.4; image 43 of brain series). 3. The remainder of the study demonstrates no evidence of ER positive disease.   Visit dated 3/20/24 Patient returns after recently completing GKRS to RIGHT frontal lesion 24Gy over 3 Fxs 2/14-2/20/2024. Overall doing well and w/o any new concerns. Denies any recurrent seizure activity. Continues Keppra 1250 mg in AM + PM managed by Dr. Joya Denies N/V, HA/unilateral extremity weakness/memory changes/gait disturbance/bowel/bladder dysfunction or other neurologic symptoms. No issues with speech or comprehension.  Continues single agent Exemestane now under the care of Dr Connelly. Next cranial image in April  Visit dated 5/9/2024 Patient returns for routine f/u and progress check with cranial images for review. Continues to do well. Denies recurrence seizure activity. Continues prescribed Antiepileptics. Minimal pain left side of head, but this doesn't limit participation in activity. Denies difficulty with speech/unilateral extremity weakness  Continues single agent Exemestane now under the care of Dr Connelly since.  MRI brain w w/o contrast 4/29/2024 IMPRESSION: 1.  Improvement of the lesion at the right cingulate gyrus. 2.  Stable enhancement at the right superior frontal gyrus, with mildly worsening vasogenic edema. 3.  Stable enhancement in the corpus callosum. 4.  Continued routine surveillance recommended.  NM bone scan 4/30/2024 IMPRESSION: No significant interval change. No evidence of osseous metastases.  VISIT DATED 9/5/2024 Patient presents for routine f/u and progress check with cranial images completed for review. Reports doing well overall she recently returned from her vacation in [facility]. Denies headaches or any new focal deficits Continues Keppra as prescribed and managed by Dr Joya w./o evidence of left facial twitches Today w/o any new concerns.  Continues Exemestane now under the care of Dr Maricel Miller PET 8/21/2024  MRI brain w w/o contrast 8/26/2024 IMPRESSION: 1.  Mixed response to therapy. 2.  Gradual progression of nodules at the right superior frontal gyrus and corpus callosum. 3.  Resolution of a lesion in the right cingulate gyrus. 4.  Cannot categorically exclude treatment related changes, correlate clinically.   VISIT DATED: 10/2/2024 Patient returns for routine f/u and progress check with short interval cranial images to assess for metastatic disease Reports on 9/30/2024 she has a seizure like activity where the LUE began twitching lasting for a few seconds denies any missed AED doses. She has not notified the neurologist. Continues Keppra as prescribed (1500mg in AM 1500mg PM) Denies N+V, Headaches/unilateral extremity weakness/memory changes/gait disturbance/bowel/bladder dysfunction or other neurologic symptoms. No issues with speech or comprehension.  Continues Exemestane now under the care of Dr Connelly    MRI spect/perfusion 9/23/2024 IMPRESSION: Increased size of the enhancement in the right posterior frontal parasagittal lesion as well as slightly increased size of the enhancement in the right body of the corpus callosum since 8/26/2024. MR perfusion suggests a combination of post therapy changes and neoplasm. MR spectroscopy is suggestive of neoplasm.

## 2024-10-03 NOTE — HISTORY OF PRESENT ILLNESS
[FreeTextEntry1] : Ms. Shelley was seen initially for consult on 3/18//2021.  RT hx: She completed SRS between 3/23-3/25/2021 to a right frontal, left cerebellar, and right brainstem lesion.and additional SRS to a right parietal lesion, 1800 cgy on 5/11/2022 over 1 fraction. GKRS RIGHT frontal (re-irradiated) 2750cgy over 5 FXS8-30-9/7/2023 GKRS RIGHT frontal 24Gy over 3 Fxs 2/14-2/20/2024  ONCOLOGY HISTORY Ms. Shelley was diagnosed with breast ca in 2012 s/p radical mastectomy and adjuvant chemotherapy. She had ER positive 20%, IN positive 10%, HER-2/lisa 2+, FISH amplified breast cancer per outside physician report. She went on to receive adjuvant dose-dense AC chemotherapy for 4 cycles followed by Taxol plus Herceptin as well as adjuvant radiation therapy. She was subsequently begun adjuvant tamoxifen therapy. Her last menstrual period was in 2014. The patient then began to experience weight loss beginning in November 2018 and over 3-month period of time lost 30 pounds per her report. At approximately the same time she developed increasing shortness of breath, and ultimately presented to [facility] Emergency Department. She subsequently had a CT scan of the chest, which showed multiple mediastinal hilar right subclavian lymph nodes corresponding to abnormal FDG activity on PET scan consistent with metastatic carcinoma; there were tiny nodularities in both lungs- too small to accurately characterize- not present on prior examination and measuring up to 2 mm in both lungs. The possibility of metastasis was a consideration; she had no bony metastases; the liver demonstrated tiny poorly defined lesions less than 5 mm and difficult to characterize and visible on prior examinations with the possibility of metastasis also considered. A PET-CT scan showed multiple hypermetabolic lymph nodes in the mediastinum, right subclavian region and both adebayo felt to represent tumor; there were stellate lesions in the left breast region, felt to represent benign scars; the lungs demonstrated no significant abnormality with multiple tiny lung nodules seen on CT as previously noted; liver demonstrated no significant abnormality with multiple small poorly defined nodules seen on CT per my previous comments. The patient underwent a left breast core biopsy of the area of concern with a finding of fat necrosis and foreign body giant cell reaction as well as a separate fine-needle aspiration consistent with a ruptured cyst/fat necrosis. The patient consequently saw her oncologist, Dr. Carias, who sent the patient for ultrasound-guided core biopsies of the mediastinal lymph nodes with a finding of carcinoma, consistent with breast primary with estrogen receptor returning positive, greater than 50%, progesterone receptor negative, and HER-2/lisa 0 per outside report. The patient was subsequently begun on docetaxel, pertuzumab, trastuzumab, and exemestane beginning at the early 4/19, and subsequently switched to weekly paclitaxel secondary to side effects and continuing on trastuzumab / pertuzumab every 3 weeks as well as exemestane. The patient was seen for initial consultation 7/24/19 for a further opinion regarding treatment recommendations. She noted ongoing weakness, fatigue, and generalized malaise, although all of these had begun to improve gradually. Her last chemotherapy as noted above was approximately at 06/12/2019. She reports that her primary oncologist, Dr. Carias, told to discontinue exemestane upon admission to [facility] with the consideration restarting it post discharge. Slides for review at [facility] and it was confirmed that she had met breast cancer ER+ IN neg and Her 2 lisa neg. At the time of initial consult she felt well. Noted a left sided seizure in the days prior to consult lasting about 1 minute, which stopped on its own. Was recently prescribed keppra and dexamethasone 8mg BID. No confusion, dizziness, nausea, focal weakness. On examestane. She recently had a focal seizure left upper/lower extremity. MRI showed 3 lesions -   Lesion 1: Large dural based enhancing lesion is seen involving the high medial right frontal region. This lesion does appear to cross the midline and measures approximately 3.2 x 2.7 x 3.5 cm. Small amount of adjacent susceptibility is identified. Surrounding edema is identified. Localized mass effect is identified. Right to left shift (7.2 mm) seen.  Lesion 2: Enhancing lesion is seen involving the right upper tiffany. This lesion measures approximately 0.5 x 0.4 cm.  Lesion 3: Enhancing lesion is seen involving the superior left cerebellar region. This lesion measures approximately 1.2 x 1.0 cm. Surrounding edema is identified. She was treated with GK SRS.   6/2/2021- Ms. Shelley presents today for follow up. [employer] [id number] for Malay was used. MRI scheduled today at 145. Has not seen Dr. Martin since radiation. States she is feeling fine. States she has intermittent sensation of "pulling" to her forehead. Denies any dizziness, confusion, headache, nausea, vomiting, visual disturbances, weakness. States 3 weeks ago she had an episode where her left upper extremity "was shaking" for one minute, denies any LOC.   9/29/2021- Ms. Shelley presents today for follow up. She is seen today with assistance of  [id number]. MRI brain done 9/24/2021 showed Previously noted enhancing lesions in the posterior fossa and supratentorial region have either decreased in size or are no longer seen which is compatible with response to therapy. Continue close interval follow-up is recommended. Continues to follow with Dr. Martin. continues on examestane with plans to repeat body scans in november or december. Feeling very well. no headaches, no new weakness, no numbness or tingling, no nausea or vomiting. overall doing well.   12/30/2021: Today Ms. Shelley presents for follow-up, [facility]  . MRI brain showed: "Right high paramedian region lesion measures similar in size though there is increased edema seen in association when compared with the prior 9/24/2021. Question of vague enhancement on the surface of the cervical medullary junction and along the surface of the brainstem extending into the spinal cord level. Consideration for possible leptomeningeal pathology should be given. Recommend correlation with CSF as clinically warranted." Per our review, no leptomeningeal disease appreciated. The vague focus of enhancement likely represents a blood vessel. The swelling of the right high paramedian region lesion actually displays decreased edema, compared to prior.  Denies any nausea, vomiting, headache, numbness or tingling. Denies any issues with balance or hearing.   3/31/2022: Pt presents for follow-up. Clinically feeling well. Denies headache, nausea, vomiting, or other complaints. Denies interval symptoms since last f/u. Denies being on steroids or reportedly any recent seizure events.  [employer]i pacific : Goran [id number]  5/4/2022- Ms. Shelley presents today for follow up.   [id number] used for visit today.Brain MRI 4/27/2022 showed  IMPRESSION: Right medial frontal parasagittal parenchymal lesion is larger when compared with 3/30/2022 and 12/30/2021 suggesting a combination of post therapy change and neoplasm supported by MR spectroscopy and MR perfusion. There is a 5 mm nodule in the right parietal white matter which is new since 12/30/2021 but is slightly larger in retrospect since 3/30/2022. No evidence of leptomeningeal enhancement at the craniocervical junction, this was likely artifactual and is no longer identified. Follows with Dr. Martin. continue on exemestane. Today she feels well. denies headaches, nausea, vomiting, focal weakness, numbness, tingling.  7/13/2022- Ms. Shelley presents today for follow up. Brain MRI done 7/8. Continues on exemestane and following with Dr. Martin.  CT CAP 5/6/2022 showed Stable exam since 12/17/2021. Bone scan 5/6 without definite evidence of bone mets. Today she is feeling very well. denies headaches, nasuea, vomiting, focal weakness, numbness, tingling, confusion  9/20/2022- Ms. Shelley presents today for follow up. pacific  [id number] used for visit today. She presented to [facility] on 9/14/22 with a seizure, left sided. A brain MRI done 9/14/2022 showed  -Increase in size of a right frontal parasagittal lesion since comparison MRI, now measuring up to 2.5 cm. Increasing surrounding vasogenic edema. -No new lesion identified. No acute infarct. Discharged on dexamethasone taper to 4mg BID and keppra 1000mg bId. Feeling well. no further seizures. no headaches, no nausea, no focal weakness, no numbness or tingling. in retrospect prior to seizure denies any focal neurological symptoms.  10/6/2022- Ms. Shelley presents today for follow up. MRI brain 9/30/2022 showed Right frontal parasagittal parenchymal enhancing mass increased size and increased vasogenic edema compared with 7/8/2022. MR perfusion and MR spectroscopy are suggestive of post therapy change rather than neoplasm progression. Question tiny 2.5 mm nodular enhancing focus right cingulate gyrus could represent an additional focus. Recommend follow-up. tapered to 2mg Dexa bid 2 weeks.   11/17/2022- Ms. Shelley presents today for follow up.  [id number] used for visit. She started avastin on 10/28 and received her second dose on 11/11. Continues on exemestane. MRI brain 11/12/2022 showed Decreased right frontal parasagittal lesion when compared to 9/30/2022, with significant decreased vasogenic edema consistent with response to treatment. MR perfusion is suggestive of a combination of neoplasm and post therapy change. Continued follow-up. CT CAP 10/12/2022 showed A new 2.7 cm right adnexal cyst with thin septation. Consider follow-up with pelvic ultrasound as indicated versus attention at short-term follow-up imaging. Feels well overall. no headaches, no nausea, no focal weakness, no numbness or tingling. notes some weakness to bilateral things recently, trouble going up the stairs.   2/23/2023: Ms. Shelley presents to clinic for follow-up.  [id number] used for visit. MRI-Brain performed on 2/11/2023 showed Enhancing lesion is again seen involving the high medial right frontal cortex which continues to decrease in size. Decreased surrounding edema is seen as well. There is decreased cerebral blood flow and cerebral blood volume seen. This could be compatible with treatment-related changes though the possibility of underlying tumor with response to therapy must be considered as well. Continued close interval follow-up is recommended  Ms. Shelley has a CT- Abd/Pel scheduled for 2/18/2023. continues on exemestane. No headaches, no nausea, no weakness, no seizures. notes some intermittent and infrequent visual disturbances on the right side over the last month. Last Avastin 12/16/2022.  6/29/2023- Ms. Shelley presents today for follow up. Continues to follow with Dr. Martin. Continues on Exemestane.   MRI brain done 6/8/2023  showed Previously noted enhancing lesion involving the high medial right frontal region has increased in size with increased edema as well.  CT AP 2/18/2023 showed No evidence of intra-abdominal metastatic disease. Interval resolution of previously identified right adnexal cyst.  In the interim reports having an attack/trembling of the left hand and leg lasting~ 5mins (episode happened a week ago) Admits to skipping her PM AED doses for ~ 1 month Recalls a similar episode about 2 years ago Denies LOC/injuries/visual disturbance/unilateral extremity weakness  Visit dated 8/23/2023 Patient returns for continuation of care with completed images for review. Continues to follow with Dr. Martin on Exemestane Reports noticing over the past 2 weeks being a little off balance resulting in falls but w/o injury. Denies HAs, dizziness, speech/visual disturbance. MRI brain w w/o contrast 8/1//2023 IMPRESSION: Mild increase in size of right paramedian frontal lesion with thick peripheral rim of hyperperfusion consistent with viable tumor and central hypoperfusion consistent with necrosis. Overall findings are concerning for progression of tumor.  PET/CT [place] 8/7/2023 FINDINGS: Increased FES uptake is noted in a right frontal paramedian mass with a large surrounding area of edema. The volume of FES uptake corresponds to the entire volume of the enhancing lesion seen on Brain MRI from 8/1/2023 most consistent with viable tumor in the entire mass. No additional FES positive lesions are noted in the brain. IMPRESSION: Findings most consistent with active metastatic breast cancer in the brain.  Visit dated 9/14/2023. Patient recently completed   GKRS RIGHT frontal (re-irradiated) 2750cgy over 5 FXSs 8/30-9/7/2023. Reports she was doing well but on 9/1/2/23 noticed left sided tremor lasted a few seconds previous episodes lasted longer periods. Saw Dr Joya today and Keppra was increased (1250mg 2 x daily)) Denies unilateral extremity weakness, numbness, tingling, nausea, vomiting, headaches or other neurologic symptoms. No issues with speech or comprehension. Endorses fatigue. post treatment. She offers no new symptoms/concerns today. ON DEX 2mg 2x daily  Visit dated 10/17/2023 Patient returns for routine f/u and progress check with cranial images for review. Denies N/V, HA/unilateral extremity weakness/memory changes/gait disturbance/bowel/bladder dysfunction or other neurologic symptoms. No issues with speech or comprehension Concerned with trouble sleeping. Continues DEX 2 mg daily. On Keppra 1250mg 2xdaily managed by Dr Joya Denies seizure episodes (left side shaking)  Follows with Dr. Martin on Exemestane.   MRI brain w w/o contrast 10/15/2023 Final read pending at time of this entry  Visit dated1/24/2024 Patient returns for routine f/u with cranial images for review. Reports doing well. Denies signs of recurrent seizure episodes. Continues Keppra 1250mg BID. Denies N/V, unilateral extremity weakness/memory changes/gait disturbance/bowel/bladder dysfunction or other neurologic symptoms. No issues with speech or comprehension. Energy level okay participating in activity w/o difficulty.  Continues to follow with Dr. Martin on Exemestane. PETct scheduled for 1/25/2024 to r/o occult POD.  MRI brain w w/o contrast 1/17/2024 IMPRESSION: Lesion within the posterior medial aspect of the right frontal lobe appears smaller measuring 1.5 x 1.0 x 2.1 cm previously 1.8 x 1.3 x 2.8 cm. Marked improvement in the amount of associated edema. Smaller enhancing lesion just caudal to the above lesion within the single gyrus measuring 0.7 x 0.6 x 0.6 cm. This is new/larger from prior exam. Mild adjacent leptomeningeal enhancement.  Visit dated 2/8/2024 Patient returns on the recommendation of her oncologist. Seems she established care with a new oncologist now that Dr. Martin no longer works in the practice PET ct was ordered and she was told to meet with rad/onc. Overall doing well Denies HAs/evidence of seizures. Continues utlize  AEDs.  Continues on Exemestane will be seeing Dr Connelly 2/29/2024.  PETct FDG  whole body 1/25/2024 IMPRESSION: Compared to FES-PET/CT scan dated 8/17/2023: 1. Resolution of FES activity in right posterior frontal paramedian mass which is decreased in size on MRI, measuring 1.5 x 1.0 x 2.1 cm on 1/17/2024, as compared to 1.8 x 1.3 x 2.8 cm on 10/15/2023, and 2.6 x 1.7 x 2.6 cm on 8/1/2023. Findings are compatible with response to interval therapy. 2. New small, discrete focus of increased radiotracer activity in the right paramedian cingulate gyrus, corresponding to the new/larger enhancing lesion measuring 0.7 x 0.6 x 0.6 cm on recent MRI, compatible with ER-positive metastasis (SUV 6.4; image 43 of brain series). 3. The remainder of the study demonstrates no evidence of ER positive disease.   Visit dated 3/20/24 Patient returns after recently completing GKRS to RIGHT frontal lesion 24Gy over 3 Fxs 2/14-2/20/2024. Overall doing well and w/o any new concerns. Denies any recurrent seizure activity. Continues Keppra 1250 mg in AM + PM managed by Dr. Joya Denies N/V, HA/unilateral extremity weakness/memory changes/gait disturbance/bowel/bladder dysfunction or other neurologic symptoms. No issues with speech or comprehension.  Continues single agent Exemestane now under the care of Dr Connelly. Next cranial image in April  Visit dated 5/9/2024 Patient returns for routine f/u and progress check with cranial images for review. Continues to do well. Denies recurrence seizure activity. Continues prescribed Antiepileptics. Minimal pain left side of head, but this doesn't limit participation in activity. Denies difficulty with speech/unilateral extremity weakness  Continues single agent Exemestane now under the care of Dr Connelly since.  MRI brain w w/o contrast 4/29/2024 IMPRESSION: 1.  Improvement of the lesion at the right cingulate gyrus. 2.  Stable enhancement at the right superior frontal gyrus, with mildly worsening vasogenic edema. 3.  Stable enhancement in the corpus callosum. 4.  Continued routine surveillance recommended.  NM bone scan 4/30/2024 IMPRESSION: No significant interval change. No evidence of osseous metastases.  VISIT DATED 9/5/2024 Patient presents for routine f/u and progress check with cranial images completed for review. Reports doing well overall she recently [facility]. Denies headaches or any new focal deficits Continues Keppra as prescribed and managed by Dr Joya w./o evidence of left facial twitches Today w/o any new concerns.  Continues Exemestane now under the care of Dr Maricel Miller PET 8/21/2024  MRI brain w w/o contrast 8/26/2024 IMPRESSION: 1.  Mixed response to therapy. 2.  Gradual progression of nodules at the right superior frontal gyrus and corpus callosum. 3.  Resolution of a lesion in the right cingulate gyrus. 4.  Cannot categorically exclude treatment related changes, correlate clinically.   VISIT DATED: 10/2/2024 Patient returns for routine f/u and progress check with short interval cranial images to assess for metastatic disease Reports on 9/30/2024 she has a seizure like activity where the LUE began twitching lasting for a few seconds denies any missed AED doses. She has not notified the neurologist. Continues Keppra as prescribed (1500mg in AM 1500mg PM) Denies N+V, Headaches/unilateral extremity weakness/memory changes/gait disturbance/bowel/bladder dysfunction or other neurologic symptoms. No issues with speech or comprehension.  Continues Exemestane now under the care of Dr Connelly    MRI spect/perfusion 9/23/2024 IMPRESSION: Increased size of the enhancement in the right posterior frontal parasagittal lesion as well as slightly increased size of the enhancement in the right body of the corpus callosum since 8/26/2024. MR perfusion suggests a combination of post therapy changes and neoplasm. MR spectroscopy is suggestive of neoplasm.

## 2024-10-07 ENCOUNTER — OUTPATIENT (OUTPATIENT)
Dept: OUTPATIENT SERVICES | Facility: HOSPITAL | Age: 59
LOS: 1 days | Discharge: HOME | End: 2024-10-07
Payer: MEDICARE

## 2024-10-07 DIAGNOSIS — Z90.49 ACQUIRED ABSENCE OF OTHER SPECIFIED PARTS OF DIGESTIVE TRACT: Chronic | ICD-10-CM

## 2024-10-07 DIAGNOSIS — Z98.51 TUBAL LIGATION STATUS: Chronic | ICD-10-CM

## 2024-10-07 DIAGNOSIS — Z90.12 ACQUIRED ABSENCE OF LEFT BREAST AND NIPPLE: Chronic | ICD-10-CM

## 2024-10-07 DIAGNOSIS — Z90.3 ACQUIRED ABSENCE OF STOMACH [PART OF]: Chronic | ICD-10-CM

## 2024-10-07 DIAGNOSIS — C50.919 MALIGNANT NEOPLASM OF UNSPECIFIED SITE OF UNSPECIFIED FEMALE BREAST: ICD-10-CM

## 2024-10-09 ENCOUNTER — APPOINTMENT (OUTPATIENT)
Dept: NEUROLOGY | Facility: CLINIC | Age: 59
End: 2024-10-09
Payer: MEDICARE

## 2024-10-09 VITALS
SYSTOLIC BLOOD PRESSURE: 122 MMHG | RESPIRATION RATE: 16 BRPM | DIASTOLIC BLOOD PRESSURE: 80 MMHG | WEIGHT: 158 LBS | OXYGEN SATURATION: 98 % | TEMPERATURE: 98.1 F | HEIGHT: 64 IN | BODY MASS INDEX: 26.98 KG/M2 | HEART RATE: 68 BPM

## 2024-10-09 PROCEDURE — 99215 OFFICE O/P EST HI 40 MIN: CPT

## 2024-10-09 RX ORDER — LEVETIRACETAM 500 MG/1
500 TABLET, FILM COATED ORAL
Qty: 60 | Refills: 5 | Status: ACTIVE | COMMUNITY
Start: 2024-10-09 | End: 1900-01-01

## 2024-10-10 ENCOUNTER — APPOINTMENT (OUTPATIENT)
Dept: CARDIOLOGY | Facility: CLINIC | Age: 59
End: 2024-10-10
Payer: MEDICARE

## 2024-10-10 PROCEDURE — 93306 TTE W/DOPPLER COMPLETE: CPT

## 2024-10-10 PROCEDURE — 93356 MYOCRD STRAIN IMG SPCKL TRCK: CPT

## 2024-10-10 RX ORDER — PANTOPRAZOLE 40 MG/1
40 TABLET, DELAYED RELEASE ORAL
Qty: 30 | Refills: 2 | Status: ACTIVE | COMMUNITY
Start: 2024-10-09 | End: 1900-01-01

## 2024-10-10 RX ORDER — DEXAMETHASONE 4 MG/1
4 TABLET ORAL
Qty: 30 | Refills: 0 | Status: ACTIVE | COMMUNITY
Start: 2024-10-09 | End: 1900-01-01

## 2024-10-11 ENCOUNTER — NON-APPOINTMENT (OUTPATIENT)
Age: 59
End: 2024-10-11

## 2024-10-18 ENCOUNTER — NON-APPOINTMENT (OUTPATIENT)
Age: 59
End: 2024-10-18

## 2024-10-18 ENCOUNTER — RESULT REVIEW (OUTPATIENT)
Age: 59
End: 2024-10-18

## 2024-10-18 ENCOUNTER — APPOINTMENT (OUTPATIENT)
Dept: HEMATOLOGY ONCOLOGY | Facility: CLINIC | Age: 59
End: 2024-10-18
Payer: MEDICARE

## 2024-10-18 ENCOUNTER — APPOINTMENT (OUTPATIENT)
Dept: INFUSION THERAPY | Facility: HOSPITAL | Age: 59
End: 2024-10-18

## 2024-10-18 VITALS
TEMPERATURE: 97.7 F | BODY MASS INDEX: 26.87 KG/M2 | SYSTOLIC BLOOD PRESSURE: 109 MMHG | DIASTOLIC BLOOD PRESSURE: 73 MMHG | OXYGEN SATURATION: 96 % | HEART RATE: 65 BPM | RESPIRATION RATE: 16 BRPM | WEIGHT: 156.53 LBS

## 2024-10-18 VITALS
OXYGEN SATURATION: 97 % | SYSTOLIC BLOOD PRESSURE: 109 MMHG | TEMPERATURE: 97.5 F | WEIGHT: 156.53 LBS | HEART RATE: 63 BPM | DIASTOLIC BLOOD PRESSURE: 73 MMHG | RESPIRATION RATE: 16 BRPM | BODY MASS INDEX: 26.87 KG/M2

## 2024-10-18 DIAGNOSIS — C79.31 SECONDARY MALIGNANT NEOPLASM OF BRAIN: ICD-10-CM

## 2024-10-18 DIAGNOSIS — C50.919 MALIGNANT NEOPLASM OF UNSPECIFIED SITE OF UNSPECIFIED FEMALE BREAST: ICD-10-CM

## 2024-10-18 LAB
ALBUMIN SERPL ELPH-MCNC: 4.4 G/DL — SIGNIFICANT CHANGE UP (ref 3.3–5)
ALP SERPL-CCNC: 147 U/L — HIGH (ref 40–120)
ALT FLD-CCNC: 21 U/L — SIGNIFICANT CHANGE UP (ref 10–45)
ANION GAP SERPL CALC-SCNC: 13 MMOL/L — SIGNIFICANT CHANGE UP (ref 5–17)
AST SERPL-CCNC: 27 U/L — SIGNIFICANT CHANGE UP (ref 10–40)
BASOPHILS # BLD AUTO: 0.02 K/UL — SIGNIFICANT CHANGE UP (ref 0–0.2)
BASOPHILS NFR BLD AUTO: 0.3 % — SIGNIFICANT CHANGE UP (ref 0–2)
BILIRUB SERPL-MCNC: 0.3 MG/DL — SIGNIFICANT CHANGE UP (ref 0.2–1.2)
BUN SERPL-MCNC: 17 MG/DL — SIGNIFICANT CHANGE UP (ref 7–23)
CALCIUM SERPL-MCNC: 9.5 MG/DL — SIGNIFICANT CHANGE UP (ref 8.4–10.5)
CEA SERPL-MCNC: 1.1 NG/ML — SIGNIFICANT CHANGE UP (ref 0–3.8)
CHLORIDE SERPL-SCNC: 104 MMOL/L — SIGNIFICANT CHANGE UP (ref 96–108)
CO2 SERPL-SCNC: 25 MMOL/L — SIGNIFICANT CHANGE UP (ref 22–31)
CREAT SERPL-MCNC: 0.51 MG/DL — SIGNIFICANT CHANGE UP (ref 0.5–1.3)
EGFR: 108 ML/MIN/1.73M2 — SIGNIFICANT CHANGE UP
EOSINOPHIL # BLD AUTO: 0.12 K/UL — SIGNIFICANT CHANGE UP (ref 0–0.5)
EOSINOPHIL NFR BLD AUTO: 1.8 % — SIGNIFICANT CHANGE UP (ref 0–6)
GLUCOSE SERPL-MCNC: 96 MG/DL — SIGNIFICANT CHANGE UP (ref 70–99)
HCT VFR BLD CALC: 40 % — SIGNIFICANT CHANGE UP (ref 34.5–45)
HGB BLD-MCNC: 13.1 G/DL — SIGNIFICANT CHANGE UP (ref 11.5–15.5)
IMM GRANULOCYTES NFR BLD AUTO: 0.3 % — SIGNIFICANT CHANGE UP (ref 0–0.9)
LYMPHOCYTES # BLD AUTO: 2.37 K/UL — SIGNIFICANT CHANGE UP (ref 1–3.3)
LYMPHOCYTES # BLD AUTO: 35.9 % — SIGNIFICANT CHANGE UP (ref 13–44)
MCHC RBC-ENTMCNC: 29.2 PG — SIGNIFICANT CHANGE UP (ref 27–34)
MCHC RBC-ENTMCNC: 32.8 G/DL — SIGNIFICANT CHANGE UP (ref 32–36)
MCV RBC AUTO: 89.3 FL — SIGNIFICANT CHANGE UP (ref 80–100)
MONOCYTES # BLD AUTO: 0.4 K/UL — SIGNIFICANT CHANGE UP (ref 0–0.9)
MONOCYTES NFR BLD AUTO: 6.1 % — SIGNIFICANT CHANGE UP (ref 2–14)
NEUTROPHILS # BLD AUTO: 3.67 K/UL — SIGNIFICANT CHANGE UP (ref 1.8–7.4)
NEUTROPHILS NFR BLD AUTO: 55.6 % — SIGNIFICANT CHANGE UP (ref 43–77)
NRBC # BLD: 0 /100 WBCS — SIGNIFICANT CHANGE UP (ref 0–0)
PLATELET # BLD AUTO: 238 K/UL — SIGNIFICANT CHANGE UP (ref 150–400)
POTASSIUM SERPL-MCNC: 4.2 MMOL/L — SIGNIFICANT CHANGE UP (ref 3.5–5.3)
POTASSIUM SERPL-SCNC: 4.2 MMOL/L — SIGNIFICANT CHANGE UP (ref 3.5–5.3)
PROT SERPL-MCNC: 7.4 G/DL — SIGNIFICANT CHANGE UP (ref 6–8.3)
RBC # BLD: 4.48 M/UL — SIGNIFICANT CHANGE UP (ref 3.8–5.2)
RBC # FLD: 14 % — SIGNIFICANT CHANGE UP (ref 10.3–14.5)
SODIUM SERPL-SCNC: 142 MMOL/L — SIGNIFICANT CHANGE UP (ref 135–145)
WBC # BLD: 6.6 K/UL — SIGNIFICANT CHANGE UP (ref 3.8–10.5)
WBC # FLD AUTO: 6.6 K/UL — SIGNIFICANT CHANGE UP (ref 3.8–10.5)

## 2024-10-18 PROCEDURE — 93010 ELECTROCARDIOGRAM REPORT: CPT

## 2024-10-18 PROCEDURE — 99214 OFFICE O/P EST MOD 30 MIN: CPT

## 2024-10-18 RX ORDER — PROCHLORPERAZINE MALEATE 10 MG/1
10 TABLET ORAL EVERY 6 HOURS
Qty: 30 | Refills: 0 | Status: ACTIVE | COMMUNITY
Start: 2024-10-18 | End: 1900-01-01

## 2024-10-18 RX ORDER — ONDANSETRON 8 MG/1
8 TABLET ORAL EVERY 8 HOURS
Qty: 30 | Refills: 6 | Status: ACTIVE | COMMUNITY
Start: 2024-10-18 | End: 1900-01-01

## 2024-10-20 LAB — CANCER AG27-29 SERPL-ACNC: 15.9 U/ML — SIGNIFICANT CHANGE UP (ref 0–38.6)

## 2024-10-21 ENCOUNTER — NON-APPOINTMENT (OUTPATIENT)
Age: 59
End: 2024-10-21

## 2024-10-21 DIAGNOSIS — C79.31 SECONDARY MALIGNANT NEOPLASM OF BRAIN: ICD-10-CM

## 2024-10-21 DIAGNOSIS — Z51.11 ENCOUNTER FOR ANTINEOPLASTIC CHEMOTHERAPY: ICD-10-CM

## 2024-10-21 DIAGNOSIS — R11.2 NAUSEA WITH VOMITING, UNSPECIFIED: ICD-10-CM

## 2024-10-25 ENCOUNTER — APPOINTMENT (OUTPATIENT)
Dept: NEUROLOGY | Facility: CLINIC | Age: 59
End: 2024-10-25

## 2024-10-25 ENCOUNTER — NON-APPOINTMENT (OUTPATIENT)
Age: 59
End: 2024-10-25

## 2024-10-26 ENCOUNTER — APPOINTMENT (OUTPATIENT)
Dept: AFTER HOURS CARE | Facility: EMERGENCY ROOM | Age: 59
End: 2024-10-26

## 2024-10-26 ENCOUNTER — EMERGENCY (EMERGENCY)
Facility: HOSPITAL | Age: 59
LOS: 1 days | Discharge: HOME | End: 2024-10-26
Attending: STUDENT IN AN ORGANIZED HEALTH CARE EDUCATION/TRAINING PROGRAM
Payer: COMMERCIAL

## 2024-10-26 VITALS
TEMPERATURE: 98 F | RESPIRATION RATE: 15 BRPM | OXYGEN SATURATION: 100 % | DIASTOLIC BLOOD PRESSURE: 73 MMHG | HEIGHT: 61.81 IN | SYSTOLIC BLOOD PRESSURE: 131 MMHG | HEART RATE: 65 BPM | WEIGHT: 160.06 LBS

## 2024-10-26 VITALS — HEART RATE: 69 BPM

## 2024-10-26 DIAGNOSIS — Z90.12 ACQUIRED ABSENCE OF LEFT BREAST AND NIPPLE: Chronic | ICD-10-CM

## 2024-10-26 DIAGNOSIS — M54.6 PAIN IN THORACIC SPINE: ICD-10-CM

## 2024-10-26 DIAGNOSIS — Z90.49 ACQUIRED ABSENCE OF OTHER SPECIFIED PARTS OF DIGESTIVE TRACT: Chronic | ICD-10-CM

## 2024-10-26 DIAGNOSIS — Z98.51 TUBAL LIGATION STATUS: Chronic | ICD-10-CM

## 2024-10-26 DIAGNOSIS — Z90.3 ACQUIRED ABSENCE OF STOMACH [PART OF]: Chronic | ICD-10-CM

## 2024-10-26 LAB
ALBUMIN SERPL ELPH-MCNC: 3.8 G/DL — SIGNIFICANT CHANGE UP (ref 3.5–5)
ALP SERPL-CCNC: 155 U/L — HIGH (ref 40–120)
ALT FLD-CCNC: 43 U/L DA — SIGNIFICANT CHANGE UP (ref 10–60)
ANION GAP SERPL CALC-SCNC: 5 MMOL/L — SIGNIFICANT CHANGE UP (ref 5–17)
AST SERPL-CCNC: 33 U/L — SIGNIFICANT CHANGE UP (ref 10–40)
BASOPHILS # BLD AUTO: 0.02 K/UL — SIGNIFICANT CHANGE UP (ref 0–0.2)
BASOPHILS NFR BLD AUTO: 0.3 % — SIGNIFICANT CHANGE UP (ref 0–2)
BILIRUB SERPL-MCNC: 0.3 MG/DL — SIGNIFICANT CHANGE UP (ref 0.2–1.2)
BUN SERPL-MCNC: 14 MG/DL — SIGNIFICANT CHANGE UP (ref 7–18)
CALCIUM SERPL-MCNC: 9.2 MG/DL — SIGNIFICANT CHANGE UP (ref 8.4–10.5)
CHLORIDE SERPL-SCNC: 109 MMOL/L — HIGH (ref 96–108)
CO2 SERPL-SCNC: 26 MMOL/L — SIGNIFICANT CHANGE UP (ref 22–31)
CREAT SERPL-MCNC: 0.59 MG/DL — SIGNIFICANT CHANGE UP (ref 0.5–1.3)
EGFR: 104 ML/MIN/1.73M2 — SIGNIFICANT CHANGE UP
EOSINOPHIL # BLD AUTO: 0.13 K/UL — SIGNIFICANT CHANGE UP (ref 0–0.5)
EOSINOPHIL NFR BLD AUTO: 1.9 % — SIGNIFICANT CHANGE UP (ref 0–6)
GLUCOSE SERPL-MCNC: 96 MG/DL — SIGNIFICANT CHANGE UP (ref 70–99)
HCT VFR BLD CALC: 38.7 % — SIGNIFICANT CHANGE UP (ref 34.5–45)
HGB BLD-MCNC: 12.9 G/DL — SIGNIFICANT CHANGE UP (ref 11.5–15.5)
IMM GRANULOCYTES NFR BLD AUTO: 0.3 % — SIGNIFICANT CHANGE UP (ref 0–0.9)
LIDOCAIN IGE QN: 51 U/L — SIGNIFICANT CHANGE UP (ref 13–75)
LYMPHOCYTES # BLD AUTO: 3.12 K/UL — SIGNIFICANT CHANGE UP (ref 1–3.3)
LYMPHOCYTES # BLD AUTO: 45 % — HIGH (ref 13–44)
MAGNESIUM SERPL-MCNC: 2.2 MG/DL — SIGNIFICANT CHANGE UP (ref 1.6–2.6)
MCHC RBC-ENTMCNC: 29.5 PG — SIGNIFICANT CHANGE UP (ref 27–34)
MCHC RBC-ENTMCNC: 33.3 GM/DL — SIGNIFICANT CHANGE UP (ref 32–36)
MCV RBC AUTO: 88.6 FL — SIGNIFICANT CHANGE UP (ref 80–100)
MONOCYTES # BLD AUTO: 0.3 K/UL — SIGNIFICANT CHANGE UP (ref 0–0.9)
MONOCYTES NFR BLD AUTO: 4.3 % — SIGNIFICANT CHANGE UP (ref 2–14)
NEUTROPHILS # BLD AUTO: 3.35 K/UL — SIGNIFICANT CHANGE UP (ref 1.8–7.4)
NEUTROPHILS NFR BLD AUTO: 48.2 % — SIGNIFICANT CHANGE UP (ref 43–77)
NRBC # BLD: 0 /100 WBCS — SIGNIFICANT CHANGE UP (ref 0–0)
NT-PROBNP SERPL-SCNC: 56 PG/ML — SIGNIFICANT CHANGE UP (ref 0–125)
PLATELET # BLD AUTO: 209 K/UL — SIGNIFICANT CHANGE UP (ref 150–400)
POTASSIUM SERPL-MCNC: 4.7 MMOL/L — SIGNIFICANT CHANGE UP (ref 3.5–5.3)
POTASSIUM SERPL-SCNC: 4.7 MMOL/L — SIGNIFICANT CHANGE UP (ref 3.5–5.3)
PROT SERPL-MCNC: 7.4 G/DL — SIGNIFICANT CHANGE UP (ref 6–8.3)
RBC # BLD: 4.37 M/UL — SIGNIFICANT CHANGE UP (ref 3.8–5.2)
RBC # FLD: 13.5 % — SIGNIFICANT CHANGE UP (ref 10.3–14.5)
SODIUM SERPL-SCNC: 140 MMOL/L — SIGNIFICANT CHANGE UP (ref 135–145)
TROPONIN I, HIGH SENSITIVITY RESULT: 21.7 NG/L — SIGNIFICANT CHANGE UP
WBC # BLD: 6.94 K/UL — SIGNIFICANT CHANGE UP (ref 3.8–10.5)
WBC # FLD AUTO: 6.94 K/UL — SIGNIFICANT CHANGE UP (ref 3.8–10.5)

## 2024-10-26 PROCEDURE — 80053 COMPREHEN METABOLIC PANEL: CPT

## 2024-10-26 PROCEDURE — 83880 ASSAY OF NATRIURETIC PEPTIDE: CPT

## 2024-10-26 PROCEDURE — 99215 OFFICE O/P EST HI 40 MIN: CPT

## 2024-10-26 PROCEDURE — 83735 ASSAY OF MAGNESIUM: CPT

## 2024-10-26 PROCEDURE — 71275 CT ANGIOGRAPHY CHEST: CPT | Mod: MC

## 2024-10-26 PROCEDURE — 84484 ASSAY OF TROPONIN QUANT: CPT

## 2024-10-26 PROCEDURE — 99285 EMERGENCY DEPT VISIT HI MDM: CPT

## 2024-10-26 PROCEDURE — 36415 COLL VENOUS BLD VENIPUNCTURE: CPT

## 2024-10-26 PROCEDURE — 85025 COMPLETE CBC W/AUTO DIFF WBC: CPT

## 2024-10-26 PROCEDURE — 83690 ASSAY OF LIPASE: CPT

## 2024-10-26 PROCEDURE — 99284 EMERGENCY DEPT VISIT MOD MDM: CPT | Mod: 25

## 2024-10-26 PROCEDURE — 71275 CT ANGIOGRAPHY CHEST: CPT | Mod: 26,MC

## 2024-10-26 NOTE — ED PROVIDER NOTE - CLINICAL SUMMARY MEDICAL DECISION MAKING FREE TEXT BOX
59-year-old female with history of breast cancer, seizures recently started on new chemo and coming with pain in left upper back that is nonradiating.  No difficulty breathing, fevers, chills, cough, abdominal pain, nausea, vomiting, dizziness, change in strength or sensation in the extremities.  Has been taking Tylenol with temporary relief.  Patient is sent in to get a CTA to be evaluated for PE.  Patient is nontoxic-appearing.  No distress.  Clear to auscultation bilaterally.  Regular rate and rhythm.  Positive tenderness to palpation in right upper thoracic paraspinal region.  No spinal tenderness to palpation.  No TTP of the chest.  Abdomen soft nontender.  No pedal edema.  No calf swelling or tenderness to palpation.  Differential diagnoses include but not limited to MSK pain.  Given history of cancer plan to eval for PE and pneumonia.  Patient is satting well on room air.

## 2024-10-26 NOTE — ED PROVIDER NOTE - PATIENT PORTAL LINK FT
You can access the FollowMyHealth Patient Portal offered by [facility] by registering at the following website: [email or web address]. By joining [facility]’s FollowMyHealth portal, you will also be able to view your health information using other applications (apps) compatible with our system.

## 2024-10-26 NOTE — ED ADULT TRIAGE NOTE - CHIEF COMPLAINT QUOTE
left back pain since last week after getting chemo therapy. denies any shortness of breath or chest pain.

## 2024-10-26 NOTE — ED PROVIDER NOTE - PROGRESS NOTE DETAILS
with back pain, care signed out pending results of CTA which showed no evidence of pulmonary embolism.  Likely musculoskeletal.  Will continue supportive therapy.

## 2024-10-26 NOTE — ED ADULT NURSE NOTE - OBJECTIVE STATEMENT
Pt A&O x 3 ambulatory, presents to the ED c/o right upper back pain that started yesterday, no falls or injuries as per Pt.  Pt denies any dizziness, N/V, fevers, SOB and chest pain.

## 2024-10-26 NOTE — ED PROVIDER NOTE - NSFOLLOWUPINSTRUCTIONS_ED_ALL_ED_FT
You were seen for back pain.    No signs of emergency medical condition on today's workup.  Your results are attached with your discharge instructions, please review them with your primary care physician. If there is a result pending, you will receive a call if test is positive.    A presumptive diagnosis is made today, but further evaluation may be required by your primary care doctor and/or specialist for a definitive diagnosis. Therefore, follow up as directed and if symptoms change/worsen or any emergency conditions, please return to the ER.    For pain or fever you can take tylenol as needed, as directed on packaging.  You can use 500-1000mg Tylenol every 6 hours for pain - as needed.  This is an over-the-counter medications - please respect the warnings on the label. This medication come with certain risks and side effects that you need to discuss with your doctor, especially if you are taking it for a prolonged period.      If needed, call patient access services at [phone] to find a primary care doctor, or call at [phone] to make an appointment at the clinic. You were seen for back pain.  Your CT imaging shows no evidence of pulmonary embolism.  Your pain is likely musculoskeletal.    No signs of emergency medical condition on today's workup.  Your results are attached with your discharge instructions, please review them with your primary care physician. If there is a result pending, you will receive a call if test is positive.    A presumptive diagnosis is made today, but further evaluation may be required by your primary care doctor and/or specialist for a definitive diagnosis. Therefore, follow up as directed and if symptoms change/worsen or any emergency conditions, please return to the ER.    For pain or fever you can take tylenol as needed, as directed on packaging.  You can use 500-1000mg Tylenol every 6 hours for pain - as needed.  This is an over-the-counter medications - please respect the warnings on the label. This medication come with certain risks and side effects that you need to discuss with your doctor, especially if you are taking it for a prolonged period.      If needed, call patient access services at [phone] to find a primary care doctor, or call at [phone] to make an appointment at the clinic.

## 2024-10-30 ENCOUNTER — NON-APPOINTMENT (OUTPATIENT)
Age: 59
End: 2024-10-30

## 2024-11-05 ENCOUNTER — APPOINTMENT (OUTPATIENT)
Dept: NEUROLOGY | Facility: CLINIC | Age: 59
End: 2024-11-05

## 2024-11-07 ENCOUNTER — NON-APPOINTMENT (OUTPATIENT)
Age: 59
End: 2024-11-07

## 2024-11-07 ENCOUNTER — APPOINTMENT (OUTPATIENT)
Dept: INTERNAL MEDICINE | Facility: CLINIC | Age: 59
End: 2024-11-07
Payer: MEDICARE

## 2024-11-07 VITALS
SYSTOLIC BLOOD PRESSURE: 125 MMHG | OXYGEN SATURATION: 99 % | RESPIRATION RATE: 16 BRPM | BODY MASS INDEX: 26.29 KG/M2 | DIASTOLIC BLOOD PRESSURE: 80 MMHG | TEMPERATURE: 97.6 F | HEIGHT: 64 IN | HEART RATE: 65 BPM | WEIGHT: 154 LBS

## 2024-11-07 DIAGNOSIS — R05.9 COUGH, UNSPECIFIED: ICD-10-CM

## 2024-11-07 DIAGNOSIS — E78.00 PURE HYPERCHOLESTEROLEMIA, UNSPECIFIED: ICD-10-CM

## 2024-11-07 DIAGNOSIS — R56.9 UNSPECIFIED CONVULSIONS: ICD-10-CM

## 2024-11-07 DIAGNOSIS — C79.31 SECONDARY MALIGNANT NEOPLASM OF BRAIN: ICD-10-CM

## 2024-11-07 DIAGNOSIS — Z12.11 ENCOUNTER FOR SCREENING FOR MALIGNANT NEOPLASM OF COLON: ICD-10-CM

## 2024-11-07 DIAGNOSIS — Z00.00 ENCOUNTER FOR GENERAL ADULT MEDICAL EXAMINATION W/OUT ABNORMAL FINDINGS: ICD-10-CM

## 2024-11-07 DIAGNOSIS — R73.03 PREDIABETES.: ICD-10-CM

## 2024-11-07 PROCEDURE — G0439: CPT

## 2024-11-07 RX ORDER — PROMETHAZINE HYDROCHLORIDE AND DEXTROMETHORPHAN HYDROBROMIDE ORAL SOLUTION 15; 6.25 MG/5ML; MG/5ML
6.25-15 SOLUTION ORAL
Qty: 210 | Refills: 0 | Status: ACTIVE | COMMUNITY
Start: 2024-11-07 | End: 1900-01-01

## 2024-11-08 ENCOUNTER — RESULT REVIEW (OUTPATIENT)
Age: 59
End: 2024-11-08

## 2024-11-08 ENCOUNTER — APPOINTMENT (OUTPATIENT)
Dept: INFUSION THERAPY | Facility: HOSPITAL | Age: 59
End: 2024-11-08

## 2024-11-08 ENCOUNTER — APPOINTMENT (OUTPATIENT)
Dept: HEMATOLOGY ONCOLOGY | Facility: CLINIC | Age: 59
End: 2024-11-08
Payer: MEDICARE

## 2024-11-08 ENCOUNTER — APPOINTMENT (OUTPATIENT)
Dept: NEUROLOGY | Facility: CLINIC | Age: 59
End: 2024-11-08

## 2024-11-08 VITALS
RESPIRATION RATE: 16 BRPM | TEMPERATURE: 97.2 F | SYSTOLIC BLOOD PRESSURE: 124 MMHG | BODY MASS INDEX: 27.06 KG/M2 | HEART RATE: 64 BPM | OXYGEN SATURATION: 99 % | WEIGHT: 157.63 LBS | DIASTOLIC BLOOD PRESSURE: 79 MMHG

## 2024-11-08 DIAGNOSIS — Z79.899 OTHER LONG TERM (CURRENT) DRUG THERAPY: ICD-10-CM

## 2024-11-08 LAB
25(OH)D3 SERPL-MCNC: 23.4 NG/ML
ALBUMIN SERPL ELPH-MCNC: 4.1 G/DL — SIGNIFICANT CHANGE UP (ref 3.3–5)
ALBUMIN SERPL ELPH-MCNC: 4.2 G/DL
ALP BLD-CCNC: 157 U/L
ALP SERPL-CCNC: 156 U/L — HIGH (ref 40–120)
ALT FLD-CCNC: 30 U/L — SIGNIFICANT CHANGE UP (ref 10–45)
ALT SERPL-CCNC: 27 U/L
ANION GAP SERPL CALC-SCNC: 12 MMOL/L — SIGNIFICANT CHANGE UP (ref 5–17)
ANION GAP SERPL CALC-SCNC: 13 MMOL/L
APPEARANCE: CLEAR
AST SERPL-CCNC: 22 U/L
AST SERPL-CCNC: 30 U/L — SIGNIFICANT CHANGE UP (ref 10–40)
BACTERIA: NEGATIVE /HPF
BASOPHILS # BLD AUTO: 0.02 K/UL
BASOPHILS # BLD AUTO: 0.02 K/UL — SIGNIFICANT CHANGE UP (ref 0–0.2)
BASOPHILS NFR BLD AUTO: 0.3 % — SIGNIFICANT CHANGE UP (ref 0–2)
BASOPHILS NFR BLD AUTO: 0.5 %
BILIRUB SERPL-MCNC: 0.2 MG/DL
BILIRUB SERPL-MCNC: 0.2 MG/DL — SIGNIFICANT CHANGE UP (ref 0.2–1.2)
BILIRUBIN URINE: NEGATIVE
BLOOD URINE: NEGATIVE
BUN SERPL-MCNC: 11 MG/DL — SIGNIFICANT CHANGE UP (ref 7–23)
BUN SERPL-MCNC: 12 MG/DL
CALCIUM SERPL-MCNC: 9.2 MG/DL — SIGNIFICANT CHANGE UP (ref 8.4–10.5)
CALCIUM SERPL-MCNC: 9.3 MG/DL
CAST: 0 /LPF
CEA SERPL-MCNC: 1.4 NG/ML — SIGNIFICANT CHANGE UP (ref 0–3.8)
CHLORIDE SERPL-SCNC: 104 MMOL/L — SIGNIFICANT CHANGE UP (ref 96–108)
CHLORIDE SERPL-SCNC: 106 MMOL/L
CHOLEST SERPL-MCNC: 189 MG/DL
CO2 SERPL-SCNC: 24 MMOL/L
CO2 SERPL-SCNC: 26 MMOL/L — SIGNIFICANT CHANGE UP (ref 22–31)
COLOR: YELLOW
CREAT SERPL-MCNC: 0.52 MG/DL
CREAT SERPL-MCNC: 0.54 MG/DL — SIGNIFICANT CHANGE UP (ref 0.5–1.3)
EGFR: 107 ML/MIN/1.73M2 — SIGNIFICANT CHANGE UP
EGFR: 108 ML/MIN/1.73M2
EOSINOPHIL # BLD AUTO: 0.16 K/UL
EOSINOPHIL # BLD AUTO: 0.16 K/UL — SIGNIFICANT CHANGE UP (ref 0–0.5)
EOSINOPHIL NFR BLD AUTO: 2.7 % — SIGNIFICANT CHANGE UP (ref 0–6)
EOSINOPHIL NFR BLD AUTO: 3.7 %
EPITHELIAL CELLS: 0 /HPF
ESTIMATED AVERAGE GLUCOSE: 120 MG/DL
GLUCOSE QUALITATIVE U: NEGATIVE MG/DL
GLUCOSE SERPL-MCNC: 100 MG/DL
GLUCOSE SERPL-MCNC: 90 MG/DL — SIGNIFICANT CHANGE UP (ref 70–99)
HBA1C MFR BLD HPLC: 5.8 %
HCT VFR BLD CALC: 36.1 % — SIGNIFICANT CHANGE UP (ref 34.5–45)
HCT VFR BLD CALC: 37 %
HDLC SERPL-MCNC: 37 MG/DL
HGB BLD-MCNC: 11.8 G/DL
HGB BLD-MCNC: 12.1 G/DL — SIGNIFICANT CHANGE UP (ref 11.5–15.5)
IMM GRANULOCYTES NFR BLD AUTO: 0.7 %
IMM GRANULOCYTES NFR BLD AUTO: 0.7 % — SIGNIFICANT CHANGE UP (ref 0–0.9)
KETONES URINE: NEGATIVE MG/DL
LDLC SERPL CALC-MCNC: 102 MG/DL
LEUKOCYTE ESTERASE URINE: ABNORMAL
LYMPHOCYTES # BLD AUTO: 2.42 K/UL
LYMPHOCYTES # BLD AUTO: 2.67 K/UL — SIGNIFICANT CHANGE UP (ref 1–3.3)
LYMPHOCYTES # BLD AUTO: 45.6 % — HIGH (ref 13–44)
LYMPHOCYTES NFR BLD AUTO: 56.4 %
MAN DIFF?: NORMAL
MCHC RBC-ENTMCNC: 28.9 PG
MCHC RBC-ENTMCNC: 29 PG — SIGNIFICANT CHANGE UP (ref 27–34)
MCHC RBC-ENTMCNC: 31.9 G/DL
MCHC RBC-ENTMCNC: 33.5 G/DL — SIGNIFICANT CHANGE UP (ref 32–36)
MCV RBC AUTO: 86.6 FL — SIGNIFICANT CHANGE UP (ref 80–100)
MCV RBC AUTO: 90.7 FL
MICROSCOPIC-UA: NORMAL
MONOCYTES # BLD AUTO: 0.41 K/UL
MONOCYTES # BLD AUTO: 0.59 K/UL — SIGNIFICANT CHANGE UP (ref 0–0.9)
MONOCYTES NFR BLD AUTO: 10.1 % — SIGNIFICANT CHANGE UP (ref 2–14)
MONOCYTES NFR BLD AUTO: 9.6 %
NEUTROPHILS # BLD AUTO: 1.25 K/UL
NEUTROPHILS # BLD AUTO: 2.37 K/UL — SIGNIFICANT CHANGE UP (ref 1.8–7.4)
NEUTROPHILS NFR BLD AUTO: 29.1 %
NEUTROPHILS NFR BLD AUTO: 40.6 % — LOW (ref 43–77)
NITRITE URINE: NEGATIVE
NONHDLC SERPL-MCNC: 152 MG/DL
NRBC # BLD: 0 /100 WBCS — SIGNIFICANT CHANGE UP (ref 0–0)
PH URINE: 7.5
PLATELET # BLD AUTO: 338 K/UL — SIGNIFICANT CHANGE UP (ref 150–400)
PLATELET # BLD AUTO: 362 K/UL
POTASSIUM SERPL-MCNC: 4.4 MMOL/L — SIGNIFICANT CHANGE UP (ref 3.5–5.3)
POTASSIUM SERPL-SCNC: 4.4 MMOL/L — SIGNIFICANT CHANGE UP (ref 3.5–5.3)
POTASSIUM SERPL-SCNC: 4.7 MMOL/L
PROT SERPL-MCNC: 6.4 G/DL
PROT SERPL-MCNC: 6.9 G/DL — SIGNIFICANT CHANGE UP (ref 6–8.3)
PROTEIN URINE: NEGATIVE MG/DL
RBC # BLD: 4.08 M/UL
RBC # BLD: 4.17 M/UL — SIGNIFICANT CHANGE UP (ref 3.8–5.2)
RBC # FLD: 14.3 % — SIGNIFICANT CHANGE UP (ref 10.3–14.5)
RBC # FLD: 14.9 %
RED BLOOD CELLS URINE: 3 /HPF
SODIUM SERPL-SCNC: 143 MMOL/L
SODIUM SERPL-SCNC: 143 MMOL/L — SIGNIFICANT CHANGE UP (ref 135–145)
SPECIFIC GRAVITY URINE: 1.02
T4 FREE SERPL-MCNC: 1.1 NG/DL
TRIGL SERPL-MCNC: 297 MG/DL
TSH SERPL-ACNC: 2.35 UIU/ML
UROBILINOGEN URINE: 1 MG/DL
VIT B12 SERPL-MCNC: >2000 PG/ML
WBC # BLD: 5.85 K/UL — SIGNIFICANT CHANGE UP (ref 3.8–10.5)
WBC # FLD AUTO: 4.29 K/UL
WBC # FLD AUTO: 5.85 K/UL — SIGNIFICANT CHANGE UP (ref 3.8–10.5)
WHITE BLOOD CELLS URINE: 0 /HPF

## 2024-11-08 PROCEDURE — G2211 COMPLEX E/M VISIT ADD ON: CPT

## 2024-11-08 PROCEDURE — 99214 OFFICE O/P EST MOD 30 MIN: CPT

## 2024-11-09 LAB — CANCER AG27-29 SERPL-ACNC: 15.6 U/ML — SIGNIFICANT CHANGE UP (ref 0–38.6)

## 2024-11-11 LAB — HEMOCCULT STL QL IA: POSITIVE

## 2024-11-12 DIAGNOSIS — C50.919 MALIGNANT NEOPLASM OF UNSPECIFIED SITE OF UNSPECIFIED FEMALE BREAST: ICD-10-CM

## 2024-11-15 ENCOUNTER — OUTPATIENT (OUTPATIENT)
Dept: OUTPATIENT SERVICES | Facility: HOSPITAL | Age: 59
LOS: 1 days | End: 2024-11-15
Payer: COMMERCIAL

## 2024-11-15 ENCOUNTER — APPOINTMENT (OUTPATIENT)
Dept: CT IMAGING | Facility: IMAGING CENTER | Age: 59
End: 2024-11-15
Payer: MEDICARE

## 2024-11-15 DIAGNOSIS — Z98.51 TUBAL LIGATION STATUS: Chronic | ICD-10-CM

## 2024-11-15 DIAGNOSIS — Z00.8 ENCOUNTER FOR OTHER GENERAL EXAMINATION: ICD-10-CM

## 2024-11-15 DIAGNOSIS — Z90.3 ACQUIRED ABSENCE OF STOMACH [PART OF]: Chronic | ICD-10-CM

## 2024-11-15 DIAGNOSIS — Z90.49 ACQUIRED ABSENCE OF OTHER SPECIFIED PARTS OF DIGESTIVE TRACT: Chronic | ICD-10-CM

## 2024-11-15 DIAGNOSIS — Z90.12 ACQUIRED ABSENCE OF LEFT BREAST AND NIPPLE: Chronic | ICD-10-CM

## 2024-11-15 PROCEDURE — 71260 CT THORAX DX C+: CPT

## 2024-11-15 PROCEDURE — 71260 CT THORAX DX C+: CPT | Mod: 26

## 2024-11-18 ENCOUNTER — NON-APPOINTMENT (OUTPATIENT)
Age: 59
End: 2024-11-18

## 2024-11-18 ENCOUNTER — APPOINTMENT (OUTPATIENT)
Dept: NEUROLOGY | Facility: CLINIC | Age: 59
End: 2024-11-18
Payer: MEDICAID

## 2024-11-18 VITALS
SYSTOLIC BLOOD PRESSURE: 115 MMHG | HEIGHT: 64 IN | OXYGEN SATURATION: 99 % | DIASTOLIC BLOOD PRESSURE: 75 MMHG | HEART RATE: 74 BPM | BODY MASS INDEX: 26.91 KG/M2 | TEMPERATURE: 96.3 F | WEIGHT: 157.63 LBS

## 2024-11-18 DIAGNOSIS — R56.9 UNSPECIFIED CONVULSIONS: ICD-10-CM

## 2024-11-18 DIAGNOSIS — C71.9 MALIGNANT NEOPLASM OF BRAIN, UNSPECIFIED: ICD-10-CM

## 2024-11-18 PROCEDURE — G2211 COMPLEX E/M VISIT ADD ON: CPT | Mod: NC

## 2024-11-18 PROCEDURE — 99215 OFFICE O/P EST HI 40 MIN: CPT

## 2024-11-18 PROCEDURE — 99205 OFFICE O/P NEW HI 60 MIN: CPT

## 2024-11-29 ENCOUNTER — RESULT REVIEW (OUTPATIENT)
Age: 59
End: 2024-11-29

## 2024-11-29 ENCOUNTER — APPOINTMENT (OUTPATIENT)
Dept: INFUSION THERAPY | Facility: HOSPITAL | Age: 59
End: 2024-11-29

## 2024-11-29 ENCOUNTER — APPOINTMENT (OUTPATIENT)
Dept: HEMATOLOGY ONCOLOGY | Facility: CLINIC | Age: 59
End: 2024-11-29
Payer: MEDICARE

## 2024-11-29 VITALS
SYSTOLIC BLOOD PRESSURE: 121 MMHG | TEMPERATURE: 97.7 F | WEIGHT: 160.04 LBS | BODY MASS INDEX: 27.32 KG/M2 | RESPIRATION RATE: 17 BRPM | OXYGEN SATURATION: 98 % | HEIGHT: 64 IN | HEART RATE: 67 BPM | DIASTOLIC BLOOD PRESSURE: 75 MMHG

## 2024-11-29 DIAGNOSIS — Z79.899 OTHER LONG TERM (CURRENT) DRUG THERAPY: ICD-10-CM

## 2024-11-29 DIAGNOSIS — C79.31 SECONDARY MALIGNANT NEOPLASM OF BRAIN: ICD-10-CM

## 2024-11-29 DIAGNOSIS — C50.919 MALIGNANT NEOPLASM OF UNSPECIFIED SITE OF UNSPECIFIED FEMALE BREAST: ICD-10-CM

## 2024-11-29 LAB
ALBUMIN SERPL ELPH-MCNC: 3.9 G/DL — SIGNIFICANT CHANGE UP (ref 3.3–5)
ALP SERPL-CCNC: 167 U/L — HIGH (ref 40–120)
ALT FLD-CCNC: 36 U/L — SIGNIFICANT CHANGE UP (ref 10–45)
ANION GAP SERPL CALC-SCNC: 13 MMOL/L — SIGNIFICANT CHANGE UP (ref 5–17)
AST SERPL-CCNC: 35 U/L — SIGNIFICANT CHANGE UP (ref 10–40)
BASOPHILS # BLD AUTO: 0.03 K/UL — SIGNIFICANT CHANGE UP (ref 0–0.2)
BASOPHILS NFR BLD AUTO: 0.4 % — SIGNIFICANT CHANGE UP (ref 0–2)
BILIRUB SERPL-MCNC: 0.2 MG/DL — SIGNIFICANT CHANGE UP (ref 0.2–1.2)
BUN SERPL-MCNC: 23 MG/DL — SIGNIFICANT CHANGE UP (ref 7–23)
CALCIUM SERPL-MCNC: 9.4 MG/DL — SIGNIFICANT CHANGE UP (ref 8.4–10.5)
CANCER AG27-29 SERPL-ACNC: 16.5 U/ML — SIGNIFICANT CHANGE UP (ref 0–38.6)
CEA SERPL-MCNC: 1.3 NG/ML — SIGNIFICANT CHANGE UP (ref 0–3.8)
CHLORIDE SERPL-SCNC: 106 MMOL/L — SIGNIFICANT CHANGE UP (ref 96–108)
CO2 SERPL-SCNC: 24 MMOL/L — SIGNIFICANT CHANGE UP (ref 22–31)
CREAT SERPL-MCNC: 0.59 MG/DL — SIGNIFICANT CHANGE UP (ref 0.5–1.3)
EGFR: 104 ML/MIN/1.73M2 — SIGNIFICANT CHANGE UP
EOSINOPHIL # BLD AUTO: 0.22 K/UL — SIGNIFICANT CHANGE UP (ref 0–0.5)
EOSINOPHIL NFR BLD AUTO: 3.3 % — SIGNIFICANT CHANGE UP (ref 0–6)
GLUCOSE SERPL-MCNC: 105 MG/DL — HIGH (ref 70–99)
HCT VFR BLD CALC: 35.8 % — SIGNIFICANT CHANGE UP (ref 34.5–45)
HGB BLD-MCNC: 11.9 G/DL — SIGNIFICANT CHANGE UP (ref 11.5–15.5)
IMM GRANULOCYTES NFR BLD AUTO: 0.9 % — SIGNIFICANT CHANGE UP (ref 0–0.9)
LYMPHOCYTES # BLD AUTO: 2.72 K/UL — SIGNIFICANT CHANGE UP (ref 1–3.3)
LYMPHOCYTES # BLD AUTO: 40.8 % — SIGNIFICANT CHANGE UP (ref 13–44)
MCHC RBC-ENTMCNC: 29.5 PG — SIGNIFICANT CHANGE UP (ref 27–34)
MCHC RBC-ENTMCNC: 33.2 G/DL — SIGNIFICANT CHANGE UP (ref 32–36)
MCV RBC AUTO: 88.8 FL — SIGNIFICANT CHANGE UP (ref 80–100)
MONOCYTES # BLD AUTO: 0.75 K/UL — SIGNIFICANT CHANGE UP (ref 0–0.9)
MONOCYTES NFR BLD AUTO: 11.2 % — SIGNIFICANT CHANGE UP (ref 2–14)
NEUTROPHILS # BLD AUTO: 2.89 K/UL — SIGNIFICANT CHANGE UP (ref 1.8–7.4)
NEUTROPHILS NFR BLD AUTO: 43.4 % — SIGNIFICANT CHANGE UP (ref 43–77)
NRBC # BLD: 0 /100 WBCS — SIGNIFICANT CHANGE UP (ref 0–0)
PLATELET # BLD AUTO: 325 K/UL — SIGNIFICANT CHANGE UP (ref 150–400)
POTASSIUM SERPL-MCNC: 4.5 MMOL/L — SIGNIFICANT CHANGE UP (ref 3.5–5.3)
POTASSIUM SERPL-SCNC: 4.5 MMOL/L — SIGNIFICANT CHANGE UP (ref 3.5–5.3)
PROT SERPL-MCNC: 6.7 G/DL — SIGNIFICANT CHANGE UP (ref 6–8.3)
RBC # BLD: 4.03 M/UL — SIGNIFICANT CHANGE UP (ref 3.8–5.2)
RBC # FLD: 15.3 % — HIGH (ref 10.3–14.5)
SODIUM SERPL-SCNC: 143 MMOL/L — SIGNIFICANT CHANGE UP (ref 135–145)
WBC # BLD: 6.67 K/UL — SIGNIFICANT CHANGE UP (ref 3.8–10.5)
WBC # FLD AUTO: 6.67 K/UL — SIGNIFICANT CHANGE UP (ref 3.8–10.5)

## 2024-11-29 PROCEDURE — 99214 OFFICE O/P EST MOD 30 MIN: CPT

## 2024-12-05 NOTE — ASU PATIENT PROFILE, ADULT - HARM RISK FACTORS
Medication: xarelto  Last office visit date: 9/27/2023  Medication Refill Protocol Failed.  Medication Refill Protocol Failed. Courtesy Refill provided after Upcoming appt on 1/29/2025 and labs expected on 1/9/2025 per protocol guidelines. Writer confirmed, courtesy refill was not a duplicate.       no

## 2024-12-10 ENCOUNTER — APPOINTMENT (OUTPATIENT)
Dept: MRI IMAGING | Facility: CLINIC | Age: 59
End: 2024-12-10
Payer: MEDICARE

## 2024-12-10 PROCEDURE — 70553 MRI BRAIN STEM W/O & W/DYE: CPT

## 2024-12-10 PROCEDURE — A9585: CPT | Mod: JW

## 2024-12-19 ENCOUNTER — OUTPATIENT (OUTPATIENT)
Dept: OUTPATIENT SERVICES | Facility: HOSPITAL | Age: 59
LOS: 1 days | Discharge: HOME | End: 2024-12-19

## 2024-12-19 DIAGNOSIS — Z90.12 ACQUIRED ABSENCE OF LEFT BREAST AND NIPPLE: Chronic | ICD-10-CM

## 2024-12-19 DIAGNOSIS — Z90.3 ACQUIRED ABSENCE OF STOMACH [PART OF]: Chronic | ICD-10-CM

## 2024-12-19 DIAGNOSIS — Z98.51 TUBAL LIGATION STATUS: Chronic | ICD-10-CM

## 2024-12-19 DIAGNOSIS — C50.919 MALIGNANT NEOPLASM OF UNSPECIFIED SITE OF UNSPECIFIED FEMALE BREAST: ICD-10-CM

## 2024-12-20 ENCOUNTER — RESULT REVIEW (OUTPATIENT)
Age: 59
End: 2024-12-20

## 2024-12-20 ENCOUNTER — APPOINTMENT (OUTPATIENT)
Dept: HEMATOLOGY ONCOLOGY | Facility: CLINIC | Age: 59
End: 2024-12-20
Payer: MEDICARE

## 2024-12-20 ENCOUNTER — APPOINTMENT (OUTPATIENT)
Dept: INFUSION THERAPY | Facility: HOSPITAL | Age: 59
End: 2024-12-20

## 2024-12-20 VITALS
HEIGHT: 60.04 IN | WEIGHT: 156.53 LBS | BODY MASS INDEX: 30.33 KG/M2 | SYSTOLIC BLOOD PRESSURE: 116 MMHG | HEART RATE: 63 BPM | DIASTOLIC BLOOD PRESSURE: 76 MMHG | TEMPERATURE: 98.2 F | RESPIRATION RATE: 17 BRPM | OXYGEN SATURATION: 98 %

## 2024-12-20 DIAGNOSIS — C50.919 MALIGNANT NEOPLASM OF UNSPECIFIED SITE OF UNSPECIFIED FEMALE BREAST: ICD-10-CM

## 2024-12-20 DIAGNOSIS — Z79.899 OTHER LONG TERM (CURRENT) DRUG THERAPY: ICD-10-CM

## 2024-12-20 LAB
ALBUMIN SERPL ELPH-MCNC: 4.1 G/DL — SIGNIFICANT CHANGE UP (ref 3.3–5)
ALP SERPL-CCNC: 158 U/L — HIGH (ref 40–120)
ALT FLD-CCNC: 30 U/L — SIGNIFICANT CHANGE UP (ref 10–45)
ANION GAP SERPL CALC-SCNC: 11 MMOL/L — SIGNIFICANT CHANGE UP (ref 5–17)
AST SERPL-CCNC: 47 U/L — HIGH (ref 10–40)
BASOPHILS # BLD AUTO: 0.03 K/UL — SIGNIFICANT CHANGE UP (ref 0–0.2)
BASOPHILS NFR BLD AUTO: 0.6 % — SIGNIFICANT CHANGE UP (ref 0–2)
BILIRUB SERPL-MCNC: 0.2 MG/DL — SIGNIFICANT CHANGE UP (ref 0.2–1.2)
BUN SERPL-MCNC: 14 MG/DL — SIGNIFICANT CHANGE UP (ref 7–23)
CALCIUM SERPL-MCNC: 9 MG/DL — SIGNIFICANT CHANGE UP (ref 8.4–10.5)
CEA SERPL-MCNC: 1.5 NG/ML — SIGNIFICANT CHANGE UP (ref 0–3.8)
CHLORIDE SERPL-SCNC: 107 MMOL/L — SIGNIFICANT CHANGE UP (ref 96–108)
CO2 SERPL-SCNC: 22 MMOL/L — SIGNIFICANT CHANGE UP (ref 22–31)
CREAT SERPL-MCNC: 0.47 MG/DL — LOW (ref 0.5–1.3)
EGFR: 110 ML/MIN/1.73M2 — SIGNIFICANT CHANGE UP
EOSINOPHIL # BLD AUTO: 0.29 K/UL — SIGNIFICANT CHANGE UP (ref 0–0.5)
EOSINOPHIL NFR BLD AUTO: 5.6 % — SIGNIFICANT CHANGE UP (ref 0–6)
GLUCOSE SERPL-MCNC: 126 MG/DL — HIGH (ref 70–99)
HCT VFR BLD CALC: 35.1 % — SIGNIFICANT CHANGE UP (ref 34.5–45)
HGB BLD-MCNC: 12.1 G/DL — SIGNIFICANT CHANGE UP (ref 11.5–15.5)
IMM GRANULOCYTES NFR BLD AUTO: 0.6 % — SIGNIFICANT CHANGE UP (ref 0–0.9)
LYMPHOCYTES # BLD AUTO: 2.12 K/UL — SIGNIFICANT CHANGE UP (ref 1–3.3)
LYMPHOCYTES # BLD AUTO: 40.8 % — SIGNIFICANT CHANGE UP (ref 13–44)
MCHC RBC-ENTMCNC: 30.4 PG — SIGNIFICANT CHANGE UP (ref 27–34)
MCHC RBC-ENTMCNC: 34.5 G/DL — SIGNIFICANT CHANGE UP (ref 32–36)
MCV RBC AUTO: 88.2 FL — SIGNIFICANT CHANGE UP (ref 80–100)
MONOCYTES # BLD AUTO: 0.53 K/UL — SIGNIFICANT CHANGE UP (ref 0–0.9)
MONOCYTES NFR BLD AUTO: 10.2 % — SIGNIFICANT CHANGE UP (ref 2–14)
NEUTROPHILS # BLD AUTO: 2.19 K/UL — SIGNIFICANT CHANGE UP (ref 1.8–7.4)
NEUTROPHILS NFR BLD AUTO: 42.2 % — LOW (ref 43–77)
NRBC # BLD: 0 /100 WBCS — SIGNIFICANT CHANGE UP (ref 0–0)
NRBC BLD-RTO: 0 /100 WBCS — SIGNIFICANT CHANGE UP (ref 0–0)
PLATELET # BLD AUTO: 263 K/UL — SIGNIFICANT CHANGE UP (ref 150–400)
POTASSIUM SERPL-MCNC: 4.6 MMOL/L — SIGNIFICANT CHANGE UP (ref 3.5–5.3)
POTASSIUM SERPL-SCNC: 4.6 MMOL/L — SIGNIFICANT CHANGE UP (ref 3.5–5.3)
PROT SERPL-MCNC: 6.8 G/DL — SIGNIFICANT CHANGE UP (ref 6–8.3)
RBC # BLD: 3.98 M/UL — SIGNIFICANT CHANGE UP (ref 3.8–5.2)
RBC # FLD: 16.1 % — HIGH (ref 10.3–14.5)
SODIUM SERPL-SCNC: 140 MMOL/L — SIGNIFICANT CHANGE UP (ref 135–145)
WBC # BLD: 5.19 K/UL — SIGNIFICANT CHANGE UP (ref 3.8–10.5)
WBC # FLD AUTO: 5.19 K/UL — SIGNIFICANT CHANGE UP (ref 3.8–10.5)

## 2024-12-20 PROCEDURE — 99214 OFFICE O/P EST MOD 30 MIN: CPT

## 2024-12-20 PROCEDURE — G2211 COMPLEX E/M VISIT ADD ON: CPT

## 2024-12-21 LAB — CANCER AG27-29 SERPL-ACNC: 14.4 U/ML — SIGNIFICANT CHANGE UP (ref 0–38.6)

## 2024-12-23 DIAGNOSIS — Z51.11 ENCOUNTER FOR ANTINEOPLASTIC CHEMOTHERAPY: ICD-10-CM

## 2024-12-23 DIAGNOSIS — C79.31 SECONDARY MALIGNANT NEOPLASM OF BRAIN: ICD-10-CM

## 2024-12-23 DIAGNOSIS — R11.2 NAUSEA WITH VOMITING, UNSPECIFIED: ICD-10-CM

## 2025-01-09 ENCOUNTER — APPOINTMENT (OUTPATIENT)
Dept: RADIATION ONCOLOGY | Facility: CLINIC | Age: 60
End: 2025-01-09
Payer: MEDICARE

## 2025-01-09 ENCOUNTER — APPOINTMENT (OUTPATIENT)
Dept: HEMATOLOGY ONCOLOGY | Facility: CLINIC | Age: 60
End: 2025-01-09

## 2025-01-09 DIAGNOSIS — C79.31 SECONDARY MALIGNANT NEOPLASM OF BRAIN: ICD-10-CM

## 2025-01-09 PROCEDURE — 99024 POSTOP FOLLOW-UP VISIT: CPT

## 2025-01-10 ENCOUNTER — APPOINTMENT (OUTPATIENT)
Dept: INFUSION THERAPY | Facility: HOSPITAL | Age: 60
End: 2025-01-10

## 2025-01-10 ENCOUNTER — RESULT REVIEW (OUTPATIENT)
Age: 60
End: 2025-01-10

## 2025-01-10 ENCOUNTER — LABORATORY RESULT (OUTPATIENT)
Age: 60
End: 2025-01-10

## 2025-01-10 ENCOUNTER — APPOINTMENT (OUTPATIENT)
Dept: HEMATOLOGY ONCOLOGY | Facility: CLINIC | Age: 60
End: 2025-01-10
Payer: MEDICARE

## 2025-01-10 ENCOUNTER — NON-APPOINTMENT (OUTPATIENT)
Age: 60
End: 2025-01-10

## 2025-01-10 VITALS
DIASTOLIC BLOOD PRESSURE: 69 MMHG | SYSTOLIC BLOOD PRESSURE: 115 MMHG | BODY MASS INDEX: 30.6 KG/M2 | HEIGHT: 60 IN | WEIGHT: 155.86 LBS | OXYGEN SATURATION: 98 % | TEMPERATURE: 98.1 F | HEART RATE: 71 BPM | RESPIRATION RATE: 17 BRPM

## 2025-01-10 DIAGNOSIS — C50.919 MALIGNANT NEOPLASM OF UNSPECIFIED SITE OF UNSPECIFIED FEMALE BREAST: ICD-10-CM

## 2025-01-10 DIAGNOSIS — Z79.69 LONG TERM (CURRENT) USE OF OTHER IMMUNOMODULATORS AND IMMUNOSUPPRESSANTS: ICD-10-CM

## 2025-01-10 LAB
BASOPHILS # BLD AUTO: 0.02 K/UL — SIGNIFICANT CHANGE UP (ref 0–0.2)
BASOPHILS NFR BLD AUTO: 0.3 % — SIGNIFICANT CHANGE UP (ref 0–2)
EOSINOPHIL # BLD AUTO: 0.19 K/UL — SIGNIFICANT CHANGE UP (ref 0–0.5)
EOSINOPHIL NFR BLD AUTO: 2.8 % — SIGNIFICANT CHANGE UP (ref 0–6)
HCT VFR BLD CALC: 35.3 % — SIGNIFICANT CHANGE UP (ref 34.5–45)
HGB BLD-MCNC: 11.7 G/DL — SIGNIFICANT CHANGE UP (ref 11.5–15.5)
IMM GRANULOCYTES NFR BLD AUTO: 0.4 % — SIGNIFICANT CHANGE UP (ref 0–0.9)
LYMPHOCYTES # BLD AUTO: 1.84 K/UL — SIGNIFICANT CHANGE UP (ref 1–3.3)
LYMPHOCYTES # BLD AUTO: 26.8 % — SIGNIFICANT CHANGE UP (ref 13–44)
MCHC RBC-ENTMCNC: 29.5 PG — SIGNIFICANT CHANGE UP (ref 27–34)
MCHC RBC-ENTMCNC: 33.1 G/DL — SIGNIFICANT CHANGE UP (ref 32–36)
MCV RBC AUTO: 89.1 FL — SIGNIFICANT CHANGE UP (ref 80–100)
MONOCYTES # BLD AUTO: 0.6 K/UL — SIGNIFICANT CHANGE UP (ref 0–0.9)
MONOCYTES NFR BLD AUTO: 8.7 % — SIGNIFICANT CHANGE UP (ref 2–14)
NEUTROPHILS # BLD AUTO: 4.18 K/UL — SIGNIFICANT CHANGE UP (ref 1.8–7.4)
NEUTROPHILS NFR BLD AUTO: 61 % — SIGNIFICANT CHANGE UP (ref 43–77)
NRBC # BLD: 0 /100 WBCS — SIGNIFICANT CHANGE UP (ref 0–0)
NRBC BLD-RTO: 0 /100 WBCS — SIGNIFICANT CHANGE UP (ref 0–0)
PLATELET # BLD AUTO: 332 K/UL — SIGNIFICANT CHANGE UP (ref 150–400)
RBC # BLD: 3.96 M/UL — SIGNIFICANT CHANGE UP (ref 3.8–5.2)
RBC # FLD: 16.5 % — HIGH (ref 10.3–14.5)
WBC # BLD: 6.86 K/UL — SIGNIFICANT CHANGE UP (ref 3.8–10.5)
WBC # FLD AUTO: 6.86 K/UL — SIGNIFICANT CHANGE UP (ref 3.8–10.5)

## 2025-01-10 PROCEDURE — 99214 OFFICE O/P EST MOD 30 MIN: CPT

## 2025-01-10 PROCEDURE — G2211 COMPLEX E/M VISIT ADD ON: CPT

## 2025-01-15 ENCOUNTER — APPOINTMENT (OUTPATIENT)
Dept: NEUROLOGY | Facility: CLINIC | Age: 60
End: 2025-01-15
Payer: MEDICARE

## 2025-01-15 VITALS
SYSTOLIC BLOOD PRESSURE: 105 MMHG | TEMPERATURE: 98 F | WEIGHT: 155 LBS | HEART RATE: 78 BPM | BODY MASS INDEX: 30.43 KG/M2 | OXYGEN SATURATION: 99 % | DIASTOLIC BLOOD PRESSURE: 70 MMHG | HEIGHT: 60 IN

## 2025-01-15 DIAGNOSIS — R56.9 UNSPECIFIED CONVULSIONS: ICD-10-CM

## 2025-01-15 PROCEDURE — 99215 OFFICE O/P EST HI 40 MIN: CPT

## 2025-01-15 PROCEDURE — G2211 COMPLEX E/M VISIT ADD ON: CPT

## 2025-01-23 NOTE — DIETITIAN INITIAL EVALUATION ADULT. - +GENDER
Patient is wanting to see if she can get another a weight loss medication.    She is wanting to know if she can have a RX for orlistat.  She said that her insurance should cover that.    She said if that doesn't work that she will go back to regular old exercising and dieting     Please advise      Statement Selected

## 2025-01-31 ENCOUNTER — RESULT REVIEW (OUTPATIENT)
Age: 60
End: 2025-01-31

## 2025-01-31 ENCOUNTER — APPOINTMENT (OUTPATIENT)
Dept: HEMATOLOGY ONCOLOGY | Facility: CLINIC | Age: 60
End: 2025-01-31
Payer: MEDICARE

## 2025-01-31 ENCOUNTER — APPOINTMENT (OUTPATIENT)
Dept: INFUSION THERAPY | Facility: HOSPITAL | Age: 60
End: 2025-01-31

## 2025-01-31 VITALS
BODY MASS INDEX: 31.21 KG/M2 | SYSTOLIC BLOOD PRESSURE: 108 MMHG | OXYGEN SATURATION: 98 % | WEIGHT: 157.83 LBS | RESPIRATION RATE: 16 BRPM | TEMPERATURE: 98.1 F | DIASTOLIC BLOOD PRESSURE: 75 MMHG | HEART RATE: 67 BPM

## 2025-01-31 DIAGNOSIS — C50.919 MALIGNANT NEOPLASM OF UNSPECIFIED SITE OF UNSPECIFIED FEMALE BREAST: ICD-10-CM

## 2025-01-31 LAB
BASOPHILS # BLD AUTO: 0.04 K/UL — SIGNIFICANT CHANGE UP (ref 0–0.2)
BASOPHILS NFR BLD AUTO: 0.6 % — SIGNIFICANT CHANGE UP (ref 0–2)
EOSINOPHIL # BLD AUTO: 0.32 K/UL — SIGNIFICANT CHANGE UP (ref 0–0.5)
EOSINOPHIL NFR BLD AUTO: 5 % — SIGNIFICANT CHANGE UP (ref 0–6)
HCT VFR BLD CALC: 32.6 % — LOW (ref 34.5–45)
HGB BLD-MCNC: 11.1 G/DL — LOW (ref 11.5–15.5)
IMM GRANULOCYTES NFR BLD AUTO: 1.6 % — HIGH (ref 0–0.9)
LYMPHOCYTES # BLD AUTO: 2.3 K/UL — SIGNIFICANT CHANGE UP (ref 1–3.3)
LYMPHOCYTES # BLD AUTO: 35.9 % — SIGNIFICANT CHANGE UP (ref 13–44)
MCHC RBC-ENTMCNC: 30.5 PG — SIGNIFICANT CHANGE UP (ref 27–34)
MCHC RBC-ENTMCNC: 34 G/DL — SIGNIFICANT CHANGE UP (ref 32–36)
MCV RBC AUTO: 89.6 FL — SIGNIFICANT CHANGE UP (ref 80–100)
MONOCYTES # BLD AUTO: 0.66 K/UL — SIGNIFICANT CHANGE UP (ref 0–0.9)
MONOCYTES NFR BLD AUTO: 10.3 % — SIGNIFICANT CHANGE UP (ref 2–14)
NEUTROPHILS # BLD AUTO: 2.99 K/UL — SIGNIFICANT CHANGE UP (ref 1.8–7.4)
NEUTROPHILS NFR BLD AUTO: 46.6 % — SIGNIFICANT CHANGE UP (ref 43–77)
NRBC # BLD: 0 /100 WBCS — SIGNIFICANT CHANGE UP (ref 0–0)
NRBC BLD-RTO: 0 /100 WBCS — SIGNIFICANT CHANGE UP (ref 0–0)
PLATELET # BLD AUTO: 321 K/UL — SIGNIFICANT CHANGE UP (ref 150–400)
RBC # BLD: 3.64 M/UL — LOW (ref 3.8–5.2)
RBC # FLD: 16.7 % — HIGH (ref 10.3–14.5)
WBC # BLD: 6.41 K/UL — SIGNIFICANT CHANGE UP (ref 3.8–10.5)
WBC # FLD AUTO: 6.41 K/UL — SIGNIFICANT CHANGE UP (ref 3.8–10.5)

## 2025-01-31 PROCEDURE — 99214 OFFICE O/P EST MOD 30 MIN: CPT

## 2025-01-31 PROCEDURE — G2211 COMPLEX E/M VISIT ADD ON: CPT

## 2025-02-01 LAB
ALBUMIN SERPL ELPH-MCNC: 3.8 G/DL — SIGNIFICANT CHANGE UP (ref 3.3–5)
ALP SERPL-CCNC: 151 U/L — HIGH (ref 40–120)
ALT FLD-CCNC: 45 U/L — SIGNIFICANT CHANGE UP (ref 10–45)
ANION GAP SERPL CALC-SCNC: 15 MMOL/L — SIGNIFICANT CHANGE UP (ref 5–17)
AST SERPL-CCNC: 41 U/L — HIGH (ref 10–40)
BILIRUB SERPL-MCNC: <0.2 MG/DL — SIGNIFICANT CHANGE UP (ref 0.2–1.2)
BUN SERPL-MCNC: 17 MG/DL — SIGNIFICANT CHANGE UP (ref 7–23)
CALCIUM SERPL-MCNC: 9 MG/DL — SIGNIFICANT CHANGE UP (ref 8.4–10.5)
CEA SERPL-MCNC: 1.5 NG/ML — SIGNIFICANT CHANGE UP (ref 0–3.8)
CHLORIDE SERPL-SCNC: 105 MMOL/L — SIGNIFICANT CHANGE UP (ref 96–108)
CO2 SERPL-SCNC: 21 MMOL/L — LOW (ref 22–31)
CREAT SERPL-MCNC: 0.57 MG/DL — SIGNIFICANT CHANGE UP (ref 0.5–1.3)
EGFR: 105 ML/MIN/1.73M2 — SIGNIFICANT CHANGE UP
GLUCOSE SERPL-MCNC: 114 MG/DL — HIGH (ref 70–99)
POTASSIUM SERPL-MCNC: 4.8 MMOL/L — SIGNIFICANT CHANGE UP (ref 3.5–5.3)
POTASSIUM SERPL-SCNC: 4.8 MMOL/L — SIGNIFICANT CHANGE UP (ref 3.5–5.3)
PROT SERPL-MCNC: 6.4 G/DL — SIGNIFICANT CHANGE UP (ref 6–8.3)
SODIUM SERPL-SCNC: 141 MMOL/L — SIGNIFICANT CHANGE UP (ref 135–145)

## 2025-02-03 LAB — CANCER AG27-29 SERPL-ACNC: 11.2 U/ML — SIGNIFICANT CHANGE UP (ref 0–38.6)

## 2025-02-19 ENCOUNTER — OUTPATIENT (OUTPATIENT)
Dept: OUTPATIENT SERVICES | Facility: HOSPITAL | Age: 60
LOS: 1 days | Discharge: HOME | End: 2025-02-19

## 2025-02-19 DIAGNOSIS — Z90.3 ACQUIRED ABSENCE OF STOMACH [PART OF]: Chronic | ICD-10-CM

## 2025-02-19 DIAGNOSIS — Z90.49 ACQUIRED ABSENCE OF OTHER SPECIFIED PARTS OF DIGESTIVE TRACT: Chronic | ICD-10-CM

## 2025-02-19 DIAGNOSIS — Z98.51 TUBAL LIGATION STATUS: Chronic | ICD-10-CM

## 2025-02-19 DIAGNOSIS — Z90.12 ACQUIRED ABSENCE OF LEFT BREAST AND NIPPLE: Chronic | ICD-10-CM

## 2025-02-21 ENCOUNTER — RESULT REVIEW (OUTPATIENT)
Age: 60
End: 2025-02-21

## 2025-02-21 ENCOUNTER — APPOINTMENT (OUTPATIENT)
Dept: HEMATOLOGY ONCOLOGY | Facility: CLINIC | Age: 60
End: 2025-02-21
Payer: MEDICARE

## 2025-02-21 ENCOUNTER — APPOINTMENT (OUTPATIENT)
Dept: INFUSION THERAPY | Facility: HOSPITAL | Age: 60
End: 2025-02-21

## 2025-02-21 VITALS
BODY MASS INDEX: 30.57 KG/M2 | SYSTOLIC BLOOD PRESSURE: 113 MMHG | RESPIRATION RATE: 16 BRPM | DIASTOLIC BLOOD PRESSURE: 75 MMHG | HEART RATE: 64 BPM | WEIGHT: 156.53 LBS | TEMPERATURE: 97.4 F | OXYGEN SATURATION: 98 %

## 2025-02-21 DIAGNOSIS — C50.919 MALIGNANT NEOPLASM OF UNSPECIFIED SITE OF UNSPECIFIED FEMALE BREAST: ICD-10-CM

## 2025-02-21 DIAGNOSIS — C79.31 SECONDARY MALIGNANT NEOPLASM OF BRAIN: ICD-10-CM

## 2025-02-21 DIAGNOSIS — Z79.69 LONG TERM (CURRENT) USE OF OTHER IMMUNOMODULATORS AND IMMUNOSUPPRESSANTS: ICD-10-CM

## 2025-02-21 LAB
ALBUMIN SERPL ELPH-MCNC: 4.1 G/DL — SIGNIFICANT CHANGE UP (ref 3.3–5)
ALP SERPL-CCNC: 151 U/L — HIGH (ref 40–120)
ALT FLD-CCNC: 35 U/L — SIGNIFICANT CHANGE UP (ref 10–45)
ANION GAP SERPL CALC-SCNC: 11 MMOL/L — SIGNIFICANT CHANGE UP (ref 5–17)
AST SERPL-CCNC: 39 U/L — SIGNIFICANT CHANGE UP (ref 10–40)
BASOPHILS # BLD AUTO: 0.04 K/UL — SIGNIFICANT CHANGE UP (ref 0–0.2)
BASOPHILS NFR BLD AUTO: 0.7 % — SIGNIFICANT CHANGE UP (ref 0–2)
BILIRUB SERPL-MCNC: 0.2 MG/DL — SIGNIFICANT CHANGE UP (ref 0.2–1.2)
BUN SERPL-MCNC: 15 MG/DL — SIGNIFICANT CHANGE UP (ref 7–23)
CALCIUM SERPL-MCNC: 9 MG/DL — SIGNIFICANT CHANGE UP (ref 8.4–10.5)
CHLORIDE SERPL-SCNC: 104 MMOL/L — SIGNIFICANT CHANGE UP (ref 96–108)
CO2 SERPL-SCNC: 27 MMOL/L — SIGNIFICANT CHANGE UP (ref 22–31)
CREAT SERPL-MCNC: 0.52 MG/DL — SIGNIFICANT CHANGE UP (ref 0.5–1.3)
EGFR: 108 ML/MIN/1.73M2 — SIGNIFICANT CHANGE UP
EGFR: 108 ML/MIN/1.73M2 — SIGNIFICANT CHANGE UP
EOSINOPHIL # BLD AUTO: 0.42 K/UL — SIGNIFICANT CHANGE UP (ref 0–0.5)
EOSINOPHIL NFR BLD AUTO: 7 % — HIGH (ref 0–6)
GLUCOSE SERPL-MCNC: 127 MG/DL — HIGH (ref 70–99)
HCT VFR BLD CALC: 35 % — SIGNIFICANT CHANGE UP (ref 34.5–45)
HGB BLD-MCNC: 11.6 G/DL — SIGNIFICANT CHANGE UP (ref 11.5–15.5)
IMM GRANULOCYTES NFR BLD AUTO: 0.8 % — SIGNIFICANT CHANGE UP (ref 0–0.9)
LYMPHOCYTES # BLD AUTO: 2.19 K/UL — SIGNIFICANT CHANGE UP (ref 1–3.3)
LYMPHOCYTES # BLD AUTO: 36.3 % — SIGNIFICANT CHANGE UP (ref 13–44)
MCHC RBC-ENTMCNC: 29.9 PG — SIGNIFICANT CHANGE UP (ref 27–34)
MCHC RBC-ENTMCNC: 33.1 G/DL — SIGNIFICANT CHANGE UP (ref 32–36)
MCV RBC AUTO: 90.2 FL — SIGNIFICANT CHANGE UP (ref 80–100)
MONOCYTES # BLD AUTO: 0.57 K/UL — SIGNIFICANT CHANGE UP (ref 0–0.9)
MONOCYTES NFR BLD AUTO: 9.4 % — SIGNIFICANT CHANGE UP (ref 2–14)
NEUTROPHILS # BLD AUTO: 2.77 K/UL — SIGNIFICANT CHANGE UP (ref 1.8–7.4)
NEUTROPHILS NFR BLD AUTO: 45.8 % — SIGNIFICANT CHANGE UP (ref 43–77)
NRBC BLD AUTO-RTO: 0 /100 WBCS — SIGNIFICANT CHANGE UP (ref 0–0)
PLATELET # BLD AUTO: 241 K/UL — SIGNIFICANT CHANGE UP (ref 150–400)
POTASSIUM SERPL-MCNC: 4.3 MMOL/L — SIGNIFICANT CHANGE UP (ref 3.5–5.3)
POTASSIUM SERPL-SCNC: 4.3 MMOL/L — SIGNIFICANT CHANGE UP (ref 3.5–5.3)
PROT SERPL-MCNC: 6.7 G/DL — SIGNIFICANT CHANGE UP (ref 6–8.3)
RBC # BLD: 3.88 M/UL — SIGNIFICANT CHANGE UP (ref 3.8–5.2)
RBC # FLD: 16.1 % — HIGH (ref 10.3–14.5)
SODIUM SERPL-SCNC: 142 MMOL/L — SIGNIFICANT CHANGE UP (ref 135–145)
WBC # BLD: 6.04 K/UL — SIGNIFICANT CHANGE UP (ref 3.8–10.5)
WBC # FLD AUTO: 6.04 K/UL — SIGNIFICANT CHANGE UP (ref 3.8–10.5)

## 2025-02-21 PROCEDURE — 99214 OFFICE O/P EST MOD 30 MIN: CPT

## 2025-02-22 LAB
CANCER AG27-29 SERPL-ACNC: 12.3 U/ML — SIGNIFICANT CHANGE UP (ref 0–38.6)
CEA SERPL-MCNC: 1.6 NG/ML — SIGNIFICANT CHANGE UP (ref 0–3.8)

## 2025-02-24 DIAGNOSIS — C79.31 SECONDARY MALIGNANT NEOPLASM OF BRAIN: ICD-10-CM

## 2025-02-24 DIAGNOSIS — R11.2 NAUSEA WITH VOMITING, UNSPECIFIED: ICD-10-CM

## 2025-02-24 DIAGNOSIS — Z51.11 ENCOUNTER FOR ANTINEOPLASTIC CHEMOTHERAPY: ICD-10-CM

## 2025-03-04 ENCOUNTER — APPOINTMENT (OUTPATIENT)
Dept: MRI IMAGING | Facility: CLINIC | Age: 60
End: 2025-03-04
Payer: MEDICARE

## 2025-03-04 PROCEDURE — 70553 MRI BRAIN STEM W/O & W/DYE: CPT

## 2025-03-04 PROCEDURE — A9585: CPT | Mod: JW

## 2025-03-05 ENCOUNTER — APPOINTMENT (OUTPATIENT)
Dept: RADIATION ONCOLOGY | Facility: CLINIC | Age: 60
End: 2025-03-05
Payer: MEDICARE

## 2025-03-05 VITALS
HEIGHT: 60 IN | DIASTOLIC BLOOD PRESSURE: 78 MMHG | WEIGHT: 160.93 LBS | TEMPERATURE: 97.5 F | HEART RATE: 69 BPM | SYSTOLIC BLOOD PRESSURE: 126 MMHG | RESPIRATION RATE: 16 BRPM | OXYGEN SATURATION: 99 % | BODY MASS INDEX: 31.6 KG/M2

## 2025-03-05 DIAGNOSIS — C79.31 SECONDARY MALIGNANT NEOPLASM OF BRAIN: ICD-10-CM

## 2025-03-05 PROCEDURE — 99213 OFFICE O/P EST LOW 20 MIN: CPT

## 2025-03-10 ENCOUNTER — APPOINTMENT (OUTPATIENT)
Dept: INTERNAL MEDICINE | Facility: CLINIC | Age: 60
End: 2025-03-10

## 2025-03-14 ENCOUNTER — RESULT REVIEW (OUTPATIENT)
Age: 60
End: 2025-03-14

## 2025-03-14 ENCOUNTER — APPOINTMENT (OUTPATIENT)
Dept: HEMATOLOGY ONCOLOGY | Facility: CLINIC | Age: 60
End: 2025-03-14
Payer: MEDICARE

## 2025-03-14 ENCOUNTER — APPOINTMENT (OUTPATIENT)
Dept: INFUSION THERAPY | Facility: HOSPITAL | Age: 60
End: 2025-03-14

## 2025-03-14 VITALS
TEMPERATURE: 97.4 F | RESPIRATION RATE: 16 BRPM | HEART RATE: 90 BPM | DIASTOLIC BLOOD PRESSURE: 78 MMHG | WEIGHT: 157.63 LBS | SYSTOLIC BLOOD PRESSURE: 117 MMHG | BODY MASS INDEX: 30.79 KG/M2 | OXYGEN SATURATION: 97 %

## 2025-03-14 DIAGNOSIS — Z79.69 LONG TERM (CURRENT) USE OF OTHER IMMUNOMODULATORS AND IMMUNOSUPPRESSANTS: ICD-10-CM

## 2025-03-14 DIAGNOSIS — C50.919 MALIGNANT NEOPLASM OF UNSPECIFIED SITE OF UNSPECIFIED FEMALE BREAST: ICD-10-CM

## 2025-03-14 LAB
ALBUMIN SERPL ELPH-MCNC: 3.9 G/DL — SIGNIFICANT CHANGE UP (ref 3.3–5)
ALP SERPL-CCNC: 153 U/L — HIGH (ref 40–120)
ALT FLD-CCNC: 40 U/L — SIGNIFICANT CHANGE UP (ref 10–45)
ANION GAP SERPL CALC-SCNC: 8 MMOL/L — SIGNIFICANT CHANGE UP (ref 5–17)
AST SERPL-CCNC: 39 U/L — SIGNIFICANT CHANGE UP (ref 10–40)
BASOPHILS # BLD AUTO: 0 K/UL — SIGNIFICANT CHANGE UP (ref 0–0.2)
BASOPHILS NFR BLD AUTO: 0 % — SIGNIFICANT CHANGE UP (ref 0–2)
BILIRUB SERPL-MCNC: 0.2 MG/DL — SIGNIFICANT CHANGE UP (ref 0.2–1.2)
BUN SERPL-MCNC: 15 MG/DL — SIGNIFICANT CHANGE UP (ref 7–23)
CALCIUM SERPL-MCNC: 8.5 MG/DL — SIGNIFICANT CHANGE UP (ref 8.4–10.5)
CANCER AG27-29 SERPL-ACNC: 12.6 U/ML — SIGNIFICANT CHANGE UP (ref 0–38.6)
CEA SERPL-MCNC: 1.4 NG/ML — SIGNIFICANT CHANGE UP (ref 0–3.8)
CHLORIDE SERPL-SCNC: 108 MMOL/L — SIGNIFICANT CHANGE UP (ref 96–108)
CO2 SERPL-SCNC: 25 MMOL/L — SIGNIFICANT CHANGE UP (ref 22–31)
CREAT SERPL-MCNC: 0.5 MG/DL — SIGNIFICANT CHANGE UP (ref 0.5–1.3)
DACRYOCYTES BLD QL SMEAR: SLIGHT — SIGNIFICANT CHANGE UP
EGFR: 109 ML/MIN/1.73M2 — SIGNIFICANT CHANGE UP
EGFR: 109 ML/MIN/1.73M2 — SIGNIFICANT CHANGE UP
ELLIPTOCYTES BLD QL SMEAR: SIGNIFICANT CHANGE UP
EOSINOPHIL # BLD AUTO: 0.09 K/UL — SIGNIFICANT CHANGE UP (ref 0–0.5)
EOSINOPHIL NFR BLD AUTO: 2 % — SIGNIFICANT CHANGE UP (ref 0–6)
GLUCOSE SERPL-MCNC: 97 MG/DL — SIGNIFICANT CHANGE UP (ref 70–99)
HCT VFR BLD CALC: 34.8 % — SIGNIFICANT CHANGE UP (ref 34.5–45)
HGB BLD-MCNC: 11.6 G/DL — SIGNIFICANT CHANGE UP (ref 11.5–15.5)
LYMPHOCYTES # BLD AUTO: 1.94 K/UL — SIGNIFICANT CHANGE UP (ref 1–3.3)
LYMPHOCYTES # BLD AUTO: 41 % — SIGNIFICANT CHANGE UP (ref 13–44)
MCHC RBC-ENTMCNC: 29.4 PG — SIGNIFICANT CHANGE UP (ref 27–34)
MCHC RBC-ENTMCNC: 33.3 G/DL — SIGNIFICANT CHANGE UP (ref 32–36)
MCV RBC AUTO: 88.3 FL — SIGNIFICANT CHANGE UP (ref 80–100)
MONOCYTES # BLD AUTO: 0.66 K/UL — SIGNIFICANT CHANGE UP (ref 0–0.9)
MONOCYTES NFR BLD AUTO: 14 % — SIGNIFICANT CHANGE UP (ref 2–14)
NEUTROPHILS # BLD AUTO: 1.98 K/UL — SIGNIFICANT CHANGE UP (ref 1.8–7.4)
NEUTROPHILS NFR BLD AUTO: 42 % — LOW (ref 43–77)
NRBC # BLD: 0 /100 WBCS — SIGNIFICANT CHANGE UP (ref 0–0)
NRBC BLD AUTO-RTO: SIGNIFICANT CHANGE UP /100 WBCS (ref 0–0)
NRBC BLD-RTO: 0 /100 WBCS — SIGNIFICANT CHANGE UP (ref 0–0)
PLAT MORPH BLD: NORMAL — SIGNIFICANT CHANGE UP
PLATELET # BLD AUTO: 285 K/UL — SIGNIFICANT CHANGE UP (ref 150–400)
POIKILOCYTOSIS BLD QL AUTO: SIGNIFICANT CHANGE UP
POTASSIUM SERPL-MCNC: 4.2 MMOL/L — SIGNIFICANT CHANGE UP (ref 3.5–5.3)
POTASSIUM SERPL-SCNC: 4.2 MMOL/L — SIGNIFICANT CHANGE UP (ref 3.5–5.3)
PROT SERPL-MCNC: 6.5 G/DL — SIGNIFICANT CHANGE UP (ref 6–8.3)
RBC # BLD: 3.94 M/UL — SIGNIFICANT CHANGE UP (ref 3.8–5.2)
RBC # FLD: 15.4 % — HIGH (ref 10.3–14.5)
RBC BLD AUTO: ABNORMAL
SODIUM SERPL-SCNC: 141 MMOL/L — SIGNIFICANT CHANGE UP (ref 135–145)
VARIANT LYMPHS # BLD: 1 % — SIGNIFICANT CHANGE UP (ref 0–6)
VARIANT LYMPHS NFR BLD MANUAL: 1 % — SIGNIFICANT CHANGE UP (ref 0–6)
WBC # BLD: 4.72 K/UL — SIGNIFICANT CHANGE UP (ref 3.8–10.5)
WBC # FLD AUTO: 4.72 K/UL — SIGNIFICANT CHANGE UP (ref 3.8–10.5)

## 2025-03-14 PROCEDURE — 99214 OFFICE O/P EST MOD 30 MIN: CPT

## 2025-03-26 ENCOUNTER — RESULT REVIEW (OUTPATIENT)
Age: 60
End: 2025-03-26

## 2025-03-26 ENCOUNTER — APPOINTMENT (OUTPATIENT)
Dept: NUCLEAR MEDICINE | Facility: IMAGING CENTER | Age: 60
End: 2025-03-26
Payer: MEDICARE

## 2025-03-26 ENCOUNTER — OUTPATIENT (OUTPATIENT)
Dept: OUTPATIENT SERVICES | Facility: HOSPITAL | Age: 60
LOS: 1 days | End: 2025-03-26
Payer: COMMERCIAL

## 2025-03-26 DIAGNOSIS — Z98.51 TUBAL LIGATION STATUS: Chronic | ICD-10-CM

## 2025-03-26 DIAGNOSIS — Z90.3 ACQUIRED ABSENCE OF STOMACH [PART OF]: Chronic | ICD-10-CM

## 2025-03-26 DIAGNOSIS — Z90.12 ACQUIRED ABSENCE OF LEFT BREAST AND NIPPLE: Chronic | ICD-10-CM

## 2025-03-26 DIAGNOSIS — Z90.49 ACQUIRED ABSENCE OF OTHER SPECIFIED PARTS OF DIGESTIVE TRACT: Chronic | ICD-10-CM

## 2025-03-26 PROCEDURE — 78816 PET IMAGE W/CT FULL BODY: CPT | Mod: 26

## 2025-03-26 PROCEDURE — 78999 UNLISTED MISC PX DX NUC MED: CPT | Mod: 26

## 2025-03-26 PROCEDURE — 78816 PET IMAGE W/CT FULL BODY: CPT

## 2025-03-26 PROCEDURE — 78999 UNLISTED MISC PX DX NUC MED: CPT

## 2025-03-26 PROCEDURE — A9591: CPT

## 2025-03-27 ENCOUNTER — NON-APPOINTMENT (OUTPATIENT)
Age: 60
End: 2025-03-27

## 2025-04-04 ENCOUNTER — APPOINTMENT (OUTPATIENT)
Dept: INFUSION THERAPY | Facility: HOSPITAL | Age: 60
End: 2025-04-04

## 2025-04-04 ENCOUNTER — RESULT REVIEW (OUTPATIENT)
Age: 60
End: 2025-04-04

## 2025-04-04 ENCOUNTER — APPOINTMENT (OUTPATIENT)
Dept: HEMATOLOGY ONCOLOGY | Facility: CLINIC | Age: 60
End: 2025-04-04
Payer: MEDICARE

## 2025-04-04 VITALS
WEIGHT: 156.53 LBS | BODY MASS INDEX: 30.57 KG/M2 | TEMPERATURE: 97.8 F | DIASTOLIC BLOOD PRESSURE: 75 MMHG | SYSTOLIC BLOOD PRESSURE: 112 MMHG | RESPIRATION RATE: 16 BRPM | OXYGEN SATURATION: 97 % | HEART RATE: 70 BPM

## 2025-04-04 DIAGNOSIS — C79.31 SECONDARY MALIGNANT NEOPLASM OF BRAIN: ICD-10-CM

## 2025-04-04 DIAGNOSIS — Z79.69 LONG TERM (CURRENT) USE OF OTHER IMMUNOMODULATORS AND IMMUNOSUPPRESSANTS: ICD-10-CM

## 2025-04-04 DIAGNOSIS — C50.919 MALIGNANT NEOPLASM OF UNSPECIFIED SITE OF UNSPECIFIED FEMALE BREAST: ICD-10-CM

## 2025-04-04 LAB
ALBUMIN SERPL ELPH-MCNC: 4.1 G/DL — SIGNIFICANT CHANGE UP (ref 3.3–5)
ALP SERPL-CCNC: 151 U/L — HIGH (ref 40–120)
ALT FLD-CCNC: 35 U/L — SIGNIFICANT CHANGE UP (ref 10–45)
ANION GAP SERPL CALC-SCNC: 11 MMOL/L — SIGNIFICANT CHANGE UP (ref 5–17)
AST SERPL-CCNC: 39 U/L — SIGNIFICANT CHANGE UP (ref 10–40)
BASOPHILS # BLD AUTO: 0.03 K/UL — SIGNIFICANT CHANGE UP (ref 0–0.2)
BASOPHILS NFR BLD AUTO: 0.7 % — SIGNIFICANT CHANGE UP (ref 0–2)
BILIRUB SERPL-MCNC: 0.2 MG/DL — SIGNIFICANT CHANGE UP (ref 0.2–1.2)
BUN SERPL-MCNC: 15 MG/DL — SIGNIFICANT CHANGE UP (ref 7–23)
CALCIUM SERPL-MCNC: 8.9 MG/DL — SIGNIFICANT CHANGE UP (ref 8.4–10.5)
CEA SERPL-MCNC: 1.4 NG/ML — SIGNIFICANT CHANGE UP (ref 0–3.8)
CHLORIDE SERPL-SCNC: 106 MMOL/L — SIGNIFICANT CHANGE UP (ref 96–108)
CO2 SERPL-SCNC: 24 MMOL/L — SIGNIFICANT CHANGE UP (ref 22–31)
CREAT SERPL-MCNC: 0.48 MG/DL — LOW (ref 0.5–1.3)
EGFR: 110 ML/MIN/1.73M2 — SIGNIFICANT CHANGE UP
EGFR: 110 ML/MIN/1.73M2 — SIGNIFICANT CHANGE UP
EOSINOPHIL # BLD AUTO: 0.23 K/UL — SIGNIFICANT CHANGE UP (ref 0–0.5)
EOSINOPHIL NFR BLD AUTO: 5 % — SIGNIFICANT CHANGE UP (ref 0–6)
GLUCOSE SERPL-MCNC: 141 MG/DL — HIGH (ref 70–99)
HCT VFR BLD CALC: 33 % — LOW (ref 34.5–45)
HGB BLD-MCNC: 10.9 G/DL — LOW (ref 11.5–15.5)
IMM GRANULOCYTES NFR BLD AUTO: 0.9 % — SIGNIFICANT CHANGE UP (ref 0–0.9)
LYMPHOCYTES # BLD AUTO: 1.76 K/UL — SIGNIFICANT CHANGE UP (ref 1–3.3)
LYMPHOCYTES # BLD AUTO: 38.5 % — SIGNIFICANT CHANGE UP (ref 13–44)
MCHC RBC-ENTMCNC: 29.2 PG — SIGNIFICANT CHANGE UP (ref 27–34)
MCHC RBC-ENTMCNC: 33 G/DL — SIGNIFICANT CHANGE UP (ref 32–36)
MCV RBC AUTO: 88.5 FL — SIGNIFICANT CHANGE UP (ref 80–100)
MONOCYTES # BLD AUTO: 0.52 K/UL — SIGNIFICANT CHANGE UP (ref 0–0.9)
MONOCYTES NFR BLD AUTO: 11.4 % — SIGNIFICANT CHANGE UP (ref 2–14)
NEUTROPHILS # BLD AUTO: 1.99 K/UL — SIGNIFICANT CHANGE UP (ref 1.8–7.4)
NEUTROPHILS NFR BLD AUTO: 43.5 % — SIGNIFICANT CHANGE UP (ref 43–77)
NRBC BLD AUTO-RTO: 0 /100 WBCS — SIGNIFICANT CHANGE UP (ref 0–0)
PLATELET # BLD AUTO: 287 K/UL — SIGNIFICANT CHANGE UP (ref 150–400)
POTASSIUM SERPL-MCNC: 3.9 MMOL/L — SIGNIFICANT CHANGE UP (ref 3.5–5.3)
POTASSIUM SERPL-SCNC: 3.9 MMOL/L — SIGNIFICANT CHANGE UP (ref 3.5–5.3)
PROT SERPL-MCNC: 6.4 G/DL — SIGNIFICANT CHANGE UP (ref 6–8.3)
RBC # BLD: 3.73 M/UL — LOW (ref 3.8–5.2)
RBC # FLD: 15.9 % — HIGH (ref 10.3–14.5)
SODIUM SERPL-SCNC: 141 MMOL/L — SIGNIFICANT CHANGE UP (ref 135–145)
WBC # BLD: 4.57 K/UL — SIGNIFICANT CHANGE UP (ref 3.8–10.5)
WBC # FLD AUTO: 4.57 K/UL — SIGNIFICANT CHANGE UP (ref 3.8–10.5)

## 2025-04-04 PROCEDURE — 99214 OFFICE O/P EST MOD 30 MIN: CPT

## 2025-04-04 RX ORDER — OMEPRAZOLE 20 MG/1
20 CAPSULE, DELAYED RELEASE ORAL DAILY
Qty: 30 | Refills: 1 | Status: ACTIVE | COMMUNITY
Start: 2025-04-04 | End: 1900-01-01

## 2025-04-05 LAB — CANCER AG27-29 SERPL-ACNC: 10.6 U/ML — SIGNIFICANT CHANGE UP (ref 0–38.6)

## 2025-04-07 ENCOUNTER — APPOINTMENT (OUTPATIENT)
Dept: CARDIOLOGY | Facility: CLINIC | Age: 60
End: 2025-04-07
Payer: MEDICARE

## 2025-04-07 PROCEDURE — 93306 TTE W/DOPPLER COMPLETE: CPT

## 2025-04-07 PROCEDURE — 93356 MYOCRD STRAIN IMG SPCKL TRCK: CPT

## 2025-04-24 ENCOUNTER — OUTPATIENT (OUTPATIENT)
Dept: OUTPATIENT SERVICES | Facility: HOSPITAL | Age: 60
LOS: 1 days | Discharge: HOME | End: 2025-04-24

## 2025-04-24 DIAGNOSIS — Z90.3 ACQUIRED ABSENCE OF STOMACH [PART OF]: Chronic | ICD-10-CM

## 2025-04-24 DIAGNOSIS — Z90.49 ACQUIRED ABSENCE OF OTHER SPECIFIED PARTS OF DIGESTIVE TRACT: Chronic | ICD-10-CM

## 2025-04-24 DIAGNOSIS — Z98.51 TUBAL LIGATION STATUS: Chronic | ICD-10-CM

## 2025-04-24 DIAGNOSIS — Z90.12 ACQUIRED ABSENCE OF LEFT BREAST AND NIPPLE: Chronic | ICD-10-CM

## 2025-04-25 ENCOUNTER — RESULT REVIEW (OUTPATIENT)
Age: 60
End: 2025-04-25

## 2025-04-25 ENCOUNTER — APPOINTMENT (OUTPATIENT)
Dept: HEMATOLOGY ONCOLOGY | Facility: CLINIC | Age: 60
End: 2025-04-25
Payer: MEDICARE

## 2025-04-25 ENCOUNTER — APPOINTMENT (OUTPATIENT)
Dept: INFUSION THERAPY | Facility: HOSPITAL | Age: 60
End: 2025-04-25

## 2025-04-25 VITALS
DIASTOLIC BLOOD PRESSURE: 74 MMHG | TEMPERATURE: 97.9 F | BODY MASS INDEX: 31 KG/M2 | OXYGEN SATURATION: 99 % | SYSTOLIC BLOOD PRESSURE: 122 MMHG | RESPIRATION RATE: 16 BRPM | HEART RATE: 80 BPM | WEIGHT: 158.73 LBS

## 2025-04-25 DIAGNOSIS — C50.919 MALIGNANT NEOPLASM OF UNSPECIFIED SITE OF UNSPECIFIED FEMALE BREAST: ICD-10-CM

## 2025-04-25 DIAGNOSIS — Z79.69 LONG TERM (CURRENT) USE OF OTHER IMMUNOMODULATORS AND IMMUNOSUPPRESSANTS: ICD-10-CM

## 2025-04-25 LAB
ALBUMIN SERPL ELPH-MCNC: 4.1 G/DL — SIGNIFICANT CHANGE UP (ref 3.3–5)
ALP SERPL-CCNC: 149 U/L — HIGH (ref 40–120)
ALT FLD-CCNC: 46 U/L — HIGH (ref 10–45)
ANION GAP SERPL CALC-SCNC: 13 MMOL/L — SIGNIFICANT CHANGE UP (ref 5–17)
AST SERPL-CCNC: 44 U/L — HIGH (ref 10–40)
BASOPHILS # BLD AUTO: 0.03 K/UL — SIGNIFICANT CHANGE UP (ref 0–0.2)
BASOPHILS NFR BLD AUTO: 0.8 % — SIGNIFICANT CHANGE UP (ref 0–2)
BILIRUB SERPL-MCNC: 0.2 MG/DL — SIGNIFICANT CHANGE UP (ref 0.2–1.2)
BUN SERPL-MCNC: 11 MG/DL — SIGNIFICANT CHANGE UP (ref 7–23)
CALCIUM SERPL-MCNC: 8.8 MG/DL — SIGNIFICANT CHANGE UP (ref 8.4–10.5)
CANCER AG27-29 SERPL-ACNC: 11.8 U/ML — SIGNIFICANT CHANGE UP (ref 0–38.6)
CEA SERPL-MCNC: 1.6 NG/ML — SIGNIFICANT CHANGE UP (ref 0–3.8)
CHLORIDE SERPL-SCNC: 106 MMOL/L — SIGNIFICANT CHANGE UP (ref 96–108)
CO2 SERPL-SCNC: 22 MMOL/L — SIGNIFICANT CHANGE UP (ref 22–31)
CREAT SERPL-MCNC: 0.48 MG/DL — LOW (ref 0.5–1.3)
EGFR: 110 ML/MIN/1.73M2 — SIGNIFICANT CHANGE UP
EGFR: 110 ML/MIN/1.73M2 — SIGNIFICANT CHANGE UP
EOSINOPHIL # BLD AUTO: 0.18 K/UL — SIGNIFICANT CHANGE UP (ref 0–0.5)
EOSINOPHIL NFR BLD AUTO: 4.5 % — SIGNIFICANT CHANGE UP (ref 0–6)
GLUCOSE SERPL-MCNC: 118 MG/DL — HIGH (ref 70–99)
HCT VFR BLD CALC: 31.6 % — LOW (ref 34.5–45)
HGB BLD-MCNC: 10.4 G/DL — LOW (ref 11.5–15.5)
IMM GRANULOCYTES NFR BLD AUTO: 0.3 % — SIGNIFICANT CHANGE UP (ref 0–0.9)
LYMPHOCYTES # BLD AUTO: 1.75 K/UL — SIGNIFICANT CHANGE UP (ref 1–3.3)
LYMPHOCYTES # BLD AUTO: 44 % — SIGNIFICANT CHANGE UP (ref 13–44)
MCHC RBC-ENTMCNC: 28.5 PG — SIGNIFICANT CHANGE UP (ref 27–34)
MCHC RBC-ENTMCNC: 32.9 G/DL — SIGNIFICANT CHANGE UP (ref 32–36)
MCV RBC AUTO: 86.6 FL — SIGNIFICANT CHANGE UP (ref 80–100)
MONOCYTES # BLD AUTO: 0.5 K/UL — SIGNIFICANT CHANGE UP (ref 0–0.9)
MONOCYTES NFR BLD AUTO: 12.6 % — SIGNIFICANT CHANGE UP (ref 2–14)
NEUTROPHILS # BLD AUTO: 1.51 K/UL — LOW (ref 1.8–7.4)
NEUTROPHILS NFR BLD AUTO: 37.8 % — LOW (ref 43–77)
NRBC BLD AUTO-RTO: 0 /100 WBCS — SIGNIFICANT CHANGE UP (ref 0–0)
PLATELET # BLD AUTO: 283 K/UL — SIGNIFICANT CHANGE UP (ref 150–400)
POTASSIUM SERPL-MCNC: 3.9 MMOL/L — SIGNIFICANT CHANGE UP (ref 3.5–5.3)
POTASSIUM SERPL-SCNC: 3.9 MMOL/L — SIGNIFICANT CHANGE UP (ref 3.5–5.3)
PROT SERPL-MCNC: 6.4 G/DL — SIGNIFICANT CHANGE UP (ref 6–8.3)
RBC # BLD: 3.65 M/UL — LOW (ref 3.8–5.2)
RBC # FLD: 16.1 % — HIGH (ref 10.3–14.5)
SODIUM SERPL-SCNC: 142 MMOL/L — SIGNIFICANT CHANGE UP (ref 135–145)
WBC # BLD: 3.98 K/UL — SIGNIFICANT CHANGE UP (ref 3.8–10.5)
WBC # FLD AUTO: 3.98 K/UL — SIGNIFICANT CHANGE UP (ref 3.8–10.5)

## 2025-04-25 PROCEDURE — 99214 OFFICE O/P EST MOD 30 MIN: CPT

## 2025-04-28 DIAGNOSIS — C79.31 SECONDARY MALIGNANT NEOPLASM OF BRAIN: ICD-10-CM

## 2025-04-28 DIAGNOSIS — R11.2 NAUSEA WITH VOMITING, UNSPECIFIED: ICD-10-CM

## 2025-04-28 DIAGNOSIS — Z51.11 ENCOUNTER FOR ANTINEOPLASTIC CHEMOTHERAPY: ICD-10-CM

## 2025-05-16 ENCOUNTER — RESULT REVIEW (OUTPATIENT)
Age: 60
End: 2025-05-16

## 2025-05-16 ENCOUNTER — APPOINTMENT (OUTPATIENT)
Dept: HEMATOLOGY ONCOLOGY | Facility: CLINIC | Age: 60
End: 2025-05-16
Payer: MEDICARE

## 2025-05-16 ENCOUNTER — APPOINTMENT (OUTPATIENT)
Dept: INFUSION THERAPY | Facility: HOSPITAL | Age: 60
End: 2025-05-16

## 2025-05-16 VITALS
WEIGHT: 156.53 LBS | RESPIRATION RATE: 16 BRPM | DIASTOLIC BLOOD PRESSURE: 77 MMHG | HEART RATE: 65 BPM | TEMPERATURE: 97 F | BODY MASS INDEX: 30.57 KG/M2 | SYSTOLIC BLOOD PRESSURE: 121 MMHG | OXYGEN SATURATION: 99 %

## 2025-05-16 DIAGNOSIS — C79.31 SECONDARY MALIGNANT NEOPLASM OF BRAIN: ICD-10-CM

## 2025-05-16 DIAGNOSIS — C50.919 MALIGNANT NEOPLASM OF UNSPECIFIED SITE OF UNSPECIFIED FEMALE BREAST: ICD-10-CM

## 2025-05-16 LAB
ALBUMIN SERPL ELPH-MCNC: 4.3 G/DL — SIGNIFICANT CHANGE UP (ref 3.3–5)
ALP SERPL-CCNC: 171 U/L — HIGH (ref 40–120)
ALT FLD-CCNC: 47 U/L — HIGH (ref 10–45)
ANION GAP SERPL CALC-SCNC: 13 MMOL/L — SIGNIFICANT CHANGE UP (ref 5–17)
AST SERPL-CCNC: 45 U/L — HIGH (ref 10–40)
BASOPHILS # BLD AUTO: 0.03 K/UL — SIGNIFICANT CHANGE UP (ref 0–0.2)
BASOPHILS NFR BLD AUTO: 0.5 % — SIGNIFICANT CHANGE UP (ref 0–2)
BILIRUB SERPL-MCNC: 0.2 MG/DL — SIGNIFICANT CHANGE UP (ref 0.2–1.2)
BUN SERPL-MCNC: 13 MG/DL — SIGNIFICANT CHANGE UP (ref 7–23)
CALCIUM SERPL-MCNC: 8.9 MG/DL — SIGNIFICANT CHANGE UP (ref 8.4–10.5)
CHLORIDE SERPL-SCNC: 106 MMOL/L — SIGNIFICANT CHANGE UP (ref 96–108)
CO2 SERPL-SCNC: 24 MMOL/L — SIGNIFICANT CHANGE UP (ref 22–31)
CREAT SERPL-MCNC: 0.47 MG/DL — LOW (ref 0.5–1.3)
EGFR: 110 ML/MIN/1.73M2 — SIGNIFICANT CHANGE UP
EGFR: 110 ML/MIN/1.73M2 — SIGNIFICANT CHANGE UP
EOSINOPHIL # BLD AUTO: 0.47 K/UL — SIGNIFICANT CHANGE UP (ref 0–0.5)
EOSINOPHIL NFR BLD AUTO: 8.5 % — HIGH (ref 0–6)
GLUCOSE SERPL-MCNC: 91 MG/DL — SIGNIFICANT CHANGE UP (ref 70–99)
HCT VFR BLD CALC: 32.6 % — LOW (ref 34.5–45)
HGB BLD-MCNC: 10.8 G/DL — LOW (ref 11.5–15.5)
IMM GRANULOCYTES NFR BLD AUTO: 0.5 % — SIGNIFICANT CHANGE UP (ref 0–0.9)
LYMPHOCYTES # BLD AUTO: 1.89 K/UL — SIGNIFICANT CHANGE UP (ref 1–3.3)
LYMPHOCYTES # BLD AUTO: 34.2 % — SIGNIFICANT CHANGE UP (ref 13–44)
MCHC RBC-ENTMCNC: 28.3 PG — SIGNIFICANT CHANGE UP (ref 27–34)
MCHC RBC-ENTMCNC: 33.1 G/DL — SIGNIFICANT CHANGE UP (ref 32–36)
MCV RBC AUTO: 85.6 FL — SIGNIFICANT CHANGE UP (ref 80–100)
MONOCYTES # BLD AUTO: 0.53 K/UL — SIGNIFICANT CHANGE UP (ref 0–0.9)
MONOCYTES NFR BLD AUTO: 9.6 % — SIGNIFICANT CHANGE UP (ref 2–14)
NEUTROPHILS # BLD AUTO: 2.58 K/UL — SIGNIFICANT CHANGE UP (ref 1.8–7.4)
NEUTROPHILS NFR BLD AUTO: 46.7 % — SIGNIFICANT CHANGE UP (ref 43–77)
NRBC BLD AUTO-RTO: 0 /100 WBCS — SIGNIFICANT CHANGE UP (ref 0–0)
PLATELET # BLD AUTO: 325 K/UL — SIGNIFICANT CHANGE UP (ref 150–400)
POTASSIUM SERPL-MCNC: 4.1 MMOL/L — SIGNIFICANT CHANGE UP (ref 3.5–5.3)
POTASSIUM SERPL-SCNC: 4.1 MMOL/L — SIGNIFICANT CHANGE UP (ref 3.5–5.3)
PROT SERPL-MCNC: 6.5 G/DL — SIGNIFICANT CHANGE UP (ref 6–8.3)
RBC # BLD: 3.81 M/UL — SIGNIFICANT CHANGE UP (ref 3.8–5.2)
RBC # FLD: 16.5 % — HIGH (ref 10.3–14.5)
SODIUM SERPL-SCNC: 142 MMOL/L — SIGNIFICANT CHANGE UP (ref 135–145)
WBC # BLD: 5.53 K/UL — SIGNIFICANT CHANGE UP (ref 3.8–10.5)
WBC # FLD AUTO: 5.53 K/UL — SIGNIFICANT CHANGE UP (ref 3.8–10.5)

## 2025-05-16 PROCEDURE — 99214 OFFICE O/P EST MOD 30 MIN: CPT

## 2025-05-16 PROCEDURE — G2211 COMPLEX E/M VISIT ADD ON: CPT

## 2025-05-17 LAB — CEA SERPL-MCNC: 1.4 NG/ML — SIGNIFICANT CHANGE UP (ref 0–3.8)

## 2025-05-19 LAB — CANCER AG27-29 SERPL-ACNC: 11.3 U/ML — SIGNIFICANT CHANGE UP (ref 0–38.6)

## 2025-06-05 ENCOUNTER — APPOINTMENT (OUTPATIENT)
Dept: MRI IMAGING | Facility: IMAGING CENTER | Age: 60
End: 2025-06-05

## 2025-06-05 ENCOUNTER — RESULT REVIEW (OUTPATIENT)
Age: 60
End: 2025-06-05

## 2025-06-05 ENCOUNTER — OUTPATIENT (OUTPATIENT)
Dept: OUTPATIENT SERVICES | Facility: HOSPITAL | Age: 60
LOS: 1 days | End: 2025-06-05
Payer: COMMERCIAL

## 2025-06-05 DIAGNOSIS — Z90.12 ACQUIRED ABSENCE OF LEFT BREAST AND NIPPLE: Chronic | ICD-10-CM

## 2025-06-05 DIAGNOSIS — Z90.3 ACQUIRED ABSENCE OF STOMACH [PART OF]: Chronic | ICD-10-CM

## 2025-06-05 DIAGNOSIS — Z98.51 TUBAL LIGATION STATUS: Chronic | ICD-10-CM

## 2025-06-05 DIAGNOSIS — C79.31 SECONDARY MALIGNANT NEOPLASM OF BRAIN: ICD-10-CM

## 2025-06-05 DIAGNOSIS — Z00.8 ENCOUNTER FOR OTHER GENERAL EXAMINATION: ICD-10-CM

## 2025-06-05 DIAGNOSIS — Z90.49 ACQUIRED ABSENCE OF OTHER SPECIFIED PARTS OF DIGESTIVE TRACT: Chronic | ICD-10-CM

## 2025-06-05 PROCEDURE — A9585: CPT

## 2025-06-05 PROCEDURE — 70553 MRI BRAIN STEM W/O & W/DYE: CPT | Mod: 26

## 2025-06-05 PROCEDURE — 76937 US GUIDE VASCULAR ACCESS: CPT | Mod: 26

## 2025-06-05 PROCEDURE — 76937 US GUIDE VASCULAR ACCESS: CPT

## 2025-06-05 PROCEDURE — 36410 VNPNXR 3YR/> PHY/QHP DX/THER: CPT

## 2025-06-05 PROCEDURE — 70553 MRI BRAIN STEM W/O & W/DYE: CPT

## 2025-06-06 ENCOUNTER — RESULT REVIEW (OUTPATIENT)
Age: 60
End: 2025-06-06

## 2025-06-06 ENCOUNTER — APPOINTMENT (OUTPATIENT)
Dept: HEMATOLOGY ONCOLOGY | Facility: CLINIC | Age: 60
End: 2025-06-06
Payer: MEDICARE

## 2025-06-06 ENCOUNTER — APPOINTMENT (OUTPATIENT)
Dept: INFUSION THERAPY | Facility: HOSPITAL | Age: 60
End: 2025-06-06

## 2025-06-06 VITALS
RESPIRATION RATE: 16 BRPM | BODY MASS INDEX: 30.57 KG/M2 | SYSTOLIC BLOOD PRESSURE: 118 MMHG | WEIGHT: 156.53 LBS | OXYGEN SATURATION: 97 % | TEMPERATURE: 97.8 F | DIASTOLIC BLOOD PRESSURE: 57 MMHG | HEART RATE: 62 BPM

## 2025-06-06 LAB
ALBUMIN SERPL ELPH-MCNC: 4.1 G/DL — SIGNIFICANT CHANGE UP (ref 3.3–5)
ALP SERPL-CCNC: 149 U/L — HIGH (ref 40–120)
ALT FLD-CCNC: 34 U/L — SIGNIFICANT CHANGE UP (ref 10–45)
ANION GAP SERPL CALC-SCNC: 13 MMOL/L — SIGNIFICANT CHANGE UP (ref 5–17)
AST SERPL-CCNC: 38 U/L — SIGNIFICANT CHANGE UP (ref 10–40)
BASOPHILS # BLD AUTO: 0.02 K/UL — SIGNIFICANT CHANGE UP (ref 0–0.2)
BASOPHILS NFR BLD AUTO: 0.3 % — SIGNIFICANT CHANGE UP (ref 0–2)
BILIRUB SERPL-MCNC: 0.2 MG/DL — SIGNIFICANT CHANGE UP (ref 0.2–1.2)
BUN SERPL-MCNC: 14 MG/DL — SIGNIFICANT CHANGE UP (ref 7–23)
CALCIUM SERPL-MCNC: 8.5 MG/DL — SIGNIFICANT CHANGE UP (ref 8.4–10.5)
CHLORIDE SERPL-SCNC: 105 MMOL/L — SIGNIFICANT CHANGE UP (ref 96–108)
CO2 SERPL-SCNC: 22 MMOL/L — SIGNIFICANT CHANGE UP (ref 22–31)
CREAT SERPL-MCNC: 0.53 MG/DL — SIGNIFICANT CHANGE UP (ref 0.5–1.3)
EGFR: 107 ML/MIN/1.73M2 — SIGNIFICANT CHANGE UP
EGFR: 107 ML/MIN/1.73M2 — SIGNIFICANT CHANGE UP
EOSINOPHIL # BLD AUTO: 0.26 K/UL — SIGNIFICANT CHANGE UP (ref 0–0.5)
EOSINOPHIL NFR BLD AUTO: 4.3 % — SIGNIFICANT CHANGE UP (ref 0–6)
GLUCOSE SERPL-MCNC: 164 MG/DL — HIGH (ref 70–99)
HCT VFR BLD CALC: 34.1 % — LOW (ref 34.5–45)
HGB BLD-MCNC: 11.2 G/DL — LOW (ref 11.5–15.5)
IMM GRANULOCYTES NFR BLD AUTO: 1 % — HIGH (ref 0–0.9)
LYMPHOCYTES # BLD AUTO: 2.24 K/UL — SIGNIFICANT CHANGE UP (ref 1–3.3)
LYMPHOCYTES # BLD AUTO: 37.1 % — SIGNIFICANT CHANGE UP (ref 13–44)
MCHC RBC-ENTMCNC: 28.3 PG — SIGNIFICANT CHANGE UP (ref 27–34)
MCHC RBC-ENTMCNC: 32.8 G/DL — SIGNIFICANT CHANGE UP (ref 32–36)
MCV RBC AUTO: 86.1 FL — SIGNIFICANT CHANGE UP (ref 80–100)
MONOCYTES # BLD AUTO: 0.57 K/UL — SIGNIFICANT CHANGE UP (ref 0–0.9)
MONOCYTES NFR BLD AUTO: 9.4 % — SIGNIFICANT CHANGE UP (ref 2–14)
NEUTROPHILS # BLD AUTO: 2.89 K/UL — SIGNIFICANT CHANGE UP (ref 1.8–7.4)
NEUTROPHILS NFR BLD AUTO: 47.9 % — SIGNIFICANT CHANGE UP (ref 43–77)
NRBC BLD AUTO-RTO: 0 /100 WBCS — SIGNIFICANT CHANGE UP (ref 0–0)
PLATELET # BLD AUTO: 281 K/UL — SIGNIFICANT CHANGE UP (ref 150–400)
POTASSIUM SERPL-MCNC: 4 MMOL/L — SIGNIFICANT CHANGE UP (ref 3.5–5.3)
POTASSIUM SERPL-SCNC: 4 MMOL/L — SIGNIFICANT CHANGE UP (ref 3.5–5.3)
PROT SERPL-MCNC: 6.3 G/DL — SIGNIFICANT CHANGE UP (ref 6–8.3)
RBC # BLD: 3.96 M/UL — SIGNIFICANT CHANGE UP (ref 3.8–5.2)
RBC # FLD: 18.1 % — HIGH (ref 10.3–14.5)
SODIUM SERPL-SCNC: 141 MMOL/L — SIGNIFICANT CHANGE UP (ref 135–145)
WBC # BLD: 6.04 K/UL — SIGNIFICANT CHANGE UP (ref 3.8–10.5)
WBC # FLD AUTO: 6.04 K/UL — SIGNIFICANT CHANGE UP (ref 3.8–10.5)

## 2025-06-06 PROCEDURE — 99214 OFFICE O/P EST MOD 30 MIN: CPT

## 2025-06-07 LAB
CANCER AG27-29 SERPL-ACNC: 11 U/ML — SIGNIFICANT CHANGE UP (ref 0–38.6)
CEA SERPL-MCNC: 1.3 NG/ML — SIGNIFICANT CHANGE UP (ref 0–3.8)

## 2025-06-11 ENCOUNTER — NON-APPOINTMENT (OUTPATIENT)
Age: 60
End: 2025-06-11

## 2025-06-11 ENCOUNTER — APPOINTMENT (OUTPATIENT)
Dept: RADIATION ONCOLOGY | Facility: CLINIC | Age: 60
End: 2025-06-11

## 2025-06-11 PROCEDURE — 99213 OFFICE O/P EST LOW 20 MIN: CPT | Mod: 93

## 2025-06-27 ENCOUNTER — APPOINTMENT (OUTPATIENT)
Dept: HEMATOLOGY ONCOLOGY | Facility: CLINIC | Age: 60
End: 2025-06-27

## 2025-06-27 ENCOUNTER — APPOINTMENT (OUTPATIENT)
Dept: INFUSION THERAPY | Facility: HOSPITAL | Age: 60
End: 2025-06-27

## 2025-06-28 ENCOUNTER — OUTPATIENT (OUTPATIENT)
Dept: OUTPATIENT SERVICES | Facility: HOSPITAL | Age: 60
LOS: 1 days | Discharge: HOME | End: 2025-06-28

## 2025-06-28 DIAGNOSIS — C50.919 MALIGNANT NEOPLASM OF UNSPECIFIED SITE OF UNSPECIFIED FEMALE BREAST: ICD-10-CM

## 2025-06-28 DIAGNOSIS — Z90.49 ACQUIRED ABSENCE OF OTHER SPECIFIED PARTS OF DIGESTIVE TRACT: Chronic | ICD-10-CM

## 2025-06-28 DIAGNOSIS — Z90.3 ACQUIRED ABSENCE OF STOMACH [PART OF]: Chronic | ICD-10-CM

## 2025-06-28 DIAGNOSIS — Z90.12 ACQUIRED ABSENCE OF LEFT BREAST AND NIPPLE: Chronic | ICD-10-CM

## 2025-06-28 DIAGNOSIS — Z98.51 TUBAL LIGATION STATUS: Chronic | ICD-10-CM

## 2025-07-03 ENCOUNTER — APPOINTMENT (OUTPATIENT)
Dept: INFUSION THERAPY | Facility: HOSPITAL | Age: 60
End: 2025-07-03

## 2025-07-03 ENCOUNTER — RESULT REVIEW (OUTPATIENT)
Age: 60
End: 2025-07-03

## 2025-07-03 ENCOUNTER — APPOINTMENT (OUTPATIENT)
Dept: HEMATOLOGY ONCOLOGY | Facility: CLINIC | Age: 60
End: 2025-07-03
Payer: MEDICARE

## 2025-07-03 ENCOUNTER — APPOINTMENT (OUTPATIENT)
Dept: HEMATOLOGY ONCOLOGY | Facility: CLINIC | Age: 60
End: 2025-07-03

## 2025-07-03 VITALS
SYSTOLIC BLOOD PRESSURE: 116 MMHG | HEART RATE: 66 BPM | RESPIRATION RATE: 16 BRPM | BODY MASS INDEX: 31.43 KG/M2 | DIASTOLIC BLOOD PRESSURE: 76 MMHG | WEIGHT: 160.93 LBS | TEMPERATURE: 97.9 F | OXYGEN SATURATION: 99 %

## 2025-07-03 LAB
ALBUMIN SERPL ELPH-MCNC: 4.1 G/DL — SIGNIFICANT CHANGE UP (ref 3.3–5)
ALP SERPL-CCNC: 168 U/L — HIGH (ref 40–120)
ALT FLD-CCNC: 39 U/L — SIGNIFICANT CHANGE UP (ref 10–45)
ANION GAP SERPL CALC-SCNC: 13 MMOL/L — SIGNIFICANT CHANGE UP (ref 5–17)
AST SERPL-CCNC: 37 U/L — SIGNIFICANT CHANGE UP (ref 10–40)
BASOPHILS # BLD AUTO: 0.02 K/UL — SIGNIFICANT CHANGE UP (ref 0–0.2)
BASOPHILS NFR BLD AUTO: 0.3 % — SIGNIFICANT CHANGE UP (ref 0–2)
BILIRUB SERPL-MCNC: 0.2 MG/DL — SIGNIFICANT CHANGE UP (ref 0.2–1.2)
BUN SERPL-MCNC: 15 MG/DL — SIGNIFICANT CHANGE UP (ref 7–23)
CALCIUM SERPL-MCNC: 9 MG/DL — SIGNIFICANT CHANGE UP (ref 8.4–10.5)
CEA SERPL-MCNC: 1.5 NG/ML — SIGNIFICANT CHANGE UP (ref 0–3.8)
CHLORIDE SERPL-SCNC: 107 MMOL/L — SIGNIFICANT CHANGE UP (ref 96–108)
CO2 SERPL-SCNC: 22 MMOL/L — SIGNIFICANT CHANGE UP (ref 22–31)
CREAT SERPL-MCNC: 0.57 MG/DL — SIGNIFICANT CHANGE UP (ref 0.5–1.3)
EGFR: 105 ML/MIN/1.73M2 — SIGNIFICANT CHANGE UP
EGFR: 105 ML/MIN/1.73M2 — SIGNIFICANT CHANGE UP
EOSINOPHIL # BLD AUTO: 0.27 K/UL — SIGNIFICANT CHANGE UP (ref 0–0.5)
EOSINOPHIL NFR BLD AUTO: 4.7 % — SIGNIFICANT CHANGE UP (ref 0–6)
GLUCOSE SERPL-MCNC: 124 MG/DL — HIGH (ref 70–99)
HCT VFR BLD CALC: 34.1 % — LOW (ref 34.5–45)
HGB BLD-MCNC: 11.2 G/DL — LOW (ref 11.5–15.5)
IMM GRANULOCYTES NFR BLD AUTO: 0.7 % — SIGNIFICANT CHANGE UP (ref 0–0.9)
LYMPHOCYTES # BLD AUTO: 2.4 K/UL — SIGNIFICANT CHANGE UP (ref 1–3.3)
LYMPHOCYTES # BLD AUTO: 41.4 % — SIGNIFICANT CHANGE UP (ref 13–44)
MCHC RBC-ENTMCNC: 28.7 PG — SIGNIFICANT CHANGE UP (ref 27–34)
MCHC RBC-ENTMCNC: 32.8 G/DL — SIGNIFICANT CHANGE UP (ref 32–36)
MCV RBC AUTO: 87.4 FL — SIGNIFICANT CHANGE UP (ref 80–100)
MONOCYTES # BLD AUTO: 0.49 K/UL — SIGNIFICANT CHANGE UP (ref 0–0.9)
MONOCYTES NFR BLD AUTO: 8.4 % — SIGNIFICANT CHANGE UP (ref 2–14)
NEUTROPHILS # BLD AUTO: 2.58 K/UL — SIGNIFICANT CHANGE UP (ref 1.8–7.4)
NEUTROPHILS NFR BLD AUTO: 44.5 % — SIGNIFICANT CHANGE UP (ref 43–77)
NRBC BLD AUTO-RTO: 0 /100 WBCS — SIGNIFICANT CHANGE UP (ref 0–0)
PLATELET # BLD AUTO: 241 K/UL — SIGNIFICANT CHANGE UP (ref 150–400)
POTASSIUM SERPL-MCNC: 4.1 MMOL/L — SIGNIFICANT CHANGE UP (ref 3.5–5.3)
POTASSIUM SERPL-SCNC: 4.1 MMOL/L — SIGNIFICANT CHANGE UP (ref 3.5–5.3)
PROT SERPL-MCNC: 6.6 G/DL — SIGNIFICANT CHANGE UP (ref 6–8.3)
RBC # BLD: 3.9 M/UL — SIGNIFICANT CHANGE UP (ref 3.8–5.2)
RBC # FLD: 18 % — HIGH (ref 10.3–14.5)
SODIUM SERPL-SCNC: 142 MMOL/L — SIGNIFICANT CHANGE UP (ref 135–145)
WBC # BLD: 5.8 K/UL — SIGNIFICANT CHANGE UP (ref 3.8–10.5)
WBC # FLD AUTO: 5.8 K/UL — SIGNIFICANT CHANGE UP (ref 3.8–10.5)

## 2025-07-03 PROCEDURE — 99214 OFFICE O/P EST MOD 30 MIN: CPT

## 2025-07-03 PROCEDURE — G2211 COMPLEX E/M VISIT ADD ON: CPT

## 2025-07-04 LAB — CANCER AG27-29 SERPL-ACNC: 11.5 U/ML — SIGNIFICANT CHANGE UP (ref 0–38.6)

## 2025-07-07 DIAGNOSIS — C79.31 SECONDARY MALIGNANT NEOPLASM OF BRAIN: ICD-10-CM

## 2025-07-07 DIAGNOSIS — R11.2 NAUSEA WITH VOMITING, UNSPECIFIED: ICD-10-CM

## 2025-07-07 DIAGNOSIS — Z51.11 ENCOUNTER FOR ANTINEOPLASTIC CHEMOTHERAPY: ICD-10-CM

## 2025-07-18 ENCOUNTER — APPOINTMENT (OUTPATIENT)
Dept: INFUSION THERAPY | Facility: HOSPITAL | Age: 60
End: 2025-07-18

## 2025-07-18 ENCOUNTER — APPOINTMENT (OUTPATIENT)
Dept: HEMATOLOGY ONCOLOGY | Facility: CLINIC | Age: 60
End: 2025-07-18

## 2025-08-01 ENCOUNTER — RESULT REVIEW (OUTPATIENT)
Age: 60
End: 2025-08-01

## 2025-08-01 ENCOUNTER — APPOINTMENT (OUTPATIENT)
Dept: HEMATOLOGY ONCOLOGY | Facility: CLINIC | Age: 60
End: 2025-08-01
Payer: MEDICARE

## 2025-08-01 ENCOUNTER — APPOINTMENT (OUTPATIENT)
Dept: INFUSION THERAPY | Facility: HOSPITAL | Age: 60
End: 2025-08-01

## 2025-08-01 VITALS
TEMPERATURE: 97.8 F | OXYGEN SATURATION: 99 % | WEIGHT: 160.93 LBS | DIASTOLIC BLOOD PRESSURE: 75 MMHG | HEART RATE: 63 BPM | BODY MASS INDEX: 31.43 KG/M2 | SYSTOLIC BLOOD PRESSURE: 127 MMHG | RESPIRATION RATE: 16 BRPM

## 2025-08-01 DIAGNOSIS — C50.919 MALIGNANT NEOPLASM OF UNSPECIFIED SITE OF UNSPECIFIED FEMALE BREAST: ICD-10-CM

## 2025-08-01 DIAGNOSIS — Z79.69 LONG TERM (CURRENT) USE OF OTHER IMMUNOMODULATORS AND IMMUNOSUPPRESSANTS: ICD-10-CM

## 2025-08-01 LAB
BASOPHILS # BLD AUTO: 0.02 K/UL — SIGNIFICANT CHANGE UP (ref 0–0.2)
BASOPHILS NFR BLD AUTO: 0.3 % — SIGNIFICANT CHANGE UP (ref 0–2)
EOSINOPHIL # BLD AUTO: 0.23 K/UL — SIGNIFICANT CHANGE UP (ref 0–0.5)
EOSINOPHIL NFR BLD AUTO: 3.4 % — SIGNIFICANT CHANGE UP (ref 0–6)
HCT VFR BLD CALC: 34.9 % — SIGNIFICANT CHANGE UP (ref 34.5–45)
HGB BLD-MCNC: 11.5 G/DL — SIGNIFICANT CHANGE UP (ref 11.5–15.5)
IMM GRANULOCYTES NFR BLD AUTO: 0.9 % — SIGNIFICANT CHANGE UP (ref 0–0.9)
LYMPHOCYTES # BLD AUTO: 2.3 K/UL — SIGNIFICANT CHANGE UP (ref 1–3.3)
LYMPHOCYTES # BLD AUTO: 34.1 % — SIGNIFICANT CHANGE UP (ref 13–44)
MCHC RBC-ENTMCNC: 28 PG — SIGNIFICANT CHANGE UP (ref 27–34)
MCHC RBC-ENTMCNC: 33 G/DL — SIGNIFICANT CHANGE UP (ref 32–36)
MCV RBC AUTO: 85.1 FL — SIGNIFICANT CHANGE UP (ref 80–100)
MONOCYTES # BLD AUTO: 0.58 K/UL — SIGNIFICANT CHANGE UP (ref 0–0.9)
MONOCYTES NFR BLD AUTO: 8.6 % — SIGNIFICANT CHANGE UP (ref 2–14)
NEUTROPHILS # BLD AUTO: 3.56 K/UL — SIGNIFICANT CHANGE UP (ref 1.8–7.4)
NEUTROPHILS NFR BLD AUTO: 52.7 % — SIGNIFICANT CHANGE UP (ref 43–77)
NRBC BLD AUTO-RTO: 0 /100 WBCS — SIGNIFICANT CHANGE UP (ref 0–0)
PLATELET # BLD AUTO: 293 K/UL — SIGNIFICANT CHANGE UP (ref 150–400)
RBC # BLD: 4.1 M/UL — SIGNIFICANT CHANGE UP (ref 3.8–5.2)
RBC # FLD: 17.2 % — HIGH (ref 10.3–14.5)
WBC # BLD: 6.75 K/UL — SIGNIFICANT CHANGE UP (ref 3.8–10.5)
WBC # FLD AUTO: 6.75 K/UL — SIGNIFICANT CHANGE UP (ref 3.8–10.5)

## 2025-08-01 PROCEDURE — 99214 OFFICE O/P EST MOD 30 MIN: CPT

## 2025-08-02 LAB
ALBUMIN SERPL ELPH-MCNC: 4 G/DL — SIGNIFICANT CHANGE UP (ref 3.3–5)
ALP SERPL-CCNC: 175 U/L — HIGH (ref 40–120)
ALT FLD-CCNC: 48 U/L — HIGH (ref 10–40)
ANION GAP SERPL CALC-SCNC: 14 MMOL/L — SIGNIFICANT CHANGE UP (ref 5–17)
AST SERPL-CCNC: 45 U/L — HIGH (ref 10–35)
BILIRUB SERPL-MCNC: <0.2 MG/DL — SIGNIFICANT CHANGE UP (ref 0.2–1.2)
BUN SERPL-MCNC: 14 MG/DL — SIGNIFICANT CHANGE UP (ref 7–23)
CALCIUM SERPL-MCNC: 8.9 MG/DL — SIGNIFICANT CHANGE UP (ref 8.4–10.5)
CANCER AG27-29 SERPL-ACNC: 10.1 U/ML — SIGNIFICANT CHANGE UP (ref 0–38.6)
CEA SERPL-MCNC: 1.8 NG/ML — SIGNIFICANT CHANGE UP (ref 0–3.8)
CHLORIDE SERPL-SCNC: 106 MMOL/L — SIGNIFICANT CHANGE UP (ref 96–108)
CO2 SERPL-SCNC: 22 MMOL/L — SIGNIFICANT CHANGE UP (ref 22–31)
CREAT SERPL-MCNC: 0.46 MG/DL — LOW (ref 0.5–1.3)
EGFR: 111 ML/MIN/1.73M2 — SIGNIFICANT CHANGE UP
EGFR: 111 ML/MIN/1.73M2 — SIGNIFICANT CHANGE UP
GLUCOSE SERPL-MCNC: 110 MG/DL — HIGH (ref 70–99)
POTASSIUM SERPL-MCNC: 4.5 MMOL/L — SIGNIFICANT CHANGE UP (ref 3.5–5.3)
POTASSIUM SERPL-SCNC: 4.5 MMOL/L — SIGNIFICANT CHANGE UP (ref 3.5–5.3)
PROT SERPL-MCNC: 6.7 G/DL — SIGNIFICANT CHANGE UP (ref 6–8.3)
SODIUM SERPL-SCNC: 141 MMOL/L — SIGNIFICANT CHANGE UP (ref 135–145)

## 2025-08-06 ENCOUNTER — APPOINTMENT (OUTPATIENT)
Dept: CARDIOLOGY | Facility: CLINIC | Age: 60
End: 2025-08-06
Payer: MEDICARE

## 2025-08-06 PROCEDURE — 93306 TTE W/DOPPLER COMPLETE: CPT

## 2025-08-06 PROCEDURE — 93356 MYOCRD STRAIN IMG SPCKL TRCK: CPT

## 2025-08-27 ENCOUNTER — APPOINTMENT (OUTPATIENT)
Dept: NEUROLOGY | Facility: CLINIC | Age: 60
End: 2025-08-27
Payer: MEDICARE

## 2025-08-27 ENCOUNTER — NON-APPOINTMENT (OUTPATIENT)
Age: 60
End: 2025-08-27

## 2025-08-27 VITALS
SYSTOLIC BLOOD PRESSURE: 113 MMHG | BODY MASS INDEX: 31.41 KG/M2 | OXYGEN SATURATION: 100 % | DIASTOLIC BLOOD PRESSURE: 69 MMHG | HEART RATE: 69 BPM | HEIGHT: 60 IN | WEIGHT: 160 LBS | TEMPERATURE: 98 F

## 2025-08-27 DIAGNOSIS — R56.9 UNSPECIFIED CONVULSIONS: ICD-10-CM

## 2025-08-27 PROCEDURE — 99215 OFFICE O/P EST HI 40 MIN: CPT

## 2025-08-27 PROCEDURE — G2211 COMPLEX E/M VISIT ADD ON: CPT

## 2025-08-29 ENCOUNTER — RESULT REVIEW (OUTPATIENT)
Age: 60
End: 2025-08-29

## 2025-08-29 ENCOUNTER — APPOINTMENT (OUTPATIENT)
Dept: HEMATOLOGY ONCOLOGY | Facility: CLINIC | Age: 60
End: 2025-08-29
Payer: MEDICARE

## 2025-08-29 ENCOUNTER — APPOINTMENT (OUTPATIENT)
Dept: INFUSION THERAPY | Facility: HOSPITAL | Age: 60
End: 2025-08-29

## 2025-08-29 VITALS
RESPIRATION RATE: 16 BRPM | BODY MASS INDEX: 31.18 KG/M2 | OXYGEN SATURATION: 99 % | TEMPERATURE: 97.1 F | WEIGHT: 160.93 LBS | DIASTOLIC BLOOD PRESSURE: 79 MMHG | HEART RATE: 62 BPM | HEIGHT: 60.42 IN | SYSTOLIC BLOOD PRESSURE: 122 MMHG

## 2025-08-29 DIAGNOSIS — Z79.69 LONG TERM (CURRENT) USE OF OTHER IMMUNOMODULATORS AND IMMUNOSUPPRESSANTS: ICD-10-CM

## 2025-08-29 DIAGNOSIS — C50.919 MALIGNANT NEOPLASM OF UNSPECIFIED SITE OF UNSPECIFIED FEMALE BREAST: ICD-10-CM

## 2025-08-29 DIAGNOSIS — C79.31 SECONDARY MALIGNANT NEOPLASM OF BRAIN: ICD-10-CM

## 2025-08-29 PROCEDURE — 99214 OFFICE O/P EST MOD 30 MIN: CPT

## 2025-08-29 RX ORDER — IRON,CARBONYL/CALCIUM 50-117MG
25 TABLET ORAL
Refills: 0 | Status: ACTIVE | COMMUNITY

## 2025-09-04 ENCOUNTER — APPOINTMENT (OUTPATIENT)
Dept: MRI IMAGING | Facility: CLINIC | Age: 60
End: 2025-09-04

## 2025-09-04 PROCEDURE — A9585: CPT | Mod: JZ

## 2025-09-04 PROCEDURE — 70553 MRI BRAIN STEM W/O & W/DYE: CPT

## 2025-09-10 ENCOUNTER — APPOINTMENT (OUTPATIENT)
Dept: RADIATION ONCOLOGY | Facility: CLINIC | Age: 60
End: 2025-09-10
Payer: MEDICARE

## 2025-09-10 DIAGNOSIS — C79.31 SECONDARY MALIGNANT NEOPLASM OF BRAIN: ICD-10-CM

## 2025-09-10 PROCEDURE — 99213 OFFICE O/P EST LOW 20 MIN: CPT | Mod: 93
